# Patient Record
Sex: FEMALE | Employment: UNEMPLOYED | URBAN - METROPOLITAN AREA
[De-identification: names, ages, dates, MRNs, and addresses within clinical notes are randomized per-mention and may not be internally consistent; named-entity substitution may affect disease eponyms.]

---

## 2017-06-12 ENCOUNTER — APPOINTMENT (OUTPATIENT)
Dept: GENERAL RADIOLOGY | Age: 52
End: 2017-06-12
Attending: EMERGENCY MEDICINE
Payer: MEDICARE

## 2017-06-12 ENCOUNTER — HOSPITAL ENCOUNTER (EMERGENCY)
Age: 52
Discharge: HOME | End: 2017-06-13
Attending: EMERGENCY MEDICINE
Payer: MEDICARE

## 2017-06-12 DIAGNOSIS — I10 ESSENTIAL HYPERTENSION: ICD-10-CM

## 2017-06-12 DIAGNOSIS — L03.311 CELLULITIS OF ABDOMINAL WALL: Primary | ICD-10-CM

## 2017-06-12 LAB
ALBUMIN SERPL BCP-MCNC: 2.6 G/DL (ref 3.4–5)
ALBUMIN/GLOB SERPL: 0.4 {RATIO} (ref 0.8–1.7)
ALP SERPL-CCNC: 115 U/L (ref 45–117)
ALT SERPL-CCNC: 33 U/L (ref 13–56)
ANION GAP BLD CALC-SCNC: 6 MMOL/L (ref 3–18)
AST SERPL W P-5'-P-CCNC: 73 U/L (ref 15–37)
BASOPHILS # BLD AUTO: 0 K/UL (ref 0–0.06)
BASOPHILS # BLD: 0 % (ref 0–2)
BILIRUB SERPL-MCNC: 0.4 MG/DL (ref 0.2–1)
BUN SERPL-MCNC: 21 MG/DL (ref 7–18)
BUN/CREAT SERPL: 18 (ref 12–20)
CALCIUM SERPL-MCNC: 8.9 MG/DL (ref 8.5–10.1)
CHLORIDE SERPL-SCNC: 103 MMOL/L (ref 100–108)
CO2 SERPL-SCNC: 27 MMOL/L (ref 21–32)
CREAT SERPL-MCNC: 1.14 MG/DL (ref 0.6–1.3)
DIFFERENTIAL METHOD BLD: ABNORMAL
EOSINOPHIL # BLD: 0.6 K/UL (ref 0–0.4)
EOSINOPHIL NFR BLD: 7 % (ref 0–5)
ERYTHROCYTE [DISTWIDTH] IN BLOOD BY AUTOMATED COUNT: 16.2 % (ref 11.6–14.5)
GLOBULIN SER CALC-MCNC: 7.4 G/DL (ref 2–4)
GLUCOSE SERPL-MCNC: 116 MG/DL (ref 74–99)
HCT VFR BLD AUTO: 26.5 % (ref 35–45)
HGB BLD-MCNC: 8.6 G/DL (ref 12–16)
LYMPHOCYTES # BLD AUTO: 21 % (ref 21–52)
LYMPHOCYTES # BLD: 1.6 K/UL (ref 0.9–3.6)
MCH RBC QN AUTO: 30.2 PG (ref 24–34)
MCHC RBC AUTO-ENTMCNC: 32.5 G/DL (ref 31–37)
MCV RBC AUTO: 93 FL (ref 74–97)
MONOCYTES # BLD: 0.6 K/UL (ref 0.05–1.2)
MONOCYTES NFR BLD AUTO: 7 % (ref 3–10)
NEUTS SEG # BLD: 4.9 K/UL (ref 1.8–8)
NEUTS SEG NFR BLD AUTO: 65 % (ref 40–73)
PLATELET # BLD AUTO: 198 K/UL (ref 135–420)
PMV BLD AUTO: 10.7 FL (ref 9.2–11.8)
POTASSIUM SERPL-SCNC: 5.3 MMOL/L (ref 3.5–5.5)
PROT SERPL-MCNC: 10 G/DL (ref 6.4–8.2)
RBC # BLD AUTO: 2.85 M/UL (ref 4.2–5.3)
SODIUM SERPL-SCNC: 136 MMOL/L (ref 136–145)
WBC # BLD AUTO: 7.7 K/UL (ref 4.6–13.2)

## 2017-06-12 PROCEDURE — 80053 COMPREHEN METABOLIC PANEL: CPT | Performed by: EMERGENCY MEDICINE

## 2017-06-12 PROCEDURE — 85025 COMPLETE CBC W/AUTO DIFF WBC: CPT | Performed by: EMERGENCY MEDICINE

## 2017-06-12 PROCEDURE — 81001 URINALYSIS AUTO W/SCOPE: CPT | Performed by: EMERGENCY MEDICINE

## 2017-06-12 PROCEDURE — 99284 EMERGENCY DEPT VISIT MOD MDM: CPT

## 2017-06-12 PROCEDURE — 71010 XR CHEST PORT: CPT

## 2017-06-12 RX ORDER — BISMUTH SUBSALICYLATE 262 MG
1 TABLET,CHEWABLE ORAL DAILY
COMMUNITY

## 2017-06-12 RX ORDER — FUROSEMIDE 20 MG/1
TABLET ORAL DAILY
Status: ON HOLD | COMMUNITY
End: 2017-10-09

## 2017-06-12 RX ORDER — LOSARTAN POTASSIUM 50 MG/1
TABLET ORAL DAILY
COMMUNITY
End: 2017-06-13

## 2017-06-12 RX ORDER — CLONIDINE HYDROCHLORIDE 0.1 MG/1
0.1 TABLET ORAL 2 TIMES DAILY
COMMUNITY
End: 2017-10-23

## 2017-06-13 ENCOUNTER — APPOINTMENT (OUTPATIENT)
Dept: CT IMAGING | Age: 52
End: 2017-06-13
Attending: EMERGENCY MEDICINE
Payer: MEDICARE

## 2017-06-13 VITALS
SYSTOLIC BLOOD PRESSURE: 174 MMHG | TEMPERATURE: 98.7 F | WEIGHT: 157 LBS | OXYGEN SATURATION: 92 % | HEART RATE: 80 BPM | RESPIRATION RATE: 16 BRPM | BODY MASS INDEX: 26.95 KG/M2 | DIASTOLIC BLOOD PRESSURE: 92 MMHG

## 2017-06-13 LAB
APPEARANCE UR: CLEAR
BILIRUB UR QL: NEGATIVE
COLOR UR: YELLOW
EPITH CASTS URNS QL MICRO: NEGATIVE /LPF (ref 0–5)
GLUCOSE BLD STRIP.AUTO-MCNC: 116 MG/DL (ref 70–110)
GLUCOSE UR STRIP.AUTO-MCNC: NEGATIVE MG/DL
HGB UR QL STRIP: ABNORMAL
KETONES UR QL STRIP.AUTO: NEGATIVE MG/DL
LEUKOCYTE ESTERASE UR QL STRIP.AUTO: NEGATIVE
NITRITE UR QL STRIP.AUTO: NEGATIVE
PH UR STRIP: >8.5 [PH] (ref 5–8)
PROT UR STRIP-MCNC: 300 MG/DL
RBC #/AREA URNS HPF: NEGATIVE /HPF (ref 0–5)
SP GR UR REFRACTOMETRY: 1.01 (ref 1–1.03)
UROBILINOGEN UR QL STRIP.AUTO: 0.2 EU/DL (ref 0.2–1)
WBC URNS QL MICRO: NEGATIVE /HPF (ref 0–4)

## 2017-06-13 PROCEDURE — 74011250637 HC RX REV CODE- 250/637: Performed by: EMERGENCY MEDICINE

## 2017-06-13 PROCEDURE — 82962 GLUCOSE BLOOD TEST: CPT

## 2017-06-13 PROCEDURE — 74011250637 HC RX REV CODE- 250/637: Performed by: PHYSICIAN ASSISTANT

## 2017-06-13 PROCEDURE — 74176 CT ABD & PELVIS W/O CONTRAST: CPT

## 2017-06-13 RX ORDER — SULFAMETHOXAZOLE AND TRIMETHOPRIM 400; 80 MG/1; MG/1
1 TABLET ORAL
Status: DISCONTINUED | OUTPATIENT
Start: 2017-06-13 | End: 2017-06-13

## 2017-06-13 RX ORDER — HYDROCODONE BITARTRATE AND ACETAMINOPHEN 7.5; 325 MG/1; MG/1
1 TABLET ORAL
Status: COMPLETED | OUTPATIENT
Start: 2017-06-13 | End: 2017-06-13

## 2017-06-13 RX ORDER — DIPHENHYDRAMINE HYDROCHLORIDE 50 MG/ML
25 INJECTION, SOLUTION INTRAMUSCULAR; INTRAVENOUS
Status: DISCONTINUED | OUTPATIENT
Start: 2017-06-13 | End: 2017-06-13 | Stop reason: HOSPADM

## 2017-06-13 RX ORDER — CIPROFLOXACIN 500 MG/1
500 TABLET ORAL 2 TIMES DAILY
Qty: 14 TAB | Refills: 0 | Status: SHIPPED | OUTPATIENT
Start: 2017-06-13 | End: 2017-06-20

## 2017-06-13 RX ORDER — SULFAMETHOXAZOLE AND TRIMETHOPRIM 800; 160 MG/1; MG/1
1 TABLET ORAL
Status: COMPLETED | OUTPATIENT
Start: 2017-06-13 | End: 2017-06-13

## 2017-06-13 RX ORDER — SULFAMETHOXAZOLE AND TRIMETHOPRIM 800; 160 MG/1; MG/1
1 TABLET ORAL 2 TIMES DAILY
Qty: 14 TAB | Refills: 0 | Status: SHIPPED | OUTPATIENT
Start: 2017-06-13 | End: 2017-06-20

## 2017-06-13 RX ORDER — CIPROFLOXACIN 500 MG/1
500 TABLET ORAL
Status: COMPLETED | OUTPATIENT
Start: 2017-06-13 | End: 2017-06-13

## 2017-06-13 RX ORDER — ONDANSETRON 2 MG/ML
4 INJECTION INTRAMUSCULAR; INTRAVENOUS
Status: DISCONTINUED | OUTPATIENT
Start: 2017-06-13 | End: 2017-06-13

## 2017-06-13 RX ADMIN — CIPROFLOXACIN HYDROCHLORIDE 500 MG: 500 TABLET, FILM COATED ORAL at 03:25

## 2017-06-13 RX ADMIN — SULFAMETHOXAZOLE AND TRIMETHOPRIM 1 TABLET: 800; 160 TABLET ORAL at 03:25

## 2017-06-13 RX ADMIN — HYDROCODONE BITARTRATE AND ACETAMINOPHEN 1 TABLET: 7.5; 325 TABLET ORAL at 01:49

## 2017-06-13 NOTE — ED PROVIDER NOTES
HPI Comments: Jennifer Verma is a 54 y.o. female that presents to the ED with a complaint of abdominal pain and infection around feeding tube. Pt states that she went to her PCP for these issues 2 days ago. They prescribed antibiotics but she was unaware of treatment plan. She states that she did not receive any medications because they were sent to the wrong pharmacy. Pt states pain is 7/10. Denies CP, SOB, urinary sx, HA, NVD    Patient is a 54 y.o. female presenting with abdominal pain. Abdominal Pain    Pertinent negatives include no fever, no diarrhea, no nausea, no vomiting, no dysuria, no arthralgias, no myalgias and no chest pain. Past Medical History:   Diagnosis Date    Anemia     Cancer ([facility]     throat    Coronary artery disease     Diabetes ([facility] [facility]     Diabetes mellitus ([facility] [facility]     ETOH abuse     HTN (hypertension)     Hypertension     Hypothyroid     Lupus ([facility] [facility]     Osteonecrosis (HCC)     of jaw    PEG adjustment, replacement, or removal     S/P thoracentesis     Throat cancer [facility])        Past Surgical History:   Procedure Laterality Date    ABDOMEN SURGERY PROC UNLISTED      feeding tube placement    HX  SECTION      HX HEENT      throat cancer biopsy         History reviewed. No pertinent family history. Social History     Social History    Marital status: LEGALLY      Spouse name: N/A    Number of children: N/A    Years of education: N/A     Occupational History    Not on file. Social History Main Topics    Smoking status: Former Smoker    Smokeless tobacco: Never Used    Alcohol use No    Drug use: No    Sexual activity: Not on file     Other Topics Concern    Not on file     Social History Narrative         ALLERGIES: Amlodipine and Morphine    Review of Systems   Constitutional: Negative for fatigue and fever. HENT: Negative for congestion. Respiratory: Negative for cough and shortness of breath.     Cardiovascular: Negative for chest pain. Gastrointestinal: Positive for abdominal pain. Negative for diarrhea, nausea and vomiting. Genitourinary: Negative for dysuria. Musculoskeletal: Negative for arthralgias and myalgias. Skin: Positive for color change and rash. Neurological: Negative for dizziness and numbness. All other systems reviewed and are negative. Vitals:    06/12/17 2117 06/12/17 2259 06/13/17 0003 06/13/17 0008   BP: (!) 164/111 (!) 174/118 (!) 186/97    Pulse: 81 80     Resp: 16 16     Temp: 98.7 °F (37.1 °C)      SpO2: 95% 99%  100%   Weight: 71.2 kg (157 lb)               Physical Exam   Constitutional: She is oriented to person, place, and time. She appears well-developed and well-nourished. No distress. HENT:   Head: Normocephalic and atraumatic. Nose: Nose normal.   Eyes: Conjunctivae are normal. Pupils are equal, round, and reactive to light. Neck: Normal range of motion. Neck supple. Cardiovascular: Normal rate, regular rhythm and normal heart sounds. Pulmonary/Chest: Effort normal and breath sounds normal.   Abdominal: Normal appearance and bowel sounds are normal. There is tenderness in the left upper quadrant. There is no CVA tenderness. Peg tube noted. Musculoskeletal: Normal range of motion. Neurological: She is alert and oriented to person, place, and time. Skin: Skin is warm. No bruising and no laceration noted. There is erythema. Psychiatric: She has a normal mood and affect. Her behavior is normal.   Nursing note and vitals reviewed. MDM  Number of Diagnoses or Management Options  Diagnosis management comments: Labs Reviewed  CBC WITH AUTOMATED DIFF - Abnormal; Notable for the following:      RBC                           2.85 (*)               HGB                           8.6 (*)                HCT                           26.5 (*)               RDW                           16.2 (*)               EOSINOPHILS                   7 (*)                  ABS. EOSINOPHILS              0.6 (*)             All other components within normal limits  METABOLIC PANEL, COMPREHENSIVE - Abnormal; Notable for the following:      Glucose                       116 (*)                BUN                           21 (*)                 GFR est non-AA                51 (*)                 AST (SGOT)                    73 (*)                 Protein, total                10.0 (*)               Albumin                       2.6 (*)                Globulin                      7.4 (*)                A-G Ratio                     0.4 (*)             All other components within normal limits  URINALYSIS W/ RFLX MICROSCOPIC - Abnormal; Notable for the following:      pH (UA)                       >8.5 (*)               Protein                       300 (*)                Blood                         TRACE (*)            All other components within normal limits  URINE MICROSCOPIC ONLY    CT:- No focal fluid collections or subcutaneous emphysema noted around PEG tube  -Cholelithiasis  Atelectasis and ground glass centrilobular nodules in right middle lobe, non specific, can be seen in atypical infections.  -additional ground glass nodular opacities in left lower lobe, non specific, infectious/inflammatory    REcent wound culture at PCP showed MRSA and KLEB.   Will treat with Cipro and BActrim    Impression: cellulitis of skin,     Plan:antibiotic treatment here  discharge home  Take medications as prescribed  Follow up with PCP           Amount and/or Complexity of Data Reviewed  Clinical lab tests: ordered and reviewed  Tests in the radiology section of CPT®: ordered and reviewed  Tests in the medicine section of CPT®: ordered and reviewed    Risk of Complications, Morbidity, and/or Mortality  Presenting problems: moderate  Diagnostic procedures: moderate  Management options: moderate    Patient Progress  Patient progress: stable    ED Course       Procedures           Vitals:  Patient Vitals for the past 12 hrs:   Temp Pulse Resp BP SpO2   06/13/17 0234 - - - - 94 %   06/13/17 0208 - - - (!) 186/101 -   06/13/17 0133 - - - - 100 %   06/13/17 0008 - - - - 100 %   06/13/17 0003 - - - (!) 186/97 -   06/12/17 2259 - 80 16 (!) 174/118 99 %   06/12/17 2117 98.7 °F (37.1 °C) 81 16 (!) 164/111 95 %         Medications ordered:   Medications   diphenhydrAMINE (BENADRYL) injection 25 mg (0 mg IntraVENous Held 6/13/17 0101)   ciprofloxacin HCl (CIPRO) tablet 500 mg (not administered)   trimethoprim-sulfamethoxazole (BACTRIM, SEPTRA)  mg per tablet 1 Tab (not administered)   HYDROcodone-acetaminophen (NORCO) 7.5-325 mg per tablet 1 Tab (1 Tab Oral Given 6/13/17 0149)         Lab findings:  Recent Results (from the past 12 hour(s))   CBC WITH AUTOMATED DIFF    Collection Time: 06/12/17 10:30 PM   Result Value Ref Range    WBC 7.7 4.6 - 13.2 K/uL    RBC 2.85 (L) 4.20 - 5.30 M/uL    HGB 8.6 (L) 12.0 - 16.0 g/dL    HCT 26.5 (L) 35.0 - 45.0 %    MCV 93.0 74.0 - 97.0 FL    MCH 30.2 24.0 - 34.0 PG    MCHC 32.5 31.0 - 37.0 g/dL    RDW 16.2 (H) 11.6 - 14.5 %    PLATELET 463 305 - 967 K/uL    MPV 10.7 9.2 - 11.8 FL    NEUTROPHILS 65 40 - 73 %    LYMPHOCYTES 21 21 - 52 %    MONOCYTES 7 3 - 10 %    EOSINOPHILS 7 (H) 0 - 5 %    BASOPHILS 0 0 - 2 %    ABS. NEUTROPHILS 4.9 1.8 - 8.0 K/UL    ABS. LYMPHOCYTES 1.6 0.9 - 3.6 K/UL    ABS. MONOCYTES 0.6 0.05 - 1.2 K/UL    ABS. EOSINOPHILS 0.6 (H) 0.0 - 0.4 K/UL    ABS.  BASOPHILS 0.0 0.0 - 0.06 K/UL    DF AUTOMATED     METABOLIC PANEL, COMPREHENSIVE    Collection Time: 06/12/17 10:30 PM   Result Value Ref Range    Sodium 136 136 - 145 mmol/L    Potassium 5.3 3.5 - 5.5 mmol/L    Chloride 103 100 - 108 mmol/L    CO2 27 21 - 32 mmol/L    Anion gap 6 3.0 - 18 mmol/L    Glucose 116 (H) 74 - 99 mg/dL    BUN 21 (H) 7.0 - 18 MG/DL    Creatinine 1.14 0.6 - 1.3 MG/DL    BUN/Creatinine ratio 18 12 - 20      GFR est AA >60 >60 ml/min/1.73m2    GFR est non-AA 51 (L) >60 ml/min/1.73m2 Calcium 8.9 8.5 - 10.1 MG/DL    Bilirubin, total 0.4 0.2 - 1.0 MG/DL    ALT (SGPT) 33 13 - 56 U/L    AST (SGOT) 73 (H) 15 - 37 U/L    Alk. phosphatase 115 45 - 117 U/L    Protein, total 10.0 (H) 6.4 - 8.2 g/dL    Albumin 2.6 (L) 3.4 - 5.0 g/dL    Globulin 7.4 (H) 2.0 - 4.0 g/dL    A-G Ratio 0.4 (L) 0.8 - 1.7     URINALYSIS W/ RFLX MICROSCOPIC    Collection Time: 06/12/17 11:35 PM   Result Value Ref Range    Color YELLOW      Appearance CLEAR      Specific gravity 1.012 1.005 - 1.030      pH (UA) >8.5 (H) 5.0 - 8.0    Protein 300 (A) NEG mg/dL    Glucose NEGATIVE  NEG mg/dL    Ketone NEGATIVE  NEG mg/dL    Bilirubin NEGATIVE  NEG      Blood TRACE (A) NEG      Urobilinogen 0.2 0.2 - 1.0 EU/dL    Nitrites NEGATIVE  NEG      Leukocyte Esterase NEGATIVE  NEG     URINE MICROSCOPIC ONLY    Collection Time: 06/12/17 11:35 PM   Result Value Ref Range    WBC NEGATIVE  0 - 4 /hpf    RBC NEGATIVE  0 - 5 /hpf    Epithelial cells NEGATIVE  0 - 5 /lpf   GLUCOSE, POC    Collection Time: 06/13/17  2:11 AM   Result Value Ref Range    Glucose (POC) 116 (H) 70 - 110 mg/dL           X-Ray, CT or other radiology findings or impressions:  XR CHEST PORT    (Results Pending)   CT ABD PELV WO CONT    (Results Pending)       Progress notes, Consult notes or additional Procedure notes:       Disposition:  Diagnosis: No diagnosis found. Disposition: discharge    Follow-up Information     None           Patient's Medications   Start Taking    No medications on file   Continue Taking    AMLODIPINE-BENAZEPRIL (LOTREL) 5-10 MG PER CAPSULE    Take 1 Cap by mouth daily. AMYLASE-LIPASE-PROTEASE (CREON) CAPSULE    Take  by mouth three (3) times daily (with meals). ASPIRIN 81 MG TABLET    Take 81 mg by mouth. CLONIDINE HCL (CATAPRES) 0.1 MG TABLET    Take 0.1 mg by mouth two (2) times a day. DOCUSATE SODIUM (COLACE) 100 MG CAPSULE    Take 100 mg by mouth two (2) times a day.     FOLIC ACID (FOLVITE) 1 MG TABLET    Take 1 mg by mouth daily.      FUROSEMIDE (LASIX) 20 MG TABLET    Take  by mouth daily. GABAPENTIN (NEURONTIN) 300 MG CAPSULE    Take 300 mg by mouth three (3) times daily. HYDROCODONE-ACETAMINOPHEN (NORCO) 5-325 MG PER TABLET    Take 1 Tab by mouth every four (4) hours as needed for Pain. LEVOTHYROXINE (SYNTHROID) 100 MCG TABLET    Take  by mouth Daily (before breakfast). METOPROLOL (LOPRESSOR) 50 MG TABLET    Take 50 mg by mouth two (2) times a day. MULTIVITAMIN (ONE A DAY) TABLET    Take 1 Tab by mouth daily. ONDANSETRON HCL (ZOFRAN) 4 MG TABLET    Take 1 Tab by mouth every eight (8) hours as needed for Nausea. OXYCODONE-ACETAMINOPHEN (PERCOCET) 5-325 MG PER TABLET    Take 1 Tab by mouth every four (4) hours as needed for Pain. PANTOPRAZOLE (PROTONIX) 40 MG TABLET    Take 1 Tab by mouth daily. POLYETHYLENE GLYCOL (MIRALAX) 17 GRAM/DOSE POWDER    Take 17 g by mouth daily. SODIUM BICARBONATE 325 MG TABLET    Take 325 mg by mouth four (4) times daily. These Medications have changed    No medications on file   Stop Taking    LOSARTAN (COZAAR) 50 MG TABLET    Take  by mouth daily.

## 2017-06-13 NOTE — ED NOTES
Went in to medicate pt with norco  Pt states \"just one?  One wont do anything can you ask him for two?\"  Will ask provider

## 2017-06-13 NOTE — DISCHARGE INSTRUCTIONS

## 2017-06-13 NOTE — ED TRIAGE NOTES
Alert female reports redness, pain at peg site in lt abdomen. Pain 7/10. Pt reports recently tx'd for pneumonia, sx improving. Continues to have cough with some blood in sputum.

## 2017-09-24 ENCOUNTER — APPOINTMENT (OUTPATIENT)
Dept: CT IMAGING | Age: 52
DRG: 871 | End: 2017-09-24
Attending: INTERNAL MEDICINE
Payer: MEDICARE

## 2017-09-24 ENCOUNTER — APPOINTMENT (OUTPATIENT)
Dept: CT IMAGING | Age: 52
DRG: 871 | End: 2017-09-24
Attending: EMERGENCY MEDICINE
Payer: MEDICARE

## 2017-09-24 ENCOUNTER — HOSPITAL ENCOUNTER (INPATIENT)
Age: 52
LOS: 15 days | Discharge: HOME HEALTH | DRG: 871 | End: 2017-10-09
Attending: EMERGENCY MEDICINE | Admitting: INTERNAL MEDICINE
Payer: MEDICARE

## 2017-09-24 ENCOUNTER — APPOINTMENT (OUTPATIENT)
Dept: GENERAL RADIOLOGY | Age: 52
DRG: 871 | End: 2017-09-24
Attending: EMERGENCY MEDICINE
Payer: MEDICARE

## 2017-09-24 DIAGNOSIS — R13.19 OTHER DYSPHAGIA: ICD-10-CM

## 2017-09-24 DIAGNOSIS — J96.02 ACUTE RESPIRATORY FAILURE WITH HYPERCAPNIA (HCC): ICD-10-CM

## 2017-09-24 DIAGNOSIS — R06.02 SOB (SHORTNESS OF BREATH): ICD-10-CM

## 2017-09-24 DIAGNOSIS — J18.9 HCAP (HEALTHCARE-ASSOCIATED PNEUMONIA): Primary | ICD-10-CM

## 2017-09-24 DIAGNOSIS — R65.21 SEPTIC SHOCK (HCC): ICD-10-CM

## 2017-09-24 DIAGNOSIS — A41.9 SEPTIC SHOCK (HCC): ICD-10-CM

## 2017-09-24 DIAGNOSIS — Z85.819 HISTORY OF THROAT CANCER: ICD-10-CM

## 2017-09-24 PROBLEM — I10 HYPERTENSION: Status: ACTIVE | Noted: 2017-09-24

## 2017-09-24 PROBLEM — E11.9 DIABETES MELLITUS (HCC): Status: ACTIVE | Noted: 2017-09-24

## 2017-09-24 LAB
ALBUMIN SERPL-MCNC: 2.5 G/DL (ref 3.4–5)
ALBUMIN/GLOB SERPL: 0.4 {RATIO} (ref 0.8–1.7)
ALP SERPL-CCNC: 115 U/L (ref 45–117)
ALT SERPL-CCNC: 25 U/L (ref 13–56)
ANION GAP SERPL CALC-SCNC: 8 MMOL/L (ref 3–18)
ANION GAP SERPL CALC-SCNC: 9 MMOL/L (ref 3–18)
APPEARANCE UR: CLEAR
ARTERIAL PATENCY WRIST A: YES
ARTERIAL PATENCY WRIST A: YES
AST SERPL-CCNC: 33 U/L (ref 15–37)
ATRIAL RATE: 122 BPM
BACTERIA URNS QL MICRO: ABNORMAL /HPF
BASE DEFICIT BLD-SCNC: 5 MMOL/L
BASE DEFICIT BLD-SCNC: 6 MMOL/L
BASOPHILS # BLD: 0 K/UL (ref 0–0.06)
BASOPHILS NFR BLD: 0 % (ref 0–2)
BDY SITE: ABNORMAL
BDY SITE: ABNORMAL
BILIRUB SERPL-MCNC: 0.4 MG/DL (ref 0.2–1)
BILIRUB UR QL: NEGATIVE
BODY TEMPERATURE: 100.9
BODY TEMPERATURE: 98.4
BUN SERPL-MCNC: 20 MG/DL (ref 7–18)
BUN SERPL-MCNC: 22 MG/DL (ref 7–18)
BUN/CREAT SERPL: 15 (ref 12–20)
BUN/CREAT SERPL: 19 (ref 12–20)
CALCIUM SERPL-MCNC: 6.7 MG/DL (ref 8.5–10.1)
CALCIUM SERPL-MCNC: 8.2 MG/DL (ref 8.5–10.1)
CALCULATED P AXIS, ECG09: 59 DEGREES
CALCULATED R AXIS, ECG10: -5 DEGREES
CALCULATED T AXIS, ECG11: -115 DEGREES
CHLORIDE SERPL-SCNC: 100 MMOL/L (ref 100–108)
CHLORIDE SERPL-SCNC: 107 MMOL/L (ref 100–108)
CO2 SERPL-SCNC: 25 MMOL/L (ref 21–32)
CO2 SERPL-SCNC: 27 MMOL/L (ref 21–32)
COLOR UR: YELLOW
CREAT SERPL-MCNC: 1.03 MG/DL (ref 0.6–1.3)
CREAT SERPL-MCNC: 1.47 MG/DL (ref 0.6–1.3)
D DIMER PPP FEU-MCNC: 2.09 UG/ML(FEU)
DIAGNOSIS, 93000: NORMAL
DIFFERENTIAL METHOD BLD: ABNORMAL
EOSINOPHIL # BLD: 1 K/UL (ref 0–0.4)
EOSINOPHIL NFR BLD: 6 % (ref 0–5)
EPITH CASTS URNS QL MICRO: ABNORMAL /LPF (ref 0–5)
ERYTHROCYTE [DISTWIDTH] IN BLOOD BY AUTOMATED COUNT: 16.1 % (ref 11.6–14.5)
FLUAV AG NPH QL IA: NEGATIVE
FLUBV AG NOSE QL IA: NEGATIVE
GAS FLOW.O2 O2 DELIVERY SYS: ABNORMAL L/MIN
GAS FLOW.O2 O2 DELIVERY SYS: ABNORMAL L/MIN
GAS FLOW.O2 SETTING OXYMISER: 12 BPM
GAS FLOW.O2 SETTING OXYMISER: 16 BPM
GLOBULIN SER CALC-MCNC: 6.1 G/DL (ref 2–4)
GLUCOSE BLD STRIP.AUTO-MCNC: 154 MG/DL (ref 70–110)
GLUCOSE SERPL-MCNC: 131 MG/DL (ref 74–99)
GLUCOSE SERPL-MCNC: 145 MG/DL (ref 74–99)
GLUCOSE UR STRIP.AUTO-MCNC: NEGATIVE MG/DL
HCO3 BLD-SCNC: 20.3 MMOL/L (ref 22–26)
HCO3 BLD-SCNC: 22.9 MMOL/L (ref 22–26)
HCT VFR BLD AUTO: 26.1 % (ref 35–45)
HGB BLD-MCNC: 8.6 G/DL (ref 12–16)
HGB UR QL STRIP: NEGATIVE
INSPIRATION.DURATION SETTING TIME VENT: 0.9 SEC
INSPIRATION.DURATION SETTING TIME VENT: 1 SEC
KETONES UR QL STRIP.AUTO: NEGATIVE MG/DL
LACTATE BLD-SCNC: 1.5 MMOL/L (ref 0.4–2)
LEUKOCYTE ESTERASE UR QL STRIP.AUTO: NEGATIVE
LYMPHOCYTES # BLD: 2.3 K/UL (ref 0.9–3.6)
LYMPHOCYTES NFR BLD: 12 % (ref 21–52)
MCH RBC QN AUTO: 30.1 PG (ref 24–34)
MCHC RBC AUTO-ENTMCNC: 33 G/DL (ref 31–37)
MCV RBC AUTO: 91.3 FL (ref 74–97)
MONOCYTES # BLD: 0.7 K/UL (ref 0.05–1.2)
MONOCYTES NFR BLD: 4 % (ref 3–10)
MUCOUS THREADS URNS QL MICRO: ABNORMAL /LPF
NEUTS SEG # BLD: 14.3 K/UL (ref 1.8–8)
NEUTS SEG NFR BLD: 78 % (ref 40–73)
NITRITE UR QL STRIP.AUTO: NEGATIVE
O2/TOTAL GAS SETTING VFR VENT: 100 %
O2/TOTAL GAS SETTING VFR VENT: 50 %
P-R INTERVAL, ECG05: 138 MS
PCO2 BLD: 44.3 MMHG (ref 35–45)
PCO2 BLD: 58.3 MMHG (ref 35–45)
PEEP RESPIRATORY: 5 CMH2O
PEEP RESPIRATORY: 5 CMH2O
PH BLD: 7.2 [PH] (ref 7.35–7.45)
PH BLD: 7.28 [PH] (ref 7.35–7.45)
PH UR STRIP: 6.5 [PH] (ref 5–8)
PIP ISTAT,IPIP: 28
PLATELET # BLD AUTO: 262 K/UL (ref 135–420)
PMV BLD AUTO: 11.7 FL (ref 9.2–11.8)
PO2 BLD: 215 MMHG (ref 80–100)
PO2 BLD: 92 MMHG (ref 80–100)
POTASSIUM SERPL-SCNC: 3.3 MMOL/L (ref 3.5–5.5)
POTASSIUM SERPL-SCNC: 3.6 MMOL/L (ref 3.5–5.5)
PROT SERPL-MCNC: 8.6 G/DL (ref 6.4–8.2)
PROT UR STRIP-MCNC: >1000 MG/DL
Q-T INTERVAL, ECG07: 420 MS
QRS DURATION, ECG06: 96 MS
QTC CALCULATION (BEZET), ECG08: 598 MS
RBC # BLD AUTO: 2.86 M/UL (ref 4.2–5.3)
RBC #/AREA URNS HPF: NEGATIVE /HPF (ref 0–5)
SAO2 % BLD: 100 % (ref 92–97)
SAO2 % BLD: 95 % (ref 92–97)
SERVICE CMNT-IMP: ABNORMAL
SERVICE CMNT-IMP: ABNORMAL
SODIUM SERPL-SCNC: 136 MMOL/L (ref 136–145)
SODIUM SERPL-SCNC: 140 MMOL/L (ref 136–145)
SP GR UR REFRACTOMETRY: 1.02 (ref 1–1.03)
SPECIMEN TYPE: ABNORMAL
SPECIMEN TYPE: ABNORMAL
TOTAL RESP. RATE, ITRR: 12
TOTAL RESP. RATE, ITRR: 19
UROBILINOGEN UR QL STRIP.AUTO: 0.2 EU/DL (ref 0.2–1)
VENTILATION MODE VENT: ABNORMAL
VENTILATION MODE VENT: ABNORMAL
VENTRICULAR RATE, ECG03: 122 BPM
VOLUME CONTROL PLUS IVLCP: YES
VOLUME CONTROL PLUS IVLCP: YES
VT SETTING VENT: 420 ML
VT SETTING VENT: 450 ML
WBC # BLD AUTO: 18.3 K/UL (ref 4.6–13.2)
WBC URNS QL MICRO: NEGATIVE /HPF (ref 0–4)

## 2017-09-24 PROCEDURE — 36415 COLL VENOUS BLD VENIPUNCTURE: CPT | Performed by: INTERNAL MEDICINE

## 2017-09-24 PROCEDURE — 65610000006 HC RM INTENSIVE CARE

## 2017-09-24 PROCEDURE — 74011000258 HC RX REV CODE- 258: Performed by: INTERNAL MEDICINE

## 2017-09-24 PROCEDURE — 5A1945Z RESPIRATORY VENTILATION, 24-96 CONSECUTIVE HOURS: ICD-10-PCS | Performed by: EMERGENCY MEDICINE

## 2017-09-24 PROCEDURE — 96367 TX/PROPH/DG ADDL SEQ IV INF: CPT

## 2017-09-24 PROCEDURE — 77030005514 HC CATH URETH FOL14 BARD -A

## 2017-09-24 PROCEDURE — 85025 COMPLETE CBC W/AUTO DIFF WBC: CPT | Performed by: EMERGENCY MEDICINE

## 2017-09-24 PROCEDURE — 74011250636 HC RX REV CODE- 250/636: Performed by: HOSPITALIST

## 2017-09-24 PROCEDURE — 82803 BLOOD GASES ANY COMBINATION: CPT

## 2017-09-24 PROCEDURE — 96368 THER/DIAG CONCURRENT INF: CPT

## 2017-09-24 PROCEDURE — 74011000250 HC RX REV CODE- 250: Performed by: INTERNAL MEDICINE

## 2017-09-24 PROCEDURE — 82962 GLUCOSE BLOOD TEST: CPT

## 2017-09-24 PROCEDURE — 80053 COMPREHEN METABOLIC PANEL: CPT | Performed by: EMERGENCY MEDICINE

## 2017-09-24 PROCEDURE — 51702 INSERT TEMP BLADDER CATH: CPT

## 2017-09-24 PROCEDURE — 74011000258 HC RX REV CODE- 258: Performed by: EMERGENCY MEDICINE

## 2017-09-24 PROCEDURE — 74000 XR ABD PORT  1 V: CPT

## 2017-09-24 PROCEDURE — 87186 SC STD MICRODIL/AGAR DIL: CPT | Performed by: EMERGENCY MEDICINE

## 2017-09-24 PROCEDURE — 87040 BLOOD CULTURE FOR BACTERIA: CPT | Performed by: EMERGENCY MEDICINE

## 2017-09-24 PROCEDURE — 74011000250 HC RX REV CODE- 250: Performed by: EMERGENCY MEDICINE

## 2017-09-24 PROCEDURE — 74011636320 HC RX REV CODE- 636/320: Performed by: EMERGENCY MEDICINE

## 2017-09-24 PROCEDURE — 96375 TX/PRO/DX INJ NEW DRUG ADDON: CPT

## 2017-09-24 PROCEDURE — 71275 CT ANGIOGRAPHY CHEST: CPT

## 2017-09-24 PROCEDURE — 74011250636 HC RX REV CODE- 250/636: Performed by: INTERNAL MEDICINE

## 2017-09-24 PROCEDURE — 89220 SPUTUM SPECIMEN COLLECTION: CPT

## 2017-09-24 PROCEDURE — 36591 DRAW BLOOD OFF VENOUS DEVICE: CPT

## 2017-09-24 PROCEDURE — 71010 XR CHEST PORT: CPT

## 2017-09-24 PROCEDURE — 70450 CT HEAD/BRAIN W/O DYE: CPT

## 2017-09-24 PROCEDURE — 87077 CULTURE AEROBIC IDENTIFY: CPT | Performed by: EMERGENCY MEDICINE

## 2017-09-24 PROCEDURE — 74011250637 HC RX REV CODE- 250/637: Performed by: INTERNAL MEDICINE

## 2017-09-24 PROCEDURE — 87086 URINE CULTURE/COLONY COUNT: CPT | Performed by: INTERNAL MEDICINE

## 2017-09-24 PROCEDURE — 77030032764 HC GLDSCP STAT GVL VERT -B

## 2017-09-24 PROCEDURE — 74011250637 HC RX REV CODE- 250/637: Performed by: EMERGENCY MEDICINE

## 2017-09-24 PROCEDURE — 77030020263 HC SOL INJ SOD CL0.9% LFCR 1000ML

## 2017-09-24 PROCEDURE — 94640 AIRWAY INHALATION TREATMENT: CPT

## 2017-09-24 PROCEDURE — 31500 INSERT EMERGENCY AIRWAY: CPT

## 2017-09-24 PROCEDURE — 87804 INFLUENZA ASSAY W/OPTIC: CPT | Performed by: EMERGENCY MEDICINE

## 2017-09-24 PROCEDURE — 80048 BASIC METABOLIC PNL TOTAL CA: CPT | Performed by: INTERNAL MEDICINE

## 2017-09-24 PROCEDURE — 85379 FIBRIN DEGRADATION QUANT: CPT | Performed by: INTERNAL MEDICINE

## 2017-09-24 PROCEDURE — 74011000250 HC RX REV CODE- 250

## 2017-09-24 PROCEDURE — 81001 URINALYSIS AUTO W/SCOPE: CPT | Performed by: EMERGENCY MEDICINE

## 2017-09-24 PROCEDURE — 87070 CULTURE OTHR SPECIMN AEROBIC: CPT | Performed by: EMERGENCY MEDICINE

## 2017-09-24 PROCEDURE — 74011250636 HC RX REV CODE- 250/636

## 2017-09-24 PROCEDURE — 77030018846 HC SOL IRR STRL H20 ICUM -A

## 2017-09-24 PROCEDURE — 74011250636 HC RX REV CODE- 250/636: Performed by: EMERGENCY MEDICINE

## 2017-09-24 PROCEDURE — 0BH17EZ INSERTION OF ENDOTRACHEAL AIRWAY INTO TRACHEA, VIA NATURAL OR ARTIFICIAL OPENING: ICD-10-PCS | Performed by: EMERGENCY MEDICINE

## 2017-09-24 PROCEDURE — 94002 VENT MGMT INPAT INIT DAY: CPT

## 2017-09-24 PROCEDURE — 36600 WITHDRAWAL OF ARTERIAL BLOOD: CPT

## 2017-09-24 PROCEDURE — 77030020454 HC TU ET CUF HI/LO COVD -B

## 2017-09-24 PROCEDURE — 96365 THER/PROPH/DIAG IV INF INIT: CPT

## 2017-09-24 PROCEDURE — 99285 EMERGENCY DEPT VISIT HI MDM: CPT

## 2017-09-24 PROCEDURE — 83605 ASSAY OF LACTIC ACID: CPT

## 2017-09-24 PROCEDURE — 93005 ELECTROCARDIOGRAM TRACING: CPT

## 2017-09-24 RX ORDER — SODIUM CHLORIDE 0.9 % (FLUSH) 0.9 %
5-10 SYRINGE (ML) INJECTION AS NEEDED
Status: DISCONTINUED | OUTPATIENT
Start: 2017-09-24 | End: 2017-10-09 | Stop reason: HOSPADM

## 2017-09-24 RX ORDER — IPRATROPIUM BROMIDE AND ALBUTEROL SULFATE 2.5; .5 MG/3ML; MG/3ML
3 SOLUTION RESPIRATORY (INHALATION)
Status: COMPLETED | OUTPATIENT
Start: 2017-09-24 | End: 2017-09-24

## 2017-09-24 RX ORDER — PROPOFOL 10 MG/ML
0-50 VIAL (ML) INTRAVENOUS
Status: DISCONTINUED | OUTPATIENT
Start: 2017-09-24 | End: 2017-09-26

## 2017-09-24 RX ORDER — ONDANSETRON 2 MG/ML
4 INJECTION INTRAMUSCULAR; INTRAVENOUS
Status: DISCONTINUED | OUTPATIENT
Start: 2017-09-24 | End: 2017-10-09 | Stop reason: HOSPADM

## 2017-09-24 RX ORDER — ACETAMINOPHEN 325 MG/1
650 TABLET ORAL
Status: DISCONTINUED | OUTPATIENT
Start: 2017-09-24 | End: 2017-09-24

## 2017-09-24 RX ORDER — ETOMIDATE 2 MG/ML
INJECTION INTRAVENOUS
Status: COMPLETED
Start: 2017-09-24 | End: 2017-09-24

## 2017-09-24 RX ORDER — IPRATROPIUM BROMIDE AND ALBUTEROL SULFATE 2.5; .5 MG/3ML; MG/3ML
3 SOLUTION RESPIRATORY (INHALATION)
Status: DISCONTINUED | OUTPATIENT
Start: 2017-09-25 | End: 2017-09-28

## 2017-09-24 RX ORDER — POTASSIUM CHLORIDE 7.45 MG/ML
10 INJECTION INTRAVENOUS
Status: COMPLETED | OUTPATIENT
Start: 2017-09-24 | End: 2017-09-26

## 2017-09-24 RX ORDER — SODIUM CHLORIDE 9 MG/ML
100 INJECTION, SOLUTION INTRAVENOUS CONTINUOUS
Status: DISCONTINUED | OUTPATIENT
Start: 2017-09-24 | End: 2017-09-25

## 2017-09-24 RX ORDER — LORAZEPAM 2 MG/ML
1 INJECTION INTRAMUSCULAR
Status: COMPLETED | OUTPATIENT
Start: 2017-09-24 | End: 2017-09-24

## 2017-09-24 RX ORDER — FAMOTIDINE 10 MG/ML
20 INJECTION INTRAVENOUS DAILY
Status: DISCONTINUED | OUTPATIENT
Start: 2017-09-24 | End: 2017-09-27

## 2017-09-24 RX ORDER — NOREPINEPHRINE BITARTRATE/D5W 8 MG/250ML
0-30 PLASTIC BAG, INJECTION (ML) INTRAVENOUS
Status: DISCONTINUED | OUTPATIENT
Start: 2017-09-24 | End: 2017-09-29

## 2017-09-24 RX ORDER — SODIUM CHLORIDE 0.9 % (FLUSH) 0.9 %
5-10 SYRINGE (ML) INJECTION EVERY 8 HOURS
Status: DISCONTINUED | OUTPATIENT
Start: 2017-09-24 | End: 2017-10-09 | Stop reason: HOSPADM

## 2017-09-24 RX ORDER — SODIUM CHLORIDE 9 MG/ML
97 INJECTION, SOLUTION INTRAVENOUS ONCE
Status: COMPLETED | OUTPATIENT
Start: 2017-09-24 | End: 2017-09-24

## 2017-09-24 RX ORDER — ASPIRIN 81 MG/1
81 TABLET ORAL DAILY
Status: DISCONTINUED | OUTPATIENT
Start: 2017-09-25 | End: 2017-09-27

## 2017-09-24 RX ORDER — ROCURONIUM BROMIDE 10 MG/ML
INJECTION, SOLUTION INTRAVENOUS
Status: COMPLETED
Start: 2017-09-24 | End: 2017-09-24

## 2017-09-24 RX ORDER — LEVOFLOXACIN 5 MG/ML
750 INJECTION, SOLUTION INTRAVENOUS EVERY 24 HOURS
Status: DISCONTINUED | OUTPATIENT
Start: 2017-09-24 | End: 2017-09-25

## 2017-09-24 RX ORDER — HEPARIN SODIUM 5000 [USP'U]/ML
5000 INJECTION, SOLUTION INTRAVENOUS; SUBCUTANEOUS EVERY 8 HOURS
Status: DISCONTINUED | OUTPATIENT
Start: 2017-09-24 | End: 2017-09-27

## 2017-09-24 RX ORDER — HYDROCODONE BITARTRATE AND ACETAMINOPHEN 5; 325 MG/1; MG/1
1 TABLET ORAL ONCE
Status: COMPLETED | OUTPATIENT
Start: 2017-09-24 | End: 2017-09-24

## 2017-09-24 RX ORDER — HYDRALAZINE HYDROCHLORIDE 20 MG/ML
10 INJECTION INTRAMUSCULAR; INTRAVENOUS
Status: DISCONTINUED | OUTPATIENT
Start: 2017-09-24 | End: 2017-10-02

## 2017-09-24 RX ORDER — NOREPINEPHRINE BITARTRATE/D5W 8 MG/250ML
PLASTIC BAG, INJECTION (ML) INTRAVENOUS
Status: COMPLETED
Start: 2017-09-24 | End: 2017-09-24

## 2017-09-24 RX ORDER — PROPOFOL 10 MG/ML
INJECTION, EMULSION INTRAVENOUS
Status: COMPLETED
Start: 2017-09-24 | End: 2017-09-24

## 2017-09-24 RX ADMIN — SODIUM CHLORIDE 1878 ML: 900 INJECTION, SOLUTION INTRAVENOUS at 07:36

## 2017-09-24 RX ADMIN — ACETAMINOPHEN 650 MG: 650 SOLUTION ORAL at 16:54

## 2017-09-24 RX ADMIN — LEVOFLOXACIN 750 MG: 750 INJECTION, SOLUTION INTRAVENOUS at 08:31

## 2017-09-24 RX ADMIN — PIPERACILLIN SODIUM,TAZOBACTAM SODIUM 4.5 G: 4; .5 INJECTION, POWDER, FOR SOLUTION INTRAVENOUS at 15:21

## 2017-09-24 RX ADMIN — PIPERACILLIN SODIUM,TAZOBACTAM SODIUM 4.5 G: 4; .5 INJECTION, POWDER, FOR SOLUTION INTRAVENOUS at 07:50

## 2017-09-24 RX ADMIN — SODIUM CHLORIDE 0.4 MCG/KG/HR: 900 INJECTION, SOLUTION INTRAVENOUS at 11:39

## 2017-09-24 RX ADMIN — POTASSIUM CHLORIDE 10 MEQ: 7.46 INJECTION, SOLUTION INTRAVENOUS at 23:23

## 2017-09-24 RX ADMIN — Medication 5 MCG/MIN: at 10:59

## 2017-09-24 RX ADMIN — SODIUM CHLORIDE 0.2 MCG/KG/HR: 900 INJECTION, SOLUTION INTRAVENOUS at 11:06

## 2017-09-24 RX ADMIN — FAMOTIDINE 20 MG: 10 INJECTION, SOLUTION INTRAVENOUS at 15:15

## 2017-09-24 RX ADMIN — SODIUM CHLORIDE 500 MG: 900 INJECTION, SOLUTION INTRAVENOUS at 09:42

## 2017-09-24 RX ADMIN — SODIUM CHLORIDE 100 ML/HR: 900 INJECTION, SOLUTION INTRAVENOUS at 15:15

## 2017-09-24 RX ADMIN — PIPERACILLIN SODIUM,TAZOBACTAM SODIUM 4.5 G: 4; .5 INJECTION, POWDER, FOR SOLUTION INTRAVENOUS at 22:25

## 2017-09-24 RX ADMIN — ETOMIDATE 20 MG: 2 INJECTION, SOLUTION INTRAVENOUS at 10:38

## 2017-09-24 RX ADMIN — PROPOFOL 15 MCG/KG/MIN: 10 INJECTION, EMULSION INTRAVENOUS at 11:42

## 2017-09-24 RX ADMIN — SODIUM CHLORIDE 1000 MG: 900 INJECTION, SOLUTION INTRAVENOUS at 08:07

## 2017-09-24 RX ADMIN — ACETAMINOPHEN 650 MG: 650 SOLUTION ORAL at 23:23

## 2017-09-24 RX ADMIN — HEPARIN SODIUM 5000 UNITS: 5000 INJECTION, SOLUTION INTRAVENOUS; SUBCUTANEOUS at 22:25

## 2017-09-24 RX ADMIN — ROCURONIUM BROMIDE 70 MG: 10 INJECTION INTRAVENOUS at 10:38

## 2017-09-24 RX ADMIN — HEPARIN SODIUM 5000 UNITS: 5000 INJECTION, SOLUTION INTRAVENOUS; SUBCUTANEOUS at 16:54

## 2017-09-24 RX ADMIN — ACETAMINOPHEN 650 MG: 160 SUSPENSION ORAL at 08:10

## 2017-09-24 RX ADMIN — Medication 10 ML: at 22:29

## 2017-09-24 RX ADMIN — HYDROCODONE BITARTRATE AND ACETAMINOPHEN 1 TABLET: 5; 325 TABLET ORAL at 08:18

## 2017-09-24 RX ADMIN — PROPOFOL 12.5 MCG/KG/MIN: 10 INJECTION, EMULSION INTRAVENOUS at 13:11

## 2017-09-24 RX ADMIN — LORAZEPAM 1 MG: 2 INJECTION INTRAMUSCULAR; INTRAVENOUS at 09:41

## 2017-09-24 RX ADMIN — IPRATROPIUM BROMIDE AND ALBUTEROL SULFATE 3 ML: .5; 3 SOLUTION RESPIRATORY (INHALATION) at 09:26

## 2017-09-24 RX ADMIN — SODIUM CHLORIDE 750 MG: 900 INJECTION, SOLUTION INTRAVENOUS at 19:59

## 2017-09-24 RX ADMIN — THIAMINE HYDROCHLORIDE 100 MG: 100 INJECTION, SOLUTION INTRAMUSCULAR; INTRAVENOUS at 19:59

## 2017-09-24 RX ADMIN — Medication 10 ML: at 15:21

## 2017-09-24 RX ADMIN — POTASSIUM CHLORIDE 10 MEQ: 7.46 INJECTION, SOLUTION INTRAVENOUS at 22:25

## 2017-09-24 RX ADMIN — SODIUM CHLORIDE 97 ML: 900 INJECTION, SOLUTION INTRAVENOUS at 10:21

## 2017-09-24 RX ADMIN — IOPAMIDOL 73 ML: 755 INJECTION, SOLUTION INTRAVENOUS at 10:20

## 2017-09-24 RX ADMIN — Medication 5 MCG/MIN: at 12:28

## 2017-09-24 NOTE — PROGRESS NOTES
Physical Exam   Skin:         red skin rash covering body; raised in some places on abdomen, skin dry and flaky

## 2017-09-24 NOTE — IP AVS SNAPSHOT
Nicholas Gao 
 
 
 [address] [place] [id number] 
[phone] Patient: Patel Chavis MRN: [id number] :1965 You are allergic to the following Allergen Reactions Amlodipine Swelling Leg swelling per patient Morphine Rash Recent Documentation Height Weight BMI OB Status Smoking Status 1.626 m 64.4 kg 24.37 kg/m2 Postmenopausal Former Smoker Emergency Contacts Name Discharge Info Relation Home Work Mobile Kari Molina CAREGIVER [3] Parent [1] [phone] About your hospitalization You were admitted on:  2017 You last received care in the:  [facility] [address] You were discharged on:  2017 Unit phone number:  [phone] Why you were hospitalized Your primary diagnosis was:  Acute Respiratory Failure With Hypercapnia (Hcc) Your diagnoses also included:  Sepsis (Hcc), Hcap (Healthcare-Associated Pneumonia), Hypertension, Diabetes Mellitus (Hcc) Providers Seen During Your Hospitalizations Provider Role Specialty Primary office phone Roopa Salas DO Attending Provider Emergency Medicine [phone] Allyson Foster MD Attending Provider Internal Medicine [phone] Miguel Myers MD Attending Provider Internal Medicine [phone] Sudarshan Youssef MD Attending Provider Internal Medicine [phone] Your Primary Care Physician (PCP) Primary Care Physician Office Phone Office Fax Bill Patel [phone] Follow-up Information Follow up With Details Comments Contact Info Roberta Verma MD On 10/6/2017 10am  34 [facility] [address] [id number] 
[phone] Current Discharge Medication List  
  
START taking these medications Dose & Instructions Dispensing Information Comments Morning Noon Evening Bedtime ciprofloxacin HCl 750 mg tablet Commonly known as:  CIPRO Your last dose was: Your next dose is:    
   
   
 Dose:  750 mg Take 1 Tab by mouth every twelve (12) hours. Quantity:  50 Tab Refills:  0  
     
   
   
   
  
 doxycycline 100 mg tablet Commonly known as:  ADOXA Your last dose was: Your next dose is:    
   
   
 Dose:  100 mg Take 1 Tab by mouth two (2) times a day. Quantity:  28 Tab Refills:  0 CONTINUE these medications which have CHANGED Dose & Instructions Dispensing Information Comments Morning Noon Evening Bedtime  
 furosemide 20 mg tablet Commonly known as:  LASIX What changed:  how much to take Your last dose was: Your next dose is:    
   
   
 Dose:  40 mg Take 2 Tabs by mouth daily. Quantity:  60 Tab Refills:  0  
     
   
   
   
  
 metoprolol tartrate 100 mg IR tablet Commonly known as:  LOPRESSOR What changed:   
- medication strength 
- how much to take - when to take this Your last dose was: Your next dose is:    
   
   
 Dose:  100 mg Take 1 Tab by mouth every twelve (12) hours. Quantity:  60 Tab Refills:  0 CONTINUE these medications which have NOT CHANGED Dose & Instructions Dispensing Information Comments Morning Noon Evening Bedtime  
 aspirin 81 mg tablet Your last dose was: Your next dose is:    
   
   
 Dose:  81 mg Take 81 mg by mouth. Refills:  0  
     
   
   
   
  
 cloNIDine HCl 0.1 mg tablet Commonly known as:  CATAPRES Your last dose was: Your next dose is:    
   
   
 Dose:  0.1 mg Take 0.1 mg by mouth two (2) times a day. Refills:  0  
     
   
   
   
  
 COLACE 100 mg capsule Generic drug:  docusate sodium Your last dose was: Your next dose is:    
   
   
 Dose:  100 mg Take 100 mg by mouth two (2) times a day. Refills:  0 CREON 12,000-38,000 -60,000 unit capsule Generic drug:  lipase-protease-amylase Your last dose was: Your next dose is: Take  by mouth three (3) times daily (with meals). Refills:  0  
     
   
   
   
  
 folic acid 1 mg tablet Commonly known as:  [employer] Your last dose was: Your next dose is:    
   
   
 Dose:  1 mg Take 1 mg by mouth daily. Refills:  0  
     
   
   
   
  
 gabapentin 300 mg capsule Commonly known as:  NEURONTIN Your last dose was: Your next dose is:    
   
   
 Dose:  300 mg Take 300 mg by mouth three (3) times daily. Refills:  0 HYDROcodone-acetaminophen 5-325 mg per tablet Commonly known as:  [place] Cuff Your last dose was: Your next dose is:    
   
   
 Dose:  1 Tab Take 1 Tab by mouth every four (4) hours as needed for Pain. Max Daily Amount: 6 Tabs. Quantity:  10 Tab Refills:  0 LOTREL 5-10 mg per capsule Generic drug:  amLODIPine-benazepril Your last dose was: Your next dose is:    
   
   
 Dose:  1 Cap Take 1 Cap by mouth daily. Refills:  0 MIRALAX 17 gram/dose powder Generic drug:  polyethylene glycol Your last dose was: Your next dose is:    
   
   
 Dose:  17 g Take 17 g by mouth daily. Refills:  0  
     
   
   
   
  
 multivitamin tablet Commonly known as:  ONE A DAY Your last dose was: Your next dose is:    
   
   
 Dose:  1 Tab Take 1 Tab by mouth daily. Refills:  0  
     
   
   
   
  
 ondansetron hcl 4 mg tablet Commonly known as:  ZOFRAN (AS HYDROCHLORIDE) Your last dose was: Your next dose is:    
   
   
 Dose:  4 mg Take 1 Tab by mouth every eight (8) hours as needed for Nausea. Quantity:  20 Tab Refills:  0  
     
   
   
   
  
 pantoprazole 40 mg tablet Commonly known as:  PROTONIX Your last dose was: Your next dose is:    
   
   
 Dose:  40 mg Take 1 Tab by mouth daily. Quantity:  30 Tab Refills:  0  
     
   
   
   
  
 SYNTHROID 100 mcg tablet Generic drug:  levothyroxine Your last dose was: Your next dose is: Take  by mouth Daily (before breakfast). Refills:  0 STOP taking these medications PERCOCET 5-325 mg per tablet Generic drug:  oxyCODONE-acetaminophen  
   
  
 sodium bicarbonate 325 mg tablet Where to Get Your Medications Information on where to get these meds will be given to you by the nurse or doctor. ! Ask your nurse or doctor about these medications  
  ciprofloxacin HCl 750 mg tablet  
 doxycycline 100 mg tablet  
 furosemide 20 mg tablet HYDROcodone-acetaminophen 5-325 mg per tablet  
 metoprolol tartrate 100 mg IR tablet Discharge Instructions Acute Respiratory Failure improved    Continue current therapy, home oxygen at 2L DISCHARGE SUMMARY from Nurse The following personal items are in your possession at time of discharge: 
 
Dental Appliances: None Visual Aid: None Home Medications: None Jewelry: None Clothing: At bedside Other Valuables: None PATIENT INSTRUCTIONS: 
 
 
F-face looks uneven A-arms unable to move or move unevenly S-speech slurred or non-existent T-time-call 911 as soon as signs and symptoms begin-DO NOT go Back to bed or wait to see if you get better-TIME IS BRAIN. Warning Signs of HEART ATTACK Call 911 if you have these symptoms: 
? Chest discomfort. Most heart attacks involve discomfort in the center of the chest that lasts more than a few minutes, or that goes away and comes back. It can feel like uncomfortable pressure, squeezing, fullness, or pain. ? Discomfort in other areas of the upper body. Symptoms can include pain or discomfort in one or both arms, the back, neck, jaw, or stomach. ? Shortness of breath with or without chest discomfort. ? Other signs may include breaking out in a cold sweat, nausea, or lightheadedness. Don't wait more than five minutes to call [address]! Fast action can save your life. Calling 911 is almost always the fastest way to get lifesaving treatment. Emergency Medical Services staff can begin treatment when they arrive  up to an hour sooner than if someone gets to the hospital by car. The discharge information has been reviewed with the patient. The patient verbalized understanding. Discharge medications reviewed with the patient and appropriate educational materials and side effects teaching were provided. Discharge Orders None Introducing [address]! [facility] introduces [employer] patient portal. Now you can access parts of your medical record, email your doctor's office, and request medication refills online. 1. In your internet browser, go to [email or web address] 2. Click on the First Time User? Click Here link in the Sign In box. You will see the New Member Sign Up page. 3. Enter your [employer] Access Code exactly as it appears below. You will not need to use this code after youve completed the sign-up process. If you do not sign up before the expiration date, you must request a new code. · [employer] Access Code: [email or web address] Expires: 1/7/2018  4:12 PM 
 
4. Enter the last four digits of your Social Security Number (xxxx) and Date of Birth (mm/dd/yyyy) as indicated and click Submit. You will be taken to the next sign-up page. 5. Create a Clinician [employer]rapeuticst ID. This will be your [employer] login ID and cannot be changed, so think of one that is secure and easy to remember. 6. Create a Clinician [employer]st password. You can change your password at any time. 7. Enter your Password Reset Question and Answer. This can be used at a later time if you forget your password. 8. Enter your e-mail address. You will receive e-mail notification when new information is available in [address] 9. Click Sign Up. You can now view and download portions of your medical record. 10. Click the Download Summary menu link to download a portable copy of your medical information. If you have questions, please visit the Frequently Asked Questions section of the [employer] website. Remember, [employer] is NOT to be used for urgent needs. For medical emergencies, dial 911. Now available from your iPhone and Android! General Information Please provide this summary of care documentation to your next provider. Patient Signature:  ____________________________________________________________ Date:  ____________________________________________________________  
  
Olivia Buitrago Provider Signature:  ____________________________________________________________ Date:  ____________________________________________________________

## 2017-09-24 NOTE — IP AVS SNAPSHOT
[address] [facility] 
 
 
 [address] [facility] [place] [address] 
[phone] Patient: Dayanara Maxwell MRN: [id number] :1965 Current Discharge Medication List  
  
START taking these medications Dose & Instructions Dispensing Information Comments Morning Noon Evening Bedtime  
 ciprofloxacin HCl 750 mg tablet Commonly known as:  CIPRO Your last dose was: Your next dose is:    
   
   
 Dose:  750 mg Take 1 Tab by mouth every twelve (12) hours. Quantity:  50 Tab Refills:  0  
     
   
   
   
  
 doxycycline 100 mg tablet Commonly known as:  ADOXA Your last dose was: Your next dose is:    
   
   
 Dose:  100 mg Take 1 Tab by mouth two (2) times a day. Quantity:  28 Tab Refills:  0 CONTINUE these medications which have CHANGED Dose & Instructions Dispensing Information Comments Morning Noon Evening Bedtime  
 furosemide 20 mg tablet Commonly known as:  LASIX What changed:  how much to take Your last dose was: Your next dose is:    
   
   
 Dose:  40 mg Take 2 Tabs by mouth daily. Quantity:  60 Tab Refills:  0  
     
   
   
   
  
 metoprolol tartrate 100 mg IR tablet Commonly known as:  LOPRESSOR What changed:   
- medication strength 
- how much to take - when to take this Your last dose was: Your next dose is:    
   
   
 Dose:  100 mg Take 1 Tab by mouth every twelve (12) hours. Quantity:  60 Tab Refills:  0 CONTINUE these medications which have NOT CHANGED Dose & Instructions Dispensing Information Comments Morning Noon Evening Bedtime  
 aspirin 81 mg tablet Your last dose was: Your next dose is:    
   
   
 Dose:  81 mg Take 81 mg by mouth. Refills:  0  
     
   
   
   
  
 cloNIDine HCl 0.1 mg tablet Commonly known as:  CATAPRES Your last dose was: Your next dose is:    
   
   
 Dose:  0.1 mg Take 0.1 mg by mouth two (2) times a day. Refills:  0  
     
   
   
   
  
 COLACE 100 mg capsule Generic drug:  docusate sodium Your last dose was: Your next dose is:    
   
   
 Dose:  100 mg Take 100 mg by mouth two (2) times a day. Refills:  0  
     
   
   
   
  
 CREON 12,000-38,000 -60,000 unit capsule Generic drug:  lipase-protease-amylase Your last dose was: Your next dose is: Take  by mouth three (3) times daily (with meals). Refills:  0  
     
   
   
   
  
 folic acid 1 mg tablet Commonly known as:  [employer] Your last dose was: Your next dose is:    
   
   
 Dose:  1 mg Take 1 mg by mouth daily. Refills:  0  
     
   
   
   
  
 gabapentin 300 mg capsule Commonly known as:  NEURONTIN Your last dose was: Your next dose is:    
   
   
 Dose:  300 mg Take 300 mg by mouth three (3) times daily. Refills:  0 HYDROcodone-acetaminophen 5-325 mg per tablet Commonly known as:  Wayna Glaze Your last dose was: Your next dose is:    
   
   
 Dose:  1 Tab Take 1 Tab by mouth every four (4) hours as needed for Pain. Max Daily Amount: 6 Tabs. Quantity:  10 Tab Refills:  0 LOTREL 5-10 mg per capsule Generic drug:  amLODIPine-benazepril Your last dose was: Your next dose is:    
   
   
 Dose:  1 Cap Take 1 Cap by mouth daily. Refills:  0 MIRALAX 17 gram/dose powder Generic drug:  polyethylene glycol Your last dose was: Your next dose is:    
   
   
 Dose:  17 g Take 17 g by mouth daily. Refills:  0  
     
   
   
   
  
 multivitamin tablet Commonly known as:  ONE A DAY Your last dose was: Your next dose is:    
   
   
 Dose:  1 Tab Take 1 Tab by mouth daily. Refills:  0 ondansetron hcl 4 mg tablet Commonly known as:  ZOFRAN (AS HYDROCHLORIDE) Your last dose was: Your next dose is:    
   
   
 Dose:  4 mg Take 1 Tab by mouth every eight (8) hours as needed for Nausea. Quantity:  20 Tab Refills:  0  
     
   
   
   
  
 pantoprazole 40 mg tablet Commonly known as:  PROTONIX Your last dose was: Your next dose is:    
   
   
 Dose:  40 mg Take 1 Tab by mouth daily. Quantity:  30 Tab Refills:  0  
     
   
   
   
  
 SYNTHROID 100 mcg tablet Generic drug:  levothyroxine Your last dose was: Your next dose is: Take  by mouth Daily (before breakfast). Refills:  0 STOP taking these medications PERCOCET 5-325 mg per tablet Generic drug:  oxyCODONE-acetaminophen  
   
  
 sodium bicarbonate 325 mg tablet Where to Get Your Medications Information on where to get these meds will be given to you by the nurse or doctor. ! Ask your nurse or doctor about these medications  
  ciprofloxacin HCl 750 mg tablet  
 doxycycline 100 mg tablet  
 furosemide 20 mg tablet HYDROcodone-acetaminophen 5-325 mg per tablet  
 metoprolol tartrate 100 mg IR tablet

## 2017-09-24 NOTE — ED NOTES
Patient returned from CT scan with decreased mentation.  MD whitlock immediately at bedside and will prepare for intubation

## 2017-09-24 NOTE — PROGRESS NOTES
Reason for Renal Dosing:  Per Renal Dosing Policy    Patient clinical status and labs ordered/reviewed. Pt Weight Weight: 62.6 kg (138 lb)   Serum Creatinine Lab Results   Component Value Date/Time    Creatinine 1.03 09/24/2017 07:15 AM       Creatinine Clearance Estimated Creatinine Clearance: 57.1 mL/min (based on Cr of 1.03). BUN Lab Results   Component Value Date/Time    BUN 20 09/24/2017 07:15 AM       WBC Lab Results   Component Value Date/Time    WBC 18.3 09/24/2017 07:15 AM      Temperature Temp: (!) 100.5 °F (38.1 °C)     HR Pulse (Heart Rate): (!) 127       BP BP: (!) 119/105             Drug type: Antibiotic indicated for Pneumonia (HAP)     Drug/dose: Piperacillin-Tazobactam 4.5 grams every 6 hours was adjusted to Piperacillin-Tazobactam 4.5 grams every 8 hours HECTOR     Continue to monitor.     Signed Parveen Briceno, PHARMD  Date 9/24/2017  Time 2:40 PM

## 2017-09-24 NOTE — PROGRESS NOTES
Pharmacy Dosing Services: Vancomycin    Indication: HAP    Day of therapy: 0    Other Antimicrobials (Include dose, start day & day of therapy):  Zosyn 4.5g  Levaquin 750 mg    Loading dose (date given): 1.5g  Current Maintenance dose: new start    Goal Vancomycin Level: 15-20  (Trough 15-20 for most infections, 20 for meningitis/osteomyelitis, pre-HD level ~25)     Significant Cultures:  blood culture pending    Renal function stable? (unstable defined as SCr increase of 0.5 mg/dL or > 50% increase from baseline, whichever is greater) (Y/N): Y     CAPD, Hemodialysis or Renal Replacement Therapy (Y/N): N     Recent Labs      17   0715   CREA  1.03   BUN  20*   WBC  18.3*     Temp (24hrs), Av.5 °F (38.1 °C), Min:100.5 °F (38.1 °C), Max:100.5 °F (38.1 °C)    Creatinine Clearance (Creatinine Clearance (ml/min)): 57 ml/min     Regimen assessment: Load total 1.5g  Maintenance dose: 750mg IV q12h  Next scheduled level:  @ 0700       Pharmacy will follow daily and adjust medications as appropriate for renal function and/or serum levels.     Thank you,  Melissa Keith

## 2017-09-24 NOTE — ED PROVIDER NOTES
HPI Comments: 7:11 AM Selma García is a 54 y.o. female with history of throat cancer, HTN, DM, ETOH abuse, DM and CAD who presents to the ED c/o SOB which began . Pt states she is not currently being treated for throat cancer. Pt states she was just discharged a few days ago from [facility] rehab and was on Meropenem for aspiration PNA. Pt has no other complaints at this time. Pt RA sats 80%, given neb from EMS. PCP: Les Gonzalez MD      The history is provided by the patient. Past Medical History:   Diagnosis Date    Anemia     Cancer [facility] [address]     throat    Coronary artery disease     Diabetes [facility] [address]     Diabetes mellitus [facility] [address]     ETOH abuse     HTN (hypertension)     Hypertension     Hypothyroid     Lupus ([facility] [address]     Osteonecrosis (HCC)     of jaw    PEG adjustment, replacement, or removal     S/P thoracentesis     Throat cancer [facility])        Past Surgical History:   Procedure Laterality Date    ABDOMEN SURGERY PROC UNLISTED      feeding tube placement    HX  SECTION      HX HEENT      throat cancer biopsy         History reviewed. No pertinent family history. Social History     Social History    Marital status: LEGALLY      Spouse name: N/A    Number of children: N/A    Years of education: N/A     Occupational History    Not on file. Social History Main Topics    Smoking status: Former Smoker    Smokeless tobacco: Never Used    Alcohol use No    Drug use: No    Sexual activity: Not on file     Other Topics Concern    Not on file     Social History Narrative         ALLERGIES: Amlodipine and Morphine    Review of Systems   Constitutional: Negative for fever. HENT: Negative for congestion. Respiratory: Positive for shortness of breath. Negative for cough. Cardiovascular: Negative for chest pain and leg swelling. Gastrointestinal: Negative for abdominal pain, nausea and vomiting. Genitourinary: Negative for dysuria. Musculoskeletal: Negative. Neurological: Negative for speech difficulty and headaches. All other systems reviewed and are negative. Vitals:    09/24/17 1030 09/24/17 1045 09/24/17 1100 09/24/17 1115   BP: 123/72 (!) 79/58 127/74 161/84   Pulse: (!) 131 (!) 108 (!) 115 (!) 131   Resp: 16      Temp:       SpO2:    100%   Weight:       Height:                Physical Exam   Constitutional: She is oriented to person, place, and time. She appears well-developed and well-nourished. HENT:   Head: Normocephalic and atraumatic. Neck: Neck supple. No JVD present. Cardiovascular: Regular rhythm. Tachycardia present. Pulmonary/Chest: Tachypnea noted. Coarse wheeze and rhonci in all fields  R upper chest wall tunneled PICC in place, no surrounding erythema or drainage   Abdominal: Soft. She exhibits no distension. There is no tenderness. There is no rebound. Peg tube in place, no surrounding erythema or drainage   Musculoskeletal: She exhibits no edema. Neurological: She is alert and oriented to person, place, and time. Skin: Skin is warm and dry. No erythema.         MDM  Number of Diagnoses or Management Options  HCAP (healthcare-associated pneumonia):   Septic shock [facility]):   Diagnosis management comments: 53 y/o female presents with sob and wheezing    Has hx of aspiration, although pt denies taking anything po    Septic bundle initiated in ED  abx for HCAP       Amount and/or Complexity of Data Reviewed  Clinical lab tests: ordered and reviewed  Tests in the radiology section of CPT®: ordered and reviewed  Tests in the medicine section of CPT®: reviewed and ordered    Critical Care  Total time providing critical care: 30-74 minutes    ED Course       Intubation  Date/Time: 9/24/2017 11:48 AM  Performed by: Darrian Smalls  Authorized by: Darrian Smalls     Consent:     Consent obtained:  Emergent situation  Pre-procedure details:     Patient status:  Unresponsive    Mallampati score:  II Pretreatment medications:  None    Paralytics:  Rocuronium  Procedure details:     Preoxygenation:  Nonrebreather mask    CPR in progress: no      Intubation method:  Oral    Oral intubation technique:  Video-assisted    Laryngoscope blade: Mac 3    Tube size (mm):  6.5    Tube type:  Cuffed    Number of attempts:  2    Ventilation between attempts: yes      Cricoid pressure: no      Tube visualized through cords: yes    Placement assessment:     ETT to lip:  22    Tube secured with:  ETT hopper    Breath sounds:  Equal    Placement verification: chest rise, CXR verification, direct visualization, equal breath sounds and ETCO2 detector      CXR findings:  ETT in proper place  Post-procedure details:     Patient tolerance of procedure:   Tolerated well, no immediate complications        Vitals:  Patient Vitals for the past 12 hrs:   Temp Pulse Resp BP SpO2   09/24/17 1115 - (!) 131 - 161/84 100 %   09/24/17 1100 - (!) 115 - 127/74 -   09/24/17 1045 - (!) 108 - (!) 79/58 -   09/24/17 1030 - (!) 131 16 123/72 -   09/24/17 0945 - (!) 134 19 (!) 165/109 95 %   09/24/17 0930 - (!) 134 30 (!) 184/96 97 %   09/24/17 0915 - (!) 132 29 (!) 195/107 95 %   09/24/17 0900 - (!) 122 27 (!) 173/99 99 %   09/24/17 0845 - (!) 121 23 (!) 184/97 97 %   09/24/17 0830 - (!) 121 28 (!) 186/99 96 %   09/24/17 0815 - (!) 118 25 (!) 181/94 96 %   09/24/17 0800 - (!) 123 24 162/84 -   09/24/17 0745 - (!) 115 22 (!) 177/151 100 %   09/24/17 0730 - (!) 115 19 147/84 100 %   09/24/17 0715 - (!) 127 24 143/82 99 %   09/24/17 0709 (!) 100.5 °F (38.1 °C) (!) 135 28 (!) 161/103 100 %        Medications ordered:   Medications   sodium chloride (NS) flush 5-10 mL (not administered)   piperacillin-tazobactam (ZOSYN) 4.5 g in 0.9% sodium chloride (MBP/ADV) 100 mL MBP (0 g IntraVENous IV Completed 9/24/17 0820)   levoFLOXacin (LEVAQUIN) 750 mg in D5W IVPB (0 mg IntraVENous IV Completed 9/24/17 1001)   vancomycin (VANCOCIN) 750 mg in 0.9% sodium chloride (MBP/ADV) 250 mL ADV (not administered)   VANCOMYCIN INFORMATION NOTE (not administered)   VANCOMYCIN INFORMATION NOTE 1 Each (not administered)   dexmedeTOMidine (PRECEDEX) 400 mcg in 0.9% sodium chloride 100 mL infusion (0 mcg/kg/hr × 62.6 kg IntraVENous Stopped 9/24/17 1148)   NOREPINephrine (LEVOPHED) 8,000 mcg in dextrose 5% 250 mL infusion (0 mcg/min IntraVENous Stopped 9/24/17 1130)   propofol (DIPRIVAN) infusion (5 mcg/kg/min × 62.6 kg IntraVENous Continued On Admission 9/24/17 1148)   sodium chloride 0.9 % bolus infusion 1,878 mL (0 mL/kg × 62.6 kg IntraVENous IV Completed 9/24/17 1000)   vancomycin (VANCOCIN) 1,000 mg in 0.9% sodium chloride (MBP/ADV) 250 mL adv (0 mg IntraVENous IV Completed 9/24/17 1007)   acetaminophen (TYLENOL) solution 650 mg (650 mg Oral Given 9/24/17 0810)   vancomycin (VANCOCIN) 500 mg in 0.9% sodium chloride (MBP/ADV) 100 mL ADV (500 mg IntraVENous Given 9/24/17 0942)   HYDROcodone-acetaminophen (NORCO) 5-325 mg per tablet 1 Tab (1 Tab Oral Given 9/24/17 0818)   albuterol-ipratropium (DUO-NEB) 2.5 MG-0.5 MG/3 ML (3 mL Nebulization Given 9/24/17 0926)   LORazepam (ATIVAN) injection 1 mg (1 mg IntraVENous Given 9/24/17 0941)   iopamidol (ISOVUE-370) 76 % injection 73 mL (73 mL IntraVENous Given 9/24/17 1020)   0.9% sodium chloride infusion 97 mL (0 mL IntraVENous IV Completed 9/24/17 1022)   etomidate (AMIDATE) 2 mg/mL injection (20 mg  Given 9/24/17 1038)   rocuronium (ZEMURON) 10 mg/mL injection (70 mg IntraVENous Given 9/24/17 1038)         Lab findings:  Recent Results (from the past 12 hour(s))   CULTURE, BLOOD    Collection Time: 09/24/17  7:15 AM   Result Value Ref Range    Special Requests: PERIPHERAL      Culture result: NO GROWTH AFTER 3 HOURS     METABOLIC PANEL, COMPREHENSIVE    Collection Time: 09/24/17  7:15 AM   Result Value Ref Range    Sodium 136 136 - 145 mmol/L    Potassium 3.3 (L) 3.5 - 5.5 mmol/L    Chloride 100 100 - 108 mmol/L    CO2 27 21 - 32 mmol/L    Anion gap 9 3.0 - 18 mmol/L    Glucose 145 (H) 74 - 99 mg/dL    BUN 20 (H) 7.0 - 18 MG/DL    Creatinine 1.03 0.6 - 1.3 MG/DL    BUN/Creatinine ratio 19 12 - 20      GFR est AA >60 >60 ml/min/1.73m2    GFR est non-AA 57 (L) >60 ml/min/1.73m2    Calcium 8.2 (L) 8.5 - 10.1 MG/DL    Bilirubin, total 0.4 0.2 - 1.0 MG/DL    ALT (SGPT) 25 13 - 56 U/L    AST (SGOT) 33 15 - 37 U/L    Alk. phosphatase 115 45 - 117 U/L    Protein, total 8.6 (H) 6.4 - 8.2 g/dL    Albumin 2.5 (L) 3.4 - 5.0 g/dL    Globulin 6.1 (H) 2.0 - 4.0 g/dL    A-G Ratio 0.4 (L) 0.8 - 1.7     CBC WITH AUTOMATED DIFF    Collection Time: 09/24/17  7:15 AM   Result Value Ref Range    WBC 18.3 (H) 4.6 - 13.2 K/uL    RBC 2.86 (L) 4.20 - 5.30 M/uL    HGB 8.6 (L) 12.0 - 16.0 g/dL    HCT 26.1 (L) 35.0 - 45.0 %    MCV 91.3 74.0 - 97.0 FL    MCH 30.1 24.0 - 34.0 PG    MCHC 33.0 31.0 - 37.0 g/dL    RDW 16.1 (H) 11.6 - 14.5 %    PLATELET 765 143 - 281 K/uL    MPV 11.7 9.2 - 11.8 FL    NEUTROPHILS 78 (H) 40 - 73 %    LYMPHOCYTES 12 (L) 21 - 52 %    MONOCYTES 4 3 - 10 %    EOSINOPHILS 6 (H) 0 - 5 %    BASOPHILS 0 0 - 2 %    ABS. NEUTROPHILS 14.3 (H) 1.8 - 8.0 K/UL    ABS. LYMPHOCYTES 2.3 0.9 - 3.6 K/UL    ABS. MONOCYTES 0.7 0.05 - 1.2 K/UL    ABS. EOSINOPHILS 1.0 (H) 0.0 - 0.4 K/UL    ABS.  BASOPHILS 0.0 0.0 - 0.06 K/UL    DF AUTOMATED     D DIMER    Collection Time: 09/24/17  7:15 AM   Result Value Ref Range    D DIMER 2.09 (H) <0.46 ug/ml(FEU)   EKG, 12 LEAD, INITIAL    Collection Time: 09/24/17  7:28 AM   Result Value Ref Range    Ventricular Rate 122 BPM    Atrial Rate 122 BPM    P-R Interval 138 ms    QRS Duration 96 ms    Q-T Interval 420 ms    QTC Calculation (Bezet) 598 ms    Calculated P Axis 59 degrees    Calculated R Axis -5 degrees    Calculated T Axis -115 degrees    Diagnosis       Sinus tachycardia  Incomplete right bundle branch block  Marked ST abnormality, possible inferior subendocardial injury  Abnormal ECG  When compared with ECG of 04-DEC-2008 16:19,  Incomplete right bundle branch block is now present  Confirmed by Carrol Franklin MD, --- ([id number]) on 9/24/2017 10:01:55 AM     CULTURE, BLOOD    Collection Time: 09/24/17  7:30 AM   Result Value Ref Range    Special Requests: PERIPHERAL      Culture result: NO GROWTH AFTER 3 HOURS     POC LACTIC ACID    Collection Time: 09/24/17  7:33 AM   Result Value Ref Range    Lactic Acid (POC) 1.5 0.4 - 2.0 mmol/L   URINALYSIS W/ RFLX MICROSCOPIC    Collection Time: 09/24/17  8:03 AM   Result Value Ref Range    Color YELLOW      Appearance CLEAR      Specific gravity 1.020 1.005 - 1.030      pH (UA) 6.5 5.0 - 8.0      Protein >1000 (A) NEG mg/dL    Glucose NEGATIVE  NEG mg/dL    Ketone NEGATIVE  NEG mg/dL    Bilirubin NEGATIVE  NEG      Blood NEGATIVE  NEG      Urobilinogen 0.2 0.2 - 1.0 EU/dL    Nitrites NEGATIVE  NEG      Leukocyte Esterase NEGATIVE  NEG     URINE MICROSCOPIC ONLY    Collection Time: 09/24/17  8:03 AM   Result Value Ref Range    WBC NEGATIVE  0 - 4 /hpf    RBC NEGATIVE  0 - 5 /hpf    Epithelial cells 1+ 0 - 5 /lpf    Bacteria 1+ (A) NEG /hpf    Mucus 1+ (A) NEG /lpf   POC G3    Collection Time: 09/24/17 11:39 AM   Result Value Ref Range    Device: VENT      FIO2 (POC) 100 %    pH (POC) 7.201 (LL) 7.35 - 7.45      pCO2 (POC) 58.3 (H) 35.0 - 45.0 MMHG    pO2 (POC) 215 (H) 80 - 100 MMHG    HCO3 (POC) 22.9 22 - 26 MMOL/L    sO2 (POC) 100 (H) 92 - 97 %    Base deficit (POC) 5 mmol/L    Mode ASSIST CONTROL      Tidal volume 450 ml    Set Rate 12 bpm    PEEP/CPAP (POC) 5 cmH2O    Allens test (POC) YES      Inspiratory Time 1 sec    Total resp. rate 12      Site LEFT RADIAL      Patient temp. 98.4      Specimen type (POC) ARTERIAL      Performed by Vanessa Simple     Volume control plus YES         EKG interpretation by ED Physician:   728, rate 122, normal axis, sinus tach with RBBB, t wave inversion I, II, III, avf, V1-v6, st depression V3-6. No prior for comparison.      X-Ray, CT or other radiology findings or impressions:  XR CHEST PORT     PICC line in proper position. Diffuse patchy infiltrate L greater than R. Per Dr. Arleen Espino     XR CHEST PORT   Final Result   Impression:     1. Right IJ approach central venous catheter in position as above. 2. Multifocal alveolar opacities, upper lobe predominant, suspicious for  multifocal pneumonia with or without associated pulmonary edema. Per radiologist   CTA CHEST W OR W WO CONT   Final Result   IMPRESSION:     1. No evidence of pulmonary embolism.     2. Diffuse alveolar, centrilobular, and peribronchial opacities, the appearance  of which is most in keeping with a multifocal pneumonia. No evidence of pleural  effusion.     3. Percutaneous gastrostomy catheter in expected position, with mild fluid  distention of the stomach noted. Per radiologist   XR CHEST PORT after intubation    ET tube in appropriate position. Worsening and infiltrates and edema. Concerning of ARDS. Per Dr. Arleen Espino   XR ABD PORT  1 V     NG Tube in appropriate position. Per Dr. Arleen Espino         Reevaluation, Progress notes, Consult notes, or additional Procedure notes:   8:18 AM Discussed results with the patient. Plan for admission. 8:53 AM Consult: I discussed care with Dr. Ben Hallman (hospitalist). It was a standard discussion including patient history, chief complaint, available diagnostic results, and predicted treatment course. Agrees with the admission. He requests CTA to eval for PE.     10:30 AM Pt returned from CT unresponsive. Plan to intubate due to respiratory distress with accessory muscle usage. Discovered that pt had been given ativan prior to transport to CT, (per Dr. Ben Hallman). Due to pt possibly on chronic benzos, will not reverse. Coarse rhonci noted, pt emergently intubated. 10:36 AM 20 of Etomidate and 70 of Rocuronium given. 10:40 AM Pt intubated with breath sounds on the right and left confirmed by Judi Greene RN. 22 at the lip.      Pt with post intubation hypotension so started on presedex for sedation. Also started on levophed. She then had persistent hypertension and tachycardia, so changed to propofol for sedation. Sepsis 3-6 hour reevaluation and exam:       Reevaluation:    Vital Signs:   Patient Vitals for the past 12 hrs:   Temp Pulse Resp BP SpO2   09/24/17 1115 - (!) 131 - 161/84 100 %   09/24/17 1100 - (!) 115 - 127/74 -   09/24/17 1045 - (!) 108 - (!) 79/58 -   09/24/17 1030 - (!) 131 16 123/72 -   09/24/17 0945 - (!) 134 19 (!) 165/109 95 %   09/24/17 0930 - (!) 134 30 (!) 184/96 97 %   09/24/17 0915 - (!) 132 29 (!) 195/107 95 %   09/24/17 0900 - (!) 122 27 (!) 173/99 99 %   09/24/17 0845 - (!) 121 23 (!) 184/97 97 %   09/24/17 0830 - (!) 121 28 (!) 186/99 96 %   09/24/17 0815 - (!) 118 25 (!) 181/94 96 %   09/24/17 0800 - (!) 123 24 162/84 -   09/24/17 0745 - (!) 115 22 (!) 177/151 100 %   09/24/17 0730 - (!) 115 19 147/84 100 %   09/24/17 0715 - (!) 127 24 143/82 99 %   09/24/17 0709 (!) 100.5 °F (38.1 °C) (!) 135 28 (!) 161/103 100 %       Cardiopulmonary assessment:  RESP: Diffuse rhonchi in all fields. CV: S1 and S2 tachycardic; No murmurs, gallops or rubs. Capillary refill:  1 second  Peripheral pulse:  2+    Skin exam:  Skin color: pink  Skin Turgor: normal    Sepsis 3-6 hour reevaluation and exam performed at 11:40 AM.      11:41 AM Spoke to Dr. Lissy Cruz (intensivist) who accepts the admit. I spent 33 minutes of critical care on this patient. Disposition:  Diagnosis:   1. HCAP (healthcare-associated pneumonia)    2.  Septic shock ([address]        Disposition: admitted      [address] acting as a scribe for and in the presence of Leopoldo Drone, DO              September 24, 2017 at 7:18 AM                            Provider Attestation:           I personally performed the services described in the documentation, reviewed the documentation, as recorded by the scribe in my presence, and it accurately and completely records my words and actions.  September 24, 2017 at 7:18 AM - Alejandro Quezada DO

## 2017-09-24 NOTE — PROGRESS NOTES
1040-Called to ED bedside for intubation due to resp distress - decreased mental status following CT   Pt intubated with 6.5 ETT, 22 lip, Pt has bilateral BS, color change on ETCO2, bilateral chest rise   Xray completed  Placed on vent: AC VC+, Fn=305, Rate=12, Kej5=136%, peep=5    ABG completed post intubation:  PH=7.201, CO2=58.3, RR4=643, HCO3=22.9, Sat=100% - critical values reported to ED physician Dr. Robi Zambrano changes following ABG:  Wean Fio2=50%, Qh=545, Rate=16, Ti=.9     Sputum sample collected & sent to lab    1400-Transport pt to unit - no complications

## 2017-09-24 NOTE — H&P
Hospitalist Admission Note    NAME:  Jane Her   :   1965   MRN:   [id number]     Date/Time:  2017 5:11 PM    Subjective:     CHIEF COMPLAINT:    Chief Complaint   Patient presents with    Shortness of Breath       HISTORY OF PRESENT ILLNESS:     Terri Jalloh is a 54 y.o.  female with h/o throat cancer,cad,hypertension,hypothyroidism,etoh abuse,anxiety,depression,diabetes,brought to ER because of short of breath. She was just discharged from [facility] for pneumonia. In the ER patient was tachypnic. CXR c/w increased conspicuity to multifocal alveolar opacities. As patient was agitated,he was given ativan 1 mg. Then was taken for CTA. At return from CT,patient was noted unresponsive and was intubated. She was admitted for respiratory failure and pneumonia. 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 Authorizing Provider   furosemide (LASIX) 20 mg tablet Take  by mouth daily. Nikita Le MD   cloNIDine HCl (CATAPRES) 0.1 mg tablet Take 0.1 mg by mouth two (2) times a day. Nikita Le MD   multivitamin (ONE A DAY) tablet Take 1 Tab by mouth daily. Nikita Le MD   HYDROcodone-acetaminophen (NORCO) 5-325 mg per tablet Take 1 Tab by mouth every four (4) hours as needed for Pain.     Phys Other, MD   polyethylene glycol (MIRALAX) 17 gram/dose powder Take 17 g by mouth daily. Nikita Le MD   docusate sodium (COLACE) 100 mg capsule Take 100 mg by mouth two (2) times a day. Nikita Le MD   oxyCODONE-acetaminophen (PERCOCET) 5-325 mg per tablet Take 1 Tab by mouth every four (4) hours as needed for Pain. Nikita Le MD   amLODIPine-benazepril (LOTREL) 5-10 mg per capsule Take 1 Cap by mouth daily. Nikita Le MD   metoprolol (LOPRESSOR) 50 mg tablet Take 50 mg by mouth two (2) times a day. Nikita Le MD   levothyroxine (SYNTHROID) 100 mcg tablet Take  by mouth Daily (before breakfast). Nikita Le MD   gabapentin (NEURONTIN) 300 mg capsule Take 300 mg by mouth three (3) times daily. Nikita Le MD   amylase-lipase-protease (CREON) capsule Take  by mouth three (3) times daily (with meals). Nikita Le MD   sodium bicarbonate 325 mg tablet Take 325 mg by mouth four (4) times daily. Nikita Le MD   pantoprazole (PROTONIX) 40 mg tablet Take 1 Tab by mouth daily. 1/5/14   Lala Ratliff DO   ondansetron hcl (ZOFRAN) 4 mg tablet Take 1 Tab by mouth every eight (8) hours as needed for Nausea. 1/5/14   Lala Ratliff DO   folic acid (FOLVITE) 1 mg tablet Take 1 mg by mouth daily. Nikita Le MD   aspirin 81 mg tablet Take 81 mg by mouth. Nikita Le MD       REVIEW OF SYSTEMS:    Patient intubated,not able to provide informations. Objective:   VITALS:       Visit Vitals    /72    Pulse (!) 129    Temp (!) 102.4 °F (39.1 °C)    Resp 25    Ht 5' 4\" (1.626 m)    Wt 62.6 kg (138 lb)    SpO2 100%    BMI 23.69 kg/m2     O2 Flow Rate (L/min): 10 l/min O2 Device: Ventilator    PHYSICAL EXAM:   General:    Lying in bed,sedated and intubated  HEENT:  Pupils equal.  Sclera anicteric. Conjunctiva pink. Mucous membranes                           moist  Neck:  Supple. Trachea midline. No accessory muscle use. No thyromegaly.                            No jugular venous distention  CV:                  Regular rate and rhythm. Lungs:   Crackles in bases. Abdomen:   Soft, non-tender. Not distended. Bowel sounds normal. No organomegaly  Extremities: No cyanosis. No edema. No clubbing  Neurologic: Sedated and intubated. Skin:                Warm and dry. No rashes. LAB DATA REVIEWED:    Recent Results (from the past 24 hour(s))   CULTURE, BLOOD    Collection Time: 09/24/17  7:15 AM   Result Value Ref Range    Special Requests: PERIPHERAL      Culture result: NO GROWTH AFTER 3 HOURS     METABOLIC PANEL, COMPREHENSIVE    Collection Time: 09/24/17  7:15 AM   Result Value Ref Range    Sodium 136 136 - 145 mmol/L    Potassium 3.3 (L) 3.5 - 5.5 mmol/L    Chloride 100 100 - 108 mmol/L    CO2 27 21 - 32 mmol/L    Anion gap 9 3.0 - 18 mmol/L    Glucose 145 (H) 74 - 99 mg/dL    BUN 20 (H) 7.0 - 18 MG/DL    Creatinine 1.03 0.6 - 1.3 MG/DL    BUN/Creatinine ratio 19 12 - 20      GFR est AA >60 >60 ml/min/1.73m2    GFR est non-AA 57 (L) >60 ml/min/1.73m2    Calcium 8.2 (L) 8.5 - 10.1 MG/DL    Bilirubin, total 0.4 0.2 - 1.0 MG/DL    ALT (SGPT) 25 13 - 56 U/L    AST (SGOT) 33 15 - 37 U/L    Alk. phosphatase 115 45 - 117 U/L    Protein, total 8.6 (H) 6.4 - 8.2 g/dL    Albumin 2.5 (L) 3.4 - 5.0 g/dL    Globulin 6.1 (H) 2.0 - 4.0 g/dL    A-G Ratio 0.4 (L) 0.8 - 1.7     CBC WITH AUTOMATED DIFF    Collection Time: 09/24/17  7:15 AM   Result Value Ref Range    WBC 18.3 (H) 4.6 - 13.2 K/uL    RBC 2.86 (L) 4.20 - 5.30 M/uL    HGB 8.6 (L) 12.0 - 16.0 g/dL    HCT 26.1 (L) 35.0 - 45.0 %    MCV 91.3 74.0 - 97.0 FL    MCH 30.1 24.0 - 34.0 PG    MCHC 33.0 31.0 - 37.0 g/dL    RDW 16.1 (H) 11.6 - 14.5 %    PLATELET 679 918 - 584 K/uL    MPV 11.7 9.2 - 11.8 FL    NEUTROPHILS 78 (H) 40 - 73 %    LYMPHOCYTES 12 (L) 21 - 52 %    MONOCYTES 4 3 - 10 %    EOSINOPHILS 6 (H) 0 - 5 %    BASOPHILS 0 0 - 2 %    ABS. NEUTROPHILS 14.3 (H) 1.8 - 8.0 K/UL    ABS. LYMPHOCYTES 2.3 0.9 - 3.6 K/UL    ABS.  MONOCYTES 0.7 0.05 - 1.2 K/UL    ABS. EOSINOPHILS 1.0 (H) 0.0 - 0.4 K/UL    ABS.  BASOPHILS 0.0 0.0 - 0.06 K/UL    DF AUTOMATED     D DIMER    Collection Time: 09/24/17  7:15 AM   Result Value Ref Range    D DIMER 2.09 (H) <0.46 ug/ml(FEU)   EKG, 12 LEAD, INITIAL    Collection Time: 09/24/17  7:28 AM   Result Value Ref Range    Ventricular Rate 122 BPM    Atrial Rate 122 BPM    P-R Interval 138 ms    QRS Duration 96 ms    Q-T Interval 420 ms    QTC Calculation (Bezet) 598 ms    Calculated P Axis 59 degrees    Calculated R Axis -5 degrees    Calculated T Axis -115 degrees    Diagnosis       Sinus tachycardia  Incomplete right bundle branch block  Marked ST abnormality, possible inferior subendocardial injury  Abnormal ECG  When compared with ECG of 04-DEC-2008 16:19,  Incomplete right bundle branch block is now present  Confirmed by Juany Owusu MD, --- ([id number]) on 9/24/2017 10:01:55 AM     CULTURE, BLOOD    Collection Time: 09/24/17  7:30 AM   Result Value Ref Range    Special Requests: PERIPHERAL      Culture result: NO GROWTH AFTER 3 HOURS     POC LACTIC ACID    Collection Time: 09/24/17  7:33 AM   Result Value Ref Range    Lactic Acid (POC) 1.5 0.4 - 2.0 mmol/L   URINALYSIS W/ RFLX MICROSCOPIC    Collection Time: 09/24/17  8:03 AM   Result Value Ref Range    Color YELLOW      Appearance CLEAR      Specific gravity 1.020 1.005 - 1.030      pH (UA) 6.5 5.0 - 8.0      Protein >1000 (A) NEG mg/dL    Glucose NEGATIVE  NEG mg/dL    Ketone NEGATIVE  NEG mg/dL    Bilirubin NEGATIVE  NEG      Blood NEGATIVE  NEG      Urobilinogen 0.2 0.2 - 1.0 EU/dL    Nitrites NEGATIVE  NEG      Leukocyte Esterase NEGATIVE  NEG     URINE MICROSCOPIC ONLY    Collection Time: 09/24/17  8:03 AM   Result Value Ref Range    WBC NEGATIVE  0 - 4 /hpf    RBC NEGATIVE  0 - 5 /hpf    Epithelial cells 1+ 0 - 5 /lpf    Bacteria 1+ (A) NEG /hpf    Mucus 1+ (A) NEG /lpf   POC G3    Collection Time: 09/24/17 11:39 AM   Result Value Ref Range    Device: VENT      FIO2 (POC) 100 %    pH (POC) 7.201 (LL) 7.35 - 7.45      pCO2 (POC) 58.3 (H) 35.0 - 45.0 MMHG    pO2 (POC) 215 (H) 80 - 100 MMHG    HCO3 (POC) 22.9 22 - 26 MMOL/L    sO2 (POC) 100 (H) 92 - 97 %    Base deficit (POC) 5 mmol/L    Mode ASSIST CONTROL      Tidal volume 450 ml    Set Rate 12 bpm    PEEP/CPAP (POC) 5 cmH2O    Allens test (POC) YES      Inspiratory Time 1 sec    Total resp. rate 12      Site LEFT RADIAL      Patient temp. 98.4      Specimen type (POC) ARTERIAL      Performed by [facility]     Volume control plus YES     INFLUENZA A & B AG (RAPID TEST)    Collection Time: 09/24/17 11:53 AM   Result Value Ref Range    Influenza A Antigen NEGATIVE  NEG      Influenza B Antigen NEGATIVE  NEG         Assessment/Plan:      Principal Problem:    Acute respiratory failure with hypercapnia (HCC) (9/24/2017)    Active Problems:    Sepsis ([facility] [email or web address] (9/24/2017)      HCAP (healthcare-associated pneumonia) (9/24/2017)      Hypertension (9/24/2017)      Diabetes mellitus ([facility] [email or web address] (9/24/2017)      GERD  ___________________________________________________  PLAN:    1. Acute respiratory failure with hypercapnia. -Intubated. Follow ABGs    -Intensivist consulted for management. 2.Sepsis due to HCAP    -On vanc,zosyn and levaquin    -Blood cx  3. HTN    -Will monitor.    -Hydralazine prn if needed  4. Diabetes.    -On ssi  5. GERD    -On ppi.   DVT:SCDS  Full code  Disposition:icu for now.    ___________________________________________________  Admitting Physician: Mortimer Diesel, MD

## 2017-09-24 NOTE — PROGRESS NOTES
-1405-Received pt. From ER and placed on ventilator. Current settings ACVC+ rate-16, VT-420, PEEP-5, FIO2-50%. O2 Sats-100% HR-127, RR-28. ETT noted to be patent secure. Respiratory will continue to monitor. -[facility]    -1620-Pt. Transported to CT via portable ventilator and returned without complications. Pt. Returned to above settngs. O2 Sats -100% HR-130, RR-23. ETT noted to be patent and secure. -[address]

## 2017-09-24 NOTE — PROGRESS NOTES
1400 Patient arrived to unit. Patient on propofol and levophed- verified in MAR. Patient sedated but will rouse to name and follow simple commands like thumbs up and wiggle toes. Lungs coarse to auscultation- RT in room to set up ventillator. Patient ST in the 120's. Dr. Madeleine Reinoso to come and see patient. Skin breakdown on sacral/buttock area and red rash present - see skin assessment. Holly cath in place. [id number] Patient taken to CT for CT of head. Carla Clarke called to inform that patient has 102.4 temp. Order received for 650 mg tylenol Q4H via PEG. [address] still 102. Ice packs applied to bilateral groin and axillary regions. Cool cloth to head. 1830 Temp recheck 100.9 oral. Lab called to add on culture to UA. 1930 Oral temp 99.9. Bedside and Verbal shift change report given to [address] (oncoming nurse) by Arpita Iraheta RN   (offgoing nurse). Report included the following information SBAR, Kardex, Intake/Output, MAR and Recent Results. Dr. Tunde Blancas paged for K of 3.3

## 2017-09-24 NOTE — ED NOTES
The Sepsis Screening has been completed on arrival in the Emergency Department.     Vital signs:  Patient Vitals for the past 4 hrs:   Temp Pulse Resp BP SpO2   09/24/17 0709 (!) 100.5 °F (38.1 °C) (!) 135 28 (!) 161/103 100 %            =monitored (data validate)  MAP (Calculated): 122    =calculated (manual entry)    Is patient is 29y/o or older, meets 2 or more ABNORMAL VITAL SIGNS below, associated with SUSPECTED INFECTION?  YES     Temperature < 96.8°F (36°C) or > 100.9°F (38.3°C)   Heart Rate > 90 beats per minute   Respiratory Rate > 20 beats per minute   BP < 90 systolic    MAP < 65    IF ANSWER IS YES, CALL A CODE SEPSIS  POC LACTIC ACID ASAP AND BEGIN NURSE DRIVEN SEPSIS ORDERS WHILE AWAITING PROVIDER

## 2017-09-24 NOTE — CONSULTS
[address] [place] [address] [facility]  Pulmonary, Critical Care, and Sleep Medicine    Name: Dayanara Maxwell MRN: [id number]   : 1965 Hospital: [facility]   Date: 2017        Critical Care Initial Patient Consult        Subjective/History:     17    Patient is a 54 y.o. female   Who has h/o of throat ca had peg tube because apparently of aspiration presented with increasing sob and cough   evaluated in ER was found to have bilateral PNA most likely again aspiration   Sent of  CT received ativan then became apparent un responsive got intubated in the ICU she is responding to vocal commands    she has history HTN, DM, ETOH abuse, DM and CAD   . Pt stated to ER  she was just discharged a few days ago from [facility] rehab and was on Meropenem for aspiration PNA. Pt RA sats 80%, given neb from EMS.    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 Authorizing Provider   furosemide (LASIX) 20 mg tablet Take  by mouth daily. Nikita Le MD   cloNIDine HCl (CATAPRES) 0.1 mg tablet Take 0.1 mg by mouth two (2) times a day. Nikita Le MD   multivitamin (ONE A DAY) tablet Take 1 Tab by mouth daily. Nikita Le MD   HYDROcodone-acetaminophen (NORCO) 5-325 mg per tablet Take 1 Tab by mouth every four (4) hours as needed for Pain.     Nikita Le MD   polyethylene glycol (MIRALAX) 17 gram/dose powder Take 17 g by mouth daily. Nikita Le MD   docusate sodium (COLACE) 100 mg capsule Take 100 mg by mouth two (2) times a day. Nikita Le MD   oxyCODONE-acetaminophen (PERCOCET) 5-325 mg per tablet Take 1 Tab by mouth every four (4) hours as needed for Pain. Nikita Le MD   amLODIPine-benazepril (LOTREL) 5-10 mg per capsule Take 1 Cap by mouth daily. Nikita Le MD   metoprolol (LOPRESSOR) 50 mg tablet Take 50 mg by mouth two (2) times a day. Nikita Le MD   levothyroxine (SYNTHROID) 100 mcg tablet Take  by mouth Daily (before breakfast). Nikita Le MD   gabapentin (NEURONTIN) 300 mg capsule Take 300 mg by mouth three (3) times daily. Nikita Le MD   amylase-lipase-protease (CREON) capsule Take  by mouth three (3) times daily (with meals). Nikita Le MD   sodium bicarbonate 325 mg tablet Take 325 mg by mouth four (4) times daily. Nikita Le MD   pantoprazole (PROTONIX) 40 mg tablet Take 1 Tab by mouth daily. 1/5/14   Magdalena Tamayo DO   ondansetron hcl (ZOFRAN) 4 mg tablet Take 1 Tab by mouth every eight (8) hours as needed for Nausea. 1/5/14   Magdalena Tamayo DO   folic acid (FOLVITE) 1 mg tablet Take 1 mg by mouth daily. Nikita Le MD   aspirin 81 mg tablet Take 81 mg by mouth.       Nikita Le MD     Current Facility-Administered Medications   Medication Dose Route Frequency    levoFLOXacin (LEVAQUIN) 750 mg in D5W IVPB  750 mg IntraVENous Q24H    vancomycin (VANCOCIN) 750 mg in 0.9% sodium chloride (MBP/ADV) 250 mL ADV  750 mg IntraVENous Q12H    [START ON 9/26/2017] VANCOMYCIN INFORMATION NOTE   Other ONCE    VANCOMYCIN INFORMATION NOTE 1 Each  1 Each Other Rx Dosing/Monitoring    dexmedeTOMidine (PRECEDEX) 400 mcg in 0.9% sodium chloride 100 mL infusion  0.2-0.7 mcg/kg/hr IntraVENous TITRATE    NOREPINephrine (LEVOPHED) 8,000 mcg in dextrose 5% 250 mL infusion  2-16 mcg/min IntraVENous TITRATE    propofol (DIPRIVAN) infusion 0-50 mcg/kg/min IntraVENous TITRATE    [START ON 2017] aspirin delayed-release tablet 81 mg  81 mg Oral DAILY    sodium chloride (NS) flush 5-10 mL  5-10 mL IntraVENous Q8H    heparin (porcine) injection 5,000 Units  5,000 Units SubCUTAneous Q8H    0.9% sodium chloride infusion  100 mL/hr IntraVENous CONTINUOUS    famotidine (PF) (PEPCID) injection 20 mg  20 mg IntraVENous DAILY    piperacillin-tazobactam (ZOSYN) 4.5 g in 0.9% sodium chloride (MBP/ADV) 100 mL MBP  4.5 g IntraVENous Q8H     Allergies   Allergen Reactions    Amlodipine Swelling    Morphine Rash      Social History   Substance Use Topics    Smoking status: Former Smoker    Smokeless tobacco: Never Used    Alcohol use No      Family History   Problem Relation Age of Onset    Lupus Sister           . Objective:   Vital Signs:    Visit Vitals    BP (!) 119/105    Pulse (!) 127    Temp (!) 100.5 °F (38.1 °C)    Resp 28    Ht 5' 4\" (1.626 m)    Wt 62.6 kg (138 lb)    SpO2 100%    BMI 23.69 kg/m2       O2 Device: Non-rebreather mask   O2 Flow Rate (L/min): 10 l/min   Temp (24hrs), Av.5 °F (38.1 °C), Min:100.5 °F (38.1 °C), Max:100.5 °F (38.1 °C)       Intake/Output:   Last shift:       0701 -  1900  In: -   Out: 200 [Urine:200]  Last 3 shifts:      Intake/Output Summary (Last 24 hours) at 17 1453  Last data filed at 17 1409   Gross per 24 hour   Intake                0 ml   Output              200 ml   Net             -200 ml         Ventilator Settings:  Mode Rate Tidal Volume Pressure FiO2 PEEP   Assist control, VC+   420 ml    50 % 5 cm H20     Peak airway pressure: 27 cm H2O    Minute ventilation: 14.1 l/min      ARDS network Guidelines:   Lung protective strategy and Plateau  Pressure goal < 30 cm H2O goals  Oxygenation Goals PaO2 55-80 mm Hg or SaO2 88-95%  PH goal 7.30-7.45    VAP bundle;  Reviewed. Marissa tube to suction at 20-30 cm Hg.   Maintain Marissa tube with 5-10ml air every 4 hours  Routine oral care every 4 hours  Elevation of head > 45 degree  Daily sedation holiday and SBT evaluation starting at 6.00am.    Physical Exam: ETT   OGT   Lines    Devices     Drains  Holly     General:  Sedated ventilated . Head:  Normocephalic, without obvious abnormality, atraumatic. Eyes:  Conjunctivae/corneas clear. PERRL,   Nose: Nares normal. Septum midline. Mucosa normal. No drainage or sinus tenderness. Throat: Lips, mucosa, and tongue normal. Teeth and gums normal.   Neck: Supple, symmetrical, trachea midline, no adenopathy, thyroid: no enlargment/tenderness/nodules, no carotid bruit and no JVD. Back:   Symmetric, no curvature. ROM normal.   Lungs:   Clear to auscultation bilaterally. Chest wall:  No tenderness or deformity. Heart:  Regular rate and rhythm, S1, S2 normal, no murmur, click, rub or gallop. Abdomen:   Soft, non-tender. Bowel sounds normal. No masses,  No organomegaly. peg tube in place    Extremities: Extremities normal, atraumatic, no cyanosis or edema. Pulses: 2+ and symmetric all extremities.    Skin: Skin color, texture, turgor normal. No rashes or lesions   Lymph nodes:      Cervical, supraclavicular, and axillary nodes normal.   Neurologic: Grossly nonfocal           Data:     Recent Results (from the past 24 hour(s))   CULTURE, BLOOD    Collection Time: 09/24/17  7:15 AM   Result Value Ref Range    Special Requests: PERIPHERAL      Culture result: NO GROWTH AFTER 3 HOURS     METABOLIC PANEL, COMPREHENSIVE    Collection Time: 09/24/17  7:15 AM   Result Value Ref Range    Sodium 136 136 - 145 mmol/L    Potassium 3.3 (L) 3.5 - 5.5 mmol/L    Chloride 100 100 - 108 mmol/L    CO2 27 21 - 32 mmol/L    Anion gap 9 3.0 - 18 mmol/L    Glucose 145 (H) 74 - 99 mg/dL    BUN 20 (H) 7.0 - 18 MG/DL    Creatinine 1.03 0.6 - 1.3 MG/DL    BUN/Creatinine ratio 19 12 - 20      GFR est AA >60 >60 ml/min/1.73m2    GFR est non-AA 57 (L) >60 ml/min/1.73m2    Calcium 8.2 (L) 8.5 - 10.1 MG/DL Bilirubin, total 0.4 0.2 - 1.0 MG/DL    ALT (SGPT) 25 13 - 56 U/L    AST (SGOT) 33 15 - 37 U/L    Alk. phosphatase 115 45 - 117 U/L    Protein, total 8.6 (H) 6.4 - 8.2 g/dL    Albumin 2.5 (L) 3.4 - 5.0 g/dL    Globulin 6.1 (H) 2.0 - 4.0 g/dL    A-G Ratio 0.4 (L) 0.8 - 1.7     CBC WITH AUTOMATED DIFF    Collection Time: 09/24/17  7:15 AM   Result Value Ref Range    WBC 18.3 (H) 4.6 - 13.2 K/uL    RBC 2.86 (L) 4.20 - 5.30 M/uL    HGB 8.6 (L) 12.0 - 16.0 g/dL    HCT 26.1 (L) 35.0 - 45.0 %    MCV 91.3 74.0 - 97.0 FL    MCH 30.1 24.0 - 34.0 PG    MCHC 33.0 31.0 - 37.0 g/dL    RDW 16.1 (H) 11.6 - 14.5 %    PLATELET 574 352 - 501 K/uL    MPV 11.7 9.2 - 11.8 FL    NEUTROPHILS 78 (H) 40 - 73 %    LYMPHOCYTES 12 (L) 21 - 52 %    MONOCYTES 4 3 - 10 %    EOSINOPHILS 6 (H) 0 - 5 %    BASOPHILS 0 0 - 2 %    ABS. NEUTROPHILS 14.3 (H) 1.8 - 8.0 K/UL    ABS. LYMPHOCYTES 2.3 0.9 - 3.6 K/UL    ABS. MONOCYTES 0.7 0.05 - 1.2 K/UL    ABS. EOSINOPHILS 1.0 (H) 0.0 - 0.4 K/UL    ABS.  BASOPHILS 0.0 0.0 - 0.06 K/UL    DF AUTOMATED     D DIMER    Collection Time: 09/24/17  7:15 AM   Result Value Ref Range    D DIMER 2.09 (H) <0.46 ug/ml(FEU)   EKG, 12 LEAD, INITIAL    Collection Time: 09/24/17  7:28 AM   Result Value Ref Range    Ventricular Rate 122 BPM    Atrial Rate 122 BPM    P-R Interval 138 ms    QRS Duration 96 ms    Q-T Interval 420 ms    QTC Calculation (Bezet) 598 ms    Calculated P Axis 59 degrees    Calculated R Axis -5 degrees    Calculated T Axis -115 degrees    Diagnosis       Sinus tachycardia  Incomplete right bundle branch block  Marked ST abnormality, possible inferior subendocardial injury  Abnormal ECG  When compared with ECG of 04-DEC-2008 16:19,  Incomplete right bundle branch block is now present  Confirmed by Kaila Medrano MD, --- ([id number]) on 9/24/2017 10:01:55 AM     CULTURE, BLOOD    Collection Time: 09/24/17  7:30 AM   Result Value Ref Range    Special Requests: PERIPHERAL      Culture result: NO GROWTH AFTER 3 HOURS POC LACTIC ACID    Collection Time: 09/24/17  7:33 AM   Result Value Ref Range    Lactic Acid (POC) 1.5 0.4 - 2.0 mmol/L   URINALYSIS W/ RFLX MICROSCOPIC    Collection Time: 09/24/17  8:03 AM   Result Value Ref Range    Color YELLOW      Appearance CLEAR      Specific gravity 1.020 1.005 - 1.030      pH (UA) 6.5 5.0 - 8.0      Protein >1000 (A) NEG mg/dL    Glucose NEGATIVE  NEG mg/dL    Ketone NEGATIVE  NEG mg/dL    Bilirubin NEGATIVE  NEG      Blood NEGATIVE  NEG      Urobilinogen 0.2 0.2 - 1.0 EU/dL    Nitrites NEGATIVE  NEG      Leukocyte Esterase NEGATIVE  NEG     URINE MICROSCOPIC ONLY    Collection Time: 09/24/17  8:03 AM   Result Value Ref Range    WBC NEGATIVE  0 - 4 /hpf    RBC NEGATIVE  0 - 5 /hpf    Epithelial cells 1+ 0 - 5 /lpf    Bacteria 1+ (A) NEG /hpf    Mucus 1+ (A) NEG /lpf   POC G3    Collection Time: 09/24/17 11:39 AM   Result Value Ref Range    Device: VENT      FIO2 (POC) 100 %    pH (POC) 7.201 (LL) 7.35 - 7.45      pCO2 (POC) 58.3 (H) 35.0 - 45.0 MMHG    pO2 (POC) 215 (H) 80 - 100 MMHG    HCO3 (POC) 22.9 22 - 26 MMOL/L    sO2 (POC) 100 (H) 92 - 97 %    Base deficit (POC) 5 mmol/L    Mode ASSIST CONTROL      Tidal volume 450 ml    Set Rate 12 bpm    PEEP/CPAP (POC) 5 cmH2O    Allens test (POC) YES      Inspiratory Time 1 sec    Total resp.  rate 12      Site LEFT RADIAL      Patient temp. 98.4      Specimen type (POC) ARTERIAL      Performed by Last Valentine     Volume control plus YES     INFLUENZA A & B AG (RAPID TEST)    Collection Time: 09/24/17 11:53 AM   Result Value Ref Range    Influenza A Antigen NEGATIVE  NEG      Influenza B Antigen NEGATIVE  NEG             Recent Labs      09/24/17   1139   FIO2I  100   HCO3I  22.9   PCO2I  58.3*   PHI  7.201*   PO2I  215*         Imaging:  I have personally reviewed the patients radiographs and have reviewed the reports:  CXR Results  (Last 48 hours)               09/24/17 1121  XR CHEST PORT Final result    Impression:  Impression: 1. Endotracheal tube and visualized esophagogastric tubes in position as above. 2. Right IJ approach central venous catheter in stable position. 3. Increased conspicuity to multifocal alveolar opacities. Narrative:  Chest, single view       Indication: Endotracheal tube placement       Comparison: Several prior examinations, most recently CT scan 9/24/2017       Findings:  Portable upright AP view of the chest was obtained. There is an   endotracheal tube which is present 2.9 cm above the matilde. A right IJ approach   central venous catheter projects over the superior cavoatrial junction. An   esophagogastric tube is present below the diaphragm, the tip collimated from   view. Progressive conspicuity to multifocal airspace disease. No pneumothorax or   large pleural effusion. Cardiac size and mediastinal contours are stable. No   acute osseous abnormality. 09/24/17 0800  XR CHEST PORT Final result    Impression:  Impression:       1. Right IJ approach central venous catheter in position as above. 2. Multifocal alveolar opacities, upper lobe predominant, suspicious for   multifocal pneumonia with or without associated pulmonary edema. Narrative:  Chest, single view       Indication: Shortness of breath, recent admission for aspiration pneumonia. Comparison: Several prior exams, most recently June 12, 2017. Findings:  Portable upright AP view of the chest was obtained. There is a right   IJ approach vascular catheter which projects over the superior cavoatrial   junction. There are diffuse bilateral alveolar opacities, with an upper lobe   predominance. No pneumothorax or pleural effusion. Cardiac size and mediastinal   contours are stable, with cardiac size upper limits of normal. No acute osseous   abnormality.                 Best practice :    Glycemic control  IHI ICU bundles:    MVentilated patients- VAP bundle , aim to keep peak plateau pressure 07-15JV H2O  Sress ulcer prophylaxis  DVT prophylaxis  Need for Lines, mckeon assessed  Palliative care consult     IMPRESSION AND PLAN:   Respiratory failure sec to bilateral PNA most likely aspiration r/o  HCAP, flu PNA etc   patient was put on BS antibiotics and cultured   cxr bilateral infiltrate   DM  HTN  H/o throat ca  H/o of aspiration sec to irradiation etc   H/o of ETOH will put derek thiamin      Patient Active Problem List   Diagnosis Code    Acute respiratory failure with hypercapnia (HCC) J96.02    Sepsis ([address] A41.9    HCAP (healthcare-associated pneumonia) J18.9    Hypertension I10    Diabetes mellitus ([facility] E11.9           ·             Total of 60  min critical care time spent at bedside during the course of care providing evaluation,management and care decisions and ordering appropriate treatment related to critical care problems exclusive of procedures. The reason for providing this level of medical care for this critically ill patient was due a critical illness that impaired one or more vital organ systems such that there was a high probability of imminent or life threatening deterioration in the patients condition. This care involved high complexity decision making to assess, manipulate, and support vital system functions, to treat this degree vital organ system failure and to prevent further life threatening deterioration of the patients condition.     Kathleen Elizabeth MD

## 2017-09-25 ENCOUNTER — APPOINTMENT (OUTPATIENT)
Dept: GENERAL RADIOLOGY | Age: 52
DRG: 871 | End: 2017-09-25
Attending: INTERNAL MEDICINE
Payer: MEDICARE

## 2017-09-25 LAB
ALBUMIN SERPL-MCNC: 1.5 G/DL (ref 3.4–5)
ALBUMIN/GLOB SERPL: 0.3 {RATIO} (ref 0.8–1.7)
ALP SERPL-CCNC: 88 U/L (ref 45–117)
ALT SERPL-CCNC: 26 U/L (ref 13–56)
ANION GAP SERPL CALC-SCNC: 10 MMOL/L (ref 3–18)
ARTERIAL PATENCY WRIST A: YES
AST SERPL-CCNC: 46 U/L (ref 15–37)
BASE DEFICIT BLD-SCNC: 8 MMOL/L
BASOPHILS # BLD: 0 K/UL (ref 0–0.06)
BASOPHILS NFR BLD: 0 % (ref 0–2)
BDY SITE: ABNORMAL
BILIRUB SERPL-MCNC: 0.4 MG/DL (ref 0.2–1)
BNP SERPL-MCNC: ABNORMAL PG/ML (ref 0–450)
BODY TEMPERATURE: 100.4
BUN SERPL-MCNC: 24 MG/DL (ref 7–18)
BUN/CREAT SERPL: 15 (ref 12–20)
CALCIUM SERPL-MCNC: 6.5 MG/DL (ref 8.5–10.1)
CHLORIDE SERPL-SCNC: 107 MMOL/L (ref 100–108)
CO2 SERPL-SCNC: 22 MMOL/L (ref 21–32)
CREAT SERPL-MCNC: 1.63 MG/DL (ref 0.6–1.3)
DATE LAST DOSE: ABNORMAL
DIFFERENTIAL METHOD BLD: ABNORMAL
EOSINOPHIL # BLD: 2.3 K/UL (ref 0–0.4)
EOSINOPHIL NFR BLD: 7 % (ref 0–5)
ERYTHROCYTE [DISTWIDTH] IN BLOOD BY AUTOMATED COUNT: 16.2 % (ref 11.6–14.5)
EST. AVERAGE GLUCOSE BLD GHB EST-MCNC: 105 MG/DL
GAS FLOW.O2 O2 DELIVERY SYS: ABNORMAL L/MIN
GAS FLOW.O2 SETTING OXYMISER: 16 BPM
GLOBULIN SER CALC-MCNC: 4.3 G/DL (ref 2–4)
GLUCOSE BLD STRIP.AUTO-MCNC: 134 MG/DL (ref 70–110)
GLUCOSE BLD STRIP.AUTO-MCNC: 162 MG/DL (ref 70–110)
GLUCOSE SERPL-MCNC: 154 MG/DL (ref 74–99)
HBA1C MFR BLD: 5.3 % (ref 4.2–5.6)
HBA1C MFR BLD: 5.8 % (ref 4.2–5.6)
HCO3 BLD-SCNC: 18.7 MMOL/L (ref 22–26)
HCT VFR BLD AUTO: 24.8 % (ref 35–45)
HGB BLD-MCNC: 8 G/DL (ref 12–16)
INSPIRATION.DURATION SETTING TIME VENT: 1.1 SEC
LYMPHOCYTES # BLD: 0.7 K/UL (ref 0.9–3.6)
LYMPHOCYTES NFR BLD: 2 % (ref 21–52)
MCH RBC QN AUTO: 29.7 PG (ref 24–34)
MCHC RBC AUTO-ENTMCNC: 32.3 G/DL (ref 31–37)
MCV RBC AUTO: 92.2 FL (ref 74–97)
MONOCYTES # BLD: 0.7 K/UL (ref 0.05–1.2)
MONOCYTES NFR BLD: 2 % (ref 3–10)
NEUTS BAND NFR BLD MANUAL: 30 %
NEUTS SEG # BLD: 19.4 K/UL (ref 1.8–8)
NEUTS SEG NFR BLD: 59 % (ref 40–73)
O2/TOTAL GAS SETTING VFR VENT: 35 %
PCO2 BLD: 40 MMHG (ref 35–45)
PEEP RESPIRATORY: 5 CMH2O
PH BLD: 7.28 [PH] (ref 7.35–7.45)
PIP ISTAT,IPIP: 27
PLATELET # BLD AUTO: 233 K/UL (ref 135–420)
PMV BLD AUTO: 10.9 FL (ref 9.2–11.8)
PO2 BLD: 73 MMHG (ref 80–100)
POTASSIUM SERPL-SCNC: 4.5 MMOL/L (ref 3.5–5.5)
PROT SERPL-MCNC: 5.8 G/DL (ref 6.4–8.2)
RBC # BLD AUTO: 2.69 M/UL (ref 4.2–5.3)
RBC MORPH BLD: ABNORMAL
RBC MORPH BLD: ABNORMAL
REPORTED DOSE,DOSE: ABNORMAL UNITS
REPORTED DOSE/TIME,TMG: 2000
SAO2 % BLD: 91 % (ref 92–97)
SERVICE CMNT-IMP: ABNORMAL
SODIUM SERPL-SCNC: 139 MMOL/L (ref 136–145)
SPECIMEN TYPE: ABNORMAL
TOTAL RESP. RATE, ITRR: 18
VANCOMYCIN TROUGH SERPL-MCNC: 24.7 UG/ML (ref 10–20)
VENTILATION MODE VENT: ABNORMAL
VOLUME CONTROL PLUS IVLCP: YES
VT SETTING VENT: 420 ML
WBC # BLD AUTO: 32.8 K/UL (ref 4.6–13.2)

## 2017-09-25 PROCEDURE — 74011250636 HC RX REV CODE- 250/636

## 2017-09-25 PROCEDURE — 74011250636 HC RX REV CODE- 250/636: Performed by: HOSPITALIST

## 2017-09-25 PROCEDURE — 74011000250 HC RX REV CODE- 250: Performed by: INTERNAL MEDICINE

## 2017-09-25 PROCEDURE — 36591 DRAW BLOOD OFF VENOUS DEVICE: CPT

## 2017-09-25 PROCEDURE — 94660 CPAP INITIATION&MGMT: CPT

## 2017-09-25 PROCEDURE — 83036 HEMOGLOBIN GLYCOSYLATED A1C: CPT | Performed by: HOSPITALIST

## 2017-09-25 PROCEDURE — 82803 BLOOD GASES ANY COMBINATION: CPT

## 2017-09-25 PROCEDURE — 74011250636 HC RX REV CODE- 250/636: Performed by: INTERNAL MEDICINE

## 2017-09-25 PROCEDURE — 77010033678 HC OXYGEN DAILY

## 2017-09-25 PROCEDURE — 85025 COMPLETE CBC W/AUTO DIFF WBC: CPT | Performed by: INTERNAL MEDICINE

## 2017-09-25 PROCEDURE — 83880 ASSAY OF NATRIURETIC PEPTIDE: CPT | Performed by: INTERNAL MEDICINE

## 2017-09-25 PROCEDURE — 84145 PROCALCITONIN (PCT): CPT | Performed by: INTERNAL MEDICINE

## 2017-09-25 PROCEDURE — 36600 WITHDRAWAL OF ARTERIAL BLOOD: CPT

## 2017-09-25 PROCEDURE — 65610000006 HC RM INTENSIVE CARE

## 2017-09-25 PROCEDURE — 74011000258 HC RX REV CODE- 258: Performed by: INTERNAL MEDICINE

## 2017-09-25 PROCEDURE — 74011000250 HC RX REV CODE- 250: Performed by: EMERGENCY MEDICINE

## 2017-09-25 PROCEDURE — 74011000250 HC RX REV CODE- 250

## 2017-09-25 PROCEDURE — 74011000258 HC RX REV CODE- 258: Performed by: HOSPITALIST

## 2017-09-25 PROCEDURE — 80307 DRUG TEST PRSMV CHEM ANLYZR: CPT | Performed by: FAMILY MEDICINE

## 2017-09-25 PROCEDURE — 94640 AIRWAY INHALATION TREATMENT: CPT

## 2017-09-25 PROCEDURE — 77030035694 HC MSK BIPAP FLL FAC PERFMAX PHIL -B

## 2017-09-25 PROCEDURE — 80053 COMPREHEN METABOLIC PANEL: CPT | Performed by: INTERNAL MEDICINE

## 2017-09-25 PROCEDURE — 74011250636 HC RX REV CODE- 250/636: Performed by: EMERGENCY MEDICINE

## 2017-09-25 PROCEDURE — 74011636637 HC RX REV CODE- 636/637: Performed by: HOSPITALIST

## 2017-09-25 PROCEDURE — 80202 ASSAY OF VANCOMYCIN: CPT | Performed by: HOSPITALIST

## 2017-09-25 PROCEDURE — 74011000258 HC RX REV CODE- 258: Performed by: EMERGENCY MEDICINE

## 2017-09-25 PROCEDURE — 82962 GLUCOSE BLOOD TEST: CPT

## 2017-09-25 PROCEDURE — 71010 XR CHEST PORT: CPT

## 2017-09-25 PROCEDURE — 93005 ELECTROCARDIOGRAM TRACING: CPT

## 2017-09-25 PROCEDURE — 94003 VENT MGMT INPAT SUBQ DAY: CPT

## 2017-09-25 PROCEDURE — 74011250637 HC RX REV CODE- 250/637: Performed by: INTERNAL MEDICINE

## 2017-09-25 PROCEDURE — 83036 HEMOGLOBIN GLYCOSYLATED A1C: CPT | Performed by: INTERNAL MEDICINE

## 2017-09-25 RX ORDER — LEVOFLOXACIN 5 MG/ML
750 INJECTION, SOLUTION INTRAVENOUS
Status: DISCONTINUED | OUTPATIENT
Start: 2017-09-27 | End: 2017-09-28

## 2017-09-25 RX ORDER — FUROSEMIDE 10 MG/ML
20 INJECTION INTRAMUSCULAR; INTRAVENOUS ONCE
Status: COMPLETED | OUTPATIENT
Start: 2017-09-25 | End: 2017-09-25

## 2017-09-25 RX ORDER — LORAZEPAM 2 MG/ML
2 INJECTION INTRAMUSCULAR ONCE
Status: COMPLETED | OUTPATIENT
Start: 2017-09-25 | End: 2017-09-25

## 2017-09-25 RX ORDER — METOPROLOL TARTRATE 5 MG/5ML
5 INJECTION INTRAVENOUS ONCE
Status: COMPLETED | OUTPATIENT
Start: 2017-09-25 | End: 2017-09-25

## 2017-09-25 RX ORDER — DEXTROSE 50 % IN WATER (D50W) INTRAVENOUS SYRINGE
25-50 AS NEEDED
Status: DISCONTINUED | OUTPATIENT
Start: 2017-09-25 | End: 2017-10-09 | Stop reason: HOSPADM

## 2017-09-25 RX ORDER — FUROSEMIDE 10 MG/ML
INJECTION INTRAMUSCULAR; INTRAVENOUS
Status: COMPLETED
Start: 2017-09-25 | End: 2017-09-25

## 2017-09-25 RX ORDER — LORAZEPAM 2 MG/ML
INJECTION INTRAMUSCULAR
Status: COMPLETED
Start: 2017-09-25 | End: 2017-09-25

## 2017-09-25 RX ORDER — MAGNESIUM SULFATE 100 %
4 CRYSTALS MISCELLANEOUS AS NEEDED
Status: DISCONTINUED | OUTPATIENT
Start: 2017-09-25 | End: 2017-10-09 | Stop reason: HOSPADM

## 2017-09-25 RX ORDER — METOPROLOL TARTRATE 5 MG/5ML
INJECTION INTRAVENOUS
Status: COMPLETED
Start: 2017-09-25 | End: 2017-09-25

## 2017-09-25 RX ORDER — LORAZEPAM 2 MG/ML
2 INJECTION INTRAMUSCULAR
Status: DISCONTINUED | OUTPATIENT
Start: 2017-09-25 | End: 2017-09-26

## 2017-09-25 RX ORDER — INSULIN LISPRO 100 [IU]/ML
INJECTION, SOLUTION INTRAVENOUS; SUBCUTANEOUS EVERY 6 HOURS
Status: DISCONTINUED | OUTPATIENT
Start: 2017-09-25 | End: 2017-10-09 | Stop reason: HOSPADM

## 2017-09-25 RX ADMIN — PIPERACILLIN SODIUM,TAZOBACTAM SODIUM 4.5 G: 4; .5 INJECTION, POWDER, FOR SOLUTION INTRAVENOUS at 05:33

## 2017-09-25 RX ADMIN — LORAZEPAM 2 MG: 2 INJECTION INTRAMUSCULAR at 20:47

## 2017-09-25 RX ADMIN — LORAZEPAM 2 MG: 2 INJECTION INTRAMUSCULAR; INTRAVENOUS at 20:47

## 2017-09-25 RX ADMIN — FAMOTIDINE 20 MG: 10 INJECTION, SOLUTION INTRAVENOUS at 09:07

## 2017-09-25 RX ADMIN — METOPROLOL TARTRATE 5 MG: 5 INJECTION INTRAVENOUS at 21:14

## 2017-09-25 RX ADMIN — LEVOFLOXACIN 750 MG: 750 INJECTION, SOLUTION INTRAVENOUS at 08:56

## 2017-09-25 RX ADMIN — IPRATROPIUM BROMIDE AND ALBUTEROL SULFATE 3 ML: .5; 3 SOLUTION RESPIRATORY (INHALATION) at 11:36

## 2017-09-25 RX ADMIN — Medication 10 ML: at 16:16

## 2017-09-25 RX ADMIN — HEPARIN SODIUM 5000 UNITS: 5000 INJECTION, SOLUTION INTRAVENOUS; SUBCUTANEOUS at 08:58

## 2017-09-25 RX ADMIN — Medication 15 MCG/MIN: at 01:52

## 2017-09-25 RX ADMIN — THIAMINE HYDROCHLORIDE 100 MG: 100 INJECTION, SOLUTION INTRAMUSCULAR; INTRAVENOUS at 08:58

## 2017-09-25 RX ADMIN — Medication 10 ML: at 05:41

## 2017-09-25 RX ADMIN — PIPERACILLIN SODIUM AND TAZOBACTAM SODIUM 4.5 G: 4; .5 INJECTION, POWDER, LYOPHILIZED, FOR SOLUTION INTRAVENOUS at 21:24

## 2017-09-25 RX ADMIN — IPRATROPIUM BROMIDE AND ALBUTEROL SULFATE 3 ML: .5; 3 SOLUTION RESPIRATORY (INHALATION) at 04:38

## 2017-09-25 RX ADMIN — IPRATROPIUM BROMIDE AND ALBUTEROL SULFATE 3 ML: .5; 3 SOLUTION RESPIRATORY (INHALATION) at 08:10

## 2017-09-25 RX ADMIN — ACETAMINOPHEN 650 MG: 650 SOLUTION ORAL at 05:33

## 2017-09-25 RX ADMIN — IPRATROPIUM BROMIDE AND ALBUTEROL SULFATE 3 ML: .5; 3 SOLUTION RESPIRATORY (INHALATION) at 19:47

## 2017-09-25 RX ADMIN — PIPERACILLIN SODIUM AND TAZOBACTAM SODIUM 4.5 G: 4; .5 INJECTION, POWDER, LYOPHILIZED, FOR SOLUTION INTRAVENOUS at 09:07

## 2017-09-25 RX ADMIN — IPRATROPIUM BROMIDE AND ALBUTEROL SULFATE 3 ML: .5; 3 SOLUTION RESPIRATORY (INHALATION) at 15:27

## 2017-09-25 RX ADMIN — HEPARIN SODIUM 5000 UNITS: 5000 INJECTION, SOLUTION INTRAVENOUS; SUBCUTANEOUS at 16:16

## 2017-09-25 RX ADMIN — Medication 10 ML: at 22:00

## 2017-09-25 RX ADMIN — THIAMINE HYDROCHLORIDE 100 MG: 100 INJECTION, SOLUTION INTRAMUSCULAR; INTRAVENOUS at 17:16

## 2017-09-25 RX ADMIN — ACETAMINOPHEN 650 MG: 650 SOLUTION ORAL at 16:15

## 2017-09-25 RX ADMIN — PROPOFOL 20 MCG/KG/MIN: 10 INJECTION, EMULSION INTRAVENOUS at 05:38

## 2017-09-25 RX ADMIN — SODIUM CHLORIDE 0.7 MCG/KG/HR: 900 INJECTION, SOLUTION INTRAVENOUS at 05:37

## 2017-09-25 RX ADMIN — PROPOFOL 25 MCG/KG/MIN: 10 INJECTION, EMULSION INTRAVENOUS at 00:15

## 2017-09-25 RX ADMIN — IPRATROPIUM BROMIDE AND ALBUTEROL SULFATE 3 ML: .5; 3 SOLUTION RESPIRATORY (INHALATION) at 00:00

## 2017-09-25 RX ADMIN — ASPIRIN 81 MG: 81 TABLET, COATED ORAL at 08:57

## 2017-09-25 RX ADMIN — FUROSEMIDE 20 MG: 10 INJECTION, SOLUTION INTRAMUSCULAR; INTRAVENOUS at 13:50

## 2017-09-25 RX ADMIN — INSULIN LISPRO 2 UNITS: 100 INJECTION, SOLUTION INTRAVENOUS; SUBCUTANEOUS at 12:32

## 2017-09-25 RX ADMIN — ACETAMINOPHEN 650 MG: 650 SOLUTION ORAL at 21:22

## 2017-09-25 RX ADMIN — PIPERACILLIN SODIUM AND TAZOBACTAM SODIUM 4.5 G: 4; .5 INJECTION, POWDER, LYOPHILIZED, FOR SOLUTION INTRAVENOUS at 13:50

## 2017-09-25 RX ADMIN — SODIUM CHLORIDE 100 ML/HR: 900 INJECTION, SOLUTION INTRAVENOUS at 04:19

## 2017-09-25 RX ADMIN — Medication 2 MCG/MIN: at 19:09

## 2017-09-25 RX ADMIN — FUROSEMIDE 20 MG: 10 INJECTION INTRAMUSCULAR; INTRAVENOUS at 13:50

## 2017-09-25 NOTE — PROGRESS NOTES
Assumed respiratory care of pt on mechanical ventilator s/p intubation on 09/24/17 for mental status changes. Pt did SBT today, and case was discussed at rounds. Dr. Blanka Escoto hesitant to extubate pt without further information about potential airway compromise 2/2 throat CA. Pt was able to maintain SBT form 0830 to 1500 successfully, and is being evaluated for extubation now. WIll continue to follw pt closely.   KFL

## 2017-09-25 NOTE — ACP (ADVANCE CARE PLANNING)
Patient has designated ____unable____________________ to participate in his/her discharge plan and to receive any needed information.      Name:   Address:  Phone number:

## 2017-09-25 NOTE — CONSULTS
PCCM:    At  PM ICU RRT called me this patient's follow up ABGs:  Both pH and PaCO2 are improved from previous and PaO2 is > 90 mm Hg  On FiO2 50% and PEEP 5 cwp. RRT describes occasional higher than expected Peak Airway Pressures due to:  A) 6,5 mm ID ETT: I cannot change this as this was the only one diameter that could be   inserted thru her narrowed Upper Airway due to her CA and B) Due to Bronchospasm for   which BDs (BHUPINDER+MACIEL) q 4 h were ordered. I requested ABG  Follow up and PCXR but   RRT explained to me that they were ALREADY scheduled. Dr Vadim Huerta will continue her PCCM care. Will report to her in AM (7AM).

## 2017-09-25 NOTE — PROGRESS NOTES
Patient Summary  Assumed care-  Pt on SBT wean this AM- Propofol off & precidex at 0.7 mcq - 1200- slowly weaning levophed for map>60  Pt will open eyes & squeeze hands on command -1400- Lasix 20mg given-  NS IV stopped-TF started via PEG at 25cc/hr   Lungs remain course thruout-1500- Precidex off for extubation   1600- HR up to 130 range since Precidex off & temp up to 101.7 Dr. Quiñones Shanti aware - Pt extubated & placed on 02 6l to maintain 02SATS 94%-- Weaning levophed & off at 1900-Used Bedpan for diarrhea brown stool-

## 2017-09-25 NOTE — PROGRESS NOTES
[facility]. Pulmonary, Critical Care, and Sleep Medicine    Name: Jelly Arellano MRN: [id number]   : 1965 Hospital: [address] [place] [address]   Date: 2017  Admission Date: 2017     Chart and notes reviewed. Data reviewed. I have evaluated all findings. [x]I have reviewed the flowsheet and previous days notes. [x]The patient is unable to give any meaningful history or review of systems because the patient is:  [x]Intubated []Sedated   []Unresponsive      [x]The patient is critically ill on      [x]Mechanical ventilation []Pressors   []BiPAP []                 : Tolerates PS but too sedated to consider extubation, requiring levophed, BNP very elevated    ROS:Review of systems not obtained due to patient factors. Events and notes from last 24 hours reviewed. Care plan discussed on multidisciplinary rounds.   Patient Active Problem List   Diagnosis Code    Acute respiratory failure with hypercapnia (HCC) J96.02    Sepsis ([facility] A41.9    HCAP (healthcare-associated pneumonia) J18.9    Hypertension I10    Diabetes mellitus ([facility] E11.9       Vital Signs:  Visit Vitals    /78    Pulse 98    Temp 98.1 °F (36.7 °C)    Resp 23    Ht 5' 4\" (1.626 m)    Wt 66 kg (145 lb 8.1 oz)    SpO2 100%    BMI 24.98 kg/m2       O2 Device: Ventilator   O2 Flow Rate (L/min): 10 l/min   Temp (24hrs), Av.5 °F (38.1 °C), Min:98.1 °F (36.7 °C), Max:102.4 °F (39.1 °C)       Intake/Output:   Last shift:       07 - 1900  In: 968.6 [I.V.:938.6]  Out: 75 [Urine:75]  Last 3 shifts: 1901 -  0700  In: 1470.7 [I.V.:1380.7]  Out: 781 [Urine:781]    Intake/Output Summary (Last 24 hours) at 17 1330  Last data filed at 17 0800   Gross per 24 hour   Intake          2439.35 ml   Output              856 ml   Net          1583.35 ml      Ventilator Settings:  Ventilator  Mode: Pressure support, Spontaneous  Respiratory Rate  Resp Rate Observed: 23  Back-Up Rate: 16  Insp Time (sec): 1.1 sec  I:E Ratio: 1:1.5  Ventilator Volumes  Vt Set (ml): 420 ml  Vt Exhaled (Machine Breath) (ml): 412 ml  Vt Spont (ml): 378 ml  Ve Observed (l/min): 10.6 l/min  Ventilator Pressures  PIP Observed (cm H2O): 13 cm H2O  Plateau Pressure (cm H2O): 22 cm H2O  MAP (cm H2O): 8.2  PEEP/VENT (cm H2O): 5 cm H20  Auto PEEP Observed (cm H2O):  (unable to obtain)    Current Facility-Administered Medications   Medication Dose Route Frequency    VANCOMYCIN, Dosing per levels    Other Rx Dosing/Monitoring    piperacillin-tazobactam (ZOSYN) 4.5 g in 0.9% sodium chloride (MBP/ADV) 100 mL MBP ### EXTENDED 4 HOUR INFUSION ##   4.5 g IntraVENous Q8H    [START ON 9/27/2017] levoFLOXacin (LEVAQUIN) 750 mg in D5W IVPB  750 mg IntraVENous Q48H    insulin lispro (HUMALOG) injection   SubCUTAneous Q6H    dexmedeTOMidine (PRECEDEX) 400 mcg in 0.9% sodium chloride 100 mL infusion  0.2-0.7 mcg/kg/hr IntraVENous TITRATE    NOREPINephrine (LEVOPHED) 8,000 mcg in dextrose 5% 250 mL infusion  0-30 mcg/min IntraVENous TITRATE    propofol (DIPRIVAN) infusion  0-50 mcg/kg/min IntraVENous TITRATE    aspirin delayed-release tablet 81 mg  81 mg Oral DAILY    sodium chloride (NS) flush 5-10 mL  5-10 mL IntraVENous Q8H    heparin (porcine) injection 5,000 Units  5,000 Units SubCUTAneous Q8H    0.9% sodium chloride infusion  100 mL/hr IntraVENous CONTINUOUS    famotidine (PF) (PEPCID) injection 20 mg  20 mg IntraVENous DAILY    thiamine (B-1) 100 mg in 0.9% sodium chloride 50 mL IVPB  100 mg IntraVENous BID    albuterol-ipratropium (DUO-NEB) 2.5 MG-0.5 MG/3 ML  3 mL Nebulization Q4H RT       Telemetry:     Physical Exam:   General: Sedated but awakes and follows come commands   HEENT: ETT in place   Neck: Supple   Chest: Normal   Lungs: Coarse bilaterally, no wheezes   Heart: Mildly tachycardic but regular   Abdomen: non distended, bowel sounds normoactive   Extremity: Moves all spontaneously   Neuro: Sedated   Skin: Intact   DATA:  MAR reviewed and pertinent medications noted or modified as needed    Labs:  Recent Labs      09/25/17   0300  09/24/17   0715   WBC  32.8*  18.3*   HGB  8.0*  8.6*   HCT  24.8*  26.1*   PLT  233  262     Recent Labs      09/25/17   0300  09/24/17   2038  09/24/17   0715   NA  139  140  136   K  4.5  3.6  3.3*   CL  107  107  100   CO2  22  25  27   GLU  154*  131*  145*   BUN  24*  22*  20*   CREA  1.63*  1.47*  1.03   CA  6.5*  6.7*  8.2*   ALB  1.5*   --   2.5*   SGOT  46*   --   33   ALT  26   --   25     No results for input(s): PH, PCO2, PO2, HCO3, FIO2 in the last 72 hours. Recent Labs      09/25/17   0610  09/24/17   1922  09/24/17   1139   FIO2I  35  50  100   HCO3I  18.7*  20.3*  22.9   PCO2I  40.0  44.3  58.3*   PHI  7.282*  7.276*  7.201*   PO2I  73*  92  215*     Imaging:  [x]                           I have personally reviewed the patients radiographs and reports    High complexity decision making was performed during this consultation and evaluation. [x]       Pt is at high risk for further organ failure and dysfunction. Critical care time spent  minutes with patient exclusive of procedures.     IMPRESSION:    Anemia      Cancer (HCC)       throat    Coronary artery disease      Diabetes (HCC)      Diabetes mellitus ([facility]      ETOH abuse      HTN (hypertension)      Hypertension      Hypothyroid      Lupus ([facility]      Osteonecrosis (HCC)       of jaw    PEG adjustment, replacement, or removal      S/P thoracentesis      Throat cancer (HCC)      ·       PLAN:   Neuro  Sedated on precedex  Frequent sedation vacations daily    Cardiovascular  Requiring levophed, wean as able  BNP >35,000  Will give lasix once today and monitor  Lactic acid normal  Will order 2D echo    Pulmonary  Continue mechanical ventilation, tolerates periods of PS but too sedated to consider extubating currently  ETT 6.5 due to upper airway obstruction from cancer  Continue scheduled duonebs  Will order procal    GI  Start tube feeds  Famotidine ppx    Renal  KEATON, slightly worsened    ID  Significant leukocytosis, 18 --> 32  Tmax 102.4  Continue vanc, zosyn, levaquin  Bcx 9/24 negative to date  Sputum cx 9/24 negative to date   Ucx 9/24 negative to date  Flu A/B negative    Heme Onc  HO throat cancer  Heparin ppx    Endo  HO DM but A1c 5.3  Continue humalog       Critical care time 35 minutes    Kaylin Valdovinos MD

## 2017-09-25 NOTE — PROGRESS NOTES
[facility] Pharmacy Dosing Services     Pharmacy adjusting dose for Piperacillin-Tazobactam (Zosyn) and Levofloxacin (Levaquin)  per renal protocol. Was on a regimen of: Zosyn 4.5 gm IV q6h and Levaquin 750mg IV 24h for HAP    Labs:   Serum Creatinine/CrCl: 1.63 mg/dl/39 ml/min    Plan:  Changed Zosyn to 4.5g IV 8h EXTENDED 4 HOUR INFUSION and Levaquin 750mg IV q48h. Pharmacy to follow and will redose based on renal function changes.     Thank you

## 2017-09-25 NOTE — DIABETES MGMT
NUTRITIONAL ASSESSMENT GLYCEMIC CONTROL/ PLAN OF CARE     Luana Simmons           54 y.o.           9/24/2017                 1. HCAP (healthcare-associated pneumonia)    2. Septic shock (HCC)         INTERVENTIONS/PLAN:   1. Goal rate of  Osmolite 1 narda tube feeding is 70 ml/hr (1781 calories, 74 grams protein, 1416 ml free water/d (from tube feeding). 2.  Monitor tube feedings, labs and weights. ASSESSMENT:   Pt is 121% ideal wt with adequate fat and somatic protein stores; BMI (calculated): 25.0 kg/m2 (low range of overweight classification). Pt appears well nourished from available data but is at risk of malnutrition due to dysphagia and PEG/TF. Upon reaching goal rate, current TFwill cover estimated nutrient needs. Nutrition Diagnoses:   Inadequate oral food and beverage intake due to dysphagia as evidenced by PEG tube/enteral nutrition. Increased nutrient needs due to wounds as evidenced by Stage 2 ulcers sacral area. Unintentional weight loss due to inadequate energy intake AEB 8# weight loss (5%) since June 2017. SUBJECTIVE/OBJECTIVE:   Information obtained from: chart review, ICU rounds  Pt admitted with acute respiratory failure, now intubated and PMHx including throat CA, dysphagia and PEG tube,  stage 2 ulcers sacral area per nursing notes, HTN. Diet: NPO/vent with orders for Osmolite 1 narda tube feeding via PEG , starting at 25 ml/hr, increasing as tolerated to goal rate of 70 ml/hr (1781 calories, 74 grams protein, 1416 ml free water/d (from tube feeding). Chart review indicates pt was at ED at [facility] where it was noted pt was using Jevity 1.5 narda 240 ml QID via PEG. G-tube last replaced 9-7-17 per chart review. No data found.     Medications: [x]                Reviewed   Pertinent:  Lasix, corrective lispro q 6 hours, normal insulin sensitivity,   IVF:  NS at 100 ml/hr    Most Recent POC Glucose:   Recent Labs      09/25/17   0300  09/24/17   2038  09/24/17   0715   GLU 154*  131*  145*         Labs:   Lab Results   Component Value Date/Time    Hemoglobin A1c 5.3 09/25/2017 09:46 AM     Lab Results   Component Value Date/Time    Sodium 139 09/25/2017 03:00 AM    Potassium 4.5 09/25/2017 03:00 AM    Chloride 107 09/25/2017 03:00 AM    CO2 22 09/25/2017 03:00 AM    Anion gap 10 09/25/2017 03:00 AM    Glucose 154 09/25/2017 03:00 AM    BUN 24 09/25/2017 03:00 AM    Creatinine 1.63 09/25/2017 03:00 AM    Calcium 6.5 09/25/2017 03:00 AM    Albumin 1.5 09/25/2017 03:00 AM       Anthropometrics:  ,  , BMI (calculated): 25   Ht - 64\"  weight - 66 kg  Ideal wt -  54.5 kg   Wt Readings from Last 1 Encounters:   09/25/17 66 kg (145 lb 8.1 oz)     08/09/17 Weight from Chart Everywhere - 67.8 kg    Ht Readings from Last 1 Encounters:   09/25/17 5' 4\" (1.626 m)     Wt Readings from Last 3 Encounters:   09/25/17 66 kg (145 lb 8.1 oz)   06/12/17 71.2 kg (157 lb)   01/05/14 77.6 kg (171 lb)       Estimated Nutrition Needs:  8586 Kcals/day, Protein (g): 86 g Fluid (ml): 2300 ml  Based on:   [x]          Actual BW    []          ABW   []            Adjusted BW        Nutrition Diagnoses:   See above          Nutrition Interventions:  Tube feeding  Goal:   Blood glucose will be within target range of  mg/dL by 9/28/17. Pt will maintain weight (+/- 1-2 kg) by 10/5/17.         Nutrition Monitoring and Evaluation      []     Monitor po intake on meal rounds  [x]     Continue inpatient monitoring and intervention  []     Other:      Nutrition Risk:  [x]   High     []  Moderate    []  Minimal/Uncompromised    Jeri Dyer RD, CDE   Office:  [address] [place] [address] Pager:  [phone]

## 2017-09-25 NOTE — PROGRESS NOTES
1605:  Pt extubated to 6L O2 via NC. Pt slightly febrile at this time. RN to give tylenol. Pt tolerating well.   KFL

## 2017-09-25 NOTE — PROGRESS NOTES
Pharmacy Dosing Services: Vancomycin    Indication: HAP    Day of therapy: 1    Other Antimicrobials:  Zosyn  and Levaquin ()    Loading dose (date given): 1500 mg   Current Maintenance dose: dose per levels     Goal Vancomycin Level: 15-20  (Trough 15-20 for most infections, 20 for meningitis/osteomyelitis, pre-HD level ~25)     Vancomycin level:   -random 24.7mcg/dl    Significant Cultures:   : sputumL GPC  : urine- pending   : blood cx: NGTD  x 3 days    Renal function stable? (unstable defined as SCr increase of 0.5 mg/dL or > 50% increase from baseline, whichever is greater) (Y/N): N    CAPD, Hemodialysis or Renal Replacement Therapy (Y/N): N       Recent Labs      17   0300  17   2038  17   0715   CREA  1.63*  1.47*  1.03   BUN  24*  22*  20*   WBC  32.8*   --   18.3*     Temp (24hrs), Av.9 °F (38.3 °C), Min:99.9 °F (37.7 °C), Max:102.4 °F (39.1 °C)    Creatinine Clearance (Creatinine Clearance (ml/min)): 39 ml/min     Regimen assessment: renal fxn decline, dosing per levels. Random level @ 24.7mcg/dl; will redose when random level <20mcg/dl  Maintenance dose: dose per levels  Next scheduled level: random  @ 0400     Pharmacy will follow daily and adjust medications as appropriate for renal function and/or serum levels.     Thank you,  Evita Payne

## 2017-09-25 NOTE — PROGRESS NOTES
Alivia   Discharge Planning/ Assessment    Reasons for Intervention: Chart reviewed and pt discussed on rounds. Pt now on vent and unable to speak. I called her [facility] AUTHORITY Vandana Moreno [phone] and left message for her to call. Pt has hx of throat cancer, not on tx now per report. She recently was discharged from [facility] rehab. Unsure of plan, will review in a.m.     High Risk Criteria  [x] Yes  []No   Physician Referral  [] Yes  [x]No        Date    Nursing Referral  [] Yes  [x]No        Date    Patient/Family Request  [] Yes  [x]No        Date       Resources:    Medicare  [x] Yes  []No   Medicaid  [] Yes  [x]No   No Resources  [] Yes  [x]No   Private Insurance  [] Yes  [x]No    Name/Phone Number    Other  [] Yes  [x]No        (i.e. Workman's Comp)         Prior Services:    Prior Services  [x] Yes  []No   Home Health  [] Yes  [x]No   [address] [place]  [] Yes  [x]No        Number of [address]  [] Yes  [x]No       Meals on Wheels  [] Yes  [x]No   Office on Aging  [] Yes  [x]No   Transportation Services  [] Yes  [x]No   Nursing Home  [] Yes  [x]No        Nursing Home Name    [address]  [] Yes  [x]No        [address] Name    Other       Information Source:      Information obtained from  [] Patient  [] Parent   [] [address]  [] Child  [] Spouse   [] Significant Other/Partner   [] Friend      [] EMS    [] Nursing Home Chart          [] Other:   Chart Review  [x] Yes  []No     Family/Support System:    Patient lives with  [] Alone    [] Spouse   [] Significant Other  [] Children  [] Caretaker   [] Parent  [] Sibling     [x] Other       Other Support System:    Is the patient responsible for care of others  [] Yes  [x]No   Information of person caring for patient on  discharge    Managers financial affairs independently  [x] Yes  []No   If no, explain:      Status Prior to Admission:    Mental Status  [x] Awake  [] Alert  [] Oriented  [] Quiet/Calm [] Lethargic/Sedated   [] Disoriented  [] Restless/Anxious  [] Combative   Personal Care  [] Dependent  [x] Independent Personal Care  [] Requires Assistance   Meal Preparation Ability  [x] Independent   [] Standby Assistance   [] Minimal Assistance   [] Moderate Assistance  [] Maximum Assistance     [] Total Assistance   Chores  [x] Independent with Chores   [] N/A Nursing Home Resident   [] Requires Assistance   Bowel/Bladder  [x] Continent  [] Catheter  [] Incontinent  [] Ostomy Self-Care    [] Urine Diversion Self-Care  [] Maximum Assistance     [] Total Assistance   Number of Persons needed for assistance    DME at home  [] Louann Qureshi  [] Robert Qureshi   [] Commode    [] Bathroom/Grab Bars  [] Hospital Bed  [] Nebulizer  [] Oxygen           [] Raised Toilet Seat  [] Shower Chair  [] Side Rails for Bed   [] Tub Transfer Bench   [] Raxi Bulls  [] Fran Coates, Standard      [] Other:   Vendor      Treatment Presently Receiving:    Current Treatments  [] Chemotherapy  [] Dialysis  [] Insulin  [] IVAB [x] IVF   [x] O2  [] PCA   [] PT   [] RT   [] Tube Feedings   [] Wound Care     Psychosocial Evaluation:    Verbalized Knowledge of Disease Process  [] Patient  []Family   Coping with Disease Process  [] Patient  []Family   Requires Further Counseling Coping with Disease Process  [] Patient  []Family     Identified Projected Needs:    Home Health Aid  [] Yes  [x]No   Transportation  [] Yes  [x]No   Education  [] Yes  [x]No        Specific Education     Financial Counseling  [] Yes  [x]No   Inability to Care for Self/Will Require 24 hour care  [] Yes  [x]No   Pain Management  [] Yes  [x]No   Home Infusion Therapy  [] Yes  [x]No   Oxygen Therapy  [] Yes  [x]No   DME  [] Yes  [x]No   Long Term Care Placement  [] Yes  [x]No   Rehab  [] Yes  [x]No   Physical Therapy  [] Yes  [x]No   Needs Anticipated At This Time  [] Yes  [x]No     Intra-Hospital Referral:    [address]  [] Yes  [x]No     [] Yes  [x]No   Patient Representative  [] Yes  [x]No   Staff for Teaching Needs  [] Yes  [x]No   Specialty Teaching Needs     Diabetic Educator  [] Yes  [x]No   Referral for Diabetic Educator Needed  [] Yes  [x]No  If Yes, place order for Nutritionist or Diabetic Consult     Tentative Discharge Plan:    Home with No Services  [] Yes  [x]No   Home with Home Health Follow-up  [x] Yes  []No        If Yes, specify type    Home Care Program  [] Yes  [x]No        If Yes, specify type    Meals on Wheels  [] Yes  [x]No   Office of Aging  [] Yes  [x]No   NHP  [] Yes  [x]No   Return to the Nursing Home  [] Yes  [x]No   Rehab Therapy  [] Yes  [x]No   Acute Rehab  [] Yes  [x]No   Subacute Rehab  [] Yes  [x]No   Private Care  [] Yes  [x]No   Substance Abuse Referral  [] Yes  [x]No   Transportation  [] Yes  [x]No   Chore Service  [] Yes  [x]No   Inpatient Hospice  [] Yes  [x]No   OP RT  [] Yes  [x] No   OP Hemo  [] Yes  [x] No   OP PT  [] Yes  [x]No   Support Group  [] Yes  [x]No   Reach to Recovery  [] Yes  [x]No   OP Oncology Clinic  [] Yes  [x]No   Clinic Appointment  [] Yes  [x]No   DME  [] Yes  [x]No   Comments    Name of D/C Planner or  Given to Patient or Family Wilman Parker RN   Phone Number         Extension [phone]   Date 09/25/17   Time    If you are discharged home, whom do you designate to participate in your discharge plan and receive any information needed?      Enter name of designee unable        Phone # of designee         Address of designee         Updated         Patient refused to designate any           individual

## 2017-09-25 NOTE — PROGRESS NOTES
Medicine Progress Note    Patient: Mackenzie Kilgore   Age:  54 y.o.  DOA: 9/24/2017   Admit Dx / CC: Acute respiratory failure with hypercapnia (HCC)  LOS:  LOS: 1 day     Assessment/Plan   Principal Problem:    Acute respiratory failure with hypercapnia ([address] (9/24/2017)    Active Problems:    Sepsis ([address] (9/24/2017)      HCAP (healthcare-associated pneumonia) (9/24/2017)      Hypertension (9/24/2017)      Diabetes mellitus ([address] (9/24/2017)        Additional Plan notes       1. Acute respiratory failure with hypercapnia. -now extubated, improving    -Intensivist consulted for management. 2.Septic shock    -On vanc,zosyn and levaquin    -Blood cx    - on levophed    3. Diabetes.    -On ssi    4. GERD    -On ppi. DISPO     Anticipated Date of Discharge: ???  Anticipated Disposition (home, SNF) : SNF    Subjective:   Patient seen and examined. Lethargic but arousable and communicating well post extubation    Objective:     Visit Vitals    /78    Pulse 98    Temp 98.1 °F (36.7 °C)    Resp 23    Ht 5' 4\" (1.626 m)    Wt 66 kg (145 lb 8.1 oz)    SpO2 98%    BMI 24.98 kg/m2       Physical Exam:  General appearance: alert, cooperative, no distress, appears stated age  Head: Normocephalic, without obvious abnormality, atraumatic  Neck: supple, trachea midline  Lungs: clear to auscultation bilaterally  Heart: regular rate and rhythm, S1, S2 normal, no murmur, click, rub or gallop  Abdomen: soft, non-tender.  Bowel sounds normal. No masses,  no organomegaly  Extremities: extremities normal, atraumatic, no cyanosis or edema  Skin: Skin color, texture, turgor normal. No rashes or lesions  Neurologic: Grossly normal    Intake and Output:  Current Shift:  09/25 0701 - 09/25 1900  In: 968.6 [I.V.:938.6]  Out: 75 [Urine:75]  Last three shifts:  09/23 1901 - 09/25 0700  In: 1470.7 [I.V.:1380.7]  Out: 781 [Urine:781]    Lab/Data Reviewed:  CMP:   Lab Results   Component Value Date/Time     09/25/2017 03:00 AM    K 4.5 09/25/2017 03:00 AM     09/25/2017 03:00 AM    CO2 22 09/25/2017 03:00 AM    AGAP 10 09/25/2017 03:00 AM     (H) 09/25/2017 03:00 AM    BUN 24 (H) 09/25/2017 03:00 AM    CREA 1.63 (H) 09/25/2017 03:00 AM    GFRAA 41 (L) 09/25/2017 03:00 AM    GFRNA 34 (L) 09/25/2017 03:00 AM    CA 6.5 (L) 09/25/2017 03:00 AM    ALB 1.5 (L) 09/25/2017 03:00 AM    TP 5.8 (L) 09/25/2017 03:00 AM    GLOB 4.3 (H) 09/25/2017 03:00 AM    AGRAT 0.3 (L) 09/25/2017 03:00 AM    SGOT 46 (H) 09/25/2017 03:00 AM    ALT 26 09/25/2017 03:00 AM     CBC:   Lab Results   Component Value Date/Time    WBC 32.8 (H) 09/25/2017 03:00 AM    HGB 8.0 (L) 09/25/2017 03:00 AM    HCT 24.8 (L) 09/25/2017 03:00 AM     09/25/2017 03:00 AM     All Cardiac Markers in the last 24 hours: No results found for: CPK, CK, CKMMB, CKMB, RCK3, CKMBT, CKNDX, CKND1, LAVELLE, TROPT, TROIQ, RAMIREZ, TROPT, TNIPOC, BNP, BNPP    Medications Reviewed:  Current Facility-Administered Medications   Medication Dose Route Frequency    VANCOMYCIN, Dosing per levels    Other Rx Dosing/Monitoring    piperacillin-tazobactam (ZOSYN) 4.5 g in 0.9% sodium chloride (MBP/ADV) 100 mL MBP ### EXTENDED 4 HOUR INFUSION ##   4.5 g IntraVENous Q8H    [START ON 9/27/2017] levoFLOXacin (LEVAQUIN) 750 mg in D5W IVPB  750 mg IntraVENous Q48H    insulin lispro (HUMALOG) injection   SubCUTAneous Q6H    glucose chewable tablet 16 g  4 Tab Oral PRN    glucagon (GLUCAGEN) injection 1 mg  1 mg IntraMUSCular PRN    dextrose (D50W) injection syrg 12.5-25 g  25-50 mL IntraVENous PRN    sodium chloride (NS) flush 5-10 mL  5-10 mL IntraVENous PRN    dexmedeTOMidine (PRECEDEX) 400 mcg in 0.9% sodium chloride 100 mL infusion  0.2-0.7 mcg/kg/hr IntraVENous TITRATE    NOREPINephrine (LEVOPHED) 8,000 mcg in dextrose 5% 250 mL infusion  0-30 mcg/min IntraVENous TITRATE    propofol (DIPRIVAN) infusion  0-50 mcg/kg/min IntraVENous TITRATE    aspirin delayed-release tablet 81 mg  81 mg Oral DAILY    sodium chloride (NS) flush 5-10 mL  5-10 mL IntraVENous Q8H    sodium chloride (NS) flush 5-10 mL  5-10 mL IntraVENous PRN    heparin (porcine) injection 5,000 Units  5,000 Units SubCUTAneous Q8H    ondansetron (ZOFRAN) injection 4 mg  4 mg IntraVENous Q6H PRN    0.9% sodium chloride infusion  100 mL/hr IntraVENous CONTINUOUS    hydrALAZINE (APRESOLINE) 20 mg/mL injection 10 mg  10 mg IntraVENous Q6H PRN    famotidine (PF) (PEPCID) injection 20 mg  20 mg IntraVENous DAILY    thiamine (B-1) 100 mg in 0.9% sodium chloride 50 mL IVPB  100 mg IntraVENous BID    acetaminophen (TYLENOL) solution 650 mg  650 mg Per G Tube Q4H PRN    ELECTROLYTE REPLACEMENT PROTOCOL  1 Each Other PRN    ELECTROLYTE REPLACEMENT PROTOCOL  1 Each Other PRN    ELECTROLYTE REPLACEMENT PROTOCOL  1 Each Other PRN    ELECTROLYTE REPLACEMENT PROTOCOL  1 Each Other PRN    albuterol-ipratropium (DUO-NEB) 2.5 MG-0.5 MG/3 ML  3 mL Nebulization Q4H RT       Guille Dasilva MD    September 25, 2017

## 2017-09-25 NOTE — PROGRESS NOTES
Patient is unable to communicate at this time as she is currently on life support measures and not awake. Shruthi Cunningham  offered prayer at bedside. No family seen at this time. John Rodriguez will continue to follow and will provide pastoral care on an as needed/requested basis.     Oleg Abraham   Spiritual Care   [phone]

## 2017-09-25 NOTE — PROGRESS NOTES
2000 assumed care of pt.  2015 assessment complete. 2030 suctioned secretions as needed. 2100 pt.turned & repositioned  2136  paged with orders made & carried out. Bure 190 paged for breathing treatment & vent changes. 2320 pt.restless & fighting the vent. 0030 titrating pressors & sedation   0100 suctioned secretions as needed. 0200 turned & repositioned. 0430 complete bath done. 0530 complete AM care done. Bedside and Verbal shift change report given to GUILLERMO SCHUSTER (oncoming nurse) by Terrie Esposito RN (offgoing nurse). Report included the following information SBAR, Kardex, Intake/Output, MAR and Recent Results.

## 2017-09-26 ENCOUNTER — APPOINTMENT (OUTPATIENT)
Dept: ULTRASOUND IMAGING | Age: 52
DRG: 871 | End: 2017-09-26
Attending: INTERNAL MEDICINE
Payer: MEDICARE

## 2017-09-26 LAB
ANION GAP SERPL CALC-SCNC: 13 MMOL/L (ref 3–18)
ATRIAL RATE: 162 BPM
BACTERIA SPEC CULT: NORMAL
BASOPHILS # BLD: 0 K/UL (ref 0–0.1)
BASOPHILS NFR BLD: 0 % (ref 0–3)
BNP SERPL-MCNC: ABNORMAL PG/ML (ref 0–450)
BUN SERPL-MCNC: 32 MG/DL (ref 7–18)
BUN/CREAT SERPL: 18 (ref 12–20)
CALCIUM SERPL-MCNC: 6.9 MG/DL (ref 8.5–10.1)
CALCULATED P AXIS, ECG09: 52 DEGREES
CALCULATED R AXIS, ECG10: -22 DEGREES
CALCULATED T AXIS, ECG11: -156 DEGREES
CHLORIDE SERPL-SCNC: 110 MMOL/L (ref 100–108)
CO2 SERPL-SCNC: 20 MMOL/L (ref 21–32)
CREAT SERPL-MCNC: 1.73 MG/DL (ref 0.6–1.3)
DIAGNOSIS, 93000: NORMAL
DIFFERENTIAL METHOD BLD: ABNORMAL
EOSINOPHIL # BLD: 0 K/UL (ref 0–0.4)
EOSINOPHIL NFR BLD: 0 % (ref 0–5)
ERYTHROCYTE [DISTWIDTH] IN BLOOD BY AUTOMATED COUNT: 16.2 % (ref 11.6–14.5)
ERYTHROCYTE [DISTWIDTH] IN BLOOD BY AUTOMATED COUNT: 16.2 % (ref 11.6–14.5)
ETHANOL SERPL-MCNC: <3 MG/DL (ref 0–3)
GLUCOSE BLD STRIP.AUTO-MCNC: 132 MG/DL (ref 70–110)
GLUCOSE BLD STRIP.AUTO-MCNC: 148 MG/DL (ref 70–110)
GLUCOSE BLD STRIP.AUTO-MCNC: 160 MG/DL (ref 70–110)
GLUCOSE SERPL-MCNC: 148 MG/DL (ref 74–99)
HCT VFR BLD AUTO: 19.6 % (ref 35–45)
HCT VFR BLD AUTO: 21 % (ref 35–45)
HCT VFR BLD AUTO: 21.8 % (ref 35–45)
HGB BLD-MCNC: 6.5 G/DL (ref 12–16)
HGB BLD-MCNC: 6.9 G/DL (ref 12–16)
HGB BLD-MCNC: 7.3 G/DL (ref 12–16)
INR PPP: 1.9 (ref 0.8–1.2)
LYMPHOCYTES # BLD: 1.1 K/UL (ref 0.8–3.5)
LYMPHOCYTES NFR BLD: 4 % (ref 20–51)
MCH RBC QN AUTO: 29.7 PG (ref 24–34)
MCH RBC QN AUTO: 30 PG (ref 24–34)
MCHC RBC AUTO-ENTMCNC: 32.9 G/DL (ref 31–37)
MCHC RBC AUTO-ENTMCNC: 33.2 G/DL (ref 31–37)
MCV RBC AUTO: 90.3 FL (ref 74–97)
MCV RBC AUTO: 90.5 FL (ref 74–97)
MONOCYTES # BLD: 0.3 K/UL (ref 0–1)
MONOCYTES NFR BLD: 1 % (ref 2–9)
NEUTS SEG # BLD: 26.3 K/UL (ref 1.8–8)
NEUTS SEG NFR BLD: 95 % (ref 42–75)
P-R INTERVAL, ECG05: 160 MS
PLATELET # BLD AUTO: 122 K/UL (ref 135–420)
PLATELET # BLD AUTO: 150 K/UL (ref 135–420)
PMV BLD AUTO: 11.2 FL (ref 9.2–11.8)
PMV BLD AUTO: 11.9 FL (ref 9.2–11.8)
POTASSIUM SERPL-SCNC: 4 MMOL/L (ref 3.5–5.5)
PROCALCITONIN SERPL-MCNC: 30.1 NG/ML (ref 0–0.08)
PROTHROMBIN TIME: 20.7 SEC (ref 11.5–15.2)
Q-T INTERVAL, ECG07: 226 MS
QRS DURATION, ECG06: 94 MS
QTC CALCULATION (BEZET), ECG08: 371 MS
RBC # BLD AUTO: 2.17 M/UL (ref 4.2–5.3)
RBC # BLD AUTO: 2.32 M/UL (ref 4.2–5.3)
RBC MORPH BLD: ABNORMAL
RBC MORPH BLD: ABNORMAL
SERVICE CMNT-IMP: NORMAL
SODIUM SERPL-SCNC: 143 MMOL/L (ref 136–145)
T4 FREE SERPL-MCNC: 0.6 NG/DL (ref 0.7–1.5)
TSH SERPL DL<=0.05 MIU/L-ACNC: 1.54 UIU/ML (ref 0.36–3.74)
VANCOMYCIN SERPL-MCNC: 19.1 UG/ML (ref 5–40)
VENTRICULAR RATE, ECG03: 162 BPM
WBC # BLD AUTO: 22.9 K/UL (ref 4.6–13.2)
WBC # BLD AUTO: 27.7 K/UL (ref 4.6–13.2)
WBC MORPH BLD: ABNORMAL

## 2017-09-26 PROCEDURE — 74011000250 HC RX REV CODE- 250: Performed by: INTERNAL MEDICINE

## 2017-09-26 PROCEDURE — P9016 RBC LEUKOCYTES REDUCED: HCPCS | Performed by: PHYSICIAN ASSISTANT

## 2017-09-26 PROCEDURE — 74011250636 HC RX REV CODE- 250/636: Performed by: INTERNAL MEDICINE

## 2017-09-26 PROCEDURE — 85027 COMPLETE CBC AUTOMATED: CPT | Performed by: INTERNAL MEDICINE

## 2017-09-26 PROCEDURE — 84439 ASSAY OF FREE THYROXINE: CPT | Performed by: INTERNAL MEDICINE

## 2017-09-26 PROCEDURE — 36415 COLL VENOUS BLD VENIPUNCTURE: CPT | Performed by: PHYSICIAN ASSISTANT

## 2017-09-26 PROCEDURE — 80202 ASSAY OF VANCOMYCIN: CPT | Performed by: HOSPITALIST

## 2017-09-26 PROCEDURE — 74011250636 HC RX REV CODE- 250/636: Performed by: PHYSICIAN ASSISTANT

## 2017-09-26 PROCEDURE — 74011000250 HC RX REV CODE- 250: Performed by: EMERGENCY MEDICINE

## 2017-09-26 PROCEDURE — 86920 COMPATIBILITY TEST SPIN: CPT | Performed by: PHYSICIAN ASSISTANT

## 2017-09-26 PROCEDURE — 74011250637 HC RX REV CODE- 250/637: Performed by: INTERNAL MEDICINE

## 2017-09-26 PROCEDURE — 86922 COMPATIBILITY TEST ANTIGLOB: CPT | Performed by: PHYSICIAN ASSISTANT

## 2017-09-26 PROCEDURE — 86921 COMPATIBILITY TEST INCUBATE: CPT | Performed by: PHYSICIAN ASSISTANT

## 2017-09-26 PROCEDURE — 74011000258 HC RX REV CODE- 258: Performed by: HOSPITALIST

## 2017-09-26 PROCEDURE — 85018 HEMOGLOBIN: CPT | Performed by: PHYSICIAN ASSISTANT

## 2017-09-26 PROCEDURE — 94660 CPAP INITIATION&MGMT: CPT

## 2017-09-26 PROCEDURE — 86902 BLOOD TYPE ANTIGEN DONOR EA: CPT | Performed by: PHYSICIAN ASSISTANT

## 2017-09-26 PROCEDURE — 76775 US EXAM ABDO BACK WALL LIM: CPT

## 2017-09-26 PROCEDURE — 87493 C DIFF AMPLIFIED PROBE: CPT | Performed by: HOSPITALIST

## 2017-09-26 PROCEDURE — 36430 TRANSFUSION BLD/BLD COMPNT: CPT

## 2017-09-26 PROCEDURE — 80048 BASIC METABOLIC PNL TOTAL CA: CPT | Performed by: PHYSICIAN ASSISTANT

## 2017-09-26 PROCEDURE — 86850 RBC ANTIBODY SCREEN: CPT | Performed by: PHYSICIAN ASSISTANT

## 2017-09-26 PROCEDURE — 74011000258 HC RX REV CODE- 258: Performed by: INTERNAL MEDICINE

## 2017-09-26 PROCEDURE — 65610000006 HC RM INTENSIVE CARE

## 2017-09-26 PROCEDURE — 82962 GLUCOSE BLOOD TEST: CPT

## 2017-09-26 PROCEDURE — 74011250636 HC RX REV CODE- 250/636: Performed by: HOSPITALIST

## 2017-09-26 PROCEDURE — 77030033263 HC DRSG MEPILEX 16-48IN BORD MOLN -B

## 2017-09-26 PROCEDURE — 74011000258 HC RX REV CODE- 258: Performed by: EMERGENCY MEDICINE

## 2017-09-26 PROCEDURE — 94640 AIRWAY INHALATION TREATMENT: CPT

## 2017-09-26 PROCEDURE — 74011250636 HC RX REV CODE- 250/636: Performed by: FAMILY MEDICINE

## 2017-09-26 PROCEDURE — 85025 COMPLETE CBC W/AUTO DIFF WBC: CPT | Performed by: PHYSICIAN ASSISTANT

## 2017-09-26 PROCEDURE — 83880 ASSAY OF NATRIURETIC PEPTIDE: CPT | Performed by: INTERNAL MEDICINE

## 2017-09-26 PROCEDURE — 84443 ASSAY THYROID STIM HORMONE: CPT | Performed by: INTERNAL MEDICINE

## 2017-09-26 PROCEDURE — 85610 PROTHROMBIN TIME: CPT | Performed by: PHYSICIAN ASSISTANT

## 2017-09-26 RX ORDER — LEVOTHYROXINE SODIUM 100 UG/1
100 TABLET ORAL
Status: DISCONTINUED | OUTPATIENT
Start: 2017-09-27 | End: 2017-10-09 | Stop reason: HOSPADM

## 2017-09-26 RX ORDER — METOPROLOL TARTRATE 5 MG/5ML
5 INJECTION INTRAVENOUS
Status: DISCONTINUED | OUTPATIENT
Start: 2017-09-26 | End: 2017-10-02

## 2017-09-26 RX ORDER — HYDROMORPHONE HYDROCHLORIDE 2 MG/ML
0.5 INJECTION, SOLUTION INTRAMUSCULAR; INTRAVENOUS; SUBCUTANEOUS
Status: DISCONTINUED | OUTPATIENT
Start: 2017-09-26 | End: 2017-09-29

## 2017-09-26 RX ORDER — LORAZEPAM 2 MG/ML
2 INJECTION INTRAMUSCULAR
Status: DISCONTINUED | OUTPATIENT
Start: 2017-09-26 | End: 2017-10-02

## 2017-09-26 RX ORDER — SODIUM CHLORIDE 9 MG/ML
250 INJECTION, SOLUTION INTRAVENOUS AS NEEDED
Status: DISCONTINUED | OUTPATIENT
Start: 2017-09-26 | End: 2017-09-30 | Stop reason: SDUPTHER

## 2017-09-26 RX ADMIN — HYDROMORPHONE HYDROCHLORIDE 0.5 MG: 2 INJECTION INTRAMUSCULAR; INTRAVENOUS; SUBCUTANEOUS at 18:35

## 2017-09-26 RX ADMIN — SODIUM CHLORIDE 1000 MG: 900 INJECTION, SOLUTION INTRAVENOUS at 09:51

## 2017-09-26 RX ADMIN — HEPARIN SODIUM 5000 UNITS: 5000 INJECTION, SOLUTION INTRAVENOUS; SUBCUTANEOUS at 22:54

## 2017-09-26 RX ADMIN — ACETAMINOPHEN 650 MG: 650 SOLUTION ORAL at 02:55

## 2017-09-26 RX ADMIN — PIPERACILLIN SODIUM AND TAZOBACTAM SODIUM 4.5 G: 4; .5 INJECTION, POWDER, LYOPHILIZED, FOR SOLUTION INTRAVENOUS at 06:42

## 2017-09-26 RX ADMIN — IPRATROPIUM BROMIDE AND ALBUTEROL SULFATE 3 ML: .5; 3 SOLUTION RESPIRATORY (INHALATION) at 11:57

## 2017-09-26 RX ADMIN — LORAZEPAM 2 MG: 2 INJECTION INTRAMUSCULAR; INTRAVENOUS at 16:24

## 2017-09-26 RX ADMIN — Medication 10 ML: at 06:08

## 2017-09-26 RX ADMIN — THIAMINE HYDROCHLORIDE 100 MG: 100 INJECTION, SOLUTION INTRAMUSCULAR; INTRAVENOUS at 22:54

## 2017-09-26 RX ADMIN — SODIUM CHLORIDE 0.5 MCG/KG/HR: 900 INJECTION, SOLUTION INTRAVENOUS at 01:29

## 2017-09-26 RX ADMIN — SODIUM CHLORIDE 250 ML: 900 INJECTION, SOLUTION INTRAVENOUS at 13:33

## 2017-09-26 RX ADMIN — FAMOTIDINE 20 MG: 10 INJECTION, SOLUTION INTRAVENOUS at 10:00

## 2017-09-26 RX ADMIN — THIAMINE HYDROCHLORIDE 100 MG: 100 INJECTION, SOLUTION INTRAMUSCULAR; INTRAVENOUS at 11:39

## 2017-09-26 RX ADMIN — PIPERACILLIN SODIUM AND TAZOBACTAM SODIUM 4.5 G: 4; .5 INJECTION, POWDER, LYOPHILIZED, FOR SOLUTION INTRAVENOUS at 13:40

## 2017-09-26 RX ADMIN — Medication 10 ML: at 13:41

## 2017-09-26 RX ADMIN — LORAZEPAM 2 MG: 2 INJECTION INTRAMUSCULAR; INTRAVENOUS at 00:17

## 2017-09-26 RX ADMIN — IPRATROPIUM BROMIDE AND ALBUTEROL SULFATE 3 ML: .5; 3 SOLUTION RESPIRATORY (INHALATION) at 19:59

## 2017-09-26 RX ADMIN — LORAZEPAM 2 MG: 2 INJECTION INTRAMUSCULAR; INTRAVENOUS at 02:56

## 2017-09-26 RX ADMIN — HEPARIN SODIUM 5000 UNITS: 5000 INJECTION, SOLUTION INTRAVENOUS; SUBCUTANEOUS at 17:45

## 2017-09-26 RX ADMIN — IPRATROPIUM BROMIDE AND ALBUTEROL SULFATE 3 ML: .5; 3 SOLUTION RESPIRATORY (INHALATION) at 00:27

## 2017-09-26 RX ADMIN — HEPARIN SODIUM 5000 UNITS: 5000 INJECTION, SOLUTION INTRAVENOUS; SUBCUTANEOUS at 10:00

## 2017-09-26 RX ADMIN — IPRATROPIUM BROMIDE AND ALBUTEROL SULFATE 3 ML: .5; 3 SOLUTION RESPIRATORY (INHALATION) at 08:45

## 2017-09-26 RX ADMIN — ASPIRIN 81 MG: 81 TABLET, COATED ORAL at 10:08

## 2017-09-26 RX ADMIN — SODIUM CHLORIDE 0.3 MCG/KG/HR: 900 INJECTION, SOLUTION INTRAVENOUS at 20:18

## 2017-09-26 RX ADMIN — PIPERACILLIN SODIUM AND TAZOBACTAM SODIUM 4.5 G: 4; .5 INJECTION, POWDER, LYOPHILIZED, FOR SOLUTION INTRAVENOUS at 22:01

## 2017-09-26 RX ADMIN — IPRATROPIUM BROMIDE AND ALBUTEROL SULFATE 3 ML: .5; 3 SOLUTION RESPIRATORY (INHALATION) at 04:46

## 2017-09-26 RX ADMIN — HEPARIN SODIUM 5000 UNITS: 5000 INJECTION, SOLUTION INTRAVENOUS; SUBCUTANEOUS at 00:16

## 2017-09-26 RX ADMIN — METOPROLOL TARTRATE 5 MG: 5 INJECTION INTRAVENOUS at 17:41

## 2017-09-26 NOTE — PROGRESS NOTES
Chart reviewed and I called Pt's mom Lydia Flores. She says the pt was recently at [facility] for pneumonia. Then she went a couple of weeks to rehab. She expects the pt to go home when discharged, with Mrs Migdalia Acuña staying at her house to care for her initially. I don't know if she has more snf days. Plan is for pt to go home, possibly with DME such as o2, nebs and or Bipap. Case Management to follow.

## 2017-09-26 NOTE — PROGRESS NOTES
Medicine Progress Note    Patient: Jelly Arellano   Age:  54 y.o.  DOA: 9/24/2017   Admit Dx / CC: Acute respiratory failure with hypercapnia (HCC)  LOS:  LOS: 2 days     Assessment/Plan   Principal Problem:    Acute respiratory failure with hypercapnia ([address] (9/24/2017)    Active Problems:    Sepsis ([address] (9/24/2017)      HCAP (healthcare-associated pneumonia) (9/24/2017)      Hypertension (9/24/2017)      Diabetes mellitus ([address] (9/24/2017)        Additional Plan notes       1. Acute respiratory failure with hypercapnia. 2/2 PNA    -now extubated, improving    -Intensivist consulted for management. 2.Septic shock 2/2 PNA    -On vanc,zosyn and levaquin    -Blood cx NGTD    3. Diabetes.    -On ssi    4. GERD    -On ppi.    5, Hx of Throat cancer    DISPO     Anticipated Date of Discharge: ???  Anticipated Disposition (home, SNF) : SNF    Subjective:   Patient seen and examined. Lethargic today 2/2 ativan    Objective:     Visit Vitals    /78    Pulse (!) 107    Temp 98.1 °F (36.7 °C)    Resp 30    Ht 5' 4\" (1.626 m)    Wt 66 kg (145 lb 8.1 oz)    SpO2 100%    BMI 24.98 kg/m2       Physical Exam:  General appearance: alert, cooperative, no distress, appears stated age  Head: Normocephalic, without obvious abnormality, atraumatic  Neck: supple, trachea midline  Lungs: clear to auscultation bilaterally  Heart: regular rate and rhythm, S1, S2 normal, no murmur, click, rub or gallop  Abdomen: soft, non-tender.  Bowel sounds normal. No masses,  no organomegaly  Extremities: extremities normal, atraumatic, no cyanosis or edema  Skin: Skin color, texture, turgor normal. No rashes or lesions  Neurologic: Grossly normal    Intake and Output:  Current Shift:  09/26 0701 - 09/26 1900  In: 501.2 [I.V.:281.2]  Out: 100 [Urine:100]  Last three shifts:  09/24 1901 - 09/26 0700  In: 3564.2 [I.V.:2829.2]  Out: 6607 [Urine:1640; Drains:200]    Lab/Data Reviewed:  CMP:   Lab Results   Component Value Date/Time    NA 143 09/26/2017 04:00 AM    K 4.0 09/26/2017 04:00 AM     (H) 09/26/2017 04:00 AM    CO2 20 (L) 09/26/2017 04:00 AM    AGAP 13 09/26/2017 04:00 AM     (H) 09/26/2017 04:00 AM    BUN 32 (H) 09/26/2017 04:00 AM    CREA 1.73 (H) 09/26/2017 04:00 AM    GFRAA 38 (L) 09/26/2017 04:00 AM    GFRNA 31 (L) 09/26/2017 04:00 AM    CA 6.9 (L) 09/26/2017 04:00 AM     CBC:   Lab Results   Component Value Date/Time    WBC 22.9 (H) 09/26/2017 09:56 AM    HGB 6.5 (L) 09/26/2017 09:56 AM    HCT 19.6 (L) 09/26/2017 09:56 AM     (L) 09/26/2017 09:56 AM     All Cardiac Markers in the last 24 hours: No results found for: CPK, CK, CKMMB, CKMB, RCK3, CKMBT, CKNDX, CKND1, LAVELLE, TROPT, TROIQ, RAMIREZ, TROPT, TNIPOC, BNP, BNPP    Medications Reviewed:  Current Facility-Administered Medications   Medication Dose Route Frequency    LORazepam (ATIVAN) injection 2 mg  2 mg IntraVENous Q2H PRN    0.9% sodium chloride infusion 250 mL  250 mL IntraVENous PRN    VANCOMYCIN, Dosing per levels    Other Rx Dosing/Monitoring    piperacillin-tazobactam (ZOSYN) 4.5 g in 0.9% sodium chloride (MBP/ADV) 100 mL MBP ### EXTENDED 4 HOUR INFUSION ##   4.5 g IntraVENous Q8H    [START ON 9/27/2017] levoFLOXacin (LEVAQUIN) 750 mg in D5W IVPB  750 mg IntraVENous Q48H    insulin lispro (HUMALOG) injection   SubCUTAneous Q6H    glucose chewable tablet 16 g  4 Tab Oral PRN    glucagon (GLUCAGEN) injection 1 mg  1 mg IntraMUSCular PRN    dextrose (D50W) injection syrg 12.5-25 g  25-50 mL IntraVENous PRN    sodium chloride (NS) flush 5-10 mL  5-10 mL IntraVENous PRN    dexmedeTOMidine (PRECEDEX) 400 mcg in 0.9% sodium chloride 100 mL infusion  0.2-0.7 mcg/kg/hr IntraVENous TITRATE    NOREPINephrine (LEVOPHED) 8,000 mcg in dextrose 5% 250 mL infusion  0-30 mcg/min IntraVENous TITRATE    aspirin delayed-release tablet 81 mg  81 mg Oral DAILY    sodium chloride (NS) flush 5-10 mL  5-10 mL IntraVENous Q8H    sodium chloride (NS) flush 5-10 mL 5-10 mL IntraVENous PRN    heparin (porcine) injection 5,000 Units  5,000 Units SubCUTAneous Q8H    ondansetron (ZOFRAN) injection 4 mg  4 mg IntraVENous Q6H PRN    hydrALAZINE (APRESOLINE) 20 mg/mL injection 10 mg  10 mg IntraVENous Q6H PRN    famotidine (PF) (PEPCID) injection 20 mg  20 mg IntraVENous DAILY    thiamine (B-1) 100 mg in 0.9% sodium chloride 50 mL IVPB  100 mg IntraVENous BID    acetaminophen (TYLENOL) solution 650 mg  650 mg Per G Tube Q4H PRN    ELECTROLYTE REPLACEMENT PROTOCOL  1 Each Other PRN    ELECTROLYTE REPLACEMENT PROTOCOL  1 Each Other PRN    ELECTROLYTE REPLACEMENT PROTOCOL  1 Each Other PRN    ELECTROLYTE REPLACEMENT PROTOCOL  1 Each Other PRN    albuterol-ipratropium (DUO-NEB) 2.5 MG-0.5 MG/3 ML  3 mL Nebulization Q4H RT       Justen More MD    September 26, 2017

## 2017-09-26 NOTE — PROGRESS NOTES
Patient remains on BiPAP - FIo2 decreased to 40%, sats 100% - changed mask to a small - patient appears to be resting comfortably at this time    0454 - patient appears to be resting comfortably on BiPAP

## 2017-09-26 NOTE — PROGRESS NOTES
[facility]  09/25/17           Last 3 Recorded Weights in this Encounter    09/24/17 0709 09/25/17 0728   Weight: 62.6 kg (138 lb) 66 kg (145 lb 8.1 oz)       Overnight Shift Events:  1930:   · Bedside and verbal shift change report received from Zana Werner, [address]. Provider's instructions/meds/plan for current shift reviewed with pt this writer; ongoing education rendered at this time. Rate verify on IV fluids/gtts. Pt lying in bed, call bell within reach, bed in lowest position, side rails engaged x3 for pt safety; will continue to monitor pt.    2045:   · Hospitalist paged and made aware of pts increased HR, anxiety, and respiratory status. New orders for ativan, EKG, CIWA protocol obtained via telephone with read back - will carry out orders as received. 2049:   · EKG completed. MD made aware of EKG reading. Ativan administered as ordered. Pt placed on NRB by RT. Sats now 100%. See flowsheets. 2122:   · Tylenol administered for axillary temp of 102.3F. Will continue to monitor. 2145:   · Spoke with Dr. Lalita Sims to update on the above. Orders received to place pt on BiPAP & see how she tolerates it. BiPAP initiated by RT at this time. 2230:   · FMS inserted by this RN 2/2 to multiple liquid stools. Supervisor paged for contact cart to place pt on contact precautions to r/o c-diff.       0127:   · Pts HR remains elevated & she remains anxious. Pt requesting \"give me propofol like they gave me earlier. \"  Pt educated by this RN on why she cannot receive propofol while she is no longer intubated. Pt with order for precedex & precedex initiated as ordered. 0200:   · Pt tolerating BiPAP better on precedex; HR decreased, pt resting comfortably. Will continue to monitor pt.      0400:   · Pt continues to tolerate BiPAP. Will continue to monitor pt. 8686:  Bedside and Verbal shift change report given to Allyson Buitrago RN (oncoming nurse) by [place] Rosebud Mylar, RN(offgoing nurse). Report included the following information SBAR, Kardex, Procedure Summary, Intake/Output, MAR and Recent Results. Sinus Tachycardia.                     Date 09/25/17 0700 - 09/26/17 0659 09/26/17 0700 - 09/27/17 0659   Shift 1918-4007 0882-7840 24 Hour Total 8952-9654 1512-5312 24 Hour Total   I  N  T  A  K  E   I.V.  (mL/kg/hr) 1779.9  (2.2) 235.2  (0.3) 2015.1  (1.3) 7.8  7.8      Precedex Volume 71.6 35.2 106.8 7.8  7.8      Levophed Volume 116.6  116.6         Volume (0.9% sodium chloride infusion) 1191.7  1191.7         Volume (piperacillin-tazobactam (ZOSYN) 4.5 g in 0.9% sodium chloride (MBP/ADV) 100 mL MBP ### EXTENDED 4 HOUR INFUSION ## ) 300 200 500         Volume (levoFLOXacin (LEVAQUIN) 750 mg in D5W IVPB) 100  100       Other  0 0         Irrigation Volume Input (mL) (Urinary Catheter 09/24/17)  0 0       NG/ 515 695 40  40      Water Flush Volume (mL) (PEG/Gastrostomy Tube 08/14/17) 30 90 120         Intake (ml) (PEG/Gastrostomy Tube 08/14/17) 150 425 575 40  40    Shift Total  (mL/kg) 1959.9  (29.7) 750.2  (11.4) 2710.1  (41.1) 47.8  (0.7)  47.8  (0.7)   O  U  T  P  U  T   Urine  (mL/kg/hr) 750  (0.9) 440  (0.6) 1190  (0.8)         Urine Output (mL) (Urinary Catheter 09/24/17)        Drains  200 200         Output (ml) (Fecal Management)  200 200       Stool            Stool Occurrence(s)  1 x 1 x       Shift Total  (mL/kg) 750  (11.4) 640  (9.7) 1390  (21.1)      NET 1209.9 110.2 1320.1 47.8  47.8   Weight (kg) 66 66 66 66 66 66

## 2017-09-26 NOTE — PROGRESS NOTES
Called to bedside to place patient on BiPap. She has increased work of breathing and increased oxygen requirement. She is tachycardic and tachypneic. She was placed on BiPap S/T 12/6, 40%, back-up rate of 8. Measured tidal volumes 350-380ml. Breath sounds coarse and equal throughout. She tolerated BiPap well. Her SPO2 is 100% and work of breathing has decreased. Emergency equipment and suction is available at the bedside. Will continue to monitor and make adjustments as needed.

## 2017-09-26 NOTE — PROGRESS NOTES
Pharmacy Dosing Services: Vancomycin    Indication: HAP    Day of therapy: 2    Other Antimicrobials (Include dose, start day & day of therapy):  Zosyn  and Levaquin  day 2    Loading dose (date given): 1.5g   Current Maintenance dose: dose per levels     Goal Vancomycin Level: 15-20  (Trough 15-20 for most infections, 20 for meningitis/osteomyelitis, pre-HD level ~25)   Vancomycin levels:   -24.7 mcg/dl (~12hrs post dose)  -19.1 mcg/dl ( ~32 hrs post dose)    Significant Cultures:   : sputum- GPC  : urine- pending   : blood cx: NGTD  x 2 days    Renal function stable? (unstable defined as SCr increase of 0.5 mg/dL or > 50% increase from baseline, whichever is greater) (Y/N): N    CAPD, Hemodialysis or Renal Replacement Therapy (Y/N): N     Recent Labs      17   0400  17   0300  17   2038  17   0715   CREA  1.73*  1.63*  1.47*  1.03   BUN  32*  24*  22*  20*   WBC  27.7*  32.8*   --   18.3*     Temp (24hrs), Av.1 °F (37.8 °C), Min:98.1 °F (36.7 °C), Max:102.3 °F (39.1 °C)    Creatinine Clearance (Creatinine Clearance (ml/min)): 36.8 ml/min     Regimen assessment:  random level <20mcg/dl @ 19.1mcg/dl, will load patient with 1000 mg x 1 today  Maintenance dose: dose per levels  Next scheduled level: random  @ 0900     Pharmacy will follow daily and adjust medications as appropriate for renal function and/or serum levels.     Thank you,  Jyoti Farr

## 2017-09-26 NOTE — PROGRESS NOTES
-0815-Received patient on Bipap 12/6, FIO2-40%. O2 Sats-100% HR-101, RR-25. Pt. Is resting comfortably. No respiratory distress noted. Respiratory will continue to monitor. -[facility]    -0930-Pt. Removed from Bipap and placed on O2 via nasal cannula at 4lpm. O2 Sats-100% HR-106, RR-26. Respiratory will continue to monitor. -[facility]    -1407-Pt. Returned to Bipap due to increase WOB O2 Sgmd80-01% HR-139 RR-45. -[facility]    -1615-Pt. Remains on Bipap O2 Sats-92% of 50% FIO2. HR-156, RR-45, Nebulizer held due to tachycardia. Pt. Due to receive blood. RN is patient's room to administer Ativan. -[address]    -1830-FIO2 increased to 55% due to desaturation per RN to 84%. -[address]

## 2017-09-26 NOTE — PROGRESS NOTES
0730: assumed care of patient resting in bed at this time. Bedside report received from [facility], [address] [facility]. Patient alert and oriented x 4. Patient denies pain at this time and remains on BiPap. Call bell in reach. Will continue to monitor. When giving morning meds, patient and family inquired about being transferred to another hospital [facility]) for medical management. Dr. Sandra Matamoros made aware along with charge nurse. Dr. Val Contreras paged at this time. 1140: tube feeding increased to 50ml/hr per orders. precedex also turned off at this time as discussed in rounds this morning. Patient tolerating well at this time. Will continue to monitor. Patient noted to be desating with an increased HR while on nasal cannula, RT called. Patient placed back on BiPap. 1420: called blood bank to check status of unit of PRBCs, per staff units will not be available until shift change d/t history of antibodies. Patient also noted to be tachycardic and desating with elevated BP and hypothermic 96.6 orally. Dr. Sandra Matamoros called and made aware of patients current status. Will continue to monitor. 1624: PRN ativan given at this time by other nursing staff for increased agitation and vitals. Will continue to monitor. 1641: Patient noted to be increasingly tachycardic, hypertensive and tachypnic, paged Dr. Sandra Matamoros. Dr. Sandra Matamoros and JOSE DAVID Erci at bedside, made aware of patients current. Given orders for PRN Lopressor, also orders given to draw tsh levels. Will continue to monitor. 1751: PRN labetalol given for increased HR. Will continue to monitor. Dr. Sandra Matamoros paged again at this time for sustained elevated HR, RR and BP. Awaiting new orders. RT also called at this time for desat while on BiPap. Will continue to monitor. 1830: received okay for patient to be placed back on precedex, resumed at 0.3, PRN dilaudid also given for complaints of pain. Will continue to monitor.      1933: Bedside and Verbal shift change report given to Rangel Bradford RN (oncoming nurse) by Georgina Hernandez RN   (offgoing nurse). Report included the following information SBAR, ED Summary, Intake/Output, MAR, Recent Results, Med Rec Status, Cardiac Rhythm sinus tach and Alarm Parameters .

## 2017-09-26 NOTE — PROGRESS NOTES
[facility]. Pulmonary, Critical Care, and Sleep Medicine    Name: Luan Dill MRN: [id number]   : 1965 Hospital: [facility]   Date: 2017  Admission Date: 2017     Chart and notes reviewed. Data reviewed. I have evaluated all findings. [x]I have reviewed the flowsheet and previous days notes. [x]The patient is critically ill on      []Mechanical ventilation []Pressors   [x]BiPAP []                 : Tolerates PS but too sedated to consider extubation, requiring levophed, BNP very elevated  : Extubated yesterday, required bipap post extubation, fevers with tmax 102.3, Hb decrease, slow worsening of renal function, agitation overnight requiring precedex    ROS:Review of systems not obtained due to patient factors. Events and notes from last 24 hours reviewed. Care plan discussed on multidisciplinary rounds.   Patient Active Problem List   Diagnosis Code    Acute respiratory failure with hypercapnia (HCC) J96.02    Sepsis ([facility] [address] A41.9    HCAP (healthcare-associated pneumonia) J18.9    Hypertension I10    Diabetes mellitus ([facility] [address] E11.9       Vital Signs:  Visit Vitals    /68    Pulse (!) 104    Temp 98.1 °F (36.7 °C)    Resp 15    Ht 5' 4\" (1.626 m)    Wt 66 kg (145 lb 8.1 oz)    SpO2 100%    BMI 24.98 kg/m2       O2 Device: BIPAP   O2 Flow Rate (L/min): 6 l/min   Temp (24hrs), Av.1 °F (37.8 °C), Min:98.1 °F (36.7 °C), Max:102.3 °F (39.1 °C)       Intake/Output:   Last shift:         Last 3 shifts:  1901 -  0700  In: 3564.2 [I.V.:2829.2]  Out: 3095 [PLZAD:1882; Drains:200]    Intake/Output Summary (Last 24 hours) at 17 0905  Last data filed at 17 0700   Gross per 24 hour   Intake          1789.27 ml   Output             1355 ml   Net           434.27 ml      Ventilator Settings:  Ventilator  Mode: Pressure support, Spontaneous  Respiratory Rate  Resp Rate Observed: 23  Back-Up Rate: 16  Insp Time (sec): 1.1 sec  I:E Ratio: 1:1.5  Ventilator Volumes  Vt Set (ml): 420 ml  Vt Exhaled (Machine Breath) (ml): 412 ml  Vt Spont (ml): 403 ml  Ve Observed (l/min): 9.8 l/min  Ventilator Pressures  PIP Observed (cm H2O): 13 cm H2O  Plateau Pressure (cm H2O): 22 cm H2O  MAP (cm H2O): 8.2  PEEP/VENT (cm H2O): 5 cm H20  Auto PEEP Observed (cm H2O):  (unable to obtain)    Current Facility-Administered Medications   Medication Dose Route Frequency    vancomycin (VANCOCIN) 1,000 mg in 0.9% sodium chloride (MBP/ADV) 250 mL adv  1,000 mg IntraVENous ONCE    VANCOMYCIN, Dosing per levels    Other Rx Dosing/Monitoring    piperacillin-tazobactam (ZOSYN) 4.5 g in 0.9% sodium chloride (MBP/ADV) 100 mL MBP ### EXTENDED 4 HOUR INFUSION ##   4.5 g IntraVENous Q8H    [START ON 9/27/2017] levoFLOXacin (LEVAQUIN) 750 mg in D5W IVPB  750 mg IntraVENous Q48H    insulin lispro (HUMALOG) injection   SubCUTAneous Q6H    dexmedeTOMidine (PRECEDEX) 400 mcg in 0.9% sodium chloride 100 mL infusion  0.2-0.7 mcg/kg/hr IntraVENous TITRATE    NOREPINephrine (LEVOPHED) 8,000 mcg in dextrose 5% 250 mL infusion  0-30 mcg/min IntraVENous TITRATE    propofol (DIPRIVAN) infusion  0-50 mcg/kg/min IntraVENous TITRATE    aspirin delayed-release tablet 81 mg  81 mg Oral DAILY    sodium chloride (NS) flush 5-10 mL  5-10 mL IntraVENous Q8H    heparin (porcine) injection 5,000 Units  5,000 Units SubCUTAneous Q8H    famotidine (PF) (PEPCID) injection 20 mg  20 mg IntraVENous DAILY    thiamine (B-1) 100 mg in 0.9% sodium chloride 50 mL IVPB  100 mg IntraVENous BID    albuterol-ipratropium (DUO-NEB) 2.5 MG-0.5 MG/3 ML  3 mL Nebulization Q4H RT       Telemetry:     Physical Exam:   General: Drowsy but awakes and follows come commands   HEENT: bipap in place   Neck: Supple   Chest: Normal   Lungs: Coarse bilaterally, no wheezes   Heart: Mildly tachycardic but regular   Abdomen: non distended, bowel sounds normoactive   Extremity: Moves all spontaneously   Neuro: Drowsy but awakes and follows commands on all extremities   Skin: Intact   DATA:  MAR reviewed and pertinent medications noted or modified as needed    Labs:  Recent Labs      09/26/17   0400  09/25/17   0300  09/24/17   0715   WBC  27.7*  32.8*  18.3*   HGB  6.9*  8.0*  8.6*   HCT  21.0*  24.8*  26.1*   PLT  150  233  262     Recent Labs      09/26/17   0400  09/25/17   0300  09/24/17 2038  09/24/17   0715   NA  143  139  140  136   K  4.0  4.5  3.6  3.3*   CL  110*  107  107  100   CO2  20*  22  25  27   GLU  148*  154*  131*  145*   BUN  32*  24*  22*  20*   CREA  1.73*  1.63*  1.47*  1.03   CA  6.9*  6.5*  6.7*  8.2*   ALB   --   1.5*   --   2.5*   SGOT   --   46*   --   33   ALT   --   26   --   25   INR  1.9*   --    --    --      No results for input(s): PH, PCO2, PO2, HCO3, FIO2 in the last 72 hours. Recent Labs      09/25/17   0610  09/24/17   1922  09/24/17   1139   FIO2I  35  50  100   HCO3I  18.7*  20.3*  22.9   PCO2I  40.0  44.3  58.3*   PHI  7.282*  7.276*  7.201*   PO2I  73*  92  215*     Imaging:  [x]                           I have personally reviewed the patients radiographs and reports    High complexity decision making was performed during this consultation and evaluation. [x]       Pt is at high risk for further organ failure and dysfunction. Critical care time spent  minutes with patient exclusive of procedures.     IMPRESSION:    Anemia      Cancer (HCC)       throat    Coronary artery disease      Diabetes (HCC)      Diabetes mellitus ([address]      ETOH abuse      HTN (hypertension)      Hypertension      Hypothyroid      Lupus ([address]      Osteonecrosis (HCC)       of jaw    PEG adjustment, replacement, or removal      S/P thoracentesis      Throat cancer ([address]      ·       PLAN:   Neuro  Required restart of precedex for agitation and distress  Minimize sedation    Cardiovascular  Off levophed  BNP >35,000, repeat today  Not much response to lasix, hesitate to heavily diurese given renal function   Lactic acid normal  2D echo pending    Pulmonary  Extubated 9/25, has required bipap periodically  During previous intubation required ETT 6.5 due to upper airway obstruction from cancer, difficult airway and if requires reintubation will likely need anesthesia assistance  Continue scheduled duonebs  procal pending    GI  Continue tube feeds, always requires tube feeds to PEG for upper airway obstruction   Famotidine ppx    Renal  KEATON, continues to slightly worsen daily  Will order renal ultrasound    ID  Significant leukocytosis, 18 --> 32 --> 27  Tmax 102.3  Continue vanc, zosyn, levaquin  Bcx 9/24 negative to date  Sputum cx 9/24 negative to date   Ucx 9/24 negative to date  Flu A/B negative  Cdiff pending    Heme Onc  HO throat cancer with upper airway obstruction  Heparin ppx  Hb 8 --> 6.9, no obvious bleeding will repeat and transfuse if repeat below 7    Endo  HO DM but A1c 5.3  Continue humalog       Critical care time 35 minutes    Niles Morrison MD

## 2017-09-27 ENCOUNTER — APPOINTMENT (OUTPATIENT)
Dept: GENERAL RADIOLOGY | Age: 52
DRG: 871 | End: 2017-09-27
Attending: PHYSICIAN ASSISTANT
Payer: MEDICARE

## 2017-09-27 LAB
ANION GAP SERPL CALC-SCNC: 11 MMOL/L (ref 3–18)
BACTERIA SPEC CULT: NORMAL
BASOPHILS # BLD: 0 K/UL (ref 0–0.06)
BASOPHILS NFR BLD: 0 % (ref 0–2)
BUN SERPL-MCNC: 28 MG/DL (ref 7–18)
BUN/CREAT SERPL: 19 (ref 12–20)
CA-I SERPL-SCNC: 1.02 MMOL/L (ref 1.12–1.32)
CALCIUM SERPL-MCNC: 7 MG/DL (ref 8.5–10.1)
CHLORIDE SERPL-SCNC: 114 MMOL/L (ref 100–108)
CO2 SERPL-SCNC: 24 MMOL/L (ref 21–32)
CREAT SERPL-MCNC: 1.44 MG/DL (ref 0.6–1.3)
DIFFERENTIAL METHOD BLD: ABNORMAL
EOSINOPHIL # BLD: 0.3 K/UL (ref 0–0.4)
EOSINOPHIL NFR BLD: 1 % (ref 0–5)
ERYTHROCYTE [DISTWIDTH] IN BLOOD BY AUTOMATED COUNT: 16.2 % (ref 11.6–14.5)
GLUCOSE BLD STRIP.AUTO-MCNC: 74 MG/DL (ref 70–110)
GLUCOSE BLD STRIP.AUTO-MCNC: 79 MG/DL (ref 70–110)
GLUCOSE BLD STRIP.AUTO-MCNC: 83 MG/DL (ref 70–110)
GLUCOSE BLD STRIP.AUTO-MCNC: 96 MG/DL (ref 70–110)
GLUCOSE SERPL-MCNC: 69 MG/DL (ref 74–99)
HCT VFR BLD AUTO: 21.1 % (ref 35–45)
HCT VFR BLD AUTO: 25.1 % (ref 35–45)
HGB BLD-MCNC: 7 G/DL (ref 12–16)
HGB BLD-MCNC: 8.4 G/DL (ref 12–16)
INR PPP: 1.5 (ref 0.8–1.2)
LDH SERPL L TO P-CCNC: 359 U/L (ref 81–234)
LYMPHOCYTES # BLD: 1.6 K/UL (ref 0.9–3.6)
LYMPHOCYTES NFR BLD: 8 % (ref 21–52)
MAGNESIUM SERPL-MCNC: 1.6 MG/DL (ref 1.6–2.6)
MAGNESIUM SERPL-MCNC: 2 MG/DL (ref 1.6–2.6)
MCH RBC QN AUTO: 29.8 PG (ref 24–34)
MCHC RBC AUTO-ENTMCNC: 33.2 G/DL (ref 31–37)
MCV RBC AUTO: 89.8 FL (ref 74–97)
MONOCYTES # BLD: 0.4 K/UL (ref 0.05–1.2)
MONOCYTES NFR BLD: 2 % (ref 3–10)
NEUTS SEG # BLD: 18.8 K/UL (ref 1.8–8)
NEUTS SEG NFR BLD: 89 % (ref 40–73)
PHOSPHATE SERPL-MCNC: 3.3 MG/DL (ref 2.5–4.9)
PLATELET # BLD AUTO: 109 K/UL (ref 135–420)
POTASSIUM SERPL-SCNC: 3.9 MMOL/L (ref 3.5–5.5)
POTASSIUM SERPL-SCNC: 4.1 MMOL/L (ref 3.5–5.5)
PROTHROMBIN TIME: 17.9 SEC (ref 11.5–15.2)
RBC # BLD AUTO: 2.35 M/UL (ref 4.2–5.3)
RETICS/RBC NFR AUTO: 2.2 % (ref 0.5–2.3)
SERVICE CMNT-IMP: NORMAL
SODIUM SERPL-SCNC: 149 MMOL/L (ref 136–145)
VANCOMYCIN SERPL-MCNC: 19.6 UG/ML (ref 5–40)
WBC # BLD AUTO: 21.1 K/UL (ref 4.6–13.2)

## 2017-09-27 PROCEDURE — 74011250636 HC RX REV CODE- 250/636: Performed by: PHYSICIAN ASSISTANT

## 2017-09-27 PROCEDURE — 77030035694 HC MSK BIPAP FLL FAC PERFMAX PHIL -B

## 2017-09-27 PROCEDURE — 85045 AUTOMATED RETICULOCYTE COUNT: CPT | Performed by: PHYSICIAN ASSISTANT

## 2017-09-27 PROCEDURE — 86022 PLATELET ANTIBODIES: CPT | Performed by: PHYSICIAN ASSISTANT

## 2017-09-27 PROCEDURE — 74011000250 HC RX REV CODE- 250: Performed by: INTERNAL MEDICINE

## 2017-09-27 PROCEDURE — 85025 COMPLETE CBC W/AUTO DIFF WBC: CPT | Performed by: PHYSICIAN ASSISTANT

## 2017-09-27 PROCEDURE — 94640 AIRWAY INHALATION TREATMENT: CPT

## 2017-09-27 PROCEDURE — 74011250636 HC RX REV CODE- 250/636: Performed by: INTERNAL MEDICINE

## 2017-09-27 PROCEDURE — 83010 ASSAY OF HAPTOGLOBIN QUANT: CPT | Performed by: PHYSICIAN ASSISTANT

## 2017-09-27 PROCEDURE — 85610 PROTHROMBIN TIME: CPT | Performed by: PHYSICIAN ASSISTANT

## 2017-09-27 PROCEDURE — 65610000006 HC RM INTENSIVE CARE

## 2017-09-27 PROCEDURE — 94660 CPAP INITIATION&MGMT: CPT

## 2017-09-27 PROCEDURE — 82962 GLUCOSE BLOOD TEST: CPT

## 2017-09-27 PROCEDURE — 74011000258 HC RX REV CODE- 258: Performed by: HOSPITALIST

## 2017-09-27 PROCEDURE — 74011000258 HC RX REV CODE- 258: Performed by: INTERNAL MEDICINE

## 2017-09-27 PROCEDURE — 82330 ASSAY OF CALCIUM: CPT | Performed by: HOSPITALIST

## 2017-09-27 PROCEDURE — 71010 XR CHEST PORT: CPT

## 2017-09-27 PROCEDURE — 85018 HEMOGLOBIN: CPT | Performed by: PHYSICIAN ASSISTANT

## 2017-09-27 PROCEDURE — 80202 ASSAY OF VANCOMYCIN: CPT | Performed by: HOSPITALIST

## 2017-09-27 PROCEDURE — 84145 PROCALCITONIN (PCT): CPT | Performed by: PHYSICIAN ASSISTANT

## 2017-09-27 PROCEDURE — 84132 ASSAY OF SERUM POTASSIUM: CPT | Performed by: PHYSICIAN ASSISTANT

## 2017-09-27 PROCEDURE — P9016 RBC LEUKOCYTES REDUCED: HCPCS | Performed by: PHYSICIAN ASSISTANT

## 2017-09-27 PROCEDURE — 30233N1 TRANSFUSION OF NONAUTOLOGOUS RED BLOOD CELLS INTO PERIPHERAL VEIN, PERCUTANEOUS APPROACH: ICD-10-PCS | Performed by: HOSPITALIST

## 2017-09-27 PROCEDURE — 84100 ASSAY OF PHOSPHORUS: CPT | Performed by: HOSPITALIST

## 2017-09-27 PROCEDURE — 36430 TRANSFUSION BLD/BLD COMPNT: CPT

## 2017-09-27 PROCEDURE — 36415 COLL VENOUS BLD VENIPUNCTURE: CPT | Performed by: HOSPITALIST

## 2017-09-27 PROCEDURE — 83615 LACTATE (LD) (LDH) ENZYME: CPT | Performed by: PHYSICIAN ASSISTANT

## 2017-09-27 PROCEDURE — 74011000250 HC RX REV CODE- 250: Performed by: EMERGENCY MEDICINE

## 2017-09-27 PROCEDURE — 83735 ASSAY OF MAGNESIUM: CPT | Performed by: HOSPITALIST

## 2017-09-27 PROCEDURE — 93306 TTE W/DOPPLER COMPLETE: CPT

## 2017-09-27 PROCEDURE — 74011000258 HC RX REV CODE- 258: Performed by: EMERGENCY MEDICINE

## 2017-09-27 PROCEDURE — 77030018846 HC SOL IRR STRL H20 ICUM -A

## 2017-09-27 PROCEDURE — 77010033711 HC HIGH FLOW OXYGEN

## 2017-09-27 PROCEDURE — 74011250636 HC RX REV CODE- 250/636: Performed by: HOSPITALIST

## 2017-09-27 RX ORDER — GUAIFENESIN 100 MG/5ML
81 LIQUID (ML) ORAL DAILY
Status: DISCONTINUED | OUTPATIENT
Start: 2017-09-28 | End: 2017-09-28

## 2017-09-27 RX ORDER — SODIUM CHLORIDE 9 MG/ML
250 INJECTION, SOLUTION INTRAVENOUS AS NEEDED
Status: DISCONTINUED | OUTPATIENT
Start: 2017-09-27 | End: 2017-09-30 | Stop reason: SDUPTHER

## 2017-09-27 RX ORDER — POTASSIUM CHLORIDE 7.45 MG/ML
10 INJECTION INTRAVENOUS ONCE
Status: COMPLETED | OUTPATIENT
Start: 2017-09-27 | End: 2017-09-28

## 2017-09-27 RX ORDER — SODIUM CHLORIDE 9 MG/ML
250 INJECTION, SOLUTION INTRAVENOUS AS NEEDED
Status: DISCONTINUED | OUTPATIENT
Start: 2017-09-27 | End: 2017-10-09 | Stop reason: HOSPADM

## 2017-09-27 RX ORDER — FAMOTIDINE 20 MG/1
20 TABLET, FILM COATED ORAL DAILY
Status: DISCONTINUED | OUTPATIENT
Start: 2017-09-28 | End: 2017-10-02

## 2017-09-27 RX ORDER — FONDAPARINUX SODIUM 2.5 MG/.5ML
2.5 INJECTION SUBCUTANEOUS
Status: DISCONTINUED | OUTPATIENT
Start: 2017-09-27 | End: 2017-09-30

## 2017-09-27 RX ORDER — MAGNESIUM SULFATE HEPTAHYDRATE 40 MG/ML
2 INJECTION, SOLUTION INTRAVENOUS ONCE
Status: COMPLETED | OUTPATIENT
Start: 2017-09-27 | End: 2017-09-28

## 2017-09-27 RX ORDER — POTASSIUM CHLORIDE 750 MG/1
10 TABLET, FILM COATED, EXTENDED RELEASE ORAL ONCE
Status: DISCONTINUED | OUTPATIENT
Start: 2017-09-27 | End: 2017-09-27

## 2017-09-27 RX ADMIN — LEVOFLOXACIN 750 MG: 750 INJECTION, SOLUTION INTRAVENOUS at 08:15

## 2017-09-27 RX ADMIN — IPRATROPIUM BROMIDE AND ALBUTEROL SULFATE 3 ML: .5; 3 SOLUTION RESPIRATORY (INHALATION) at 20:17

## 2017-09-27 RX ADMIN — IPRATROPIUM BROMIDE AND ALBUTEROL SULFATE 3 ML: .5; 3 SOLUTION RESPIRATORY (INHALATION) at 00:21

## 2017-09-27 RX ADMIN — PIPERACILLIN SODIUM AND TAZOBACTAM SODIUM 4.5 G: 4; .5 INJECTION, POWDER, LYOPHILIZED, FOR SOLUTION INTRAVENOUS at 14:37

## 2017-09-27 RX ADMIN — Medication 10 ML: at 01:05

## 2017-09-27 RX ADMIN — IPRATROPIUM BROMIDE AND ALBUTEROL SULFATE 3 ML: .5; 3 SOLUTION RESPIRATORY (INHALATION) at 03:47

## 2017-09-27 RX ADMIN — PIPERACILLIN SODIUM AND TAZOBACTAM SODIUM 4.5 G: 4; .5 INJECTION, POWDER, LYOPHILIZED, FOR SOLUTION INTRAVENOUS at 22:39

## 2017-09-27 RX ADMIN — PIPERACILLIN SODIUM AND TAZOBACTAM SODIUM 4.5 G: 4; .5 INJECTION, POWDER, LYOPHILIZED, FOR SOLUTION INTRAVENOUS at 05:18

## 2017-09-27 RX ADMIN — HEPARIN SODIUM 5000 UNITS: 5000 INJECTION, SOLUTION INTRAVENOUS; SUBCUTANEOUS at 08:11

## 2017-09-27 RX ADMIN — IPRATROPIUM BROMIDE AND ALBUTEROL SULFATE 3 ML: .5; 3 SOLUTION RESPIRATORY (INHALATION) at 08:31

## 2017-09-27 RX ADMIN — THIAMINE HYDROCHLORIDE 100 MG: 100 INJECTION, SOLUTION INTRAMUSCULAR; INTRAVENOUS at 22:16

## 2017-09-27 RX ADMIN — SODIUM CHLORIDE 1000 MG: 900 INJECTION, SOLUTION INTRAVENOUS at 12:42

## 2017-09-27 RX ADMIN — Medication 10 ML: at 05:20

## 2017-09-27 RX ADMIN — FONDAPARINUX SODIUM 2.5 MG: 2.5 INJECTION, SOLUTION SUBCUTANEOUS at 17:20

## 2017-09-27 RX ADMIN — HYDROMORPHONE HYDROCHLORIDE 0.5 MG: 2 INJECTION INTRAMUSCULAR; INTRAVENOUS; SUBCUTANEOUS at 20:42

## 2017-09-27 RX ADMIN — MAGNESIUM SULFATE HEPTAHYDRATE 2 G: 40 INJECTION, SOLUTION INTRAVENOUS at 13:54

## 2017-09-27 RX ADMIN — IPRATROPIUM BROMIDE AND ALBUTEROL SULFATE 3 ML: .5; 3 SOLUTION RESPIRATORY (INHALATION) at 15:39

## 2017-09-27 RX ADMIN — THIAMINE HYDROCHLORIDE 100 MG: 100 INJECTION, SOLUTION INTRAMUSCULAR; INTRAVENOUS at 08:36

## 2017-09-27 RX ADMIN — FAMOTIDINE 20 MG: 10 INJECTION, SOLUTION INTRAVENOUS at 08:11

## 2017-09-27 RX ADMIN — Medication 10 ML: at 23:25

## 2017-09-27 RX ADMIN — SODIUM CHLORIDE 0.3 MCG/KG/HR: 900 INJECTION, SOLUTION INTRAVENOUS at 17:20

## 2017-09-27 RX ADMIN — Medication 10 ML: at 14:38

## 2017-09-27 RX ADMIN — POTASSIUM CHLORIDE 10 MEQ: 7.46 INJECTION, SOLUTION INTRAVENOUS at 08:12

## 2017-09-27 RX ADMIN — IPRATROPIUM BROMIDE AND ALBUTEROL SULFATE 3 ML: .5; 3 SOLUTION RESPIRATORY (INHALATION) at 11:54

## 2017-09-27 RX ADMIN — HYDROMORPHONE HYDROCHLORIDE 0.5 MG: 2 INJECTION INTRAMUSCULAR; INTRAVENOUS; SUBCUTANEOUS at 09:25

## 2017-09-27 NOTE — PROGRESS NOTES
Problem: Falls - Risk of  Goal: *Absence of Falls  Document Kapil Fall Risk and appropriate interventions in the flowsheet.    Outcome: Progressing Towards Goal  Fall Risk Interventions:        Mentation Interventions: Door open when patient unattended     Medication Interventions: Patient to call before getting OOB     Elimination Interventions: Call light in reach, Patient to call for help with toileting needs

## 2017-09-27 NOTE — PROGRESS NOTES
[facility]. Pulmonary, Critical Care, and Sleep Medicine    Name: Suman Hernandez MRN: [id number]   : 1965 Hospital: [address] [place] [address]   Date: 2017  Admission Date: 2017     Chart and notes reviewed. Data reviewed. I have evaluated all findings. [x]I have reviewed the flowsheet and previous days notes. [x]The patient is critically ill on      []Mechanical ventilation []Pressors   [x]BiPAP []                 : Tolerates PS but too sedated to consider extubation, requiring levophed, BNP very elevated  : Extubated yesterday, required bipap post extubation, fevers with tmax 102.3, Hb decrease, slow worsening of renal function, agitation overnight requiring precedex  : Intermittently switching between bipap and high flow, tachycardic/tachypnic during periods of pain, improves with precedex and dilaudid    ROS:Review of systems not obtained due to patient factors. Events and notes from last 24 hours reviewed. Care plan discussed on multidisciplinary rounds.   Patient Active Problem List   Diagnosis Code    Acute respiratory failure with hypercapnia (HCC) J96.02    Sepsis ([facility] [address] A41.9    HCAP (healthcare-associated pneumonia) J18.9    Hypertension I10    Diabetes mellitus ([facility] [address] E11.9       Vital Signs:  Visit Vitals    /87    Pulse (!) 108    Temp 98.1 °F (36.7 °C)    Resp (!) 31    Ht 5' 4\" (1.626 m)    Wt 67.2 kg (148 lb 2.4 oz)    SpO2 98%    BMI 25.43 kg/m2       O2 Device: Hi flow nasal cannula   O2 Flow Rate (L/min): 45 l/min   Temp (24hrs), Av.6 °F (37 °C), Min:96.6 °F (35.9 °C), Max:99.7 °F (37.6 °C)       Intake/Output:   Last shift:      701 - 1900  In: 195 [I.V.:195]  Out: 182 [Urine:182]  Last 3 shifts: 1901 -  0700  In: 2267.5 [I.V.:1152.5]  Out: 1781 [Urine:1745; Drains:650]    Intake/Output Summary (Last 24 hours) at 17 1020  Last data filed at 17 0900   Gross per 24 hour   Intake 1196.04 ml   Output             1747 ml   Net          -550.96 ml      Ventilator Settings:  Ventilator  Mode: Pressure support, Spontaneous  Respiratory Rate  Resp Rate Observed: 23  Back-Up Rate: 16  Insp Time (sec): 1.1 sec  I:E Ratio: 1:1.5  Ventilator Volumes  Vt Set (ml): 420 ml  Vt Exhaled (Machine Breath) (ml): 412 ml  Vt Spont (ml): 404 ml  Ve Observed (l/min): 12.1 l/min  Ventilator Pressures  PIP Observed (cm H2O): 13 cm H2O  Plateau Pressure (cm H2O): 22 cm H2O  MAP (cm H2O): 8.2  PEEP/VENT (cm H2O): 5 cm H20  Auto PEEP Observed (cm H2O):  (unable to obtain)    Current Facility-Administered Medications   Medication Dose Route Frequency    [START ON 9/28/2017] famotidine (PEPCID) tablet 20 mg  20 mg Oral DAILY    [START ON 9/28/2017] aspirin chewable tablet 81 mg  81 mg Oral DAILY    fondaparinux (ARIXTRA) injection 2.5 mg  2.5 mg SubCUTAneous DAILY    levothyroxine (SYNTHROID) tablet 100 mcg  100 mcg Oral Daily    VANCOMYCIN, Dosing per levels    Other Rx Dosing/Monitoring    piperacillin-tazobactam (ZOSYN) 4.5 g in 0.9% sodium chloride (MBP/ADV) 100 mL MBP ### EXTENDED 4 HOUR INFUSION ##   4.5 g IntraVENous Q8H    levoFLOXacin (LEVAQUIN) 750 mg in D5W IVPB  750 mg IntraVENous Q48H    insulin lispro (HUMALOG) injection   SubCUTAneous Q6H    dexmedeTOMidine (PRECEDEX) 400 mcg in 0.9% sodium chloride 100 mL infusion  0.2-0.7 mcg/kg/hr IntraVENous TITRATE    NOREPINephrine (LEVOPHED) 8,000 mcg in dextrose 5% 250 mL infusion  0-30 mcg/min IntraVENous TITRATE    sodium chloride (NS) flush 5-10 mL  5-10 mL IntraVENous Q8H    thiamine (B-1) 100 mg in 0.9% sodium chloride 50 mL IVPB  100 mg IntraVENous BID    albuterol-ipratropium (DUO-NEB) 2.5 MG-0.5 MG/3 ML  3 mL Nebulization Q4H RT       Telemetry:     Physical Exam:   General: Drowsy but awakes and follows come commands   HEENT: bipap in place   Neck: Supple   Chest: Normal   Lungs: Coarse bilaterally, no wheezes   Heart: Mildly tachycardic but regular   Abdomen: non distended, bowel sounds normoactive   Extremity: Moves all spontaneously   Neuro: Drowsy but awakes and follows commands on all extremities   Skin: Intact   DATA:  MAR reviewed and pertinent medications noted or modified as needed    Labs:  Recent Labs      09/27/17   0347  09/26/17   1630  09/26/17   0956  09/26/17   0400   WBC  21.1*   --   22.9*  27.7*   HGB  7.0*  7.3*  6.5*  6.9*   HCT  21.1*  21.8*  19.6*  21.0*   PLT  109*   --   122*  150     Recent Labs      09/27/17   0347  09/26/17   0400  09/25/17   0300   NA  149*  143  139   K  3.9  4.0  4.5   CL  114*  110*  107   CO2  24  20*  22   GLU  69*  148*  154*   BUN  28*  32*  24*   CREA  1.44*  1.73*  1.63*   CA  7.0*  6.9*  6.5*   ALB   --    --   1.5*   SGOT   --    --   46*   ALT   --    --   26   INR  1.5*  1.9*   --      No results for input(s): PH, PCO2, PO2, HCO3, FIO2 in the last 72 hours. Recent Labs      09/25/17   0610  09/24/17   1922  09/24/17   1139   FIO2I  35  50  100   HCO3I  18.7*  20.3*  22.9   PCO2I  40.0  44.3  58.3*   PHI  7.282*  7.276*  7.201*   PO2I  73*  92  215*     Imaging:  [x]                           I have personally reviewed the patients radiographs and reports    High complexity decision making was performed during this consultation and evaluation. [x]       Pt is at high risk for further organ failure and dysfunction. Critical care time spent  minutes with patient exclusive of procedures.     IMPRESSION:    Anemia      Cancer (HCC)       throat    Coronary artery disease      Diabetes (HCC)      Diabetes mellitus ([address]      ETOH abuse      HTN (hypertension)      Hypertension      Hypothyroid      Lupus ([facility] [address]      Osteonecrosis (HCC)       of jaw    PEG adjustment, replacement, or removal      S/P thoracentesis      Throat cancer ([facility] [address]      ·       PLAN:   Neuro/Pain  On and off precedex for agitation and distress  Minimize sedation if able   Dilaudid for pain    Cardiovascular  Tachycardic and distressed when in pain, resolves with dilauidid  Off levophed  BNP >35,000, not much response to lasix, hesitate to heavily diurese given renal function   Lactic acid normal  2D echo pending, could not complete due to elevated heart rate, will reattempt    Pulmonary  MRSA and Pseudomonas PNA  Extubated 9/25, has required bipap and high flow intermittently  During previous intubation required ETT 6.5 due to upper airway obstruction from cancer, difficult airway and if requires reintubation will likely need anesthesia assistance  Continue scheduled duonebs  procal 30 on 9/25, will repeat for trend    GI  Continue tube feeds, always requires tube feeds to PEG for upper airway obstruction   Famotidine ppx    Renal  KEATON, improved today  renal ultrasound with medical renal disease  Free water for mild hypernatremia    ID  Significant leukocytosis, 18 --> 32 --> 27--> 21  Fevers improving  Continue vanc, zosyn, levaquin  Bcx 9/24 negative to date  Sputum cx 9/24 MRSA and pseudomonas   Ucx 9/24 negative to date  Flu A/B negative  Cdiff negative    Heme Onc  HO throat cancer with upper airway obstruction  Hb decrease, s/p 1u pRBCs 9/26, transfuse PRN for 7 or below  Pt has antibodies in blood, takes many hours to get blood products  Platelets with trend down, will start arixtra and send HIT panel    Endo  HO DM but A1c 5.3  Continue humalog       Critical care time 35 minutes    Cal Masters MD

## 2017-09-27 NOTE — PROGRESS NOTES
Medicine Progress Note    Patient: Luan Dill   Age:  54 y.o.  DOA: 9/24/2017   Admit Dx / CC: Acute respiratory failure with hypercapnia (HCC)  LOS:  LOS: 3 days     Assessment/Plan   Principal Problem:    Acute respiratory failure with hypercapnia ([address] (9/24/2017)    Active Problems:    Sepsis ([address] (9/24/2017)      HCAP (healthcare-associated pneumonia) (9/24/2017)      Hypertension (9/24/2017)      Diabetes mellitus ([address] (9/24/2017)        Additional Plan notes       1. Acute respiratory failure with hypercapnia. 2/2 PNA    -now extubated, improving on hi sunil currently    -Intensivist consulted for management. 2.Septic shock 2/2 PNA    -On vanc,zosyn and levaquin    -Blood cx NGTD, off pressors    3. Diabetes.    -On ssi    4. GERD    -On ppi.    5, Hx of Throat cancer    DISPO     Anticipated Date of Discharge: ???  Anticipated Disposition (home, SNF) : SNF    Subjective:   Patient seen and examined. Lethargic today 2/2 precedex and pain meds    Objective:     Visit Vitals    /80    Pulse 96    Temp 96.6 °F (35.9 °C)    Resp 23    Ht 5' 4\" (1.626 m)    Wt 67.2 kg (148 lb 2.4 oz)    SpO2 99%    BMI 25.43 kg/m2       Physical Exam:  General appearance: alert, cooperative, no distress, appears stated age  Head: Normocephalic, without obvious abnormality, atraumatic  Neck: supple, trachea midline  Lungs: clear to auscultation bilaterally  Heart: regular rate and rhythm, S1, S2 normal, no murmur, click, rub or gallop  Abdomen: soft, non-tender.  Bowel sounds normal. No masses,  no organomegaly  Extremities: extremities normal, atraumatic, no cyanosis or edema  Skin: Skin color, texture, turgor normal. No rashes or lesions  Neurologic: Grossly normal    Intake and Output:  Current Shift:  09/27 0701 - 09/27 1900  In: 195 [I.V.:195]  Out: 227 [Urine:227]  Last three shifts:  09/25 1901 - 09/27 0700  In: 2267.5 [I.V.:1152.5]  Out: 2395 [Urine:1745; Drains:650]    Lab/Data Reviewed:  CMP:   Lab Results   Component Value Date/Time     (H) 09/27/2017 03:47 AM    K 3.9 09/27/2017 03:47 AM     (H) 09/27/2017 03:47 AM    CO2 24 09/27/2017 03:47 AM    AGAP 11 09/27/2017 03:47 AM    GLU 69 (L) 09/27/2017 03:47 AM    BUN 28 (H) 09/27/2017 03:47 AM    CREA 1.44 (H) 09/27/2017 03:47 AM    GFRAA 47 (L) 09/27/2017 03:47 AM    GFRNA 39 (L) 09/27/2017 03:47 AM    CA 7.0 (L) 09/27/2017 03:47 AM    MG 1.6 09/27/2017 03:47 AM    PHOS 3.3 09/27/2017 03:47 AM     CBC:   Lab Results   Component Value Date/Time    WBC 21.1 (H) 09/27/2017 03:47 AM    HGB 7.0 (L) 09/27/2017 03:47 AM    HCT 21.1 (L) 09/27/2017 03:47 AM     (L) 09/27/2017 03:47 AM     All Cardiac Markers in the last 24 hours: No results found for: CPK, CK, CKMMB, CKMB, RCK3, CKMBT, CKNDX, CKND1, LAVELLE, TROPT, TROIQ, RAMIREZ, TROPT, TNIPOC, BNP, BNPP    Medications Reviewed:  Current Facility-Administered Medications   Medication Dose Route Frequency    0.9% sodium chloride infusion 250 mL  250 mL IntraVENous PRN    [START ON 9/28/2017] famotidine (PEPCID) tablet 20 mg  20 mg Oral DAILY    [START ON 9/28/2017] aspirin chewable tablet 81 mg  81 mg Oral DAILY    fondaparinux (ARIXTRA) injection 2.5 mg  2.5 mg SubCUTAneous Q48H    0.9% sodium chloride infusion 250 mL  250 mL IntraVENous PRN    0.9% sodium chloride infusion 250 mL  250 mL IntraVENous PRN    vancomycin (VANCOCIN) 1,000 mg in 0.9% sodium chloride (MBP/ADV) 250 mL adv  1,000 mg IntraVENous ONCE    [START ON 9/28/2017] VANCOMYCIN INFORMATION NOTE   Other ONCE    magnesium sulfate 2 g/50 ml IVPB (premix or compounded)  2 g IntraVENous ONCE    LORazepam (ATIVAN) injection 2 mg  2 mg IntraVENous Q2H PRN    0.9% sodium chloride infusion 250 mL  250 mL IntraVENous PRN    metoprolol (LOPRESSOR) injection 5 mg  5 mg IntraVENous Q4H PRN    HYDROmorphone (DILAUDID) injection 0.5 mg  0.5 mg IntraVENous Q6H PRN    levothyroxine (SYNTHROID) tablet 100 mcg  100 mcg Oral Daily    VANCOMYCIN, Dosing per levels    Other Rx Dosing/Monitoring    piperacillin-tazobactam (ZOSYN) 4.5 g in 0.9% sodium chloride (MBP/ADV) 100 mL MBP ### EXTENDED 4 HOUR INFUSION ##   4.5 g IntraVENous Q8H    levoFLOXacin (LEVAQUIN) 750 mg in D5W IVPB  750 mg IntraVENous Q48H    insulin lispro (HUMALOG) injection   SubCUTAneous Q6H    glucose chewable tablet 16 g  4 Tab Oral PRN    glucagon (GLUCAGEN) injection 1 mg  1 mg IntraMUSCular PRN    dextrose (D50W) injection syrg 12.5-25 g  25-50 mL IntraVENous PRN    sodium chloride (NS) flush 5-10 mL  5-10 mL IntraVENous PRN    dexmedeTOMidine (PRECEDEX) 400 mcg in 0.9% sodium chloride 100 mL infusion  0.2-0.7 mcg/kg/hr IntraVENous TITRATE    NOREPINephrine (LEVOPHED) 8,000 mcg in dextrose 5% 250 mL infusion  0-30 mcg/min IntraVENous TITRATE    sodium chloride (NS) flush 5-10 mL  5-10 mL IntraVENous Q8H    sodium chloride (NS) flush 5-10 mL  5-10 mL IntraVENous PRN    ondansetron (ZOFRAN) injection 4 mg  4 mg IntraVENous Q6H PRN    hydrALAZINE (APRESOLINE) 20 mg/mL injection 10 mg  10 mg IntraVENous Q6H PRN    thiamine (B-1) 100 mg in 0.9% sodium chloride 50 mL IVPB  100 mg IntraVENous BID    acetaminophen (TYLENOL) solution 650 mg  650 mg Per G Tube Q4H PRN    ELECTROLYTE REPLACEMENT PROTOCOL  1 Each Other PRN    ELECTROLYTE REPLACEMENT PROTOCOL  1 Each Other PRN    ELECTROLYTE REPLACEMENT PROTOCOL  1 Each Other PRN    ELECTROLYTE REPLACEMENT PROTOCOL  1 Each Other PRN    albuterol-ipratropium (DUO-NEB) 2.5 MG-0.5 MG/3 ML  3 mL Nebulization Q4H RT       Sky Huynh MD    September 27, 2017

## 2017-09-27 NOTE — PROGRESS NOTES
[facility]: This patient meets P & T approved criteria for conversion from IV to oral therapy for the following medication:     Famotidine 20 mg IV q24h was discontinued and Famotidine 20 mg PO q24 was started. If the patient no longer meets all criteria for oral therapy, the pharmacist will switch back to IV therapy. Thanks.

## 2017-09-27 NOTE — ROUTINE PROCESS
0700- Bedside shift change report given to Michelle Ruvalcaba RN (oncoming nurse) by Hussain Ortega (offgoing nurse). Report included the following information SBAR, MAR and Cardiac Rhythm NSR/SInus Tach. 1900- Bedside shift change report given to Susan Meyer RN (oncoming nurse) by aPlomo Noonan (offgoing nurse). Report included the following information SBAR, MAR and Cardiac Rhythm NSR.

## 2017-09-27 NOTE — PROGRESS NOTES
Pharmacy Dosing Services: Vancomycin    Indication: HAP    Day of therapy: 3    Other Antimicrobials (Include dose, start day & day of therapy):  Zosyn and Levaquin-  day 3    Loading dose (date given): 1500 mg   Current Maintenance dose: dose per levels     Goal Vancomycin Level: 15-20  (Trough 15-20 for most infections, 20 for meningitis/osteomyelitis, pre-HD level ~25)   Vancomycin levels:   -24.7 mcg/dl (~12hrs post dose)  -19.1 mcg/dl ( ~32 hrs post dose)  -19.6 mcg/dl (~23 hrs post dose)    Significant Cultures:   : sputum- MRSA, pseudomonas sensitivity to follow    : urine- NGTD    : blood cx: NGTD    Renal function stable? (unstable defined as SCr increase of 0.5 mg/dL or > 50% increase from baseline, whichever is greater) (Y/N): N, improving    CAPD, Hemodialysis or Renal Replacement Therapy (Y/N): N     Recent Labs      17   0347  17   0956  17   0400  17   0300   CREA  1.44*   --   1.73*  1.63*   BUN  28*   --   32*  24*   WBC  21.1*  22.9*  27.7*  32.8*     Temp (24hrs), Av.6 °F (37 °C), Min:96.6 °F (35.9 °C), Max:99.7 °F (37.6 °C)    Creatinine Clearance (Creatinine Clearance (ml/min)): 44.5 ml/min     Regimen assessment:  random level <20mcg/dl @ 19.6mcg/dl, will load patient with 1000 mg x 1 today  Maintenance dose: dose per levels  Next scheduled level: random  @ 1200     Pharmacy will follow daily and adjust medications as appropriate for renal function and/or serum levels.     Thank you,  Pilar Benavides

## 2017-09-27 NOTE — PROGRESS NOTES
Assumed care of patient . Patient opens eyes to voice and follows all commands. Moderate sedation from Precedex. bipap in place. Patient tolerating fine. Holly in place. FMS in place. Right subclavian central line in place. VSS  1957 blood transfusion started  2247 transfusion complete  0430 labs drawn, bath done  0534 H&H 7.1/21.1 Dr Eloina Alexander notified. No orders at this time   Bedside shift change report given to Marni Appiah (oncoming nurse) by Loraine Schwartz RN   (offgoing nurse). Report included the following information SBAR, MAR and Recent Results.

## 2017-09-28 LAB
ANION GAP SERPL CALC-SCNC: 8 MMOL/L (ref 3–18)
BACTERIA SPEC CULT: ABNORMAL
BASOPHILS # BLD: 0 K/UL (ref 0–0.06)
BASOPHILS NFR BLD: 0 % (ref 0–2)
BUN SERPL-MCNC: 22 MG/DL (ref 7–18)
BUN/CREAT SERPL: 19 (ref 12–20)
CA-I SERPL-SCNC: 1.29 MMOL/L (ref 1.12–1.32)
CALCIUM SERPL-MCNC: 7.9 MG/DL (ref 8.5–10.1)
CHLORIDE SERPL-SCNC: 118 MMOL/L (ref 100–108)
CO2 SERPL-SCNC: 22 MMOL/L (ref 21–32)
CREAT SERPL-MCNC: 1.13 MG/DL (ref 0.6–1.3)
DIFFERENTIAL METHOD BLD: ABNORMAL
EOSINOPHIL # BLD: 1.1 K/UL (ref 0–0.4)
EOSINOPHIL NFR BLD: 7 % (ref 0–5)
ERYTHROCYTE [DISTWIDTH] IN BLOOD BY AUTOMATED COUNT: 16.5 % (ref 11.6–14.5)
GLUCOSE BLD STRIP.AUTO-MCNC: 108 MG/DL (ref 70–110)
GLUCOSE BLD STRIP.AUTO-MCNC: 112 MG/DL (ref 70–110)
GLUCOSE BLD STRIP.AUTO-MCNC: 118 MG/DL (ref 70–110)
GLUCOSE BLD STRIP.AUTO-MCNC: 87 MG/DL (ref 70–110)
GLUCOSE SERPL-MCNC: 83 MG/DL (ref 74–99)
GRAM STN SPEC: ABNORMAL
HAPTOGLOB SERPL-MCNC: 129 MG/DL (ref 34–200)
HCT VFR BLD AUTO: 24.6 % (ref 35–45)
HGB BLD-MCNC: 8.2 G/DL (ref 12–16)
INR PPP: 1.4 (ref 0.8–1.2)
LYMPHOCYTES # BLD: 1.4 K/UL (ref 0.9–3.6)
LYMPHOCYTES NFR BLD: 9 % (ref 21–52)
MAGNESIUM SERPL-MCNC: 1.8 MG/DL (ref 1.6–2.6)
MAGNESIUM SERPL-MCNC: 2.1 MG/DL (ref 1.6–2.6)
MCH RBC QN AUTO: 30.3 PG (ref 24–34)
MCHC RBC AUTO-ENTMCNC: 33.3 G/DL (ref 31–37)
MCV RBC AUTO: 90.8 FL (ref 74–97)
MONOCYTES # BLD: 0.5 K/UL (ref 0.05–1.2)
MONOCYTES NFR BLD: 3 % (ref 3–10)
NEUTS SEG # BLD: 12.5 K/UL (ref 1.8–8)
NEUTS SEG NFR BLD: 81 % (ref 40–73)
PLATELET # BLD AUTO: 65 K/UL (ref 135–420)
POTASSIUM SERPL-SCNC: 4 MMOL/L (ref 3.5–5.5)
PROCALCITONIN SERPL-MCNC: 7.51 NG/ML (ref 0–0.08)
PROTHROMBIN TIME: 16.5 SEC (ref 11.5–15.2)
RBC # BLD AUTO: 2.71 M/UL (ref 4.2–5.3)
SERVICE CMNT-IMP: ABNORMAL
SODIUM SERPL-SCNC: 148 MMOL/L (ref 136–145)
VANCOMYCIN SERPL-MCNC: 20.1 UG/ML (ref 5–40)
WBC # BLD AUTO: 15.5 K/UL (ref 4.6–13.2)

## 2017-09-28 PROCEDURE — 74011000250 HC RX REV CODE- 250: Performed by: INTERNAL MEDICINE

## 2017-09-28 PROCEDURE — 77010033711 HC HIGH FLOW OXYGEN

## 2017-09-28 PROCEDURE — 74011250636 HC RX REV CODE- 250/636

## 2017-09-28 PROCEDURE — 94669 MECHANICAL CHEST WALL OSCILL: CPT

## 2017-09-28 PROCEDURE — 74011000258 HC RX REV CODE- 258: Performed by: HOSPITALIST

## 2017-09-28 PROCEDURE — 74011250636 HC RX REV CODE- 250/636: Performed by: HOSPITALIST

## 2017-09-28 PROCEDURE — 74011250636 HC RX REV CODE- 250/636: Performed by: INTERNAL MEDICINE

## 2017-09-28 PROCEDURE — 85610 PROTHROMBIN TIME: CPT | Performed by: PHYSICIAN ASSISTANT

## 2017-09-28 PROCEDURE — 77030033263 HC DRSG MEPILEX 16-48IN BORD MOLN -B

## 2017-09-28 PROCEDURE — 74011250636 HC RX REV CODE- 250/636: Performed by: PHYSICIAN ASSISTANT

## 2017-09-28 PROCEDURE — 80048 BASIC METABOLIC PNL TOTAL CA: CPT | Performed by: PHYSICIAN ASSISTANT

## 2017-09-28 PROCEDURE — 74011000250 HC RX REV CODE- 250: Performed by: EMERGENCY MEDICINE

## 2017-09-28 PROCEDURE — 77030011256 HC DRSG MEPILEX <16IN NO BORD MOLN -A

## 2017-09-28 PROCEDURE — 77030018846 HC SOL IRR STRL H20 ICUM -A

## 2017-09-28 PROCEDURE — 82330 ASSAY OF CALCIUM: CPT | Performed by: HOSPITALIST

## 2017-09-28 PROCEDURE — 65610000006 HC RM INTENSIVE CARE

## 2017-09-28 PROCEDURE — 83735 ASSAY OF MAGNESIUM: CPT | Performed by: HOSPITALIST

## 2017-09-28 PROCEDURE — 82962 GLUCOSE BLOOD TEST: CPT

## 2017-09-28 PROCEDURE — 85025 COMPLETE CBC W/AUTO DIFF WBC: CPT | Performed by: PHYSICIAN ASSISTANT

## 2017-09-28 PROCEDURE — 74011250637 HC RX REV CODE- 250/637: Performed by: INTERNAL MEDICINE

## 2017-09-28 PROCEDURE — 74011250637 HC RX REV CODE- 250/637: Performed by: PHYSICIAN ASSISTANT

## 2017-09-28 PROCEDURE — 94640 AIRWAY INHALATION TREATMENT: CPT

## 2017-09-28 PROCEDURE — 74011000258 HC RX REV CODE- 258: Performed by: INTERNAL MEDICINE

## 2017-09-28 PROCEDURE — 36415 COLL VENOUS BLD VENIPUNCTURE: CPT | Performed by: HOSPITALIST

## 2017-09-28 PROCEDURE — 80202 ASSAY OF VANCOMYCIN: CPT | Performed by: HOSPITALIST

## 2017-09-28 PROCEDURE — 77030020249 HC SOL INJ D 5% 250ML BG LF

## 2017-09-28 PROCEDURE — 74011000258 HC RX REV CODE- 258: Performed by: EMERGENCY MEDICINE

## 2017-09-28 PROCEDURE — 74011250636 HC RX REV CODE- 250/636: Performed by: FAMILY MEDICINE

## 2017-09-28 RX ORDER — OXYCODONE AND ACETAMINOPHEN 5; 325 MG/1; MG/1
1 TABLET ORAL
Status: DISCONTINUED | OUTPATIENT
Start: 2017-09-28 | End: 2017-09-30

## 2017-09-28 RX ORDER — IPRATROPIUM BROMIDE AND ALBUTEROL SULFATE 2.5; .5 MG/3ML; MG/3ML
3 SOLUTION RESPIRATORY (INHALATION)
Status: DISCONTINUED | OUTPATIENT
Start: 2017-09-28 | End: 2017-10-09 | Stop reason: HOSPADM

## 2017-09-28 RX ORDER — FUROSEMIDE 10 MG/ML
INJECTION INTRAMUSCULAR; INTRAVENOUS
Status: COMPLETED
Start: 2017-09-28 | End: 2017-09-28

## 2017-09-28 RX ORDER — FUROSEMIDE 10 MG/ML
20 INJECTION INTRAMUSCULAR; INTRAVENOUS ONCE
Status: COMPLETED | OUTPATIENT
Start: 2017-09-28 | End: 2017-09-28

## 2017-09-28 RX ORDER — LEVOFLOXACIN 5 MG/ML
750 INJECTION, SOLUTION INTRAVENOUS EVERY 24 HOURS
Status: DISCONTINUED | OUTPATIENT
Start: 2017-09-28 | End: 2017-09-29

## 2017-09-28 RX ORDER — MAGNESIUM SULFATE 1 G/100ML
1 INJECTION INTRAVENOUS ONCE
Status: COMPLETED | OUTPATIENT
Start: 2017-09-28 | End: 2017-09-28

## 2017-09-28 RX ADMIN — METOPROLOL TARTRATE 5 MG: 5 INJECTION INTRAVENOUS at 19:33

## 2017-09-28 RX ADMIN — HYDROMORPHONE HYDROCHLORIDE 0.5 MG: 2 INJECTION INTRAMUSCULAR; INTRAVENOUS; SUBCUTANEOUS at 15:24

## 2017-09-28 RX ADMIN — IPRATROPIUM BROMIDE AND ALBUTEROL SULFATE 3 ML: .5; 3 SOLUTION RESPIRATORY (INHALATION) at 12:00

## 2017-09-28 RX ADMIN — HYDRALAZINE HYDROCHLORIDE 10 MG: 20 INJECTION INTRAMUSCULAR; INTRAVENOUS at 21:07

## 2017-09-28 RX ADMIN — THIAMINE HYDROCHLORIDE 100 MG: 100 INJECTION, SOLUTION INTRAMUSCULAR; INTRAVENOUS at 22:36

## 2017-09-28 RX ADMIN — PIPERACILLIN SODIUM,TAZOBACTAM SODIUM 4.5 G: 4; .5 INJECTION, POWDER, FOR SOLUTION INTRAVENOUS at 18:49

## 2017-09-28 RX ADMIN — LEVOTHYROXINE SODIUM 100 MCG: 100 TABLET ORAL at 05:59

## 2017-09-28 RX ADMIN — LORAZEPAM 2 MG: 2 INJECTION INTRAMUSCULAR; INTRAVENOUS at 23:43

## 2017-09-28 RX ADMIN — PIPERACILLIN SODIUM AND TAZOBACTAM SODIUM 4.5 G: 4; .5 INJECTION, POWDER, LYOPHILIZED, FOR SOLUTION INTRAVENOUS at 05:59

## 2017-09-28 RX ADMIN — IPRATROPIUM BROMIDE AND ALBUTEROL SULFATE 3 ML: .5; 3 SOLUTION RESPIRATORY (INHALATION) at 00:16

## 2017-09-28 RX ADMIN — IPRATROPIUM BROMIDE AND ALBUTEROL SULFATE 3 ML: .5; 3 SOLUTION RESPIRATORY (INHALATION) at 19:19

## 2017-09-28 RX ADMIN — MAGNESIUM SULFATE HEPTAHYDRATE 1 G: 1 INJECTION, SOLUTION INTRAVENOUS at 09:35

## 2017-09-28 RX ADMIN — Medication 10 ML: at 08:52

## 2017-09-28 RX ADMIN — LEVOFLOXACIN 750 MG: 750 INJECTION, SOLUTION INTRAVENOUS at 17:21

## 2017-09-28 RX ADMIN — IPRATROPIUM BROMIDE AND ALBUTEROL SULFATE 3 ML: .5; 3 SOLUTION RESPIRATORY (INHALATION) at 08:18

## 2017-09-28 RX ADMIN — OXYCODONE HYDROCHLORIDE AND ACETAMINOPHEN 1 TABLET: 5; 325 TABLET ORAL at 19:38

## 2017-09-28 RX ADMIN — METOPROLOL TARTRATE 5 MG: 5 INJECTION INTRAVENOUS at 23:43

## 2017-09-28 RX ADMIN — Medication 10 ML: at 22:00

## 2017-09-28 RX ADMIN — LORAZEPAM 2 MG: 2 INJECTION INTRAMUSCULAR; INTRAVENOUS at 20:18

## 2017-09-28 RX ADMIN — HYDROMORPHONE HYDROCHLORIDE 0.5 MG: 2 INJECTION INTRAMUSCULAR; INTRAVENOUS; SUBCUTANEOUS at 21:41

## 2017-09-28 RX ADMIN — FUROSEMIDE 20 MG: 10 INJECTION INTRAMUSCULAR; INTRAVENOUS at 18:21

## 2017-09-28 RX ADMIN — FUROSEMIDE 20 MG: 10 INJECTION, SOLUTION INTRAMUSCULAR; INTRAVENOUS at 18:21

## 2017-09-28 RX ADMIN — THIAMINE HYDROCHLORIDE 100 MG: 100 INJECTION, SOLUTION INTRAMUSCULAR; INTRAVENOUS at 08:53

## 2017-09-28 RX ADMIN — ONDANSETRON 4 MG: 2 INJECTION INTRAMUSCULAR; INTRAVENOUS at 19:56

## 2017-09-28 RX ADMIN — Medication 10 ML: at 17:18

## 2017-09-28 RX ADMIN — ASPIRIN 81 MG 81 MG: 81 TABLET ORAL at 09:34

## 2017-09-28 RX ADMIN — CALCIUM GLUCONATE 2 G: 94 INJECTION, SOLUTION INTRAVENOUS at 02:27

## 2017-09-28 RX ADMIN — IPRATROPIUM BROMIDE AND ALBUTEROL SULFATE 3 ML: .5; 3 SOLUTION RESPIRATORY (INHALATION) at 04:55

## 2017-09-28 RX ADMIN — SODIUM CHLORIDE 1000 MG: 900 INJECTION, SOLUTION INTRAVENOUS at 15:00

## 2017-09-28 RX ADMIN — HYDROMORPHONE HYDROCHLORIDE 0.5 MG: 2 INJECTION INTRAMUSCULAR; INTRAVENOUS; SUBCUTANEOUS at 04:46

## 2017-09-28 RX ADMIN — SODIUM CHLORIDE 0.2 MCG/KG/HR: 900 INJECTION, SOLUTION INTRAVENOUS at 09:43

## 2017-09-28 RX ADMIN — FAMOTIDINE 20 MG: 20 TABLET ORAL at 09:35

## 2017-09-28 NOTE — PROGRESS NOTES
1940  spoke with Dr. Mariam Sanchez concerning synthroid being held. new orders pending pharmacy consult. 2000  hypoglycemia reported by offgoing RN. tube feed restarted via PEG at 25 mL per hour as patient has tolerated hi flow for most of the day. will continue to monitor    2100  follow up call to pharmacy. ok to dissolve synthroid tablet in 10 mL water and administer immediately via PEG on empty stomach    0000  blood sugar 87    0400  patient bathed after some leakage from FMS, sacral mepilex changed, barrier cream applied to buttock and inner thighs skin appears to be improving in that area    0500  tube feed stopped to prepare for synthroid administration. Lab Results   Component Value Date/Time    WBC 15.5 09/28/2017 03:45 AM    HGB 8.2 09/28/2017 03:45 AM    HCT 24.6 09/28/2017 03:45 AM    PLATELET 65 33/71/8713 03:45 AM    MCV 90.8 09/28/2017 03:45 AM     potassium not replaced per renal dosing protocol. sodium this morning is 148 250 mL water flushes given at 0000 and 0600    Bedside and Verbal shift change report given to Kamar Dorman RN (oncoming nurse) by Violeta Manzanares RN   (offgoing nurse). Report included the following information SBAR, Kardex, Intake/Output, MAR and Recent Results.

## 2017-09-28 NOTE — PROGRESS NOTES
Pharmacy adjusting dose for Levofloxacin (Levaquin) per renal protocol. Was on a regimen of: 750 mg IV q48h. Labs:   Serum Creatinine - 1.13 mg/dl  CrCl - 59.1 ml/min  Renal function improved     Plan:  Changed Levofloxacin to 750 mg IV q24h. Pharmacy to follow and will redose based on renal function changes. Thanks.

## 2017-09-28 NOTE — PROGRESS NOTES
Physical Exam   Skin:        Nurses Notes:   0800 Assessment completed. Nuero:Pt is very drowsy. Open eyes with verbal stimuli. Moves all extremities but very weak. Mouth is very dry with crusted oral secretions. On Precedex gtt @ 0.3 mcg/kg/hr infusing via rt Subclavian Catheter. CV:SR on the monitor. Resp. Lungs sound with coarse rhonchi. On Hi Flow O2. Suctioned orally. Mouth care given. GI:Abdomen soft, non tender. With Gasto-Jejunal tube in place. Tube feeding of Osmolite 1 narda ongoing. On accucheck every 6 hours. : Holly intact with marginal output. Skin: see above. 1501 [facility] is off. SR-ST on the monitor. 1400 Awake. Mouth words. Coug up thick secretions. 1524 Complaining of pain. Tachycardic, tachypneic. Dilaudid 0.5 mg IVP given. 1600 Reassessment done. Pt is sleepy but arousable. Still tachycardic. SBP>160. MD is aware    1821 Pt is desatting but able to recover slowly. Not tolerating activity(turning and pm care). Discussed pt's condition with Dr. Alvino Duarte and JOSE DAVID Gonzalez. Lasix 20 mg IVP given. 1900 Pt is still tachycardic. O2 sat 96%. 1930 Bedside and Verbal shift change report given to Laura SCHUSTER (oncoming nurse) by Sarika Rivera RN   (offgoing nurse).  Report included the following information SBAR, Kardex, Intake/Output, MAR and Cardiac Rhythm ST.

## 2017-09-28 NOTE — PROGRESS NOTES
Medicine Progress Note    Patient: Adithya Postal   Age:  54 y.o.  DOA: 9/24/2017   Admit Dx / CC: Acute respiratory failure with hypercapnia (HCC)  LOS:  LOS: 4 days     Assessment/Plan   Principal Problem:    Acute respiratory failure with hypercapnia ([address] (9/24/2017)    Active Problems:    Sepsis ([address] (9/24/2017)      HCAP (healthcare-associated pneumonia) (9/24/2017)      Hypertension (9/24/2017)      Diabetes mellitus ([facility] (9/24/2017)        Additional Plan notes       1. Acute respiratory failure with hypercapnia. 2/2 PNA    -now extubated, improving on hi sunil currently    -Intensivist consulted for management. - chest pt    2. Septic shock 2/2 PNA    -On vanc,zosyn and levaquin    -Blood cx NGTD, off pressors    3. Diabetes.    -On ssi    4. GERD    -On ppi.    5, Hx of Throat cancer    DISPO     Anticipated Date of Discharge: ???  Anticipated Disposition (home, SNF) : SNF    Subjective:   Patient seen and examined. More awake, productive sputum    Objective:     Visit Vitals    BP (!) 160/99    Pulse 100    Temp 96.5 °F (35.8 °C)    Resp 22    Ht 5' 4\" (1.626 m)    Wt 73.5 kg (162 lb 0.6 oz)    SpO2 100%    BMI 27.81 kg/m2       Physical Exam:  General appearance: alert, cooperative, no distress, appears stated age  Head: Normocephalic, without obvious abnormality, atraumatic  Neck: supple, trachea midline  Lungs:b/l rhonchi  Heart: regular rate and rhythm, S1, S2 normal, no murmur, click, rub or gallop  Abdomen: soft, non-tender.  Bowel sounds normal. No masses,  no organomegaly  Extremities: extremities normal, atraumatic, no cyanosis or edema  Skin: Skin color, texture, turgor normal. No rashes or lesions  Neurologic: Grossly normal    Intake and Output:  Current Shift:  09/28 0701 - 09/28 1900  In: 353.7 [I.V.:73.7]  Out: 220 [Urine:120; Drains:100]  Last three shifts:  09/26 1901 - 09/28 0700  In: 2199.5 [I.V.:1474.5]  Out: 2067 [MOVHL:8163; Drains:600]    Lab/Data Reviewed:  CMP:   Lab Results   Component Value Date/Time     (H) 09/28/2017 03:45 AM    K 4.0 09/28/2017 03:45 AM     (H) 09/28/2017 03:45 AM    CO2 22 09/28/2017 03:45 AM    AGAP 8 09/28/2017 03:45 AM    GLU 83 09/28/2017 03:45 AM    BUN 22 (H) 09/28/2017 03:45 AM    CREA 1.13 09/28/2017 03:45 AM    GFRAA >60 09/28/2017 03:45 AM    GFRNA 51 (L) 09/28/2017 03:45 AM    CA 7.9 (L) 09/28/2017 03:45 AM    MG 1.8 09/28/2017 03:45 AM     CBC:   Lab Results   Component Value Date/Time    WBC 15.5 (H) 09/28/2017 03:45 AM    HGB 8.2 (L) 09/28/2017 03:45 AM    HCT 24.6 (L) 09/28/2017 03:45 AM    PLT 65 (L) 09/28/2017 03:45 AM     All Cardiac Markers in the last 24 hours: No results found for: CPK, CK, CKMMB, CKMB, RCK3, CKMBT, CKNDX, CKND1, LAVELLE, TROPT, TROIQ, RAMIREZ, TROPT, TNIPOC, BNP, BNPP    Medications Reviewed:  Current Facility-Administered Medications   Medication Dose Route Frequency    albuterol-ipratropium (DUO-NEB) 2.5 MG-0.5 MG/3 ML  3 mL Nebulization Q6H RT    vancomycin (VANCOCIN) 1,000 mg in 0.9% sodium chloride (MBP/ADV) 250 mL adv  1,000 mg IntraVENous ONCE    [START ON 9/29/2017] VANCOMYCIN INFORMATION NOTE   Other ONCE    0.9% sodium chloride infusion 250 mL  250 mL IntraVENous PRN    famotidine (PEPCID) tablet 20 mg  20 mg Oral DAILY    fondaparinux (ARIXTRA) injection 2.5 mg  2.5 mg SubCUTAneous Q48H    0.9% sodium chloride infusion 250 mL  250 mL IntraVENous PRN    0.9% sodium chloride infusion 250 mL  250 mL IntraVENous PRN    LORazepam (ATIVAN) injection 2 mg  2 mg IntraVENous Q2H PRN    0.9% sodium chloride infusion 250 mL  250 mL IntraVENous PRN    metoprolol (LOPRESSOR) injection 5 mg  5 mg IntraVENous Q4H PRN    HYDROmorphone (DILAUDID) injection 0.5 mg  0.5 mg IntraVENous Q6H PRN    levothyroxine (SYNTHROID) tablet 100 mcg  100 mcg Oral Daily    VANCOMYCIN, Dosing per levels    Other Rx Dosing/Monitoring    piperacillin-tazobactam (ZOSYN) 4.5 g in 0.9% sodium chloride (MBP/ADV) 100 mL MBP ### EXTENDED 4 HOUR INFUSION ##   4.5 g IntraVENous Q8H    levoFLOXacin (LEVAQUIN) 750 mg in D5W IVPB  750 mg IntraVENous Q48H    insulin lispro (HUMALOG) injection   SubCUTAneous Q6H    glucose chewable tablet 16 g  4 Tab Oral PRN    glucagon (GLUCAGEN) injection 1 mg  1 mg IntraMUSCular PRN    dextrose (D50W) injection syrg 12.5-25 g  25-50 mL IntraVENous PRN    sodium chloride (NS) flush 5-10 mL  5-10 mL IntraVENous PRN    dexmedeTOMidine (PRECEDEX) 400 mcg in 0.9% sodium chloride 100 mL infusion  0.2-0.7 mcg/kg/hr IntraVENous TITRATE    NOREPINephrine (LEVOPHED) 8,000 mcg in dextrose 5% 250 mL infusion  0-30 mcg/min IntraVENous TITRATE    sodium chloride (NS) flush 5-10 mL  5-10 mL IntraVENous Q8H    sodium chloride (NS) flush 5-10 mL  5-10 mL IntraVENous PRN    ondansetron (ZOFRAN) injection 4 mg  4 mg IntraVENous Q6H PRN    hydrALAZINE (APRESOLINE) 20 mg/mL injection 10 mg  10 mg IntraVENous Q6H PRN    thiamine (B-1) 100 mg in 0.9% sodium chloride 50 mL IVPB  100 mg IntraVENous BID    acetaminophen (TYLENOL) solution 650 mg  650 mg Per G Tube Q4H PRN    ELECTROLYTE REPLACEMENT PROTOCOL  1 Each Other PRN    ELECTROLYTE REPLACEMENT PROTOCOL  1 Each Other PRN    ELECTROLYTE REPLACEMENT PROTOCOL  1 Each Other PRN    ELECTROLYTE REPLACEMENT PROTOCOL  1 Each Other PRN       Pablo Sparks MD    September 28, 2017

## 2017-09-28 NOTE — PROGRESS NOTES
Patient is unable to communicate at this time as she is resting peacefully at present.  offered prayer and left Spiritual Care brochure. Chaplains will continue to follow and will provide pastoral care on an as needed/requested basis.     Ralph Boyd   Spiritual Care   [phone]

## 2017-09-28 NOTE — PROGRESS NOTES
Pharmacy Dosing Services: Vancomycin    Indication: HAP    Day of therapy: 4    Other Antimicrobials (Include dose, start day & day of therapy):  Zosyn and Levaquin-  day 3    Loading dose (date given): 1.5g   Current Maintenance dose: dose per levels     Goal Vancomycin Level: 15-20  (Trough 15-20 for most infections, 20 for meningitis/osteomyelitis, pre-HD level ~25)   Vancomycin levels:   -24.7 mcg/dl (~12hrs post dose)  -19.1 mcg/dl ( ~32 hrs post dose)  -19.6 mcg/dl (~23 hrs post dose)  -20.1mcg/dl    Significant Cultures:   : sputum- MRSA, pseudomonas sensitivity to follow    : urine- NGTD    : blood cx: NGTD    Renal function stable? (unstable defined as SCr increase of 0.5 mg/dL or > 50% increase from baseline, whichever is greater) (Y/N): N, improving    CAPD, Hemodialysis or Renal Replacement Therapy (Y/N): N     Recent Labs      17   0347  17   0956  17   0400  17   0300   CREA  1.44*   --   1.73*  1.63*   BUN  28*   --   32*  24*   WBC  21.1*  22.9*  27.7*  32.8*     Temp (24hrs), Av.6 °F (37 °C), Min:96.6 °F (35.9 °C), Max:99.7 °F (37.6 °C)    Creatinine Clearance (Creatinine Clearance (ml/min)): 59.1 ml/min     Regimen assessment:  random level @ 20.1 mcg/dl, will load patient with 1000 mg x 1 today; scheduled for later start   Maintenance dose: dose per levels  Next scheduled level: random  @ 1500     Pharmacy will follow daily and adjust medications as appropriate for renal function and/or serum levels.     Thank you,  Jennifer Campos

## 2017-09-28 NOTE — PROGRESS NOTES
Physical Exam   Neck:       Pulmonary/Chest:           Abdominal:       Genitourinary:         Skin:        Primary Nurse Sobia Bowie RN and Gi Sotelo RN performed a dual skin assessment on this patient Impairment noted- see wound doc flow sheet  Jamie score is 13

## 2017-09-28 NOTE — PROGRESS NOTES
Problem: Falls - Risk of  Goal: *Absence of Falls  Document Kapil Fall Risk and appropriate interventions in the flowsheet.    Outcome: Progressing Towards Goal  Fall Risk Interventions:        Mentation Interventions: Bed/chair exit alarm, More frequent rounding, Room close to nurse's station, Reorient patient     Medication Interventions: Bed/chair exit alarm, Patient to call before getting OOB     Elimination Interventions: Call light in reach, Bed/chair exit alarm, Toileting schedule/hourly rounds

## 2017-09-28 NOTE — PROGRESS NOTES
[facility]. Pulmonary, Critical Care, and Sleep Medicine    Name: Inez Benavides MRN: [id number]   : 1965 Hospital: [address] [place] [address]   Date: 2017  Admission Date: 2017     Chart and notes reviewed. Data reviewed. I have evaluated all findings. [x]I have reviewed the flowsheet and previous days notes. [x]The patient is critically ill on      []Mechanical ventilation []Pressors   []BiPAP High flow                 : Tolerates PS but too sedated to consider extubation, requiring levophed, BNP very elevated  : Extubated yesterday, required bipap post extubation, fevers with tmax 102.3, Hb decrease, slow worsening of renal function, agitation overnight requiring precedex  : Intermittently switching between bipap and high flow, tachycardic/tachypnic during periods of pain, improves with precedex and dilaudid  : Tolerating high flow, still requiring precedex, attempting to wean, tachycardia/tachypnea currently improved    ROS:Review of systems not obtained due to patient factors. Events and notes from last 24 hours reviewed. Care plan discussed on multidisciplinary rounds.   Patient Active Problem List   Diagnosis Code    Acute respiratory failure with hypercapnia (HCC) J96.02    Sepsis ([facility] A41.9    HCAP (healthcare-associated pneumonia) J18.9    Hypertension I10    Diabetes mellitus ([facility] E11.9       Vital Signs:  Visit Vitals    /71    Pulse 88    Temp 98.5 °F (36.9 °C)    Resp (!) 0    Ht 5' 4\" (1.626 m)    Wt 73.5 kg (162 lb 0.6 oz)    SpO2 98%    BMI 27.81 kg/m2       O2 Device: Hi flow nasal cannula   O2 Flow Rate (L/min): 45 l/min   Temp (24hrs), Av.1 °F (36.2 °C), Min:96.6 °F (35.9 °C), Max:98.5 °F (36.9 °C)       Intake/Output:   Last shift:         Last 3 shifts:  1901 -  0700  In: 2199.5 [I.V.:1474.5]  Out:  [UFYZC:1515; Drains:600]    Intake/Output Summary (Last 24 hours) at 17 0802  Last data filed at 09/28/17 0700   Gross per 24 hour   Intake          1790.71 ml   Output             1340 ml   Net           450.71 ml      Ventilator Settings:  Ventilator  Mode: Pressure support, Spontaneous  Respiratory Rate  Resp Rate Observed: 23  Back-Up Rate: 16  Insp Time (sec): 1.1 sec  I:E Ratio: 1:1.5  Ventilator Volumes  Vt Set (ml): 420 ml  Vt Exhaled (Machine Breath) (ml): 412 ml  Vt Spont (ml): 404 ml  Ve Observed (l/min): 12.1 l/min  Ventilator Pressures  PIP Observed (cm H2O): 13 cm H2O  Plateau Pressure (cm H2O): 22 cm H2O  MAP (cm H2O): 8.2  PEEP/VENT (cm H2O): 5 cm H20  Auto PEEP Observed (cm H2O):  (unable to obtain)    Current Facility-Administered Medications   Medication Dose Route Frequency    magnesium sulfate 1 g/100 ml IVPB (premix or compounded)  1 g IntraVENous ONCE    famotidine (PEPCID) tablet 20 mg  20 mg Oral DAILY    aspirin chewable tablet 81 mg  81 mg Oral DAILY    fondaparinux (ARIXTRA) injection 2.5 mg  2.5 mg SubCUTAneous Q48H    VANCOMYCIN INFORMATION NOTE   Other ONCE    levothyroxine (SYNTHROID) tablet 100 mcg  100 mcg Oral Daily    VANCOMYCIN, Dosing per levels    Other Rx Dosing/Monitoring    piperacillin-tazobactam (ZOSYN) 4.5 g in 0.9% sodium chloride (MBP/ADV) 100 mL MBP ### EXTENDED 4 HOUR INFUSION ##   4.5 g IntraVENous Q8H    levoFLOXacin (LEVAQUIN) 750 mg in D5W IVPB  750 mg IntraVENous Q48H    insulin lispro (HUMALOG) injection   SubCUTAneous Q6H    dexmedeTOMidine (PRECEDEX) 400 mcg in 0.9% sodium chloride 100 mL infusion  0.2-0.7 mcg/kg/hr IntraVENous TITRATE    NOREPINephrine (LEVOPHED) 8,000 mcg in dextrose 5% 250 mL infusion  0-30 mcg/min IntraVENous TITRATE    sodium chloride (NS) flush 5-10 mL  5-10 mL IntraVENous Q8H    thiamine (B-1) 100 mg in 0.9% sodium chloride 50 mL IVPB  100 mg IntraVENous BID    albuterol-ipratropium (DUO-NEB) 2.5 MG-0.5 MG/3 ML  3 mL Nebulization Q4H RT       Telemetry:     Physical Exam:   General: Drowsy but awakes and follows commands on all extremities   HEENT: high flow in place   Neck: Supple   Chest: Normal   Lungs: Coarse bilaterally, no wheezes, scattered rhonci   Heart: Regular rate and rhythm   Abdomen: non distended, bowel sounds normoactive   Extremity: Moves all spontaneously and to command   Neuro: Drowsy but awakes and follows commands on all extremities   Skin: Intact   DATA:  MAR reviewed and pertinent medications noted or modified as needed    Labs:  Recent Labs      09/28/17   0345  09/27/17   1515  09/27/17   0347   09/26/17   0956   WBC  15.5*   --   21.1*   --   22.9*   HGB  8.2*  8.4*  7.0*   < >  6.5*   HCT  24.6*  25.1*  21.1*   < >  19.6*   PLT  65*   --   109*   --   122*    < > = values in this interval not displayed. Recent Labs      09/28/17   0345  09/27/17   1900  09/27/17   0347 09/26/17   0400   NA  148*   --   149*  143   K  4.0  4.1  3.9  4.0   CL  118*   --   114*  110*   CO2  22   --   24  20*   GLU  83   --   69*  148*   BUN  22*   --   28*  32*   CREA  1.13   --   1.44*  1.73*   CA  7.9*   --   7.0*  6.9*   MG  1.8  2.0  1.6   --    PHOS   --    --   3.3   --    INR  1.4*   --   1.5*  1.9*     No results for input(s): PH, PCO2, PO2, HCO3, FIO2 in the last 72 hours. No results for input(s): FIO2I, IFO2, HCO3I, IHCO3, HCOPOC, PCO2I, PCOPOC, IPHI, PHI, PHPOC, PO2I, PO2POC in the last 72 hours. No lab exists for component: IPOC2  Imaging:  [x]                           I have personally reviewed the patients radiographs and reports    High complexity decision making was performed during this consultation and evaluation. [x]       Pt is at high risk for further organ failure and dysfunction. Critical care time spent  minutes with patient exclusive of procedures.     IMPRESSION:    Anemia      Cancer (HCC)       throat    Coronary artery disease      Diabetes ([facility]      Diabetes mellitus ([facility]      ETOH abuse      HTN (hypertension)      Hypertension      Hypothyroid      Lupus ([facility]      Osteonecrosis (HCC)       of jaw    PEG adjustment, replacement, or removal      S/P thoracentesis      Throat cancer ([facility]      ·       PLAN:   Neuro/Pain  On and off precedex for agitation and distress  Minimize sedation if able   Dilaudid for pain    Cardiovascular  Tachycardic and distressed when in pain, resolves with dilauidid  Off levophed  BNP >35,000, not much response to lasix, hesitate to heavily diurese given renal function   Lactic acid normal  2D echo EF 55 - 60%, moderate tricuspid regurge    Pulmonary  MRSA and Pseudomonas PNA  Extubated 9/25, has required bipap and high flow intermittently  During previous intubation required ETT 6.5 due to upper airway obstruction from cancer, difficult airway and if requires reintubation will likely need anesthesia assistance  Continue scheduled duonebs  procal 30 on 9/25, repeat pending for trend    GI  Continue tube feeds, always requires tube feeds to PEG for upper airway obstruction   Famotidine ppx    Renal  KEATON, resolved  renal ultrasound with medical renal disease  Free water for mild hypernatremia    ID  Significant leukocytosis, 32 --> 27--> 21 --> 15  Fevers resolved  Continue vanc, zosyn, levaquin  Bcx 9/24 negative to date  Sputum cx 9/24 MRSA and pseudomonas   Ucx 9/24 negative to date  Flu A/B negative  Cdiff negative    Heme Onc  HO throat cancer with upper airway obstruction  Hb decrease, s/p 1u pRBCs 9/26, 9/27 transfuse PRN for 7 or below  Pt has antibodies in blood, takes many hours to get blood products  Platelets with trend down, will continue arixtra, HIT panel pending (sent 9/27)    Endo  HO DM but A1c 5.3  Continue humalog  TSH normal, free T4 slightly low  Continue synthroid       Critical care time 35 minutes    Isis Thayer MD

## 2017-09-28 NOTE — DIABETES MGMT
NUTRITIONAL RE-ASSESSMENT GLYCEMIC CONTROL/ PLAN OF CARE     Aniyah Simmons           54 y.o.           9/24/2017                 1. HCAP (healthcare-associated pneumonia)    2. Septic shock (HCC)         INTERVENTIONS/PLAN:   1. Osmolite 1 narda tube feeding via PEG at goal rate of 70 ml/hr (1781 calories, 74 grams protein, 1416 ml free water/d (from tube feeding). 2.  250 ml water flushes via PEG q 6 hours  3. Monitor tube feedings, labs and weights. ASSESSMENT:   Pt is 121% ideal wt with adequate fat and somatic protein stores; BMI (calculated): 25.0 kg/m2 (low range of overweight classification). Pt appears well nourished from available data but is at risk of malnutrition due to dysphagia and PEG/TF. Question accuracy of documented weights as they indicate pt has gained 6.3 kg since yesterday. Tube feeding and water flushes will cover estimated nutrient and fluid needs (once tube feeding reaches goal rate). Noted mild hypernatremia with water flushes ordered yesterday. Nutrition Diagnoses:   Inadequate oral food and beverage intake due to dysphagia as evidenced by PEG tube/enteral nutrition. Increased nutrient needs due to wounds as evidenced by Stage 2 ulcers sacral area. Unintentional weight loss due to inadequate energy intake AEB 8# weight loss (5%) since June 2017. SUBJECTIVE/OBJECTIVE:   Information obtained from: chart review, ICU rounds  Pt admitted with acute respiratory failure, now extubated, was on BIPAP intermittently with tube feeding being held (while on BIPAP), now on HFNC and tube feeding resumed. PMHx includes throat CA, dysphagia and PEG tube,  stage 2 ulcers sacral area per nursing notes, HTN. Diet: NPO with Osmolite 1 narda tube feeding via PEG , now infusing at 45 ml/hr, increasing as tolerated to goal rate of 70 ml/hr (1781 calories, 74 grams protein, 1416 ml free water/d (from tube feeding).   Pt also receiving 250 ml water flushes q 6 hours  Last BM - 9/28 per I/O's - watery, large    Chart review indicates pt was at ED at [facility] where it was noted pt was using Jevity 1.5 narda 240 ml QID via PEG. G-tube last replaced 9-7-17 per chart review. No data found.     Medications: [x]                Reviewed   Pertinent:  Lasix, corrective lispro q 6 hours, normal insulin sensitivity,   IVF:  NS at 100 ml/hr    Most Recent POC Glucose:   Recent Labs      09/28/17   0345  09/27/17   0347  09/26/17   0400   GLU  83  69*  148*         Labs:   Lab Results   Component Value Date/Time    Hemoglobin A1c 5.3 09/25/2017 09:46 AM     Lab Results   Component Value Date/Time    Sodium 148 09/28/2017 03:45 AM    Potassium 4.0 09/28/2017 03:45 AM    Chloride 118 09/28/2017 03:45 AM    CO2 22 09/28/2017 03:45 AM    Anion gap 8 09/28/2017 03:45 AM    Glucose 83 09/28/2017 03:45 AM    BUN 22 09/28/2017 03:45 AM    Creatinine 1.13 09/28/2017 03:45 AM    Calcium 7.9 09/28/2017 03:45 AM    Magnesium 1.8 09/28/2017 03:45 AM    Phosphorus 3.3 09/27/2017 03:47 AM    Albumin 1.5 09/25/2017 03:00 AM       Anthropometrics:  ,  , BMI (calculated): 27.8   Ht - 64\"  weight - 66 kg  Ideal wt -  54.5 kg   Wt Readings from Last 1 Encounters:   09/27/17 73.5 kg (162 lb 0.6 oz)     Last 3 Recorded Weights in this Encounter    09/25/17 0728 09/26/17 1300 09/27/17 1900   Weight: 66 kg (145 lb 8.1 oz) 67.2 kg (148 lb 2.4 oz) 73.5 kg (162 lb 0.6 oz)     08/09/17 Weight from Chart Everywhere - 67.8 kg    Ht Readings from Last 1 Encounters:   09/25/17 5' 4\" (1.626 m)     Wt Readings from Last 3 Encounters:   09/27/17 73.5 kg (162 lb 0.6 oz)   06/12/17 71.2 kg (157 lb)   01/05/14 77.6 kg (171 lb)       Estimated Nutrition Needs:  8193 Kcals/day, Protein (g): 86 g Fluid (ml): 2300 ml  Based on:   [x]          Actual BW    []          ABW   []            Adjusted BW        Nutrition Diagnoses:   See above          Nutrition Interventions:  Tube feeding  Goal:   Blood glucose will be within target range of  mg/dL by 9/28/17. Pt will maintain weight (+/- 1-2 kg) by 10/5/17.         Nutrition Monitoring and Evaluation      []     Monitor po intake on meal rounds  [x]     Continue inpatient monitoring and intervention  []     Other:      Nutrition Risk:  [x]   High     []  Moderate    []  Minimal/Uncompromised    Joaquin Ortega RD, CDE   Office:  [address] [place] [address] Pager:  [phone]

## 2017-09-29 ENCOUNTER — APPOINTMENT (OUTPATIENT)
Dept: GENERAL RADIOLOGY | Age: 52
DRG: 871 | End: 2017-09-29
Attending: INTERNAL MEDICINE
Payer: MEDICARE

## 2017-09-29 LAB
ANION GAP SERPL CALC-SCNC: 7 MMOL/L (ref 3–18)
BASOPHILS # BLD: 0.1 K/UL (ref 0–0.06)
BASOPHILS NFR BLD: 0 % (ref 0–2)
BUN SERPL-MCNC: 21 MG/DL (ref 7–18)
BUN/CREAT SERPL: 18 (ref 12–20)
CA-I SERPL-SCNC: 1.3 MMOL/L (ref 1.12–1.32)
CALCIUM SERPL-MCNC: 8.1 MG/DL (ref 8.5–10.1)
CHLORIDE SERPL-SCNC: 116 MMOL/L (ref 100–108)
CO2 SERPL-SCNC: 23 MMOL/L (ref 21–32)
CREAT SERPL-MCNC: 1.2 MG/DL (ref 0.6–1.3)
DIFFERENTIAL METHOD BLD: ABNORMAL
EOSINOPHIL # BLD: 0.7 K/UL (ref 0–0.4)
EOSINOPHIL NFR BLD: 6 % (ref 0–5)
ERYTHROCYTE [DISTWIDTH] IN BLOOD BY AUTOMATED COUNT: 16.3 % (ref 11.6–14.5)
GLUCOSE BLD STRIP.AUTO-MCNC: 120 MG/DL (ref 70–110)
GLUCOSE BLD STRIP.AUTO-MCNC: 129 MG/DL (ref 70–110)
GLUCOSE BLD STRIP.AUTO-MCNC: 137 MG/DL (ref 70–110)
GLUCOSE SERPL-MCNC: 135 MG/DL (ref 74–99)
HCT VFR BLD AUTO: 26.5 % (ref 35–45)
HEPARIN AGGREGATION,XHEAGT: NEGATIVE
HGB BLD-MCNC: 8.9 G/DL (ref 12–16)
INR PPP: 1.3 (ref 0.8–1.2)
LYMPHOCYTES # BLD: 1.7 K/UL (ref 0.9–3.6)
LYMPHOCYTES NFR BLD: 13 % (ref 21–52)
MAGNESIUM SERPL-MCNC: 1.8 MG/DL (ref 1.6–2.6)
MAGNESIUM SERPL-MCNC: 1.9 MG/DL (ref 1.6–2.6)
MCH RBC QN AUTO: 30.3 PG (ref 24–34)
MCHC RBC AUTO-ENTMCNC: 33.6 G/DL (ref 31–37)
MCV RBC AUTO: 90.1 FL (ref 74–97)
MONOCYTES # BLD: 0.9 K/UL (ref 0.05–1.2)
MONOCYTES NFR BLD: 7 % (ref 3–10)
NEUTS SEG # BLD: 9.6 K/UL (ref 1.8–8)
NEUTS SEG NFR BLD: 74 % (ref 40–73)
PHOSPHATE SERPL-MCNC: 3.7 MG/DL (ref 2.5–4.9)
PLATELET # BLD AUTO: 63 K/UL (ref 135–420)
PLATELET FACTOR 4,XPF4T: NEGATIVE
POTASSIUM SERPL-SCNC: 3.6 MMOL/L (ref 3.5–5.5)
POTASSIUM SERPL-SCNC: 4.3 MMOL/L (ref 3.5–5.5)
PROTHROMBIN TIME: 15.3 SEC (ref 11.5–15.2)
RBC # BLD AUTO: 2.94 M/UL (ref 4.2–5.3)
SODIUM SERPL-SCNC: 146 MMOL/L (ref 136–145)
VANCOMYCIN SERPL-MCNC: 20.2 UG/ML (ref 5–40)
WBC # BLD AUTO: 12.9 K/UL (ref 4.6–13.2)

## 2017-09-29 PROCEDURE — 74011250636 HC RX REV CODE- 250/636: Performed by: PHYSICIAN ASSISTANT

## 2017-09-29 PROCEDURE — 85610 PROTHROMBIN TIME: CPT | Performed by: PHYSICIAN ASSISTANT

## 2017-09-29 PROCEDURE — 74011250636 HC RX REV CODE- 250/636: Performed by: FAMILY MEDICINE

## 2017-09-29 PROCEDURE — 74011250636 HC RX REV CODE- 250/636: Performed by: INTERNAL MEDICINE

## 2017-09-29 PROCEDURE — 74011000250 HC RX REV CODE- 250: Performed by: INTERNAL MEDICINE

## 2017-09-29 PROCEDURE — 74011000258 HC RX REV CODE- 258: Performed by: HOSPITALIST

## 2017-09-29 PROCEDURE — 94669 MECHANICAL CHEST WALL OSCILL: CPT

## 2017-09-29 PROCEDURE — 74011000258 HC RX REV CODE- 258: Performed by: INTERNAL MEDICINE

## 2017-09-29 PROCEDURE — 82962 GLUCOSE BLOOD TEST: CPT

## 2017-09-29 PROCEDURE — 80200 ASSAY OF TOBRAMYCIN: CPT | Performed by: INTERNAL MEDICINE

## 2017-09-29 PROCEDURE — 83735 ASSAY OF MAGNESIUM: CPT | Performed by: INTERNAL MEDICINE

## 2017-09-29 PROCEDURE — 84100 ASSAY OF PHOSPHORUS: CPT | Performed by: INTERNAL MEDICINE

## 2017-09-29 PROCEDURE — 65610000006 HC RM INTENSIVE CARE

## 2017-09-29 PROCEDURE — 74011250637 HC RX REV CODE- 250/637: Performed by: INTERNAL MEDICINE

## 2017-09-29 PROCEDURE — 85025 COMPLETE CBC W/AUTO DIFF WBC: CPT | Performed by: PHYSICIAN ASSISTANT

## 2017-09-29 PROCEDURE — 80202 ASSAY OF VANCOMYCIN: CPT | Performed by: HOSPITALIST

## 2017-09-29 PROCEDURE — 71010 XR CHEST PORT: CPT

## 2017-09-29 PROCEDURE — 36591 DRAW BLOOD OFF VENOUS DEVICE: CPT

## 2017-09-29 PROCEDURE — 94640 AIRWAY INHALATION TREATMENT: CPT

## 2017-09-29 PROCEDURE — 84132 ASSAY OF SERUM POTASSIUM: CPT | Performed by: PHYSICIAN ASSISTANT

## 2017-09-29 PROCEDURE — 82330 ASSAY OF CALCIUM: CPT | Performed by: INTERNAL MEDICINE

## 2017-09-29 PROCEDURE — 97162 PT EVAL MOD COMPLEX 30 MIN: CPT

## 2017-09-29 PROCEDURE — 74011250636 HC RX REV CODE- 250/636: Performed by: HOSPITALIST

## 2017-09-29 PROCEDURE — 77010033711 HC HIGH FLOW OXYGEN

## 2017-09-29 PROCEDURE — 97530 THERAPEUTIC ACTIVITIES: CPT

## 2017-09-29 RX ORDER — HALOPERIDOL 5 MG/ML
4 INJECTION INTRAMUSCULAR
Status: DISCONTINUED | OUTPATIENT
Start: 2017-09-29 | End: 2017-10-09 | Stop reason: HOSPADM

## 2017-09-29 RX ORDER — MAGNESIUM SULFATE 1 G/100ML
1 INJECTION INTRAVENOUS ONCE
Status: COMPLETED | OUTPATIENT
Start: 2017-09-29 | End: 2017-09-29

## 2017-09-29 RX ORDER — METOPROLOL TARTRATE 25 MG/1
25 TABLET, FILM COATED ORAL EVERY 12 HOURS
Status: DISCONTINUED | OUTPATIENT
Start: 2017-09-29 | End: 2017-10-01

## 2017-09-29 RX ORDER — POTASSIUM CHLORIDE 7.45 MG/ML
10 INJECTION INTRAVENOUS
Status: COMPLETED | OUTPATIENT
Start: 2017-09-29 | End: 2017-09-29

## 2017-09-29 RX ORDER — METOPROLOL TARTRATE 25 MG/1
12.5 TABLET, FILM COATED ORAL EVERY 12 HOURS
Status: DISCONTINUED | OUTPATIENT
Start: 2017-09-29 | End: 2017-09-29

## 2017-09-29 RX ORDER — HYDROMORPHONE HCL IN 0.9% NACL 50 MG/50ML
0.5 PLASTIC BAG, INJECTION (ML) INJECTION ONCE
Status: COMPLETED | OUTPATIENT
Start: 2017-09-29 | End: 2017-09-29

## 2017-09-29 RX ADMIN — METOPROLOL TARTRATE 5 MG: 5 INJECTION INTRAVENOUS at 06:54

## 2017-09-29 RX ADMIN — HALOPERIDOL LACTATE 4 MG: 5 INJECTION, SOLUTION INTRAMUSCULAR at 21:14

## 2017-09-29 RX ADMIN — IPRATROPIUM BROMIDE AND ALBUTEROL SULFATE 3 ML: .5; 3 SOLUTION RESPIRATORY (INHALATION) at 14:12

## 2017-09-29 RX ADMIN — FAMOTIDINE 20 MG: 20 TABLET ORAL at 10:26

## 2017-09-29 RX ADMIN — FONDAPARINUX SODIUM 2.5 MG: 2.5 INJECTION, SOLUTION SUBCUTANEOUS at 15:25

## 2017-09-29 RX ADMIN — THIAMINE HYDROCHLORIDE 100 MG: 100 INJECTION, SOLUTION INTRAMUSCULAR; INTRAVENOUS at 07:54

## 2017-09-29 RX ADMIN — OXYCODONE HYDROCHLORIDE AND ACETAMINOPHEN 1 TABLET: 5; 325 TABLET ORAL at 16:18

## 2017-09-29 RX ADMIN — TOBRAMYCIN SULFATE 300 MG: 40 INJECTION, SOLUTION INTRAMUSCULAR; INTRAVENOUS at 11:01

## 2017-09-29 RX ADMIN — POTASSIUM CHLORIDE 10 MEQ: 7.46 INJECTION, SOLUTION INTRAVENOUS at 09:10

## 2017-09-29 RX ADMIN — POTASSIUM CHLORIDE 10 MEQ: 7.46 INJECTION, SOLUTION INTRAVENOUS at 07:54

## 2017-09-29 RX ADMIN — Medication 0.5 MG: at 22:31

## 2017-09-29 RX ADMIN — METOPROLOL TARTRATE 5 MG: 5 INJECTION INTRAVENOUS at 03:39

## 2017-09-29 RX ADMIN — IPRATROPIUM BROMIDE AND ALBUTEROL SULFATE 3 ML: .5; 3 SOLUTION RESPIRATORY (INHALATION) at 20:25

## 2017-09-29 RX ADMIN — IPRATROPIUM BROMIDE AND ALBUTEROL SULFATE 3 ML: .5; 3 SOLUTION RESPIRATORY (INHALATION) at 09:45

## 2017-09-29 RX ADMIN — Medication 10 ML: at 06:38

## 2017-09-29 RX ADMIN — Medication 10 ML: at 22:00

## 2017-09-29 RX ADMIN — HALOPERIDOL LACTATE 4 MG: 5 INJECTION, SOLUTION INTRAMUSCULAR at 13:50

## 2017-09-29 RX ADMIN — HYDRALAZINE HYDROCHLORIDE 10 MG: 20 INJECTION INTRAMUSCULAR; INTRAVENOUS at 09:10

## 2017-09-29 RX ADMIN — HYDRALAZINE HYDROCHLORIDE 10 MG: 20 INJECTION INTRAMUSCULAR; INTRAVENOUS at 02:19

## 2017-09-29 RX ADMIN — MAGNESIUM SULFATE HEPTAHYDRATE 1 G: 1 INJECTION, SOLUTION INTRAVENOUS at 06:54

## 2017-09-29 RX ADMIN — OXYCODONE HYDROCHLORIDE AND ACETAMINOPHEN 1 TABLET: 5; 325 TABLET ORAL at 02:55

## 2017-09-29 RX ADMIN — METOPROLOL TARTRATE 12.5 MG: 25 TABLET ORAL at 10:26

## 2017-09-29 RX ADMIN — HYDROMORPHONE HYDROCHLORIDE 0.5 MG: 2 INJECTION INTRAMUSCULAR; INTRAVENOUS; SUBCUTANEOUS at 03:39

## 2017-09-29 RX ADMIN — METOPROLOL TARTRATE 5 MG: 5 INJECTION INTRAVENOUS at 19:05

## 2017-09-29 RX ADMIN — OXYCODONE HYDROCHLORIDE AND ACETAMINOPHEN 1 TABLET: 5; 325 TABLET ORAL at 11:16

## 2017-09-29 RX ADMIN — HYDRALAZINE HYDROCHLORIDE 10 MG: 20 INJECTION INTRAMUSCULAR; INTRAVENOUS at 16:18

## 2017-09-29 RX ADMIN — PIPERACILLIN SODIUM,TAZOBACTAM SODIUM 4.5 G: 4; .5 INJECTION, POWDER, FOR SOLUTION INTRAVENOUS at 02:19

## 2017-09-29 RX ADMIN — LORAZEPAM 2 MG: 2 INJECTION INTRAMUSCULAR; INTRAVENOUS at 13:11

## 2017-09-29 RX ADMIN — LORAZEPAM 2 MG: 2 INJECTION INTRAMUSCULAR; INTRAVENOUS at 19:43

## 2017-09-29 RX ADMIN — METOPROLOL TARTRATE 25 MG: 25 TABLET ORAL at 21:14

## 2017-09-29 RX ADMIN — SODIUM CHLORIDE 1000 MG: 900 INJECTION, SOLUTION INTRAVENOUS at 21:16

## 2017-09-29 RX ADMIN — Medication 10 ML: at 15:24

## 2017-09-29 RX ADMIN — IPRATROPIUM BROMIDE AND ALBUTEROL SULFATE 3 ML: .5; 3 SOLUTION RESPIRATORY (INHALATION) at 03:18

## 2017-09-29 RX ADMIN — METOPROLOL TARTRATE 5 MG: 5 INJECTION INTRAVENOUS at 16:01

## 2017-09-29 NOTE — PROGRESS NOTES
OT order received and chart reviewed. Pt no longer has bedrest orders. 1st attempt at 1402: RT present. 2nd attempt, PT working with pt. Will allow pt to rest prior to mobilizing. Will follow up. Thank you.     Tristan Valadez MS OTR/L  Office Ext: [phone]  Pager: [phone]

## 2017-09-29 NOTE — PROGRESS NOTES
Problem: Mobility Impaired (Adult and Pediatric)  Goal: *Acute Goals and Plan of Care (Insert Text)  Physical Therapy Goals  Initiated 9/29/2017 and to be accomplished within 7 day(s)  1. Patient will move from supine to sit and sit to supine , scoot up and down and roll side to side in bed with supervision/set-up. 2. Patient will transfer from bed to chair and chair to bed with minimal assistance/contact guard assist using the least restrictive device. 3. Patient will perform sit to stand with moderate assistance . 4. Patient will ambulate with moderate assistance for 50 feet with the least restrictive device. 5. Patient will ascend/descend 3 stairs with handrail(s) with moderate assistance . Outcome: Progressing Towards Goal  PHYSICAL THERAPY EVALUATION     Patient: Adithya Wiseman (72 y.o. female)  Date: 9/29/2017  Primary Diagnosis: Acute respiratory failure with hypercapnia (HCC)        Precautions:   Fall, Aspiration      PROBLEM LIST:  Patient presents with the following problems:   Bed Mobility, Transfers, Gait, Strength, Range of Motion, Balance, Precautions and breathing rate  ASSESSMENT :   Patient requires between maximal assistance and total assistance for bed mobility, transfers . Only able to sit at edge of bed with total assist minimal responses to questions and opening eyes. Limited active movement noted. patietn on HI flow via n/c SAT's stayed 94%. No pain reported education on exercises and plan of care no evidence of learning; patient had been given haldol prior to evaluation. Patient will benefit from skilled intervention to address the above impairments.   Patients rehabilitation potential is considered to be Fair  Factors which may influence rehabilitation potential include:   [ ]         None noted  [ ]         Mental ability/status  [X]         Medical condition  [ ]         Home/family situation and support systems  [ ]         Safety awareness  [ ]         Pain tolerance/management  [ ]         Other:        PLAN :  Recommendations and Planned Interventions:  [X]           Bed Mobility Training             [X]    Neuromuscular Re-Education  [X]           Transfer Training                   [ ]    Orthotic/Prosthetic Training  [X]           Gait Training                          [ ]    Modalities  [X]           Therapeutic Exercises          [ ]    Edema Management/Control  [X]           Therapeutic Activities            [X]    Patient and Family Training/Education  [ ]           Other (comment):     Frequency/Duration: Patient will be followed by physical therapy 3-5 times a week to address goals. Discharge Recommendations: Rehab, [facility] and To Be Determined  Further Equipment Recommendations for Discharge: tbd       SUBJECTIVE:   Patient stated .      OBJECTIVE DATA SUMMARY:       Past Medical History:   Diagnosis Date    Anemia      Cancer ([facility]       throat    Coronary artery disease      Diabetes ([facility]      Diabetes mellitus ([facility]      ETOH abuse      HTN (hypertension)      Hypertension      Hypothyroid      Lupus ([facility]      Osteonecrosis ([facility]       of jaw    PEG adjustment, replacement, or removal      S/P thoracentesis      Throat cancer ([facility]       Past Surgical History:   Procedure Laterality Date    ABDOMEN SURGERY PROC UNLISTED         feeding tube placement    HX  SECTION        HX HEENT         throat cancer biopsy     Barriers to Learning/Limitations: yes;  physical  Compensate with: visual, verbal, tactile, kinesthetic cues/model     G CODES:Mobility  Current  CN= 100%   Goal  CK= 40-59%. The severity rating is based on the Other [employer] Sitting Balance Scale0/5   [employer] Sitting Balance Scale0/5  0: Pt performs 25% or less of sitting activity (Max assist) CN, 100% impaired. 1: Pt supports self with upper extremities but requires therapist assistance.  Pt performs 25-50% of effort (Mod assist) CM, 80% to <100% impaired. 1+: Pt supports self with upper extremities but requires therapist assistance. Pt performs >50% effort. (Min assist). CL, 60% to <80% impaired. 2: Pt supports self independently with both upper extremities. CL, 60% to <80% impaired. 2+: Pt support self independently with 1 upper extremity. CK, 40% to <60% impaired. 3: Pt sits without upper extremity support for up to 30 seconds. CK, 40% to <60% impaired. 3+: Pt sits without upper extremity support for 30 seconds or greater. CJ, 20% to <40% impaired. 4: Pt moves and returns trunkal midpoint 1-2 inches in one plane. CJ, 20% to <40% impaired. 4+: Pt moves and returns trunkal midpoint 1-2 inches in multiple planes. CI, 1% to <20% impaired. 5: Pt moves and returns trunkal midpoint in all planes greater than 2 inches. CH, 0% impaired. Eval Complexity: History: HIGH Complexity :3+ comorbidities / personal factors will impact the outcome/ POC Exam:MEDIUM Complexity : 3 Standardized tests and measures addressing body structure, function, activity limitation and / or participation in recreation  Presentation: MEDIUM Complexity : Evolving with changing characteristics  Clinical Decision Making:Medium Complexity [facility] Sitting Balance Scale0/5 Overall Complexity:MEDIUM     Prior Level of Function/Home Situation: unknown  Home Situation  Home Environment: Other (comment) (unknown)  One/Two Story Residence: Other (Comment) (unknown)  Living Alone: No  Support Systems: Friends \ neighbors  Patient Expects to be Discharged to[de-identified] Unknown  Current DME Used/Available at Home: None  Critical Behavior:  Neurologic State: Drowsy; Lethargic;Eyes open to pain  Orientation Level: Oriented to person;Disoriented to place; Disoriented to situation;Disoriented to time  Cognition: Decreased attention/concentration;Decreased command following  Safety/Judgement: Fall prevention  Psychosocial  Patient Behaviors: Lethargic; Not interactive  Needs Expressed: Cultural;Nutritional;Non-supportive  Purposeful Interaction: Yes  Pt Identified Daily Priority: Clinical issues (comment)  Caritas Process: Nurture loving kindness;Nurture spiritual self;Create healing environment  Caring Interventions: Reassure; Therapeutic modalities  Reassure: Therapeutic listening  Therapeutic Modalities: Intentional therapeutic touch  Skin Condition/Temp: Flaky  Skin Integrity: Excoriation  Skin Integumentary  Skin Color: Appropriate for ethnicity  Skin Condition/Temp: Flaky  Skin Integrity: Excoriation  Turgor: Non-tenting  Hair Growth: Sparce  Varicosities: Absent  Strength:    Strength: Grossly decreased, non-functional (2-/5 both both legs and trunk)  Tone & Sensation:   Tone: Normal  Range Of Motion:  AROM: Grossly decreased, non-functional (minimal movement of  extremities noted )  PROM: Within functional limits  Functional Mobility:  Bed Mobility:  Rolling: Maximum assistance; Total assistance;Assist x2; Additional time  Supine to Sit: Maximum assistance; Total assistance;Assist x2; Additional time  Sit to Supine: Maximum assistance; Total assistance; Additional time;Assist x2  Scooting: Maximum assistance; Total assistance;Assist x2; Additional time  Transfers:  Sit to Stand:  (unable to test)  Balance:   Sitting: Impaired  Sitting - Static: Poor (constant support)  Sitting - Dynamic: None  Standing:  (unable to test )  Ambulation/Gait Training:  Gait Description (WDL):  (unable to test )  Therapeutic Exercises:   PROM/AAROM  Pain:  Pre treatment pain level:0  Post treatment pain level:0  Pain Scale 1: Adult Nonverbal Pain Scale  Pain Intensity 1: 2  Pain Location 1: Back  Pain Description 1: Aching  Pain Intervention(s) 1: Medication (see MAR); Repositioned  Activity Tolerance:   Fair minus   Please refer to the flowsheet for vital signs taken during this treatment.   After treatment:   [ ]         Patient left in no apparent distress sitting up in chair  [X]         Patient left in no apparent distress in bed  [X]         Call bell left within reach  [X]         Nursing notified  [ ]         Caregiver present  [ ]         Bed alarm activated      COMMUNICATION/EDUCATION:   [X]         Fall prevention education was provided and the patient/caregiver indicated understanding. [ ]         Patient/family have participated as able in goal setting and plan of care. [ ]         Patient/family agree to work toward stated goals and plan of care. [ ]         Patient understands intent and goals of therapy, but is neutral about his/her participation. [X]         Patient is unable to participate in goal setting and plan of care. Patient educated on the role of physical therapy during the acute stay  and the importance of mobility. No evidence of learning no family present to educate.  .       Thank you for this referral.  Janet Price, PT   Time Calculation: 20 mins

## 2017-09-29 NOTE — PROGRESS NOTES
Problem: Falls - Risk of  Goal: *Absence of Falls  Document Kapil Fall Risk and appropriate interventions in the flowsheet.    Outcome: Progressing Towards Goal  Fall Risk Interventions:        Mentation Interventions: Bed/chair exit alarm     Medication Interventions: Evaluate medications/consider consulting pharmacy     Elimination Interventions: Call light in reach

## 2017-09-29 NOTE — PROGRESS NOTES
Pharmacy Dosing Services: Tobramycin    Indication: pneumonia    Day of therapy: 1    Other Antimicrobials (Include dose, start day & day of therapy):  Vancomycin day 5  S/p levaquin, zosyn       Significant Cultures: MDR pseudomonas    Renal function stable? (unstable defined as SCr increase of 0.5 mg/dL or > 50% increase from baseline, whichever is greater) (Y/N): y (improved, + urine output)     CAPD, Hemodialysis or Renal Replacement Therapy (Y/N): n     Recent Labs      17   0355  17   0345  17   0347   CREA  1.20  1.13  1.44*   BUN  21*  22*  28*   WBC  12.9  15.5*  21.1*     Temp (24hrs), Av.3 °F (36.8 °C), Min:97.5 °F (36.4 °C), Max:99 °F (37.2 °C)    Creatinine Clearance (Creatinine Clearance (ml/min)): 55     Regimen assessment: 300mg once, then dose per nomogram    Next scheduled level: 10 hour level to determine interval dosing       Pharmacy will follow daily and adjust medications as appropriate for renal function and/or serum levels. Thank you for this consult,  Yeni Aaron D. BCPS  [phone].

## 2017-09-29 NOTE — PROGRESS NOTES
Physical Exam   Skin:        - Dry, Flaking skin all over body. 2954- assumed care of patient. SBP over 170, given PRN. Tachycardic-110s with RR 20-30. Pt drowsy, oriented to self. See doc flow sheets and MAR for completed assessment and medication administration   0910- hydralazine given PRN for SBP >160  1315- Dr Lorena Martin in to see patient, updated on patient status and agitation/restlessness and anxiety. Haldol ordered for agitation. 1350-Haldol given for pt agitation  1618-hydralazine given for sbp >160. Percocet given for pain .   1900- report to be given to [facility]

## 2017-09-29 NOTE — PROGRESS NOTES
[facility]. Pulmonary, Critical Care, and Sleep Medicine    Name: Donald Hudson MRN: [id number]   : 1965 Hospital: [facility]   Date: 2017  Admission Date: 2017     Chart and notes reviewed. Data reviewed. I have evaluated all findings. [x]I have reviewed the flowsheet and previous days notes. [x]The patient is critically ill on      []Mechanical ventilation []Pressors   []BiPAP High flow                 : Tolerates PS but too sedated to consider extubation, requiring levophed, BNP very elevated  : Extubated yesterday, required bipap post extubation, fevers with tmax 102.3, Hb decrease, slow worsening of renal function, agitation overnight requiring precedex  : Intermittently switching between bipap and high flow, tachycardic/tachypnic during periods of pain, improves with precedex and dilaudid  : Tolerating high flow, still requiring precedex, attempting to wean, tachycardia/tachypnea currently improved  : Tolerating high flow, off precedex, dilaudid leads to sedation, pain meds adjusted, awaiting HIT panel    ROS:Review of systems not obtained due to patient factors. Events and notes from last 24 hours reviewed. Care plan discussed on multidisciplinary rounds.   Patient Active Problem List   Diagnosis Code    Acute respiratory failure with hypercapnia (HCC) J96.02    Sepsis ([facility] A41.9    HCAP (healthcare-associated pneumonia) J18.9    Hypertension I10    Diabetes mellitus ([facility] E11.9       Vital Signs:  Visit Vitals    BP (!) 163/98    Pulse (!) 119    Temp 99 °F (37.2 °C)    Resp 22    Ht 5' 4\" (1.626 m)    Wt 73.5 kg (162 lb 0.6 oz)    SpO2 99%    BMI 27.81 kg/m2       O2 Device: Hi flow nasal cannula   O2 Flow Rate (L/min): 45 l/min   Temp (24hrs), Av.1 °F (36.7 °C), Min:96.5 °F (35.8 °C), Max:99 °F (37.2 °C)       Intake/Output:   Last shift:         Last 3 shifts:   0700  In: 3731.3 [I.V.:1125.3]  Out: 5507 [Urine:1890; Drains:550]    Intake/Output Summary (Last 24 hours) at 09/29/17 0747  Last data filed at 09/29/17 1504   Gross per 24 hour   Intake          2594.92 ml   Output             2090 ml   Net           504.92 ml      Ventilator Settings:  Ventilator  Mode: Pressure support, Spontaneous  Respiratory Rate  Resp Rate Observed: 23  Back-Up Rate: 16  Insp Time (sec): 1.1 sec  I:E Ratio: 1:1.5  Ventilator Volumes  Vt Set (ml): 420 ml  Vt Exhaled (Machine Breath) (ml): 412 ml  Vt Spont (ml): 404 ml  Ve Observed (l/min): 12.1 l/min  Ventilator Pressures  PIP Observed (cm H2O): 13 cm H2O  Plateau Pressure (cm H2O): 22 cm H2O  MAP (cm H2O): 8.2  PEEP/VENT (cm H2O): 5 cm H20  Auto PEEP Observed (cm H2O):  (unable to obtain)    Current Facility-Administered Medications   Medication Dose Route Frequency    potassium chloride 10 mEq in 100 ml IVPB  10 mEq IntraVENous Q1H    magnesium sulfate 1 g/100 ml IVPB (premix or compounded)  1 g IntraVENous ONCE    albuterol-ipratropium (DUO-NEB) 2.5 MG-0.5 MG/3 ML  3 mL Nebulization Q6H RT    VANCOMYCIN INFORMATION NOTE   Other ONCE    levoFLOXacin (LEVAQUIN) 750 mg in D5W IVPB  750 mg IntraVENous Q24H    piperacillin-tazobactam (ZOSYN) 4.5 g in 0.9% sodium chloride (MBP/ADV) 100 mL MBP Extended 4 Hour Infusion###  4.5 g IntraVENous Q8H    famotidine (PEPCID) tablet 20 mg  20 mg Oral DAILY    fondaparinux (ARIXTRA) injection 2.5 mg  2.5 mg SubCUTAneous Q48H    levothyroxine (SYNTHROID) tablet 100 mcg  100 mcg Oral Daily    VANCOMYCIN, Dosing per levels    Other Rx Dosing/Monitoring    insulin lispro (HUMALOG) injection   SubCUTAneous Q6H    dexmedeTOMidine (PRECEDEX) 400 mcg in 0.9% sodium chloride 100 mL infusion  0.2-0.7 mcg/kg/hr IntraVENous TITRATE    NOREPINephrine (LEVOPHED) 8,000 mcg in dextrose 5% 250 mL infusion  0-30 mcg/min IntraVENous TITRATE    sodium chloride (NS) flush 5-10 mL  5-10 mL IntraVENous Q8H    thiamine (B-1) 100 mg in 0.9% sodium chloride 50 mL IVPB  100 mg IntraVENous BID       Telemetry:     Physical Exam:   General: Drowsy but awakes and follows commands on all extremities   HEENT: high flow in place   Neck: Supple   Chest: Normal   Lungs: Coarse bilaterally, no wheezes, scattered rhonci   Heart: Regular rate and rhythm   Abdomen: non distended, bowel sounds normoactive   Extremity: Moves all spontaneously and to command   Neuro: Drowsy but awakes and follows commands on all extremities   Skin: Intact   DATA:  MAR reviewed and pertinent medications noted or modified as needed    Labs:  Recent Labs      09/29/17   0355  09/28/17 0345 09/27/17   1515  09/27/17 0347   WBC  12.9  15.5*   --   21.1*   HGB  8.9*  8.2*  8.4*  7.0*   HCT  26.5*  24.6*  25.1*  21.1*   PLT  63*  65*   --   109*     Recent Labs      09/29/17   0355  09/28/17   1454  09/28/17   0345  09/27/17   1900  09/27/17 0347   NA  146*   --   148*   --   149*   K  3.6   --   4.0  4.1  3.9   CL  116*   --   118*   --   114*   CO2  23   --   22   --   24   GLU  135*   --   83   --   69*   BUN  21*   --   22*   --   28*   CREA  1.20   --   1.13   --   1.44*   CA  8.1*   --   7.9*   --   7.0*   MG  1.8  2.1  1.8  2.0  1.6   PHOS  3.7   --    --    --   3.3   INR  1.3*   --   1.4*   --   1.5*     No results for input(s): PH, PCO2, PO2, HCO3, FIO2 in the last 72 hours. No results for input(s): FIO2I, IFO2, HCO3I, IHCO3, HCOPOC, PCO2I, PCOPOC, IPHI, PHI, PHPOC, PO2I, PO2POC in the last 72 hours. No lab exists for component: IPOC2  Imaging:  [x]                           I have personally reviewed the patients radiographs and reports    High complexity decision making was performed during this consultation and evaluation. [x]       Pt is at high risk for further organ failure and dysfunction. Critical care time spent  minutes with patient exclusive of procedures.     IMPRESSION:    Anemia      Cancer (HCC)       throat    Coronary artery disease      Diabetes ([facility]      Diabetes mellitus ([facility]      ETOH abuse      HTN (hypertension)      Hypertension      Hypothyroid      L[facility] ([facility]      Osteonecrosis (HCC)       of jaw    PEG adjustment, replacement, or removal      S/P thoracentesis      Throat cancer ([facility] [facility]      ·       PLAN:   Neuro/Pain  Now off precedex, will avoid restart if able  Dilaudid causes sedation, preferentially use percocet     Cardiovascular  Tachycardic and distressed when in pain, resolves with pain control  Off levophed  BNP >35,000, not much response to lasix, hesitate to heavily diurese as renal functions worsens with each dose of lasix   Lactic acid normal  2D echo EF 55 - 60%, moderate tricuspid regurge  Will start metoprolol for BP control    Pulmonary  MRSA and Pseudomonas PNA  Extubated 9/25, has required high flow continuously, occasionally bipap  During previous intubation required ETT 6.5 due to upper airway obstruction from cancer, difficult airway and if requires reintubation will likely need anesthesia assistance  Continue scheduled duonebs  procal 30 --> 7.5  CXR today    GI  Continue tube feeds, always requires tube feeds to PEG for upper airway obstruction   Famotidine ppx    Renal  KEATON, overall improved but slight worsening with diuresis  renal ultrasound with medical renal disease  Free water for mild hypernatremia    ID  Significant leukocytosis, 32 --> 27--> 21 --> 15 --> 12.9  Fevers resolved  Continue vanc, zosyn, levaquin  Bcx 9/24 negative to date  Sputum cx 9/24 MRSA and pseudomonas  Pseudomonas MDR and resistant to zosyn and partially to levaquin, adjust abx to tobramycin   Ucx 9/24 negative to date  Flu A/B negative  Cdiff negative    Heme Onc  HO throat cancer with upper airway obstruction  Hb decrease, s/p 1u pRBCs 9/26, 9/27 transfuse PRN for 7 or below  Pt has antibodies in blood, takes many hours to get blood products  Platelets with trend down, will continue arixtra, HIT panel pending (sent 9/27)    Endo  HO DM but A1c 5.3  Continue humalog  TSH normal, free T4 slightly low  Continue synthroid       Critical care time 35 minutes    Manjula Rodriguez MD

## 2017-09-29 NOTE — PROGRESS NOTES
Pt moaning, asking for her momma. She is also asking for her \"\". She is not  now but Mom says pt is friends with ex  and he sometimes checks up on her. Mom reaffirmed that she will take care of pt when discharged, But her Nurse is concerned that pt may be total care pt and may require more care than her mom can give her. Case Management to follow. 1446: I had called [facility] [phone] and per call Reference # [id number], they states pt had 100% in network Home health benefits then they said she has used 7 days of her 100 days skilled nursing facility days. I said those 100 days  must mean hospital days allowed, but she said no, skilled nursing facility days. I think she is mistaken.

## 2017-09-29 NOTE — PROGRESS NOTES
Chart reviewed. Pt was recently at [address] [place] [address] for pneumonia. Then she went a couple of weeks to rehab. Mrs Mosher Grounds staying at her daughter's  house to care for her initially. I don't know if pt has more snf days. Plan is for pt to go home, possibly with DME such as o2, nebs and or Bipap. Case Management to follow.

## 2017-09-29 NOTE — PROGRESS NOTES
[address] - Received report from \[facility]\"". Pt is alert and receiving a breathing treatment. Pt is tachycardic in 130s and tachypneic. Complains of pain in her lower back and bottom, and complaining of full feeling in stomach and nauseated. . Adjusted pt and given percocet. Pt's osmolite 1 narda infusing at 45 along with free water infusing at 45ml/hr continuous. Stopped feeding and free water. Residual over 300 from PEG. Zofran given. Pt also hypertensive and given metoprolol at this time. Pt states she is allergic to hydralazine. When asked what her reaction is her response is unclear. 2030 - restarted osmolite at 55 ml / hr. Pt states she feels better. Complains of pain still. Ativan given. 2100 - Pressure still elevated at 187/109. No history of allergy to hydralazine in chart. Given and stayed with pt to ensure no reaction. Pt tolerated well. 2130 - Pt hollering out for pain medication. Dilaudid 0.5 given. 2340 - metoprolol given for elevated BP and ativan. Pt resting quietly and stable. Will continue to monitor. 9/29/17  0100 - Pt's /110. Cannot give medication at this time. Will continue to monitor. Trying to keep pt comfortable. 0340 - Pt's residual > 400. Stopped tube feeding. Pt cleaned and bathed. Linens changed. Pt complaining of pain even after percocet. See MAR. Turning pt and laying her flat increased her heartrate from 130s to 150. FMS intact. Will continue to monitor.

## 2017-09-30 LAB
ABO + RH BLD: NORMAL
ANION GAP SERPL CALC-SCNC: 9 MMOL/L (ref 3–18)
ANTIGENS PRESENT RBC DONR: NORMAL
BACTERIA SPEC CULT: NORMAL
BASOPHILS # BLD: 0 K/UL (ref 0–0.1)
BASOPHILS NFR BLD: 0 % (ref 0–3)
BLD PROD TYP BPU: NORMAL
BLOOD GROUP ANTIBODIES SERPL: NORMAL
BNP SERPL-MCNC: ABNORMAL PG/ML (ref 0–450)
BPU ID: NORMAL
BUN SERPL-MCNC: 19 MG/DL (ref 7–18)
BUN/CREAT SERPL: 18 (ref 12–20)
CALCIUM SERPL-MCNC: 8.6 MG/DL (ref 8.5–10.1)
CALLED TO:,BCALL1: NORMAL
CALLED TO:,BCALL2: NORMAL
CALLED TO:,BCALL3: NORMAL
CHLORIDE SERPL-SCNC: 114 MMOL/L (ref 100–108)
CO2 SERPL-SCNC: 22 MMOL/L (ref 21–32)
CREAT SERPL-MCNC: 1.03 MG/DL (ref 0.6–1.3)
CROSSMATCH RESULT,%XM: NORMAL
DIFFERENTIAL METHOD BLD: ABNORMAL
EOSINOPHIL # BLD: 0 K/UL (ref 0–0.4)
EOSINOPHIL NFR BLD: 0 % (ref 0–5)
ERYTHROCYTE [DISTWIDTH] IN BLOOD BY AUTOMATED COUNT: 16.4 % (ref 11.6–14.5)
GLUCOSE BLD STRIP.AUTO-MCNC: 109 MG/DL (ref 70–110)
GLUCOSE BLD STRIP.AUTO-MCNC: 126 MG/DL (ref 70–110)
GLUCOSE BLD STRIP.AUTO-MCNC: 133 MG/DL (ref 70–110)
GLUCOSE BLD STRIP.AUTO-MCNC: 94 MG/DL (ref 70–110)
GLUCOSE SERPL-MCNC: 107 MG/DL (ref 74–99)
HCT VFR BLD AUTO: 24.4 % (ref 35–45)
HCT VFR BLD AUTO: 24.9 % (ref 35–45)
HGB BLD-MCNC: 8.1 G/DL (ref 12–16)
HGB BLD-MCNC: 8.4 G/DL (ref 12–16)
INR PPP: 1.3 (ref 0.8–1.2)
LYMPHOCYTES # BLD: 1.9 K/UL (ref 0.8–3.5)
LYMPHOCYTES NFR BLD: 23 % (ref 20–51)
MAGNESIUM SERPL-MCNC: 1.6 MG/DL (ref 1.6–2.6)
MCH RBC QN AUTO: 30 PG (ref 24–34)
MCHC RBC AUTO-ENTMCNC: 33.2 G/DL (ref 31–37)
MCV RBC AUTO: 90.4 FL (ref 74–97)
METAMYELOCYTES NFR BLD MANUAL: 1 %
MONOCYTES # BLD: 0.4 K/UL (ref 0–1)
MONOCYTES NFR BLD: 5 % (ref 2–9)
MYELOCYTES NFR BLD MANUAL: 1 %
NEUTS BAND NFR BLD MANUAL: 2 % (ref 0–5)
NEUTS SEG # BLD: 5.5 K/UL (ref 1.8–8)
NEUTS SEG NFR BLD: 68 % (ref 42–75)
PLATELET # BLD AUTO: 63 K/UL (ref 135–420)
PLATELET COMMENTS,PCOM: ABNORMAL
POTASSIUM SERPL-SCNC: 3.8 MMOL/L (ref 3.5–5.5)
PROTHROMBIN TIME: 15.6 SEC (ref 11.5–15.2)
RBC # BLD AUTO: 2.7 M/UL (ref 4.2–5.3)
RBC MORPH BLD: ABNORMAL
SERVICE CMNT-IMP: NORMAL
SODIUM SERPL-SCNC: 145 MMOL/L (ref 136–145)
SPECIMEN EXP DATE BLD: NORMAL
STATUS OF UNIT,%ST: NORMAL
TOBRAMYCIN SERPL-MCNC: 5.1 UG/ML
UNIT DIVISION, %UDIV: 0
WBC # BLD AUTO: 8.1 K/UL (ref 4.6–13.2)

## 2017-09-30 PROCEDURE — 65610000006 HC RM INTENSIVE CARE

## 2017-09-30 PROCEDURE — 74011250637 HC RX REV CODE- 250/637: Performed by: PHYSICIAN ASSISTANT

## 2017-09-30 PROCEDURE — 74011250636 HC RX REV CODE- 250/636: Performed by: HOSPITALIST

## 2017-09-30 PROCEDURE — 36592 COLLECT BLOOD FROM PICC: CPT

## 2017-09-30 PROCEDURE — 80048 BASIC METABOLIC PNL TOTAL CA: CPT | Performed by: PHYSICIAN ASSISTANT

## 2017-09-30 PROCEDURE — 74011000258 HC RX REV CODE- 258: Performed by: INTERNAL MEDICINE

## 2017-09-30 PROCEDURE — 94669 MECHANICAL CHEST WALL OSCILL: CPT

## 2017-09-30 PROCEDURE — 74011250636 HC RX REV CODE- 250/636: Performed by: INTERNAL MEDICINE

## 2017-09-30 PROCEDURE — 94640 AIRWAY INHALATION TREATMENT: CPT

## 2017-09-30 PROCEDURE — 36591 DRAW BLOOD OFF VENOUS DEVICE: CPT

## 2017-09-30 PROCEDURE — 83735 ASSAY OF MAGNESIUM: CPT | Performed by: PHYSICIAN ASSISTANT

## 2017-09-30 PROCEDURE — 85025 COMPLETE CBC W/AUTO DIFF WBC: CPT | Performed by: PHYSICIAN ASSISTANT

## 2017-09-30 PROCEDURE — 74011000258 HC RX REV CODE- 258: Performed by: HOSPITALIST

## 2017-09-30 PROCEDURE — 74011000250 HC RX REV CODE- 250: Performed by: INTERNAL MEDICINE

## 2017-09-30 PROCEDURE — 82962 GLUCOSE BLOOD TEST: CPT

## 2017-09-30 PROCEDURE — 74011250637 HC RX REV CODE- 250/637: Performed by: INTERNAL MEDICINE

## 2017-09-30 PROCEDURE — 83880 ASSAY OF NATRIURETIC PEPTIDE: CPT | Performed by: PHYSICIAN ASSISTANT

## 2017-09-30 PROCEDURE — 85610 PROTHROMBIN TIME: CPT | Performed by: PHYSICIAN ASSISTANT

## 2017-09-30 PROCEDURE — 77010033711 HC HIGH FLOW OXYGEN

## 2017-09-30 PROCEDURE — 85018 HEMOGLOBIN: CPT | Performed by: PHYSICIAN ASSISTANT

## 2017-09-30 PROCEDURE — 94660 CPAP INITIATION&MGMT: CPT

## 2017-09-30 RX ORDER — LISINOPRIL 10 MG/1
10 TABLET ORAL DAILY
Status: DISCONTINUED | OUTPATIENT
Start: 2017-09-30 | End: 2017-10-01

## 2017-09-30 RX ORDER — HEPARIN SODIUM 5000 [USP'U]/ML
5000 INJECTION, SOLUTION INTRAVENOUS; SUBCUTANEOUS EVERY 12 HOURS
Status: DISCONTINUED | OUTPATIENT
Start: 2017-09-30 | End: 2017-09-30

## 2017-09-30 RX ORDER — OXYCODONE HYDROCHLORIDE 5 MG/1
5 TABLET ORAL
Status: DISCONTINUED | OUTPATIENT
Start: 2017-09-30 | End: 2017-10-02

## 2017-09-30 RX ORDER — POTASSIUM CHLORIDE 7.45 MG/ML
10 INJECTION INTRAVENOUS ONCE
Status: DISCONTINUED | OUTPATIENT
Start: 2017-10-01 | End: 2017-09-30

## 2017-09-30 RX ORDER — MAGNESIUM SULFATE HEPTAHYDRATE 40 MG/ML
2 INJECTION, SOLUTION INTRAVENOUS ONCE
Status: DISCONTINUED | OUTPATIENT
Start: 2017-10-01 | End: 2017-09-30

## 2017-09-30 RX ORDER — OXYMETAZOLINE HCL 0.05 %
2 SPRAY, NON-AEROSOL (ML) NASAL 2 TIMES DAILY
Status: DISPENSED | OUTPATIENT
Start: 2017-09-30 | End: 2017-10-01

## 2017-09-30 RX ADMIN — METOPROLOL TARTRATE 5 MG: 5 INJECTION INTRAVENOUS at 23:00

## 2017-09-30 RX ADMIN — LISINOPRIL 10 MG: 10 TABLET ORAL at 09:34

## 2017-09-30 RX ADMIN — HYDRALAZINE HYDROCHLORIDE 10 MG: 20 INJECTION INTRAMUSCULAR; INTRAVENOUS at 18:41

## 2017-09-30 RX ADMIN — IPRATROPIUM BROMIDE AND ALBUTEROL SULFATE 3 ML: .5; 3 SOLUTION RESPIRATORY (INHALATION) at 20:36

## 2017-09-30 RX ADMIN — THIAMINE HYDROCHLORIDE 100 MG: 100 INJECTION, SOLUTION INTRAMUSCULAR; INTRAVENOUS at 00:26

## 2017-09-30 RX ADMIN — METOPROLOL TARTRATE 25 MG: 25 TABLET ORAL at 21:36

## 2017-09-30 RX ADMIN — HALOPERIDOL LACTATE 4 MG: 5 INJECTION, SOLUTION INTRAMUSCULAR at 21:36

## 2017-09-30 RX ADMIN — TOBRAMYCIN SULFATE 300 MG: 40 INJECTION, SOLUTION INTRAMUSCULAR; INTRAVENOUS at 22:34

## 2017-09-30 RX ADMIN — THIAMINE HYDROCHLORIDE 100 MG: 100 INJECTION, SOLUTION INTRAMUSCULAR; INTRAVENOUS at 10:56

## 2017-09-30 RX ADMIN — OXYCODONE HYDROCHLORIDE 5 MG: 5 TABLET ORAL at 19:45

## 2017-09-30 RX ADMIN — SODIUM CHLORIDE 0.5 MCG/KG/HR: 900 INJECTION, SOLUTION INTRAVENOUS at 22:31

## 2017-09-30 RX ADMIN — OXYMETAZOLINE HYDROCHLORIDE 2 SPRAY: 5 SPRAY NASAL at 08:00

## 2017-09-30 RX ADMIN — THIAMINE HYDROCHLORIDE 100 MG: 100 INJECTION, SOLUTION INTRAMUSCULAR; INTRAVENOUS at 21:49

## 2017-09-30 RX ADMIN — SODIUM CHLORIDE 0.6 MCG/KG/HR: 900 INJECTION, SOLUTION INTRAVENOUS at 23:07

## 2017-09-30 RX ADMIN — SODIUM CHLORIDE 0.2 MCG/KG/HR: 900 INJECTION, SOLUTION INTRAVENOUS at 02:50

## 2017-09-30 RX ADMIN — FAMOTIDINE 20 MG: 20 TABLET ORAL at 09:34

## 2017-09-30 RX ADMIN — METOPROLOL TARTRATE 25 MG: 25 TABLET ORAL at 09:34

## 2017-09-30 RX ADMIN — HALOPERIDOL LACTATE 4 MG: 5 INJECTION, SOLUTION INTRAMUSCULAR at 12:43

## 2017-09-30 RX ADMIN — HYDRALAZINE HYDROCHLORIDE 10 MG: 20 INJECTION INTRAMUSCULAR; INTRAVENOUS at 03:03

## 2017-09-30 RX ADMIN — IPRATROPIUM BROMIDE AND ALBUTEROL SULFATE 3 ML: .5; 3 SOLUTION RESPIRATORY (INHALATION) at 08:29

## 2017-09-30 RX ADMIN — HYDRALAZINE HYDROCHLORIDE 10 MG: 20 INJECTION INTRAMUSCULAR; INTRAVENOUS at 11:03

## 2017-09-30 RX ADMIN — Medication 10 ML: at 14:00

## 2017-09-30 RX ADMIN — IPRATROPIUM BROMIDE AND ALBUTEROL SULFATE 3 ML: .5; 3 SOLUTION RESPIRATORY (INHALATION) at 02:28

## 2017-09-30 RX ADMIN — OXYCODONE HYDROCHLORIDE 5 MG: 5 TABLET ORAL at 09:34

## 2017-09-30 RX ADMIN — Medication 10 ML: at 21:37

## 2017-09-30 RX ADMIN — IPRATROPIUM BROMIDE AND ALBUTEROL SULFATE 3 ML: .5; 3 SOLUTION RESPIRATORY (INHALATION) at 15:52

## 2017-09-30 RX ADMIN — SODIUM CHLORIDE 1000 MG: 900 INJECTION, SOLUTION INTRAVENOUS at 21:36

## 2017-09-30 RX ADMIN — Medication 10 ML: at 06:46

## 2017-09-30 NOTE — PROGRESS NOTES
Pharmacy Dosing Services: Aminoglycosides    Aminoglycoside agent: Tobramycin    Indication: HAP    Day of therapy: 1    Other Antimicrobials (Include dose, start day & day of therapy):  Vancomycin 1 gram IV every 24 hours - start 24 Sept - Day 5      Significant Cultures: 24 Sept Sputum:  Pseudomonas aeruginosa sensitive to tobramycin <= 1mcg/ml  MRSA sensitive to vancomycin = 1 mcg/ml        Extended or traditional dosing: Extended  For extended dosing, follow nomogram - Dosing per nomogram  For traditional dosing:   goal peak n/a   Goal trough n/a  Dosing weight (IBW or adjusted weight) 66.5 kg    Level (if applicable): Tobramycin Random Level 5.1 mcg/ml about 10.7 hours after previous dose       Renal function stable? (unstable defined as SCr increase of 0.5 mg/dL or > 50% increase from baseline, whichever is greater) (Y/N): N    CAPD, Hemodialysis or Renal Replacement Therapy (Y/N): N     Estimated Creatinine Clearance: 62 mL/min (based on Cr of 1.03). Regimen assessment: Tobramycin to Treat HAP (Pseudomonas aeruginosa)  Maintenance dose: 300 mg IV every 36 hours (dosing per nomogram). Next scheduled level: 2 Oct 2100 hours       Pharmacy will follow daily and adjust medications as appropriate for renal function and/or serum levels. Thank you,  M.D.  Avila Jean, Pharmacist

## 2017-09-30 NOTE — PROGRESS NOTES
Patient took HFNC off during sleep dropped SpO2 to 69% placed back on HFNC spo2 back up to 93% in 5 minuets patient asleep again and comfortable .  will continue to monitor     [id number] patient got very un compliant   Increased oxygen  md in room patient given sedative and had to increase HFNC  to 100% fio2 and 50 lpm flow

## 2017-09-30 NOTE — PROGRESS NOTES
Medicine Progress Note    Patient: Inez Benavides   Age:  54 y.o.  DOA: 9/24/2017   Admit Dx / CC: Acute respiratory failure with hypercapnia (HCC)  LOS:  LOS: 6 days     Assessment/Plan   Principal Problem:    Acute respiratory failure with hypercapnia ([address] (9/24/2017)    Active Problems:    Sepsis ([address] (9/24/2017)      HCAP (healthcare-associated pneumonia) (9/24/2017)      Hypertension (9/24/2017)      Diabetes mellitus ([facility] (9/24/2017)        Additional Plan notes       1. Acute respiratory failure with hypercapnia. 2/2 PNA    -now extubated, still on hi sunil, unable to decrease to NC    -Intensivist consulted for management. - chest pt    2. Septic shock 2/2 PNA    -On vanc, tobramycin    -Blood cx NGTD, off pressors    3. Diabetes.    -On ssi    4. GERD    -On ppi.    5, Hx of Throat cancer    DISPO     Anticipated Date of Discharge: ???  Anticipated Disposition (home, SNF) : SNF    Subjective:   Patient seen and examined. sleeping    Objective:     Visit Vitals    /82    Pulse (!) 107    Temp 98.6 °F (37 °C)    Resp 27    Ht 5' 4\" (1.626 m)    Wt 66.5 kg (146 lb 9.7 oz)    SpO2 90%    BMI 25.16 kg/m2       Physical Exam:  General appearance: alert, cooperative, no distress, appears stated age  Head: Normocephalic, without obvious abnormality, atraumatic  Neck: supple, trachea midline  Lungs:b/l rhonchi  Heart: regular rate and rhythm, S1, S2 normal, no murmur, click, rub or gallop  Abdomen: soft, non-tender.  Bowel sounds normal. No masses,  no organomegaly  Extremities: extremities normal, atraumatic, no cyanosis or edema  Skin: Skin color, texture, turgor normal. No rashes or lesions  Neurologic: Grossly normal    Intake and Output:  Current Shift:  09/30 0701 - 09/30 1900  In: 1184.9 [I.V.:834.9]  Out: 395 [Urine:295; Drains:100]  Last three shifts:  09/28 1901 - 09/30 0700  In: 1920 [I.V.:665]  Out: 9620 [Urine:2231; Drains:210]    Lab/Data Reviewed:  CMP:   Lab Results   Component Value Date/Time     09/30/2017 04:00 AM    K 3.8 09/30/2017 04:00 AM     (H) 09/30/2017 04:00 AM    CO2 22 09/30/2017 04:00 AM    AGAP 9 09/30/2017 04:00 AM     (H) 09/30/2017 04:00 AM    BUN 19 (H) 09/30/2017 04:00 AM    CREA 1.03 09/30/2017 04:00 AM    GFRAA >60 09/30/2017 04:00 AM    GFRNA 57 (L) 09/30/2017 04:00 AM    CA 8.6 09/30/2017 04:00 AM    MG 1.6 09/30/2017 09:20 AM     CBC:   Lab Results   Component Value Date/Time    WBC 8.1 09/30/2017 04:00 AM    HGB 8.4 (L) 09/30/2017 09:20 AM    HCT 24.9 (L) 09/30/2017 09:20 AM    PLT 63 (L) 09/30/2017 04:00 AM     All Cardiac Markers in the last 24 hours: No results found for: CPK, CK, CKMMB, CKMB, RCK3, CKMBT, CKNDX, CKND1, LAVELLE, TROPT, TROIQ, RAMIREZ, TROPT, TNIPOC, BNP, BNPP    Medications Reviewed:  Current Facility-Administered Medications   Medication Dose Route Frequency    oxymetazoline (AFRIN) 0.05 % nasal spray 2 Spray  2 Spray Both Nostrils BID    oxyCODONE IR (ROXICODONE) tablet 5 mg  5 mg Oral Q4H PRN    lisinopril (PRINIVIL, ZESTRIL) tablet 10 mg  10 mg Oral DAILY    tobramycin (NEBCIN) 300 mg in 0.9% sodium chloride 100 mL IVPB  300 mg IntraVENous Q36H    [START ON 10/2/2017] TOBRAMYCIN INFORMATION NOTE   Other ONCE    TOBRAMYCIN INFORMATION NOTE   Other Rx Dosing/Monitoring    haloperidol lactate (HALDOL) injection 4 mg  4 mg IntraVENous Q8H PRN    metoprolol tartrate (LOPRESSOR) tablet 25 mg  25 mg Oral Q12H    vancomycin (VANCOCIN) 1,000 mg in 0.9% sodium chloride (MBP/ADV) 250 mL adv  1,000 mg IntraVENous Q24H    [START ON 10/2/2017] VANCOMYCIN INFORMATION NOTE   Other ONCE    VANCOMYCIN INFORMATION NOTE   Other Rx Dosing/Monitoring    dexmedeTOMidine (PRECEDEX) 400 mcg in 0.9% sodium chloride 100 mL infusion  0.2-0.7 mcg/kg/hr IntraVENous TITRATE    albuterol-ipratropium (DUO-NEB) 2.5 MG-0.5 MG/3 ML  3 mL Nebulization Q6H RT    0.9% sodium chloride infusion 250 mL  250 mL IntraVENous PRN    famotidine (PEPCID) tablet 20 mg  20 mg Oral DAILY    0.9% sodium chloride infusion 250 mL  250 mL IntraVENous PRN    0.9% sodium chloride infusion 250 mL  250 mL IntraVENous PRN    LORazepam (ATIVAN) injection 2 mg  2 mg IntraVENous Q2H PRN    0.9% sodium chloride infusion 250 mL  250 mL IntraVENous PRN    metoprolol (LOPRESSOR) injection 5 mg  5 mg IntraVENous Q4H PRN    levothyroxine (SYNTHROID) tablet 100 mcg  100 mcg Oral Daily    insulin lispro (HUMALOG) injection   SubCUTAneous Q6H    glucose chewable tablet 16 g  4 Tab Oral PRN    glucagon (GLUCAGEN) injection 1 mg  1 mg IntraMUSCular PRN    dextrose (D50W) injection syrg 12.5-25 g  25-50 mL IntraVENous PRN    sodium chloride (NS) flush 5-10 mL  5-10 mL IntraVENous PRN    sodium chloride (NS) flush 5-10 mL  5-10 mL IntraVENous Q8H    sodium chloride (NS) flush 5-10 mL  5-10 mL IntraVENous PRN    ondansetron (ZOFRAN) injection 4 mg  4 mg IntraVENous Q6H PRN    hydrALAZINE (APRESOLINE) 20 mg/mL injection 10 mg  10 mg IntraVENous Q6H PRN    thiamine (B-1) 100 mg in 0.9% sodium chloride 50 mL IVPB  100 mg IntraVENous BID    acetaminophen (TYLENOL) solution 650 mg  650 mg Per G Tube Q4H PRN    ELECTROLYTE REPLACEMENT PROTOCOL  1 Each Other PRN    ELECTROLYTE REPLACEMENT PROTOCOL  1 Each Other PRN    ELECTROLYTE REPLACEMENT PROTOCOL  1 Each Other PRN    ELECTROLYTE REPLACEMENT PROTOCOL  1 Each Other PRN       Enid Pringle MD    September 30, 2017

## 2017-09-30 NOTE — PROGRESS NOTES
[facility]. Pulmonary, Critical Care, and Sleep Medicine    Name: Jennifer Serrano MRN: [id number]   : 1965 Hospital: [address] [place] [address]   Date: 2017  Admission Date: 2017     Chart and notes reviewed. Data reviewed. I have evaluated all findings. [x]I have reviewed the flowsheet and previous days notes. [x]The patient is critically ill on      []Mechanical ventilation []Pressors   []BiPAP High flow                 : Tolerates PS but too sedated to consider extubation, requiring levophed, BNP very elevated  : Extubated yesterday, required bipap post extubation, fevers with tmax 102.3, Hb decrease, slow worsening of renal function, agitation overnight requiring precedex  : Intermittently switching between bipap and high flow, tachycardic/tachypnic during periods of pain, improves with precedex and dilaudid  : Tolerating high flow, still requiring precedex, attempting to wean, tachycardia/tachypnea currently improved  : Tolerating high flow, off precedex, dilaudid leads to sedation, pain meds adjusted, awaiting HIT panel  : Tolerating high flow, precedex overnight now held, HIT negative, wbc improving, episode of epistaxis this AM now resolved    ROS:Review of systems not obtained due to patient factors. Events and notes from last 24 hours reviewed. Care plan discussed on multidisciplinary rounds.   Patient Active Problem List   Diagnosis Code    Acute respiratory failure with hypercapnia (HCC) J96.02    Sepsis ([facility] A41.9    HCAP (healthcare-associated pneumonia) J18.9    Hypertension I10    Diabetes mellitus ([facility] [address] E11.9       Vital Signs:  Visit Vitals    BP (!) 155/94    Pulse 96    Temp 97.8 °F (36.6 °C)    Resp 23    Ht 5' 4\" (1.626 m)    Wt 66.5 kg (146 lb 9.7 oz)    SpO2 99%    BMI 25.16 kg/m2       O2 Device: Hi flow nasal cannula   O2 Flow Rate (L/min): 50 l/min   Temp (24hrs), Av.9 °F (36.6 °C), Min:97.8 °F (36.6 °C), Max:98.1 °F (36.7 °C)       Intake/Output:   Last shift:         Last 3 shifts: 09/28 1901 - 09/30 0700  In: 1920 [I.V.:665]  Out: 6839 [Urine:2231; Drains:210]    Intake/Output Summary (Last 24 hours) at 09/30/17 0820  Last data filed at 09/30/17 0657   Gross per 24 hour   Intake              640 ml   Output             1221 ml   Net             -581 ml      Ventilator Settings:  Ventilator  Mode: Pressure support, Spontaneous  Respiratory Rate  Resp Rate Observed: 23  Back-Up Rate: 16  Insp Time (sec): 1.1 sec  I:E Ratio: 1:1.5  Ventilator Volumes  Vt Set (ml): 420 ml  Vt Exhaled (Machine Breath) (ml): 412 ml  Vt Spont (ml): 404 ml  Ve Observed (l/min): 12.1 l/min  Ventilator Pressures  PIP Observed (cm H2O): 13 cm H2O  Plateau Pressure (cm H2O): 22 cm H2O  MAP (cm H2O): 8.2  PEEP/VENT (cm H2O): 5 cm H20  Auto PEEP Observed (cm H2O):  (unable to obtain)    Current Facility-Administered Medications   Medication Dose Route Frequency    oxymetazoline (AFRIN) 0.05 % nasal spray 2 Spray  2 Spray Both Nostrils BID    TOBRAMYCIN INFORMATION NOTE   Other Rx Dosing/Monitoring    metoprolol tartrate (LOPRESSOR) tablet 25 mg  25 mg Oral Q12H    vancomycin (VANCOCIN) 1,000 mg in 0.9% sodium chloride (MBP/ADV) 250 mL adv  1,000 mg IntraVENous Q24H    [START ON 10/2/2017] VANCOMYCIN INFORMATION NOTE   Other ONCE    VANCOMYCIN INFORMATION NOTE   Other Rx Dosing/Monitoring    dexmedeTOMidine (PRECEDEX) 400 mcg in 0.9% sodium chloride 100 mL infusion  0.2-0.7 mcg/kg/hr IntraVENous TITRATE    albuterol-ipratropium (DUO-NEB) 2.5 MG-0.5 MG/3 ML  3 mL Nebulization Q6H RT    famotidine (PEPCID) tablet 20 mg  20 mg Oral DAILY    fondaparinux (ARIXTRA) injection 2.5 mg  2.5 mg SubCUTAneous Q48H    levothyroxine (SYNTHROID) tablet 100 mcg  100 mcg Oral Daily    insulin lispro (HUMALOG) injection   SubCUTAneous Q6H    sodium chloride (NS) flush 5-10 mL  5-10 mL IntraVENous Q8H    thiamine (B-1) 100 mg in 0.9% sodium chloride 50 mL IVPB  100 mg IntraVENous BID       Telemetry:     Physical Exam:   General: Drowsy but awakes and follows commands on all extremities   HEENT: high flow in place   Neck: Supple   Chest: Normal   Lungs: Coarse bilaterally, no wheezes, scattered rhonci   Heart: Regular rate and rhythm   Abdomen: non distended, bowel sounds normoactive   Extremity: Moves all spontaneously and to command   Neuro: Drowsy but awakes and follows commands on all extremities   Skin: Intact   DATA:  MAR reviewed and pertinent medications noted or modified as needed    Labs:  Recent Labs      09/30/17   0400 09/29/17   0355  09/28/17   0345   WBC  8.1  12.9  15.5*   HGB  8.1*  8.9*  8.2*   HCT  24.4*  26.5*  24.6*   PLT  63*  63*  65*     Recent Labs      09/30/17   0400  09/29/17   1530  09/29/17   0355  09/28/17   1454  09/28/17   0345   NA  145   --   146*   --   148*   K  3.8  4.3  3.6   --   4.0   CL  114*   --   116*   --   118*   CO2  22   --   23   --   22   GLU  107*   --   135*   --   83   BUN  19*   --   21*   --   22*   CREA  1.03   --   1.20   --   1.13   CA  8.6   --   8.1*   --   7.9*   MG   --   1.9  1.8  2.1  1.8   PHOS   --    --   3.7   --    --    INR  1.3*   --   1.3*   --   1.4*     No results for input(s): PH, PCO2, PO2, HCO3, FIO2 in the last 72 hours. No results for input(s): FIO2I, IFO2, HCO3I, IHCO3, HCOPOC, PCO2I, PCOPOC, IPHI, PHI, PHPOC, PO2I, PO2POC in the last 72 hours. No lab exists for component: IPOC2  Imaging:  [x]                           I have personally reviewed the patients radiographs and reports    High complexity decision making was performed during this consultation and evaluation. [x]       Pt is at high risk for further organ failure and dysfunction. Critical care time spent  minutes with patient exclusive of procedures.     IMPRESSION:    Anemia      Cancer (HCC)       throat    Coronary artery disease      Diabetes ([address]      Diabetes mellitus (HCC)      ETOH abuse      HTN (hypertension)      Hypertension      Hypothyroid      Lupus ([facility] [address]      Osteonecrosis (HCC)       of jaw    PEG adjustment, replacement, or removal      S/P thoracentesis      Throat cancer ([facility] [address]      ·       PLAN:   Neuro/Pain  Required precedex overnight, Now off precedex again  Dilaudid causes sedation, preferentially use oxycodone    Cardiovascular  Tachycardia improved  BNP >35,000, order repeat today, not much response to lasix, hesitate to heavily diurese as renal functions worsens with each dose of lasix   Lactic acid normal  2D echo EF 55 - 60%, moderate tricuspid regurge  Continue to increase metoprolol for BP control  Will add lisinopril    Pulmonary  MRSA and Pseudomonas PNA  Extubated 9/25, has required high flow continuously, occasionally bipap  During previous intubation required ETT 6.5 due to upper airway obstruction from cancer, difficult airway and if requires reintubation will likely need anesthesia assistance  Continue scheduled duonebs  procal 30 --> 7.5      GI  Continue tube feeds, always requires tube feeds to PEG for upper airway obstruction   Famotidine ppx    Renal  KEATON, overall improved but worsens with every attempt at diuresis   renal ultrasound with medical renal disease  Free water for mild hypernatremia    ID  Significant leukocytosis, 32 --> 8.1  Fevers resolved  Continue vanc, tobramycin  Bcx 9/24 negative to date  Sputum cx 9/24 MRSA and MDR pseudomonas  Pseudomonas MDR and resistant to zosyn and partially to levaquin   Ucx 9/24 negative to date  Flu A/B negative  Cdiff negative    Heme Onc  HO throat cancer with upper airway obstruction  Hb decrease, s/p 1u pRBCs 9/26, 9/27 transfuse PRN for 7 or below  Pt has antibodies in blood, takes many hours to get blood products  Platelets stabilized with trend down  arixtra to heparin, if further epistaxis may need hold of anticoagulation  HIT panel negative    Endo  HO DM but A1c 5.3  Continue humalog  TSH normal, free T4 slightly low  Continue synthroid       Critical care time 35 minutes    Isis Thayer MD

## 2017-09-30 NOTE — PROGRESS NOTES
Physical Exam   Skin: Skin is warm and dry. Skin is dry, flaky, and peeling all over the patients body. Dr's are aware. Possible drug reaction.

## 2017-09-30 NOTE — PROGRESS NOTES
Problem: Falls - Risk of  Goal: *Absence of Falls  Document Kapil Fall Risk and appropriate interventions in the flowsheet.    Outcome: Progressing Towards Goal  Fall Risk Interventions:        Mentation Interventions: Door open when patient unattended, More frequent rounding, Room close to nurse's station     Medication Interventions: Bed/chair exit alarm, Patient to call before getting OOB, Evaluate medications/consider consulting pharmacy     Elimination Interventions: Call light in reach

## 2017-09-30 NOTE — PROGRESS NOTES
Pharmacy Dosing Services: Vancomycin    Indication: Pneumonia (HAP)    Day of therapy: 5    Other Antimicrobials (Include dose, start day & day of therapy): Tobramycin started 2017 day 0      Loading dose (date given): 1,500 mg  Current Maintenance dose: 1,000 mg every 24 hours    Goal Vancomycin Level: 15-20 mcg/mL  (Trough 15-20 for most infections, 20 for meningitis/osteomyelitis, pre-HD level ~25)    Vancomycin Level (if drawn): 20.2 mcg/mL     Significant Cultures: FEW PSEUDOMONAS AERUGINOSA in respiratory culture. Renal function stable? (unstable defined as SCr increase of 0.5 mg/dL or > 50% increase from baseline, whichever is greater) (Y/N): Y     CAPD, Hemodialysis or Renal Replacement Therapy (Y/N): N     Recent Labs      17   0355  17   0345  17   0347   CREA  1.20  1.13  1.44*   BUN  21*  22*  28*   WBC  12.9  15.5*  21.1*     Temp (24hrs), Av.2 °F (36.8 °C), Min:97.5 °F (36.4 °C), Max:99 °F (37.2 °C)    Creatinine Clearance (Creatinine Clearance (ml/min)): 53.2 mL/min     Regimen assessment: Treating for HAP, continuing coverage with Vancomycin  Maintenance dose: 1,000 mg  Next scheduled level: 10- at 2100       Pharmacy will follow daily and adjust medications as appropriate for renal function and/or serum levels. Thank you,  JESSIE DENG

## 2017-09-30 NOTE — ROUTINE PROCESS
Assumed care of patient. Report received from [address]. GroverGILBERT reported patient had blood all over her bed. Went to assess the patient and there were large blood clots coming out of her mouth and nose. Gown and sheets saturated. ICU team notified. Cleaned up patient and suctioned mouth. Bleeding appears to be coming out of patients nose. Afrin ordered and a repeat Hgb/Hct drawn and sent. 1200: Patient agitated. Pulling off oxygen. Fighting nursing staff and yelling \"no\" when attempting to place O2 back on patient. Required 2 RN's to put O2 back on patient. Patient's O2 sat dropped to the low 60's and took a few minutes recover back to patients baseline. RT at the bedside. Bedside shift change report given to Sathish Landry RN (oncoming nurse) by [address], RN (offgoing nurse). Report included the following information SBAR, Kardex, ED Summary, Procedure Summary, Intake/Output, MAR, Accordion, Recent Results, Med Rec Status and Cardiac Rhythm Sinus Tach.

## 2017-10-01 ENCOUNTER — APPOINTMENT (OUTPATIENT)
Dept: CT IMAGING | Age: 52
DRG: 871 | End: 2017-10-01
Attending: INTERNAL MEDICINE
Payer: MEDICARE

## 2017-10-01 LAB
ANION GAP SERPL CALC-SCNC: 9 MMOL/L (ref 3–18)
BASOPHILS # BLD: 0 K/UL (ref 0–0.1)
BASOPHILS NFR BLD: 0 % (ref 0–3)
BUN SERPL-MCNC: 15 MG/DL (ref 7–18)
BUN/CREAT SERPL: 15 (ref 12–20)
CA-I SERPL-SCNC: 1.31 MMOL/L (ref 1.12–1.32)
CA-I SERPL-SCNC: 1.32 MMOL/L (ref 1.12–1.32)
CALCIUM SERPL-MCNC: 8.3 MG/DL (ref 8.5–10.1)
CHLORIDE SERPL-SCNC: 115 MMOL/L (ref 100–108)
CO2 SERPL-SCNC: 22 MMOL/L (ref 21–32)
CREAT SERPL-MCNC: 0.99 MG/DL (ref 0.6–1.3)
DIFFERENTIAL METHOD BLD: ABNORMAL
EOSINOPHIL # BLD: 0.4 K/UL (ref 0–0.4)
EOSINOPHIL NFR BLD: 6 % (ref 0–5)
ERYTHROCYTE [DISTWIDTH] IN BLOOD BY AUTOMATED COUNT: 16.2 % (ref 11.6–14.5)
GLUCOSE BLD STRIP.AUTO-MCNC: 100 MG/DL (ref 70–110)
GLUCOSE BLD STRIP.AUTO-MCNC: 104 MG/DL (ref 70–110)
GLUCOSE BLD STRIP.AUTO-MCNC: 111 MG/DL (ref 70–110)
GLUCOSE BLD STRIP.AUTO-MCNC: 111 MG/DL (ref 70–110)
GLUCOSE SERPL-MCNC: 112 MG/DL (ref 74–99)
HCT VFR BLD AUTO: 22.2 % (ref 35–45)
HGB BLD-MCNC: 7.3 G/DL (ref 12–16)
INR PPP: 1.3 (ref 0.8–1.2)
LYMPHOCYTES # BLD: 1.8 K/UL (ref 0.8–3.5)
LYMPHOCYTES NFR BLD: 26 % (ref 20–51)
MAGNESIUM SERPL-MCNC: 1.5 MG/DL (ref 1.6–2.6)
MCH RBC QN AUTO: 29.8 PG (ref 24–34)
MCHC RBC AUTO-ENTMCNC: 32.9 G/DL (ref 31–37)
MCV RBC AUTO: 90.6 FL (ref 74–97)
METAMYELOCYTES NFR BLD MANUAL: 2 %
MONOCYTES # BLD: 0.4 K/UL (ref 0–1)
MONOCYTES NFR BLD: 6 % (ref 2–9)
MYELOCYTES NFR BLD MANUAL: 6 %
NEUTS BAND NFR BLD MANUAL: 1 % (ref 0–5)
NEUTS SEG # BLD: 3.6 K/UL (ref 1.8–8)
NEUTS SEG NFR BLD: 53 % (ref 42–75)
PHOSPHATE SERPL-MCNC: 3.6 MG/DL (ref 2.5–4.9)
PHOSPHATE SERPL-MCNC: 3.7 MG/DL (ref 2.5–4.9)
PLATELET # BLD AUTO: 84 K/UL (ref 135–420)
POTASSIUM SERPL-SCNC: 3.5 MMOL/L (ref 3.5–5.5)
POTASSIUM SERPL-SCNC: 3.7 MMOL/L (ref 3.5–5.5)
POTASSIUM SERPL-SCNC: 3.9 MMOL/L (ref 3.5–5.5)
PROTHROMBIN TIME: 15.6 SEC (ref 11.5–15.2)
RBC # BLD AUTO: 2.45 M/UL (ref 4.2–5.3)
RBC MORPH BLD: ABNORMAL
RBC MORPH BLD: ABNORMAL
SODIUM SERPL-SCNC: 146 MMOL/L (ref 136–145)
WBC # BLD AUTO: 6.8 K/UL (ref 4.6–13.2)

## 2017-10-01 PROCEDURE — 94660 CPAP INITIATION&MGMT: CPT

## 2017-10-01 PROCEDURE — 74011250636 HC RX REV CODE- 250/636: Performed by: HOSPITALIST

## 2017-10-01 PROCEDURE — 71250 CT THORAX DX C-: CPT

## 2017-10-01 PROCEDURE — 74011000250 HC RX REV CODE- 250: Performed by: INTERNAL MEDICINE

## 2017-10-01 PROCEDURE — 74011250636 HC RX REV CODE- 250/636: Performed by: FAMILY MEDICINE

## 2017-10-01 PROCEDURE — 74011250637 HC RX REV CODE- 250/637: Performed by: PHYSICIAN ASSISTANT

## 2017-10-01 PROCEDURE — 74011250637 HC RX REV CODE- 250/637: Performed by: INTERNAL MEDICINE

## 2017-10-01 PROCEDURE — 94640 AIRWAY INHALATION TREATMENT: CPT

## 2017-10-01 PROCEDURE — 84132 ASSAY OF SERUM POTASSIUM: CPT | Performed by: PHYSICIAN ASSISTANT

## 2017-10-01 PROCEDURE — 82330 ASSAY OF CALCIUM: CPT | Performed by: HOSPITALIST

## 2017-10-01 PROCEDURE — 74011250636 HC RX REV CODE- 250/636: Performed by: INTERNAL MEDICINE

## 2017-10-01 PROCEDURE — 85610 PROTHROMBIN TIME: CPT | Performed by: PHYSICIAN ASSISTANT

## 2017-10-01 PROCEDURE — 83735 ASSAY OF MAGNESIUM: CPT | Performed by: HOSPITALIST

## 2017-10-01 PROCEDURE — 74011250636 HC RX REV CODE- 250/636

## 2017-10-01 PROCEDURE — 36415 COLL VENOUS BLD VENIPUNCTURE: CPT | Performed by: HOSPITALIST

## 2017-10-01 PROCEDURE — 82962 GLUCOSE BLOOD TEST: CPT

## 2017-10-01 PROCEDURE — 74011000258 HC RX REV CODE- 258: Performed by: INTERNAL MEDICINE

## 2017-10-01 PROCEDURE — 84132 ASSAY OF SERUM POTASSIUM: CPT | Performed by: HOSPITALIST

## 2017-10-01 PROCEDURE — 84100 ASSAY OF PHOSPHORUS: CPT | Performed by: HOSPITALIST

## 2017-10-01 PROCEDURE — 36592 COLLECT BLOOD FROM PICC: CPT

## 2017-10-01 PROCEDURE — 77010033711 HC HIGH FLOW OXYGEN

## 2017-10-01 PROCEDURE — 85025 COMPLETE CBC W/AUTO DIFF WBC: CPT | Performed by: PHYSICIAN ASSISTANT

## 2017-10-01 PROCEDURE — 65610000006 HC RM INTENSIVE CARE

## 2017-10-01 RX ORDER — BUDESONIDE 0.5 MG/2ML
500 INHALANT ORAL
Status: DISCONTINUED | OUTPATIENT
Start: 2017-10-01 | End: 2017-10-02

## 2017-10-01 RX ORDER — POTASSIUM CHLORIDE 7.45 MG/ML
10 INJECTION INTRAVENOUS ONCE
Status: COMPLETED | OUTPATIENT
Start: 2017-10-01 | End: 2017-10-01

## 2017-10-01 RX ORDER — MAGNESIUM SULFATE HEPTAHYDRATE 40 MG/ML
INJECTION, SOLUTION INTRAVENOUS
Status: COMPLETED
Start: 2017-10-01 | End: 2017-10-01

## 2017-10-01 RX ORDER — POTASSIUM CHLORIDE 14.9 MG/ML
10 INJECTION INTRAVENOUS
Status: COMPLETED | OUTPATIENT
Start: 2017-10-01 | End: 2017-10-01

## 2017-10-01 RX ORDER — FUROSEMIDE 10 MG/ML
20 INJECTION INTRAMUSCULAR; INTRAVENOUS ONCE
Status: COMPLETED | OUTPATIENT
Start: 2017-10-01 | End: 2017-10-01

## 2017-10-01 RX ORDER — LISINOPRIL 20 MG/1
20 TABLET ORAL DAILY
Status: DISCONTINUED | OUTPATIENT
Start: 2017-10-01 | End: 2017-10-09 | Stop reason: HOSPADM

## 2017-10-01 RX ORDER — ENOXAPARIN SODIUM 100 MG/ML
40 INJECTION SUBCUTANEOUS EVERY 24 HOURS
Status: DISCONTINUED | OUTPATIENT
Start: 2017-10-01 | End: 2017-10-09 | Stop reason: HOSPADM

## 2017-10-01 RX ORDER — POTASSIUM CHLORIDE 7.45 MG/ML
INJECTION INTRAVENOUS
Status: COMPLETED
Start: 2017-10-01 | End: 2017-10-01

## 2017-10-01 RX ORDER — MAGNESIUM SULFATE HEPTAHYDRATE 40 MG/ML
2 INJECTION, SOLUTION INTRAVENOUS ONCE
Status: COMPLETED | OUTPATIENT
Start: 2017-10-01 | End: 2017-10-01

## 2017-10-01 RX ORDER — ARFORMOTEROL TARTRATE 15 UG/2ML
15 SOLUTION RESPIRATORY (INHALATION)
Status: DISCONTINUED | OUTPATIENT
Start: 2017-10-01 | End: 2017-10-02

## 2017-10-01 RX ORDER — METOPROLOL TARTRATE 50 MG/1
50 TABLET ORAL EVERY 12 HOURS
Status: DISCONTINUED | OUTPATIENT
Start: 2017-10-01 | End: 2017-10-02

## 2017-10-01 RX ORDER — POTASSIUM CHLORIDE 29.8 MG/ML
20 INJECTION INTRAVENOUS
Status: DISCONTINUED | OUTPATIENT
Start: 2017-10-01 | End: 2017-10-01 | Stop reason: RX

## 2017-10-01 RX ORDER — HEPARIN SODIUM 5000 [USP'U]/ML
5000 INJECTION, SOLUTION INTRAVENOUS; SUBCUTANEOUS EVERY 12 HOURS
Status: DISCONTINUED | OUTPATIENT
Start: 2017-10-01 | End: 2017-10-01

## 2017-10-01 RX ADMIN — METOPROLOL TARTRATE 50 MG: 50 TABLET ORAL at 20:47

## 2017-10-01 RX ADMIN — POTASSIUM CHLORIDE 10 MEQ: 14.9 INJECTION, SOLUTION INTRAVENOUS at 17:10

## 2017-10-01 RX ADMIN — OXYCODONE HYDROCHLORIDE 5 MG: 5 TABLET ORAL at 03:07

## 2017-10-01 RX ADMIN — Medication 10 ML: at 21:41

## 2017-10-01 RX ADMIN — FAMOTIDINE 20 MG: 20 TABLET ORAL at 09:05

## 2017-10-01 RX ADMIN — METOPROLOL TARTRATE 50 MG: 50 TABLET ORAL at 09:05

## 2017-10-01 RX ADMIN — POTASSIUM CHLORIDE 10 MEQ: 14.9 INJECTION, SOLUTION INTRAVENOUS at 19:48

## 2017-10-01 RX ADMIN — IPRATROPIUM BROMIDE AND ALBUTEROL SULFATE 3 ML: .5; 3 SOLUTION RESPIRATORY (INHALATION) at 02:39

## 2017-10-01 RX ADMIN — IPRATROPIUM BROMIDE AND ALBUTEROL SULFATE 3 ML: .5; 3 SOLUTION RESPIRATORY (INHALATION) at 20:10

## 2017-10-01 RX ADMIN — OXYCODONE HYDROCHLORIDE 5 MG: 5 TABLET ORAL at 07:11

## 2017-10-01 RX ADMIN — ARFORMOTEROL TARTRATE 15 MCG: 15 SOLUTION RESPIRATORY (INHALATION) at 20:10

## 2017-10-01 RX ADMIN — BUDESONIDE 500 MCG: 0.5 INHALANT RESPIRATORY (INHALATION) at 20:10

## 2017-10-01 RX ADMIN — HYDRALAZINE HYDROCHLORIDE 10 MG: 20 INJECTION INTRAMUSCULAR; INTRAVENOUS at 06:05

## 2017-10-01 RX ADMIN — OXYCODONE HYDROCHLORIDE 5 MG: 5 TABLET ORAL at 17:27

## 2017-10-01 RX ADMIN — SODIUM CHLORIDE 1000 MG: 900 INJECTION, SOLUTION INTRAVENOUS at 21:34

## 2017-10-01 RX ADMIN — THIAMINE HYDROCHLORIDE 100 MG: 100 INJECTION, SOLUTION INTRAMUSCULAR; INTRAVENOUS at 09:08

## 2017-10-01 RX ADMIN — HEPARIN SODIUM 5000 UNITS: 5000 INJECTION, SOLUTION INTRAVENOUS; SUBCUTANEOUS at 09:05

## 2017-10-01 RX ADMIN — SODIUM CHLORIDE 0.6 MCG/KG/HR: 900 INJECTION, SOLUTION INTRAVENOUS at 12:54

## 2017-10-01 RX ADMIN — IPRATROPIUM BROMIDE AND ALBUTEROL SULFATE 3 ML: .5; 3 SOLUTION RESPIRATORY (INHALATION) at 13:10

## 2017-10-01 RX ADMIN — METOPROLOL TARTRATE 5 MG: 5 INJECTION INTRAVENOUS at 03:07

## 2017-10-01 RX ADMIN — ENOXAPARIN SODIUM 40 MG: 40 INJECTION SUBCUTANEOUS at 21:33

## 2017-10-01 RX ADMIN — ARFORMOTEROL TARTRATE 15 MCG: 15 SOLUTION RESPIRATORY (INHALATION) at 08:40

## 2017-10-01 RX ADMIN — THIAMINE HYDROCHLORIDE 100 MG: 100 INJECTION, SOLUTION INTRAMUSCULAR; INTRAVENOUS at 21:34

## 2017-10-01 RX ADMIN — BUDESONIDE 500 MCG: 0.5 INHALANT RESPIRATORY (INHALATION) at 08:40

## 2017-10-01 RX ADMIN — OXYCODONE HYDROCHLORIDE 5 MG: 5 TABLET ORAL at 20:47

## 2017-10-01 RX ADMIN — POTASSIUM CHLORIDE 10 MEQ: 7.46 INJECTION, SOLUTION INTRAVENOUS at 06:07

## 2017-10-01 RX ADMIN — POTASSIUM CHLORIDE 10 MEQ: 14.9 INJECTION, SOLUTION INTRAVENOUS at 16:18

## 2017-10-01 RX ADMIN — HYDRALAZINE HYDROCHLORIDE 10 MG: 20 INJECTION INTRAMUSCULAR; INTRAVENOUS at 22:05

## 2017-10-01 RX ADMIN — POTASSIUM CHLORIDE 10 MEQ: 7.45 INJECTION INTRAVENOUS at 06:07

## 2017-10-01 RX ADMIN — HYDRALAZINE HYDROCHLORIDE 10 MG: 20 INJECTION INTRAMUSCULAR; INTRAVENOUS at 17:11

## 2017-10-01 RX ADMIN — MAGNESIUM SULFATE HEPTAHYDRATE 2 G: 40 INJECTION, SOLUTION INTRAVENOUS at 03:13

## 2017-10-01 RX ADMIN — IPRATROPIUM BROMIDE AND ALBUTEROL SULFATE 3 ML: .5; 3 SOLUTION RESPIRATORY (INHALATION) at 08:28

## 2017-10-01 RX ADMIN — SODIUM CHLORIDE 0.7 MCG/KG/HR: 900 INJECTION, SOLUTION INTRAVENOUS at 03:08

## 2017-10-01 RX ADMIN — HYDRALAZINE HYDROCHLORIDE 10 MG: 20 INJECTION INTRAMUSCULAR; INTRAVENOUS at 11:05

## 2017-10-01 RX ADMIN — Medication 10 ML: at 07:06

## 2017-10-01 RX ADMIN — Medication 10 ML: at 16:17

## 2017-10-01 RX ADMIN — LISINOPRIL 20 MG: 20 TABLET ORAL at 09:05

## 2017-10-01 RX ADMIN — FUROSEMIDE 20 MG: 10 INJECTION, SOLUTION INTRAMUSCULAR; INTRAVENOUS at 17:11

## 2017-10-01 RX ADMIN — POTASSIUM CHLORIDE 10 MEQ: 14.9 INJECTION, SOLUTION INTRAVENOUS at 18:13

## 2017-10-01 RX ADMIN — LEVOTHYROXINE SODIUM 100 MCG: 100 TABLET ORAL at 06:00

## 2017-10-01 NOTE — PROGRESS NOTES
PHYSICAL THERAPY NOTE    10:07:  Per Aime Schroeder to work with patient on bed level exercises. However, patient sound asleep at this time. 12:48:  Attempted to see patient for the second time. Patient declined PT. Nurse Delmer in room with patient. Will re-attempt PT as schedule permits. Ashley Rios.  Aris Martin

## 2017-10-01 NOTE — PROGRESS NOTES
Medicine Progress Note    Patient: Jane Her   Age:  54 y.o.  DOA: 9/24/2017   Admit Dx / CC: Acute respiratory failure with hypercapnia (HCC)  LOS:  LOS: 7 days     Assessment/Plan   Principal Problem:    Acute respiratory failure with hypercapnia ([address] (9/24/2017)    Active Problems:    Sepsis ([address] (9/24/2017)      HCAP (healthcare-associated pneumonia) (9/24/2017)      Hypertension (9/24/2017)      Diabetes mellitus ([facility] (9/24/2017)        Additional Plan notes       1. Acute respiratory failure with hypercapnia. 2/2 PNA    -extubated, still on hi sunil, unable to decrease to NC    -Intensivist consulted for management. - chest pt,  Repeat chest CT today    2. Septic shock 2/2 PNA    -On vanc, tobramycin    -Blood cx NGTD, off pressors    3. Diabetes.    -On ssi    4. GERD    -On ppi.    5, Hx of Throat cancer    DISPO     Anticipated Date of Discharge: ???  Anticipated Disposition (home, SNF) : SNF    Subjective:   Patient seen and examined. Lethargic but arousable , no complaints    Objective:     Visit Vitals    BP (!) 148/99    Pulse 72    Temp 96.4 °F (35.8 °C)    Resp 17    Ht 5' 4\" (1.626 m)    Wt 66.3 kg (146 lb 2.6 oz)    SpO2 98%    BMI 25.09 kg/m2       Physical Exam:  General appearance: alert, cooperative, no distress, appears stated age  Head: Normocephalic, without obvious abnormality, atraumatic  Neck: supple, trachea midline  Lungs:b/l rhonchi  Heart: regular rate and rhythm, S1, S2 normal, no murmur, click, rub or gallop  Abdomen: soft, non-tender.  Bowel sounds normal. No masses,  no organomegaly  Extremities: extremities normal, atraumatic, no cyanosis or edema  Skin: Skin color, texture, turgor normal. No rashes or lesions  Neurologic: Grossly normal    Intake and Output:  Current Shift:  10/01 0701 - 10/01 1900  In: 356.5 [I.V.:126.5]  Out: 610 [Urine:610]  Last three shifts:  09/29 1901 - 10/01 0700  In: 2537.8 [I.V.:1672.8]  Out: 1880 [Urine:1730; Drains:150]    Lab/Data Reviewed:  CMP:   Lab Results   Component Value Date/Time     (H) 10/01/2017 04:00 AM    K 3.5 10/01/2017 11:00 AM     (H) 10/01/2017 04:00 AM    CO2 22 10/01/2017 04:00 AM    AGAP 9 10/01/2017 04:00 AM     (H) 10/01/2017 04:00 AM    BUN 15 10/01/2017 04:00 AM    CREA 0.99 10/01/2017 04:00 AM    GFRAA >60 10/01/2017 04:00 AM    GFRNA 60 (L) 10/01/2017 04:00 AM    CA 8.3 (L) 10/01/2017 04:00 AM    MG 1.5 (L) 10/01/2017 12:00 AM    PHOS 3.6 10/01/2017 04:00 AM     CBC:   Lab Results   Component Value Date/Time    WBC 6.8 10/01/2017 04:00 AM    HGB 7.3 (L) 10/01/2017 04:00 AM    HCT 22.2 (L) 10/01/2017 04:00 AM    PLT 84 (L) 10/01/2017 04:00 AM     All Cardiac Markers in the last 24 hours: No results found for: CPK, CK, CKMMB, CKMB, RCK3, CKMBT, CKNDX, CKND1, LAVELLE, TROPT, TROIQ, RAMIREZ, TROPT, TNIPOC, BNP, BNPP    Medications Reviewed:  Current Facility-Administered Medications   Medication Dose Route Frequency    budesonide (PULMICORT) 500 mcg/2 ml nebulizer suspension  500 mcg Nebulization BID RT    arformoterol (BROVANA) neb solution 15 mcg  15 mcg Nebulization BID RT    lisinopril (PRINIVIL, ZESTRIL) tablet 20 mg  20 mg Oral DAILY    metoprolol tartrate (LOPRESSOR) tablet 50 mg  50 mg Oral Q12H    heparin (porcine) injection 5,000 Units  5,000 Units SubCUTAneous Q12H    oxymetazoline (AFRIN) 0.05 % nasal spray 2 Spray  2 Spray Both Nostrils BID    oxyCODONE IR (ROXICODONE) tablet 5 mg  5 mg Oral Q4H PRN    tobramycin (NEBCIN) 300 mg in 0.9% sodium chloride 100 mL IVPB  300 mg IntraVENous Q36H    [START ON 10/2/2017] TOBRAMYCIN INFORMATION NOTE   Other ONCE    TOBRAMYCIN INFORMATION NOTE   Other Rx Dosing/Monitoring    haloperidol lactate (HALDOL) injection 4 mg  4 mg IntraVENous Q8H PRN    vancomycin (VANCOCIN) 1,000 mg in 0.9% sodium chloride (MBP/ADV) 250 mL adv  1,000 mg IntraVENous Q24H    [START ON 10/2/2017] VANCOMYCIN INFORMATION NOTE   Other ONCE    VANCOMYCIN INFORMATION NOTE Other Rx Dosing/Monitoring    dexmedeTOMidine (PRECEDEX) 400 mcg in 0.9% sodium chloride 100 mL infusion  0.2-0.7 mcg/kg/hr IntraVENous TITRATE    albuterol-ipratropium (DUO-NEB) 2.5 MG-0.5 MG/3 ML  3 mL Nebulization Q6H RT    famotidine (PEPCID) tablet 20 mg  20 mg Oral DAILY    0.9% sodium chloride infusion 250 mL  250 mL IntraVENous PRN    LORazepam (ATIVAN) injection 2 mg  2 mg IntraVENous Q2H PRN    metoprolol (LOPRESSOR) injection 5 mg  5 mg IntraVENous Q4H PRN    levothyroxine (SYNTHROID) tablet 100 mcg  100 mcg Oral Daily    insulin lispro (HUMALOG) injection   SubCUTAneous Q6H    glucose chewable tablet 16 g  4 Tab Oral PRN    glucagon (GLUCAGEN) injection 1 mg  1 mg IntraMUSCular PRN    dextrose (D50W) injection syrg 12.5-25 g  25-50 mL IntraVENous PRN    sodium chloride (NS) flush 5-10 mL  5-10 mL IntraVENous PRN    sodium chloride (NS) flush 5-10 mL  5-10 mL IntraVENous Q8H    sodium chloride (NS) flush 5-10 mL  5-10 mL IntraVENous PRN    ondansetron (ZOFRAN) injection 4 mg  4 mg IntraVENous Q6H PRN    hydrALAZINE (APRESOLINE) 20 mg/mL injection 10 mg  10 mg IntraVENous Q6H PRN    thiamine (B-1) 100 mg in 0.9% sodium chloride 50 mL IVPB  100 mg IntraVENous BID    acetaminophen (TYLENOL) solution 650 mg  650 mg Per G Tube Q4H PRN    ELECTROLYTE REPLACEMENT PROTOCOL  1 Each Other PRN    ELECTROLYTE REPLACEMENT PROTOCOL  1 Each Other PRN    ELECTROLYTE REPLACEMENT PROTOCOL  1 Each Other PRN    ELECTROLYTE REPLACEMENT PROTOCOL  1 Each Other PRN       Rajiv Barnes MD    October 1, 2017

## 2017-10-01 NOTE — PROGRESS NOTES
OT order received and chart reviewed. Per Daniel Louise, RN, pt agitated over night. 2672: On bipap at this time; pt nodding/shaking head appropriately to questions, however, very drowsy. 1130: pt still on bipap. Will follow up.     Amanda Whitmore MS OTR/L  Office Ext: [phone]  Pager: [phone]

## 2017-10-01 NOTE — PROGRESS NOTES
Problem: Falls - Risk of  Goal: *Absence of Falls  Document Kapil Fall Risk and appropriate interventions in the flowsheet.    Outcome: Progressing Towards Goal  Fall Risk Interventions:  Mobility Interventions: Bed/chair exit alarm     Mentation Interventions: Bed/chair exit alarm, Reorient patient, Toileting rounds, Update white board, Family/sitter at bedside     Medication Interventions: Bed/chair exit alarm, Teach patient to arise slowly     Elimination Interventions: Call light in reach, Toileting schedule/hourly rounds

## 2017-10-01 NOTE — PROGRESS NOTES
[facility]. Pulmonary, Critical Care, and Sleep Medicine    Name: Axel Lara MRN: [id number]   : 1965 Hospital: [facility]   Date: 10/1/2017  Admission Date: 2017     Chart and notes reviewed. Data reviewed. I have evaluated all findings. [x]I have reviewed the flowsheet and previous days notes. [x]The patient is critically ill on      []Mechanical ventilation []Pressors   [x]BiPAP High flow                 : Tolerates PS but too sedated to consider extubation, requiring levophed, BNP very elevated  : Extubated yesterday, required bipap post extubation, fevers with tmax 102.3, Hb decrease, slow worsening of renal function, agitation overnight requiring precedex  : Intermittently switching between bipap and high flow, tachycardic/tachypnic during periods of pain, improves with precedex and dilaudid  : Tolerating high flow, still requiring precedex, attempting to wean, tachycardia/tachypnea currently improved  : Tolerating high flow, off precedex, dilaudid leads to sedation, pain meds adjusted, awaiting HIT panel  : Tolerating high flow, precedex overnight now held, HIT negative, wbc improving, episode of epistaxis this AM now resolved  10/1: Still requiring precedex, when weaned pt becomes agitated and refuses to wear oxygen which leads to quick desaturation, on and off bipap, tolerates high flow for periods of time, still high oxygen requirements, platelets improving, leukocytosis improving    ROS:Review of systems not obtained due to patient factors. Events and notes from last 24 hours reviewed. Care plan discussed on multidisciplinary rounds.   Patient Active Problem List   Diagnosis Code    Acute respiratory failure with hypercapnia (HCC) J96.02    Sepsis ([facility] [address] A41.9    HCAP (healthcare-associated pneumonia) J18.9    Hypertension I10    Diabetes mellitus ([facility] [address] E11.9       Vital Signs:  Visit Vitals    BP (!) 156/92    Pulse 73    Temp 98.1 °F (36.7 °C)    Resp 20    Ht 5' 4\" (1.626 m)    Wt 66.3 kg (146 lb 2.6 oz)    SpO2 98%    BMI 25.09 kg/m2       O2 Device: BIPAP   O2 Flow Rate (L/min): 100 l/min   Temp (24hrs), Av.1 °F (36.7 °C), Min:97.5 °F (36.4 °C), Max:98.6 °F (37 °C)       Intake/Output:   Last shift:         Last 3 shifts: 1901 - 10/01 07  In: 2537.8 [I.V.:1672.8]  Out: 1880 [Urine:1730; Drains:150]    Intake/Output Summary (Last 24 hours) at 10/01/17 08  Last data filed at 10/01/17 0700   Gross per 24 hour   Intake          1484.96 ml   Output             1019 ml   Net           465.96 ml      Ventilator Settings:  Ventilator  Mode: Pressure support, Spontaneous  Respiratory Rate  Resp Rate Observed: 23  Back-Up Rate: 16  Insp Time (sec): 1.1 sec  I:E Ratio: 1:1.5  Ventilator Volumes  Vt Set (ml): 420 ml  Vt Exhaled (Machine Breath) (ml): 412 ml  Vt Spont (ml): 421 ml  Ve Observed (l/min): 8.3 l/min  Ventilator Pressures  PIP Observed (cm H2O): 13 cm H2O  Plateau Pressure (cm H2O): 22 cm H2O  MAP (cm H2O): 8.2  PEEP/VENT (cm H2O): 5 cm H20  Auto PEEP Observed (cm H2O):  (unable to obtain)    Current Facility-Administered Medications   Medication Dose Route Frequency    oxymetazoline (AFRIN) 0.05 % nasal spray 2 Spray  2 Spray Both Nostrils BID    lisinopril (PRINIVIL, ZESTRIL) tablet 10 mg  10 mg Oral DAILY    tobramycin (NEBCIN) 300 mg in 0.9% sodium chloride 100 mL IVPB  300 mg IntraVENous Q36H    [START ON 10/2/2017] TOBRAMYCIN INFORMATION NOTE   Other ONCE    TOBRAMYCIN INFORMATION NOTE   Other Rx Dosing/Monitoring    metoprolol tartrate (LOPRESSOR) tablet 25 mg  25 mg Oral Q12H    vancomycin (VANCOCIN) 1,000 mg in 0.9% sodium chloride (MBP/ADV) 250 mL adv  1,000 mg IntraVENous Q24H    [START ON 10/2/2017] VANCOMYCIN INFORMATION NOTE   Other ONCE    VANCOMYCIN INFORMATION NOTE   Other Rx Dosing/Monitoring    dexmedeTOMidine (PRECEDEX) 400 mcg in 0.9% sodium chloride 100 mL infusion 0.2-0.7 mcg/kg/hr IntraVENous TITRATE    albuterol-ipratropium (DUO-NEB) 2.5 MG-0.5 MG/3 ML  3 mL Nebulization Q6H RT    famotidine (PEPCID) tablet 20 mg  20 mg Oral DAILY    levothyroxine (SYNTHROID) tablet 100 mcg  100 mcg Oral Daily    insulin lispro (HUMALOG) injection   SubCUTAneous Q6H    sodium chloride (NS) flush 5-10 mL  5-10 mL IntraVENous Q8H    thiamine (B-1) 100 mg in 0.9% sodium chloride 50 mL IVPB  100 mg IntraVENous BID       Telemetry:     Physical Exam:   General: Drowsy but awakes and follows commands on all extremities   HEENT: bipap in place   Neck: Supple   Chest: Normal   Lungs: Coarse bilaterally, no wheezes, scattered rhonci   Heart: Regular rate and rhythm   Abdomen: non distended, bowel sounds normoactive   Extremity: Moves all spontaneously and to command   Neuro: Drowsy but awakes and follows commands on all extremities   Skin: Intact   DATA:  MAR reviewed and pertinent medications noted or modified as needed    Labs:  Recent Labs      10/01/17   0400  09/30/17   0920 09/30/17   0400  09/29/17   0355   WBC  6.8   --   8.1  12.9   HGB  7.3*  8.4*  8.1*  8.9*   HCT  22.2*  24.9*  24.4*  26.5*   PLT  84*   --   63*  63*     Recent Labs      10/01/17   0400  10/01/17   0000  09/30/17   0920  09/30/17   0400  09/29/17   1530  09/29/17   0355   NA  146*   --    --   145   --   146*   K  3.7  3.9   --   3.8  4.3  3.6   CL  115*   --    --   114*   --   116*   CO2  22   --    --   22   --   23   GLU  112*   --    --   107*   --   135*   BUN  15   --    --   19*   --   21*   CREA  0.99   --    --   1.03   --   1.20   CA  8.3*   --    --   8.6   --   8.1*   MG   --   1.5*  1.6   --   1.9  1.8   PHOS  3.6  3.7   --    --    --   3.7   INR  1.3*   --    --   1.3*   --   1.3*     No results for input(s): PH, PCO2, PO2, HCO3, FIO2 in the last 72 hours. No results for input(s): FIO2I, IFO2, HCO3I, IHCO3, HCOPOC, PCO2I, PCOPOC, IPHI, PHI, PHPOC, PO2I, PO2POC in the last 72 hours.     No lab exists for component: IPOC2  Imaging:  [x]                           I have personally reviewed the patients radiographs and reports    High complexity decision making was performed during this consultation and evaluation. [x]       Pt is at high risk for further organ failure and dysfunction. Critical care time spent  minutes with patient exclusive of procedures.     IMPRESSION:    Anemia      Cancer (HCC)       throat    Coronary artery disease      Diabetes (HCC)      Diabetes mellitus ([facility] [address]      ETOH abuse      HTN (hypertension)      Hypertension      Hypothyroid      Lupus ([facility] [address]      Osteonecrosis (HCC)       of jaw    PEG adjustment, replacement, or removal      S/P thoracentesis      Throat cancer (HCC)      ·       PLAN:   Neuro/Pain  No success in decreasing sedation, requires precedex for severe and frequent agitation  When off precedex pt confused and agitated, refuses to keep oxygen on leading to desaturation  Difficult to manage chronic pain issues, dilaudid causes sedation, preferentially use oxycodone    Cardiovascular  Hemodynamically stable  BNP >35,000, not much response to lasix, hesitate to heavily diurese as renal functions worsens with each dose of lasix   Lactic acid normal  2D echo EF 55 - 60%, moderate tricuspid regurge  Increase metoprolol  Increase lisinopril    Pulmonary  MRSA and Pseudomonas PNA  Extubated 9/25, has required high flow continuously, occasionally bipap  During previous intubation required ETT 6.5 due to upper airway obstruction from cancer, difficult airway and if requires reintubation will likely need anesthesia assistance  Continue scheduled duonebs  Add pulmicort and brovana  procal 30 --> 7.5  CTA 9/24 negative for PE  Will order non contrast CT chest today (10/1) given prolonged oxygen requirements      GI  Continue tube feeds, always requires tube feeds through PEG due to upper airway obstruction   Famotidine ppx    Renal  KEATON, overall improved but worsens with every attempt at diuresis   renal ultrasound with medical renal disease  Increase free water for mild hypernatremia    ID  Significant leukocytosis, 32 --> 6.8  Fevers resolved  Continue vanc, tobramycin  Bcx 9/24 negative to date  Sputum cx 9/24 MRSA and MDR pseudomonas  Pseudomonas MDR and resistant to zosyn and partially to levaquin   Ucx 9/24 negative to date  Flu A/B negative  Cdiff negative    Heme Onc  HO throat cancer with upper airway obstruction  Hb decrease, s/p 1u pRBCs 9/26, 9/27 transfuse PRN for 7 or below  Pt has antibodies in blood, takes many hours to get blood products  Platelets stabilized, now trending up  Continue heparin ppx  Epistaxis once 9/30, no further episodes  HIT panel negative    Endo  HO DM but A1c 5.3  Continue humalog  TSH normal, free T4 slightly low  Continue synthroid    Social/Dispo  Pt with minimal improvement in the past week, pt very deconditioned with high oxygen requirements and agitation requiring prolonged sedation, anticipate prolonged hospitalization and overall poor prognosis.   Will consult palliative care team in AM for assistance with goals of care and end of life discussions       Critical care time 35 minutes    Barrera Avelar MD

## 2017-10-01 NOTE — PROGRESS NOTES
Patient-s sats dropping into the 80s on HFNC, RR>29, patient very agitated - placed patient on BiPAP - sats increased to 95%, RR between 24-26 - patient appears to be resting comfortably at this time - will continue to monitor - CPT via vest deferred at this time due to patient agitation    0031 - patient remains on BiPAP - appears to be resting comfortably at this time    0429 - patient remains on BiPAP

## 2017-10-01 NOTE — PROGRESS NOTES
0700- Assumed care of pt. Report received by [facility]. 0800- Assessment complete. Pt drowsy awakens to voice, precedex gtt infusing, +3 PERRL, QUICK, FC. Lungs clear on bipap. SR on the monitor. Pulses palpable. Ab soft non tender, PEG clamped. Pt has no complaints of pain or discomfort. Repositioned. 1105- SBP > 160, PRN hydralazine given    1900- Bedside and Verbal shift change report given to [facility] (oncoming nurse) by Charlene Diop. Alma Valentino, RN  (offgoing nurse).  Report included the following information SBAR, Kardex, Intake/Output, Recent Results and Cardiac Rhythm SR.

## 2017-10-01 NOTE — PROGRESS NOTES
1945  Bedside SBAR report was taken from [place] [id number]. Patient is awake alert and oriented. Her Precedex is running and can be titrated to get BP less than 160. Patient complains of lower back and foot pain. Denies numbing and tingling. Can move all extremities, however is weak. 2020   had been in prior to being put on BiPap. Patient requested that he call her , which he did and the  told her that Mr. Kalyani Hewitt would be in      2030  Patient was not maintaining her saturations on high flow, thus respiratory therapy was called and this patient was placed on Bi-pap. Oxygen saturations were 88-89% on high flow, now on bi pap they are %    2140  , patients mother and father were in to see her. She got upset and ripped off her bi pap mask. Was able to replace it and get her calmed down. Family has left and the patient is resting now with sats at 99% and resp. At 25 bpm      0000  Bathed patient and checked her skin well. lotioned her up well. Tolerated turns well and was very cooperative. Cleaned her oral cavity as much as I could. Dried blood covers the inside. Denies pain, numbing tingling or itching. 0200  Resting well.      0400  Turned to the left. Voices no complaints. Refusing to let me perform mouth care. States it hurts when mouth care is done. Explained how important it is do have it done. Holly care performed    0600  Refuses to let me perform mouth care. Turned her onto her right side    0700  Gave patient pain pill for back pain. Once again refusing mouth care. Magnesium level drawn    0730  SBAR report given to Sara Wilcox, incoming RN. Patient was just given pain medicine for back pain. Dr. Maik Barakat has been in to see patient.

## 2017-10-02 LAB
ANION GAP SERPL CALC-SCNC: 9 MMOL/L (ref 3–18)
BASOPHILS # BLD: 0.1 K/UL (ref 0–0.1)
BASOPHILS NFR BLD: 1 % (ref 0–3)
BNP SERPL-MCNC: ABNORMAL PG/ML (ref 0–450)
BUN SERPL-MCNC: 12 MG/DL (ref 7–18)
BUN/CREAT SERPL: 13 (ref 12–20)
CA-I SERPL-SCNC: 1.24 MMOL/L (ref 1.12–1.32)
CALCIUM SERPL-MCNC: 8.4 MG/DL (ref 8.5–10.1)
CHLORIDE SERPL-SCNC: 112 MMOL/L (ref 100–108)
CO2 SERPL-SCNC: 21 MMOL/L (ref 21–32)
CREAT SERPL-MCNC: 0.93 MG/DL (ref 0.6–1.3)
DATE LAST DOSE: ABNORMAL
DIFFERENTIAL METHOD BLD: ABNORMAL
EOSINOPHIL # BLD: 0.5 K/UL (ref 0–0.4)
EOSINOPHIL NFR BLD: 6 % (ref 0–5)
ERYTHROCYTE [DISTWIDTH] IN BLOOD BY AUTOMATED COUNT: 16 % (ref 11.6–14.5)
GLUCOSE BLD STRIP.AUTO-MCNC: 108 MG/DL (ref 70–110)
GLUCOSE BLD STRIP.AUTO-MCNC: 72 MG/DL (ref 70–110)
GLUCOSE BLD STRIP.AUTO-MCNC: 81 MG/DL (ref 70–110)
GLUCOSE SERPL-MCNC: 70 MG/DL (ref 74–99)
HCT VFR BLD AUTO: 24.7 % (ref 35–45)
HGB BLD-MCNC: 8.2 G/DL (ref 12–16)
INR PPP: 1.2 (ref 0.8–1.2)
LYMPHOCYTES # BLD: 2.3 K/UL (ref 0.8–3.5)
LYMPHOCYTES NFR BLD: 28 % (ref 20–51)
MAGNESIUM SERPL-MCNC: 1.6 MG/DL (ref 1.6–2.6)
MAGNESIUM SERPL-MCNC: 1.7 MG/DL (ref 1.6–2.6)
MAGNESIUM SERPL-MCNC: 2.2 MG/DL (ref 1.6–2.6)
MCH RBC QN AUTO: 29.9 PG (ref 24–34)
MCHC RBC AUTO-ENTMCNC: 33.2 G/DL (ref 31–37)
MCV RBC AUTO: 90.1 FL (ref 74–97)
METAMYELOCYTES NFR BLD MANUAL: 1 %
MONOCYTES # BLD: 0.7 K/UL (ref 0–1)
MONOCYTES NFR BLD: 8 % (ref 2–9)
NEUTS BAND NFR BLD MANUAL: 1 % (ref 0–5)
NEUTS SEG # BLD: 4.5 K/UL (ref 1.8–8)
NEUTS SEG NFR BLD: 55 % (ref 42–75)
PHOSPHATE SERPL-MCNC: 3.8 MG/DL (ref 2.5–4.9)
PLATELET # BLD AUTO: 125 K/UL (ref 135–420)
POTASSIUM SERPL-SCNC: 4 MMOL/L (ref 3.5–5.5)
POTASSIUM SERPL-SCNC: 4.1 MMOL/L (ref 3.5–5.5)
POTASSIUM SERPL-SCNC: 4.2 MMOL/L (ref 3.5–5.5)
PROTHROMBIN TIME: 14.7 SEC (ref 11.5–15.2)
RBC # BLD AUTO: 2.74 M/UL (ref 4.2–5.3)
RBC MORPH BLD: ABNORMAL
REPORTED DOSE,DOSE: ABNORMAL UNITS
REPORTED DOSE/TIME,TMG: 2200
SODIUM SERPL-SCNC: 142 MMOL/L (ref 136–145)
VANCOMYCIN TROUGH SERPL-MCNC: 21.3 UG/ML (ref 10–20)
WBC # BLD AUTO: 8.2 K/UL (ref 4.6–13.2)

## 2017-10-02 PROCEDURE — 36591 DRAW BLOOD OFF VENOUS DEVICE: CPT

## 2017-10-02 PROCEDURE — 74011000258 HC RX REV CODE- 258: Performed by: INTERNAL MEDICINE

## 2017-10-02 PROCEDURE — 65610000006 HC RM INTENSIVE CARE

## 2017-10-02 PROCEDURE — 85610 PROTHROMBIN TIME: CPT | Performed by: PHYSICIAN ASSISTANT

## 2017-10-02 PROCEDURE — 74011250637 HC RX REV CODE- 250/637: Performed by: PHYSICIAN ASSISTANT

## 2017-10-02 PROCEDURE — 83735 ASSAY OF MAGNESIUM: CPT | Performed by: HOSPITALIST

## 2017-10-02 PROCEDURE — 74011250636 HC RX REV CODE- 250/636: Performed by: INTERNAL MEDICINE

## 2017-10-02 PROCEDURE — 97110 THERAPEUTIC EXERCISES: CPT

## 2017-10-02 PROCEDURE — 74011000258 HC RX REV CODE- 258: Performed by: HOSPITALIST

## 2017-10-02 PROCEDURE — 84132 ASSAY OF SERUM POTASSIUM: CPT | Performed by: HOSPITALIST

## 2017-10-02 PROCEDURE — 74011250636 HC RX REV CODE- 250/636: Performed by: HOSPITALIST

## 2017-10-02 PROCEDURE — 74011250636 HC RX REV CODE- 250/636: Performed by: FAMILY MEDICINE

## 2017-10-02 PROCEDURE — 74011250636 HC RX REV CODE- 250/636

## 2017-10-02 PROCEDURE — 76450000000

## 2017-10-02 PROCEDURE — 94640 AIRWAY INHALATION TREATMENT: CPT

## 2017-10-02 PROCEDURE — 84132 ASSAY OF SERUM POTASSIUM: CPT | Performed by: PHYSICIAN ASSISTANT

## 2017-10-02 PROCEDURE — 94660 CPAP INITIATION&MGMT: CPT

## 2017-10-02 PROCEDURE — 74011250637 HC RX REV CODE- 250/637: Performed by: INTERNAL MEDICINE

## 2017-10-02 PROCEDURE — 36592 COLLECT BLOOD FROM PICC: CPT

## 2017-10-02 PROCEDURE — 77030020254 HC SOL INJ D5LR LACTATED RINGER

## 2017-10-02 PROCEDURE — 77010033711 HC HIGH FLOW OXYGEN

## 2017-10-02 PROCEDURE — 82962 GLUCOSE BLOOD TEST: CPT

## 2017-10-02 PROCEDURE — 82330 ASSAY OF CALCIUM: CPT | Performed by: HOSPITALIST

## 2017-10-02 PROCEDURE — 74011258636 HC RX REV CODE- 258/636: Performed by: INTERNAL MEDICINE

## 2017-10-02 PROCEDURE — 84100 ASSAY OF PHOSPHORUS: CPT | Performed by: HOSPITALIST

## 2017-10-02 PROCEDURE — 74011000250 HC RX REV CODE- 250: Performed by: INTERNAL MEDICINE

## 2017-10-02 PROCEDURE — 97166 OT EVAL MOD COMPLEX 45 MIN: CPT

## 2017-10-02 PROCEDURE — 83880 ASSAY OF NATRIURETIC PEPTIDE: CPT | Performed by: HOSPITALIST

## 2017-10-02 PROCEDURE — 77030018846 HC SOL IRR STRL H20 ICUM -A

## 2017-10-02 PROCEDURE — 85025 COMPLETE CBC W/AUTO DIFF WBC: CPT | Performed by: PHYSICIAN ASSISTANT

## 2017-10-02 PROCEDURE — 80202 ASSAY OF VANCOMYCIN: CPT | Performed by: HOSPITALIST

## 2017-10-02 PROCEDURE — 77030011256 HC DRSG MEPILEX <16IN NO BORD MOLN -A

## 2017-10-02 RX ORDER — HYDROCODONE BITARTRATE AND ACETAMINOPHEN 5; 325 MG/1; MG/1
2 TABLET ORAL
Status: DISCONTINUED | OUTPATIENT
Start: 2017-10-02 | End: 2017-10-09 | Stop reason: HOSPADM

## 2017-10-02 RX ORDER — HYDRALAZINE HYDROCHLORIDE 20 MG/ML
INJECTION INTRAMUSCULAR; INTRAVENOUS
Status: COMPLETED
Start: 2017-10-02 | End: 2017-10-02

## 2017-10-02 RX ORDER — HYDRALAZINE HYDROCHLORIDE 20 MG/ML
20 INJECTION INTRAMUSCULAR; INTRAVENOUS ONCE
Status: COMPLETED | OUTPATIENT
Start: 2017-10-02 | End: 2017-10-02

## 2017-10-02 RX ORDER — MAGNESIUM SULFATE HEPTAHYDRATE 40 MG/ML
INJECTION, SOLUTION INTRAVENOUS
Status: COMPLETED
Start: 2017-10-02 | End: 2017-10-02

## 2017-10-02 RX ORDER — MAGNESIUM SULFATE 1 G/100ML
1 INJECTION INTRAVENOUS ONCE
Status: COMPLETED | OUTPATIENT
Start: 2017-10-02 | End: 2017-10-02

## 2017-10-02 RX ORDER — MAGNESIUM SULFATE HEPTAHYDRATE 40 MG/ML
2 INJECTION, SOLUTION INTRAVENOUS ONCE
Status: COMPLETED | OUTPATIENT
Start: 2017-10-02 | End: 2017-10-02

## 2017-10-02 RX ORDER — LABETALOL HYDROCHLORIDE 5 MG/ML
20 INJECTION, SOLUTION INTRAVENOUS
Status: DISCONTINUED | OUTPATIENT
Start: 2017-10-02 | End: 2017-10-09 | Stop reason: HOSPADM

## 2017-10-02 RX ORDER — POTASSIUM CHLORIDE 7.45 MG/ML
10 INJECTION INTRAVENOUS ONCE
Status: COMPLETED | OUTPATIENT
Start: 2017-10-02 | End: 2017-10-02

## 2017-10-02 RX ORDER — DEXTROSE, SODIUM CHLORIDE, SODIUM LACTATE, POTASSIUM CHLORIDE, AND CALCIUM CHLORIDE 5; .6; .31; .03; .02 G/100ML; G/100ML; G/100ML; G/100ML; G/100ML
50 INJECTION, SOLUTION INTRAVENOUS CONTINUOUS
Status: DISCONTINUED | OUTPATIENT
Start: 2017-10-02 | End: 2017-10-04

## 2017-10-02 RX ORDER — AMLODIPINE BESYLATE 10 MG/1
10 TABLET ORAL DAILY
Status: DISCONTINUED | OUTPATIENT
Start: 2017-10-02 | End: 2017-10-09 | Stop reason: HOSPADM

## 2017-10-02 RX ORDER — METOPROLOL TARTRATE 50 MG/1
100 TABLET ORAL EVERY 12 HOURS
Status: DISCONTINUED | OUTPATIENT
Start: 2017-10-02 | End: 2017-10-09 | Stop reason: HOSPADM

## 2017-10-02 RX ORDER — GABAPENTIN 300 MG/1
300 CAPSULE ORAL 3 TIMES DAILY
Status: DISCONTINUED | OUTPATIENT
Start: 2017-10-02 | End: 2017-10-09 | Stop reason: HOSPADM

## 2017-10-02 RX ORDER — FAMOTIDINE 20 MG/1
20 TABLET, FILM COATED ORAL EVERY 12 HOURS
Status: DISCONTINUED | OUTPATIENT
Start: 2017-10-02 | End: 2017-10-09 | Stop reason: HOSPADM

## 2017-10-02 RX ORDER — HYDRALAZINE HYDROCHLORIDE 20 MG/ML
10 INJECTION INTRAMUSCULAR; INTRAVENOUS ONCE
Status: COMPLETED | OUTPATIENT
Start: 2017-10-02 | End: 2017-10-02

## 2017-10-02 RX ORDER — CIPROFLOXACIN 2 MG/ML
400 INJECTION, SOLUTION INTRAVENOUS EVERY 12 HOURS
Status: DISCONTINUED | OUTPATIENT
Start: 2017-10-02 | End: 2017-10-05

## 2017-10-02 RX ADMIN — HYDRALAZINE HYDROCHLORIDE 20 MG: 20 INJECTION INTRAMUSCULAR; INTRAVENOUS at 15:47

## 2017-10-02 RX ADMIN — FAMOTIDINE 20 MG: 20 TABLET ORAL at 08:14

## 2017-10-02 RX ADMIN — OXYCODONE HYDROCHLORIDE 5 MG: 5 TABLET ORAL at 00:15

## 2017-10-02 RX ADMIN — LABETALOL HYDROCHLORIDE 20 MG: 5 INJECTION, SOLUTION INTRAVENOUS at 16:46

## 2017-10-02 RX ADMIN — SODIUM CHLORIDE 0.2 MCG/KG/HR: 900 INJECTION, SOLUTION INTRAVENOUS at 06:20

## 2017-10-02 RX ADMIN — Medication 10 ML: at 06:01

## 2017-10-02 RX ADMIN — BUDESONIDE 500 MCG: 0.5 INHALANT RESPIRATORY (INHALATION) at 07:31

## 2017-10-02 RX ADMIN — OXYCODONE HYDROCHLORIDE 5 MG: 5 TABLET ORAL at 05:07

## 2017-10-02 RX ADMIN — LEVOTHYROXINE SODIUM 100 MCG: 100 TABLET ORAL at 06:00

## 2017-10-02 RX ADMIN — CIPROFLOXACIN 400 MG: 2 INJECTION, SOLUTION INTRAVENOUS at 19:41

## 2017-10-02 RX ADMIN — ONDANSETRON 4 MG: 2 INJECTION INTRAMUSCULAR; INTRAVENOUS at 19:35

## 2017-10-02 RX ADMIN — HYDROCODONE BITARTRATE AND ACETAMINOPHEN 2 TABLET: 5; 325 TABLET ORAL at 19:40

## 2017-10-02 RX ADMIN — MAGNESIUM SULFATE HEPTAHYDRATE 1 G: 1 INJECTION, SOLUTION INTRAVENOUS at 15:55

## 2017-10-02 RX ADMIN — SODIUM CHLORIDE 0.3 MCG/KG/HR: 900 INJECTION, SOLUTION INTRAVENOUS at 03:58

## 2017-10-02 RX ADMIN — IPRATROPIUM BROMIDE AND ALBUTEROL SULFATE 3 ML: .5; 3 SOLUTION RESPIRATORY (INHALATION) at 01:50

## 2017-10-02 RX ADMIN — AMLODIPINE BESYLATE 10 MG: 10 TABLET ORAL at 12:46

## 2017-10-02 RX ADMIN — METOPROLOL TARTRATE 50 MG: 50 TABLET ORAL at 08:15

## 2017-10-02 RX ADMIN — Medication 10 ML: at 13:34

## 2017-10-02 RX ADMIN — IPRATROPIUM BROMIDE AND ALBUTEROL SULFATE 3 ML: .5; 3 SOLUTION RESPIRATORY (INHALATION) at 13:27

## 2017-10-02 RX ADMIN — TOBRAMYCIN SULFATE 300 MG: 40 INJECTION, SOLUTION INTRAMUSCULAR; INTRAVENOUS at 12:15

## 2017-10-02 RX ADMIN — FAMOTIDINE 20 MG: 20 TABLET ORAL at 20:38

## 2017-10-02 RX ADMIN — LISINOPRIL 20 MG: 20 TABLET ORAL at 08:14

## 2017-10-02 RX ADMIN — GABAPENTIN 300 MG: 300 CAPSULE ORAL at 15:55

## 2017-10-02 RX ADMIN — MAGNESIUM SULFATE HEPTAHYDRATE 2 G: 40 INJECTION, SOLUTION INTRAVENOUS at 06:04

## 2017-10-02 RX ADMIN — METOPROLOL TARTRATE 100 MG: 50 TABLET ORAL at 20:38

## 2017-10-02 RX ADMIN — POTASSIUM CHLORIDE 10 MEQ: 7.46 INJECTION, SOLUTION INTRAVENOUS at 09:46

## 2017-10-02 RX ADMIN — THIAMINE HYDROCHLORIDE 100 MG: 100 INJECTION, SOLUTION INTRAMUSCULAR; INTRAVENOUS at 20:39

## 2017-10-02 RX ADMIN — IPRATROPIUM BROMIDE AND ALBUTEROL SULFATE 3 ML: .5; 3 SOLUTION RESPIRATORY (INHALATION) at 07:31

## 2017-10-02 RX ADMIN — GABAPENTIN 300 MG: 300 CAPSULE ORAL at 21:57

## 2017-10-02 RX ADMIN — SODIUM CHLORIDE, SODIUM LACTATE, POTASSIUM CHLORIDE, CALCIUM CHLORIDE, AND DEXTROSE MONOHYDRATE 50 ML/HR: 600; 310; 30; 20; 5 INJECTION, SOLUTION INTRAVENOUS at 12:14

## 2017-10-02 RX ADMIN — THIAMINE HYDROCHLORIDE 100 MG: 100 INJECTION, SOLUTION INTRAMUSCULAR; INTRAVENOUS at 08:30

## 2017-10-02 RX ADMIN — HYDRALAZINE HYDROCHLORIDE 10 MG: 20 INJECTION INTRAMUSCULAR; INTRAVENOUS at 05:07

## 2017-10-02 RX ADMIN — HYDRALAZINE HYDROCHLORIDE 10 MG: 20 INJECTION INTRAMUSCULAR; INTRAVENOUS at 09:44

## 2017-10-02 RX ADMIN — HYDROCODONE BITARTRATE AND ACETAMINOPHEN 2 TABLET: 5; 325 TABLET ORAL at 12:48

## 2017-10-02 RX ADMIN — Medication 10 ML: at 21:30

## 2017-10-02 RX ADMIN — LABETALOL HYDROCHLORIDE 20 MG: 5 INJECTION, SOLUTION INTRAVENOUS at 12:22

## 2017-10-02 RX ADMIN — ENOXAPARIN SODIUM 40 MG: 40 INJECTION SUBCUTANEOUS at 21:57

## 2017-10-02 RX ADMIN — OXYCODONE HYDROCHLORIDE 5 MG: 5 TABLET ORAL at 09:27

## 2017-10-02 RX ADMIN — ARFORMOTEROL TARTRATE 15 MCG: 15 SOLUTION RESPIRATORY (INHALATION) at 07:31

## 2017-10-02 RX ADMIN — IPRATROPIUM BROMIDE AND ALBUTEROL SULFATE 3 ML: .5; 3 SOLUTION RESPIRATORY (INHALATION) at 20:02

## 2017-10-02 NOTE — PROGRESS NOTES
0700 Received patient from [facility] 39 Patient B/P 181/105 Valeen Mcardle PA made aware. PRN antihypertensive not due at this time. One time additional dose IV hydralazin ordered see MAR     1222 Patient B/P still elevated systolic in 302'J PRN IV Labetolol given see Vital signs and MAR     12 Kaelyn HOLLINGSWORTH was made aware of patient high B/Ps. Patient placed back on home BP medication with the exception of Clonidine per pharmacy clonidine and precedex may interact. P.O.  Antihypertensives from home ordered and given see MAR    0267 Patient systolic B/P still  in 202'G     1340 Patient placed on High Flow NC 50L 35% by RT     1600 Mirian MAIN made aware of patients B/P despite many antihypertensives    10/02/17 1600   Vitals   BP (!) 173/96

## 2017-10-02 NOTE — PROGRESS NOTES
0 - Received report from [facility]. Pt is alert and stable. Currently using hi-flow NC. Complains of nausea. See MAR. Will continue to monitor. 2224 - Received call from lab about critical vancomycin trough of 21.1. Pharmacy notified. Informed to hold 2200 dose. 10/3/17  0000 - Pt resting quietly and stable. Using bipap. No complaints at this time. Will continue to monitor. 0400 - Pt bathed. Tolerated well. Complained of pain. See MAR. Will continue to monitor.

## 2017-10-02 NOTE — PROGRESS NOTES
0700 Beside verbal report received from  [facility]. Pt denied any pain and continued on BI-Pap. No distress noted at the time. 0930 bp 181/105 Jacey MAIN was informed at bedside. New order for hydralazine 10 mg iv received. 1050 Dr Candelario Ratliff by bedside to see the pt.  6682 Pt placed on high flow  Oxygen at 35% at 50 L/ min. 1523 /99. Navi MAIN was informed. New order to give hydralazine 20 mg IV stat. Pt tolerating high flow oxygen well. Denies any SOB. 1900 Bedside and Verbal shift change report given to BRENDA RN (oncoming nurse) by Jordon Villafuerte RN (offgoing nurse). Report included the following information SBAR, Kardex, ED Summary, Intake/Output, MAR, Recent Results and Cardiac Rhythm NSR.

## 2017-10-02 NOTE — PROGRESS NOTES
Called by nurse for patient on heparin with platelet 84. Unlikely to be HIT given an upward trend in platelet level. I will however switch her to Lovenox 40 mg sc every day.

## 2017-10-02 NOTE — PROGRESS NOTES
-0730-Received patient on Bipap 12/6, FIO2-35%. O2 Sats-99% HR-91, RR-21. Pt. Resting comfortably not respiratory distress at this time. CPT due to /99 MAP-115. Respiratory will continue to monitor. -[facility]    -1340-Pt. Placed on high flow nasal cannula FIO2-35% 50lpm. O2 Sats-96% HR-91, RR-92. CPT held due to /100. Dried blood noted in nose and mouth. RN aware. Respiratory will continue to monitor. -[facility]    -1540-Pt. Remains on high flow nasal cannula. FIO2-35% 50lpm. O2 Sats-96% HR-97, RR-20. Pt.  Is awake and alert watching TV.-[facility]

## 2017-10-02 NOTE — PROGRESS NOTES
Problem: Falls - Risk of  Goal: *Absence of Falls  Document Kapil Fall Risk and appropriate interventions in the flowsheet. Outcome: Not Met  Fall Risk Interventions:  Mobility Interventions: Bed/chair exit alarm     Mentation Interventions: Bed/chair exit alarm     Medication Interventions: Bed/chair exit alarm     Elimination Interventions: Call light in reach      patient is bed bound at this time. She is on bipap and onsure when she will be able to be strong enough to get up with PT           Problem: Gas Exchange - Impaired  Goal: *Absence of hypoxia  Outcome: Not Progressing Towards Goal  Patient has been on Bi pap since yesterday.  She was on high flow with sats no higher than 89%

## 2017-10-02 NOTE — PROGRESS NOTES
Patient remains on BiPAP    0446 - patient remains on BiPAP - no s/s respiratory distress noted at this time

## 2017-10-02 NOTE — PROGRESS NOTES
Physical Exam   Skin:             Primary Nurse Vivienne Stout RN and Latasha aCll RN performed a dual skin assessment on this patient Impairment noted- see wound doc flow sheet  Jamie score is 15

## 2017-10-02 NOTE — PROGRESS NOTES
Problem: Falls - Risk of  Goal: *Absence of Falls  Document Kapil Fall Risk and appropriate interventions in the flowsheet.    Fall Risk Interventions:  Mobility Interventions: Patient to call before getting OOB     Mentation Interventions: Bed/chair exit alarm     Medication Interventions: Patient to call before getting OOB, Bed/chair exit alarm     Elimination Interventions: Call light in reach

## 2017-10-02 NOTE — PROGRESS NOTES
[facility]  ICU Progress Note    Name: Kamaljit White   : 1965   MRN: [id number]   Date: 10/2/2017 2:14 PM     [x]I have reviewed the flowsheet and previous days notes. Events overnight reviewed and discussed with nursing staff. Vital signs and records reviewed. Subjective:  - Elevated BP overnight and this AM. HR controlled. - Remained on bipap overnight, will trial on HiFlow today. - Off Precedex with no agitation with bipap. - Afebrile. RC (+) MRSA and pseudomonas   - Adequate UOP from mckeon, clear yellow urine   - Pt reports back pain, but denies any other complaints. []The patient is critically ill on      []Mechanical ventilation []Pressors   [x]BiPAP []               ROS:Review of systems not obtained due to patient factors.     Medication Review:  · Pressors - none  · Sedation - PRN precedex   · Antibiotics - Tobramycin, Vanc   · Pain - PRN Norco  · GI/ DVT -  Pepcid/Lovenox   · Others (other gtts)    Safety Bundles: CAUTI/ Electrolyte Replacement Protocol    Vital Signs:    Visit Vitals    BP (!) 187/108    Pulse 96    Temp 98.5 °F (36.9 °C)    Resp 28    Ht 5' 4\" (1.626 m)    Wt 69.1 kg (152 lb 5.4 oz)    SpO2 99%    BMI 26.15 kg/m2       O2 Device: BIPAP   O2 Flow Rate (L/min): 100 l/min   Temp (24hrs), Av.6 °F (36.4 °C), Min:96.4 °F (35.8 °C), Max:98.5 °F (36.9 °C)       Intake/Output:   Last shift:      10/02 0701 - 10/02 1900  In: 73.2 [I.V.:13.2]  Out: 615 [Urine:615]  Last 3 shifts: 1901 - 10/02 0700  In: 2925.9 [I.V.:1460.9]  Out: 1888 [Urine:3284; Drains:50]    Intake/Output Summary (Last 24 hours) at 10/02/17 1414  Last data filed at 10/02/17 1300   Gross per 24 hour   Intake          1547.92 ml   Output             2665 ml   Net         -1117.08 ml       Ventilator Settings:  Ventilator  Mode: Pressure support, Spontaneous  Respiratory Rate  Resp Rate Observed: 23  Back-Up Rate: 16  Insp Time (sec): 1.1 sec  I:E Ratio: 1:1.5  Ventilator Volumes  Vt Set (ml): 420 ml  Vt Exhaled (Machine Breath) (ml): 412 ml  Vt Spont (ml): 489 ml  Ve Observed (l/min): 10.3 l/min  Ventilator Pressures  PIP Observed (cm H2O): 13 cm H2O  Plateau Pressure (cm H2O): 22 cm H2O  MAP (cm H2O): 8.2  PEEP/VENT (cm H2O): 5 cm H20  Auto PEEP Observed (cm H2O):  (unable to obtain)    Physical Exam:              General: A&O x 4 in NAD. HEENT: bipap in place                        Neck: Supple, no JVD, but (+) hepato-juglar reflex. Chest: Normal                        Lungs: Symmetric rise and fall of chest with no increased WOB while on bipap. Inspiratory rales in LLL, otherwise CTA. No wheezing, rhonchi. Heart: Regular rate and rhythm. No m,r,g. Abdomen: non distended, bowel sounds normoactive. PEG tube in place, small amount of drainage around site. Extremity: No edema or cyanosis in extremities bilaterally. Neuro: No focal weakness or paresthesias. CN II-XII intact.                          Skin: Intact                                        Devices:  PEG, PICC in subclavian, Holly     DATA:     Current Facility-Administered Medications   Medication Dose Route Frequency    famotidine (PEPCID) tablet 20 mg  20 mg Oral Q12H    HYDROcodone-acetaminophen (NORCO) 5-325 mg per tablet 2 Tab  2 Tab Oral Q4H PRN    gabapentin (NEURONTIN) capsule 300 mg  300 mg Oral TID    amLODIPine (NORVASC) tablet 10 mg  10 mg Oral DAILY    labetalol (NORMODYNE;TRANDATE) injection 20 mg  20 mg IntraVENous Q4H PRN    dextrose 5% lactated ringers infusion  50 mL/hr IntraVENous CONTINUOUS    lisinopril (PRINIVIL, ZESTRIL) tablet 20 mg  20 mg Oral DAILY    metoprolol tartrate (LOPRESSOR) tablet 50 mg  50 mg Oral Q12H    enoxaparin (LOVENOX) injection 40 mg  40 mg SubCUTAneous Q24H    tobramycin (NEBCIN) 300 mg in 0.9% sodium chloride 100 mL IVPB  300 mg IntraVENous Q36H    TOBRAMYCIN INFORMATION NOTE   Other ONCE    TOBRAMYCIN INFORMATION NOTE   Other Rx Dosing/Monitoring    haloperidol lactate (HALDOL) injection 4 mg  4 mg IntraVENous Q8H PRN    vancomycin (VANCOCIN) 1,000 mg in 0.9% sodium chloride (MBP/ADV) 250 mL adv  1,000 mg IntraVENous Q24H    VANCOMYCIN INFORMATION NOTE   Other ONCE    VANCOMYCIN INFORMATION NOTE   Other Rx Dosing/Monitoring    dexmedeTOMidine (PRECEDEX) 400 mcg in 0.9% sodium chloride 100 mL infusion  0.2-0.7 mcg/kg/hr IntraVENous TITRATE    albuterol-ipratropium (DUO-NEB) 2.5 MG-0.5 MG/3 ML  3 mL Nebulization Q6H RT    0.9% sodium chloride infusion 250 mL  250 mL IntraVENous PRN    levothyroxine (SYNTHROID) tablet 100 mcg  100 mcg Oral Daily    insulin lispro (HUMALOG) injection   SubCUTAneous Q6H    glucose chewable tablet 16 g  4 Tab Oral PRN    glucagon (GLUCAGEN) injection 1 mg  1 mg IntraMUSCular PRN    dextrose (D50W) injection syrg 12.5-25 g  25-50 mL IntraVENous PRN    sodium chloride (NS) flush 5-10 mL  5-10 mL IntraVENous PRN    sodium chloride (NS) flush 5-10 mL  5-10 mL IntraVENous Q8H    sodium chloride (NS) flush 5-10 mL  5-10 mL IntraVENous PRN    ondansetron (ZOFRAN) injection 4 mg  4 mg IntraVENous Q6H PRN    thiamine (B-1) 100 mg in 0.9% sodium chloride 50 mL IVPB  100 mg IntraVENous BID    acetaminophen (TYLENOL) solution 650 mg  650 mg Per G Tube Q4H PRN    ELECTROLYTE REPLACEMENT PROTOCOL  1 Each Other PRN    ELECTROLYTE REPLACEMENT PROTOCOL  1 Each Other PRN    ELECTROLYTE REPLACEMENT PROTOCOL  1 Each Other PRN    ELECTROLYTE REPLACEMENT PROTOCOL  1 Each Other PRN     Labs: Results:       Chemistry Recent Labs      10/02/17   0410  10/02/17   0025  10/01/17   1100  10/01/17   0400   09/30/17   0400   GLU  70*   --    --   112*   --   107*   NA  142   --    --   146*   --   145   K  4.0  4.2  3.5  3.7   < >  3.8   CL  112*   --    --   115*   --   114*   CO2  21   --    -- 22   --   22   BUN  12   --    --   15   --   19*   CREA  0.93   --    --   0.99   --   1.03   CA  8.4*   --    --   8.3*   --   8.6   AGAP  9   --    --   9   --   9   BUCR  13   --    --   15   --   18    < > = values in this interval not displayed. CBC w/Diff Recent Labs      10/02/17   0410  10/01/17   0400  09/30/17   0920  09/30/17   0400   WBC  8.2  6.8   --   8.1   RBC  2.74*  2.45*   --   2.70*   HGB  8.2*  7.3*  8.4*  8.1*   HCT  24.7*  22.2*  24.9*  24.4*   PLT  125*  84*   --   63*   GRANS  55  53   --   68   LYMPH  28  26   --   23   EOS  6*  6*   --   0      Coagulation Recent Labs      10/02/17   0410  10/01/17   0400   PTP  14.7  15.6*   INR  1.2  1.3*       Liver Enzymes No results for input(s): TP, ALB, TBIL, AP, SGOT, GPT in the last 72 hours. No lab exists for component: DBIL   ABG No results found for: PH, PHI, PCO2, PCO2I, PO2, PO2I, HCO3, HCO3I, FIO2, FIO2I   Microbiology No results for input(s): CULT in the last 72 hours. Telemetry: [x]Sinus []A-flutter []Paced    []A-fib []Multiple PVCs                  Imaging:  CT Chest 10/01/17: 1. Slight interval improvement in the patchy bilateral airspace opacities with  overall similar distribution as above suggesting multifocal pneumonia and likely  superimposed interstitial edema. 2.  Small new bilateral pleural effusions and a trace pericardial effusion. 3.  Probable reactive mediastinal adenopathy. IMPRESSION:   · Acute Hypercapnic and Hypoxic Respiratory Failure- extubated 09/25 and has since required bipap  · Health Care Associated Pneumonia- bilateral ground glass opacities of CT Chest, respiratory culture (+) MRSA and Pseudomonas MDR   · Possible acute exacerbation of diastolic CHF-compensated, does not appear fluid overloaded  · Septic Shock requiring pressors- now resolved.    · Acute Kidney Injury- resolved  · Hx of throat cancer s/p PEG placement   · DM Type II  · Remote hx of alcohol and tobacco abuse      PLAN: · Resp -  Continuous bipap 12/6 with trial of HiFlow NC as pt tolerates. Continue Pulmicort and brovana. Repeat CT Chest- similar to prior, no new findings. Duo-nebz. Difficult airway due to upper airway obstruction from cancer, call anesthesia for assistance (size 6.5 ETT used initally). · ID - afebrile and leukocytosis- resolved. Continue Vanc and Tobramycin for (+) respiratory cultures. UC and BC- NGTD. · CVS - Elevated BP. Continue Norvasc, lisinopril, and metoprolol. PRN labetalol for BP > 160/100. Elevated BNP > 35,000, recheck. · Heme/onc -  Monitor H/H and transfuse for hemoglobin < 7. Platelets okay, HIT negative. · Metabolic - Monitor and replace lytes per protocol. Add D5LR @ 50 ml/hr to prevent hypoglycemia while pt NPO. · Renal - Monitor Cr and UOP. Strict I&O. Free water flush to prevent hypernatremia. · Endocrine - BS q 6 hrs while NPO. Continue Synthroid. · Neuro/ Pain/ Sedation - PRN Norco, chronic back pain. Restart Gabapentin. Hold precedex if pt is able to tolerate. Avoid sedating medications. · GI - NPO except meds while on bipap. · Prophylaxis - DVT(lovenox), GI(Pepcid)  · Discussed in interdisciplinary rounds  · Palliative Care following.    · Full Code         The patient is: [x] acutely ill Risk of deterioration: [x] moderate    [] critically ill  [] high     [x]See my orders for details    My assessment/plan was discussed with:  [x]nursing []PT/OT    []respiratory therapy [x]Dr. Yariel Luna [x] Patient      Scotty Guo PA-C   10/02/17 2:14 PM

## 2017-10-02 NOTE — PROGRESS NOTES
Patient is unable to communicate at this time.  offered prayer and left Spiritual Care brochure. Chaplains will continue to follow and will provide pastoral care on an as needed/requested basis.     Britton Mcclure   Spiritual Care   [phone]

## 2017-10-02 NOTE — MED STUDENT NOTES
*ATTENTION:  This note has been created by a medical student for educational purposes only. Please do not refer to the content of this note for clinical decision-making, billing, or other purposes. Please see attending physicians note to obtain clinical information on this patient. *        History and Physical    Patient: Jayy Araujo MRN: [id number]  SSN: [id number]    YOB: 1965  Age: 54 y.o. Sex: female      Subjective:      Jayy Araujo is a 54 y.o. female who presented to Ed  for SOB. PMH significant for throat cancer, HTN, DM, CAD. Patient was discharged several days prior to presentation for [facility] for aspiration pneumonia. Patient was intubated in ED d/t respiratory distress. Found to have bilateral pneumonia in ED. Culture grew pseudomonas and MRSA. Patient was admitted to ICU. Extubated  with bipap following extubation. Currently tolerating high flow. Today patient was awake and responsive on bipap. Did not communicate any new complaints. Past Medical History:   Diagnosis Date    Anemia     Cancer ([address]     throat    Coronary artery disease     [facility]     Diabetes mellitus [facility]     ETOH abuse     HTN (hypertension)     Hypertension     Hypothyroid     Lupus ([facility]     Osteonecrosis (HCC)     of jaw    PEG adjustment, replacement, or removal     S/P thoracentesis     Throat cancer [facility])      Past Surgical History:   Procedure Laterality Date    ABDOMEN SURGERY PROC UNLISTED      feeding tube placement    HX  SECTION      HX HEENT      throat cancer biopsy      Family History   Problem Relation Age of Onset    Lupus Sister      Social History   Substance Use Topics    Smoking status: Former Smoker    Smokeless tobacco: Never Used    Alcohol use No      Prior to Admission medications    Medication Sig Start Date End Date Taking? Authorizing Provider   furosemide (LASIX) 20 mg tablet Take  by mouth daily.     Phys MD Rey cloNIDine HCl (CATAPRES) 0.1 mg tablet Take 0.1 mg by mouth two (2) times a day. Nikita Le MD   multivitamin (ONE A DAY) tablet Take 1 Tab by mouth daily. Nikita Le MD   HYDROcodone-acetaminophen (NORCO) 5-325 mg per tablet Take 1 Tab by mouth every four (4) hours as needed for Pain. Nikita Le MD   polyethylene glycol (MIRALAX) 17 gram/dose powder Take 17 g by mouth daily. Nikita Le MD   docusate sodium (COLACE) 100 mg capsule Take 100 mg by mouth two (2) times a day. Nikita Le MD   oxyCODONE-acetaminophen (PERCOCET) 5-325 mg per tablet Take 1 Tab by mouth every four (4) hours as needed for Pain. Nikita Le MD   amLODIPine-benazepril (LOTREL) 5-10 mg per capsule Take 1 Cap by mouth daily. Nikita Le MD   metoprolol (LOPRESSOR) 50 mg tablet Take 50 mg by mouth two (2) times a day. Nikita Le MD   levothyroxine (SYNTHROID) 100 mcg tablet Take  by mouth Daily (before breakfast). Nikita Le MD   gabapentin (NEURONTIN) 300 mg capsule Take 300 mg by mouth three (3) times daily. Nikita Le MD   amylase-lipase-protease (CREON) capsule Take  by mouth three (3) times daily (with meals). Nikita Le MD   sodium bicarbonate 325 mg tablet Take 325 mg by mouth four (4) times daily. Nikita Le MD   pantoprazole (PROTONIX) 40 mg tablet Take 1 Tab by mouth daily. 1/5/14   Ja Salcedo,    ondansetron hcl (ZOFRAN) 4 mg tablet Take 1 Tab by mouth every eight (8) hours as needed for Nausea. 1/5/14   Ja Salcedo,    folic acid (FOLVITE) 1 mg tablet Take 1 mg by mouth daily. Nikita Le MD   aspirin 81 mg tablet Take 81 mg by mouth.       Nikita Le MD        Allergies   Allergen Reactions    Amlodipine Swelling     Leg swelling per patient    Morphine Rash       Objective:     Vitals:    10/02/17 1130 10/02/17 1146 10/02/17 1148 10/02/17 1328   BP:   (!) 187/108    Pulse: 88  96    Resp: 19  28    Temp:       SpO2: 99% 93% 93% 99%   Weight:       Height: Physical Exam:  GENERAL: alert, cooperative, no distress  LUNG: clear to auscultation bilaterally  HEART: regular rate and rhythm, S1, S2 normal, no murmur, click, rub or gallop    Assessment:     Hospital Problems  Date Reviewed: 9/24/2017          Codes Class Noted POA    * (Principal)Acute respiratory failure with hypercapnia ([email or web address] ICD-10-CM: J96.02  ICD-9-CM: 518.81  9/24/2017 Yes        Sepsis ([facility] ICD-10-CM: A41.9  ICD-9-CM: 038.9, 995.91  9/24/2017 Yes        HCAP (healthcare-associated pneumonia) ICD-10-CM: J18.9  ICD-9-CM: 062  9/24/2017 Yes        Hypertension ICD-10-CM: I10  ICD-9-CM: 401.9  9/24/2017 Yes        Diabetes mellitus ([facility] ICD-10-CM: E11.9  ICD-9-CM: 250.00  9/24/2017 Yes              Plan:   Acute respiratory failure, HCAP  -continue bipap with high flow  -repeat chest CT negative    Sepsis  -consult ID  -continue IV Abs  -low platelets suggested possible HIT, switched to lovenox    HTN  -continue IV labetalol, norvasc PO    Signed By: Antoinette Sol     October 2, 2017

## 2017-10-02 NOTE — CONSULTS
Infectious Disease Consultation Note    Requested by: Dr. Leigh Dire    Reason: pseudomonas and MRSA in sputum culture    Current abx Prior abx   Vancomycin 9/24 - 8  Ciprofloxacin 10/2 - 0 Levofloxacin 9/24 - 4  Zosyn 9/24 - 6 Tobramycin 9/30 - 2      ASSESSMENT - > REC:     Recurrent Pneumonia  - MRSA, Pseudomonas in spcx 9/24  - same organisms in spcx last confinement [facility]) 8/11-18  - treated with Vancomycin/zosyn x 14 days (ending ~8/25)  - bilateral multifocal opacities - sl improved on CT 10/1 -> continue Vancomycin  -> change Tobramycin to Ciprofloxacin 400 mg IV q 12h (eventual change to po 750 bid)  -> could eventually change Vancomycin to Doxycycline aiming for total 3-4 weeks rx (relapsed after 2 weeks IV rx)   Respiratory Failure   - intubated 9/24, extubated 9/25 -> HFNC per PCCM   H/o throat CA     ETOH abuse    DM    HTN    CAD    GERD    PEG    DELFINA      MICROBIOLOGY:   9/24 blcx NG x 2    urcx NG   spcx Ps aeruginosa sens aminoglycosides, cipro res pip-tazo, other beta lactams    LINES AND CATHETERS:   Tunneled PICC R ? IJ      HPI:    54year-old female with h/o throat CA, ETOH abuse, DM, HTN, CAD, GERD, PEG, DELFINA admitted to [facility] 9/24/2017 due to shortness of breath. She had recently been confined to [facility] from 8/11-8/18 for MRSA and Pseudomonas pneumonia for which she was given Vancomycin and zosyn for 14 days. She followed up with Kayla Long ID on 8/24 and no further antibiotic therapy was recommended. She was sent to the [facility] ED on 9/16 due to worsening anemia and was discharged that same day. She presented to [facility] ED on 9/24 with severe shortness of breath requiring intubation and admission to the NICU. She was febrile to 100.5 degrees and tachycardic to 135/minute. Vancomycin, Zosyn and levofloxacin were given. WBC count was 18.3 but faheem to 32.8 with 30% bands on 9/25.   CTA chest 9/24 showed no pulmonary embolism but diffuse alveolar, centrilobular and peribronchial opacities (multifocal pneumonia). Sputum culture again grew MRSA and Pseudomonas aeruginosa res to zosyn, Meropenem and all beta-lactams. It was sensitive to Ciprofloxacin and aminoglycosides. Blood cultures  are NG x 3.  Repeat CT Chest 10/1 showed slight interval improvement in the bilateral opacities. Zosyn was discontinued  and tobramycin started IV. Fever resolved by  and WBC normalized by . Past Medical History:   Diagnosis Date    Anemia     Cancer ([facility]     throat    Coronary artery disease     Diabetes ([facility]     [facility]     ETOH abuse     HTN (hypertension)     Hypertension     Hypothyroid     Lupus [facility]     Osteonecrosis (HCC)     of jaw    PEG adjustment, replacement, or removal     S/P thoracentesis     Throat cancer [facility])        Past Surgical History:   Procedure Laterality Date    ABDOMEN SURGERY PROC UNLISTED      feeding tube placement    HX  SECTION      HX HEENT      throat cancer biopsy       Allergies: Amlodipine and Morphine .     Current Facility-Administered Medications   Medication Dose Route Frequency    famotidine (PEPCID) tablet 20 mg  20 mg Oral Q12H    HYDROcodone-acetaminophen (NORCO) 5-325 mg per tablet 2 Tab  2 Tab Oral Q4H PRN    gabapentin (NEURONTIN) capsule 300 mg  300 mg Oral TID    amLODIPine (NORVASC) tablet 10 mg  10 mg Oral DAILY    labetalol (NORMODYNE;TRANDATE) injection 20 mg  20 mg IntraVENous Q4H PRN    dextrose 5% lactated ringers infusion  50 mL/hr IntraVENous CONTINUOUS    magnesium sulfate 1 g/100 ml IVPB (premix or compounded)  1 g IntraVENous ONCE    lisinopril (PRINIVIL, ZESTRIL) tablet 20 mg  20 mg Oral DAILY    metoprolol tartrate (LOPRESSOR) tablet 50 mg  50 mg Oral Q12H    enoxaparin (LOVENOX) injection 40 mg  40 mg SubCUTAneous Q24H    tobramycin (NEBCIN) 300 mg in 0.9% sodium chloride 100 mL IVPB  300 mg IntraVENous Q36H    TOBRAMYCIN INFORMATION NOTE   Other ONCE    TOBRAMYCIN INFORMATION NOTE   Other Rx Dosing/Monitoring    haloperidol lactate (HALDOL) injection 4 mg  4 mg IntraVENous Q8H PRN    vancomycin (VANCOCIN) 1,000 mg in 0.9% sodium chloride (MBP/ADV) 250 mL adv  1,000 mg IntraVENous Q24H    VANCOMYCIN INFORMATION NOTE   Other ONCE    VANCOMYCIN INFORMATION NOTE   Other Rx Dosing/Monitoring    dexmedeTOMidine (PRECEDEX) 400 mcg in 0.9% sodium chloride 100 mL infusion  0.2-0.7 mcg/kg/hr IntraVENous TITRATE    albuterol-ipratropium (DUO-NEB) 2.5 MG-0.5 MG/3 ML  3 mL Nebulization Q6H RT    0.9% sodium chloride infusion 250 mL  250 mL IntraVENous PRN    levothyroxine (SYNTHROID) tablet 100 mcg  100 mcg Oral Daily    insulin lispro (HUMALOG) injection   SubCUTAneous Q6H    glucose chewable tablet 16 g  4 Tab Oral PRN    glucagon (GLUCAGEN) injection 1 mg  1 mg IntraMUSCular PRN    dextrose (D50W) injection syrg 12.5-25 g  25-50 mL IntraVENous PRN    sodium chloride (NS) flush 5-10 mL  5-10 mL IntraVENous PRN    sodium chloride (NS) flush 5-10 mL  5-10 mL IntraVENous Q8H    sodium chloride (NS) flush 5-10 mL  5-10 mL IntraVENous PRN    ondansetron (ZOFRAN) injection 4 mg  4 mg IntraVENous Q6H PRN    thiamine (B-1) 100 mg in 0.9% sodium chloride 50 mL IVPB  100 mg IntraVENous BID    acetaminophen (TYLENOL) solution 650 mg  650 mg Per G Tube Q4H PRN    ELECTROLYTE REPLACEMENT PROTOCOL  1 Each Other PRN    ELECTROLYTE REPLACEMENT PROTOCOL  1 Each Other PRN    ELECTROLYTE REPLACEMENT PROTOCOL  1 Each Other PRN    ELECTROLYTE REPLACEMENT PROTOCOL  1 Each Other PRN       Family History   Problem Relation Age of Onset    Lupus Sister      Social History     Social History    Marital status: LEGALLY      Spouse name: N/A    Number of children: N/A    Years of education: N/A     Occupational History    Not on file.      Social History Main Topics    Smoking status: Former Smoker    Smokeless tobacco: Never Used    Alcohol use No    Drug use: No    Sexual activity: Not on file     Other Topics Concern    Not on file     Social History Narrative     History   Smoking Status    Former Smoker   Smokeless Tobacco    Never Used        Temp (24hrs), Av.6 °F (36.4 °C), Min:96.4 °F (35.8 °C), Max:98.5 °F (36.9 °C)    Visit Vitals    BP (!) 176/102    Pulse 94    Temp 98.5 °F (36.9 °C)    Resp 18    Ht 5' 4\" (1.626 m)    Wt 69.1 kg (152 lb 5.4 oz)    SpO2 96%    BMI 26.15 kg/m2       ROS:A comprehensive review of systems was negative except for that written in the History of Present Illness. Physical Exam:    General: Well developed, well nourished 54 y.o.  female in no acute distress. ENT: ENT exam normal, no neck nodes or sinus tenderness  Head: normocephalic, without obvious abnormality  Mouth:  mucous membranes moist, pharynx normal without lesions  Neck: supple, symmetrical, trachea midline and no adenopathy   Cardio:  regular rate and rhythm, S1, S2 normal, no murmur, click, rub or gallop  Chest: inspection normal - no chest wall deformities or tenderness  Lungs: scattered crackles, no wheezing  Abdomen: soft, non-tender. Bowel sounds normal. No masses, no organomegaly. PEG in place  Extremities:  no redness or tenderness in the calves or thighs, no edema  Neuro: Grossly normal      Labs: Results:   Chemistry Recent Labs      10/02/17   1456  10/02/17   0410  10/02/17   0025   10/01/17   0400   17   0400   GLU   --   70*   --    --   112*   --   107*   NA   --   142   --    --   146*   --   145   K  4.1  4.0  4.2   < >  3.7   < >  3.8   CL   --   112*   --    --   115*   --   114*   CO2   --   21   --    --   22   --   22   BUN   --   12   --    --   15   --   19*   CREA   --   0.93   --    --   0.99   --   1.03   CA   --   8.4*   --    --   8.3*   --   8.6   AGAP   --   9   --    --   9   --   9   BUCR   --   13   --    --   15   --   18    < > = values in this interval not displayed.       CBC w/Diff Recent Labs      10/02/17 0410  10/01/17   0400  09/30/17   0920  09/30/17   0400   WBC  8.2  6.8   --   8.1   RBC  2.74*  2.45*   --   2.70*   HGB  8.2*  7.3*  8.4*  8.1*   HCT  24.7*  22.2*  24.9*  24.4*   PLT  125*  84*   --   63*   GRANS  55  53   --   68   LYMPH  28  26   --   23   EOS  6*  6*   --   0      Microbiology No results for input(s): CULT in the last 72 hours.      Brii Gilliland M.D.  [facility]   [phone]

## 2017-10-02 NOTE — PALLIATIVE CARE
I met with pt at bedside. Pt was AOX4. Her voice is weak. I told her that I had spoken to her mother who gave me the children's names, but no phone numbers. Pt said she didn't know their phone numbers either. We discussed the AMD, pt said she did not have one but did want to complete one. She named her mother Lucina Buckley as 1st MPOA, and her sister Nicole Miller at 2nd MPOA. She does not want heroics at end of life and does not want to be an organ donor. I did not discuss code status as there was no family present. Pt currently off bipap.

## 2017-10-02 NOTE — PROGRESS NOTES
0700 Verbal bed side report received from [facility]. Pt in bed on BI-pap and tolerating well. Pt narda and denied any SOB, or pain.  Precedex drip  Running at 0.2 Mcg.

## 2017-10-02 NOTE — PROGRESS NOTES
1930  [place] SBAR report taken from [employer]. SItter at bedside. Patient is alert and oriented and very pleasant and cooperative. She refuses mouth care, despite her mouth being dry and having dried blood coating the inside. 2112  Spoke with Dr. Britney Hunter in regards to whether or not to give Heparin on this patient that has platelets of 84. He said not to give it, he is ordering Lovenox instead. 0115  Offered to give patient a bath now and she is refusing, wants to wait. Still refusing mouth care    0500  Patient agreed to get her bath. Bathed her with mild soap and applied generous amounts of lotion to her dry flaky skin. CVL dressing was changed, ostomy site was cleaned and a new dressing placed. New Mepilex applied. Patient still refused mouth care. 0730  SBAR Report given to [place] [employer] incoming RN's.

## 2017-10-02 NOTE — PALLIATIVE CARE
I spoke to pt at bedside. She was alert and awake, on bipap so conversation was limited. She can shake head yes or no. She reports that she is , has three children, and gave permission to call her mother to get the children's info. She does not know whether or not she will get more tx for her cancer, could not remember oncologist's name. I looking in care everywhere, she has seen Toy Mohamud at [facility], last contact in 2012. Mother is Denver Mikes does not know phone numbers    [facility]    [employer]. Mother reports that King Lizabeth is the child who has most contact with pt. When pt is stronger and can better communicate, we will discuss completing an AMD so pt can choose her MPOAs.

## 2017-10-02 NOTE — PROGRESS NOTES
Pt stated she would be amenable to SNF or ARU if recommended. Pt states her only preference is for the facility to be in [place]. Referrals sent to several [facility] via [employer] [facility] [place] and [facility]. Discharge plan: ARU vs SNF.

## 2017-10-02 NOTE — PROGRESS NOTES
Problem: Mobility Impaired (Adult and Pediatric)  Goal: *Acute Goals and Plan of Care (Insert Text)  Physical Therapy Goals  Initiated 9/29/2017 and to be accomplished within 7 day(s)  1. Patient will move from supine to sit and sit to supine , scoot up and down and roll side to side in bed with supervision/set-up. 2. Patient will transfer from bed to chair and chair to bed with minimal assistance/contact guard assist using the least restrictive device. 3. Patient will perform sit to stand with moderate assistance . 4. Patient will ambulate with moderate assistance for 50 feet with the least restrictive device. 5. Patient will ascend/descend 3 stairs with handrail(s) with moderate assistance . Outcome: Progressing Towards Goal  PHYSICAL THERAPY TREATMENT     Patient: Ginger Zhao (30 y.o. female)  Date: 10/2/2017  Diagnosis: Acute respiratory failure with hypercapnia ([facility] Acute respiratory failure with hypercapnia (HCC)       Precautions: Fall (respiration )  Chart, physical therapy assessment, plan of care and goals were reviewed. ASSESSMENT:  Patient on bipap during session worked on exercise for all extremities,  And patient able to follow directions and nod yes /no to questions and talk thru mask. Pain reported all over at 10/10 pre and post, nursing was trying to get medication for her. Education on exercise needs reinforcement. Progression toward goals:  [ ]      Improving appropriately and progressing toward goals  [X]      Improving slowly and progressing toward goals  [ ]      Not making progress toward goals and plan of care will be adjusted       PLAN:  Patient continues to benefit from skilled intervention to address the above impairments. Continue treatment per established plan of care.   Discharge Recommendations:  Rehab, [address] [place] [address] depending on progress   Further Equipment Recommendations for Discharge:  tbd  Depending on progress        SUBJECTIVE: Patient stated .      OBJECTIVE DATA SUMMARY:   Critical Behavior:  Neurologic State: Alert  Orientation Level: Oriented X4  Cognition: Follows commands  Safety/Judgement: Awareness of environment, Fall prevention  Transfers:  Sit to Stand:  (on bipap)     Therapeutic Exercises: Ankle pumps glu8t sets quad sets resisted shoulder hortizonal abd/add, flex, heel slides hip abd/add  10 reps each on both sides. Pain:10  Pain Scale 1: Visual  Activity Tolerance:   Fair   Please refer to the flowsheet for vital signs taken during this treatment.   After treatment:   [ ] Patient left in no apparent distress sitting up in chair  [X] Patient left in no apparent distress in bed  [X] Call bell left within reach  [X] Nursing notified  [ ] Caregiver present  [ ] Bed alarm activated      Debi Andrews PT   Time Calculation: 10 mins

## 2017-10-02 NOTE — PROGRESS NOTES
Problem: Falls - Risk of  Goal: *Absence of Falls  Document Kapil Fall Risk and appropriate interventions in the flowsheet.    Outcome: Progressing Towards Goal  Fall Risk Interventions:  Mobility Interventions: Patient to call before getting OOB     Mentation Interventions: Bed/chair exit alarm     Medication Interventions: Patient to call before getting OOB, Bed/chair exit alarm     Elimination Interventions: Call light in reach

## 2017-10-02 NOTE — PROGRESS NOTES
Progress Note      Patient: Luan Dill               Sex: female          DOA: 9/24/2017       YOB: 1965      Age:  54 y.o.        LOS:  LOS: 8 days               Subjective:   Pt was on bipap when seen  . she has a pneumonia and is growing out pseudomonas amd MRSA from her  sputum . she is on tobramycin and vancomycin . will ask id to see the pt. Pt also seems to have chf as the nt probnp is markedly elevated at >35,000. She is on precedex and an iv at 50 cc/hr . Her bp is elevated at 187/108 and is on iv labetolol for this. Her mg is being supplemented . Objective:      Visit Vitals    BP (!) 187/108    Pulse 96    Temp 98.5 °F (36.9 °C)    Resp 28    Ht 5' 4\" (1.626 m)    Wt 69.1 kg (152 lb 5.4 oz)    SpO2 99%    BMI 26.15 kg/m2       Physical Exam:  The pt was awake and responsive abnd was o her bipap   Heart reg rate and rhythm  Lungs decreased breath sounds heard bilaterally   Abdomen soft and no masses felt . peg in place  Neuro moves all extremities     Lab/Data Reviewed:  BMP:   Lab Results   Component Value Date/Time     10/02/2017 04:10 AM    K 4.0 10/02/2017 04:10 AM     (H) 10/02/2017 04:10 AM    CO2 21 10/02/2017 04:10 AM    AGAP 9 10/02/2017 04:10 AM    GLU 70 (L) 10/02/2017 04:10 AM    BUN 12 10/02/2017 04:10 AM    CREA 0.93 10/02/2017 04:10 AM    GFRAA >60 10/02/2017 04:10 AM    GFRNA >60 10/02/2017 04:10 AM     CMP:   Lab Results   Component Value Date/Time     10/02/2017 04:10 AM    K 4.0 10/02/2017 04:10 AM     (H) 10/02/2017 04:10 AM    CO2 21 10/02/2017 04:10 AM    AGAP 9 10/02/2017 04:10 AM    GLU 70 (L) 10/02/2017 04:10 AM    BUN 12 10/02/2017 04:10 AM    CREA 0.93 10/02/2017 04:10 AM    GFRAA >60 10/02/2017 04:10 AM    GFRNA >60 10/02/2017 04:10 AM    CA 8.4 (L) 10/02/2017 04:10 AM    MG 1.7 10/02/2017 01:00 PM    PHOS 3.8 10/02/2017 04:10 AM           Assessment/Plan     Principal Problem:    Acute respiratory failure with hypercapnia ([facility] (9/24/2017)    Active Problems:    Sepsis ([facility] (9/24/2017)      HCAP (healthcare-associated pneumonia) (9/24/2017)      Hypertension (9/24/2017)      Diabetes mellitus ([facility] (9/24/2017)  H/o throat cancer . Location is unclear        Plan:continue the antibiotics as above . Will need to be slowly diuresed once the infection has come under control .

## 2017-10-02 NOTE — PROGRESS NOTES
Problem: Self Care Deficits Care Plan (Adult)  Goal: *Acute Goals and Plan of Care (Insert Text)  Occupational Therapy Goals  Initiated 10/2/2017 within 3 day(s). Pt is on bipap, will implement 3 day trial to determine ability to participate in skilled therapy. 1. Patient will perform grooming tasks with supervision. 2. Patient will perform bed mobility with supervision and verbal cues for pacing. 3. Patient will perform functional task at EOB for 8 minutes with supervision for balance to increase activity tolerance for ADLs. 4. Patient will perform all aspects of toileting with minimal assistance. 5. Patient will participate in upper extremity therapeutic exercise/activities with supervision/set-up for 8 minutes to increase BUE strength for ADLs. 6. Patient will utilize energy conservation techniques during functional activities with minimal verbal cues. 7. Patient will perform toilet transfer with moderate assistance. Outcome: Progressing Towards Goal  OCCUPATIONAL THERAPY EVALUATION     Patient: Laqueta Olszewski (54 y.o. female)  Date: 10/2/2017  Primary Diagnosis: Acute respiratory failure with hypercapnia [facility])        Precautions:  Fall      ASSESSMENT :  Based on the objective data described below, the patient presents with impairments with regard to bed mobility in prep for ADLs, activity tolerance, BUE strength and independence in ADLs. Pt on bipap t/o session. /94 on arrival, within 5 mins of beginning OT evaluation, BP elevated to 180/109. Bed mobility & EOB activity deferred. Pt A&O x4, following all commands, appropriate decision making. Full AROM, decreased strength of BUEs. Scooted towards [facility] with mod A x2. Pt reporting 8/10 pain in lower back; [facility], RN notified. Will implement 3 day trial to determine pt's ability to participate in skilled therapy due to bipap. Pt appears to be motivated to work with therapy. O2 100% on bipap, HR low 100s, RR 20-25 t/o session.      Patient will benefit from skilled intervention to address the above impairments. Patients rehabilitation potential is considered to be Fair  Factors which may influence rehabilitation potential include:   [ ]             None noted  [ ]             Mental ability/status  [X]             Medical condition  [ ]             Home/family situation and support systems  [ ]             Safety awareness  [ ]             Pain tolerance/management  [ ]             Other:           PLAN :  Recommendations and Planned Interventions:  [X]               Self Care Training                  [X]        Therapeutic Activities  [X]               Functional Mobility Training    [ ]        Cognitive Retraining  [X]               Therapeutic Exercises           [X]        Endurance Activities  [X]               Balance Training                   [ ]        Neuromuscular Re-Education  [ ]               Visual/Perceptual Training     [X]   Home Safety Training  [X]               Patient Education                 [X]        Family Training/Education  [ ]               Other (comment):     Frequency/Duration: Patient will be followed by occupational therapy 3 times a week for a 3 day trial to address goals. Discharge Recommendations: TBD with OOB activity  Further Equipment Recommendations for Discharge: TBD       SUBJECTIVE:   Patient stated My back. 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 The severity rating is based on the Other Functional Assessment, MMT, ROM     Eval Complexity: History: MEDIUM Complexity : Expanded review of history including physical, cognitive and psychosocial  history ; Examination: MEDIUM Complexity : 3-5 performance deficits relating to physical, cognitive , or psychosocial skils that result in activity limitations and / or participation restrictions; Decision Making:MEDIUM Complexity : Patient may present with comorbidities that affect occupational performnce. Miniml to moderate modification of tasks or assistance (eg, physical or verbal ) with assesment(s) is necessary to enable patient to complete evaluation      Prior Level of Function/Home Situation: Unknown  Home Situation  Home Environment:  (unable to obtain; pt on bipap)  One/Two Story Residence: Other (Comment) (unknown)  Living Alone: No  Support Systems: Friends \ neighbors  Patient Expects to be Discharged to[de-identified] Unknown  Current DME Used/Available at Home: None  [X]  Right hand dominant          [ ]  Left hand dominant     Cognitive/Behavioral Status:  Neurologic State: Alert  Orientation Level: Oriented X4 (by providing choices & pt nodding)  Cognition: Follows commands  Safety/Judgement: Awareness of environment; Fall prevention      Skin: Intact (BUEs)  Edema: slight edema in L hand     Vision/Perceptual:    Acuity: Within Defined Limits       Coordination:  Coordination: Within functional limits (BUEs)  Fine Motor Skills-Upper: Right Intact; Left Intact    Gross Motor Skills-Upper: Right Intact; Left Intact      Balance:  Sitting:  (pt seen at bed level; on bipap)  Standing:  (not assessed)      Strength:  Strength: Generally decreased, functional (BUEs: 4/5)     Tone & Sensation:  Tone: Normal (bues)     Range of Motion:  AROM: Within functional limits (BUEs: full shoulder/elbow flex)     Functional Mobility and Transfers for ADLs:  Bed Mobility:  Rolling: Stand-by asssistance  Supine to Sit:  (not assessed)  Scooting: Moderate assistance;Assist x2 (towards HOB)      Transfers:  Sit to Stand:  (not assessed)     ADL Assessment:  Feeding: Minimum assistance  Oral Facial Hygiene/Grooming: Minimum assistance  Bathing: Moderate assistance  Upper Body Dressing: Minimum assistance  Lower Body Dressing: Maximum assistance  Toileting: Total assistance     Cognitive Retraining  Safety/Judgement: Awareness of environment; Fall prevention     Pain:  Pre treatment pain level: 8/10  Post treatment pain level: 8/10  Pain Scale 1: Numeric (0 - 10)   Pain Location: Lower back     Activity Tolerance:  Poor+  Please refer to the flowsheet for vital signs taken during this treatment. After treatment:   [ ] Patient left in no apparent distress sitting up in chair  [X] Patient left in no apparent distress in bed  [X] Call bell left within reach  [X] Nursing notified  [ ] Caregiver present  [ ] Bed alarm activated      COMMUNICATION/EDUCATION: Pt educated on role of OT and POC; pt nodded in understanding. [ ] Home safety education was provided and the patient/caregiver indicated understanding. [X] Patient/family have participated as able in goal setting and plan of care. [X] Patient/family agree to work toward stated goals and plan of care. [ ] Patient understands intent and goals of therapy, but is neutral about his/her participation. [ ] Patient is unable to participate in goal setting and plan of care.      Thank you for this referral.     Bryan Carlos MS OTR/L  Time Calculation: 12 mins

## 2017-10-02 NOTE — PROGRESS NOTES
Pharmacy adjusting dose for Famotidine (Pepcid) per renal protocol. Was on a regimen of: 20 mg po q24h. Labs:     CrCl - 69.9 ml/min    Plan:  Changed Famotidine to 20 mg po q12h. Pharmacy to follow and will redose based on renal function changes. Thanks.

## 2017-10-03 ENCOUNTER — APPOINTMENT (OUTPATIENT)
Dept: GENERAL RADIOLOGY | Age: 52
DRG: 871 | End: 2017-10-03
Attending: PHYSICIAN ASSISTANT
Payer: MEDICARE

## 2017-10-03 LAB
ALBUMIN SERPL-MCNC: 2 G/DL (ref 3.4–5)
ALBUMIN/GLOB SERPL: 0.4 {RATIO} (ref 0.8–1.7)
ALP SERPL-CCNC: 121 U/L (ref 45–117)
ALT SERPL-CCNC: 14 U/L (ref 13–56)
ANION GAP SERPL CALC-SCNC: 10 MMOL/L (ref 3–18)
AST SERPL-CCNC: 18 U/L (ref 15–37)
BASOPHILS # BLD: 0.1 K/UL (ref 0–0.06)
BASOPHILS NFR BLD: 1 % (ref 0–2)
BILIRUB SERPL-MCNC: 0.3 MG/DL (ref 0.2–1)
BUN SERPL-MCNC: 9 MG/DL (ref 7–18)
BUN/CREAT SERPL: 10 (ref 12–20)
CALCIUM SERPL-MCNC: 8 MG/DL (ref 8.5–10.1)
CHLORIDE SERPL-SCNC: 106 MMOL/L (ref 100–108)
CO2 SERPL-SCNC: 25 MMOL/L (ref 21–32)
CREAT SERPL-MCNC: 0.9 MG/DL (ref 0.6–1.3)
DIFFERENTIAL METHOD BLD: ABNORMAL
EOSINOPHIL # BLD: 1 K/UL (ref 0–0.4)
EOSINOPHIL NFR BLD: 11 % (ref 0–5)
ERYTHROCYTE [DISTWIDTH] IN BLOOD BY AUTOMATED COUNT: 15.7 % (ref 11.6–14.5)
GLOBULIN SER CALC-MCNC: 4.9 G/DL (ref 2–4)
GLUCOSE BLD STRIP.AUTO-MCNC: 102 MG/DL (ref 70–110)
GLUCOSE BLD STRIP.AUTO-MCNC: 104 MG/DL (ref 70–110)
GLUCOSE BLD STRIP.AUTO-MCNC: 104 MG/DL (ref 70–110)
GLUCOSE BLD STRIP.AUTO-MCNC: 114 MG/DL (ref 70–110)
GLUCOSE BLD STRIP.AUTO-MCNC: 85 MG/DL (ref 70–110)
GLUCOSE BLD STRIP.AUTO-MCNC: 95 MG/DL (ref 70–110)
GLUCOSE SERPL-MCNC: 96 MG/DL (ref 74–99)
HCT VFR BLD AUTO: 25.7 % (ref 35–45)
HGB BLD-MCNC: 8.3 G/DL (ref 12–16)
INR PPP: 1.3 (ref 0.8–1.2)
LYMPHOCYTES # BLD: 1.7 K/UL (ref 0.9–3.6)
LYMPHOCYTES NFR BLD: 18 % (ref 21–52)
MAGNESIUM SERPL-MCNC: 1.9 MG/DL (ref 1.6–2.6)
MCH RBC QN AUTO: 29.1 PG (ref 24–34)
MCHC RBC AUTO-ENTMCNC: 32.3 G/DL (ref 31–37)
MCV RBC AUTO: 90.2 FL (ref 74–97)
MONOCYTES # BLD: 1 K/UL (ref 0.05–1.2)
MONOCYTES NFR BLD: 10 % (ref 3–10)
NEUTS SEG # BLD: 5.9 K/UL (ref 1.8–8)
NEUTS SEG NFR BLD: 60 % (ref 40–73)
PLATELET # BLD AUTO: 134 K/UL (ref 135–420)
POTASSIUM SERPL-SCNC: 3.9 MMOL/L (ref 3.5–5.5)
POTASSIUM SERPL-SCNC: 3.9 MMOL/L (ref 3.5–5.5)
POTASSIUM SERPL-SCNC: 4 MMOL/L (ref 3.5–5.5)
PROT SERPL-MCNC: 6.9 G/DL (ref 6.4–8.2)
PROTHROMBIN TIME: 15.2 SEC (ref 11.5–15.2)
RBC # BLD AUTO: 2.85 M/UL (ref 4.2–5.3)
SODIUM SERPL-SCNC: 141 MMOL/L (ref 136–145)
WBC # BLD AUTO: 9.6 K/UL (ref 4.6–13.2)

## 2017-10-03 PROCEDURE — 97530 THERAPEUTIC ACTIVITIES: CPT

## 2017-10-03 PROCEDURE — 36591 DRAW BLOOD OFF VENOUS DEVICE: CPT

## 2017-10-03 PROCEDURE — 94668 MNPJ CHEST WALL SBSQ: CPT

## 2017-10-03 PROCEDURE — 77010033711 HC HIGH FLOW OXYGEN

## 2017-10-03 PROCEDURE — 74011250637 HC RX REV CODE- 250/637: Performed by: PHYSICIAN ASSISTANT

## 2017-10-03 PROCEDURE — 74011250636 HC RX REV CODE- 250/636

## 2017-10-03 PROCEDURE — 71010 XR CHEST PORT: CPT

## 2017-10-03 PROCEDURE — 94640 AIRWAY INHALATION TREATMENT: CPT

## 2017-10-03 PROCEDURE — 85025 COMPLETE CBC W/AUTO DIFF WBC: CPT | Performed by: HOSPITALIST

## 2017-10-03 PROCEDURE — 77030011943

## 2017-10-03 PROCEDURE — 74011250636 HC RX REV CODE- 250/636: Performed by: HOSPITALIST

## 2017-10-03 PROCEDURE — 65610000006 HC RM INTENSIVE CARE

## 2017-10-03 PROCEDURE — 74011258636 HC RX REV CODE- 258/636: Performed by: INTERNAL MEDICINE

## 2017-10-03 PROCEDURE — 84132 ASSAY OF SERUM POTASSIUM: CPT | Performed by: HOSPITALIST

## 2017-10-03 PROCEDURE — 74011250637 HC RX REV CODE- 250/637: Performed by: INTERNAL MEDICINE

## 2017-10-03 PROCEDURE — 97535 SELF CARE MNGMENT TRAINING: CPT

## 2017-10-03 PROCEDURE — 85610 PROTHROMBIN TIME: CPT | Performed by: HOSPITALIST

## 2017-10-03 PROCEDURE — 83735 ASSAY OF MAGNESIUM: CPT | Performed by: HOSPITALIST

## 2017-10-03 PROCEDURE — 36592 COLLECT BLOOD FROM PICC: CPT

## 2017-10-03 PROCEDURE — 82962 GLUCOSE BLOOD TEST: CPT

## 2017-10-03 PROCEDURE — 74011250636 HC RX REV CODE- 250/636: Performed by: INTERNAL MEDICINE

## 2017-10-03 PROCEDURE — 51798 US URINE CAPACITY MEASURE: CPT

## 2017-10-03 PROCEDURE — 94660 CPAP INITIATION&MGMT: CPT

## 2017-10-03 PROCEDURE — 77030018836 HC SOL IRR NACL ICUM -A

## 2017-10-03 PROCEDURE — 74011000250 HC RX REV CODE- 250: Performed by: INTERNAL MEDICINE

## 2017-10-03 PROCEDURE — 74011000258 HC RX REV CODE- 258: Performed by: INTERNAL MEDICINE

## 2017-10-03 PROCEDURE — 74011250636 HC RX REV CODE- 250/636: Performed by: FAMILY MEDICINE

## 2017-10-03 RX ORDER — POTASSIUM CHLORIDE 7.45 MG/ML
10 INJECTION INTRAVENOUS ONCE
Status: COMPLETED | OUTPATIENT
Start: 2017-10-03 | End: 2017-10-03

## 2017-10-03 RX ORDER — ASPIRIN 325 MG/1
100 TABLET, FILM COATED ORAL 2 TIMES DAILY
Status: DISCONTINUED | OUTPATIENT
Start: 2017-10-03 | End: 2017-10-09 | Stop reason: HOSPADM

## 2017-10-03 RX ORDER — POTASSIUM CHLORIDE 7.45 MG/ML
INJECTION INTRAVENOUS
Status: COMPLETED
Start: 2017-10-03 | End: 2017-10-03

## 2017-10-03 RX ADMIN — LEVOTHYROXINE SODIUM 100 MCG: 100 TABLET ORAL at 05:13

## 2017-10-03 RX ADMIN — HYDROCODONE BITARTRATE AND ACETAMINOPHEN 2 TABLET: 5; 325 TABLET ORAL at 15:22

## 2017-10-03 RX ADMIN — POTASSIUM CHLORIDE 10 MEQ: 10 INJECTION, SOLUTION INTRAVENOUS at 06:09

## 2017-10-03 RX ADMIN — Medication 10 ML: at 14:41

## 2017-10-03 RX ADMIN — GABAPENTIN 300 MG: 300 CAPSULE ORAL at 21:49

## 2017-10-03 RX ADMIN — THIAMINE HYDROCHLORIDE 100 MG: 100 INJECTION, SOLUTION INTRAMUSCULAR; INTRAVENOUS at 08:11

## 2017-10-03 RX ADMIN — SODIUM CHLORIDE, SODIUM LACTATE, POTASSIUM CHLORIDE, CALCIUM CHLORIDE, AND DEXTROSE MONOHYDRATE 50 ML/HR: 600; 310; 30; 20; 5 INJECTION, SOLUTION INTRAVENOUS at 07:11

## 2017-10-03 RX ADMIN — Medication 10 ML: at 05:13

## 2017-10-03 RX ADMIN — POTASSIUM CHLORIDE 10 MEQ: 10 INJECTION, SOLUTION INTRAVENOUS at 22:45

## 2017-10-03 RX ADMIN — IPRATROPIUM BROMIDE AND ALBUTEROL SULFATE 3 ML: .5; 3 SOLUTION RESPIRATORY (INHALATION) at 13:31

## 2017-10-03 RX ADMIN — HYDROCODONE BITARTRATE AND ACETAMINOPHEN 2 TABLET: 5; 325 TABLET ORAL at 20:01

## 2017-10-03 RX ADMIN — HYDROCODONE BITARTRATE AND ACETAMINOPHEN 2 TABLET: 5; 325 TABLET ORAL at 05:02

## 2017-10-03 RX ADMIN — METOPROLOL TARTRATE 100 MG: 50 TABLET ORAL at 20:58

## 2017-10-03 RX ADMIN — HYDROCODONE BITARTRATE AND ACETAMINOPHEN 2 TABLET: 5; 325 TABLET ORAL at 09:29

## 2017-10-03 RX ADMIN — METOPROLOL TARTRATE 100 MG: 50 TABLET ORAL at 08:12

## 2017-10-03 RX ADMIN — POTASSIUM CHLORIDE 10 MEQ: 7.46 INJECTION, SOLUTION INTRAVENOUS at 14:41

## 2017-10-03 RX ADMIN — GABAPENTIN 300 MG: 300 CAPSULE ORAL at 15:22

## 2017-10-03 RX ADMIN — IPRATROPIUM BROMIDE AND ALBUTEROL SULFATE 3 ML: .5; 3 SOLUTION RESPIRATORY (INHALATION) at 08:00

## 2017-10-03 RX ADMIN — Medication 10 ML: at 12:47

## 2017-10-03 RX ADMIN — SODIUM CHLORIDE 750 MG: 900 INJECTION, SOLUTION INTRAVENOUS at 23:46

## 2017-10-03 RX ADMIN — SODIUM CHLORIDE 750 MG: 900 INJECTION, SOLUTION INTRAVENOUS at 00:22

## 2017-10-03 RX ADMIN — CIPROFLOXACIN 400 MG: 2 INJECTION, SOLUTION INTRAVENOUS at 08:04

## 2017-10-03 RX ADMIN — AMLODIPINE BESYLATE 10 MG: 10 TABLET ORAL at 08:12

## 2017-10-03 RX ADMIN — FAMOTIDINE 20 MG: 20 TABLET ORAL at 20:58

## 2017-10-03 RX ADMIN — LISINOPRIL 20 MG: 20 TABLET ORAL at 08:12

## 2017-10-03 RX ADMIN — IPRATROPIUM BROMIDE AND ALBUTEROL SULFATE 3 ML: .5; 3 SOLUTION RESPIRATORY (INHALATION) at 21:09

## 2017-10-03 RX ADMIN — IPRATROPIUM BROMIDE AND ALBUTEROL SULFATE 3 ML: .5; 3 SOLUTION RESPIRATORY (INHALATION) at 01:43

## 2017-10-03 RX ADMIN — ENOXAPARIN SODIUM 40 MG: 40 INJECTION SUBCUTANEOUS at 21:49

## 2017-10-03 RX ADMIN — GABAPENTIN 300 MG: 300 CAPSULE ORAL at 08:12

## 2017-10-03 RX ADMIN — FAMOTIDINE 20 MG: 20 TABLET ORAL at 08:12

## 2017-10-03 RX ADMIN — Medication 100 MG: at 17:47

## 2017-10-03 RX ADMIN — CIPROFLOXACIN 400 MG: 2 INJECTION, SOLUTION INTRAVENOUS at 20:55

## 2017-10-03 RX ADMIN — Medication 10 ML: at 21:49

## 2017-10-03 RX ADMIN — POTASSIUM CHLORIDE 10 MEQ: 7.45 INJECTION INTRAVENOUS at 06:09

## 2017-10-03 NOTE — PROGRESS NOTES
Problem: Self Care Deficits Care Plan (Adult)  Goal: *Acute Goals and Plan of Care (Insert Text)  Occupational Therapy Goals  Initiated 10/2/2017 within 3 day(s). Pt is on bipap, will implement 3 day trial to determine ability to participate in skilled therapy. Continue per POC if appropriate. 1. Patient will perform grooming tasks with supervision. 2. Patient will perform bed mobility with supervision and verbal cues for pacing. 3. Patient will perform functional task at EOB for 8 minutes with supervision for balance to increase activity tolerance for ADLs. 4. Patient will perform all aspects of toileting with minimal assistance. 5. Patient will participate in upper extremity therapeutic exercise/activities with supervision/set-up for 8 minutes to increase BUE strength for ADLs. 6. Patient will utilize energy conservation techniques during functional activities with minimal verbal cues. 7. Patient will perform toilet transfer with moderate assistance. Outcome: Progressing Towards Goal  OCCUPATIONAL THERAPY TREATMENT     Patient: Pilar Gresham (55 y.o. female)  Date: 10/3/2017  Diagnosis: Acute respiratory failure with hypercapnia ([facility] [address] Acute respiratory failure with hypercapnia (HCC)       Precautions: Fall (respiration )  Chart, occupational therapy assessment, plan of care, and goals were reviewed. ASSESSMENT:  Day 1 of 3 day trial. Pt is motivated for EOB activity, requires mod vc's for pacing and Min Assist 2/2 generalized weakness and  LYN. Pt desats to 87% requiring frequent rest breaks w/all tasks and vc's for PLB. Pt tolerates ~ 15 minutes sitting EOB (unsupported) performing ADL grooming tasks w/o LOB. Pt fatigues easily and requires assist w/BLE returning to supine.    EDUCATION Pt educated on energy conservation techniques w/ADLs and PLB  Progression toward goals:  [ ]          Improving appropriately and progressing toward goals  [X]          Improving slowly and progressing toward goals  [ ]          Not making progress toward goals and plan of care will be adjusted       PLAN:  Patient continues to benefit from skilled intervention to address the above impairments. Continue treatment per established plan of care. Discharge Recommendations:  Inpatient Rehab vs Skilled Nursing Facility  Further Equipment Recommendations for Discharge:  shower chair and rolling walker       SUBJECTIVE:   Patient stated It feels good to sit up.       OBJECTIVE DATA SUMMARY:         Cognitive/Behavioral Status:  Neurologic State: Alert  Orientation Level: Oriented X4  Cognition: Follows commands, Appropriate for age attention/concentration  Safety/Judgement: Awareness of environment, Fall prevention  Functional Mobility and Transfers for ADLs:              Bed Mobility:  Rolling: Contact guard assistance  Supine to Sit: Minimum assistance  Sit to Supine: Moderate assistance (requires assist w/BLEs)  Scooting: Minimum assistance (along EOB)              Balance:  Sitting: Impaired  Sitting - Static: Fair (occasional)  Sitting - Dynamic: Fair (occasional)  ADL Intervention:  Grooming  Brushing Teeth: Stand-by assistance  Applying Makeup: Stand-by assistance (applying lotion to BUEs and face)     Pain:  Pre Treatment:0  Post Treatment:0     Activity Tolerance:    Fair, pt fatigues easily     Please refer to the flowsheet for vital signs taken during this treatment.   After treatment:   [ ]  Patient left in no apparent distress sitting up in chair  [X]  Patient left in no apparent distress in bed  [X]  Call bell left within reach  [X]  Nursing notified  [ ]  Caregiver present  [ ]  Bed alarm activated     KOREY Medrano  Time Calculation: 29 mins

## 2017-10-03 NOTE — PROGRESS NOTES
Ms. Eduardo Robles is resting comfortably on 50lpm HFNC @ 40%. She received her duoneb treatment and tolerated it without adverse reaction to the medication. CPT held at this time due to her complaint of nausea. Will continue to monitor. 2200: Placed back on BiPap for the night. Suction is set-up and available at the bedside.

## 2017-10-03 NOTE — PROGRESS NOTES
[facility]: Thiamine 100mg IV BID changed to PO BID (via tube)    If the patient no longer meets all criteria for oral therapy, the pharmacist will switch back to IV therapy. Thanks.

## 2017-10-03 NOTE — MED STUDENT NOTES
*ATTENTION:  This note has been created by a medical student for educational purposes only. Please do not refer to the content of this note for clinical decision-making, billing, or other purposes. Please see attending physicians note to obtain clinical information on this patient. *        Progress Note    Patient: Dayanara Maxwell MRN: [id number]  SSN: [id number]    YOB: 1965  Age: 54 y.o. Sex: female      Admit Date: 9/24/2017    LOS: 9 days     Subjective:   Patient was awake and on high flow nasal cannula today. Patient denied any pain or new complaints today. She grew Pseudomonas and MRSA in sputum cultures. Tobramycin was changed to Ciprofloxacin. Objective:     Vitals:    10/03/17 0947 10/03/17 1000 10/03/17 1100 10/03/17 1200   BP:  158/89 145/79 155/82   Pulse:  75 73 70   Resp:  14 11 12   Temp:    97.7 °F (36.5 °C)   SpO2: 96% 93%     Weight:       Height:            Intake and Output:  Current Shift: 10/03 0701 - 10/03 1900  In: 460 [I.V.:200]  Out: 125 [Urine:125]  Last three shifts: 10/01 1901 - 10/03 0700  In: 3680.2 [I.V.:2060.2]  Out: 4353 [LRGDU:0554; Drains:70]    Physical Exam:   GENERAL: alert, cooperative, no distress, appears stated age, PEG in place  HEART: regular rate and rhythm, S1, S2 normal, no murmur, click, rub or gallop    Lab/Data Review: All lab results for the last 24 hours reviewed.      Assessment:     Principal Problem:    Acute respiratory failure with hypercapnia ([address] (9/24/2017)    Active Problems:    Sepsis ([address] (9/24/2017)      HCAP (healthcare-associated pneumonia) (9/24/2017)      Hypertension (9/24/2017)      Diabetes mellitus ([address] (9/24/2017)        Plan:   Acute respiratory failure   -continue high flow during the day with bipap at night    HCAP  -continue vanc and cipro per ID  -tobramycin was changed to ciprofloxacin per ID  -sputum cultures grew pseudomonas, MRSA    HTN  -continue norvasc, lisinopril, and metoprolol      Signed By: Gabriela Mcdermott KATIE Marcus     October 3, 2017

## 2017-10-03 NOTE — PROGRESS NOTES
Problem: Falls - Risk of  Goal: *Absence of Falls  Document Kapil Fall Risk and appropriate interventions in the flowsheet.    Outcome: Progressing Towards Goal  Fall Risk Interventions:  Mobility Interventions: Assess mobility with egress test     Mentation Interventions: Adequate sleep, hydration, pain control, Bed/chair exit alarm     Medication Interventions: Assess postural VS orthostatic hypotension, Evaluate medications/consider consulting pharmacy, Bed/chair exit alarm     Elimination Interventions: Bed/chair exit alarm, Call light in reach

## 2017-10-03 NOTE — DIABETES MGMT
NUTRITIONAL RE-ASSESSMENT GLYCEMIC CONTROL/ PLAN OF CARE     Matthew Simmons           54 y.o.           9/24/2017                 1. HCAP (healthcare-associated pneumonia)    2. Septic shock (HCC)         INTERVENTIONS/PLAN:   1. When able to resume tube feeding, suggest Osmolite 1.2 narda tube feeding via PEG at initial rate of 20 ml/hr increasing gradually as tolerated to goal rate of 60 ml/hr (1728 calories, 80 grams protein, ~1.2 L L free water/d (from tube feeding). 2.  Monitor tube feedings, labs and weights. ASSESSMENT:   Pt is 121% ideal wt with adequate fat and somatic protein stores; BMI (calculated): 25.0 kg/m2 (low range of overweight classification). Pt appears well nourished from available data but is at risk of malnutrition due to dysphagia and PEG/TF. Weight has been stable. Pt is not receiving tube feeding x 3 days due to intermittent Bipap and also had complaints of nausea 10/2. Nutrition Diagnoses:   Inadequate oral food and beverage intake due to dysphagia as evidenced by PEG tube/enteral nutrition. Inadequate intake from enteral feedings due to intermittent Bipap/nausea as evidenced by tube feeding stopped 9/30. Increased nutrient needs due to wounds as evidenced by Stage 2 ulcers sacral area. Unintentional weight loss due to inadequate energy intake AEB 8# weight loss (5%) since June 2017. SUBJECTIVE/OBJECTIVE:   Information obtained from: chart review, ICU rounds, RN  Pt admitted with acute respiratory failure, extubated and now on HFNC with intermittent Bipap. PMHx includes throat CA, dysphagia and PEG tube,  stage 2 ulcers sacral area per nursing notes, HTN. Chart review indicates pt was at ED at [facility] where it was noted pt was using Jevity 1.5 narda 240 ml QID via PEG. G-tube last replaced 9-7-17 per chart review. Diet: NPO; noted tube feeding was discontinued 9/30 when pt was having nausea and on intermittent Bipap.       Patient Vitals for the past 100 hrs:   % Diet Eaten   10/02/17 1600 0 %   10/02/17 1200 0 %     Medications: [x]                Reviewed   Pertinent:  Lasix, corrective lispro q 6 hours, normal insulin sensitivity,   IVF:  D5 LR at 50 ml/hr    Most Recent POC Glucose:   Recent Labs      10/03/17   0330  10/02/17   0410  10/01/17   0400   GLU  96  70*  112*         Labs:   Lab Results   Component Value Date/Time    Hemoglobin A1c 5.3 09/25/2017 09:46 AM     Lab Results   Component Value Date/Time    Sodium 141 10/03/2017 03:30 AM    Potassium 3.9 10/03/2017 03:30 AM    Chloride 106 10/03/2017 03:30 AM    CO2 25 10/03/2017 03:30 AM    Anion gap 10 10/03/2017 03:30 AM    Glucose 96 10/03/2017 03:30 AM    BUN 9 10/03/2017 03:30 AM    Creatinine 0.90 10/03/2017 03:30 AM    Calcium 8.0 10/03/2017 03:30 AM    Magnesium 1.9 10/03/2017 03:30 AM    Phosphorus 3.8 10/02/2017 04:10 AM    Albumin 2.0 10/03/2017 03:30 AM       Anthropometrics:  ,  , BMI (calculated): 25.7   Ht - 64\"  weight - 66 kg  Ideal wt -  54.5 kg   Wt Readings from Last 1 Encounters:   10/03/17 68 kg (149 lb 14.6 oz)     Last 3 Recorded Weights in this Encounter    10/01/17 0524 10/02/17 0600 10/03/17 0609   Weight: 66.3 kg (146 lb 2.6 oz) 69.1 kg (152 lb 5.4 oz) 68 kg (149 lb 14.6 oz)     08/09/17 Weight from Chart Everywhere - 67.8 kg    Ht Readings from Last 1 Encounters:   10/03/17 5' 4\" (1.626 m)     Wt Readings from Last 3 Encounters:   10/03/17 68 kg (149 lb 14.6 oz)   06/12/17 71.2 kg (157 lb)   01/05/14 77.6 kg (171 lb)       Estimated Nutrition Needs:  1978 Kcals/day, Protein (g): 86 g Fluid (ml): 2300 ml  Based on:   [x]          Actual BW    []          ABW   []            Adjusted BW        Nutrition Diagnoses:   See above          Nutrition Interventions:  Tube feeding recommendations  Goal:   Met and on-going:  Blood glucose will be within target range of  mg/dL by 9/28/17. Met and on-going:  Pt will maintain weight (+/- 1-2 kg) by 10/5/17.   Provision of adequate nutrition by 10/6/17.         Nutrition Monitoring and Evaluation      []     Monitor po intake on meal rounds  [x]     Continue inpatient monitoring and intervention  []     Other:      Nutrition Risk:  [x]   High     []  Moderate    []  Minimal/Uncompromised    Micky Carmona RD, CDE   Office:  [address] [place] [address] Pager:  [phone]

## 2017-10-03 NOTE — PROGRESS NOTES
Problem: Falls - Risk of  Goal: *Absence of Falls  Document Kapil Fall Risk and appropriate interventions in the flowsheet.    Outcome: Progressing Towards Goal  Fall Risk Interventions:  Mobility Interventions: Bed/chair exit alarm, Patient to call before getting OOB     Mentation Interventions: Bed/chair exit alarm     Medication Interventions: Bed/chair exit alarm, Patient to call before getting OOB     Elimination Interventions: Bed/chair exit alarm, Call light in reach, Patient to call for help with toileting needs

## 2017-10-03 NOTE — PROGRESS NOTES
Pharmacy Dosing Services: Vancomycin     Indication: Pneumonia (HAP)     Day of therapy: 9     Other Antimicrobials (Include dose, start day & day of therapy):  Ciprofloxacin 400 mg every 12 hours BID day 0        Loading dose (date given): 1,500 mg  Current Maintenance dose: 750 mg every 24 hours     Goal Vancomycin Level: 15-20 mcg/mL  (Trough 15-20 for most infections, 20 for meningitis/osteomyelitis, pre-HD level ~25)     Vancomycin Level (if drawn): 21.3 mcg/mL     Significant Cultures: FEW PSEUDOMONAS AERUGINOSA in respiratory culture. Renal function stable? (unstable defined as SCr increase of 0.5 mg/dL or > 50% increase from baseline, whichever is greater) (Y/N): Y      CAPD, Hemodialysis or Renal Replacement Therapy (Y/N): N            Recent Labs       17   0355  17   0345  17   034   CREA  1.20  1.13  1.44*   BUN  21*  22*  28*   WBC  12.9  15.5*  21.1*      Temp (24hrs), Av.2 °F (36.8 °C), Min:97.5 °F (36.4 °C), Max:99 °F (37.2 °C)     Creatinine Clearance (Creatinine Clearance (ml/min)): 69.9 mL/min      Regimen assessment: Treating for HAP, continuing coverage with Vancomycin  Maintenance dose: 750 mg every 24 hours  Next scheduled level: 10- at 2300         Pharmacy will follow daily and adjust medications as appropriate for renal function and/or serum levels. Thank you,  [employer]

## 2017-10-03 NOTE — PROGRESS NOTES
[facility]  ICU Progress Note    Name: Artis Roe   : 1965   MRN: [id number]   Date: 10/3/2017 2:14 PM     [x]I have reviewed the flowsheet and previous days notes. Events overnight reviewed and discussed with nursing staff. Vital signs and records reviewed. Subjective:  - BP now controlled, HR controlled. - Now tolerating HiFlow 50 L/min FIO2 40% during the day, was placed back on bipap do to respiratory fatigue overnight.   - Off Precedex with no agitation with bipap. - Afebrile. RC (+) MRSA and pseudomonas   - Pt reports back pain, but denies any other complaints- notes some improvement  - Holly Removed     [x]The patient is critically ill on      []Mechanical ventilation []Pressors   [x]BiPAP []               ROS:Review of systems not obtained due to patient factors.     Medication Review:  · Pressors - none  · Sedation - PRN precedex   · Antibiotics - Cipro, Vanc   · Pain - PRN Norco  · GI/ DVT -  Pepcid/Lovenox   · Others (other gtts)    Safety Bundles: Electrolyte Replacement Protocol    Vital Signs:    Visit Vitals    /89    Pulse 75    Temp 97.8 °F (36.6 °C)    Resp 14    Ht 5' 4\" (1.626 m)    Wt 68 kg (149 lb 14.6 oz)    SpO2 93%    BMI 25.73 kg/m2       O2 Device: Hi flow nasal cannula   O2 Flow Rate (L/min): 50 l/min   Temp (24hrs), Av.8 °F (36.6 °C), Min:97.5 °F (36.4 °C), Max:97.9 °F (36.6 °C)       Intake/Output:   Last shift:      10/03 0701 - 10/03 1900  In: 460 [I.V.:200]  Out: 125 [Urine:125]  Last 3 shifts: 10/01 1901 - 10/03 0700  In: 3680.2 [I.V.:2060.2]  Out: 6636 [EGTKX:6154; Drains:70]    Intake/Output Summary (Last 24 hours) at 10/03/17 1206  Last data filed at 10/03/17 1000   Gross per 24 hour   Intake          2922.68 ml   Output             2080 ml   Net           842.68 ml     Physical Exam:              General: A&O x 4 in NAD on HiFlow NC.                         HEENT: bipap in place, mouth looks clean with normal mucosa no visible mass                         Neck: Supple, no JVD, but (+) hepato-juglar reflex. Chest: Normal                         Lungs: Symmetric rise and fall of chest with no increased WOB while on HiFlow. Inspiratory rales in LLL, otherwise CTA. Scattered rhonchi that clear with cough. No wheezing. Heart: Regular rate and rhythm. No m,r,g. Abdomen: non distended, bowel sounds normoactive. PEG tube in place. Extremity: No edema or cyanosis in extremities bilaterally. Neuro: No focal weakness.                          Skin: Intact                                       Devices:  PEG, PICC in subclavian  DATA:     Current Facility-Administered Medications   Medication Dose Route Frequency    Thiamine Mononitrate (B-1) tablet 100 mg  100 mg Oral BID    famotidine (PEPCID) tablet 20 mg  20 mg Oral Q12H    HYDROcodone-acetaminophen (NORCO) 5-325 mg per tablet 2 Tab  2 Tab Oral Q4H PRN    gabapentin (NEURONTIN) capsule 300 mg  300 mg Oral TID    amLODIPine (NORVASC) tablet 10 mg  10 mg Oral DAILY    labetalol (NORMODYNE;TRANDATE) injection 20 mg  20 mg IntraVENous Q4H PRN    dextrose 5% lactated ringers infusion  50 mL/hr IntraVENous CONTINUOUS    ciprofloxacin (CIPRO) 400 mg IVPB (premix)  400 mg IntraVENous Q12H    metoprolol tartrate (LOPRESSOR) tablet 100 mg  100 mg Oral Q12H    vancomycin (VANCOCIN) 750 mg in 0.9% sodium chloride (MBP/ADV) 250 mL ADV  750 mg IntraVENous Q24H    [START ON 10/5/2017] VANCOMYCIN INFORMATION NOTE   Other ONCE    lisinopril (PRINIVIL, ZESTRIL) tablet 20 mg  20 mg Oral DAILY    enoxaparin (LOVENOX) injection 40 mg  40 mg SubCUTAneous Q24H    haloperidol lactate (HALDOL) injection 4 mg  4 mg IntraVENous Q8H PRN    VANCOMYCIN INFORMATION NOTE   Other Rx Dosing/Monitoring    albuterol-ipratropium (DUO-NEB) 2.5 MG-0.5 MG/3 ML  3 mL Nebulization Q6H RT    0.9% sodium chloride infusion 250 mL  250 mL IntraVENous PRN    levothyroxine (SYNTHROID) tablet 100 mcg  100 mcg Oral Daily    insulin lispro (HUMALOG) injection   SubCUTAneous Q6H    glucose chewable tablet 16 g  4 Tab Oral PRN    glucagon (GLUCAGEN) injection 1 mg  1 mg IntraMUSCular PRN    dextrose (D50W) injection syrg 12.5-25 g  25-50 mL IntraVENous PRN    sodium chloride (NS) flush 5-10 mL  5-10 mL IntraVENous PRN    sodium chloride (NS) flush 5-10 mL  5-10 mL IntraVENous Q8H    sodium chloride (NS) flush 5-10 mL  5-10 mL IntraVENous PRN    ondansetron (ZOFRAN) injection 4 mg  4 mg IntraVENous Q6H PRN    acetaminophen (TYLENOL) solution 650 mg  650 mg Per G Tube Q4H PRN    ELECTROLYTE REPLACEMENT PROTOCOL  1 Each Other PRN    ELECTROLYTE REPLACEMENT PROTOCOL  1 Each Other PRN    ELECTROLYTE REPLACEMENT PROTOCOL  1 Each Other PRN    ELECTROLYTE REPLACEMENT PROTOCOL  1 Each Other PRN     Labs: Results:       Chemistry Recent Labs      10/03/17   0330  10/02/17   1456  10/02/17   0410   10/01/17   0400   GLU  96   --   70*   --   112*   NA  141   --   142   --   146*   K  3.9  4.1  4.0   < >  3.7   CL  106   --   112*   --   115*   CO2  25   --   21   --   22   BUN  9   --   12   --   15   CREA  0.90   --   0.93   --   0.99   CA  8.0*   --   8.4*   --   8.3*   AGAP  10   --   9   --   9   BUCR  10*   --   13   --   15   AP  121*   --    --    --    --    TP  6.9   --    --    --    --    ALB  2.0*   --    --    --    --    GLOB  4.9*   --    --    --    --    AGRAT  0.4*   --    --    --    --     < > = values in this interval not displayed.       CBC w/Diff Recent Labs      10/03/17   0330  10/02/17   0410  10/01/17   0400   WBC  9.6  8.2  6.8   RBC  2.85*  2.74*  2.45*   HGB  8.3*  8.2*  7.3*   HCT  25.7*  24.7*  22.2*   PLT  134*  125*  84*   GRANS  60  55  53   LYMPH  18*  28  26   EOS  11*  6*  6*      Coagulation Recent Labs      10/03/17   0330  10/02/17   0410   PTP  15.2  14.7   INR  1.3* 1.2       Liver Enzymes Recent Labs      10/03/17   0330   TP  6.9   ALB  2.0*   AP  121*   SGOT  18      ABG No results found for: PH, PHI, PCO2, PCO2I, PO2, PO2I, HCO3, HCO3I, FIO2, FIO2I   Microbiology No results for input(s): CULT in the last 72 hours. Telemetry: [x]Sinus []A-flutter []Paced    []A-fib []Multiple PVCs                  Imaging:  CT Chest 10/01/17: 1. Slight interval improvement in the patchy bilateral airspace opacities with  overall similar distribution as above suggesting multifocal pneumonia and likely  superimposed interstitial edema. 2.  Small new bilateral pleural effusions and a trace pericardial effusion. 3.  Probable reactive mediastinal adenopathy. CXR 10/03/17: 1. The bilateral predominantly upper lobe airspace infiltrates which could  represent inflammatory pneumonia  and superimposed interstitial edema appear  improved from 9/29/2017 but about the same as the CT. 2. Right central line satisfactory position without pneumothorax. 3. Small right pleural effusion. IMPRESSION:   · Acute Hypercapnic and Hypoxic Respiratory Failure- extubated 09/25 and has since required bipap, now tolerating Hiflow during the day   · HCAP with septic shock- bilateral ground glass opacities of CT Chest, respiratory culture (+) MRSA and Pseudomonas MDR   · Possible acute exacerbation of diastolic CHF-compensated, does not appear fluid overloaded  · Acute Kidney Injury- resolved  · Hx of throat cancer s/p PEG placement- difficult airway due to upper airway obstruction from cancer, call anesthesia for assistance (size 6.5 ETT used initally). · DM Type II  · Remote hx of alcohol and tobacco abuse      PLAN:   · Resp -  Wean HiFlow NC as tolerated, PRN bipap 12/6 for respiratory fatigue or distress. Continue Pulmicort and brovana. Duo-nebz.  Will consider trial lasix if pt's oxygen requirement does not improve, small right pleural effusion on CXR with improving upper lobe infiltrates (pt takes Lasix 20 mg PO at home). · ID - Continue Vanc and Cipro per ID. Resp cultures (+) MRSA. UC, BC- NGTD. · CVS - Elevated BP. Continue Norvasc, lisinopril, and metoprolol (increased dose today). PRN labetalol for BP > 160/100. BNP remains elevated, will re-check in a few days. · Heme/onc - Monitor H/H and Platelets. · Metabolic - Monitor and replace lytes per protocol. Continue D5LR @ 50 ml/hr to prevent hypoglycemia while pt NPO (on bipap). · Renal - Monitor Cr and UOP. Strict I&O. Free water flush to prevent hypernatremia. Remove Holly and monitor for retention. · Endocrine - BS q 6 hrs while NPO. Continue Synthroid. · Neuro/ Pain/ Sedation - PRN Norco, chronic back pain. Continue Gabapentin. Avoid sedating medications. Precedex D/C'd. · GI - NPO except meds while on bipap. Please provide aggressive oral care. · Prophylaxis - DVT(lovenox), GI(Pepcid)  · Discussed in interdisciplinary rounds  · Palliative Care following.    · Full Code         The patient is: [x] acutely ill Risk of deterioration: [x] moderate    [] critically ill  [] high     [x]See my orders for details    My assessment/plan was discussed with:  [x]nursing []PT/OT    []respiratory therapy [x]Dr. Noemí Oseguera [x] Patient      Iris LAVERN Louise   10/03/17 2:14 PM

## 2017-10-03 NOTE — PROGRESS NOTES
-0750-Received patient on Bipap 12/6., FIO2-35% O2 sats-99% HR-81, RR-13. Pt. Placed on high flow nasal cannula FIO2-35% 50lpm O2 Sats-96% HR-82, RR-15. Pt. Is awake and alert. Pt. Given scheduled nebulizer to and CPT via [employer]. Pt. Tolerated well. Respiratory will continue to monitor. -[address]    -1515-Pt. Remains on high flow nasal cannula, FIO2-40% which was increased earlier in shift due to desaturation. 50lpm. O2 Sats-98% HR-77, RR-13. Pt. Is awake and alert. No respiratory distress noted.  -[address]

## 2017-10-03 NOTE — PROGRESS NOTES
Infectious Disease Follow-up     Admit Date: 9/24/2017    Current abx Prior abx   Vancomycin 9/24-9  Ciprofloxacin 10/2-1 Levofloxacin 9/24 - 4  Zosyn 9/24 - 6 Tobramycin 9/30 - 2      ASSESSMENT - > REC:     Recurrent Pneumonia  - MRSA, Pseudomonas in spcx 9/24  - same organisms in spcx last confinement [facility]) 8/11-18  - treated with Vancomycin/zosyn x 14 days (ending ~8/25)  - bilateral multifocal opacities - sl improved on CT 10/1 -> continue Vancomycin and cipro  -> could eventually change Vancomycin to Doxycycline, cipro to po 750 bid aiming for total 3-4 weeks rx (relapsed after 2 weeks IV rx)   Respiratory Failure   - intubated 9/24, extubated 9/25 -> HFNC per PCCM   H/o throat CA     ETOH abuse    DM    HTN    CAD    GERD    PEG    DELFINA      MICROBIOLOGY:   9/24 blcx NG x 2    urcx NG   spcx Ps aeruginosa sens aminoglycosides, cipro res pip-tazo, other beta lactams, MRSA R levaquin vanco rios 1 few CF  9/26  C diff neg     LINES AND CATHETERS:   Tunneled PICC R ? [address] Problems    Diagnosis Date Noted    Acute respiratory failure with hypercapnia ([facility] [address] 09/24/2017    Sepsis ([facility] [address] 09/24/2017    HCAP (healthcare-associated pneumonia) 09/24/2017    Hypertension 09/24/2017    Diabetes mellitus ([facility] [address] 09/24/2017         Subjective: Interval notes reviewed. Pt remains in ICU on HFNC, pt denied adr rash n/v/d, fever or chill, says breathing ok d/w nurse no new issues reported.       Current Facility-Administered Medications   Medication Dose Route Frequency    Thiamine Mononitrate (B-1) tablet 100 mg  100 mg Oral BID    famotidine (PEPCID) tablet 20 mg  20 mg Oral Q12H    HYDROcodone-acetaminophen (NORCO) 5-325 mg per tablet 2 Tab  2 Tab Oral Q4H PRN    gabapentin (NEURONTIN) capsule 300 mg  300 mg Oral TID    amLODIPine (NORVASC) tablet 10 mg  10 mg Oral DAILY    labetalol (NORMODYNE;TRANDATE) injection 20 mg  20 mg IntraVENous Q4H PRN    dextrose 5% lactated ringers infusion  50 mL/hr IntraVENous CONTINUOUS    ciprofloxacin (CIPRO) 400 mg IVPB (premix)  400 mg IntraVENous Q12H    metoprolol tartrate (LOPRESSOR) tablet 100 mg  100 mg Oral Q12H    vancomycin (VANCOCIN) 750 mg in 0.9% sodium chloride (MBP/ADV) 250 mL ADV  750 mg IntraVENous Q24H    [START ON 10/5/2017] VANCOMYCIN INFORMATION NOTE   Other ONCE    lisinopril (PRINIVIL, ZESTRIL) tablet 20 mg  20 mg Oral DAILY    enoxaparin (LOVENOX) injection 40 mg  40 mg SubCUTAneous Q24H    haloperidol lactate (HALDOL) injection 4 mg  4 mg IntraVENous Q8H PRN    VANCOMYCIN INFORMATION NOTE   Other Rx Dosing/Monitoring    albuterol-ipratropium (DUO-NEB) 2.5 MG-0.5 MG/3 ML  3 mL Nebulization Q6H RT    0.9% sodium chloride infusion 250 mL  250 mL IntraVENous PRN    levothyroxine (SYNTHROID) tablet 100 mcg  100 mcg Oral Daily    insulin lispro (HUMALOG) injection   SubCUTAneous Q6H    glucose chewable tablet 16 g  4 Tab Oral PRN    glucagon (GLUCAGEN) injection 1 mg  1 mg IntraMUSCular PRN    dextrose (D50W) injection syrg 12.5-25 g  25-50 mL IntraVENous PRN    sodium chloride (NS) flush 5-10 mL  5-10 mL IntraVENous PRN    sodium chloride (NS) flush 5-10 mL  5-10 mL IntraVENous Q8H    sodium chloride (NS) flush 5-10 mL  5-10 mL IntraVENous PRN    ondansetron (ZOFRAN) injection 4 mg  4 mg IntraVENous Q6H PRN    acetaminophen (TYLENOL) solution 650 mg  650 mg Per G Tube Q4H PRN    ELECTROLYTE REPLACEMENT PROTOCOL  1 Each Other PRN    ELECTROLYTE REPLACEMENT PROTOCOL  1 Each Other PRN    ELECTROLYTE REPLACEMENT PROTOCOL  1 Each Other PRN    ELECTROLYTE REPLACEMENT PROTOCOL  1 Each Other PRN         Objective:     Visit Vitals    /88    Pulse 78    Temp 97.7 °F (36.5 °C)    Resp 23    Ht 5' 4\" (1.626 m)    Wt 68 kg (149 lb 14.6 oz)    SpO2 98%    BMI 25.73 kg/m2       Temp (24hrs), Av.8 °F (36.6 °C), Min:97.5 °F (36.4 °C), Max:97.9 °F (36.6 °C)    GEN: wdwn BF appears older than stated age in ICU  HEENT: HFNC in place, anicteric  NECK: R picc in place  CHEST: fair air movement B scattered crackles no wheezes  CVS: S1'S2' no murmurs  ABD: soft (+) BS non-tender PEG  EXT: no edema   NEURO: awake alert seems appropriate. Labs: Results:   Chemistry Recent Labs      10/03/17   1130  10/03/17   0330  10/02/17   1456  10/02/17   0410   10/01/17   0400   GLU   --   96   --   70*   --   112*   NA   --   141   --   142   --   146*   K  3.9  3.9  4.1  4.0   < >  3.7   CL   --   106   --   112*   --   115*   CO2   --   25   --   21   --   22   BUN   --   9   --   12   --   15   CREA   --   0.90   --   0.93   --   0.99   CA   --   8.0*   --   8.4*   --   8.3*   AGAP   --   10   --   9   --   9   BUCR   --   10*   --   13   --   15   AP   --   121*   --    --    --    --    TP   --   6.9   --    --    --    --    ALB   --   2.0*   --    --    --    --    GLOB   --   4.9*   --    --    --    --    AGRAT   --   0.4*   --    --    --    --     < > = values in this interval not displayed. CBC w/Diff Recent Labs      10/03/17   0330  10/02/17   0410  10/01/17   0400   WBC  9.6  8.2  6.8   RBC  2.85*  2.74*  2.45*   HGB  8.3*  8.2*  7.3*   HCT  25.7*  24.7*  22.2*   PLT  134*  125*  84*   GRANS  60  55  53   LYMPH  18*  28  26   EOS  11*  6*  6*        cxr 10/3 Impression:        1. The bilateral predominantly upper lobe airspace infiltrates which could  represent inflammatory pneumonia  and superimposed interstitial edema appear  improved from 9/29/2017 but about the same as the CT. 2. Right central line satisfactory position without pneumothorax. 3. Small right pleural effusion. Microbiology Results  No results for input(s): Saniya Ochoa in the last 72 hours.     Cheryle Altman MD  October 3, 2017  [facility] AT THE [facility]  [phone]

## 2017-10-03 NOTE — PROGRESS NOTES
0700 Bedside report received from [facility]. Pt. Continues on Bi-Pap .  0805 pt was started on high flow oxygen. 2290 Pt was desaturating to the seventies. Pt. repositioned . Oxygen level did not improve. Pt FIO2 was increased to 40%. 1447 Pt. Working with OT  Pt. SpO2 at 88 % with activity. Pt denied any SOB. Pt. Continues on Hi-Sajan oxygen. Pt. Sitting at the side of the bed.   1900 Bedside and Verbal shift change report given to BRENDA SCHUSTER (oncoming nurse) by Emily Foley   (offgoing nurse). Report included the following information SBAR, Kardex, ED Summary, Intake/Output, MAR and Recent Results.

## 2017-10-03 NOTE — PROGRESS NOTES
0 - Received report from [facility] and LANDEN Agarwal. Pt is alert and stable. Complains of pain. Will continue to monitor. 2000 - Pt complains of pain. See MAR. Pt is also having to urinate again and is having difficulty urinating on her own. Bladder scan completed with 655 ml detected. Straight cathed with 625 ml clear yellow urine out. 10/4/17  0000 - Pt complains of pain. See MAR. Pt is also having to urinate again and is having difficulty urinating on her own. Bladder scan completed with 525 ml detected. Straight cathed with 500 ml clear yellow urine out. Pt had copious amounts of white discharge in genital area. 0030 - Pt transferred to [facility]. Bedside and Verbal shift change report given to LANDEN Katz (oncoming nurse) by Robert Mcnally RN (offgoing nurse). Report included the following information SBAR, Kardex, Intake/Output, MAR, Recent Results and Med Rec Status.

## 2017-10-03 NOTE — PROGRESS NOTES
Pt with accepting facility - [facility]- pending auth and bed availability at time of discharge readiness. Several other referrals without decisions rendered at this time. Pt not medically stable for discharge at this time. Remains NPO on IV abx and increase in 02 requirements today to 40% Fio2 @ 50L. Plan remains ARU vs SNF pending pt progress and auth.

## 2017-10-03 NOTE — PROGRESS NOTES
0700 Received patient from [facility] Patient SPO2 dropped to 77%. Patient repositioned in bed and placed in chair position    6884 Patient SPO2 still not improving SPO2 hanging at 86% and pleth is accurate.  High Flow NC FIO2  increased from 35% to 40% with 50LPM.     0944 SPO2 now 93%

## 2017-10-03 NOTE — PROGRESS NOTES
Physical Exam   Skin: Skin is warm and dry.         Primary Nurse Christy Rosas and Shelby Carmona RN performed a dual skin assessment on this patient Impairment noted- see wound doc flow sheet  Jamie score is 14

## 2017-10-04 LAB
ALBUMIN SERPL-MCNC: 2 G/DL (ref 3.4–5)
ALBUMIN/GLOB SERPL: 0.4 {RATIO} (ref 0.8–1.7)
ALP SERPL-CCNC: 125 U/L (ref 45–117)
ALT SERPL-CCNC: 16 U/L (ref 13–56)
ANION GAP SERPL CALC-SCNC: 8 MMOL/L (ref 3–18)
AST SERPL-CCNC: 25 U/L (ref 15–37)
BASOPHILS # BLD: 0.1 K/UL (ref 0–0.06)
BASOPHILS NFR BLD: 1 % (ref 0–2)
BILIRUB SERPL-MCNC: 0.3 MG/DL (ref 0.2–1)
BNP SERPL-MCNC: ABNORMAL PG/ML (ref 0–450)
BUN SERPL-MCNC: 7 MG/DL (ref 7–18)
BUN/CREAT SERPL: 8 (ref 12–20)
CALCIUM SERPL-MCNC: 7.8 MG/DL (ref 8.5–10.1)
CHLORIDE SERPL-SCNC: 102 MMOL/L (ref 100–108)
CO2 SERPL-SCNC: 25 MMOL/L (ref 21–32)
CREAT SERPL-MCNC: 0.83 MG/DL (ref 0.6–1.3)
DIFFERENTIAL METHOD BLD: ABNORMAL
EOSINOPHIL # BLD: 1.4 K/UL (ref 0–0.4)
EOSINOPHIL NFR BLD: 13 % (ref 0–5)
ERYTHROCYTE [DISTWIDTH] IN BLOOD BY AUTOMATED COUNT: 15.6 % (ref 11.6–14.5)
GLOBULIN SER CALC-MCNC: 5.2 G/DL (ref 2–4)
GLUCOSE BLD STRIP.AUTO-MCNC: 101 MG/DL (ref 70–110)
GLUCOSE BLD STRIP.AUTO-MCNC: 83 MG/DL (ref 70–110)
GLUCOSE BLD STRIP.AUTO-MCNC: 99 MG/DL (ref 70–110)
GLUCOSE SERPL-MCNC: 91 MG/DL (ref 74–99)
HCT VFR BLD AUTO: 26.3 % (ref 35–45)
HGB BLD-MCNC: 8.6 G/DL (ref 12–16)
INR PPP: 1.3 (ref 0.8–1.2)
LYMPHOCYTES # BLD: 1.7 K/UL (ref 0.9–3.6)
LYMPHOCYTES NFR BLD: 15 % (ref 21–52)
MCH RBC QN AUTO: 29.7 PG (ref 24–34)
MCHC RBC AUTO-ENTMCNC: 32.7 G/DL (ref 31–37)
MCV RBC AUTO: 90.7 FL (ref 74–97)
MONOCYTES # BLD: 0.9 K/UL (ref 0.05–1.2)
MONOCYTES NFR BLD: 8 % (ref 3–10)
NEUTS SEG # BLD: 7 K/UL (ref 1.8–8)
NEUTS SEG NFR BLD: 63 % (ref 40–73)
PLATELET # BLD AUTO: 143 K/UL (ref 135–420)
PMV BLD AUTO: 10.2 FL (ref 9.2–11.8)
POTASSIUM SERPL-SCNC: 4.1 MMOL/L (ref 3.5–5.5)
PROT SERPL-MCNC: 7.2 G/DL (ref 6.4–8.2)
PROTHROMBIN TIME: 15.3 SEC (ref 11.5–15.2)
RBC # BLD AUTO: 2.9 M/UL (ref 4.2–5.3)
SODIUM SERPL-SCNC: 135 MMOL/L (ref 136–145)
WBC # BLD AUTO: 11 K/UL (ref 4.6–13.2)

## 2017-10-04 PROCEDURE — 74011250636 HC RX REV CODE- 250/636: Performed by: HOSPITALIST

## 2017-10-04 PROCEDURE — 77010033711 HC HIGH FLOW OXYGEN

## 2017-10-04 PROCEDURE — 80053 COMPREHEN METABOLIC PANEL: CPT | Performed by: HOSPITALIST

## 2017-10-04 PROCEDURE — 97530 THERAPEUTIC ACTIVITIES: CPT

## 2017-10-04 PROCEDURE — 74011258636 HC RX REV CODE- 258/636: Performed by: INTERNAL MEDICINE

## 2017-10-04 PROCEDURE — 74011250636 HC RX REV CODE- 250/636: Performed by: PHYSICIAN ASSISTANT

## 2017-10-04 PROCEDURE — 74011000250 HC RX REV CODE- 250: Performed by: INTERNAL MEDICINE

## 2017-10-04 PROCEDURE — 74011250637 HC RX REV CODE- 250/637: Performed by: INTERNAL MEDICINE

## 2017-10-04 PROCEDURE — 94660 CPAP INITIATION&MGMT: CPT

## 2017-10-04 PROCEDURE — 74011250637 HC RX REV CODE- 250/637: Performed by: PHYSICIAN ASSISTANT

## 2017-10-04 PROCEDURE — 97535 SELF CARE MNGMENT TRAINING: CPT

## 2017-10-04 PROCEDURE — 94640 AIRWAY INHALATION TREATMENT: CPT

## 2017-10-04 PROCEDURE — 65610000006 HC RM INTENSIVE CARE

## 2017-10-04 PROCEDURE — 94668 MNPJ CHEST WALL SBSQ: CPT

## 2017-10-04 PROCEDURE — 85610 PROTHROMBIN TIME: CPT | Performed by: HOSPITALIST

## 2017-10-04 PROCEDURE — 83880 ASSAY OF NATRIURETIC PEPTIDE: CPT | Performed by: INTERNAL MEDICINE

## 2017-10-04 PROCEDURE — 74011250636 HC RX REV CODE- 250/636: Performed by: INTERNAL MEDICINE

## 2017-10-04 PROCEDURE — 82962 GLUCOSE BLOOD TEST: CPT

## 2017-10-04 PROCEDURE — 74011250636 HC RX REV CODE- 250/636: Performed by: FAMILY MEDICINE

## 2017-10-04 PROCEDURE — 77030011943

## 2017-10-04 PROCEDURE — 85025 COMPLETE CBC W/AUTO DIFF WBC: CPT | Performed by: HOSPITALIST

## 2017-10-04 RX ORDER — MIDAZOLAM HYDROCHLORIDE 1 MG/ML
5 INJECTION, SOLUTION INTRAMUSCULAR; INTRAVENOUS ONCE
Status: DISCONTINUED | OUTPATIENT
Start: 2017-10-04 | End: 2017-10-04

## 2017-10-04 RX ORDER — MIDAZOLAM HYDROCHLORIDE 1 MG/ML
2 INJECTION, SOLUTION INTRAMUSCULAR; INTRAVENOUS ONCE
Status: DISCONTINUED | OUTPATIENT
Start: 2017-10-04 | End: 2017-10-04

## 2017-10-04 RX ORDER — CLONIDINE HYDROCHLORIDE 0.1 MG/1
0.1 TABLET ORAL 2 TIMES DAILY
Status: DISCONTINUED | OUTPATIENT
Start: 2017-10-04 | End: 2017-10-04

## 2017-10-04 RX ORDER — CLONIDINE HYDROCHLORIDE 0.1 MG/1
0.1 TABLET ORAL 2 TIMES DAILY
Status: DISCONTINUED | OUTPATIENT
Start: 2017-10-04 | End: 2017-10-09 | Stop reason: HOSPADM

## 2017-10-04 RX ORDER — FUROSEMIDE 10 MG/ML
40 INJECTION INTRAMUSCULAR; INTRAVENOUS ONCE
Status: COMPLETED | OUTPATIENT
Start: 2017-10-04 | End: 2017-10-04

## 2017-10-04 RX ADMIN — METOPROLOL TARTRATE 100 MG: 50 TABLET ORAL at 09:15

## 2017-10-04 RX ADMIN — AMLODIPINE BESYLATE 10 MG: 10 TABLET ORAL at 09:30

## 2017-10-04 RX ADMIN — HYDROCODONE BITARTRATE AND ACETAMINOPHEN 2 TABLET: 5; 325 TABLET ORAL at 19:46

## 2017-10-04 RX ADMIN — Medication 100 MG: at 21:57

## 2017-10-04 RX ADMIN — SODIUM CHLORIDE 750 MG: 900 INJECTION, SOLUTION INTRAVENOUS at 11:23

## 2017-10-04 RX ADMIN — Medication 10 ML: at 21:58

## 2017-10-04 RX ADMIN — CIPROFLOXACIN 400 MG: 2 INJECTION, SOLUTION INTRAVENOUS at 19:43

## 2017-10-04 RX ADMIN — CLONIDINE HYDROCHLORIDE 0.1 MG: 0.1 TABLET ORAL at 13:28

## 2017-10-04 RX ADMIN — IPRATROPIUM BROMIDE AND ALBUTEROL SULFATE 3 ML: .5; 3 SOLUTION RESPIRATORY (INHALATION) at 20:41

## 2017-10-04 RX ADMIN — CIPROFLOXACIN 400 MG: 2 INJECTION, SOLUTION INTRAVENOUS at 10:07

## 2017-10-04 RX ADMIN — SODIUM CHLORIDE, SODIUM LACTATE, POTASSIUM CHLORIDE, CALCIUM CHLORIDE, AND DEXTROSE MONOHYDRATE 50 ML/HR: 600; 310; 30; 20; 5 INJECTION, SOLUTION INTRAVENOUS at 06:04

## 2017-10-04 RX ADMIN — FAMOTIDINE 20 MG: 20 TABLET ORAL at 09:16

## 2017-10-04 RX ADMIN — FUROSEMIDE 40 MG: 10 INJECTION, SOLUTION INTRAMUSCULAR; INTRAVENOUS at 11:36

## 2017-10-04 RX ADMIN — IPRATROPIUM BROMIDE AND ALBUTEROL SULFATE 3 ML: .5; 3 SOLUTION RESPIRATORY (INHALATION) at 08:19

## 2017-10-04 RX ADMIN — ENOXAPARIN SODIUM 40 MG: 40 INJECTION SUBCUTANEOUS at 21:57

## 2017-10-04 RX ADMIN — FAMOTIDINE 20 MG: 20 TABLET ORAL at 21:57

## 2017-10-04 RX ADMIN — HYDROCODONE BITARTRATE AND ACETAMINOPHEN 2 TABLET: 5; 325 TABLET ORAL at 13:26

## 2017-10-04 RX ADMIN — IPRATROPIUM BROMIDE AND ALBUTEROL SULFATE 3 ML: .5; 3 SOLUTION RESPIRATORY (INHALATION) at 02:00

## 2017-10-04 RX ADMIN — GABAPENTIN 300 MG: 300 CAPSULE ORAL at 09:16

## 2017-10-04 RX ADMIN — SODIUM CHLORIDE 750 MG: 900 INJECTION, SOLUTION INTRAVENOUS at 23:07

## 2017-10-04 RX ADMIN — LABETALOL HYDROCHLORIDE 20 MG: 5 INJECTION, SOLUTION INTRAVENOUS at 04:07

## 2017-10-04 RX ADMIN — IPRATROPIUM BROMIDE AND ALBUTEROL SULFATE 3 ML: .5; 3 SOLUTION RESPIRATORY (INHALATION) at 13:51

## 2017-10-04 RX ADMIN — METOPROLOL TARTRATE 100 MG: 50 TABLET ORAL at 21:57

## 2017-10-04 RX ADMIN — CLONIDINE HYDROCHLORIDE 0.1 MG: 0.1 TABLET ORAL at 17:54

## 2017-10-04 RX ADMIN — LEVOTHYROXINE SODIUM 100 MCG: 100 TABLET ORAL at 06:00

## 2017-10-04 RX ADMIN — GABAPENTIN 300 MG: 300 CAPSULE ORAL at 21:57

## 2017-10-04 RX ADMIN — GABAPENTIN 300 MG: 300 CAPSULE ORAL at 17:54

## 2017-10-04 RX ADMIN — HYDROCODONE BITARTRATE AND ACETAMINOPHEN 2 TABLET: 5; 325 TABLET ORAL at 00:36

## 2017-10-04 RX ADMIN — Medication 100 MG: at 09:15

## 2017-10-04 RX ADMIN — HYDROCODONE BITARTRATE AND ACETAMINOPHEN 2 TABLET: 5; 325 TABLET ORAL at 06:00

## 2017-10-04 RX ADMIN — LISINOPRIL 20 MG: 20 TABLET ORAL at 09:15

## 2017-10-04 RX ADMIN — Medication 10 ML: at 05:49

## 2017-10-04 NOTE — PROGRESS NOTES
Progress Note      Patient: Sina Moreland               Sex: female          DOA: 9/24/2017       YOB: 1965      Age:  54 y.o.        LOS:  LOS: 10 days               Subjective:   Pt was on HiFlow when seen today . She became fatigued and was put back on bipap . She is off pressors and is on precedex as needed . she is s/p peg she has been extubated but still requires a high degree of respiratory support       Objective:      Visit Vitals    BP (!) 147/94    Pulse 76    Temp 97.2 °F (36.2 °C)    Resp 12    Ht 5' 4\" (1.626 m)    Wt 68.8 kg (151 lb 10.8 oz)    SpO2 100%    BMI 26.04 kg/m2       Physical Exam:  Pt is awake and is responsive   heart reg rate and rhythm   Lungs fair movement of air   Abdomen  Soft  Peg in place   Neuro nonfocal        Lab/Data Reviewed:  CMP:   Lab Results   Component Value Date/Time     (L) 10/04/2017 04:00 AM    K 4.1 10/04/2017 04:00 AM     10/04/2017 04:00 AM    CO2 25 10/04/2017 04:00 AM    AGAP 8 10/04/2017 04:00 AM    GLU 91 10/04/2017 04:00 AM    BUN 7 10/04/2017 04:00 AM    CREA 0.83 10/04/2017 04:00 AM    GFRAA >60 10/04/2017 04:00 AM    GFRNA >60 10/04/2017 04:00 AM    CA 7.8 (L) 10/04/2017 04:00 AM    ALB 2.0 (L) 10/04/2017 04:00 AM    TP 7.2 10/04/2017 04:00 AM    GLOB 5.2 (H) 10/04/2017 04:00 AM    AGRAT 0.4 (L) 10/04/2017 04:00 AM    SGOT 25 10/04/2017 04:00 AM    ALT 16 10/04/2017 04:00 AM     CBC:   Lab Results   Component Value Date/Time    WBC 11.0 10/04/2017 04:00 AM    HGB 8.6 (L) 10/04/2017 04:00 AM    HCT 26.3 (L) 10/04/2017 04:00 AM     10/04/2017 04:00 AM           Assessment/Plan     Principal Problem:    Acute respiratory failure with hypercapnia (HCC) (9/24/2017)    Active Problems:    Sepsis ([facility] (9/24/2017)      HCAP (healthcare-associated pneumonia) (9/24/2017)      Hypertension (9/24/2017)      Diabetes mellitus ([facility] (9/24/2017)        Plan: continue respiratory support as per pulmonary .  Will follow

## 2017-10-04 NOTE — PROGRESS NOTES
Problem: Self Care Deficits Care Plan (Adult)  Goal: *Acute Goals and Plan of Care (Insert Text)  Occupational Therapy Goals  Initiated 10/2/2017 within 3 day(s). Pt is on bipap, will implement 3 day trial to determine ability to participate in skilled therapy. Continue per POC if appropriate. 1. Patient will perform grooming tasks with supervision. 2. Patient will perform bed mobility with supervision and verbal cues for pacing. 3. Patient will perform functional task at EOB for 8 minutes with supervision for balance to increase activity tolerance for ADLs. 4. Patient will perform all aspects of toileting with minimal assistance. 5. Patient will participate in upper extremity therapeutic exercise/activities with supervision/set-up for 8 minutes to increase BUE strength for ADLs. 6. Patient will utilize energy conservation techniques during functional activities with minimal verbal cues. 7. Patient will perform toilet transfer with moderate assistance. Outcome: Progressing Towards Goal  OCCUPATIONAL THERAPY TREATMENT     Patient: Kamaljit White (48 y.o. female)  Date: 10/4/2017  Diagnosis: Acute respiratory failure with hypercapnia ([facility] Acute respiratory failure with hypercapnia (HCC)       Precautions: Fall (respiration )  Chart, occupational therapy assessment, plan of care, and goals were reviewed. ASSESSMENT:  Pt seen for day 2 of 3 day trial continuing to progress toward STGs. Pt remains on Hi-sunil, 50L, maintaining O2 sats 100% throughout session. Pt tolerates sitting EOB ~ 20 minutes performing thorough oral care with increase time and no LOB. Pt generalized weakness and BLE weakness requires Min Assist w/functional transfer to [facility] and pt tolerates standing w/RW for marky-care. Pt decrease standing balance requires Max Assist w/toileting ADL.     EDUCATION Pt educated on stand pivot technique w/functional transfer to [facility]  Progression toward goals:  [ ]          Improving appropriately and progressing toward goals  [X]          Improving slowly and progressing toward goals  [ ]          Not making progress toward goals and plan of care will be adjusted       PLAN:  Patient continues to benefit from skilled intervention to address the above impairments. Continue treatment per established plan of care. Discharge Recommendations:  Isiah Medel  Further Equipment Recommendations for Discharge:  shower chair and rolling walker       SUBJECTIVE:   Patient stated My back hurts when I'm standing.       OBJECTIVE DATA SUMMARY:         Cognitive/Behavioral Status:  Neurologic State: Alert  Orientation Level: Oriented X4  Cognition: Appropriate for age attention/concentration, Follows commands  Safety/Judgement: Awareness of environment, Fall prevention  Functional Mobility and Transfers for ADLs:              Bed Mobility:  Supine to Sit: Additional time;Minimum assistance  Sit to Supine: Minimum assistance  Scooting: Stand-by asssistance              Transfers:  Sit to Stand: Minimum assistance  Bed to Chair: Additional time              Toilet Transfer : Minimum assistance              Stand Pivot Transfers: Minimal assistance  Balance:  Sitting: Intact  Sitting - Static: Good (unsupported)  Sitting - Dynamic: Fair (occasional)  Standing: Impaired; With support  Standing - Static: Fair  Standing - Dynamic : Fair  ADL Intervention:  Grooming  Washing Hands: Supervision/set-up  Brushing Teeth: Supervision/set-up     Toileting  Toileting Assistance: Maximum assistance  Bowel Hygiene: Maximum assistance  Clothing Management: Maximum assistance     Pain:  Pre Treatment:7  Post Treatment:7  Pain Scale 1: Numeric (0 - 10)  Pain Intensity 1: 7  Pain Location 1: Back  Pain Orientation 1: Lower  Pain Description 1: Aching  Pain Intervention(s) 1: Medication (see MAR)   Pt c/o back pain 8/10 standing     Activity Tolerance:    Fair, pt fatigues easily     Please refer to the flowsheet for vital signs taken during this treatment.   After treatment:   [ ]  Patient left in no apparent distress sitting up in chair  [X]  Patient left in no apparent distress in bed  [X]  Call bell left within reach  [X]  Nursing notified  [ ]  Caregiver present  [ ]  Bed alarm activated     KOREY Medrano  Time Calculation: 32 mins

## 2017-10-04 NOTE — PROGRESS NOTES
-0805-Received patient on Bipap 12/6, FIO2-35%. O2 sats-10% HR-74, RR-14. Respiratory will continue to monitor. -[address]

## 2017-10-04 NOTE — PROGRESS NOTES
Pharmacy Dosing Services: Vancomycin    Indication: HAP    Day of therapy: 11    Other Antimicrobials (Include dose, start day & day of therapy):  Cipro 400 mg IV q 12h-day 1, start 10/2    Loading dose (date given): 1.5g-   Current Maintenance dose: 750 mg IV q24h    Goal Vancomycin Level: 15-20  (Trough 15-20 for most infections, 20 for meningitis/osteomyelitis, pre-HD level ~25)     Significant Cultures:   : sputum: GPC, MRSA, Pseudomonas   : urine- NGTD   : blood cx: NGTD      Renal function stable? (unstable defined as SCr increase of 0.5 mg/dL or > 50% increase from baseline, whichever is greater) (Y/N): Y    CAPD, Hemodialysis or Renal Replacement Therapy (Y/N): N     Recent Labs      10/04/17   0400  10/03/17   0330  10/02/17   0410   CREA  0.83  0.90  0.93   BUN  7  9  12   WBC  11.0  9.6  8.2     Temp (24hrs), Av.2 °F (36.8 °C), Min:97.7 °F (36.5 °C), Max:98.7 °F (37.1 °C)    Creatinine Clearance (Creatinine Clearance (ml/min)): 78 ml/min     Regimen assessment: s.cr improved; will increase frequency to q12h  Maintenance dose: 750 mg IV q12h (predicted trough ~15)  Next scheduled level: 10/5  prior to 2300 dose     Pharmacy will follow daily and adjust medications as appropriate for renal function and/or serum levels.     Thank you,  Jennifer Campos

## 2017-10-04 NOTE — PROGRESS NOTES
Accepted to [facility] pending auth when pt closer to discharge. Discharge plan: [facility].

## 2017-10-04 NOTE — PROGRESS NOTES
[facility]  ICU Progress Note    Name: Joy Delong   : 1965   MRN: [id number]   Date: 10/4/2017 2:14 PM     [x]I have reviewed the flowsheet and previous days notes. Events overnight reviewed and discussed with nursing staff. Vital signs and records reviewed. Subjective:  - BP remains elevated, clonidine added bipap. - Now tolerating HiFlow 50 L/min FIO2 40% during the day, was placed back on bipap do to respiratory fatigue overnight. - Afebrile. RC (+) MRSA and pseudomonas   - Pt reports back pain, but denies any other complaints- notes some improvement  - Holly Removed     [x]The patient is critically ill on      []Mechanical ventilation []Pressors   [x]BiPAP []               ROS:Review of systems not obtained due to patient factors.     Medication Review:  · Pressors - none  · Sedation - PRN precedex   · Antibiotics - Cipro, Vanc   · Pain - PRN Norco  · GI/ DVT -  Pepcid/Lovenox   · Others (other gtts)    Safety Bundles: Electrolyte Replacement Protocol    Vital Signs:    Visit Vitals    BP (!) 146/93    Pulse 71    Temp 97.4 °F (36.3 °C)    Resp 18    Ht 5' 4\" (1.626 m)    Wt 68.8 kg (151 lb 10.8 oz)    SpO2 92%    BMI 26.04 kg/m2       O2 Device: Hi flow nasal cannula   O2 Flow Rate (L/min): 50 l/min   Temp (24hrs), Av.2 °F (36.8 °C), Min:97.4 °F (36.3 °C), Max:98.7 °F (37.1 °C)       Intake/Output:   Last shift:      10/04 0701 - 10/04 1900  In: 925 [I.V.:850]  Out: 950 [Urine:950]  Last 3 shifts: 10/02 1901 - 10/04 07  In: 5110 [I.V.:2960]  Out: 3365 [JDLIO:3739; Drains:170]    Intake/Output Summary (Last 24 hours) at 10/04/17 1354  Last data filed at 10/04/17 1123   Gross per 24 hour   Intake             3305 ml   Output             2535 ml   Net              770 ml     Physical Exam:              General: A&O x 4 in NAD on HiFlow NC.                         HEENT: bipap in place, mouth looks clean with normal mucosa no visible mass Neck: Supple, no JVD, but (+) hepato-juglar reflex. Chest: Normal                         Lungs: Symmetric rise and fall of chest with no increased WOB while on HiFlow. Inspiratory rales in LLL, otherwise CTA. Scattered rhonchi that clear with cough. No wheezing. Heart: Regular rate and rhythm. No m,r,g. Abdomen: non distended, bowel sounds normoactive. PEG tube in place. Extremity: No edema or cyanosis in extremities bilaterally. Neuro: No focal weakness.                          Skin: Intact                                       Devices:  PEG, PICC in subclavian  DATA:     Current Facility-Administered Medications   Medication Dose Route Frequency    vancomycin (VANCOCIN) 750 mg in 0.9% sodium chloride (MBP/ADV) 250 mL ADV  750 mg IntraVENous Q12H    [START ON 10/5/2017] VANCOMYCIN INFORMATION NOTE   Other ONCE    cloNIDine HCl (CATAPRES) tablet 0.1 mg  0.1 mg Per G Tube BID    Thiamine Mononitrate (B-1) tablet 100 mg  100 mg Oral BID    famotidine (PEPCID) tablet 20 mg  20 mg Oral Q12H    HYDROcodone-acetaminophen (NORCO) 5-325 mg per tablet 2 Tab  2 Tab Oral Q4H PRN    gabapentin (NEURONTIN) capsule 300 mg  300 mg Oral TID    amLODIPine (NORVASC) tablet 10 mg  10 mg Oral DAILY    labetalol (NORMODYNE;TRANDATE) injection 20 mg  20 mg IntraVENous Q4H PRN    ciprofloxacin (CIPRO) 400 mg IVPB (premix)  400 mg IntraVENous Q12H    metoprolol tartrate (LOPRESSOR) tablet 100 mg  100 mg Oral Q12H    lisinopril (PRINIVIL, ZESTRIL) tablet 20 mg  20 mg Oral DAILY    enoxaparin (LOVENOX) injection 40 mg  40 mg SubCUTAneous Q24H    haloperidol lactate (HALDOL) injection 4 mg  4 mg IntraVENous Q8H PRN    VANCOMYCIN INFORMATION NOTE   Other Rx Dosing/Monitoring    albuterol-ipratropium (DUO-NEB) 2.5 MG-0.5 MG/3 ML  3 mL Nebulization Q6H RT    0.9% sodium chloride infusion 250 mL  250 mL IntraVENous PRN    levothyroxine (SYNTHROID) tablet 100 mcg  100 mcg Oral Daily    insulin lispro (HUMALOG) injection   SubCUTAneous Q6H    glucose chewable tablet 16 g  4 Tab Oral PRN    glucagon (GLUCAGEN) injection 1 mg  1 mg IntraMUSCular PRN    dextrose (D50W) injection syrg 12.5-25 g  25-50 mL IntraVENous PRN    sodium chloride (NS) flush 5-10 mL  5-10 mL IntraVENous PRN    sodium chloride (NS) flush 5-10 mL  5-10 mL IntraVENous Q8H    sodium chloride (NS) flush 5-10 mL  5-10 mL IntraVENous PRN    ondansetron (ZOFRAN) injection 4 mg  4 mg IntraVENous Q6H PRN    acetaminophen (TYLENOL) solution 650 mg  650 mg Per G Tube Q4H PRN    ELECTROLYTE REPLACEMENT PROTOCOL  1 Each Other PRN    ELECTROLYTE REPLACEMENT PROTOCOL  1 Each Other PRN    ELECTROLYTE REPLACEMENT PROTOCOL  1 Each Other PRN    ELECTROLYTE REPLACEMENT PROTOCOL  1 Each Other PRN     Labs: Results:       Chemistry Recent Labs      10/04/17   0400  10/03/17   2000  10/03/17   1130  10/03/17   0330   10/02/17   0410   GLU  91   --    --   96   --   70*   NA  135*   --    --   141   --   142   K  4.1  4.0  3.9  3.9   < >  4.0   CL  102   --    --   106   --   112*   CO2  25   --    --   25   --   21   BUN  7   --    --   9   --   12   CREA  0.83   --    --   0.90   --   0.93   CA  7.8*   --    --   8.0*   --   8.4*   AGAP  8   --    --   10   --   9   BUCR  8*   --    --   10*   --   13   AP  125*   --    --   121*   --    --    TP  7.2   --    --   6.9   --    --    ALB  2.0*   --    --   2.0*   --    --    GLOB  5.2*   --    --   4.9*   --    --    AGRAT  0.4*   --    --   0.4*   --    --     < > = values in this interval not displayed.       CBC w/Diff Recent Labs      10/04/17   0400  10/03/17   0330  10/02/17   0410   WBC  11.0  9.6  8.2   RBC  2.90*  2.85*  2.74*   HGB  8.6*  8.3*  8.2*   HCT  26.3*  25.7*  24.7*   PLT  143  134*  125*   GRANS  63  60  55   LYMPH  15*  18*  28   EOS  13*  11*  6*      Coagulation Recent Labs 10/04/17   0400  10/03/17   0330   PTP  15.3*  15.2   INR  1.3*  1.3*       Liver Enzymes Recent Labs      10/04/17   0400   TP  7.2   ALB  2.0*   AP  125*   SGOT  25      ABG No results found for: PH, PHI, PCO2, PCO2I, PO2, PO2I, HCO3, HCO3I, FIO2, FIO2I   Microbiology No results for input(s): CULT in the last 72 hours. Telemetry: [x]Sinus []A-flutter []Paced    []A-fib []Multiple PVCs                  Imaging:  CT Chest 10/01/17: 1. Slight interval improvement in the patchy bilateral airspace opacities with  overall similar distribution as above suggesting multifocal pneumonia and likely  superimposed interstitial edema. 2.  Small new bilateral pleural effusions and a trace pericardial effusion. 3.  Probable reactive mediastinal adenopathy. CXR 10/03/17: 1. The bilateral predominantly upper lobe airspace infiltrates which could  represent inflammatory pneumonia  and superimposed interstitial edema appear  improved from 9/29/2017 but about the same as the CT. 2. Right central line satisfactory position without pneumothorax. 3. Small right pleural effusion. IMPRESSION:   · Acute Hypercapnic and Hypoxic Respiratory Failure- extubated 09/25 and has since required bipap, now tolerating Hiflow during the day   · HCAP with septic shock- bilateral ground glass opacities of CT Chest, respiratory culture (+) MRSA and Pseudomonas MDR   · Possible acute exacerbation of diastolic CHF-compensated, does not appear fluid overloaded, BNP 35,000   · Acute Kidney Injury- resolved  · Hx of throat cancer s/p PEG placement- difficult airway due to upper airway obstruction from cancer, call anesthesia for assistance (size 6.5 ETT used initally). · DM Type II  · Remote hx of alcohol and tobacco abuse      PLAN:   · Resp -  Wean HiFlow NC as tolerated, PRN bipap 12/6 for respiratory fatigue or distress. Continue Pulmicort and brovana. Duo-nebz.  Will consider trial lasix if pt's oxygen requirement does not improve, small right pleural effusion on CXR. · ID - Continue Vanc and Cipro per ID. Resp cultures (+) MRSA. UC, BC- NGTD. · CVS - Give IV lasix 40 mg and monitor output. Daily I&Os. Elevated BP. Continue Norvasc, lisinopril, and metoprolol. Add clonidine . PRN labetalol for BP > 160/100. BNP remains elevated-trending down, will re-check in a few days. · Heme/onc - Monitor H/H and Platelets. · Metabolic - Monitor and replace lytes per protocol. Continue D5LR @ 50 ml/hr to prevent hypoglycemia while pt NPO (on bipap). · Renal - Urinary retention. Continue to straight cath with serial bladder scans. Monitor Cr and UOP. Strict I&O. May need to put mckeon back in if pt is unable to urinate. · Endocrine - BS q 6 hrs while NPO. Continue Synthroid. · Neuro/ Pain/ Sedation - PRN Norco, chronic back pain. Continue Gabapentin. Avoid sedating medications. · GI - Tube Feeds 8 am-8 pm while off bipap. · Prophylaxis - DVT(lovenox), GI(Pepcid)  · Discussed in interdisciplinary rounds  · Palliative Care following.    · Full Code         The patient is: [x] acutely ill Risk of deterioration: [x] moderate    [] critically ill  [] high     [x]See my orders for details    My assessment/plan was discussed with:  [x]nursing []PT/OT    []respiratory therapy [x]Dr. Phil Pace [x] Patient      Diego Chavira PA-C   10/04/17 2:14 PM

## 2017-10-04 NOTE — PROGRESS NOTES
Progress Note      Patient: Patel Chavis               Sex: female          DOA: 9/24/2017       YOB: 1965      Age:  54 y.o.        LOS:  LOS: 9 days               Subjective:   Help of id is greatly appreciated . pt has a recurrent pneumonia and  Is now on vancomycin and ciprofloxacin . She has a h/o  Throat cancer in the past and this has been treated . she has multiple other problems including CAD and alcohol dependence . she is now off precedex  and was on high flow when seen this am        Pt Objective:      Visit Vitals    BP (!) 147/95    Pulse 73    Temp 97.7 °F (36.5 °C)    Resp 14    Ht 5' 4\" (1.626 m)    Wt 68 kg (149 lb 14.6 oz)    SpO2 99%    BMI 25.73 kg/m2       Physical Exam:  Pt was on hihg flow nasal o2   Heart reg rate and rhythm   Lungs good air flow noted   abdomen soft and no m,asses felt   Neuro nonfocal        Lab/Data Reviewed:  CMP:   Lab Results   Component Value Date/Time     10/03/2017 03:30 AM    K 3.9 10/03/2017 11:30 AM     10/03/2017 03:30 AM    CO2 25 10/03/2017 03:30 AM    AGAP 10 10/03/2017 03:30 AM    GLU 96 10/03/2017 03:30 AM    BUN 9 10/03/2017 03:30 AM    CREA 0.90 10/03/2017 03:30 AM    GFRAA >60 10/03/2017 03:30 AM    GFRNA >60 10/03/2017 03:30 AM    CA 8.0 (L) 10/03/2017 03:30 AM    MG 1.9 10/03/2017 03:30 AM    ALB 2.0 (L) 10/03/2017 03:30 AM    TP 6.9 10/03/2017 03:30 AM    GLOB 4.9 (H) 10/03/2017 03:30 AM    AGRAT 0.4 (L) 10/03/2017 03:30 AM    SGOT 18 10/03/2017 03:30 AM    ALT 14 10/03/2017 03:30 AM     CBC:   Lab Results   Component Value Date/Time    WBC 9.6 10/03/2017 03:30 AM    HGB 8.3 (L) 10/03/2017 03:30 AM    HCT 25.7 (L) 10/03/2017 03:30 AM     (L) 10/03/2017 03:30 AM           Assessment/Plan     Principal Problem:    Acute respiratory failure with hypercapnia (HCC) (9/24/2017)    Active Problems:    Sepsis ([address] (9/24/2017)      HCAP (healthcare-associated pneumonia) (9/24/2017)      Hypertension (9/24/2017)      Diabetes mellitus ([facility] (9/24/2017)        Plan:continue the vancomycin and cipro as outlined by id

## 2017-10-04 NOTE — ROUTINE PROCESS
Bedside and Verbal shift change report given to [address]   (oncoming nurse) by Cedrick James RN   (offgoing nurse). Report included the following information SBAR, Kardex, Intake/Output, MAR, Accordion and Recent Results.

## 2017-10-04 NOTE — PROGRESS NOTES
Called to bedside for desaturation. Despite repositioning and replacing pulse oximeter probe, SPO2 maintained <85%. She had increased work of breathing and was tachypneic. She was placed back on BiPap at previous settings of 12/6, 40%. Tolerated well and vital signs stabilized.

## 2017-10-04 NOTE — PROGRESS NOTES
Pt was straight cathed twice this shift when she felt like she had to void. Was able to void on BSC near end of shift 500 cc. Also FMS was removed. Remained on Hi flow all day. SATs flashed mostly 100%. sometimes reading incorrectly due to placement.

## 2017-10-04 NOTE — MED STUDENT NOTES
*ATTENTION:  This note has been created by a medical student for educational purposes only. Please do not refer to the content of this note for clinical decision-making, billing, or other purposes. Please see attending physicians note to obtain clinical information on this patient. *        Progress Note    Patient: Jelly Arellano MRN: [id number]  SSN: [id number]    YOB: 1965  Age: 54 y.o. Sex: female      Admit Date: 9/24/2017    LOS: 10 days     Subjective:   Patient was on high flow nasal cannula today. She was awake and alert but had slowed mentation. Denied any pain or further complaints. Patient was recently changed to Vancomycin and Ciprofloxacin for Pseudomonas and MRSA growth in sputum cultures. Objective:     Vitals:    10/04/17 1030 10/04/17 1100 10/04/17 1130 10/04/17 1200   BP:       Pulse: 75 74 73    Resp: 18 19 19    Temp:    97.4 °F (36.3 °C)   SpO2: (!) 86% (!) 86% 92%    Weight:       Height:            Intake and Output:  Current Shift: 10/04 0701 - 10/04 1900  In: 925 [I.V.:850]  Out: 950 [Urine:950]  Last three shifts: 10/02 1901 - 10/04 0700  In: 5110 [I.V.:2960]  Out: 3365 [HGVCE:7920; Drains:170]    Physical Exam:   GENERAL: alert, cooperative, no distress  LUNG: chest excursion normal  HEART: regular rate and rhythm, S1, S2 normal, no murmur, click, rub or gallop    Lab/Data Review: All lab results for the last 24 hours reviewed.        Assessment:     Principal Problem:    Acute respiratory failure with hypercapnia ([address] (9/24/2017)    Active Problems:    Sepsis ([address] (9/24/2017)      HCAP (healthcare-associated pneumonia) (9/24/2017)      Hypertension (9/24/2017)      Diabetes mellitus ([address] (9/24/2017)        Plan:   Acute respiratory failure with hypercapnia  -continue high flow during the day with bipap at night    HCAP  - ID was consulted, sputum culture grew MRSA and Pseudomonas, continue taking vancomycin and ciprofloxacin per ID    Elevated BNP likely indicative of volume overload  -consider lasix therapy     Signed By: Julianna Madera     October 4, 2017

## 2017-10-04 NOTE — PROGRESS NOTES
Infectious Disease Follow-up     Admit Date: 9/24/2017    Current abx Prior abx   Vancomycin 9/24-10  Ciprofloxacin 10/2-2 Levofloxacin 9/24 - 4  Zosyn 9/24 - 6 Tobramycin 9/30 - 2      ASSESSMENT - > REC:     Recurrent Pneumonia  - MRSA, Pseudomonas in spcx 9/24  - same organisms in spcx last confinement [facility]) 8/11-18  - treated with Vancomycin/zosyn x 14 days (ending ~8/25)  - bilateral multifocal opacities - sl improved on CT 10/1 -> continue Vancomycin and cipro  -> may eventually change Vancomycin to Doxycycline, cipro to po 750 bid aiming for total 3-4 weeks rx (relapsed after 2 weeks IV rx)   Respiratory Failure   - intubated 9/24, extubated 9/25  -NEW BNP 26k 10/4- got lasix -> HFNC etc per PCCM   H/o throat CA     ETOH abuse    DM    HTN    CAD    GERD    PEG    DELFINA      MICROBIOLOGY:   9/24 blcx NG x 2    urcx NG   spcx Ps aeruginosa sens aminoglycosides, cipro res pip-tazo, other beta lactams, MRSA R levaquin vanco rios 1 few CF  9/26  C diff neg     LINES AND CATHETERS:   Tunneled PICC R ? [address] Problems    Diagnosis Date Noted    Acute respiratory failure with hypercapnia ([address] 09/24/2017    Sepsis ([facility] 09/24/2017    HCAP (healthcare-associated pneumonia) 09/24/2017    Hypertension 09/24/2017    Diabetes mellitus ([facility] 09/24/2017     Subjective: Interval notes reviewed.  Pt remains in ICU on HFNC, d/w nurse mckeon out has required straight cath, got lasix, pt denied adr rash n/v/d, fever or chill      Current Facility-Administered Medications   Medication Dose Route Frequency    vancomycin (VANCOCIN) 750 mg in 0.9% sodium chloride (MBP/ADV) 250 mL ADV  750 mg IntraVENous Q12H    [START ON 10/5/2017] VANCOMYCIN INFORMATION NOTE   Other ONCE    cloNIDine HCl (CATAPRES) tablet 0.1 mg  0.1 mg Per G Tube BID    Thiamine Mononitrate (B-1) tablet 100 mg  100 mg Oral BID    famotidine (PEPCID) tablet 20 mg  20 mg Oral Q12H    HYDROcodone-acetaminophen (NORCO) 5-325 mg per tablet 2 Tab  2 Tab Oral Q4H PRN    gabapentin (NEURONTIN) capsule 300 mg  300 mg Oral TID    amLODIPine (NORVASC) tablet 10 mg  10 mg Oral DAILY    labetalol (NORMODYNE;TRANDATE) injection 20 mg  20 mg IntraVENous Q4H PRN    ciprofloxacin (CIPRO) 400 mg IVPB (premix)  400 mg IntraVENous Q12H    metoprolol tartrate (LOPRESSOR) tablet 100 mg  100 mg Oral Q12H    lisinopril (PRINIVIL, ZESTRIL) tablet 20 mg  20 mg Oral DAILY    enoxaparin (LOVENOX) injection 40 mg  40 mg SubCUTAneous Q24H    haloperidol lactate (HALDOL) injection 4 mg  4 mg IntraVENous Q8H PRN    VANCOMYCIN INFORMATION NOTE   Other Rx Dosing/Monitoring    albuterol-ipratropium (DUO-NEB) 2.5 MG-0.5 MG/3 ML  3 mL Nebulization Q6H RT    0.9% sodium chloride infusion 250 mL  250 mL IntraVENous PRN    levothyroxine (SYNTHROID) tablet 100 mcg  100 mcg Oral Daily    insulin lispro (HUMALOG) injection   SubCUTAneous Q6H    glucose chewable tablet 16 g  4 Tab Oral PRN    glucagon (GLUCAGEN) injection 1 mg  1 mg IntraMUSCular PRN    dextrose (D50W) injection syrg 12.5-25 g  25-50 mL IntraVENous PRN    sodium chloride (NS) flush 5-10 mL  5-10 mL IntraVENous PRN    sodium chloride (NS) flush 5-10 mL  5-10 mL IntraVENous Q8H    sodium chloride (NS) flush 5-10 mL  5-10 mL IntraVENous PRN    ondansetron (ZOFRAN) injection 4 mg  4 mg IntraVENous Q6H PRN    acetaminophen (TYLENOL) solution 650 mg  650 mg Per G Tube Q4H PRN    ELECTROLYTE REPLACEMENT PROTOCOL  1 Each Other PRN    ELECTROLYTE REPLACEMENT PROTOCOL  1 Each Other PRN    ELECTROLYTE REPLACEMENT PROTOCOL  1 Each Other PRN    ELECTROLYTE REPLACEMENT PROTOCOL  1 Each Other PRN         Objective:     Visit Vitals    BP (!) 158/94    Pulse 73    Temp 98.5 °F (36.9 °C)    Resp 19    Ht 5' 4\" (1.626 m)    Wt 68.8 kg (151 lb 10.8 oz)    SpO2 92%    BMI 26.04 kg/m2       Temp (24hrs), Av.3 °F (36.8 °C), Min:97.7 °F (36.5 °C), Max:98.7 °F (37.1 °C)    GEN: wdwn BF appears older than stated age in ICU  HEENT: HFNC in place, anicteric  NECK: R picc in place  CHEST: fair air movement B scattered crackles no wheezes  CVS: S1'S2' no murmurs  ABD: soft (+) BS non-tender PEG  EXT: no edema   NEURO: awake alert appropriate. Labs: Results:   Chemistry Recent Labs      10/04/17   0400  10/03/17   2000  10/03/17   1130  10/03/17   0330   10/02/17   0410   GLU  91   --    --   96   --   70*   NA  135*   --    --   141   --   142   K  4.1  4.0  3.9  3.9   < >  4.0   CL  102   --    --   106   --   112*   CO2  25   --    --   25   --   21   BUN  7   --    --   9   --   12   CREA  0.83   --    --   0.90   --   0.93   CA  7.8*   --    --   8.0*   --   8.4*   AGAP  8   --    --   10   --   9   BUCR  8*   --    --   10*   --   13   AP  125*   --    --   121*   --    --    TP  7.2   --    --   6.9   --    --    ALB  2.0*   --    --   2.0*   --    --    GLOB  5.2*   --    --   4.9*   --    --    AGRAT  0.4*   --    --   0.4*   --    --     < > = values in this interval not displayed. CBC w/Diff Recent Labs      10/04/17   0400  10/03/17   0330  10/02/17   0410   WBC  11.0  9.6  8.2   RBC  2.90*  2.85*  2.74*   HGB  8.6*  8.3*  8.2*   HCT  26.3*  25.7*  24.7*   PLT  143  134*  125*   GRANS  63  60  55   LYMPH  15*  18*  28   EOS  13*  11*  6*        cxr 10/3 Impression:        1. The bilateral predominantly upper lobe airspace infiltrates which could  represent inflammatory pneumonia  and superimposed interstitial edema appear  improved from 9/29/2017 but about the same as the CT. 2. Right central line satisfactory position without pneumothorax. 3. Small right pleural effusion. Microbiology Results  No results for input(s): Lord Chandra in the last 72 hours.     Oneida Beckett MD  October 4, 2017  [facility] AT THE [facility]  [phone]

## 2017-10-04 NOTE — PROGRESS NOTES
0040 received pt.fron Neuro ICU alert & oriented. 0130 pt.resing quietly in bed.  0250 noted pt.desaturating  5668 RT notified. Pt.placed on   Bipap as ordered. 0400 pt.sleeping.  0600 AM care done. needs attended.

## 2017-10-04 NOTE — PROGRESS NOTES
Problem: Falls - Risk of  Goal: *Absence of Falls  Document Kapil Fall Risk and appropriate interventions in the flowsheet.    Outcome: Progressing Towards Goal  Fall Risk Interventions:  Mobility Interventions: Assess mobility with egress test, Bed/chair exit alarm, OT consult for ADLs, Patient to call before getting OOB, Strengthening exercises (ROM-active/passive)     Mentation Interventions: Adequate sleep, hydration, pain control, Bed/chair exit alarm, Door open when patient unattended, More frequent rounding, Toileting rounds, Update white board     Medication Interventions: Bed/chair exit alarm, Patient to call before getting OOB     Elimination Interventions: Bed/chair exit alarm, Call light in reach, Patient to call for help with toileting needs, Toileting schedule/hourly rounds

## 2017-10-04 NOTE — PROGRESS NOTES
Problem: Mobility Impaired (Adult and Pediatric)  Goal: *Acute Goals and Plan of Care (Insert Text)  Physical Therapy Goals  Initiated 9/29/2017 and to be accomplished within 7 day(s)  1. Patient will move from supine to sit and sit to supine , scoot up and down and roll side to side in bed with supervision/set-up. 2. Patient will transfer from bed to chair and chair to bed with minimal assistance/contact guard assist using the least restrictive device. 3. Patient will perform sit to stand with moderate assistance . 4. Patient will ambulate with moderate assistance for 50 feet with the least restrictive device. 5. Patient will ascend/descend 3 stairs with handrail(s) with moderate assistance . Outcome: Progressing Towards Goal  PHYSICAL THERAPY TREATMENT     Patient: Anurag Real (66 y.o. female)  Date: 10/4/2017  Diagnosis: Acute respiratory failure with hypercapnia ([facility] Acute respiratory failure with hypercapnia (HCC)  Precautions: Fall (respiration )   Chart, physical therapy assessment, plan of care and goals were reviewed. ASSESSMENT:  Nurse requesting assistance with getting pt onto [facility] to attempt voiding. Retrieved BS, nurse disconnected lines. Debbi with bed mobility and sit to stand. In standing, instructed pt to perform (B)TKE to stabilize LEs for bed to [facility] transfer. Pt unable to void, FMS came out with pt stating \"I think I had a bowel movement. \"  Nurse assisted with marky-care. Pt returned to supine in bed. SpO2 maintaining 100% on high flow O2 throughout session. No signs of distress. C/o feeling cold. Education: TKE to stabilize knees  Progression toward goals:  [ ]      Improving appropriately and progressing toward goals  [X]      Improving slowly and progressing toward goals  [ ]      Not making progress toward goals and plan of care will be adjusted       PLAN:  Patient continues to benefit from skilled intervention to address the above impairments.   Continue treatment per established plan of care. Discharge Recommendations:  SNF likely, To Be Determined  Further Equipment Recommendations for Discharge:  rolling walker likely       SUBJECTIVE:   Patient stated I am freezing.       OBJECTIVE DATA SUMMARY:   Critical Behavior:  Neurologic State: Alert, Eyes open spontaneously  Orientation Level: Oriented X4  Cognition: Appropriate decision making, Appropriate for age attention/concentration, Appropriate safety awareness, Follows commands  Safety/Judgement: Awareness of environment, Fall prevention  Functional Mobility Training:  Bed Mobility:  Supine to Sit: Minimum assistance; Additional time  Sit to Supine: Minimum assistance  Scooting: Stand-by asssistance  Transfers:  Sit to Stand: Minimum assistance  Stand to Sit: Minimum assistance  Stand Pivot Transfers: Minimal assistance  Bed to Chair: Additional time  Balance:  Sitting: Intact  Sitting - Static: Good (unsupported)  Sitting - Dynamic: Fair (occasional)  Standing: Impaired; With support  Standing - Static: Fair  Standing - Dynamic : Fair  Ambulation/Gait Training:  Assistive Device: Walker, rolling  Ambulation - Level of Assistance: Contact guard assistance  Pain:  Pre:7  Post:7  Pain Scale 1: Numeric (0 - 10)  Pain Intensity 1: 7  Pain Location 1: Back  Pain Orientation 1: Lower  Pain Description 1: Aching  Pain Intervention(s) 1: Medication (see MAR)  Activity Tolerance:   Fair(-)  Please refer to the flowsheet for vital signs taken during this treatment.   After treatment:   [ ] Patient left in no apparent distress sitting up in chair  [X] Patient left in no apparent distress in bed  [X] Call bell left within reach  [ ] Nursing notified  [X] Nurse present  [ ] Bed alarm activated      Kareem Marinelli PTA   Time Calculation: 30 mins

## 2017-10-05 LAB
ALBUMIN SERPL-MCNC: 1.7 G/DL (ref 3.4–5)
ALBUMIN/GLOB SERPL: 0.4 {RATIO} (ref 0.8–1.7)
ALP SERPL-CCNC: 119 U/L (ref 45–117)
ALT SERPL-CCNC: 15 U/L (ref 13–56)
ANION GAP SERPL CALC-SCNC: 8 MMOL/L (ref 3–18)
AST SERPL-CCNC: 27 U/L (ref 15–37)
BASOPHILS # BLD: 0 K/UL (ref 0–0.06)
BASOPHILS NFR BLD: 0 % (ref 0–2)
BILIRUB SERPL-MCNC: 0.3 MG/DL (ref 0.2–1)
BUN SERPL-MCNC: 10 MG/DL (ref 7–18)
BUN/CREAT SERPL: 11 (ref 12–20)
CA-I SERPL-SCNC: 1.13 MMOL/L (ref 1.12–1.32)
CALCIUM SERPL-MCNC: 7.8 MG/DL (ref 8.5–10.1)
CHLORIDE SERPL-SCNC: 103 MMOL/L (ref 100–108)
CO2 SERPL-SCNC: 26 MMOL/L (ref 21–32)
CREAT SERPL-MCNC: 0.9 MG/DL (ref 0.6–1.3)
DATE LAST DOSE: ABNORMAL
DIFFERENTIAL METHOD BLD: ABNORMAL
EOSINOPHIL # BLD: 0.7 K/UL (ref 0–0.4)
EOSINOPHIL NFR BLD: 9 % (ref 0–5)
ERYTHROCYTE [DISTWIDTH] IN BLOOD BY AUTOMATED COUNT: 15.6 % (ref 11.6–14.5)
GLOBULIN SER CALC-MCNC: 4.7 G/DL (ref 2–4)
GLUCOSE BLD STRIP.AUTO-MCNC: 72 MG/DL (ref 70–110)
GLUCOSE BLD STRIP.AUTO-MCNC: 73 MG/DL (ref 70–110)
GLUCOSE BLD STRIP.AUTO-MCNC: 75 MG/DL (ref 70–110)
GLUCOSE BLD STRIP.AUTO-MCNC: 91 MG/DL (ref 70–110)
GLUCOSE SERPL-MCNC: 66 MG/DL (ref 74–99)
HCT VFR BLD AUTO: 20.7 % (ref 35–45)
HCT VFR BLD AUTO: 24.8 % (ref 35–45)
HGB BLD-MCNC: 6.8 G/DL (ref 12–16)
HGB BLD-MCNC: 8.1 G/DL (ref 12–16)
INR PPP: 1.3 (ref 0.8–1.2)
LYMPHOCYTES # BLD: 1.2 K/UL (ref 0.9–3.6)
LYMPHOCYTES NFR BLD: 16 % (ref 21–52)
MCH RBC QN AUTO: 29.6 PG (ref 24–34)
MCHC RBC AUTO-ENTMCNC: 32.9 G/DL (ref 31–37)
MCV RBC AUTO: 90 FL (ref 74–97)
MONOCYTES # BLD: 0.6 K/UL (ref 0.05–1.2)
MONOCYTES NFR BLD: 7 % (ref 3–10)
NEUTS SEG # BLD: 5.3 K/UL (ref 1.8–8)
NEUTS SEG NFR BLD: 68 % (ref 40–73)
PLATELET # BLD AUTO: 121 K/UL (ref 135–420)
POTASSIUM SERPL-SCNC: 4 MMOL/L (ref 3.5–5.5)
PROT SERPL-MCNC: 6.4 G/DL (ref 6.4–8.2)
PROTHROMBIN TIME: 15.9 SEC (ref 11.5–15.2)
RBC # BLD AUTO: 2.3 M/UL (ref 4.2–5.3)
REPORTED DOSE,DOSE: ABNORMAL UNITS
REPORTED DOSE/TIME,TMG: 1100
RETICS/RBC NFR AUTO: 2.8 % (ref 0.5–2.3)
SODIUM SERPL-SCNC: 137 MMOL/L (ref 136–145)
VANCOMYCIN TROUGH SERPL-MCNC: 42.3 UG/ML (ref 10–20)
WBC # BLD AUTO: 7.8 K/UL (ref 4.6–13.2)

## 2017-10-05 PROCEDURE — 80053 COMPREHEN METABOLIC PANEL: CPT | Performed by: HOSPITALIST

## 2017-10-05 PROCEDURE — 74011250636 HC RX REV CODE- 250/636: Performed by: PHYSICIAN ASSISTANT

## 2017-10-05 PROCEDURE — 85045 AUTOMATED RETICULOCYTE COUNT: CPT | Performed by: HOSPITALIST

## 2017-10-05 PROCEDURE — 74011250636 HC RX REV CODE- 250/636: Performed by: INTERNAL MEDICINE

## 2017-10-05 PROCEDURE — 74011250637 HC RX REV CODE- 250/637: Performed by: INTERNAL MEDICINE

## 2017-10-05 PROCEDURE — 74011250636 HC RX REV CODE- 250/636: Performed by: HOSPITALIST

## 2017-10-05 PROCEDURE — 82962 GLUCOSE BLOOD TEST: CPT

## 2017-10-05 PROCEDURE — 97530 THERAPEUTIC ACTIVITIES: CPT

## 2017-10-05 PROCEDURE — 74011250637 HC RX REV CODE- 250/637: Performed by: PHYSICIAN ASSISTANT

## 2017-10-05 PROCEDURE — 85025 COMPLETE CBC W/AUTO DIFF WBC: CPT | Performed by: HOSPITALIST

## 2017-10-05 PROCEDURE — 77010033711 HC HIGH FLOW OXYGEN

## 2017-10-05 PROCEDURE — 74011250636 HC RX REV CODE- 250/636: Performed by: FAMILY MEDICINE

## 2017-10-05 PROCEDURE — 36592 COLLECT BLOOD FROM PICC: CPT

## 2017-10-05 PROCEDURE — 77030018846 HC SOL IRR STRL H20 ICUM -A

## 2017-10-05 PROCEDURE — 85018 HEMOGLOBIN: CPT | Performed by: HOSPITALIST

## 2017-10-05 PROCEDURE — 82330 ASSAY OF CALCIUM: CPT | Performed by: HOSPITALIST

## 2017-10-05 PROCEDURE — 74011000250 HC RX REV CODE- 250: Performed by: INTERNAL MEDICINE

## 2017-10-05 PROCEDURE — 97535 SELF CARE MNGMENT TRAINING: CPT

## 2017-10-05 PROCEDURE — 94640 AIRWAY INHALATION TREATMENT: CPT

## 2017-10-05 PROCEDURE — 94668 MNPJ CHEST WALL SBSQ: CPT

## 2017-10-05 PROCEDURE — 65610000006 HC RM INTENSIVE CARE

## 2017-10-05 PROCEDURE — 80202 ASSAY OF VANCOMYCIN: CPT | Performed by: HOSPITALIST

## 2017-10-05 PROCEDURE — 85610 PROTHROMBIN TIME: CPT | Performed by: HOSPITALIST

## 2017-10-05 PROCEDURE — 92950 HEART/LUNG RESUSCITATION CPR: CPT

## 2017-10-05 PROCEDURE — 36415 COLL VENOUS BLD VENIPUNCTURE: CPT | Performed by: HOSPITALIST

## 2017-10-05 RX ORDER — NYSTATIN 100000 U/G
OINTMENT TOPICAL 2 TIMES DAILY
Status: DISCONTINUED | OUTPATIENT
Start: 2017-10-05 | End: 2017-10-09 | Stop reason: HOSPADM

## 2017-10-05 RX ORDER — FUROSEMIDE 10 MG/ML
40 INJECTION INTRAMUSCULAR; INTRAVENOUS ONCE
Status: COMPLETED | OUTPATIENT
Start: 2017-10-05 | End: 2017-10-05

## 2017-10-05 RX ORDER — FUROSEMIDE 10 MG/ML
40 INJECTION INTRAMUSCULAR; INTRAVENOUS DAILY
Status: DISCONTINUED | OUTPATIENT
Start: 2017-10-05 | End: 2017-10-05

## 2017-10-05 RX ORDER — FUROSEMIDE 10 MG/ML
40 INJECTION INTRAMUSCULAR; INTRAVENOUS 2 TIMES DAILY
Status: DISCONTINUED | OUTPATIENT
Start: 2017-10-05 | End: 2017-10-09 | Stop reason: HOSPADM

## 2017-10-05 RX ORDER — FUROSEMIDE 10 MG/ML
40 INJECTION INTRAMUSCULAR; INTRAVENOUS 2 TIMES DAILY
Status: DISCONTINUED | OUTPATIENT
Start: 2017-10-06 | End: 2017-10-05

## 2017-10-05 RX ADMIN — HYDROCODONE BITARTRATE AND ACETAMINOPHEN 2 TABLET: 5; 325 TABLET ORAL at 00:51

## 2017-10-05 RX ADMIN — IPRATROPIUM BROMIDE AND ALBUTEROL SULFATE 3 ML: .5; 3 SOLUTION RESPIRATORY (INHALATION) at 13:42

## 2017-10-05 RX ADMIN — GABAPENTIN 300 MG: 300 CAPSULE ORAL at 16:59

## 2017-10-05 RX ADMIN — IPRATROPIUM BROMIDE AND ALBUTEROL SULFATE 3 ML: .5; 3 SOLUTION RESPIRATORY (INHALATION) at 08:08

## 2017-10-05 RX ADMIN — Medication 100 MG: at 09:31

## 2017-10-05 RX ADMIN — Medication 10 ML: at 14:00

## 2017-10-05 RX ADMIN — CLONIDINE HYDROCHLORIDE 0.1 MG: 0.1 TABLET ORAL at 18:24

## 2017-10-05 RX ADMIN — HYDROCODONE BITARTRATE AND ACETAMINOPHEN 2 TABLET: 5; 325 TABLET ORAL at 22:15

## 2017-10-05 RX ADMIN — Medication 10 ML: at 22:08

## 2017-10-05 RX ADMIN — NYSTATIN: 100000 OINTMENT TOPICAL at 12:00

## 2017-10-05 RX ADMIN — FUROSEMIDE 40 MG: 10 INJECTION, SOLUTION INTRAMUSCULAR; INTRAVENOUS at 09:31

## 2017-10-05 RX ADMIN — NYSTATIN: 100000 OINTMENT TOPICAL at 18:25

## 2017-10-05 RX ADMIN — Medication 10 ML: at 07:16

## 2017-10-05 RX ADMIN — GABAPENTIN 300 MG: 300 CAPSULE ORAL at 22:08

## 2017-10-05 RX ADMIN — CIPROFLOXACIN 400 MG: 2 INJECTION, SOLUTION INTRAVENOUS at 09:51

## 2017-10-05 RX ADMIN — AMLODIPINE BESYLATE 10 MG: 10 TABLET ORAL at 09:30

## 2017-10-05 RX ADMIN — LEVOTHYROXINE SODIUM 100 MCG: 100 TABLET ORAL at 07:16

## 2017-10-05 RX ADMIN — ENOXAPARIN SODIUM 40 MG: 40 INJECTION SUBCUTANEOUS at 22:15

## 2017-10-05 RX ADMIN — GABAPENTIN 300 MG: 300 CAPSULE ORAL at 09:31

## 2017-10-05 RX ADMIN — FAMOTIDINE 20 MG: 20 TABLET ORAL at 22:08

## 2017-10-05 RX ADMIN — METOPROLOL TARTRATE 100 MG: 50 TABLET ORAL at 22:08

## 2017-10-05 RX ADMIN — FAMOTIDINE 20 MG: 20 TABLET ORAL at 09:30

## 2017-10-05 RX ADMIN — HYDROCODONE BITARTRATE AND ACETAMINOPHEN 2 TABLET: 5; 325 TABLET ORAL at 09:30

## 2017-10-05 RX ADMIN — IPRATROPIUM BROMIDE AND ALBUTEROL SULFATE 3 ML: .5; 3 SOLUTION RESPIRATORY (INHALATION) at 01:58

## 2017-10-05 RX ADMIN — METOPROLOL TARTRATE 100 MG: 50 TABLET ORAL at 09:30

## 2017-10-05 RX ADMIN — IPRATROPIUM BROMIDE AND ALBUTEROL SULFATE 3 ML: .5; 3 SOLUTION RESPIRATORY (INHALATION) at 20:03

## 2017-10-05 RX ADMIN — LISINOPRIL 20 MG: 20 TABLET ORAL at 09:31

## 2017-10-05 RX ADMIN — Medication 100 MG: at 22:08

## 2017-10-05 RX ADMIN — SODIUM CHLORIDE 750 MG: 900 INJECTION, SOLUTION INTRAVENOUS at 10:57

## 2017-10-05 RX ADMIN — FUROSEMIDE 40 MG: 10 INJECTION, SOLUTION INTRAMUSCULAR; INTRAVENOUS at 18:24

## 2017-10-05 RX ADMIN — HYDROCODONE BITARTRATE AND ACETAMINOPHEN 2 TABLET: 5; 325 TABLET ORAL at 16:59

## 2017-10-05 RX ADMIN — CLONIDINE HYDROCHLORIDE 0.1 MG: 0.1 TABLET ORAL at 09:30

## 2017-10-05 NOTE — PROGRESS NOTES
10/04/2017 2000 Assumed care of pt after bedside report with pt lying in bed with HOB elevated. AAO x 4. QUICK x 4. Monitor denotes NSR without ectopy. Lungs clear diminished in bases. Pt on HI-flow O2 at present. O2 sat 10%. Abd soft distended with PEG tube in place. TF turned off in case pt needs BIPAP. Pt void clear yellow urine. Pt very [place]. Skin dry, flaky, and some excoriation at perineum and buttock region. Pt states was on Clotrimazole cream for same under breasts etc prior to admission. Pt c/o pain and requests pain meds. Norco 2 tabs po given as ordered for pain in back. Pt turns self in bed. 2200 Restin at present without complaints. 10/05/2017 0000 Accucheck result 73. No Lispro insulin given as per sliding scale coverage. 0050 Pt c/o pain and requests pain meds. Norco 2 tabs po given as ordered. 0130 Pt asiiste to bedpain and voided 500 ml yellow urine with smear of brownish BM. 0300 Resting at present without complaints. 0400 AM labs drawn and sent to lab. 0630 Accucheck result 75. No Lispro insulin given as per sliding scale protocol. Pt on bed pan as requested. Pt voided and had some incontinence of loose brownish stool. Pt cleansed and bed pads and bed linens changed. Tolerated without difficulty. Electroded and pulse ox changed.

## 2017-10-05 NOTE — PROGRESS NOTES
Physical Exam   Skin:         Pt states has had fungal infection under breasts and at perineal, vaginal area.  States was on Clotrimazole cream for [place].

## 2017-10-05 NOTE — PROGRESS NOTES
Progress Note      Patient: Jane Her               Sex: female          DOA: 9/24/2017       YOB: 1965      Age:  54 y.o.        LOS:  LOS: 11 days               Subjective:   Pt is alert and is on HiFlow  nasal o2 at 30 litres /min . she has been having c/o dryness in her throat . A repeat h/h was 8.1/24.8 no evidence of bleeding . Her retic count is low at 2. 8.pt says she feels much better today . Her ntprobnp  is [id number]. chest x ray could be consistent with chf. Pt is on iv lasix bid      Objective:      Visit Vitals    /83    Pulse 78    Temp 98.1 °F (36.7 °C)    Resp 19    Ht 5' 4\" (1.626 m)    Wt 66.6 kg (146 lb 13.2 oz)    SpO2 98%    BMI 25.2 kg/m2       Physical Exam:  Pt is awake and is on nasal o2   Heart reg rate and rhythm   Lungs crackles heard in the bases   Abdomen soft and nontender   Neuro nonfocal        Lab/Data Reviewed:  CMP:   Lab Results   Component Value Date/Time     10/05/2017 04:00 AM    K 4.0 10/05/2017 04:00 AM     10/05/2017 04:00 AM    CO2 26 10/05/2017 04:00 AM    AGAP 8 10/05/2017 04:00 AM    GLU 66 (L) 10/05/2017 04:00 AM    BUN 10 10/05/2017 04:00 AM    CREA 0.90 10/05/2017 04:00 AM    GFRAA >60 10/05/2017 04:00 AM    GFRNA >60 10/05/2017 04:00 AM    CA 7.8 (L) 10/05/2017 04:00 AM    ALB 1.7 (L) 10/05/2017 04:00 AM    TP 6.4 10/05/2017 04:00 AM    GLOB 4.7 (H) 10/05/2017 04:00 AM    AGRAT 0.4 (L) 10/05/2017 04:00 AM    SGOT 27 10/05/2017 04:00 AM    ALT 15 10/05/2017 04:00 AM     CBC:   Lab Results   Component Value Date/Time    WBC 7.8 10/05/2017 04:00 AM    HGB 8.1 (L) 10/05/2017 09:29 AM    HCT 24.8 (L) 10/05/2017 09:29 AM     (L) 10/05/2017 04:00 AM           Assessment/Plan     Principal Problem:    Acute respiratory failure with hypercapnia (HCC) (9/24/2017)    Active Problems:    Sepsis ([facility] (9/24/2017)      HCAP (healthcare-associated pneumonia) (9/24/2017)      Hypertension (9/24/2017)      Diabetes mellitus ([facility] (9/24/2017)        Plan: continue iv diuretics as per pulmonary . she will be weaned down off her hiflow as tolerated

## 2017-10-05 NOTE — PROGRESS NOTES
Problem: Falls - Risk of  Goal: *Absence of Falls  Document Kapil Fall Risk and appropriate interventions in the flowsheet.    Outcome: Progressing Towards Goal  Fall Risk Interventions:  Mobility Interventions: Assess mobility with egress test, Patient to call before getting OOB     Mentation Interventions: Adequate sleep, hydration, pain control     Medication Interventions: Evaluate medications/consider consulting pharmacy     Elimination Interventions: Bed/chair exit alarm, Call light in reach, Patient to call for help with toileting needs

## 2017-10-05 NOTE — PROGRESS NOTES
Infectious Disease Follow-up     Admit Date: 9/24/2017    Current abx Prior abx   Vancomycin 9/24-11  Ciprofloxacin 10/2-3 Levofloxacin 9/24 - 4  Zosyn 9/24 - 6 Tobramycin 9/30 - 2      ASSESSMENT - > REC:     Recurrent Pneumonia  - MRSA, Pseudomonas in spcx 9/24  - same organisms in spcx last confinement [facility]) 8/11-18  - treated with Vancomycin/zosyn x 14 days (ending ~8/25)  - bilateral multifocal opacities - sl improved on CT 10/1  - CXR 10/3 improving infiltrates c/w 9/29 -> continue Vancomycin to complete 14 days (3 more) then change to po Doxycycline 100 mg bid x 2 weeks  -> Change Cipro to po 750 bid aiming for 4 weeks rx if tolerated (relapsed after 2 weeks IV rx)   Respiratory Failure  - intubated 9/24, extubated 9/25  - BNP 26k 10/4-  Improved after lasix -> HFNC etc per PCCM   H/o throat CA     ETOH abuse    DM    HTN    CAD    GERD    PEG    DELFINA      MICROBIOLOGY:   9/24 blcx NG x 2    urcx NG   spcx Ps aeruginosa sens aminoglycosides, cipro res pip-tazo, other beta lactams, MRSA R levaquin vanco rios 1 few CF  9/26  C diff neg     LINES AND CATHETERS:   Tunneled PICC R ? [address] Problems    Diagnosis Date Noted    Acute respiratory failure with hypercapnia ([facility] 09/24/2017    Sepsis ([facility] [facility] 09/24/2017    HCAP (healthcare-associated pneumonia) 09/24/2017    Hypertension 09/24/2017    Diabetes mellitus ([facility] [address] 09/24/2017     Subjective: Interval notes reviewed. Sitting up in bed in ICU with HFNC O2. Says she feels \"much better\" than yesterday.       Current Facility-Administered Medications   Medication Dose Route Frequency    nystatin (MYCOSTATIN) 100,000 unit/gram ointment   Topical BID    furosemide (LASIX) injection 40 mg  40 mg IntraVENous BID    vancomycin (VANCOCIN) 750 mg in 0.9% sodium chloride (MBP/ADV) 250 mL ADV  750 mg IntraVENous Q12H    VANCOMYCIN INFORMATION NOTE   Other ONCE    cloNIDine HCl (CATAPRES) tablet 0.1 mg  0.1 mg Per G Tube BID    Thiamine Mononitrate (B-1) tablet 100 mg  100 mg Oral BID    famotidine (PEPCID) tablet 20 mg  20 mg Oral Q12H    HYDROcodone-acetaminophen (NORCO) 5-325 mg per tablet 2 Tab  2 Tab Oral Q4H PRN    gabapentin (NEURONTIN) capsule 300 mg  300 mg Oral TID    amLODIPine (NORVASC) tablet 10 mg  10 mg Oral DAILY    labetalol (NORMODYNE;TRANDATE) injection 20 mg  20 mg IntraVENous Q4H PRN    ciprofloxacin (CIPRO) 400 mg IVPB (premix)  400 mg IntraVENous Q12H    metoprolol tartrate (LOPRESSOR) tablet 100 mg  100 mg Oral Q12H    lisinopril (PRINIVIL, ZESTRIL) tablet 20 mg  20 mg Oral DAILY    enoxaparin (LOVENOX) injection 40 mg  40 mg SubCUTAneous Q24H    haloperidol lactate (HALDOL) injection 4 mg  4 mg IntraVENous Q8H PRN    VANCOMYCIN INFORMATION NOTE   Other Rx Dosing/Monitoring    albuterol-ipratropium (DUO-NEB) 2.5 MG-0.5 MG/3 ML  3 mL Nebulization Q6H RT    0.9% sodium chloride infusion 250 mL  250 mL IntraVENous PRN    levothyroxine (SYNTHROID) tablet 100 mcg  100 mcg Oral Daily    insulin lispro (HUMALOG) injection   SubCUTAneous Q6H    glucose chewable tablet 16 g  4 Tab Oral PRN    glucagon (GLUCAGEN) injection 1 mg  1 mg IntraMUSCular PRN    dextrose (D50W) injection syrg 12.5-25 g  25-50 mL IntraVENous PRN    sodium chloride (NS) flush 5-10 mL  5-10 mL IntraVENous PRN    sodium chloride (NS) flush 5-10 mL  5-10 mL IntraVENous Q8H    sodium chloride (NS) flush 5-10 mL  5-10 mL IntraVENous PRN    ondansetron (ZOFRAN) injection 4 mg  4 mg IntraVENous Q6H PRN    acetaminophen (TYLENOL) solution 650 mg  650 mg Per G Tube Q4H PRN    ELECTROLYTE REPLACEMENT PROTOCOL  1 Each Other PRN    ELECTROLYTE REPLACEMENT PROTOCOL  1 Each Other PRN    ELECTROLYTE REPLACEMENT PROTOCOL  1 Each Other PRN    ELECTROLYTE REPLACEMENT PROTOCOL  1 Each Other PRN       Objective:     Visit Vitals    /60    Pulse 75    Temp 97.4 °F (36.3 °C)    Resp 15    Ht 5' 4\" (1.626 m)    Wt 66.6 kg (146 lb 13.2 oz)    SpO2 96%    BMI 25.2 kg/m2       Temp (24hrs), Av.3 °F (36.3 °C), Min:96.8 °F (36 °C), Max:97.6 °F (36.4 °C)    GEN: wdwn BF appears older than stated age in ICU,   HEENT: HFNC in place, anicteric  NECK: R picc in place  CHEST: fair air movement B crackles mostly in bases no wheezes   CVS: S1'S2' no murmurs  ABD: soft (+) BS non-tender PEG  EXT: no edema   NEURO: awake alert appropriate. Labs: Results:   Chemistry Recent Labs      10/05/17   0400  10/04/17   0400  10/03/17   2000   10/03/17   0330   GLU  66*  91   --    --   96   NA  137  135*   --    --   141   K  4.0  4.1  4.0   < >  3.9   CL  103  102   --    --   106   CO2  26  25   --    --   25   BUN  10  7   --    --   9   CREA  0.90  0.83   --    --   0.90   CA  7.8*  7.8*   --    --   8.0*   AGAP  8  8   --    --   10   BUCR  11*  8*   --    --   10*   AP  119*  125*   --    --   121*   TP  6.4  7.2   --    --   6.9   ALB  1.7*  2.0*   --    --   2.0*   GLOB  4.7*  5.2*   --    --   4.9*   AGRAT  0.4*  0.4*   --    --   0.4*    < > = values in this interval not displayed. CBC w/Diff Recent Labs      10/05/17   0400  10/04/17   0400  10/03/17   0330   WBC  7.8  11.0  9.6   RBC  2.30*  2.90*  2.85*   HGB  6.8*  8.6*  8.3*   HCT  20.7*  26.3*  25.7*   PLT  121*  143  134*   GRANS  68  63  60   LYMPH  16*  15*  18*   EOS  9*  13*  11*        cxr 10/3 Impression:        1. The bilateral predominantly upper lobe airspace infiltrates which could  represent inflammatory pneumonia  and superimposed interstitial edema appear  improved from 2017 but about the same as the CT. 2. Right central line satisfactory position without pneumothorax. 3. Small right pleural effusion. Microbiology Results  No results for input(s): Angelina Mancilla in the last 72 hours.     Liliam Owusu MD  2017  [facility] AT THE [facility]  [phone]

## 2017-10-05 NOTE — PROGRESS NOTES
Physical Exam   Skin: Skin is warm and dry.           Primary Nurse Fawn Harris RN and TONI KELLER  CATE LEBLANC RN performed a dual skin assessment on this patient No impairment noted  Jamie score is 13

## 2017-10-05 NOTE — PROGRESS NOTES
Chart reviewed. Pt has been accepted to [facility] but needs an anticipated date of discharge so auth can be used while it is active. Care Management to follow.

## 2017-10-05 NOTE — PROGRESS NOTES
Ms. Gould Miu is resting comfortably at this time on HFNC @ 50lpm and 50%. SPO2 100%, HR 74 and respiratory rate 17. FIO2 weaned to 40%. Will continue to monitor and wean as tolerated. 0215: Patient continues to tolerate wean of HFNC. Patient now decreased to 40lpm, 35%. Will continue to monitor. 0400: HFNC weaned to 30 lpm and 35%, SPO2 maintained @ 100%, HR 73, RR 15, No respiratory distress.

## 2017-10-05 NOTE — PROGRESS NOTES
Received patient on HFNC 30 lpm and FiO2 30%. - Did not use BIPAP last night. PLAN: Continue to use metaneb even if she transfers off the unit.

## 2017-10-05 NOTE — PROGRESS NOTES
Problem: Self Care Deficits Care Plan (Adult)  Goal: *Acute Goals and Plan of Care (Insert Text)  Occupational Therapy Goals  Initiated 10/2/2017 within 3 day(s). Pt is on bipap, will implement 3 day trial to determine ability to participate in skilled therapy. Continue per POC if appropriate. 1. Patient will perform grooming tasks with supervision. 2. Patient will perform bed mobility with supervision and verbal cues for pacing. 3. Patient will perform functional task at EOB for 8 minutes with supervision for balance to increase activity tolerance for ADLs. 4. Patient will perform all aspects of toileting with minimal assistance. 5. Patient will participate in upper extremity therapeutic exercise/activities with supervision/set-up for 8 minutes to increase BUE strength for ADLs. 6. Patient will utilize energy conservation techniques during functional activities with minimal verbal cues. 7. Patient will perform toilet transfer with moderate assistance. Outcome: Progressing Towards Goal  OCCUPATIONAL THERAPY TREATMENT     Patient: Laqueta Olszewski (54 y.o. female)  Date: 10/5/2017  Diagnosis: Acute respiratory failure with hypercapnia ([facility] [address] Acute respiratory failure with hypercapnia (HCC)       Precautions: Fall (respiration )  Chart, occupational therapy assessment, plan of care, and goals were reviewed. ASSESSMENT:  Pt motivated for OOB, requesting to use bathroom upon entry. Pt demonstrates good carryover w/stand pivot transfer to [facility], requires assist 2/2 generalized weakness. Decrease standing balance requires Max Assist w/toileting ADL. Pt requires increase time side stepping around bed to chair w/RW for support. O2 sats % on 35L w/activity, HR 70s throughout session  EDUCATION Pt educated on safety w/RW and functional mobility to chair.   Progression toward goals:  [X]          Improving appropriately and progressing toward goals  [ ]          Improving slowly and progressing toward goals  [ ]          Not making progress toward goals and plan of care will be adjusted       PLAN:  Patient continues to benefit from skilled intervention to address the above impairments. Continue treatment per established plan of care. Discharge Recommendations:  Inpatient Rehab vs Skilled Nursing Facility  Further Equipment Recommendations for Discharge:  shower chair and rolling walker       SUBJECTIVE:   Patient stated This feels good.  reference sitting in chair      OBJECTIVE DATA SUMMARY:         Cognitive/Behavioral Status:  Neurologic State: Alert  Orientation Level: Oriented X4  Cognition: Appropriate for age attention/concentration, Follows commands  Safety/Judgement: Awareness of environment, Fall prevention  Functional Mobility and Transfers for ADLs:              Bed Mobility:  Supine to Sit: Additional time;Supervision              Transfers:  Sit to Stand: Contact guard assistance  Bed to Chair: Contact guard assistance (w/RW)              Toilet Transfer : Minimum assistance              Balance:  Sitting: Intact  Sitting - Static: Good (unsupported)  Sitting - Dynamic: Good (unsupported)  Standing: Impaired; With support  Standing - Static: Fair  Standing - Dynamic : Fair  ADL Intervention:  Grooming  Washing Face: Supervision/set-up  Washing Hands: Supervision/set-up     Toileting  Toileting Assistance: Maximum assistance  Bladder Hygiene: Contact guard assistance  Bowel Hygiene: Maximum assistance  Clothing Management: Maximum assistance     Pain:  Pre Treatment:0  Post Treatment:0     Activity Tolerance:    Good     Please refer to the flowsheet for vital signs taken during this treatment.   After treatment:   [X]  Patient left in no apparent distress sitting up in chair  [ ]  Patient left in no apparent distress in bed  [X]  Call bell left within reach  [X]  Nursing notified  [ ]  Caregiver present  [ ]  Bed alarm activated     KOREY Medrano  Time Calculation: 38 mins

## 2017-10-05 NOTE — ROUTINE PROCESS
(0730) Received report from [facility]. Assumed care of patient. (1030) Patient had a urine output of 700 ml/hr. (1100) Tube feeding rate increased to 60 ml/hr continuous. [id number]) Patient returned to bed after being up in the recliner since physical therapy worked with her. Patient is resting and breathing with ease. (1920) Bedside and Verbal shift change report given to LANDEN Botello (oncoming nurse) by Ziyad Hicks RN (offgoing nurse). Report included the following information SBAR, Intake/Output, MAR, Recent Results, Cardiac Rhythm NSR and Alarm Parameters .

## 2017-10-05 NOTE — PROGRESS NOTES
[facility]  ICU Progress Note    Name: Jelly Arellano   : 1965   MRN: [id number]   Date: 10/5/2017 2:14 PM     [x]I have reviewed the flowsheet and previous days notes. Events overnight reviewed and discussed with nursing staff. Vital signs and records reviewed. Subjective:  - BP now controlled. - HiFlow reduced to 30 L/min FIO2 30%  - Afebrile. - Pt reports back pain, but denies any other complaints- notes significant improvement in her breathing today  - Pt experienced urinary retention and was straight cathed several times yesterday, total UOP 2.3 L 24 hrs with the addition of laisx.  - Pt not urinating with no issues, mckeon did not have to be reinserted     ROS:Review of systems not obtained due to patient factors. Medication Review:  · Pressors - none  · Sedation - none   · Antibiotics - Cipro  · Pain - PRN Norco  · GI/ DVT -  Pepcid/Lovenox   · Others (other gtts)    Safety Bundles: Electrolyte Replacement Protocol    Vital Signs:    Visit Vitals    /63    Pulse 64    Temp 97.4 °F (36.3 °C)    Resp 20    Ht 5' 4\" (1.626 m)    Wt 66.6 kg (146 lb 13.2 oz)    SpO2 100%    BMI 25.2 kg/m2       O2 Device: Hi flow nasal cannula   O2 Flow Rate (L/min): 30 l/min   Temp (24hrs), Av.3 °F (36.3 °C), Min:96.8 °F (36 °C), Max:97.6 °F (36.4 °C)       Intake/Output:   Last shift:      10/05 0701 - 10/05 1900  In: 605 [I.V.:450]  Out: 700 [Urine:700]  Last 3 shifts: 10/03 1901 - 10/05 0700  In: 4365 [I.V.:2310]  Out: 8206 [Urine:3525; Drains:50]    Intake/Output Summary (Last 24 hours) at 10/05/17 1452  Last data filed at 10/05/17 1100   Gross per 24 hour   Intake             1870 ml   Output             1500 ml   Net              370 ml     Physical Exam:              General: A&O x 4 in NAD on HiFlow NC.                         HEENT: mouth looks clean with normal mucosa no visible mass. Small amount of dried blood in nose from nose bleed overnight. Neck: Supple, no JVD. Trachea midline. Chest: Normal                         Lungs: Symmetric rise and fall of chest with no increased WOB while on HiFlow. Inspiratory rales in LLL have improved significantly, otherwise CTA. No wheezing or rhonchi. Heart: Regular rate and rhythm. No m,r,g. Abdomen: non-distended, bowel sounds normoactive. PEG tube in place. Extremity: No edema or cyanosis in extremities bilaterally. Neuro: No focal weakness.                          Skin: Intact                                       Devices:  PEG, PICC in subclavian  DATA:     Current Facility-Administered Medications   Medication Dose Route Frequency    nystatin (MYCOSTATIN) 100,000 unit/gram ointment   Topical BID    furosemide (LASIX) injection 40 mg  40 mg IntraVENous BID    ciprofloxacin HCl (CIPRO) 750 mg  750 mg Oral Q12H    vancomycin (VANCOCIN) 750 mg in 0.9% sodium chloride (MBP/ADV) 250 mL ADV  750 mg IntraVENous Q12H    VANCOMYCIN INFORMATION NOTE   Other ONCE    cloNIDine HCl (CATAPRES) tablet 0.1 mg  0.1 mg Per G Tube BID    Thiamine Mononitrate (B-1) tablet 100 mg  100 mg Oral BID    famotidine (PEPCID) tablet 20 mg  20 mg Oral Q12H    HYDROcodone-acetaminophen (NORCO) 5-325 mg per tablet 2 Tab  2 Tab Oral Q4H PRN    gabapentin (NEURONTIN) capsule 300 mg  300 mg Oral TID    amLODIPine (NORVASC) tablet 10 mg  10 mg Oral DAILY    labetalol (NORMODYNE;TRANDATE) injection 20 mg  20 mg IntraVENous Q4H PRN    metoprolol tartrate (LOPRESSOR) tablet 100 mg  100 mg Oral Q12H    lisinopril (PRINIVIL, ZESTRIL) tablet 20 mg  20 mg Oral DAILY    enoxaparin (LOVENOX) injection 40 mg  40 mg SubCUTAneous Q24H    haloperidol lactate (HALDOL) injection 4 mg  4 mg IntraVENous Q8H PRN    VANCOMYCIN INFORMATION NOTE   Other Rx Dosing/Monitoring    albuterol-ipratropium (DUO-NEB) 2.5 MG-0.5 MG/3 ML  3 mL Nebulization Q6H RT    0.9% sodium chloride infusion 250 mL  250 mL IntraVENous PRN    levothyroxine (SYNTHROID) tablet 100 mcg  100 mcg Oral Daily    insulin lispro (HUMALOG) injection   SubCUTAneous Q6H    glucose chewable tablet 16 g  4 Tab Oral PRN    glucagon (GLUCAGEN) injection 1 mg  1 mg IntraMUSCular PRN    dextrose (D50W) injection syrg 12.5-25 g  25-50 mL IntraVENous PRN    sodium chloride (NS) flush 5-10 mL  5-10 mL IntraVENous PRN    sodium chloride (NS) flush 5-10 mL  5-10 mL IntraVENous Q8H    sodium chloride (NS) flush 5-10 mL  5-10 mL IntraVENous PRN    ondansetron (ZOFRAN) injection 4 mg  4 mg IntraVENous Q6H PRN    acetaminophen (TYLENOL) solution 650 mg  650 mg Per G Tube Q4H PRN    ELECTROLYTE REPLACEMENT PROTOCOL  1 Each Other PRN    ELECTROLYTE REPLACEMENT PROTOCOL  1 Each Other PRN    ELECTROLYTE REPLACEMENT PROTOCOL  1 Each Other PRN    ELECTROLYTE REPLACEMENT PROTOCOL  1 Each Other PRN     Labs: Results:       Chemistry Recent Labs      10/05/17   0400  10/04/17   0400  10/03/17   2000   10/03/17   0330   GLU  66*  91   --    --   96   NA  137  135*   --    --   141   K  4.0  4.1  4.0   < >  3.9   CL  103  102   --    --   106   CO2  26  25   --    --   25   BUN  10  7   --    --   9   CREA  0.90  0.83   --    --   0.90   CA  7.8*  7.8*   --    --   8.0*   AGAP  8  8   --    --   10   BUCR  11*  8*   --    --   10*   AP  119*  125*   --    --   121*   TP  6.4  7.2   --    --   6.9   ALB  1.7*  2.0*   --    --   2.0*   GLOB  4.7*  5.2*   --    --   4.9*   AGRAT  0.4*  0.4*   --    --   0.4*    < > = values in this interval not displayed.       CBC w/Diff Recent Labs      10/05/17   0929  10/05/17   0400  10/04/17   0400  10/03/17   0330   WBC   --   7.8  11.0  9.6   RBC   --   2.30*  2.90*  2.85*   HGB  8.1*  6.8*  8.6*  8.3*   HCT  24.8*  20.7*  26.3*  25.7*   PLT   --   121*  143  134*   GRANS   --   68  63  60   LYMPH   --   16*  15*  18*   EOS   --   9*  13* 11*      Coagulation Recent Labs      10/05/17   0400  10/04/17   0400   PTP  15.9*  15.3*   INR  1.3*  1.3*       Liver Enzymes Recent Labs      10/05/17   0400   TP  6.4   ALB  1.7*   AP  119*   SGOT  27      ABG No results found for: PH, PHI, PCO2, PCO2I, PO2, PO2I, HCO3, HCO3I, FIO2, FIO2I   Microbiology No results for input(s): CULT in the last 72 hours. Telemetry: [x]Sinus []A-flutter []Paced    []A-fib []Multiple PVCs                  Imaging:  CT Chest 10/01/17: 1. Slight interval improvement in the patchy bilateral airspace opacities with  overall similar distribution as above suggesting multifocal pneumonia and likely  superimposed interstitial edema. 2.  Small new bilateral pleural effusions and a trace pericardial effusion. 3.  Probable reactive mediastinal adenopathy. CXR 10/03/17: 1. The bilateral predominantly upper lobe airspace infiltrates which could  represent inflammatory pneumonia  and superimposed interstitial edema appear  improved from 9/29/2017 but about the same as the CT. 2. Right central line satisfactory position without pneumothorax. 3. Small right pleural effusion. IMPRESSION:   · Acute Hypercapnic and Hypoxic Respiratory Failure- extubated 09/25, improving   · HCAP with septic shock- resolved   · Acute exacerbation of diastolic CHF- improving  · Acute Kidney Injury- resolved  · Hx of throat cancer s/p PEG placement- difficult airway due to upper airway obstruction from cancer, call anesthesia for assistance (size 6.5 ETT used initally). · DM Type II  · Remote hx of alcohol and tobacco abuse      PLAN:   · Resp -  Wean HiFlow NC as tolerated, Continue Pulmicort and brovana. Duo-nebz. · ID - Continue Vanc and Cipro per ID. Resp cultures (+) MRSA. UC, BC- NGTD. · CVS - Lasix BID, Daily weights. Continue Norvasc, lisinopril, clonidine, and metoprolol. BNP remains elevated-trending down, will re-check in a few days.    · Heme/onc - Monitor H/H and Platelets. · Metabolic - Monitor and replace lytes per protocol. D/C IVF. · Renal - Monitor Cr and UOP. Strict I&O. · Endocrine - BS checks. Continue Synthroid. · Neuro/ Pain/ Sedation - PRN Norco, chronic back pain. Continue Gabapentin. Avoid sedating medications. · GI - Start continuous Tube Feeds now that pt is off bipap. · Prophylaxis - DVT(lovenox), GI(Pepcid)  · Discussed in interdisciplinary rounds  · Palliative Care following.    · Full Code         The patient is: [x] acutely ill Risk of deterioration: [x] moderate    [] critically ill  [] high     [x]See my orders for details    My assessment/plan was discussed with:  [x]nursing []PT/OT    []respiratory therapy [x]Dr. Molly Call [x] Patient      Umer LAVERN Rosenberg   10/05/17 2:14 PM

## 2017-10-05 NOTE — DIABETES MGMT
Nutrition/Glycemic Control:Pt to resume tube feeding when off bipap during the day. Recommend Osmolite 1.2 Calorie TF at 75 ml/hr from 8am to 8pm. This will provide 1080 calorie, 50 g protein with 0.8 liters free water from TF. If  bipap is discontinued, TF should be given over 24 hrs to more closely meet pt's. nutrient requirements.  Claudia KENNEDY

## 2017-10-05 NOTE — PROGRESS NOTES
Problem: Falls - Risk of  Goal: *Absence of Falls  Document Kapil Fall Risk and appropriate interventions in the flowsheet.    Outcome: Progressing Towards Goal  Fall Risk Interventions:  Mobility Interventions: Bed/chair exit alarm, Patient to call before getting OOB     Mentation Interventions: Adequate sleep, hydration, pain control, Bed/chair exit alarm, Door open when patient unattended     Medication Interventions: Bed/chair exit alarm, Evaluate medications/consider consulting pharmacy, Patient to call before getting OOB     Elimination Interventions: Bed/chair exit alarm, Call light in reach, Patient to call for help with toileting needs

## 2017-10-05 NOTE — INTERDISCIPLINARY ROUNDS
AM ICU MDR Brief  BNP down, crackles less, airflow improved; no need for BIPAP last night  Off IVF  Alb 1.7 -increase EN to goal   Doing well  Can go to [facility] if needed    -maksim

## 2017-10-05 NOTE — ROUTINE PROCESS
0745 Bedside report given to [id number] (oncoming nurse) per Burley Collet RN (offgoing nurse) utilizing SBAR, MAR, Kardex, I&O, results review, and EKG interpretation with rate and rhythm.

## 2017-10-05 NOTE — PROGRESS NOTES
Problem: Mobility Impaired (Adult and Pediatric)  Goal: *Acute Goals and Plan of Care (Insert Text)  Physical Therapy Goals  Initiated 9/29/2017 and to be accomplished within 7 day(s)  1. Patient will move from supine to sit and sit to supine , scoot up and down and roll side to side in bed with supervision/set-up. 2. Patient will transfer from bed to chair and chair to bed with minimal assistance/contact guard assist using the least restrictive device. 3. Patient will perform sit to stand with moderate assistance . 4. Patient will ambulate with moderate assistance for 50 feet with the least restrictive device. 5. Patient will ascend/descend 3 stairs with handrail(s) with moderate assistance . Outcome: Progressing Towards Goal  PHYSICAL THERAPY TREATMENT     Patient: Anurag Real (63 y.o. female)  Date: 10/5/2017  Diagnosis: Acute respiratory failure with hypercapnia (HCC) Acute respiratory failure with hypercapnia (HCC)       Precautions: Fall (respiration )   Chart, physical therapy assessment, plan of care and goals were reviewed. ASSESSMENT:  No assistance needed to sit up at EOB, only mgmt of lines needed. Pt able to perform sit to stand without hesitation this session. Ambulated a total of 10ft x2 with with RW in room, one seated rest break. Unable to obtain O2 saturation but pt does not reports difficulty or SOB. Pt left sitting EOB, notified nurse. Education: PT POC, [facility] vs SNF determination when pt TF to floor  Progression toward goals:  [ ]      Improving appropriately and progressing toward goals  [X]      Improving slowly and progressing toward goals  [ ]      Not making progress toward goals and plan of care will be adjusted       PLAN:  Patient continues to benefit from skilled intervention to address the above impairments. Continue treatment per established plan of care.   Discharge Recommendations:  [address], TBD  Further Equipment Recommendations for Discharge:  rolling walker possible       SUBJECTIVE:   Patient stated I am feeling stronger.       OBJECTIVE DATA SUMMARY:   Critical Behavior:  Neurologic State: Alert  Orientation Level: Oriented X4  Cognition: Appropriate decision making, Appropriate for age attention/concentration, Appropriate safety awareness, Follows commands  Safety/Judgement: Awareness of environment, Fall prevention  Functional Mobility Training:  Bed Mobility:  Supine to Sit: Additional time;Supervision  Scooting: Additional time  Transfers:  Sit to Stand: Contact guard assistance  Stand to Sit: Contact guard assistance  Balance:  Sitting: Intact  Sitting - Static: Good (unsupported)  Sitting - Dynamic: Good (unsupported)  Standing: With support  Standing - Static: Fair  Standing - Dynamic : Fair  Ambulation/Gait Training:  Distance (ft): 10 Feet (ft) (x2)  Assistive Device: Walker, rolling  Ambulation - Level of Assistance: Contact guard assistance  Gait Abnormalities: Decreased step clearance  Speed/Doretha: Slow  Step Length: Right shortened;Left shortened  Pain:  Pre:2  Post:2  Pain Scale 1: Numeric (0 - 10)  Pain location: buttocks  Activity Tolerance:   Fair  Please refer to the flowsheet for vital signs taken during this treatment.   After treatment:   [ ] Patient left in no apparent distress sitting up in chair  [X] Patient left in no apparent distress in bed  [X] Call bell left within reach  [X] Nursing notified  [ ] Caregiver present  [ ] Bed alarm activated      Tanner Marinelli PTA   Time Calculation: 27 mins

## 2017-10-06 LAB
ALBUMIN SERPL-MCNC: 1.9 G/DL (ref 3.4–5)
ALBUMIN/GLOB SERPL: 0.4 {RATIO} (ref 0.8–1.7)
ALP SERPL-CCNC: 138 U/L (ref 45–117)
ALT SERPL-CCNC: 18 U/L (ref 13–56)
ANION GAP SERPL CALC-SCNC: 6 MMOL/L (ref 3–18)
AST SERPL-CCNC: 32 U/L (ref 15–37)
BASOPHILS # BLD: 0 K/UL (ref 0–0.06)
BASOPHILS NFR BLD: 0 % (ref 0–2)
BILIRUB SERPL-MCNC: 0.2 MG/DL (ref 0.2–1)
BUN SERPL-MCNC: 12 MG/DL (ref 7–18)
BUN/CREAT SERPL: 11 (ref 12–20)
CALCIUM SERPL-MCNC: 7.8 MG/DL (ref 8.5–10.1)
CHLORIDE SERPL-SCNC: 103 MMOL/L (ref 100–108)
CO2 SERPL-SCNC: 29 MMOL/L (ref 21–32)
CREAT SERPL-MCNC: 1.13 MG/DL (ref 0.6–1.3)
DIFFERENTIAL METHOD BLD: ABNORMAL
EOSINOPHIL # BLD: 0.8 K/UL (ref 0–0.4)
EOSINOPHIL NFR BLD: 8 % (ref 0–5)
ERYTHROCYTE [DISTWIDTH] IN BLOOD BY AUTOMATED COUNT: 15.8 % (ref 11.6–14.5)
GLOBULIN SER CALC-MCNC: 5.3 G/DL (ref 2–4)
GLUCOSE BLD STRIP.AUTO-MCNC: 116 MG/DL (ref 70–110)
GLUCOSE BLD STRIP.AUTO-MCNC: 136 MG/DL (ref 70–110)
GLUCOSE BLD STRIP.AUTO-MCNC: 94 MG/DL (ref 70–110)
GLUCOSE BLD STRIP.AUTO-MCNC: 96 MG/DL (ref 70–110)
GLUCOSE BLD STRIP.AUTO-MCNC: 97 MG/DL (ref 70–110)
GLUCOSE SERPL-MCNC: 106 MG/DL (ref 74–99)
HCT VFR BLD AUTO: 26.2 % (ref 35–45)
HGB BLD-MCNC: 8.6 G/DL (ref 12–16)
INR PPP: 1.1 (ref 0.8–1.2)
LYMPHOCYTES # BLD: 1.5 K/UL (ref 0.9–3.6)
LYMPHOCYTES NFR BLD: 16 % (ref 21–52)
MAGNESIUM SERPL-MCNC: 1.3 MG/DL (ref 1.6–2.6)
MCH RBC QN AUTO: 29.4 PG (ref 24–34)
MCHC RBC AUTO-ENTMCNC: 32.8 G/DL (ref 31–37)
MCV RBC AUTO: 89.4 FL (ref 74–97)
MONOCYTES # BLD: 0.5 K/UL (ref 0.05–1.2)
MONOCYTES NFR BLD: 5 % (ref 3–10)
NEUTS SEG # BLD: 6.9 K/UL (ref 1.8–8)
NEUTS SEG NFR BLD: 71 % (ref 40–73)
PHOSPHATE SERPL-MCNC: 5.1 MG/DL (ref 2.5–4.9)
PLATELET # BLD AUTO: 163 K/UL (ref 135–420)
POTASSIUM SERPL-SCNC: 3.6 MMOL/L (ref 3.5–5.5)
PROT SERPL-MCNC: 7.2 G/DL (ref 6.4–8.2)
PROTHROMBIN TIME: 13.6 SEC (ref 11.5–15.2)
RBC # BLD AUTO: 2.93 M/UL (ref 4.2–5.3)
SODIUM SERPL-SCNC: 138 MMOL/L (ref 136–145)
VANCOMYCIN SERPL-MCNC: 36.9 UG/ML (ref 5–40)
WBC # BLD AUTO: 9.7 K/UL (ref 4.6–13.2)

## 2017-10-06 PROCEDURE — 97110 THERAPEUTIC EXERCISES: CPT

## 2017-10-06 PROCEDURE — 84100 ASSAY OF PHOSPHORUS: CPT | Performed by: HOSPITALIST

## 2017-10-06 PROCEDURE — 74011250637 HC RX REV CODE- 250/637: Performed by: INTERNAL MEDICINE

## 2017-10-06 PROCEDURE — 80202 ASSAY OF VANCOMYCIN: CPT | Performed by: HOSPITALIST

## 2017-10-06 PROCEDURE — 94668 MNPJ CHEST WALL SBSQ: CPT

## 2017-10-06 PROCEDURE — 80053 COMPREHEN METABOLIC PANEL: CPT | Performed by: HOSPITALIST

## 2017-10-06 PROCEDURE — 74011250636 HC RX REV CODE- 250/636: Performed by: INTERNAL MEDICINE

## 2017-10-06 PROCEDURE — 74011250636 HC RX REV CODE- 250/636: Performed by: HOSPITALIST

## 2017-10-06 PROCEDURE — 74011000250 HC RX REV CODE- 250: Performed by: INTERNAL MEDICINE

## 2017-10-06 PROCEDURE — 74011250637 HC RX REV CODE- 250/637: Performed by: PHYSICIAN ASSISTANT

## 2017-10-06 PROCEDURE — 83735 ASSAY OF MAGNESIUM: CPT | Performed by: HOSPITALIST

## 2017-10-06 PROCEDURE — 65660000000 HC RM CCU STEPDOWN

## 2017-10-06 PROCEDURE — 82962 GLUCOSE BLOOD TEST: CPT

## 2017-10-06 PROCEDURE — 85610 PROTHROMBIN TIME: CPT | Performed by: HOSPITALIST

## 2017-10-06 PROCEDURE — 77030020256 HC SOL INJ NACL 0.9%  500ML

## 2017-10-06 PROCEDURE — 97530 THERAPEUTIC ACTIVITIES: CPT

## 2017-10-06 PROCEDURE — 74011250636 HC RX REV CODE- 250/636: Performed by: FAMILY MEDICINE

## 2017-10-06 PROCEDURE — 74011250636 HC RX REV CODE- 250/636: Performed by: PHYSICIAN ASSISTANT

## 2017-10-06 PROCEDURE — 94640 AIRWAY INHALATION TREATMENT: CPT

## 2017-10-06 PROCEDURE — 85025 COMPLETE CBC W/AUTO DIFF WBC: CPT | Performed by: HOSPITALIST

## 2017-10-06 PROCEDURE — 36415 COLL VENOUS BLD VENIPUNCTURE: CPT | Performed by: HOSPITALIST

## 2017-10-06 RX ORDER — CIPROFLOXACIN 250 MG/1
750 TABLET, FILM COATED ORAL EVERY 12 HOURS
Status: DISCONTINUED | OUTPATIENT
Start: 2017-10-06 | End: 2017-10-09 | Stop reason: HOSPADM

## 2017-10-06 RX ORDER — POTASSIUM CHLORIDE 7.45 MG/ML
10 INJECTION INTRAVENOUS
Status: DISPENSED | OUTPATIENT
Start: 2017-10-06 | End: 2017-10-06

## 2017-10-06 RX ORDER — MAGNESIUM SULFATE 1 G/100ML
1 INJECTION INTRAVENOUS ONCE
Status: COMPLETED | OUTPATIENT
Start: 2017-10-06 | End: 2017-10-06

## 2017-10-06 RX ORDER — MAGNESIUM SULFATE HEPTAHYDRATE 40 MG/ML
2 INJECTION, SOLUTION INTRAVENOUS ONCE
Status: COMPLETED | OUTPATIENT
Start: 2017-10-06 | End: 2017-10-06

## 2017-10-06 RX ADMIN — SODIUM CHLORIDE 250 ML: 900 INJECTION, SOLUTION INTRAVENOUS at 13:17

## 2017-10-06 RX ADMIN — Medication 5 ML: at 14:00

## 2017-10-06 RX ADMIN — HYDROCODONE BITARTRATE AND ACETAMINOPHEN 2 TABLET: 5; 325 TABLET ORAL at 13:16

## 2017-10-06 RX ADMIN — METOPROLOL TARTRATE 100 MG: 50 TABLET ORAL at 08:45

## 2017-10-06 RX ADMIN — IPRATROPIUM BROMIDE AND ALBUTEROL SULFATE 3 ML: .5; 3 SOLUTION RESPIRATORY (INHALATION) at 01:25

## 2017-10-06 RX ADMIN — IPRATROPIUM BROMIDE AND ALBUTEROL SULFATE 3 ML: .5; 3 SOLUTION RESPIRATORY (INHALATION) at 07:42

## 2017-10-06 RX ADMIN — FUROSEMIDE 40 MG: 10 INJECTION, SOLUTION INTRAMUSCULAR; INTRAVENOUS at 18:55

## 2017-10-06 RX ADMIN — CLONIDINE HYDROCHLORIDE 0.1 MG: 0.1 TABLET ORAL at 08:45

## 2017-10-06 RX ADMIN — ENOXAPARIN SODIUM 40 MG: 40 INJECTION SUBCUTANEOUS at 22:49

## 2017-10-06 RX ADMIN — Medication 100 MG: at 08:45

## 2017-10-06 RX ADMIN — Medication 10 ML: at 22:49

## 2017-10-06 RX ADMIN — AMLODIPINE BESYLATE 10 MG: 10 TABLET ORAL at 08:45

## 2017-10-06 RX ADMIN — FAMOTIDINE 20 MG: 20 TABLET ORAL at 22:48

## 2017-10-06 RX ADMIN — MAGNESIUM SULFATE HEPTAHYDRATE 1 G: 1 INJECTION, SOLUTION INTRAVENOUS at 13:11

## 2017-10-06 RX ADMIN — POTASSIUM CHLORIDE 10 MEQ: 10 INJECTION, SOLUTION INTRAVENOUS at 08:46

## 2017-10-06 RX ADMIN — HYDROCODONE BITARTRATE AND ACETAMINOPHEN 2 TABLET: 5; 325 TABLET ORAL at 22:49

## 2017-10-06 RX ADMIN — GABAPENTIN 300 MG: 300 CAPSULE ORAL at 08:45

## 2017-10-06 RX ADMIN — Medication 10 ML: at 06:10

## 2017-10-06 RX ADMIN — FUROSEMIDE 40 MG: 10 INJECTION, SOLUTION INTRAMUSCULAR; INTRAVENOUS at 08:45

## 2017-10-06 RX ADMIN — GABAPENTIN 300 MG: 300 CAPSULE ORAL at 22:49

## 2017-10-06 RX ADMIN — Medication 100 MG: at 22:49

## 2017-10-06 RX ADMIN — GABAPENTIN 300 MG: 300 CAPSULE ORAL at 16:46

## 2017-10-06 RX ADMIN — POTASSIUM CHLORIDE 10 MEQ: 10 INJECTION, SOLUTION INTRAVENOUS at 10:06

## 2017-10-06 RX ADMIN — HYDROCODONE BITARTRATE AND ACETAMINOPHEN 2 TABLET: 5; 325 TABLET ORAL at 08:45

## 2017-10-06 RX ADMIN — FAMOTIDINE 20 MG: 20 TABLET ORAL at 08:45

## 2017-10-06 RX ADMIN — METOPROLOL TARTRATE 100 MG: 50 TABLET ORAL at 22:38

## 2017-10-06 RX ADMIN — CLONIDINE HYDROCHLORIDE 0.1 MG: 0.1 TABLET ORAL at 18:55

## 2017-10-06 RX ADMIN — HYDROCODONE BITARTRATE AND ACETAMINOPHEN 2 TABLET: 5; 325 TABLET ORAL at 04:30

## 2017-10-06 RX ADMIN — NYSTATIN: 100000 OINTMENT TOPICAL at 08:46

## 2017-10-06 RX ADMIN — POTASSIUM CHLORIDE 10 MEQ: 10 INJECTION, SOLUTION INTRAVENOUS at 11:00

## 2017-10-06 RX ADMIN — LISINOPRIL 20 MG: 20 TABLET ORAL at 08:45

## 2017-10-06 RX ADMIN — LEVOTHYROXINE SODIUM 100 MCG: 100 TABLET ORAL at 06:09

## 2017-10-06 RX ADMIN — NYSTATIN: 100000 OINTMENT TOPICAL at 18:54

## 2017-10-06 RX ADMIN — CIPROFLOXACIN HYDROCHLORIDE 750 MG: 250 TABLET, FILM COATED ORAL at 16:46

## 2017-10-06 RX ADMIN — MAGNESIUM SULFATE HEPTAHYDRATE 2 G: 40 INJECTION, SOLUTION INTRAVENOUS at 10:16

## 2017-10-06 NOTE — ROUTINE PROCESS
(7678) Received report from [address]. Assumed care of patient. (1000) Physical therapist Tahir Resendiz at bedside working with patient. Patient is up in chair. (5406) TRANSFER - OUT REPORT:    Verbal report given to Marcos Gibson RN(name) on Dayanara Maxwell  being transferred to (unit) for routine progression of care       Report consisted of patients Situation, Background, Assessment and   Recommendations(SBAR). Information from the following report(s) SBAR, Intake/Output, MAR, Recent Results, Med Rec Status and Cardiac Rhythm NSR was reviewed with the receiving nurse. Lines:   Double Lumen 08/16/17 Right Subclavian (Active)   Central Line Being Utilized Yes 10/6/2017  8:00 AM   Criteria for Appropriate Use Hemodynamically unstable, requiring monitoring lines, vasopressors, or volume resuscitation 10/6/2017  8:00 AM   Site Assessment Clean, dry, & intact 10/6/2017  9:00 AM   Infiltration Assessment 0 10/6/2017  9:00 AM   Affected Extremity/Extremities Color distal to insertion site pink (or appropriate for race); Pulses palpable;Range of motion performed 10/6/2017  8:00 AM   Date of Last Dressing Change 10/02/17 10/6/2017  8:00 AM   Dressing Status Clean, dry, & intact 10/6/2017  8:00 AM   Dressing Type Disk with Chlorhexadine gluconate (CHG) 10/6/2017  8:00 AM   Action Taken Open ports on tubing capped 10/6/2017  8:00 AM   Proximal Hub Color/Line Status Red;Flushed;Patent;Capped 10/6/2017  8:00 AM   Positive Blood Return (Medial Site) No 10/6/2017  8:00 AM   Distal Hub Color/Line Status Purple;Flushed;Patent;Capped 10/6/2017  8:00 AM   Positive Blood Return (Lateral Site) No 10/6/2017  8:00 AM   External Catheter Length (cm) 0 centimeters 10/6/2017  8:00 AM   Alcohol Cap Used Yes 10/6/2017  8:00 AM        Opportunity for questions and clarification was provided.       Patient transported with:   O2 @ 10 liters  Patient-specific medications from [facility]

## 2017-10-06 NOTE — PROGRESS NOTES
Problem: Falls - Risk of  Goal: *Absence of Falls  Document Kapil Fall Risk and appropriate interventions in the flowsheet.    Outcome: Progressing Towards Goal  Fall Risk Interventions:  Mobility Interventions: Bed/chair exit alarm     Mentation Interventions: Adequate sleep, hydration, pain control     Medication Interventions: Evaluate medications/consider consulting pharmacy     Elimination Interventions: Call light in reach, Patient to call for help with toileting needs

## 2017-10-06 NOTE — PROGRESS NOTES
10/05/2017 2000 Assumed care of pt after bedside report with pt lying in bed with HOB elevated. Monitor denotes NSR without ectopy. Pulses present and palpable. Neuro intact. AAO x 4. QUICK x 4. Pt on HI-flow O2. Lungs clear diminished in bases. Abd soft, distended and nontender. PEG tube in place with Osmolite 1.0 narda TF in progress at 60 ml/hr. Pt voiding in bedpan as needed. 2215 Norco 2 tab po given for c/o back pain. 2300 Resting with both eyes closed after earlier pain meds. Lab drawn and sent to lab. 2300 Vancomycin level 42.3. Dose held for now. 10/06/2017 0000 Accucheck result 136. No Lispro insulin given as per sliding scale coverage. Voiding in bedpan as needed. Skin care given to buttocks and marky area. 0400 AM labs drawn and sent to lab. 0430 Norco 2 tabs po given for pain. 0500 Resting with both eyes closed after earlier pain meds. 0600 Pt put on call l ight and nurse in to see pt. Pt expectorated large blood clot with ?tissue inside. States feels much better now.

## 2017-10-06 NOTE — PROGRESS NOTES
Titrated patient's oxygen to 10 lpm. Patient SpO2 98%.     - No respiratory distress noted.    -Respiratory KAMLESH

## 2017-10-06 NOTE — PROGRESS NOTES
NUTRITION follow-up/Plan of care    RECOMMENDATIONS:     1. TF: Osmolite 1.2 at goal rate of 60 ml/hr to provide 1728 Kcal, 80 g protein and 1181 ml free water. 2. Monitor weight, labs and PO intake  3. RD to follow    GOALS:     1. Met/Ongoing: Enteral nutrition meets >75% of protein/calorie needs by 10/9  2. Ongoing: Maintain weight (+/- 1-2 lb by 10/9)    ASSESSMENT:     Weight status is classified as normal per BMI of 24.9. Meeting nutrition needs with Osmolite 1.2 enteral nutrition at 60 ml/hr. Labs noted. Nutrition recommendations listed. RD to follow. Nutrition Risk:  [x]   High []  Moderate [] Low    SUBJECTIVE/OBJECTIVE:     Transferred from ICU. PMHx includes throat CA, dysphagia and PEG tube. Osmolite 1.2 infusing at goal rate of 60 ml/hr without difficulty. 20 ml residuals per I/O's. Will monitor. Information Obtained From:   [x] Chart Review  [x] Patient  [] Family/Caregiver  [] Nurse/Physician   [] Patient Rounds/Interdisciplinary Meeting    Diet: TF: Osmolite 1.2 @ goal of 60 ml/hr  Patient Vitals for the past 100 hrs:   % Diet Eaten   10/02/17 1600 0 %   10/02/17 1200 0 %     Medications: [x] Reviewed   Encounter Diagnoses     ICD-10-CM ICD-9-CM   1. HCAP (healthcare-associated pneumonia) J18.9 486   2.  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    PEG adjustment, replacement, or removal     S/P thoracentesis     Throat cancer ([address]      Labs:  Lab Results   Component Value Date/Time    Sodium 138 10/06/2017 04:30 AM    Potassium 3.6 10/06/2017 04:30 AM    Chloride 103 10/06/2017 04:30 AM    CO2 29 10/06/2017 04:30 AM    Anion gap 6 10/06/2017 04:30 AM    Glucose 106 10/06/2017 04:30 AM BUN 12 10/06/2017 04:30 AM    Creatinine 1.13 10/06/2017 04:30 AM    Calcium 7.8 10/06/2017 04:30 AM    Magnesium 1.3 10/06/2017 04:30 AM    Phosphorus 5.1 10/06/2017 04:30 AM    Albumin 1.9 10/06/2017 04:30 AM     Anthropometrics: BMI (calculated): 24.9 Last 3 Recorded Weights in this Encounter    10/04/17 0706 10/05/17 0715 10/06/17 0900   Weight: 68.8 kg (151 lb 10.8 oz) 66.6 kg (146 lb 13.2 oz) 65.9 kg (145 lb 4.5 oz)    Ht Readings from Last 1 Encounters:   10/03/17 5' 4\" (1.626 m)     [x]  Weight Loss  []  Weight Gain  []  Weight Stable   []  New wt n/a on record     Estimated Nutrition Needs:   3039 Kcals/day  Protein (g): 86 g    Nutrition Problems Identified:   [x] Suboptimal PO intake   [] Food Allergies  [] Difficulty chewing/swallowing/poor dentition  [] Constipation/Diarrhea   [] Nausea/Vomiting   [] None  [x] Other: Enteral nutrition via PEG    Plan:   [] Therapeutic Diet  []  Obtained/adjusted food preferences/tolerances and/or snacks options   []  Supplements added   [] Occupational therapy following for feeding techniques  []  HS snack added   []  Modify diet texture   []  Modify diet for food allergies   []  Assist with menu selection   []  Monitor PO intake on meal rounds   [x]  Continue inpatient monitoring and intervention   []  Participated in discharge planning/Interdisciplinary rounds/Team meetings   [x]  Other: Enteral nutrition recommendations listed    Education Needs:   [x] Not appropriate for teaching at this time    [] Identified and addressed    Nutrition Monitoring and Evaluation:   [x] Continue inpatient monitoring and interventions    [] Other:     Raquel Arrington

## 2017-10-06 NOTE — ROUTINE PROCESS
1215- Assumed care. Pt on 8 L high flow. No acute distress noted. 1315- Norco given for back pain rated 8/10. Tolerated well. Shift assessment complete. No respiratory distress. Alert and oriented x4. Call bell in reach.

## 2017-10-06 NOTE — PROGRESS NOTES
Pharmacy Dosing Services: Vancomycin    Indication: HAP    Day of therapy: 13    Other Antimicrobials (Include dose, start day & day of therapy):  Cipro 400 mg IV q 12h-day 1, start 10/2    Loading dose (date given): 1.5g-   Current Maintenance dose: was on 750 mg IV q24h    Goal Vancomycin Level: 15-20  (Trough 15-20 for most infections, 20 for meningitis/osteomyelitis, pre-HD level ~25)     -24.7 mcg/dl (~12hrs post dose)  -19.1 mcg/dl ( ~32 hrs post dose)  -19.6 mcg/dl (~23 hrs post dose)  -20.1mcg/dl  10/6-36.9 mcg/dl      Significant Cultures:   : sputum: GPC, MRSA, Pseudomonas   : urine- NGTD   : blood cx: NGTD      Renal function stable? (unstable defined as SCr increase of 0.5 mg/dL or > 50% increase from baseline, whichever is greater) (Y/N): Y    CAPD, Hemodialysis or Renal Replacement Therapy (Y/N): N     Recent Labs      10/06/17   0430  10/05/17   0400  10/04/17   0400   CREA  1.13  0.90  0.83   BUN  12  10  7   WBC  9.7  7.8  11.0     Temp (24hrs), Av.7 ° F (36.5 ° C), Min:97.1 ° F (36.2 ° C), Max:98.6 ° F (37 ° C)    Creatinine Clearance (Creatinine Clearance (ml/min)): 56 ml/min     Regimen assessment: supra therapeutic level, will draw random levels and redose when level is <20mcg/dl   Maintenance dose: dose per level  Next scheduled level: 10/7 @ 0400     Pharmacy will follow daily and adjust medications as appropriate for renal function and/or serum levels.     Thank you,  Jair Rhodes

## 2017-10-06 NOTE — PROGRESS NOTES
Progress Note      Patient: Sina Moreland               Sex: female          DOA: 9/24/2017       YOB: 1965      Age:  54 y.o.        LOS:  LOS: 12 days               Subjective:   P[t seen this am in the icu . she is now transferred to the floor . she has been extubated . she is continuing on vancomycin and ciprofloxacin for her pseudomonas . she has chf and is on iv lasix . sputum grew out pseudomonas      Objective:      Visit Vitals    /85    Pulse 63    Temp 97.8 °F (36.6 °C)    Resp 18    Ht 5' 4\" (1.626 m)    Wt 65.9 kg (145 lb 4.5 oz)    SpO2 100%    BMI 24.94 kg/m2       Physical Exam:  Pt is awake and is alert . she says she feels much better   Heart reg rate and rhythm   Lungs coarse rhonchi bilaterally   Abdomen soft and no masses felt   Neuro nonfocal        Lab/Data Reviewed:  CMP:   Lab Results   Component Value Date/Time     10/06/2017 04:30 AM    K 3.6 10/06/2017 04:30 AM     10/06/2017 04:30 AM    CO2 29 10/06/2017 04:30 AM    AGAP 6 10/06/2017 04:30 AM     (H) 10/06/2017 04:30 AM    BUN 12 10/06/2017 04:30 AM    CREA 1.13 10/06/2017 04:30 AM    GFRAA >60 10/06/2017 04:30 AM    GFRNA 51 (L) 10/06/2017 04:30 AM    CA 7.8 (L) 10/06/2017 04:30 AM    MG 1.3 (L) 10/06/2017 04:30 AM    PHOS 5.1 (H) 10/06/2017 04:30 AM    ALB 1.9 (L) 10/06/2017 04:30 AM    TP 7.2 10/06/2017 04:30 AM    GLOB 5.3 (H) 10/06/2017 04:30 AM    AGRAT 0.4 (L) 10/06/2017 04:30 AM    SGOT 32 10/06/2017 04:30 AM    ALT 18 10/06/2017 04:30 AM     CBC:   Lab Results   Component Value Date/Time    WBC 9.7 10/06/2017 04:30 AM    HGB 8.6 (L) 10/06/2017 04:30 AM    HCT 26.2 (L) 10/06/2017 04:30 AM     10/06/2017 04:30 AM           Assessment/Plan     Principal Problem:    Acute respiratory failure with hypercapnia (HCC) (9/24/2017)    Active Problems:    Sepsis ([address] (9/24/2017)      HCAP (healthcare-associated pneumonia) (9/24/2017)      Hypertension (9/24/2017)      Diabetes mellitus ([facility] (9/24/2017)  chf      Plan: continue the antibiotics as oper id . Continue the iv lasix as well .

## 2017-10-06 NOTE — PROGRESS NOTES
Problem: Mobility Impaired (Adult and Pediatric)  Goal: *Acute Goals and Plan of Care (Insert Text)  Physical Therapy Goals  Initiated 9/29/2017 and to be accomplished within 7 day(s)  1. Patient will move from supine to sit and sit to supine , scoot up and down and roll side to side in bed with supervision/set-up. 2. Patient will transfer from bed to chair and chair to bed with minimal assistance/contact guard assist using the least restrictive device. 3. Patient will perform sit to stand with moderate assistance . 4. Patient will ambulate with moderate assistance for 50 feet with the least restrictive device. 5. Patient will ascend/descend 3 stairs with handrail(s) with moderate assistance . Outcome: Progressing Towards Goal  PHYSICAL THERAPY TREATMENT     Patient: Adithya Wiseman (52 y.o. female)  Date: 10/6/2017  Diagnosis: Acute respiratory failure with hypercapnia ([facility] Acute respiratory failure with hypercapnia (HCC)  Precautions: Fall (respiration )   Chart, physical therapy assessment, plan of care and goals were reviewed. ASSESSMENT:  No assistance needed for bed mobility. Ambulated 10ft with RW then 60ft without an assistive device, reciprocal gt pattern, decreased step height and length, decreased shavon, limited my many lines, no LOB or SOB. Pt left sitting in recliner. SpO2 maintained 100% throughout session. Education: take time changing positions  Progression toward goals:  [ ]      Improving appropriately and progressing toward goals  [X]      Improving slowly and progressing toward goals  [ ]      Not making progress toward goals and plan of care will be adjusted       PLAN:  Patient continues to benefit from skilled intervention to address the above impairments. Continue treatment per established plan of care.   Discharge Recommendations:  [address], TBD when TF to step down  Further Equipment Recommendations for Discharge:  N/A SUBJECTIVE:   Patient stated I feel better.       OBJECTIVE DATA SUMMARY:   Critical Behavior:  Neurologic State: Alert  Orientation Level: Oriented X4  Cognition: Appropriate decision making, Appropriate for age attention/concentration, Appropriate safety awareness, Follows commands  Safety/Judgement: Awareness of environment, Fall prevention  Functional Mobility Training:  Bed Mobility:  Supine to Sit: Supervision  Scooting: Supervision  Transfers:  Sit to Stand: Stand-by asssistance  Stand to Sit: Stand-by asssistance;Contact guard assistance  Bed to Chair: Contact guard assistance  Balance:  Sitting: Intact  Sitting - Static: Good (unsupported)  Sitting - Dynamic: Good (unsupported)  Standing: Intact; With support; Without support  Standing - Static: Good  Standing - Dynamic : Good  Ambulation/Gait Training:  Distance (ft): 70 Feet (ft)  Ambulation - Level of Assistance: Contact guard assistance  Gait Abnormalities: Decreased step clearance  Speed/Doretha: Slow  Pain:  Pre:0  Post:0  Pain Scale 1: Adult Nonverbal Pain Scale  Activity Tolerance:   Good  Please refer to the flowsheet for vital signs taken during this treatment.   After treatment:   [ ] Patient left in no apparent distress sitting up in chair  [X] Patient left in no apparent distress in bed  [X] Call bell left within reach  [X] Nursing notified  [ ] Caregiver present  [ ] Bed alarm activated      David Marinelli PTA   Time Calculation: 29 mins

## 2017-10-06 NOTE — DIABETES MGMT
NUTRITIONAL RE-ASSESSMENT GLYCEMIC CONTROL/ PLAN OF CARE     Martin Simmons           54 y.o.           9/24/2017                 1. HCAP (healthcare-associated pneumonia)    2. Septic shock (HCC)         INTERVENTIONS/PLAN:   Pt is receiving Osmolite 1.2 narda tube feeding via PEG at 60 ml/hr. TF is providing 1728 calories, 80 grams protein, ~1.2 L L free water/d  from tube feeding. Monitor tube feeding tolerance. ASSESSMENT:   Pt is 121% ideal wt with adequate fat  stores; BMI  25.0 kg/m2 (low range of overweight classification). Pt at risk for malnutrition due to  hx of  Throat CA and PEG/TF. Nutrition Diagnoses:   Inadequate oral food and beverage intake  R/t npo status due to throat CA and dysphagia as evidenced by reqrt for PEG tube/enteral nutrition. Increased nutrient needs due to wounds as evidenced by Stage 2 ulcers sacral area. Pt w/hypoalbuminemia as evidenced by albumin=1.7mg/dl. Unintentional weight loss due to inadequate energy intake AEB 8# weight loss (5%) since June 2017. SUBJECTIVE/OBJECTIVE:   Information obtained from:  ICU rounds  PMHx includes throat CA, dysphagia and PEG tube,  stage 2 ulcers sacral area    Diet: Osmolite 1.2 narda tube feeding via PEG at 60 ml/hr.    Medications: [x]                Reviewed   corrective lispro q 6 hours, normal insulin sensitivity,     Most Recent POC Glucose:   Recent Labs      10/05/17   0400  10/04/17   0400  10/03/17   0330   GLU  66*  91  96         Labs:   Lab Results   Component Value Date/Time    Hemoglobin A1c 5.3 09/25/2017 09:46 AM     Lab Results   Component Value Date/Time    Sodium 137 10/05/2017 04:00 AM    Potassium 4.0 10/05/2017 04:00 AM    Chloride 103 10/05/2017 04:00 AM    CO2 26 10/05/2017 04:00 AM    Anion gap 8 10/05/2017 04:00 AM    Glucose 66 10/05/2017 04:00 AM    BUN 10 10/05/2017 04:00 AM    Creatinine 0.90 10/05/2017 04:00 AM    Calcium 7.8 10/05/2017 04:00 AM    Magnesium 1.9 10/03/2017 03:30 AM    Phosphorus 3.8 10/02/2017 04:10 AM    Albumin 1.7 10/05/2017 04:00 AM       Anthropometrics:  ,  , BMI (calculated): 25.2   Ht - 64\"  weight - 66 kg  Ideal wt -  54.5 kg   Wt Readings from Last 1 Encounters:   10/05/17 66.6 kg (146 lb 13.2 oz)     Last 3 Recorded Weights in this Encounter    10/03/17 0609 10/04/17 0706 10/05/17 0715   Weight: 68 kg (149 lb 14.6 oz) 68.8 kg (151 lb 10.8 oz) 66.6 kg (146 lb 13.2 oz)         Ht Readings from Last 1 Encounters:   10/03/17 5' 4\" (1.626 m)     Wt Readings from Last 3 Encounters:   10/05/17 66.6 kg (146 lb 13.2 oz)   06/12/17 71.2 kg (157 lb)   01/05/14 77.6 kg (171 lb)       Estimated Nutrition Needs:  8147 Kcals/day, Protein (g): 86 g Fluid (ml): 2300 ml  Based on:   [x]          Actual BW    []          ABW   []            Adjusted BW      Nutrition Diagnoses:   As stated above. Nutrition Interventions:  Tube feeding  Monitoring. Goal:    Blood glucose will be within target range of  mg/dL by  10/10/17. Pt will maintain weight (+/- 1-2 kg) by 10/15/17. Intake will meet >75% of energy and protein requirements. by 10/10.        Nutrition Monitoring and Evaluation      []     Monitor po intake on meal rounds  [x]     Continue inpatient monitoring and intervention  []     Other:      Nutrition Risk:  [x]   High     []  Moderate    []  Minimal/Uncompromised

## 2017-10-06 NOTE — ROUTINE PROCESS
1456 Bedside report given to [address] (oncoming nurse) per Rebel Knowles RN (offgoing nurse) utilizing SBAR, MAR, Kardex, I&O, results review, and EKG interpretation with rate and rhythm.

## 2017-10-06 NOTE — MED STUDENT NOTES
*ATTENTION:  This note has been created by a medical student for educational purposes only. Please do not refer to the content of this note for clinical decision-making, billing, or other purposes. Please see attending physicians note to obtain clinical information on this patient. *        Progress Note    Patient: Jayy Araujo MRN: [id number]  SSN: [id number]    YOB: 1965  Age: 54 y.o. Sex: female      Admit Date: 9/24/2017    LOS: 12 days     Subjective:   Patient is up with PT. She has been able to walk a few steps around the room but limited by machines. Patient was maintaining O2 sat of 100% throughout PT. She is improving and feeling much better. Patient is wanting to be moved from the ICU soon so she can go home. Yesterday patient complain of \"something stuck in her throat\" and today she coughed up what looked like a clot. Probably from irritation from intubation. Patient moved to unit at 1200. Objective:     Vitals:    10/06/17 1000 10/06/17 1100 10/06/17 1200 10/06/17 1241   BP: 124/83 111/69  134/83   Pulse: 71 74  68   Resp: 23 17  18   Temp:   97.6 °F (36.4 °C) 98 °F (36.7 °C)   SpO2:    100%   Weight:       Height:            Intake and Output:  Current Shift: 10/06 0701 - 10/06 1900  In: 270 [I.V.:100]  Out: -   Last three shifts: 10/04 1901 - 10/06 0700  In: 5140 [I.V.:900]  Out: 2900 [Urine:2900]    Physical Exam:   GENERAL: alert, cooperative, no distress  LUNG: clear to auscultation bilaterally  HEART: regular rate and rhythm, S1, S2 normal, no murmur, click, rub or gallop    Lab/Data Review: All lab results for the last 24 hours reviewed.        Assessment:     Principal Problem:    Acute respiratory failure with hypercapnia ([address] (9/24/2017)    Active Problems:    Sepsis ([address] (9/24/2017)      HCAP (healthcare-associated pneumonia) (9/24/2017)      Hypertension (9/24/2017)      Diabetes mellitus ([address] (9/24/2017)        Plan:   HCAP  -sputum cultures grew Pseudomonas and MRSA  -continue ciprofloxacin and vancomycin per ID    Acute respiratory failure  -patient was extubated  -continue to wean HiFlow as tolerated    Signed By: Severo Plough     October 6, 2017

## 2017-10-06 NOTE — PROGRESS NOTES
Infectious Disease Follow-up   Will follow back on Monday, Dr John Monson is on call this weekend, please call us at [phone] for any questions or concerns. Admit Date: 9/24/2017    Current abx Prior abx   Vancomycin 9/24-12  Ciprofloxacin 10/2-4 Levofloxacin 9/24 - 4  Zosyn 9/24 - 6 Tobramycin 9/30 - 2      ASSESSMENT - > REC:     Recurrent Pneumonia  - MRSA, Pseudomonas in spcx 9/24  - same organisms in spcx last confinement . [facility]) 8/11-18  - treated with Vancomycin/zosyn x 14 days (ending ~8/25)  - bilateral multifocal opacities - sl improved on CT 10/1  - CXR 10/3 improving infiltrates c/w 9/29 -> continue Vancomycin to complete 14 days (2 more) then change to po Doxycycline 100 mg bid x 2 weeks  -> Change Cipro to po 750 bid aiming for 4 weeks rx if tolerated (relapsed after 2 weeks IV rx)   Respiratory Failure  - intubated 9/24, extubated 9/25  - BNP 26k 10/4-  Improved after lasix -> HFNC etc per PCCM   H/o throat CA     ETOH abuse    DM    HTN    CAD    GERD    PEG    DELFINA      MICROBIOLOGY:   9/24 blcx NG x 2    urcx NG   spcx Ps aeruginosa sens aminoglycosides, cipro res pip-tazo, other beta lactams, MRSA R levaquin vanco rios 1 few CF  9/26  C diff neg     LINES AND CATHETERS:   Tunneled PICC R ? IJ    Subjective: Interval notes reviewed. Sitting up in bed out of ICU with HFNC O2. Says she feels \"much better\" .       Current Facility-Administered Medications   Medication Dose Route Frequency    VANCOMYCIN- DOSING PER LEVELS   Other Rx Dosing/Monitoring    [START ON 10/7/2017] VANCOMYCIN INFORMATION NOTE   Other ONCE    magnesium sulfate 1 g/100 ml IVPB (premix or compounded)  1 g IntraVENous ONCE    nystatin (MYCOSTATIN) 100,000 unit/gram ointment   Topical BID    furosemide (LASIX) injection 40 mg  40 mg IntraVENous BID    ciprofloxacin HCl (CIPRO) 750 mg  750 mg Oral Q12H    cloNIDine HCl (CATAPRES) tablet 0.1 mg  0.1 mg Per G Tube BID    Thiamine Mononitrate (B-1) tablet 100 mg  100 mg Oral BID    famotidine (PEPCID) tablet 20 mg  20 mg Oral Q12H    HYDROcodone-acetaminophen (NORCO) 5-325 mg per tablet 2 Tab  2 Tab Oral Q4H PRN    gabapentin (NEURONTIN) capsule 300 mg  300 mg Oral TID    amLODIPine (NORVASC) tablet 10 mg  10 mg Oral DAILY    labetalol (NORMODYNE;TRANDATE) injection 20 mg  20 mg IntraVENous Q4H PRN    metoprolol tartrate (LOPRESSOR) tablet 100 mg  100 mg Oral Q12H    lisinopril (PRINIVIL, ZESTRIL) tablet 20 mg  20 mg Oral DAILY    enoxaparin (LOVENOX) injection 40 mg  40 mg SubCUTAneous Q24H    haloperidol lactate (HALDOL) injection 4 mg  4 mg IntraVENous Q8H PRN    albuterol-ipratropium (DUO-NEB) 2.5 MG-0.5 MG/3 ML  3 mL Nebulization Q6H RT    0.9% sodium chloride infusion 250 mL  250 mL IntraVENous PRN    levothyroxine (SYNTHROID) tablet 100 mcg  100 mcg Oral Daily    insulin lispro (HUMALOG) injection   SubCUTAneous Q6H    glucose chewable tablet 16 g  4 Tab Oral PRN    glucagon (GLUCAGEN) injection 1 mg  1 mg IntraMUSCular PRN    dextrose (D50W) injection syrg 12.5-25 g  25-50 mL IntraVENous PRN    sodium chloride (NS) flush 5-10 mL  5-10 mL IntraVENous PRN    sodium chloride (NS) flush 5-10 mL  5-10 mL IntraVENous Q8H    sodium chloride (NS) flush 5-10 mL  5-10 mL IntraVENous PRN    ondansetron (ZOFRAN) injection 4 mg  4 mg IntraVENous Q6H PRN    acetaminophen (TYLENOL) solution 650 mg  650 mg Per G Tube Q4H PRN       Objective:     Visit Vitals    /83    Pulse 68    Temp 98 °F (36.7 °C)    Resp 18    Ht 5' 4\" (1.626 m)    Wt 65.9 kg (145 lb 4.5 oz)    SpO2 100%    BMI 24.94 kg/m2       Temp (24hrs), Av.7 °F (36.5 °C), Min:97.1 °F (36.2 °C), Max:98.6 °F (37 °C)    GEN: wdwn BF appears older than stated age in ICU,   HEENT: HFNC in place, anicteric  NECK: R picc in place  CHEST: fair air movement B crackles mostly in bases no wheezes   CVS: S1'S2' no murmurs  ABD: soft (+) BS non-tender PEG  EXT: no edema   NEURO: awake alert appropriate. Labs: Results:   Chemistry Recent Labs      10/06/17   0430  10/05/17   0400  10/04/17   0400   GLU  106*  66*  91   NA  138  137  135*   K  3.6  4.0  4.1   CL  103  103  102   CO2  29  26  25   BUN  12  10  7   CREA  1.13  0.90  0.83   CA  7.8*  7.8*  7.8*   AGAP  6  8  8   BUCR  11*  11*  8*   AP  138*  119*  125*   TP  7.2  6.4  7.2   ALB  1.9*  1.7*  2.0*   GLOB  5.3*  4.7*  5.2*   AGRAT  0.4*  0.4*  0.4*      CBC w/Diff Recent Labs      10/06/17   0430  10/05/17   0929  10/05/17   0400  10/04/17   0400   WBC  9.7   --   7.8  11.0   RBC  2.93*   --   2.30*  2.90*   HGB  8.6*  8.1*  6.8*  8.6*   HCT  26.2*  24.8*  20.7*  26.3*   PLT  163   --   121*  143   GRANS  71   --   68  63   LYMPH  16*   --   16*  15*   EOS  8*   --   9*  13*        cxr 10/3 Impression:        1. The bilateral predominantly upper lobe airspace infiltrates which could  represent inflammatory pneumonia  and superimposed interstitial edema appear  improved from 9/29/2017 but about the same as the CT. 2. Right central line satisfactory position without pneumothorax. 3. Small right pleural effusion.     Sawyer Conde MD  October 6, 2017  [facility] AT THE [facility]  [phone]

## 2017-10-06 NOTE — PROGRESS NOTES
Problem: Self Care Deficits Care Plan (Adult)  Goal: *Acute Goals and Plan of Care (Insert Text)  Occupational Therapy Goals  Initiated 10/2/2017 within 3 day(s). Pt is on bipap, will implement 3 day trial to determine ability to participate in skilled therapy. Continue per POC if appropriate. 1. Patient will perform grooming tasks with supervision. 2. Patient will perform bed mobility with supervision and verbal cues for pacing. 3. Patient will perform functional task at EOB for 8 minutes with supervision for balance to increase activity tolerance for ADLs. 4. Patient will perform all aspects of toileting with minimal assistance. 5. Patient will participate in upper extremity therapeutic exercise/activities with supervision/set-up for 8 minutes to increase BUE strength for ADLs. 6. Patient will utilize energy conservation techniques during functional activities with minimal verbal cues. 7. Patient will perform toilet transfer with moderate assistance. Outcome: Progressing Towards Goal  OCCUPATIONAL THERAPY TREATMENT     Patient: Hermes Lee (65 y.o. female)  Date: 10/6/2017  Diagnosis: Acute respiratory failure with hypercapnia ([address] Acute respiratory failure with hypercapnia (HCC)       Precautions: Fall (respiration )  Chart, occupational therapy assessment, plan of care, and goals were reviewed. ASSESSMENT:  Pt appears brighter today, OOB seated in chair and cleared for discharge to floor. Pt now on 10L O2 nc, sating 100% w/UE TherEx using orange theraband. EDUCATION Pt educated on importance of UE TherEx and encouraged to perform throughout the day  Progression toward goals:  [X]          Improving appropriately and progressing toward goals  [ ]          Improving slowly and progressing toward goals  [ ]          Not making progress toward goals and plan of care will be adjusted       PLAN:  Patient continues to benefit from skilled intervention to address the above impairments. Continue treatment per established plan of care. Discharge Recommendations:  Skilled Nursing Facility  Further Equipment Recommendations for Discharge:  shower chair and rolling walker       SUBJECTIVE:   Patient stated Loulou Galdamez just had my pain medicine.  when asked if she was in any pain      OBJECTIVE DATA SUMMARY:         Cognitive/Behavioral Status:  Neurologic State: Alert  Orientation Level: Oriented X4  Cognition: Appropriate for age attention/concentration, Follows commands  Safety/Judgement: Awareness of environment, Fall prevention        Therapeutic Exercises:   BUE TherEx w/oranger theraband in multiple planes 10x3     Pain:  Pre Treatment:0  Post Treatment:0     Activity Tolerance:    Good     Please refer to the flowsheet for vital signs taken during this treatment.   After treatment:   [X]  Patient left in no apparent distress sitting up in chair  [ ]  Patient left in no apparent distress in bed  [X]  Call bell left within reach  [X]  Nursing notified  [ ]  Caregiver present  [ ]  Bed alarm activated     KOREY Medrano  Time Calculation: 21 mins

## 2017-10-06 NOTE — ROUTINE PROCESS
1530-Assumed care, no change in assessment. No distress noted. 1640-pm due medications given. 1855-due medications given. 1930-Bedside and Verbal shift change report given to Julee Kelly (oncoming nurse) by Shorty Shafer (offgoing nurse). Report included the following information SBAR, MAR and Recent Results.

## 2017-10-06 NOTE — PROGRESS NOTES
completed follow up visit with patient in room 2706 today and a Spiritual assessment of patient was done. Patient reported that she was hoping to be moved from ICU today into another room as she is feeling better. Prayer offered.   Chaplains will continue to follow and will provide pastoral care on an as needed/requested basis    Chaplain Sachin Johnston   Board Certified [address] [place] [address]   [phone]

## 2017-10-07 LAB
ANION GAP SERPL CALC-SCNC: 5 MMOL/L (ref 3–18)
BASOPHILS # BLD: 0.1 K/UL (ref 0–0.06)
BASOPHILS NFR BLD: 1 % (ref 0–2)
BUN SERPL-MCNC: 17 MG/DL (ref 7–18)
BUN/CREAT SERPL: 15 (ref 12–20)
CALCIUM SERPL-MCNC: 8.2 MG/DL (ref 8.5–10.1)
CHLORIDE SERPL-SCNC: 102 MMOL/L (ref 100–108)
CO2 SERPL-SCNC: 30 MMOL/L (ref 21–32)
CREAT SERPL-MCNC: 1.17 MG/DL (ref 0.6–1.3)
DIFFERENTIAL METHOD BLD: ABNORMAL
EOSINOPHIL # BLD: 0.8 K/UL (ref 0–0.4)
EOSINOPHIL NFR BLD: 8 % (ref 0–5)
ERYTHROCYTE [DISTWIDTH] IN BLOOD BY AUTOMATED COUNT: 15.9 % (ref 11.6–14.5)
GLUCOSE BLD STRIP.AUTO-MCNC: 106 MG/DL (ref 70–110)
GLUCOSE BLD STRIP.AUTO-MCNC: 108 MG/DL (ref 70–110)
GLUCOSE BLD STRIP.AUTO-MCNC: 115 MG/DL (ref 70–110)
GLUCOSE BLD STRIP.AUTO-MCNC: 84 MG/DL (ref 70–110)
GLUCOSE SERPL-MCNC: 119 MG/DL (ref 74–99)
HCT VFR BLD AUTO: 28.8 % (ref 35–45)
HGB BLD-MCNC: 9.5 G/DL (ref 12–16)
INR PPP: 1 (ref 0.8–1.2)
LYMPHOCYTES # BLD: 2 K/UL (ref 0.9–3.6)
LYMPHOCYTES NFR BLD: 21 % (ref 21–52)
MCH RBC QN AUTO: 29.9 PG (ref 24–34)
MCHC RBC AUTO-ENTMCNC: 33 G/DL (ref 31–37)
MCV RBC AUTO: 90.6 FL (ref 74–97)
MONOCYTES # BLD: 0.7 K/UL (ref 0.05–1.2)
MONOCYTES NFR BLD: 7 % (ref 3–10)
NEUTS SEG # BLD: 6 K/UL (ref 1.8–8)
NEUTS SEG NFR BLD: 63 % (ref 40–73)
PLATELET # BLD AUTO: 178 K/UL (ref 135–420)
POTASSIUM SERPL-SCNC: 4.1 MMOL/L (ref 3.5–5.5)
PROTHROMBIN TIME: 13 SEC (ref 11.5–15.2)
RBC # BLD AUTO: 3.18 M/UL (ref 4.2–5.3)
SODIUM SERPL-SCNC: 137 MMOL/L (ref 136–145)
VANCOMYCIN SERPL-MCNC: 23.9 UG/ML (ref 5–40)
WBC # BLD AUTO: 9.4 K/UL (ref 4.6–13.2)

## 2017-10-07 PROCEDURE — 74011250636 HC RX REV CODE- 250/636: Performed by: FAMILY MEDICINE

## 2017-10-07 PROCEDURE — 97530 THERAPEUTIC ACTIVITIES: CPT

## 2017-10-07 PROCEDURE — 85610 PROTHROMBIN TIME: CPT | Performed by: PHYSICIAN ASSISTANT

## 2017-10-07 PROCEDURE — 80048 BASIC METABOLIC PNL TOTAL CA: CPT | Performed by: PHYSICIAN ASSISTANT

## 2017-10-07 PROCEDURE — 77030011256 HC DRSG MEPILEX <16IN NO BORD MOLN -A

## 2017-10-07 PROCEDURE — 65660000000 HC RM CCU STEPDOWN

## 2017-10-07 PROCEDURE — 74011250637 HC RX REV CODE- 250/637: Performed by: PHYSICIAN ASSISTANT

## 2017-10-07 PROCEDURE — 74011250637 HC RX REV CODE- 250/637: Performed by: INTERNAL MEDICINE

## 2017-10-07 PROCEDURE — 77010033678 HC OXYGEN DAILY

## 2017-10-07 PROCEDURE — 74011250636 HC RX REV CODE- 250/636: Performed by: PHYSICIAN ASSISTANT

## 2017-10-07 PROCEDURE — 94760 N-INVAS EAR/PLS OXIMETRY 1: CPT

## 2017-10-07 PROCEDURE — 36415 COLL VENOUS BLD VENIPUNCTURE: CPT | Performed by: PHYSICIAN ASSISTANT

## 2017-10-07 PROCEDURE — 74011000250 HC RX REV CODE- 250: Performed by: INTERNAL MEDICINE

## 2017-10-07 PROCEDURE — 94640 AIRWAY INHALATION TREATMENT: CPT

## 2017-10-07 PROCEDURE — 85025 COMPLETE CBC W/AUTO DIFF WBC: CPT | Performed by: PHYSICIAN ASSISTANT

## 2017-10-07 PROCEDURE — 80202 ASSAY OF VANCOMYCIN: CPT | Performed by: HOSPITALIST

## 2017-10-07 PROCEDURE — 82962 GLUCOSE BLOOD TEST: CPT

## 2017-10-07 RX ADMIN — CLONIDINE HYDROCHLORIDE 0.1 MG: 0.1 TABLET ORAL at 10:30

## 2017-10-07 RX ADMIN — Medication 10 ML: at 23:08

## 2017-10-07 RX ADMIN — Medication 100 MG: at 10:31

## 2017-10-07 RX ADMIN — FAMOTIDINE 20 MG: 20 TABLET ORAL at 23:08

## 2017-10-07 RX ADMIN — LISINOPRIL 20 MG: 20 TABLET ORAL at 10:30

## 2017-10-07 RX ADMIN — Medication 10 ML: at 06:49

## 2017-10-07 RX ADMIN — IPRATROPIUM BROMIDE AND ALBUTEROL SULFATE 3 ML: .5; 3 SOLUTION RESPIRATORY (INHALATION) at 14:52

## 2017-10-07 RX ADMIN — HYDROCODONE BITARTRATE AND ACETAMINOPHEN 2 TABLET: 5; 325 TABLET ORAL at 23:09

## 2017-10-07 RX ADMIN — HYDROCODONE BITARTRATE AND ACETAMINOPHEN 2 TABLET: 5; 325 TABLET ORAL at 15:58

## 2017-10-07 RX ADMIN — NYSTATIN: 100000 OINTMENT TOPICAL at 10:31

## 2017-10-07 RX ADMIN — HYDROCODONE BITARTRATE AND ACETAMINOPHEN 2 TABLET: 5; 325 TABLET ORAL at 04:59

## 2017-10-07 RX ADMIN — FUROSEMIDE 40 MG: 10 INJECTION, SOLUTION INTRAMUSCULAR; INTRAVENOUS at 10:33

## 2017-10-07 RX ADMIN — IPRATROPIUM BROMIDE AND ALBUTEROL SULFATE 3 ML: .5; 3 SOLUTION RESPIRATORY (INHALATION) at 09:10

## 2017-10-07 RX ADMIN — LEVOTHYROXINE SODIUM 100 MCG: 100 TABLET ORAL at 06:49

## 2017-10-07 RX ADMIN — ENOXAPARIN SODIUM 40 MG: 40 INJECTION SUBCUTANEOUS at 23:07

## 2017-10-07 RX ADMIN — FAMOTIDINE 20 MG: 20 TABLET ORAL at 10:31

## 2017-10-07 RX ADMIN — GABAPENTIN 300 MG: 300 CAPSULE ORAL at 10:30

## 2017-10-07 RX ADMIN — METOPROLOL TARTRATE 100 MG: 50 TABLET ORAL at 23:08

## 2017-10-07 RX ADMIN — GABAPENTIN 300 MG: 300 CAPSULE ORAL at 23:08

## 2017-10-07 RX ADMIN — IPRATROPIUM BROMIDE AND ALBUTEROL SULFATE 3 ML: .5; 3 SOLUTION RESPIRATORY (INHALATION) at 01:43

## 2017-10-07 RX ADMIN — CIPROFLOXACIN HYDROCHLORIDE 750 MG: 250 TABLET, FILM COATED ORAL at 15:58

## 2017-10-07 RX ADMIN — NYSTATIN: 100000 OINTMENT TOPICAL at 17:55

## 2017-10-07 RX ADMIN — Medication 100 MG: at 23:09

## 2017-10-07 RX ADMIN — IPRATROPIUM BROMIDE AND ALBUTEROL SULFATE 3 ML: .5; 3 SOLUTION RESPIRATORY (INHALATION) at 19:40

## 2017-10-07 RX ADMIN — AMLODIPINE BESYLATE 10 MG: 10 TABLET ORAL at 10:28

## 2017-10-07 RX ADMIN — HYDROCODONE BITARTRATE AND ACETAMINOPHEN 2 TABLET: 5; 325 TABLET ORAL at 10:28

## 2017-10-07 RX ADMIN — Medication 10 ML: at 13:09

## 2017-10-07 RX ADMIN — METOPROLOL TARTRATE 100 MG: 50 TABLET ORAL at 10:30

## 2017-10-07 RX ADMIN — GABAPENTIN 300 MG: 300 CAPSULE ORAL at 15:58

## 2017-10-07 RX ADMIN — CIPROFLOXACIN HYDROCHLORIDE 750 MG: 250 TABLET, FILM COATED ORAL at 05:00

## 2017-10-07 NOTE — PROGRESS NOTES
Pharmacy Dosing Services: Vancomycin    Indication: HAP    Day of therapy: 14    Other Antimicrobials (Include dose, start day & day of therapy):  Cipro 400 mg IV q 12h-day 1, start 10/2    Loading dose (date given): 1.5g-   Current Maintenance dose: dose per level    Goal Vancomycin Level: 15-20  (Trough 15-20 for most infections, 20 for meningitis/osteomyelitis, pre-HD level ~25)     Level: 23.9 (10/7 @ 1015)    Significant Cultures:   : sputum: GPC, MRSA, Pseudomonas   : urine- NGTD   : blood cx: NGTD      Renal function stable? (unstable defined as SCr increase of 0.5 mg/dL or > 50% increase from baseline, whichever is greater) (Y/N): Y    CAPD, Hemodialysis or Renal Replacement Therapy (Y/N): N     Recent Labs      10/06/17   0430  10/05/17   0400  10/04/17   0400   CREA  1.13  0.90  0.83   BUN  12  10  7   WBC  9.7  7.8  11.0     Temp (24hrs), Av.7 ° F (36.5 ° C), Min:97.1 ° F (36.2 ° C), Max:98.6 ° F (37 ° C)    Creatinine Clearance (Creatinine Clearance (ml/min)): 54.4 ml/min     Regimen assessment: Calculated half life ~48hrs per random level calcualtion  Maintenance dose: 750mg IV q48h starting 10/8 @ 1000   Next scheduled level: 10/10 @ 0900     Pharmacy will follow daily and adjust medications as appropriate for renal function and/or serum levels.     Thank you,  Maria Esther Richards

## 2017-10-07 NOTE — PROGRESS NOTES
0910-Recd pt on 7 lpm Trevor - pt awake & alert - no distress observed  -administer neb  -begin wean of Fio2= 6 lpm    -used Nayana with pt - explained device & had pt use device herself - placed by bedside & encouraged continued use throughout day     1450-Pt sleeping but had removed her nasal cannula  -saturation 88% so placed back on NC but wean to 4 lpm  -no SOB reported when awoke pt  -administer neb

## 2017-10-07 NOTE — PROGRESS NOTES
Problem: Falls - Risk of  Goal: *Absence of Falls  Document Kapil Fall Risk and appropriate interventions in the flowsheet.    Outcome: Progressing Towards Goal  Fall Risk Interventions:  Mobility Interventions: Patient to call before getting OOB     Mentation Interventions: Adequate sleep, hydration, pain control     Medication Interventions: Patient to call before getting OOB     Elimination Interventions: Call light in reach

## 2017-10-07 NOTE — PROGRESS NOTES
Problem: Mobility Impaired (Adult and Pediatric)  Goal: *Acute Goals and Plan of Care (Insert Text)  10/7/2017  PT Goals # 1-4 initiated on 9/29/2017 met and revised below. Goal # 5 initiated on 9/29/2017 not met and continue to be appropriate. Updated Physical Therapy Goals to be accomplished within 7 day(s)  1. Patient will move from supine to sit and sit to supine , scoot up and down and roll side to side in bed with modified independence. 2. Patient will transfer from bed to chair and chair to bed with modified independence using the least restrictive device. 3. Patient will perform sit to stand with modified independence. 4. Patient will ambulate with modified independence for >/= 440 feet with the least restrictive device. 5. Patient will ascend/descend 3 stairs with handrail(s) with moderate assistance. -----Lopez Dodson. Jaime, PT    Physical Therapy Goals  Initiated 9/29/2017 and to be accomplished within 7 day(s)  1. Patient will move from supine to sit and sit to supine , scoot up and down and roll side to side in bed with supervision/set-up. 2. Patient will transfer from bed to chair and chair to bed with minimal assistance/contact guard assist using the least restrictive device. 3. Patient will perform sit to stand with moderate assistance . 4. Patient will ambulate with moderate assistance for 50 feet with the least restrictive device. 5. Patient will ascend/descend 3 stairs with handrail(s) with moderate assistance . PHYSICAL THERAPY RE-EVALUATION AND TREATMENT     Patient: Joy Delong (81 y.o. female)  Date: 10/7/2017  Diagnosis: Acute respiratory failure with hypercapnia ([address] Acute respiratory failure with hypercapnia ([address]       Precautions: Fall (respiration )      ASSESSMENT:  Patient making steady progress, though does require increased time to execute task at hand. Supervision supine to sit. SBA sit to stand. CGA bed <> BSC, stand pivot transfer.   Patient ambulated 120 feet with rolling walker and CGA. SpO2 97% after gait with 6l/min O2 per nasal cannula. Will benefit from continued PT intervention to increase overall functional mobility independence and safety. EDUCATION:  Bed Mobility, Transfers, Gait with rolling walker  Patient's progression toward goals since last assessment: Goal #1-4 initiated on 9/29/2017 met and revised. PLAN:  Goals have been updated based on progression since last assessment. Patient continues to benefit from skilled intervention to address the above impairments. Continue to follow the patient 1 time per day/3-5 days per week to address goals. Planned Interventions:  [X]     Bed Mobility Training          [ ]     Neuromuscular Re-Education  [X]     Transfer Training                [ ]    Orthotic/Prosthetic Training   [X]     Gait Training                       [ ]     Modalities  [ ]     Therapeutic Exercises       [ ]     Edema Management/Control  [ ]     Therapeutic Activities         [X]     Patient and Family Training/Education  [ ]     Other (comment):  Discharge Recommendations: [facility] Medel  Further Equipment Recommendations for Discharge: to be determined       SUBJECTIVE:   Patient stated Can I use the bathroom before we go walking?       OBJECTIVE DATA SUMMARY:      Critical Behavior:  Neurologic State: Alert  Orientation Level: Oriented X4  Cognition: Follows commands  Safety/Judgement: Awareness of environment, Fall prevention  Functional Mobility Training:  Bed Mobility:  Supine to Sit: Supervision  Sit to Supine: Stand-by asssistance  Transfers:  Sit to Stand: Stand-by asssistance  Stand to Sit: Stand-by asssistance     Stand Pivot Transfers: Contact guard assistance (BSC <> Bed)  Balance:  Sitting: Intact  Sitting - Static: Good (unsupported)  Sitting - Dynamic: Good (unsupported)  Standing: Intact; With support  Standing - Static: Good  Standing - Dynamic : Good  Ambulation/Gait Training:  Distance (ft): 120 Feet (ft)  Assistive Device: Walker, rolling  Ambulation - Level of Assistance: Contact guard assistance  Gait Abnormalities: Decreased step clearance  Base of Support: Center of gravity altered  Speed/Doretha: Slow  Step Length: Left shortened;Right shortened     Pain:  Pre treatment pain:  0  Post treatment pain:  0  Pain Scale 1: Numeric (0 - 10)  Pain Intensity 1: 0  Pain Location 1: Back  Pain Orientation 1: Lower  Pain Description 1: Aching  Pain Intervention(s) 1: Medication (see MAR)      Activity Tolerance:   Fair, SpO2 97% after gait with 6l/min O2 per nasal cannula     Please refer to the flowsheet for vital signs taken during this treatment.   After treatment:   [ ]  Patient left in no apparent distress sitting up in chair  [X]  Patient left in no apparent distress in bed  [X]  Call bell left within reach  [X]  Nursing notified  [ ]  Caregiver present  [ ]  Bed alarm activated     Recommendations for nursing:   Written on communication board: May ambulate in hallway with assist of 1  Verbally communicated to: nurse Nicolasa Castellanos, PT   Time Calculation: 40 mins

## 2017-10-07 NOTE — PROGRESS NOTES
Patient is AAA. She is on high sunil and states she wants to be weaned down from 8. She had two incont issues of bowel, but patient was able to ambulate with assistance to bsc to get cleaned up. She complained of pain twice and medication was given with positive results. Patient had minimal residuals from Peg tube feeding and continued to tolerate 60ml/hr. No other issues throughout my shift. Bedside and Verbal shift change report given to Florin (oncoming nurse) by Berto Duncan (offgoing nurse). Report included the following information Kardex, MAR and Med Rec Status.

## 2017-10-07 NOTE — PROGRESS NOTES
Progress Note      Patient: Jonathan Sesay               Sex: female          DOA: 9/24/2017       YOB: 1965      Age:  54 y.o.        LOS:  LOS: 13 days               Subjective:   Pt is now on the floor . she is feeling much better . she is s/p pseudomonas  pneumonia . She is off bipap and is looking much brighter . she is on ciprofloxacin . she is now being mobilized . She has been on hi flow nasal cannula at 6 litres /min        Objective:      Visit Vitals    /71 (BP 1 Location: Right arm, BP Patient Position: Head of bed elevated (Comment degrees))    Pulse 72    Temp 97.7 °F (36.5 °C)    Resp 16    Ht 5' 4\" (1.626 m)    Wt 62.7 kg (138 lb 3.7 oz)    SpO2 (!) 88%    BMI 23.73 kg/m2       Physical Exam:  Pt is awake and is alert . denies sob  Heart reg rte and rhythm   Lungs rhonchi heard bilaterally   Abdomen soft and nontender . peg inplace  Neuro nonfocal        Lab/Data Reviewed:  CMP:   Lab Results   Component Value Date/Time     10/07/2017 10:15 AM    K 4.1 10/07/2017 10:15 AM     10/07/2017 10:15 AM    CO2 30 10/07/2017 10:15 AM    AGAP 5 10/07/2017 10:15 AM     (H) 10/07/2017 10:15 AM    BUN 17 10/07/2017 10:15 AM    CREA 1.17 10/07/2017 10:15 AM    GFRAA 60 (L) 10/07/2017 10:15 AM    GFRNA 49 (L) 10/07/2017 10:15 AM    CA 8.2 (L) 10/07/2017 10:15 AM     CBC:   Lab Results   Component Value Date/Time    WBC 9.4 10/07/2017 10:15 AM    HGB 9.5 (L) 10/07/2017 10:15 AM    HCT 28.8 (L) 10/07/2017 10:15 AM     10/07/2017 10:15 AM           Assessment/Plan     Principal Problem:    Acute respiratory failure with hypercapnia (HCC) (9/24/2017)    Active Problems:    Sepsis ([address] (9/24/2017)      HCAP (healthcare-associated pneumonia) (9/24/2017)      Hypertension (9/24/2017)      Diabetes mellitus ([facility] (9/24/2017)        Plan:continue to slowly wean pt down on the hiflow  nasalo2 and to mobilize the pt  As tolerated .

## 2017-10-07 NOTE — ROUTINE PROCESS
0730- Assumed care. Pt in bed sleeping. No acute distress noted. 1030- Due meds given. Shift assessment completed. No respiratory distress noted. Complaints of back pain 5/10. Norco given. Peg tube residual 225ml. Feeding paused. Pt is alert and oriented x4. Mepilex placed over sacrum. Call bell in reach. 1300- Pt in bed after working with PT. Steady gait. States she \"feels much better after walking\". Residual rechecked. 10 ml. Will continue to monitor. 1500- Reassessment completed. No changes to previous. Call bell in reach. 1600- Norc given for chronic back pain rated 8/10. Tolerated well. 1930-Bedside and Verbal shift change report given to Milana SCHUSTER (oncoming nurse) by Tejas Dale RN   (offgoing nurse). Report included the following information SBAR, Kardex, Procedure Summary, Intake/Output, MAR and Recent Results.

## 2017-10-08 LAB
ALBUMIN SERPL-MCNC: 1.8 G/DL (ref 3.4–5)
ALBUMIN/GLOB SERPL: 0.4 {RATIO} (ref 0.8–1.7)
ALP SERPL-CCNC: 118 U/L (ref 45–117)
ALT SERPL-CCNC: 16 U/L (ref 13–56)
ANION GAP SERPL CALC-SCNC: 7 MMOL/L (ref 3–18)
AST SERPL-CCNC: 27 U/L (ref 15–37)
BASOPHILS # BLD: 0.1 K/UL (ref 0–0.06)
BASOPHILS NFR BLD: 1 % (ref 0–2)
BILIRUB SERPL-MCNC: 0.2 MG/DL (ref 0.2–1)
BUN SERPL-MCNC: 19 MG/DL (ref 7–18)
BUN/CREAT SERPL: 17 (ref 12–20)
CALCIUM SERPL-MCNC: 7.7 MG/DL (ref 8.5–10.1)
CHLORIDE SERPL-SCNC: 104 MMOL/L (ref 100–108)
CO2 SERPL-SCNC: 30 MMOL/L (ref 21–32)
CREAT SERPL-MCNC: 1.15 MG/DL (ref 0.6–1.3)
DIFFERENTIAL METHOD BLD: ABNORMAL
EOSINOPHIL # BLD: 0.6 K/UL (ref 0–0.4)
EOSINOPHIL NFR BLD: 8 % (ref 0–5)
ERYTHROCYTE [DISTWIDTH] IN BLOOD BY AUTOMATED COUNT: 16.1 % (ref 11.6–14.5)
GLOBULIN SER CALC-MCNC: 4.9 G/DL (ref 2–4)
GLUCOSE BLD STRIP.AUTO-MCNC: 111 MG/DL (ref 70–110)
GLUCOSE BLD STRIP.AUTO-MCNC: 114 MG/DL (ref 70–110)
GLUCOSE BLD STRIP.AUTO-MCNC: 119 MG/DL (ref 70–110)
GLUCOSE BLD STRIP.AUTO-MCNC: 119 MG/DL (ref 70–110)
GLUCOSE BLD STRIP.AUTO-MCNC: 137 MG/DL (ref 70–110)
GLUCOSE SERPL-MCNC: 112 MG/DL (ref 74–99)
HCT VFR BLD AUTO: 25.9 % (ref 35–45)
HGB BLD-MCNC: 8.4 G/DL (ref 12–16)
INR PPP: 1 (ref 0.8–1.2)
LYMPHOCYTES # BLD: 1.5 K/UL (ref 0.9–3.6)
LYMPHOCYTES NFR BLD: 22 % (ref 21–52)
MCH RBC QN AUTO: 29.9 PG (ref 24–34)
MCHC RBC AUTO-ENTMCNC: 32.4 G/DL (ref 31–37)
MCV RBC AUTO: 92.2 FL (ref 74–97)
MONOCYTES # BLD: 0.7 K/UL (ref 0.05–1.2)
MONOCYTES NFR BLD: 10 % (ref 3–10)
NEUTS SEG # BLD: 4 K/UL (ref 1.8–8)
NEUTS SEG NFR BLD: 59 % (ref 40–73)
PLATELET # BLD AUTO: 165 K/UL (ref 135–420)
PMV BLD AUTO: 11.7 FL (ref 9.2–11.8)
POTASSIUM SERPL-SCNC: 4.3 MMOL/L (ref 3.5–5.5)
PROT SERPL-MCNC: 6.7 G/DL (ref 6.4–8.2)
PROTHROMBIN TIME: 12.9 SEC (ref 11.5–15.2)
RBC # BLD AUTO: 2.81 M/UL (ref 4.2–5.3)
SODIUM SERPL-SCNC: 141 MMOL/L (ref 136–145)
WBC # BLD AUTO: 6.8 K/UL (ref 4.6–13.2)

## 2017-10-08 PROCEDURE — 85610 PROTHROMBIN TIME: CPT | Performed by: HOSPITALIST

## 2017-10-08 PROCEDURE — 74011250637 HC RX REV CODE- 250/637: Performed by: INTERNAL MEDICINE

## 2017-10-08 PROCEDURE — 77030020262 HC SOL INJ SOD CL 0.9% 100ML

## 2017-10-08 PROCEDURE — 74011250637 HC RX REV CODE- 250/637: Performed by: PHYSICIAN ASSISTANT

## 2017-10-08 PROCEDURE — 94640 AIRWAY INHALATION TREATMENT: CPT

## 2017-10-08 PROCEDURE — 36415 COLL VENOUS BLD VENIPUNCTURE: CPT | Performed by: HOSPITALIST

## 2017-10-08 PROCEDURE — 80053 COMPREHEN METABOLIC PANEL: CPT | Performed by: HOSPITALIST

## 2017-10-08 PROCEDURE — 82962 GLUCOSE BLOOD TEST: CPT

## 2017-10-08 PROCEDURE — 74011250636 HC RX REV CODE- 250/636: Performed by: HOSPITALIST

## 2017-10-08 PROCEDURE — 85025 COMPLETE CBC W/AUTO DIFF WBC: CPT | Performed by: HOSPITALIST

## 2017-10-08 PROCEDURE — 74011250636 HC RX REV CODE- 250/636: Performed by: FAMILY MEDICINE

## 2017-10-08 PROCEDURE — 77010033678 HC OXYGEN DAILY

## 2017-10-08 PROCEDURE — 65660000000 HC RM CCU STEPDOWN

## 2017-10-08 PROCEDURE — 74011250636 HC RX REV CODE- 250/636: Performed by: PHYSICIAN ASSISTANT

## 2017-10-08 PROCEDURE — 77030011256 HC DRSG MEPILEX <16IN NO BORD MOLN -A

## 2017-10-08 PROCEDURE — 94760 N-INVAS EAR/PLS OXIMETRY 1: CPT

## 2017-10-08 PROCEDURE — 77030020847 HC STATLOK BARD -A

## 2017-10-08 PROCEDURE — 74011000250 HC RX REV CODE- 250: Performed by: INTERNAL MEDICINE

## 2017-10-08 RX ADMIN — IPRATROPIUM BROMIDE AND ALBUTEROL SULFATE 3 ML: .5; 3 SOLUTION RESPIRATORY (INHALATION) at 01:48

## 2017-10-08 RX ADMIN — GABAPENTIN 300 MG: 300 CAPSULE ORAL at 21:44

## 2017-10-08 RX ADMIN — CLONIDINE HYDROCHLORIDE 0.1 MG: 0.1 TABLET ORAL at 18:41

## 2017-10-08 RX ADMIN — NYSTATIN: 100000 OINTMENT TOPICAL at 10:06

## 2017-10-08 RX ADMIN — HYDROCODONE BITARTRATE AND ACETAMINOPHEN 2 TABLET: 5; 325 TABLET ORAL at 06:32

## 2017-10-08 RX ADMIN — Medication 100 MG: at 21:44

## 2017-10-08 RX ADMIN — FAMOTIDINE 20 MG: 20 TABLET ORAL at 09:47

## 2017-10-08 RX ADMIN — NYSTATIN: 100000 OINTMENT TOPICAL at 18:44

## 2017-10-08 RX ADMIN — CIPROFLOXACIN HYDROCHLORIDE 750 MG: 250 TABLET, FILM COATED ORAL at 06:32

## 2017-10-08 RX ADMIN — GABAPENTIN 300 MG: 300 CAPSULE ORAL at 18:41

## 2017-10-08 RX ADMIN — HYDROCODONE BITARTRATE AND ACETAMINOPHEN 2 TABLET: 5; 325 TABLET ORAL at 13:29

## 2017-10-08 RX ADMIN — GABAPENTIN 300 MG: 300 CAPSULE ORAL at 09:47

## 2017-10-08 RX ADMIN — LISINOPRIL 20 MG: 20 TABLET ORAL at 09:47

## 2017-10-08 RX ADMIN — Medication 10 ML: at 21:45

## 2017-10-08 RX ADMIN — CIPROFLOXACIN HYDROCHLORIDE 750 MG: 250 TABLET, FILM COATED ORAL at 18:41

## 2017-10-08 RX ADMIN — IPRATROPIUM BROMIDE AND ALBUTEROL SULFATE 3 ML: .5; 3 SOLUTION RESPIRATORY (INHALATION) at 08:21

## 2017-10-08 RX ADMIN — FAMOTIDINE 20 MG: 20 TABLET ORAL at 21:44

## 2017-10-08 RX ADMIN — ENOXAPARIN SODIUM 40 MG: 40 INJECTION SUBCUTANEOUS at 21:45

## 2017-10-08 RX ADMIN — AMLODIPINE BESYLATE 10 MG: 10 TABLET ORAL at 09:47

## 2017-10-08 RX ADMIN — METOPROLOL TARTRATE 100 MG: 50 TABLET ORAL at 21:44

## 2017-10-08 RX ADMIN — FUROSEMIDE 40 MG: 10 INJECTION, SOLUTION INTRAMUSCULAR; INTRAVENOUS at 09:47

## 2017-10-08 RX ADMIN — LEVOTHYROXINE SODIUM 100 MCG: 100 TABLET ORAL at 06:36

## 2017-10-08 RX ADMIN — METOPROLOL TARTRATE 100 MG: 50 TABLET ORAL at 09:47

## 2017-10-08 RX ADMIN — Medication 10 ML: at 06:32

## 2017-10-08 RX ADMIN — HYDROCODONE BITARTRATE AND ACETAMINOPHEN 2 TABLET: 5; 325 TABLET ORAL at 23:28

## 2017-10-08 RX ADMIN — HYDROCODONE BITARTRATE AND ACETAMINOPHEN 2 TABLET: 5; 325 TABLET ORAL at 18:47

## 2017-10-08 RX ADMIN — IPRATROPIUM BROMIDE AND ALBUTEROL SULFATE 3 ML: .5; 3 SOLUTION RESPIRATORY (INHALATION) at 20:20

## 2017-10-08 RX ADMIN — Medication 10 ML: at 13:35

## 2017-10-08 RX ADMIN — FUROSEMIDE 40 MG: 10 INJECTION, SOLUTION INTRAMUSCULAR; INTRAVENOUS at 18:42

## 2017-10-08 RX ADMIN — CLONIDINE HYDROCHLORIDE 0.1 MG: 0.1 TABLET ORAL at 09:47

## 2017-10-08 RX ADMIN — Medication 100 MG: at 09:47

## 2017-10-08 RX ADMIN — SODIUM CHLORIDE 750 MG: 900 INJECTION, SOLUTION INTRAVENOUS at 09:35

## 2017-10-08 NOTE — PROGRESS NOTES
Patient declined CPT via Nayana - stated she wanted to use the mask for her neb treatment - sats 100% on 4lpm, decreased to 2lpm post neb tx

## 2017-10-08 NOTE — ROUTINE PROCESS
1950: Assumed care. Awake. HOB elevated at 45 degrees. Assessment done. On TF via peg. Infusing Osmolite 1.2 narda at 60 ml/hr. Active ROM all extremities. Sensations intact. Denies any pain or discomfort at this time. Call light within reach. 2136: No change from previous assessment. 2307: Medicated for pain per patient request. Will continue to monitor. 0118: No change from previous assessment. 0430: Sleeping.    2667: Slept on & off thru night. Needs attended. Medicated for pain per patient request. Right SC PICC line dressing changed & labeled. Aseptic technique observed. 0800: Bedside and Verbal shift change report given to Van Cody RN (oncoming nurse) by me (offgoing nurse). Report included the following information SBAR, Kardex, Intake/Output, MAR and Recent Results.

## 2017-10-08 NOTE — PROGRESS NOTES
1916  Received bedside verbal shift report. Pt lying in bed resting comfortably. 2lnc in place. Nsr on telemetry box # 18. Right subclavian DL cvl intact. Peg with osmolite 1.2 narda infusing at 60 ml/hr. Call bell within reach, side rails up x 3, bed low and locked. No complaints offered, pt instructed to call for assistance    2300  Notified by telemetry pt with 6 beats vtach, pt resting no complaints offered. 0550  Medicated for pain     0730 Bedside and Verbal shift change report given to 810 Nahomy Keating rn  (oncoming nurse) by Arabella Mariano rn  (offgoing nurse). Report included the following information SBAR, Kardex, Intake/Output, MAR, Recent Results and Cardiac Rhythm nsr.

## 2017-10-08 NOTE — PROGRESS NOTES
0820-Rec'd pt on 2 lpm NC - pt awake & alert  Administer neb    Discussed use of aerobika for CPT - pt indicated has been using device - is by bed within pt reach    1420-Attempt to administer neb - pt currently bathing with CNA - requested I return when completed  1520-Made 2nd attempt on neb - pt sleeping

## 2017-10-08 NOTE — PROGRESS NOTES
Problem: Falls - Risk of  Goal: *Absence of Falls  Document Kapil Fall Risk and appropriate interventions in the flowsheet.    Outcome: Progressing Towards Goal  Fall Risk Interventions:  Mobility Interventions: Bed/chair exit alarm     Mentation Interventions: Door open when patient unattended, Bed/chair exit alarm     Medication Interventions: Bed/chair exit alarm, Patient to call before getting OOB     Elimination Interventions: Call light in reach, Patient to call for help with toileting needs

## 2017-10-08 NOTE — ROUTINE PROCESS
0740 assumed care of pt after bedside verbal report was given by off going nurse, pt resting in bed awake, no acute distress noted, osmolite 1.2 narda infusing via peg tube at 60 ml/hr, will continue to monitor      1223 pt resting in bed quietly, no acute distress noted, will monitor     1656 Bedside and Verbal shift change report given to LANDEN Petersen (oncoming nurse) by LANDEN Chauhan (offgoing nurse). Report included the following information SBAR, Kardex and MAR.

## 2017-10-08 NOTE — PROGRESS NOTES
Problem: Falls - Risk of  Goal: *Absence of Falls  Document Kapil Fall Risk and appropriate interventions in the flowsheet.    Outcome: Progressing Towards Goal  Fall Risk Interventions:  Mobility Interventions: Patient to call before getting OOB     Mentation Interventions: Door open when patient unattended     Medication Interventions: Patient to call before getting OOB, Teach patient to arise slowly     Elimination Interventions: Call light in reach, Patient to call for help with toileting needs

## 2017-10-08 NOTE — ROUTINE PROCESS
1950: Assumed care. Awake. Watching TV. Assessment done. On TF via peg tube infusing Osmolite 1.2 narda  At 60 ml/hr. Denies any pain or discomfort at this time. Call light within reach. 2136: No change from previous assessment. 2309: Due meds given. Medicated for pain per patient request. Will continue to monitor. 0118: No change from previous assessment.

## 2017-10-08 NOTE — PROGRESS NOTES
Progress Note      Patient: Alexey Mehta               Sex: female          DOA: 9/24/2017       YOB: 1965      Age:  54 y.o.        LOS:  LOS: 14 days               Subjective:   Pt is sitting up in bed and is apparently fairly comfortable she is not wheezing . she is on 2 litres nasal o2 and her o2 sats are  % . She is on vancomycin and ciprofloxacin for her pseudomonas infection      Objective:      Visit Vitals    /64    Pulse 75    Temp 97.8 °F (36.6 °C)    Resp 16    Ht 5' 4\" (1.626 m)    Wt 64.5 kg (142 lb 3.2 oz)    SpO2 98%    BMI 24.41 kg/m2       Physical Exam:  Pt is awake and alert   Heart reg rate and rhythm  Lungs good breath sounds heard   Abdomen soft and no masses felt   Neuro nonfocal    Lab/Data Reviewed:  CMP:   Lab Results   Component Value Date/Time     10/08/2017 08:05 AM    K 4.3 10/08/2017 08:05 AM     10/08/2017 08:05 AM    CO2 30 10/08/2017 08:05 AM    AGAP 7 10/08/2017 08:05 AM     (H) 10/08/2017 08:05 AM    BUN 19 (H) 10/08/2017 08:05 AM    CREA 1.15 10/08/2017 08:05 AM    GFRAA >60 10/08/2017 08:05 AM    GFRNA 50 (L) 10/08/2017 08:05 AM    CA 7.7 (L) 10/08/2017 08:05 AM    ALB 1.8 (L) 10/08/2017 08:05 AM    TP 6.7 10/08/2017 08:05 AM    GLOB 4.9 (H) 10/08/2017 08:05 AM    AGRAT 0.4 (L) 10/08/2017 08:05 AM    SGOT 27 10/08/2017 08:05 AM    ALT 16 10/08/2017 08:05 AM     CBC:   Lab Results   Component Value Date/Time    WBC 6.8 10/08/2017 08:05 AM    HGB 8.4 (L) 10/08/2017 08:05 AM    HCT 25.9 (L) 10/08/2017 08:05 AM     10/08/2017 08:05 AM           Assessment/Plan     Principal Problem:    Acute respiratory failure with hypercapnia (HCC) (9/24/2017)    Active Problems:    Sepsis ([facility] (9/24/2017)      HCAP (healthcare-associated pneumonia) (9/24/2017)      Hypertension (9/24/2017)      Diabetes mellitus ([facility] (9/24/2017)        Plan: pt does not use home o2 . She mmay need it on a temporary basis when she is dc'd . Continue the antibiotics as per id the patient is slowly recovering

## 2017-10-09 ENCOUNTER — HOME HEALTH ADMISSION (OUTPATIENT)
Dept: HOME HEALTH SERVICES | Facility: HOME HEALTH | Age: 52
End: 2017-10-09
Payer: MEDICARE

## 2017-10-09 ENCOUNTER — APPOINTMENT (OUTPATIENT)
Dept: GENERAL RADIOLOGY | Age: 52
DRG: 871 | End: 2017-10-09
Attending: HOSPITALIST
Payer: MEDICARE

## 2017-10-09 VITALS
BODY MASS INDEX: 24.24 KG/M2 | HEART RATE: 72 BPM | TEMPERATURE: 97.6 F | SYSTOLIC BLOOD PRESSURE: 107 MMHG | OXYGEN SATURATION: 94 % | RESPIRATION RATE: 20 BRPM | HEIGHT: 64 IN | DIASTOLIC BLOOD PRESSURE: 65 MMHG | WEIGHT: 141.98 LBS

## 2017-10-09 PROBLEM — Z85.819 HISTORY OF THROAT CANCER: Status: ACTIVE | Noted: 2017-10-09

## 2017-10-09 PROBLEM — R06.02 SOB (SHORTNESS OF BREATH): Status: ACTIVE | Noted: 2017-10-09

## 2017-10-09 PROBLEM — R13.10 DYSPHAGIA: Status: ACTIVE | Noted: 2017-10-09

## 2017-10-09 LAB
ALBUMIN SERPL-MCNC: 1.8 G/DL (ref 3.4–5)
ALBUMIN/GLOB SERPL: 0.3 {RATIO} (ref 0.8–1.7)
ALP SERPL-CCNC: 119 U/L (ref 45–117)
ALT SERPL-CCNC: 16 U/L (ref 13–56)
ANION GAP SERPL CALC-SCNC: 7 MMOL/L (ref 3–18)
AST SERPL-CCNC: 29 U/L (ref 15–37)
BASOPHILS # BLD: 0 K/UL (ref 0–0.06)
BASOPHILS NFR BLD: 1 % (ref 0–2)
BILIRUB SERPL-MCNC: 0.2 MG/DL (ref 0.2–1)
BNP SERPL-MCNC: 5558 PG/ML (ref 0–450)
BUN SERPL-MCNC: 22 MG/DL (ref 7–18)
BUN/CREAT SERPL: 18 (ref 12–20)
CALCIUM SERPL-MCNC: 8 MG/DL (ref 8.5–10.1)
CHLORIDE SERPL-SCNC: 102 MMOL/L (ref 100–108)
CO2 SERPL-SCNC: 30 MMOL/L (ref 21–32)
CREAT SERPL-MCNC: 1.2 MG/DL (ref 0.6–1.3)
DIFFERENTIAL METHOD BLD: ABNORMAL
EOSINOPHIL # BLD: 0.6 K/UL (ref 0–0.4)
EOSINOPHIL NFR BLD: 9 % (ref 0–5)
ERYTHROCYTE [DISTWIDTH] IN BLOOD BY AUTOMATED COUNT: 16.2 % (ref 11.6–14.5)
GLOBULIN SER CALC-MCNC: 5.2 G/DL (ref 2–4)
GLUCOSE BLD STRIP.AUTO-MCNC: 113 MG/DL (ref 70–110)
GLUCOSE BLD STRIP.AUTO-MCNC: 131 MG/DL (ref 70–110)
GLUCOSE BLD STRIP.AUTO-MCNC: 96 MG/DL (ref 70–110)
GLUCOSE SERPL-MCNC: 107 MG/DL (ref 74–99)
HCT VFR BLD AUTO: 25 % (ref 35–45)
HGB BLD-MCNC: 8.1 G/DL (ref 12–16)
INR PPP: 1 (ref 0.8–1.2)
LYMPHOCYTES # BLD: 1.4 K/UL (ref 0.9–3.6)
LYMPHOCYTES NFR BLD: 23 % (ref 21–52)
MCH RBC QN AUTO: 29.6 PG (ref 24–34)
MCHC RBC AUTO-ENTMCNC: 32.4 G/DL (ref 31–37)
MCV RBC AUTO: 91.2 FL (ref 74–97)
MONOCYTES # BLD: 0.6 K/UL (ref 0.05–1.2)
MONOCYTES NFR BLD: 10 % (ref 3–10)
NEUTS SEG # BLD: 3.6 K/UL (ref 1.8–8)
NEUTS SEG NFR BLD: 57 % (ref 40–73)
PLATELET # BLD AUTO: 142 K/UL (ref 135–420)
POTASSIUM SERPL-SCNC: 4.6 MMOL/L (ref 3.5–5.5)
PROT SERPL-MCNC: 7 G/DL (ref 6.4–8.2)
PROTHROMBIN TIME: 12.7 SEC (ref 11.5–15.2)
RBC # BLD AUTO: 2.74 M/UL (ref 4.2–5.3)
SODIUM SERPL-SCNC: 139 MMOL/L (ref 136–145)
WBC # BLD AUTO: 6.2 K/UL (ref 4.6–13.2)

## 2017-10-09 PROCEDURE — 82962 GLUCOSE BLOOD TEST: CPT

## 2017-10-09 PROCEDURE — 97110 THERAPEUTIC EXERCISES: CPT

## 2017-10-09 PROCEDURE — 94640 AIRWAY INHALATION TREATMENT: CPT

## 2017-10-09 PROCEDURE — 85025 COMPLETE CBC W/AUTO DIFF WBC: CPT | Performed by: HOSPITALIST

## 2017-10-09 PROCEDURE — 74011250637 HC RX REV CODE- 250/637: Performed by: INTERNAL MEDICINE

## 2017-10-09 PROCEDURE — 71010 XR CHEST PORT: CPT

## 2017-10-09 PROCEDURE — 74011000250 HC RX REV CODE- 250: Performed by: INTERNAL MEDICINE

## 2017-10-09 PROCEDURE — 77010033678 HC OXYGEN DAILY

## 2017-10-09 PROCEDURE — 97535 SELF CARE MNGMENT TRAINING: CPT

## 2017-10-09 PROCEDURE — 36415 COLL VENOUS BLD VENIPUNCTURE: CPT | Performed by: HOSPITALIST

## 2017-10-09 PROCEDURE — 94760 N-INVAS EAR/PLS OXIMETRY 1: CPT

## 2017-10-09 PROCEDURE — 74011250637 HC RX REV CODE- 250/637: Performed by: PHYSICIAN ASSISTANT

## 2017-10-09 PROCEDURE — 74011250636 HC RX REV CODE- 250/636: Performed by: PHYSICIAN ASSISTANT

## 2017-10-09 PROCEDURE — 85610 PROTHROMBIN TIME: CPT | Performed by: HOSPITALIST

## 2017-10-09 PROCEDURE — 83880 ASSAY OF NATRIURETIC PEPTIDE: CPT | Performed by: INTERNAL MEDICINE

## 2017-10-09 PROCEDURE — 80053 COMPREHEN METABOLIC PANEL: CPT | Performed by: HOSPITALIST

## 2017-10-09 RX ORDER — CIPROFLOXACIN 750 MG/1
750 TABLET, FILM COATED ORAL EVERY 12 HOURS
Qty: 50 TAB | Refills: 0 | Status: SHIPPED | OUTPATIENT
Start: 2017-10-09 | End: 2017-10-23

## 2017-10-09 RX ORDER — HYDROCODONE BITARTRATE AND ACETAMINOPHEN 5; 325 MG/1; MG/1
1 TABLET ORAL
Qty: 10 TAB | Refills: 0 | Status: SHIPPED | OUTPATIENT
Start: 2017-10-09 | End: 2017-12-16

## 2017-10-09 RX ORDER — DOXYCYCLINE 100 MG/1
100 TABLET ORAL 2 TIMES DAILY
Qty: 28 TAB | Refills: 0 | Status: SHIPPED | OUTPATIENT
Start: 2017-10-09 | End: 2017-10-23

## 2017-10-09 RX ORDER — FUROSEMIDE 20 MG/1
40 TABLET ORAL DAILY
Qty: 60 TAB | Refills: 0 | Status: SHIPPED | OUTPATIENT
Start: 2017-10-09

## 2017-10-09 RX ORDER — METOPROLOL TARTRATE 100 MG/1
100 TABLET ORAL EVERY 12 HOURS
Qty: 60 TAB | Refills: 0 | Status: SHIPPED | OUTPATIENT
Start: 2017-10-09 | End: 2017-10-23

## 2017-10-09 RX ADMIN — IPRATROPIUM BROMIDE AND ALBUTEROL SULFATE 3 ML: .5; 3 SOLUTION RESPIRATORY (INHALATION) at 01:11

## 2017-10-09 RX ADMIN — HYDROCODONE BITARTRATE AND ACETAMINOPHEN 2 TABLET: 5; 325 TABLET ORAL at 16:42

## 2017-10-09 RX ADMIN — FUROSEMIDE 40 MG: 10 INJECTION, SOLUTION INTRAMUSCULAR; INTRAVENOUS at 08:46

## 2017-10-09 RX ADMIN — LEVOTHYROXINE SODIUM 100 MCG: 100 TABLET ORAL at 05:48

## 2017-10-09 RX ADMIN — AMLODIPINE BESYLATE 10 MG: 10 TABLET ORAL at 08:46

## 2017-10-09 RX ADMIN — Medication 10 ML: at 06:04

## 2017-10-09 RX ADMIN — CIPROFLOXACIN HYDROCHLORIDE 750 MG: 250 TABLET, FILM COATED ORAL at 16:40

## 2017-10-09 RX ADMIN — GABAPENTIN 300 MG: 300 CAPSULE ORAL at 08:46

## 2017-10-09 RX ADMIN — FAMOTIDINE 20 MG: 20 TABLET ORAL at 08:46

## 2017-10-09 RX ADMIN — LISINOPRIL 20 MG: 20 TABLET ORAL at 08:46

## 2017-10-09 RX ADMIN — Medication 100 MG: at 08:46

## 2017-10-09 RX ADMIN — GABAPENTIN 300 MG: 300 CAPSULE ORAL at 16:40

## 2017-10-09 RX ADMIN — CLONIDINE HYDROCHLORIDE 0.1 MG: 0.1 TABLET ORAL at 08:46

## 2017-10-09 RX ADMIN — CIPROFLOXACIN HYDROCHLORIDE 750 MG: 250 TABLET, FILM COATED ORAL at 05:48

## 2017-10-09 RX ADMIN — IPRATROPIUM BROMIDE AND ALBUTEROL SULFATE 3 ML: .5; 3 SOLUTION RESPIRATORY (INHALATION) at 10:05

## 2017-10-09 RX ADMIN — METOPROLOL TARTRATE 100 MG: 50 TABLET ORAL at 08:46

## 2017-10-09 RX ADMIN — HYDROCODONE BITARTRATE AND ACETAMINOPHEN 2 TABLET: 5; 325 TABLET ORAL at 05:49

## 2017-10-09 NOTE — DISCHARGE INSTRUCTIONS
Acute Respiratory Failure improved    Continue current therapy, home oxygen at 2L  DISCHARGE SUMMARY from Nurse    The following personal items are in your possession at time of discharge:    Dental Appliances: None  Visual Aid: None     Home Medications: None  Jewelry: None  Clothing: At bedside  Other Valuables: None             PATIENT INSTRUCTIONS:    After general anesthesia or intravenous sedation, for 24 hours or while taking prescription Narcotics:  · Limit your activities  · Do not drive and operate hazardous machinery  · Do not make important personal or business decisions  · Do  not drink alcoholic beverages  · If you have not urinated within 8 hours after discharge, please contact your surgeon on call. Report the following to your surgeon:  · Excessive pain, swelling, redness or odor of or around the surgical area  · Temperature over 100.5  · Nausea and vomiting lasting longer than 4 hours or if unable to take medications  · Any signs of decreased circulation or nerve impairment to extremity: change in color, persistent  numbness, tingling, coldness or increase pain  · Any questions        What to do at Home:  Recommended activity: Activity as tolerated      *  Please give a list of your current medications to your Primary Care Provider. *  Please update this list whenever your medications are discontinued, doses are      changed, or new medications (including over-the-counter products) are added. *  Please carry medication information at all times in case of emergency situations. These are general instructions for a healthy lifestyle:    No smoking/ No tobacco products/ Avoid exposure to second hand smoke    Surgeon General's Warning:  Quitting smoking now greatly reduces serious risk to your health.     Obesity, smoking, and sedentary lifestyle greatly increases your risk for illness    A healthy diet, regular physical exercise & weight monitoring are important for maintaining a healthy lifestyle    You may be retaining fluid if you have a history of heart failure or if you experience any of the following symptoms:  Weight gain of 3 pounds or more overnight or 5 pounds in a week, increased swelling in our hands or feet or shortness of breath while lying flat in bed. Please call your doctor as soon as you notice any of these symptoms; do not wait until your next office visit. Recognize signs and symptoms of STROKE:    F-face looks uneven    A-arms unable to move or move unevenly    S-speech slurred or non-existent    T-time-call 911 as soon as signs and symptoms begin-DO NOT go       Back to bed or wait to see if you get better-TIME IS BRAIN. Warning Signs of HEART ATTACK     Call 911 if you have these symptoms:   Chest discomfort. Most heart attacks involve discomfort in the center of the chest that lasts more than a few minutes, or that goes away and comes back. It can feel like uncomfortable pressure, squeezing, fullness, or pain.  Discomfort in other areas of the upper body. Symptoms can include pain or discomfort in one or both arms, the back, neck, jaw, or stomach.  Shortness of breath with or without chest discomfort.  Other signs may include breaking out in a cold sweat, nausea, or lightheadedness. Don't wait more than five minutes to call 911 - MINUTES MATTER! Fast action can save your life. Calling 911 is almost always the fastest way to get lifesaving treatment. Emergency Medical Services staff can begin treatment when they arrive -- up to an hour sooner than if someone gets to the hospital by car. The discharge information has been reviewed with the patient. The patient verbalized understanding. Discharge medications reviewed with the patient and appropriate educational materials and side effects teaching were provided.

## 2017-10-09 NOTE — PROGRESS NOTES
Problem: Falls - Risk of  Goal: *Absence of Falls  Document Kapil Fall Risk and appropriate interventions in the flowsheet.    Outcome: Progressing Towards Goal  Fall Risk Interventions:  Mobility Interventions: Patient to call before getting OOB     Mentation Interventions: Adequate sleep, hydration, pain control, Door open when patient unattended, Update white board     Medication Interventions: Patient to call before getting OOB, Teach patient to arise slowly     Elimination Interventions: Call light in reach, Patient to call for help with toileting needs

## 2017-10-09 NOTE — PROGRESS NOTES
Offered the pt FOC for home care, she chose [facility]. Referral sent to intake via [facility] and called to the [address] [place] [address] for f/u. Pt verified the contact information on the face sheet is correct. Delivered the pt a portable POC to the room. Referral and signed delivery tickets  sent to [facility] for processing and home DME delivery.

## 2017-10-09 NOTE — MANAGEMENT PLAN
Discharge Planning    Reviewed chart. Pt went 120 feet with therapy. Spoke with patient regarding d/c plan. She would like to discharge home with home health. Mother will be at home to assist and she has multiple family members that are willing to help. PT completed walk test and pt dipped to 79% on Room Air. Verbal order, read back from Dr Lorena Martin for home o2 and [address].  Pt states family able to transport home        Lizzy Middleton RN BSN  Outcomes Manager  Pager # [phone]

## 2017-10-09 NOTE — PROGRESS NOTES
Rec'd pt on 2 lpm NC  -pt awake & alert - stated plans to go home today  -administer neb - pt has Nayana at bedside & has been instructed on use    1510-Made attempt to administer neb - pt sleeping - pt had advised me earlier in day she would prefer the rest

## 2017-10-09 NOTE — PROGRESS NOTES
[facility]: [phone]  [facility]ICK[facility]: [phone]   [facility]: [phone]    Patient Name: Tanisha Martinez  YOB: 1965    Date of Initial Consult: 10-2-17  Reason for Consult: Care Decisions  Requesting Provider: Maryjo Mendoza MD   Primary Care Physician: Dannie Harris MD      SUMMARY:   Tanisha Martinez is a 54y.o. year old with a past history of throat cancer, HTN, DM, ETOH abuse, DM and CAD, who was admitted on 9/24/2017 from ER/Home with a diagnosis of SOB Current medical issues leading to Palliative Medicine involvement include: patient admitted for aspiration pneumonia with resp failure was intubated initially on pressors, extubated on 9-26->BIPAP, asked to support patient to complete AMD/Goals of care. PALLIATIVE DIAGNOSES:   1. SOB  2. Resp Failure  3. Dysphagia  4. Throat Cancer       PLAN:   1. I met with patient in the am, she was being visited by a friend and told me she would be leaving later today, to come back around 4pm. I stopped by at 3pm, she was sleeping, came back at 4pm, she was up walking around her room gathering her belongings. She was in good spirits, happy to be returning home, she said she does have some pain from a diaper rash but at the moment felt ok. 2. SOB-presently she is on nasal oxygen and is not out of breath. IS still having a cough, but although it sounds wet she is not expectorating. 3. Resp Failure-when admitted she required Intubation, then BIPAP, then high flow ie she had a long slow recovery. Had candid conversation with patient, she does recall her admission and agreed she has had pneumonia due to aspiration more than once and that she was very sick. 4. Dysphagia-she knows that she aspirates and that it is due to her history of throat cancer and the radiation she rec'd to treat it. We talked about not eating/drinking orally to use the PEG instead.  Explained to her that she may still get ASPIRATION PNEUMONIA from aspirating her own saliva and she is aware of how she can lower this risk with good oral hygiene. Unfortunately her mother was not present during our talk, PLUS this same talk is well documented in the chart when she was at [address] and other times. 5. Throat Cancer-per records she is still followed at [facility]. 15 by Dr Giorgi Mccauley. No evidence of recurrent disease. 6. Goals of care and Code status-patient apparently has been seen by Palliative Care at [facility] during her prior admission in 9-2017, she completed a POST there, but she did not have a copy of it and we did an AMD with her on 10-2, she named her mother Lucina Buckley as primary mPOA and her sister Irving Augustin as secondary mPOA. She did spell out that she did not want to be kept alive with machines if she was not going to improve or if she had no ability to interact and was not going to recover. She does voice an understanding that she could have [address] again and she would be willing to have the same aggressive interventions, including intubation, if she were to recover. She said she shared this with her mother, I spoke to her mother later and she agreed she knew what patient wanted and was planning on helping her at home. Patient remains a FULL CODE and by her history will likely return to hospital with recurrent respiratory distress. 7. Initial consult note routed to primary continuity provider  8.  Communicated plan of care with: Palliative IDT, attending, PCP       GOALS OF CARE:     [====Goals of Care====]  GOALS OF CARE:  Patient / health care proxy stated goals: to return home and see my family      TREATMENT PREFERENCES:   Code Status: Full Code    Advance Care Planning:  Advance Care Planning 10/2/2017   Patient's Healthcare Decision Maker is: Named in scanned ACP document   Primary Decision Maker Name Lucina Buckley   Primary Decision Maker Phone Number [phone]   Primary Decision Maker Relationship to Patient Parent   Secondary Decision Maker Name Fall Officer   Secondary Decision Maker Phone Number [phone]   Secondary Decision Maker Relationship to Patient Sibling   Confirm Advance Directive Yes, on file       Other:    The palliative care team has discussed with patient / health care proxy about goals of care / treatment preferences for patient.  [====Goals of Care====]    Advance Care Planning 10/2/2017   Patient's Healthcare Decision Maker is: Named in scanned ACP document   Primary Decision Maker Name Colton Stanton   Primary Decision Maker Phone Number [phone]   Primary Decision Maker Relationship to Patient Parent   Secondary Decision Maker Name Eufemia Officer   Secondary Decision Maker Phone Number [phone]   Secondary Decision Maker Relationship to Patient Sibling   Confirm Advance Directive Yes, on file      mother Colton Stanton as 1st [phone], and her sister Eufemia Officer at [facility]     HISTORY:     History obtained from: Chart  Throat Cancer-Toy Ribeiro at [facility], last contact in 2012  CHIEF COMPLAINT: SOB    HPI/SUBJECTIVE:  98.4, 73, 113/73, 18 on 2L at 96%, WT 141lb, Stool 10-7,   MAR-Nebs, Norvasc, Cipro, Catapress, Lovenox, Lasix, Pepcid, Neurontin 300mg tid, NORCO 5-325 2 tabs prn po C9ys-[id number]'d 8 tabs 10-8, and 10-7, Synthroid, Lopressor, Zestril, Nystatin ointment, Thiamine, Vanco  LABS-WBC 6.2, H&H 8,1 & 25, Plat 142, BUN/Creat 22/1.2, Albumin 1.8, BNP 27933,  TSH 1.54, Sputum grew MRSA and Pseudomonas,   CXR-  The bilateral predominantly upper lobe airspace infiltrates which could  represent inflammatory pneumonia  and superimposed interstitial edema appear  improved from 9/29/2017 but about the same as the CT. 2. Right central line satisfactory position without pneumothorax. 3. Small right pleural effusion.       CT Chest-1.   Slight interval improvement in the patchy bilateral airspace opacities with  overall similar distribution as above suggesting multifocal pneumonia and likely  superimposed interstitial edema. 2.  Small new bilateral pleural effusions and a trace pericardial effusion. 3.  Probable reactive mediastinal adenopathy. ECHO-Left ventricle: Systolic function was normal. Ejection fraction was estimated   in the range of 55 % to 60 %. There were  no regional wall motion abnormalities. Left atrium: The atrium was dilated. Tricuspid valve: There was moderate regurgitation. The patient is:   [x] Verbal and participatory  [] Non-participatory due to:       Clinical Pain Assessment (nonverbal scale for nonverbal patients): Pain: 0  Pain free       FUNCTIONAL ASSESSMENT:     Palliative Performance Scale (PPS):90%          PSYCHOSOCIAL/SPIRITUAL SCREENING:     Advance Care Planning:  Advance Care Planning 10/2/2017   Patient's Healthcare Decision Maker is: Named in scanned ACP document   Primary Decision Maker Name Kiki Tse   Primary Decision Maker Phone Number [phone]   Primary Decision Maker Relationship to Patient Parent   Secondary Decision Maker Name Louise Sosa   Secondary Decision Maker Phone Number [phone]   Secondary Decision Maker Relationship to Patient Sibling   Confirm Advance Directive Yes, on file        Any spiritual / Jew concerns:  [] Yes /  [x] No    Caregiver Burnout:  [] Yes /  [] No /  [x] No Caregiver Present      Anticipatory grief assessment:   [x] Normal  / [] Maladaptive       ESAS Anxiety: Anxiety: 0    ESAS Depression: Depression: 0        REVIEW OF SYSTEMS:     Positive and pertinent negative findings in ROS are noted above in HPI. Is not taking orally, but using her PEG and admits to having frequent stool, which has caused a diaper rash but otherwise she has no pain. The following systems were [x] reviewed / [] unable to be reviewed as noted in HPI  Other findings are noted below.   Systems: constitutional, ears/nose/mouth/throat, respiratory, gastrointestinal, genitourinary, musculoskeletal, integumentary, neurologic, psychiatric, endocrine. Positive findings noted below. Modified ESAS Completed by: provider   Fatigue: 2 Drowsiness: 0   Depression: 0 Pain: 0   Anxiety: 0 Nausea: 0   Anorexia: 0 Dyspnea: 2     Constipation: No     Stool Occurrence(s): 1        PHYSICAL EXAM:     Wt Readings from Last 3 Encounters:   10/09/17 64.4 kg (141 lb 15.6 oz)   06/12/17 71.2 kg (157 lb)   01/05/14 77.6 kg (171 lb)     Blood pressure 107/65, pulse 72, temperature 97.6 °F (36.4 °C), resp. rate 20, height 5' 4\" (1.626 m), weight 64.4 kg (141 lb 15.6 oz), SpO2 94 %.   Pain:  Pain Scale 1: Numeric (0 - 10)  Pain Intensity 1: 8  Pain Onset 1: constant  Pain Location 1: Back, Buttocks  Pain Orientation 1: Lower  Pain Description 1: Aching  Pain Intervention(s) 1: Medication (see MAR)  Last bowel movement: several times daily    Constitutional: alert and oriented, in no distress, walking in room, packing bags  Eyes: pupils equal, anicteric  ENMT: wearing oxygen, speech is nasal   Respiratory: breathing not labored, symmetric  Gastrointestinal: soft non-tender, +bowel sounds  Musculoskeletal: no deformity, no tenderness to palpation  Skin: warm, dry  Neurologic: following commands, moving all extremities  Psychiatric: full affect, no hallucinations  Other:       HISTORY:     Principal Problem:    Acute respiratory failure with hypercapnia (HCC) (9/24/2017)    Active Problems:    Sepsis ([address] (9/24/2017)      HCAP (healthcare-associated pneumonia) (9/24/2017)      Hypertension (9/24/2017)      Diabetes mellitus ([address] (9/24/2017)      Past Medical History:   Diagnosis Date    Anemia     Cancer ([address]     throat    Coronary artery disease     Diabetes ([address]     Diabetes mellitus ([address]     ETOH abuse     HTN (hypertension)     Hypertension     Hypothyroid     Lupus ([address]     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   Social History   Substance Use Topics    Smoking status: Former Smoker    Smokeless tobacco: Never Used    Alcohol use No     Allergies   Allergen Reactions    Amlodipine Swelling     Leg swelling per patient    Morphine Rash      Current Facility-Administered Medications   Medication Dose Route Frequency    vancomycin (VANCOCIN) 750 mg in 0.9% sodium chloride (MBP/ADV) 250 mL ADV  750 mg IntraVENous Q48H    [START ON 10/10/2017] VANCOMYCIN INFORMATION NOTE   Other ONCE    VANCOMYCIN INFORMATION NOTE 1 Each  1 Each Other Rx Dosing/Monitoring    ciprofloxacin HCl (CIPRO) tablet 750 mg  750 mg Oral Q12H    nystatin (MYCOSTATIN) 100,000 unit/gram ointment   Topical BID    furosemide (LASIX) injection 40 mg  40 mg IntraVENous BID    cloNIDine HCl (CATAPRES) tablet 0.1 mg  0.1 mg Per G Tube BID    Thiamine Mononitrate (B-1) tablet 100 mg  100 mg Oral BID    famotidine (PEPCID) tablet 20 mg  20 mg Oral Q12H    HYDROcodone-acetaminophen (NORCO) 5-325 mg per tablet 2 Tab  2 Tab Oral Q4H PRN    gabapentin (NEURONTIN) capsule 300 mg  300 mg Oral TID    amLODIPine (NORVASC) tablet 10 mg  10 mg Oral DAILY    labetalol (NORMODYNE;TRANDATE) injection 20 mg  20 mg IntraVENous Q4H PRN    metoprolol tartrate (LOPRESSOR) tablet 100 mg  100 mg Oral Q12H    lisinopril (PRINIVIL, ZESTRIL) tablet 20 mg  20 mg Oral DAILY    enoxaparin (LOVENOX) injection 40 mg  40 mg SubCUTAneous Q24H    haloperidol lactate (HALDOL) injection 4 mg  4 mg IntraVENous Q8H PRN    albuterol-ipratropium (DUO-NEB) 2.5 MG-0.5 MG/3 ML  3 mL Nebulization Q6H RT    0.9% sodium chloride infusion 250 mL  250 mL IntraVENous PRN    levothyroxine (SYNTHROID) tablet 100 mcg  100 mcg Oral Daily    insulin lispro (HUMALOG) injection   SubCUTAneous Q6H    glucose chewable tablet 16 g  4 Tab Oral PRN    glucagon (GLUCAGEN) injection 1 mg  1 mg IntraMUSCular PRN    dextrose (D50W) injection syrg 12.5-25 g  25-50 mL IntraVENous PRN    sodium chloride (NS) flush 5-10 mL  5-10 mL IntraVENous PRN    sodium chloride (NS) flush 5-10 mL  5-10 mL IntraVENous Q8H    sodium chloride (NS) flush 5-10 mL  5-10 mL IntraVENous PRN    ondansetron (ZOFRAN) injection 4 mg  4 mg IntraVENous Q6H PRN    acetaminophen (TYLENOL) solution 650 mg  650 mg Per G Tube Q4H PRN        LAB AND IMAGING FINDINGS:     Lab Results   Component Value Date/Time    WBC 6.2 10/09/2017 04:50 AM    HGB 8.1 10/09/2017 04:50 AM    PLATELET 660 35/62/6841 04:50 AM     Lab Results   Component Value Date/Time    Sodium 139 10/09/2017 04:50 AM    Potassium 4.6 10/09/2017 04:50 AM    Chloride 102 10/09/2017 04:50 AM    CO2 30 10/09/2017 04:50 AM    BUN 22 10/09/2017 04:50 AM    Creatinine 1.20 10/09/2017 04:50 AM    Calcium 8.0 10/09/2017 04:50 AM    Magnesium 1.3 10/06/2017 04:30 AM    Phosphorus 5.1 10/06/2017 04:30 AM      Lab Results   Component Value Date/Time    AST (SGOT) 29 10/09/2017 04:50 AM    Alk.  phosphatase 119 10/09/2017 04:50 AM    Protein, total 7.0 10/09/2017 04:50 AM    Albumin 1.8 10/09/2017 04:50 AM    Globulin 5.2 10/09/2017 04:50 AM     Lab Results   Component Value Date/Time    INR 1.0 10/09/2017 04:50 AM    Prothrombin time 12.7 10/09/2017 04:50 AM    aPTT 67.1 01/29/2013 07:30 PM      No results found for: IRON, FE, TIBC, IBCT, PSAT, FERR   No results found for: PH, PCO2, PO2  No components found for: GLPOC   No results found for: CPK, CKMB           Total time: 100  Counseling / coordination time, spent as noted above: 60  > 50% counseling / coordination?: yes with patient and mother    Prolonged service was provided for  [x]30 min   []75 min in face to face time in the presence of the patient, spent as noted above. Time Start: 4pmTime End: 5pm  Note: this can only be billed with  (initial) or 21 [phone] (follow up). If multiple start / stop times, list each separately.

## 2017-10-09 NOTE — PROGRESS NOTES
Pt seen for walk test with OT. Pt desats to 82% on RA, seated EOB. Test stopped, reapplied O2 2L nc. Pt tolerates standing sinkside ~ 7 minutes, desats 79% w/2L O2 nc. Pt requires ~ 2 minutes to recover to 91% w/PLB.     KOREY Otero/BENSON

## 2017-10-09 NOTE — PROGRESS NOTES
Problem: Self Care Deficits Care Plan (Adult)  Goal: *Acute Goals and Plan of Care (Insert Text)  Occupational Therapy Goals  Initiated 10/2/2017 within 3 day(s). Pt is on bipap, will implement 3 day trial to determine ability to participate in skilled therapy. Continue per POC if appropriate. 1. Patient will perform grooming tasks with supervision. 2. Patient will perform bed mobility with supervision and verbal cues for pacing. 3. Patient will perform functional task at EOB for 8 minutes with supervision for balance to increase activity tolerance for ADLs. 4. Patient will perform all aspects of toileting with minimal assistance. 5. Patient will participate in upper extremity therapeutic exercise/activities with supervision/set-up for 8 minutes to increase BUE strength for ADLs. 6. Patient will utilize energy conservation techniques during functional activities with minimal verbal cues. 7. Patient will perform toilet transfer with moderate assistance. Outcome: Progressing Towards Goal  OCCUPATIONAL THERAPY TREATMENT     Patient: Suman Hernandez (13 y.o. female)  Date: 10/9/2017  Diagnosis: Acute respiratory failure with hypercapnia ([address] Acute respiratory failure with hypercapnia (HCC)       Precautions: Fall (respiration )  Chart, occupational therapy assessment, plan of care, and goals were reviewed. ASSESSMENT:  Pt now out of ICU, on telemetry unit. Requested to perfromed walk test w/pt. Pt desats to 82% on RA seated EOB ~ 1 minute. Reapplied O2 to complete therapy session. Pt demonstrates good safety and balance w/functional mobility to bathroom and tolerates standing ~ 8 minutes performing ADL grooming tasks. Pt c/o fatigue and returns to EOB. Pt requires ~ 2 minute seated rest break for O2 levels to return to 91%. Pt requires min vc's for PLB technique. Pt demonstrates energy conservation techniques w/LB dressing ADLs, donning/doffing socks and performing footcare.  Pt motivated for discharge home. EDUCATION Pt educated on energy conservation techniques and importance of diabetic foot care. Progression toward goals:  [X]          Improving appropriately and progressing toward goals  [ ]          Improving slowly and progressing toward goals  [ ]          Not making progress toward goals and plan of care will be adjusted       PLAN:  Patient continues to benefit from skilled intervention to address the above impairments. Continue treatment per established plan of care. Discharge Recommendations:  Home Health  Further Equipment Recommendations for Discharge:  shower chair       SUBJECTIVE:   Patient stated Ok, I'm tired now.       OBJECTIVE DATA SUMMARY:         Cognitive/Behavioral Status:  Neurologic State: Alert  Orientation Level: Oriented X4  Cognition: Follows commands, Appropriate for age attention/concentration  Safety/Judgement: Awareness of environment, Fall prevention  Functional Mobility and Transfers for ADLs:              Bed Mobility:  Supine to Sit: Modified independent  Sit to Supine: Modified independent              Transfers:  Sit to Stand: Independent              Bathroom Mobility: Modified independent              Balance:  Sitting: Intact  Sitting - Static: Good (unsupported)  Sitting - Dynamic: Good (unsupported)  Standing: Intact; With support  Standing - Static: Good  Standing - Dynamic : Good  ADL Intervention:  Grooming  Washing Face: Modified independent  Washing Hands: Moderate assistance  Brushing Teeth: Modified independent  Applying Makeup: Independent (tae lotion to BLEs, long sitting )     Lower Body Dressing Assistance  Socks: Modified independent  Leg Crossed Method Used: Yes  Position Performed: Long sitting on bed  Cues: Don;Doff     Pain:  Pre Treatment:0  Post Treatment:0     Activity Tolerance:    Fair, pt fatigues easily     Please refer to the flowsheet for vital signs taken during this treatment.   After treatment:   [ ]  Patient left in no apparent distress sitting up in chair  [X]  Patient left in no apparent distress in bed  [X]  Call bell left within reach  [ ]  Nursing notified  [ ]  Caregiver present  [ ]  Bed alarm activated     KOREY Medrano  Time Calculation: 38 mins

## 2017-10-09 NOTE — PROGRESS NOTES
Patient remained stable throughout the shift with no changes in status. Discharged instructions given by Uziel Brandon RN. Patient is educated regarding the oxygen machine and how to use. All questions are answered prior to discharge. Tunneled central line is also newly dressed and xray performed due to it being pulled slightly and reads that it remains in a correct position.

## 2017-10-09 NOTE — PROGRESS NOTES
Infectious Disease Follow-up    Admit Date: 9/24/2017    Current abx Prior abx   Ciprofloxacin 10/2-7 Doxycycline 10/9 - 0 Levofloxacin 9/24 - 4  Zosyn 9/24 - 6 Tobramycin 9/30 - 2 Vancomycin 9/24-15        ASSESSMENT - > REC:     Recurrent Pneumonia  - MRSA, Pseudomonas in spcx 9/24  - same organisms in spcx last confinement [facility]) 8/11-18  - treated with Vancomycin/zosyn x 14 days (ending ~8/25)  - bilateral multifocal opacities - sl improved on CT 10/1  - CXR 10/3 improving infiltrates c/w 9/29 -> dc Vancomycin (15 days given)   -> start po Doxycycline 100 mg bid x 2 weeks  -> continue Cipro to po 750 bid aiming for 4 weeks rx if tolerated (relapsed after 2 weeks IV rx)  - > no objection to discharge   Respiratory Failure  - intubated 9/24, extubated 9/25  - BNP 26k 10/4-  Improved after lasix -> HFNC etc per PCCM   H/o throat CA     ETOH abuse    DM    HTN    CAD    GERD    PEG    DELFINA      MICROBIOLOGY:   9/24 blcx NG x 2    urcx NG   spcx Ps aeruginosa sens aminoglycosides, cipro res pip-tazo, other beta lactams, MRSA R levaquin vanco rios 1 few CF  9/26  C diff neg     LINES AND CATHETERS:   Tunneled PICC R ? IJ    Subjective: Interval notes reviewed. Sleeping soundly. Easily awakened. Being discharged today .       Current Facility-Administered Medications   Medication Dose Route Frequency    vancomycin (VANCOCIN) 750 mg in 0.9% sodium chloride (MBP/ADV) 250 mL ADV  750 mg IntraVENous Q48H    [START ON 10/10/2017] VANCOMYCIN INFORMATION NOTE   Other ONCE    VANCOMYCIN INFORMATION NOTE 1 Each  1 Each Other Rx Dosing/Monitoring    ciprofloxacin HCl (CIPRO) tablet 750 mg  750 mg Oral Q12H    nystatin (MYCOSTATIN) 100,000 unit/gram ointment   Topical BID    furosemide (LASIX) injection 40 mg  40 mg IntraVENous BID    cloNIDine HCl (CATAPRES) tablet 0.1 mg  0.1 mg Per G Tube BID    Thiamine Mononitrate (B-1) tablet 100 mg  100 mg Oral BID    famotidine (PEPCID) tablet 20 mg  20 mg Oral Q12H  HYDROcodone-acetaminophen (NORCO) 5-325 mg per tablet 2 Tab  2 Tab Oral Q4H PRN    gabapentin (NEURONTIN) capsule 300 mg  300 mg Oral TID    amLODIPine (NORVASC) tablet 10 mg  10 mg Oral DAILY    labetalol (NORMODYNE;TRANDATE) injection 20 mg  20 mg IntraVENous Q4H PRN    metoprolol tartrate (LOPRESSOR) tablet 100 mg  100 mg Oral Q12H    lisinopril (PRINIVIL, ZESTRIL) tablet 20 mg  20 mg Oral DAILY    enoxaparin (LOVENOX) injection 40 mg  40 mg SubCUTAneous Q24H    haloperidol lactate (HALDOL) injection 4 mg  4 mg IntraVENous Q8H PRN    albuterol-ipratropium (DUO-NEB) 2.5 MG-0.5 MG/3 ML  3 mL Nebulization Q6H RT    0.9% sodium chloride infusion 250 mL  250 mL IntraVENous PRN    levothyroxine (SYNTHROID) tablet 100 mcg  100 mcg Oral Daily    insulin lispro (HUMALOG) injection   SubCUTAneous Q6H    glucose chewable tablet 16 g  4 Tab Oral PRN    glucagon (GLUCAGEN) injection 1 mg  1 mg IntraMUSCular PRN    dextrose (D50W) injection syrg 12.5-25 g  25-50 mL IntraVENous PRN    sodium chloride (NS) flush 5-10 mL  5-10 mL IntraVENous PRN    sodium chloride (NS) flush 5-10 mL  5-10 mL IntraVENous Q8H    sodium chloride (NS) flush 5-10 mL  5-10 mL IntraVENous PRN    ondansetron (ZOFRAN) injection 4 mg  4 mg IntraVENous Q6H PRN    acetaminophen (TYLENOL) solution 650 mg  650 mg Per G Tube Q4H PRN       Objective:     Visit Vitals    /65    Pulse 72    Temp 97.6 °F (36.4 °C)    Resp 20    Ht 5' 4\" (1.626 m)    Wt 64.4 kg (141 lb 15.6 oz)    SpO2 94%    BMI 24.37 kg/m2       Temp (24hrs), Av.2 °F (36.8 °C), Min:97.6 °F (36.4 °C), Max:98.6 °F (37 °C)    GEN: wdwn BF appears older than stated age in ICU,   HEENT: HFNC in place, anicteric  NECK: R picc in place  CHEST: fair air movement B crackles diffusely, no wheezes   CVS: S1'S2' no murmurs  ABD: soft (+) BS non-tender PEG  EXT: no edema   NEURO: awake alert appropriate.      Labs: Results:   Chemistry Recent Labs      10/09/17   7236 10/08/17   0805  10/07/17   1015   GLU  107*  112*  119*   NA  139  141  137   K  4.6  4.3  4.1   CL  102  104  102   CO2  30  30  30   BUN  22*  19*  17   CREA  1.20  1.15  1.17   CA  8.0*  7.7*  8.2*   AGAP  7  7  5   BUCR  18  17  15   AP  119*  118*   --    TP  7.0  6.7   --    ALB  1.8*  1.8*   --    GLOB  5.2*  4.9*   --    AGRAT  0.3*  0.4*   --       CBC w/Diff Recent Labs      10/09/17   0450  10/08/17   0805  10/07/17   1015   WBC  6.2  6.8  9.4   RBC  2.74*  2.81*  3.18*   HGB  8.1*  8.4*  9.5*   HCT  25.0*  25.9*  28.8*   PLT  142  165  178   GRANS  57  59  63   LYMPH  23  22  21   EOS  9*  8*  8*        cxr 10/3 Impression:        1. The bilateral predominantly upper lobe airspace infiltrates which could  represent inflammatory pneumonia  and superimposed interstitial edema appear  improved from 9/29/2017 but about the same as the CT. 2. Right central line satisfactory position without pneumothorax. 3. Small right pleural effusion.     Oskar Greenberg MD  October 9, 2017  [facility] AT THE [facility]  [phone]

## 2017-10-09 NOTE — ROUTINE PROCESS
Assisting patient with discharge home with [facility]. Reviewed discharge instructions, follow-up appointments, prescriptions and next doses due with patient. Medicated for pain. Patient able to give her crushed meds via g-tube. Has home oxygen tank in room and able to use at 2L NC. Questions answered and phone number given for any follow- up questions once home. Patient waiting for her brother to pick her up.

## 2017-10-09 NOTE — PROGRESS NOTES
Attempted twice to see patient for treatment first time patient requested to hold due to line being  Removed  And second time patient completing  Consult with Dr. Summer Reaves care doctor .

## 2017-10-09 NOTE — PROGRESS NOTES
Care Management Interventions  PCP Verified by CM: No  Palliative Care Criteria Met (RRAT>21 & CHF Dx)?: No  Reason Palliative Care Not Recommended?: Other (see comment) (not ordered)  Mode of Transport at Discharge: Other (see comment) (unsure)  Transition of Care Consult (CM Consult): [address]: Yes  MyChart Signup: No  Discharge Durable Medical Equipment: Yes (91)  Physical Therapy Consult: No  Occupational Therapy Consult: No  Speech Therapy Consult: No  Current Support Network:  Other (not sure)  Confirm Follow Up Transport: Other (see comment) (not sure)  Plan discussed with Pt/Family/Caregiver: Yes  Freedom of Choice Offered: Yes  Discharge Location  Discharge Placement: Home with home health

## 2017-10-09 NOTE — PROGRESS NOTES
[facility]  ICU FU  10/9/2017      Events/ Subj: Asleep, no distress. I tried gently to awaken her but decided against when she did not immediately respond; on NC O2     10/5: (last time I saw):  - BP now controlled. - HiFlow reduced to 30 L/min FIO2 30%  - Afebrile.      - Pt reports back pain, but denies any other complaints- notes significant improvement in her breathing today  - Pt experienced urinary retention and was straight cathed several times yesterday, total UOP 2.3 L 24 hrs with the addition of laisx.  - Pt now urinating with no issues, mckeon did not have to be reinserted       Vital Signs:    Visit Vitals    /65    Pulse 72    Temp 97.6 °F (36.4 °C)    Resp 20    Ht 5' 4\" (1.626 m)    Wt 64.4 kg (141 lb 15.6 oz)    SpO2 94%    BMI 24.37 kg/m2       O2 Device: Nasal cannula   O2 Flow Rate (L/min): 2 l/min   Temp (24hrs), Av.2 °F (36.8 °C), Min:97.6 °F (36.4 °C), Max:98.6 °F (37 °C)       Intake/Output:   Last shift:      10/09 0701 - 10/09 1900  In: 560   Out: -   Last 3 shifts: 10/07 1901 - 10/09 0700  In: 1940   Out: -     Intake/Output Summary (Last 24 hours) at 10/09/17 1755  Last data filed at 10/09/17 1400   Gross per 24 hour   Intake             1520 ml   Output                0 ml   Net             1520 ml     Physical Exam:   Gen/Neuro: Asleep as above; no obvious use [facility]  HEENT:  Neck:   Chest:   Lungs: +mid insp to EI crackles dep laterally bilat   + mild possible abd paradox  Heart:   Abdomen: non-distended, soft; PEG infusing EN  Extremity: Symm/ No edema or cyanosis    Skin: no diffuse rash                  Devices:  new CVC apparently today enters skin under the right clavicle (tunneled RIJ)     DATA:     Current Facility-Administered Medications   Medication Dose Route Frequency    vancomycin (VANCOCIN) 750 mg in 0.9% sodium chloride (MBP/ADV) 250 mL ADV  750 mg IntraVENous Q48H    [START ON 10/10/2017] VANCOMYCIN INFORMATION NOTE   Other ONCE    VANCOMYCIN INFORMATION NOTE 1 Each  1 Each Other Rx Dosing/Monitoring    ciprofloxacin HCl (CIPRO) tablet 750 mg  750 mg Oral Q12H    nystatin (MYCOSTATIN) 100,000 unit/gram ointment   Topical BID    furosemide (LASIX) injection 40 mg  40 mg IntraVENous BID    cloNIDine HCl (CATAPRES) tablet 0.1 mg  0.1 mg Per G Tube BID    Thiamine Mononitrate (B-1) tablet 100 mg  100 mg Oral BID    famotidine (PEPCID) tablet 20 mg  20 mg Oral Q12H    HYDROcodone-acetaminophen (NORCO) 5-325 mg per tablet 2 Tab  2 Tab Oral Q4H PRN    gabapentin (NEURONTIN) capsule 300 mg  300 mg Oral TID    amLODIPine (NORVASC) tablet 10 mg  10 mg Oral DAILY    labetalol (NORMODYNE;TRANDATE) injection 20 mg  20 mg IntraVENous Q4H PRN    metoprolol tartrate (LOPRESSOR) tablet 100 mg  100 mg Oral Q12H    lisinopril (PRINIVIL, ZESTRIL) tablet 20 mg  20 mg Oral DAILY    enoxaparin (LOVENOX) injection 40 mg  40 mg SubCUTAneous Q24H    haloperidol lactate (HALDOL) injection 4 mg  4 mg IntraVENous Q8H PRN    albuterol-ipratropium (DUO-NEB) 2.5 MG-0.5 MG/3 ML  3 mL Nebulization Q6H RT    0.9% sodium chloride infusion 250 mL  250 mL IntraVENous PRN    levothyroxine (SYNTHROID) tablet 100 mcg  100 mcg Oral Daily    insulin lispro (HUMALOG) injection   SubCUTAneous Q6H    glucose chewable tablet 16 g  4 Tab Oral PRN    glucagon (GLUCAGEN) injection 1 mg  1 mg IntraMUSCular PRN    dextrose (D50W) injection syrg 12.5-25 g  25-50 mL IntraVENous PRN    sodium chloride (NS) flush 5-10 mL  5-10 mL IntraVENous PRN    sodium chloride (NS) flush 5-10 mL  5-10 mL IntraVENous Q8H    sodium chloride (NS) flush 5-10 mL  5-10 mL IntraVENous PRN    ondansetron (ZOFRAN) injection 4 mg  4 mg IntraVENous Q6H PRN    acetaminophen (TYLENOL) solution 650 mg  650 mg Per G Tube Q4H PRN     Labs: Results:       Chemistry Recent Labs      10/09/17   0450  10/08/17   0805  10/07/17   1015   GLU  107*  112*  119*   NA  139  141  137   K  4.6  4.3  4.1   CL  102  104  102   CO2  30 30  30   BUN  22*  19*  17   CREA  1.20  1.15  1.17   CA  8.0*  7.7*  8.2*   AGAP  7  7  5   BUCR  18  17  15   AP  119*  118*   --    TP  7.0  6.7   --    ALB  1.8*  1.8*   --    GLOB  5.2*  4.9*   --    AGRAT  0.3*  0.4*   --       CBC w/Diff Recent Labs      10/09/17   0450  10/08/17   0805  10/07/17   1015   WBC  6.2  6.8  9.4   RBC  2.74*  2.81*  3.18*   HGB  8.1*  8.4*  9.5*   HCT  25.0*  25.9*  28.8*   PLT  142  165  178   GRANS  57  59  63   LYMPH  23  22  21   EOS  9*  8*  8*      Coagulation Recent Labs      10/09/17   0450  10/08/17   0805   PTP  12.7  12.9   INR  1.0  1.0       Liver Enzymes Recent Labs      10/09/17   0450   TP  7.0   ALB  1.8*   AP  119*   SGOT  29      ABG No results found for: PH, PHI, PCO2, PCO2I, PO2, PO2I, HCO3, HCO3I, FIO2, FIO2I   Microbiology No results for input(s): CULT in the last 72 hours. Telemetry: [x]Sinus []A-flutter []Paced    []A-fib []Multiple PVCs                  Imaging:  CT Chest 10/01/17: 1. Slight interval improvement in the patchy bilateral airspace opacities with  overall similar distribution as above suggesting multifocal pneumonia and likely  superimposed interstitial edema. 2.  Small new bilateral pleural effusions and a trace pericardial effusion. 3.  Probable reactive mediastinal adenopathy. CXR today 10/9:  Chest, single view   Indication: Central line placement.   Comparison: Several prior exams, most recently 10/3/2017   Findings:  Portable upright AP view of the chest was obtained. There is a right  IJ approach central venous catheter with tip projecting over the superior  cavoatrial junction. Degree of pulmonary inflation is similar to prior, with  patchy upper lobe predominant airspace opacities unchanged. No pneumothorax or  pleural effusion. Cardiac size and mediastinal contours are stable.  No acute osseous abnormality.   Impression:   1. Right IJ approach central venous catheter with tip projecting over the superior cavoatrial junction. 2. Overall similar appearance to bilateral, upper lobe predominant airspace opacities in keeping with underlying pneumonia             CXR 10/03/17: 1. The bilateral predominantly upper lobe airspace infiltrates which could  represent inflammatory pneumonia  and superimposed interstitial edema appear  improved from 9/29/2017 but about the same as the CT. 2. Right central line satisfactory position without pneumothorax. 3. Small right pleural effusion. IMPRESSION/ PLANS/ REC   # Acute Hypercapneic and Hypoxic Respiratory Failure    * felt to have MRSA & pseudomonas pneumonia w/ septic shock    * extubated 09/25    * pulm edema / acute exacerbation DD-CHF    * now on 2L nc    * COPD? # KEATON - resolved    # ID   * managing abx for pneumonia    # Throat cancer     * PEG prior to admission    * difficult airway due to upper airway obstruction from cancer, call anesthesia for assistance if necessary to re-intubate (size 6.5 ETT used initally). # GI/ Nutrition   * DM2 mgmnt per protocol    * EN in PEG   * pepcid    # Coag/ Heme    * enox 40    # Other   * remote hx of alcohol and tobacco abuse   * chronic back pain on Norco, gabapentin    SUMMARY/ DISPOSITION:  Although she came in with pneumonia/ sepsis I think her ongoing HF is still a significant issue, although everything seems to be slowly imroving.     * Check BNP (was >25,000 despite resolved KEATON)              Tresa Marquez MD  Pager [phone]

## 2017-10-09 NOTE — PROGRESS NOTES
Problem: Self Care Deficits Care Plan (Adult)  Goal: *Acute Goals and Plan of Care (Insert Text)  Occupational Therapy Goals  Initiated 10/2/2017 within 3 day(s). Pt is on bipap, will implement 3 day trial to determine ability to participate in skilled therapy. Continue per POC if appropriate. 1. Patient will perform grooming tasks with supervision. 2. Patient will perform bed mobility with supervision and verbal cues for pacing. 3. Patient will perform functional task at EOB for 8 minutes with supervision for balance to increase activity tolerance for ADLs. 4. Patient will perform all aspects of toileting with minimal assistance. 5. Patient will participate in upper extremity therapeutic exercise/activities with supervision/set-up for 8 minutes to increase BUE strength for ADLs. 6. Patient will utilize energy conservation techniques during functional activities with minimal verbal cues. 7. Patient will perform toilet transfer with moderate assistance. Outcome: Progressing Towards Goal  OCCUPATIONAL THERAPY TREATMENT     Patient: Manoj Farrell (30 y.o. female)  Date: 10/9/2017  Diagnosis: Acute respiratory failure with hypercapnia ([address] Acute respiratory failure with hypercapnia (HCC)       Precautions: Fall (respiration )  Chart, occupational therapy assessment, plan of care, and goals were reviewed. ASSESSMENT:  Pt seen at bed level for UE TherEx w/yellow theraband. Issued yellow theraband and HEP. Pt requires rest breaks between sets. EDUCATION Pt educated on importance of UE TherEx  Progression toward goals:  [X]          Improving appropriately and progressing toward goals  [ ]          Improving slowly and progressing toward goals  [ ]          Not making progress toward goals and plan of care will be adjusted       PLAN:  Patient continues to benefit from skilled intervention to address the above impairments. Continue treatment per established plan of care.   Discharge Recommendations: Home Health  Further Equipment Recommendations for Discharge:  shower chair       SUBJECTIVE:   Patient stated You can't get any sleep in here.       OBJECTIVE DATA SUMMARY:         Cognitive/Behavioral Status:  Neurologic State: Alert  Orientation Level: Oriented X4  Cognition: Follows commands, Appropriate for age attention/concentration  Safety/Judgement: Awareness of environment, Fall prevention     Therapeutic Exercises:   BUE TherEx w/yellow theraband in multiple planes, 4x5, at bed level     Pain:  Pre Treatment:0  Post Treatment:0     Activity Tolerance:    Fair     Please refer to the flowsheet for vital signs taken during this treatment.   After treatment:   [ ]  Patient left in no apparent distress sitting up in chair  [X]  Patient left in no apparent distress in bed  [X]  Call bell left within reach  [ ]  Nursing notified  [ ]  Caregiver present  [ ]  Bed alarm activated     KOREY Medrano  Time Calculation: 12 mins

## 2017-10-09 NOTE — ANCILLARY DISCHARGE INSTRUCTIONS
Patient and/or next of kin has been given the [facility] Important Message From Medicare About Your Rights\" letter and all questions were answered.

## 2017-10-11 ENCOUNTER — HOME CARE VISIT (OUTPATIENT)
Dept: SCHEDULING | Facility: HOME HEALTH | Age: 52
End: 2017-10-11
Payer: MEDICARE

## 2017-10-11 ENCOUNTER — HOME CARE VISIT (OUTPATIENT)
Dept: HOME HEALTH SERVICES | Facility: HOME HEALTH | Age: 52
End: 2017-10-11
Payer: MEDICARE

## 2017-10-11 ENCOUNTER — APPOINTMENT (OUTPATIENT)
Dept: GENERAL RADIOLOGY | Age: 52
DRG: 177 | End: 2017-10-11
Attending: EMERGENCY MEDICINE
Payer: MEDICARE

## 2017-10-11 ENCOUNTER — HOSPITAL ENCOUNTER (INPATIENT)
Age: 52
LOS: 12 days | Discharge: HOME HEALTH | DRG: 177 | End: 2017-10-23
Attending: EMERGENCY MEDICINE | Admitting: FAMILY MEDICINE
Payer: MEDICARE

## 2017-10-11 VITALS — SYSTOLIC BLOOD PRESSURE: 88 MMHG | DIASTOLIC BLOOD PRESSURE: 44 MMHG | OXYGEN SATURATION: 91 % | HEART RATE: 71 BPM

## 2017-10-11 VITALS
BODY MASS INDEX: 24.41 KG/M2 | SYSTOLIC BLOOD PRESSURE: 128 MMHG | HEIGHT: 64 IN | DIASTOLIC BLOOD PRESSURE: 60 MMHG | HEART RATE: 69 BPM | WEIGHT: 143 LBS | TEMPERATURE: 97.6 F | OXYGEN SATURATION: 93 %

## 2017-10-11 DIAGNOSIS — J96.21 ACUTE ON CHRONIC RESPIRATORY FAILURE WITH HYPOXIA (HCC): Primary | ICD-10-CM

## 2017-10-11 DIAGNOSIS — J18.9 HCAP (HEALTHCARE-ASSOCIATED PNEUMONIA): ICD-10-CM

## 2017-10-11 DIAGNOSIS — N17.9 ACUTE KIDNEY INJURY (HCC): ICD-10-CM

## 2017-10-11 PROBLEM — J96.91 RESPIRATORY FAILURE WITH HYPOXIA (HCC): Status: ACTIVE | Noted: 2017-10-11

## 2017-10-11 LAB
ALBUMIN SERPL-MCNC: 2.4 G/DL (ref 3.4–5)
ALBUMIN/GLOB SERPL: 0.4 {RATIO} (ref 0.8–1.7)
ALP SERPL-CCNC: 125 U/L (ref 45–117)
ALT SERPL-CCNC: 20 U/L (ref 13–56)
ANION GAP SERPL CALC-SCNC: 6 MMOL/L (ref 3–18)
APPEARANCE UR: CLEAR
ARTERIAL PATENCY WRIST A: YES
ARTERIAL PATENCY WRIST A: YES
AST SERPL-CCNC: 35 U/L (ref 15–37)
BACTERIA URNS QL MICRO: ABNORMAL /HPF
BASE EXCESS BLD CALC-SCNC: 4 MMOL/L
BASE EXCESS BLD CALC-SCNC: 4 MMOL/L
BASOPHILS # BLD: 0.1 K/UL (ref 0–0.06)
BASOPHILS NFR BLD: 0 % (ref 0–2)
BDY SITE: ABNORMAL
BDY SITE: ABNORMAL
BILIRUB SERPL-MCNC: 0.3 MG/DL (ref 0.2–1)
BILIRUB UR QL: NEGATIVE
BNP SERPL-MCNC: 2830 PG/ML (ref 0–450)
BODY TEMPERATURE: 98.4
BODY TEMPERATURE: 98.6
BUN SERPL-MCNC: 33 MG/DL (ref 7–18)
BUN/CREAT SERPL: 13 (ref 12–20)
CALCIUM SERPL-MCNC: 8.6 MG/DL (ref 8.5–10.1)
CHLORIDE SERPL-SCNC: 101 MMOL/L (ref 100–108)
CK MB CFR SERPL CALC: NORMAL % (ref 0–4)
CK MB SERPL-MCNC: <1 NG/ML (ref 5–25)
CK SERPL-CCNC: 42 U/L (ref 26–192)
CO2 SERPL-SCNC: 29 MMOL/L (ref 21–32)
COLOR UR: YELLOW
CREAT SERPL-MCNC: 2.46 MG/DL (ref 0.6–1.3)
D DIMER PPP FEU-MCNC: 0.57 UG/ML(FEU)
DIFFERENTIAL METHOD BLD: ABNORMAL
EOSINOPHIL # BLD: 0.4 K/UL (ref 0–0.4)
EOSINOPHIL NFR BLD: 2 % (ref 0–5)
EPITH CASTS URNS QL MICRO: NEGATIVE /LPF (ref 0–5)
ERYTHROCYTE [DISTWIDTH] IN BLOOD BY AUTOMATED COUNT: 16.6 % (ref 11.6–14.5)
GAS FLOW.O2 O2 DELIVERY SYS: ABNORMAL L/MIN
GAS FLOW.O2 O2 DELIVERY SYS: ABNORMAL L/MIN
GAS FLOW.O2 SETTING OXYMISER: 4 L/M
GLOBULIN SER CALC-MCNC: 6 G/DL (ref 2–4)
GLUCOSE BLD STRIP.AUTO-MCNC: 137 MG/DL (ref 70–110)
GLUCOSE SERPL-MCNC: 103 MG/DL (ref 74–99)
GLUCOSE UR STRIP.AUTO-MCNC: NEGATIVE MG/DL
HCO3 BLD-SCNC: 28.9 MMOL/L (ref 22–26)
HCO3 BLD-SCNC: 29.4 MMOL/L (ref 22–26)
HCT VFR BLD AUTO: 26.3 % (ref 35–45)
HGB BLD-MCNC: 8.4 G/DL (ref 12–16)
HGB UR QL STRIP: NEGATIVE
HYALINE CASTS URNS QL MICRO: ABNORMAL /LPF (ref 0–2)
KETONES UR QL STRIP.AUTO: NEGATIVE MG/DL
LACTATE BLD-SCNC: 1.6 MMOL/L (ref 0.4–2)
LEUKOCYTE ESTERASE UR QL STRIP.AUTO: NEGATIVE
LYMPHOCYTES # BLD: 1.3 K/UL (ref 0.9–3.6)
LYMPHOCYTES NFR BLD: 7 % (ref 21–52)
MCH RBC QN AUTO: 30.1 PG (ref 24–34)
MCHC RBC AUTO-ENTMCNC: 31.9 G/DL (ref 31–37)
MCV RBC AUTO: 94.3 FL (ref 74–97)
MONOCYTES # BLD: 0.8 K/UL (ref 0.05–1.2)
MONOCYTES NFR BLD: 4 % (ref 3–10)
NEUTS SEG # BLD: 16 K/UL (ref 1.8–8)
NEUTS SEG NFR BLD: 87 % (ref 40–73)
NITRITE UR QL STRIP.AUTO: NEGATIVE
O2/TOTAL GAS SETTING VFR VENT: 0.36 %
O2/TOTAL GAS SETTING VFR VENT: 70 %
PCO2 BLD: 48.4 MMHG (ref 35–45)
PCO2 BLD: 52.5 MMHG (ref 35–45)
PEEP RESPIRATORY: 8 CMH2O
PH BLD: 7.36 [PH] (ref 7.35–7.45)
PH BLD: 7.38 [PH] (ref 7.35–7.45)
PH UR STRIP: 6.5 [PH] (ref 5–8)
PIP ISTAT,IPIP: 18
PLATELET # BLD AUTO: 249 K/UL (ref 135–420)
PMV BLD AUTO: 12.4 FL (ref 9.2–11.8)
PO2 BLD: 226 MMHG (ref 80–100)
PO2 BLD: 43 MMHG (ref 80–100)
POTASSIUM SERPL-SCNC: 4.3 MMOL/L (ref 3.5–5.5)
PRESSURE SUPPORT SETTING VENT: 10 CMH2O
PROT SERPL-MCNC: 8.4 G/DL (ref 6.4–8.2)
PROT UR STRIP-MCNC: 100 MG/DL
RBC # BLD AUTO: 2.79 M/UL (ref 4.2–5.3)
RBC #/AREA URNS HPF: NEGATIVE /HPF (ref 0–5)
SAO2 % BLD: 100 % (ref 92–97)
SAO2 % BLD: 78 % (ref 92–97)
SERVICE CMNT-IMP: ABNORMAL
SERVICE CMNT-IMP: ABNORMAL
SODIUM SERPL-SCNC: 136 MMOL/L (ref 136–145)
SP GR UR REFRACTOMETRY: 1.02 (ref 1–1.03)
SPECIMEN TYPE: ABNORMAL
SPECIMEN TYPE: ABNORMAL
TOTAL RESP. RATE, ITRR: 18
TOTAL RESP. RATE, ITRR: 18
TROPONIN I SERPL-MCNC: <0.02 NG/ML (ref 0–0.04)
UROBILINOGEN UR QL STRIP.AUTO: 0.2 EU/DL (ref 0.2–1)
VANCOMYCIN SERPL-MCNC: 12.3 UG/ML (ref 5–40)
WBC # BLD AUTO: 18.5 K/UL (ref 4.6–13.2)
WBC URNS QL MICRO: ABNORMAL /HPF (ref 0–4)

## 2017-10-11 PROCEDURE — G0152 HHCP-SERV OF OT,EA 15 MIN: HCPCS

## 2017-10-11 PROCEDURE — 85025 COMPLETE CBC W/AUTO DIFF WBC: CPT | Performed by: EMERGENCY MEDICINE

## 2017-10-11 PROCEDURE — 82803 BLOOD GASES ANY COMBINATION: CPT

## 2017-10-11 PROCEDURE — 74011000258 HC RX REV CODE- 258: Performed by: EMERGENCY MEDICINE

## 2017-10-11 PROCEDURE — 3331090001 HH PPS REVENUE CREDIT

## 2017-10-11 PROCEDURE — 96365 THER/PROPH/DIAG IV INF INIT: CPT

## 2017-10-11 PROCEDURE — 83880 ASSAY OF NATRIURETIC PEPTIDE: CPT | Performed by: EMERGENCY MEDICINE

## 2017-10-11 PROCEDURE — 74011250636 HC RX REV CODE- 250/636: Performed by: EMERGENCY MEDICINE

## 2017-10-11 PROCEDURE — G0299 HHS/HOSPICE OF RN EA 15 MIN: HCPCS

## 2017-10-11 PROCEDURE — 80202 ASSAY OF VANCOMYCIN: CPT | Performed by: EMERGENCY MEDICINE

## 2017-10-11 PROCEDURE — 83036 HEMOGLOBIN GLYCOSYLATED A1C: CPT | Performed by: FAMILY MEDICINE

## 2017-10-11 PROCEDURE — 65270000029 HC RM PRIVATE

## 2017-10-11 PROCEDURE — 71010 XR CHEST PORT: CPT

## 2017-10-11 PROCEDURE — 81001 URINALYSIS AUTO W/SCOPE: CPT | Performed by: EMERGENCY MEDICINE

## 2017-10-11 PROCEDURE — 82962 GLUCOSE BLOOD TEST: CPT

## 2017-10-11 PROCEDURE — 83605 ASSAY OF LACTIC ACID: CPT

## 2017-10-11 PROCEDURE — 74011250636 HC RX REV CODE- 250/636: Performed by: FAMILY MEDICINE

## 2017-10-11 PROCEDURE — G0151 HHCP-SERV OF PT,EA 15 MIN: HCPCS

## 2017-10-11 PROCEDURE — 99285 EMERGENCY DEPT VISIT HI MDM: CPT

## 2017-10-11 PROCEDURE — 93005 ELECTROCARDIOGRAM TRACING: CPT

## 2017-10-11 PROCEDURE — 80053 COMPREHEN METABOLIC PANEL: CPT | Performed by: EMERGENCY MEDICINE

## 2017-10-11 PROCEDURE — 96375 TX/PRO/DX INJ NEW DRUG ADDON: CPT

## 2017-10-11 PROCEDURE — 96361 HYDRATE IV INFUSION ADD-ON: CPT

## 2017-10-11 PROCEDURE — 94660 CPAP INITIATION&MGMT: CPT

## 2017-10-11 PROCEDURE — 77030013033 HC MSK BPAP/CPAP MMKA -B

## 2017-10-11 PROCEDURE — 400013 HH SOC

## 2017-10-11 PROCEDURE — 3331090002 HH PPS REVENUE DEBIT

## 2017-10-11 PROCEDURE — 36600 WITHDRAWAL OF ARTERIAL BLOOD: CPT

## 2017-10-11 PROCEDURE — 85379 FIBRIN DEGRADATION QUANT: CPT | Performed by: EMERGENCY MEDICINE

## 2017-10-11 PROCEDURE — 82550 ASSAY OF CK (CPK): CPT | Performed by: EMERGENCY MEDICINE

## 2017-10-11 PROCEDURE — 87040 BLOOD CULTURE FOR BACTERIA: CPT | Performed by: EMERGENCY MEDICINE

## 2017-10-11 RX ORDER — DEXTROSE 50 % IN WATER (D50W) INTRAVENOUS SYRINGE
25-50 AS NEEDED
Status: DISCONTINUED | OUTPATIENT
Start: 2017-10-11 | End: 2017-10-23 | Stop reason: HOSPADM

## 2017-10-11 RX ORDER — NALOXONE HYDROCHLORIDE 0.4 MG/ML
0.4 INJECTION, SOLUTION INTRAMUSCULAR; INTRAVENOUS; SUBCUTANEOUS AS NEEDED
Status: DISCONTINUED | OUTPATIENT
Start: 2017-10-11 | End: 2017-10-23 | Stop reason: HOSPADM

## 2017-10-11 RX ORDER — HYDROMORPHONE HYDROCHLORIDE 2 MG/ML
1 INJECTION, SOLUTION INTRAMUSCULAR; INTRAVENOUS; SUBCUTANEOUS
Status: DISCONTINUED | OUTPATIENT
Start: 2017-10-11 | End: 2017-10-12

## 2017-10-11 RX ORDER — LEVOFLOXACIN 5 MG/ML
750 INJECTION, SOLUTION INTRAVENOUS
Status: DISCONTINUED | OUTPATIENT
Start: 2017-10-11 | End: 2017-10-11

## 2017-10-11 RX ORDER — ENOXAPARIN SODIUM 100 MG/ML
70 INJECTION SUBCUTANEOUS EVERY 24 HOURS
Status: DISCONTINUED | OUTPATIENT
Start: 2017-10-11 | End: 2017-10-11

## 2017-10-11 RX ORDER — SODIUM CHLORIDE 0.9 % (FLUSH) 0.9 %
5-10 SYRINGE (ML) INJECTION AS NEEDED
Status: DISCONTINUED | OUTPATIENT
Start: 2017-10-11 | End: 2017-10-20

## 2017-10-11 RX ORDER — BUDESONIDE 0.5 MG/2ML
500 INHALANT ORAL
Status: CANCELLED | OUTPATIENT
Start: 2017-10-11

## 2017-10-11 RX ORDER — IPRATROPIUM BROMIDE AND ALBUTEROL SULFATE 2.5; .5 MG/3ML; MG/3ML
3 SOLUTION RESPIRATORY (INHALATION)
Status: CANCELLED | OUTPATIENT
Start: 2017-10-11

## 2017-10-11 RX ORDER — INSULIN LISPRO 100 [IU]/ML
INJECTION, SOLUTION INTRAVENOUS; SUBCUTANEOUS EVERY 6 HOURS
Status: DISCONTINUED | OUTPATIENT
Start: 2017-10-11 | End: 2017-10-23 | Stop reason: HOSPADM

## 2017-10-11 RX ORDER — SODIUM CHLORIDE 9 MG/ML
75 INJECTION, SOLUTION INTRAVENOUS CONTINUOUS
Status: DISCONTINUED | OUTPATIENT
Start: 2017-10-11 | End: 2017-10-12

## 2017-10-11 RX ORDER — CIPROFLOXACIN 2 MG/ML
400 INJECTION, SOLUTION INTRAVENOUS EVERY 24 HOURS
Status: DISCONTINUED | OUTPATIENT
Start: 2017-10-11 | End: 2017-10-14

## 2017-10-11 RX ORDER — MAGNESIUM SULFATE 100 %
4 CRYSTALS MISCELLANEOUS AS NEEDED
Status: DISCONTINUED | OUTPATIENT
Start: 2017-10-11 | End: 2017-10-23 | Stop reason: HOSPADM

## 2017-10-11 RX ORDER — CIPROFLOXACIN 2 MG/ML
400 INJECTION, SOLUTION INTRAVENOUS EVERY 12 HOURS
Status: DISCONTINUED | OUTPATIENT
Start: 2017-10-11 | End: 2017-10-11 | Stop reason: DRUGHIGH

## 2017-10-11 RX ORDER — LEVOFLOXACIN 5 MG/ML
750 INJECTION, SOLUTION INTRAVENOUS EVERY 24 HOURS
Status: DISCONTINUED | OUTPATIENT
Start: 2017-10-11 | End: 2017-10-11

## 2017-10-11 RX ORDER — HEPARIN SODIUM 5000 [USP'U]/ML
5000 INJECTION, SOLUTION INTRAVENOUS; SUBCUTANEOUS EVERY 8 HOURS
Status: DISCONTINUED | OUTPATIENT
Start: 2017-10-11 | End: 2017-10-23 | Stop reason: HOSPADM

## 2017-10-11 RX ORDER — ARFORMOTEROL TARTRATE 15 UG/2ML
15 SOLUTION RESPIRATORY (INHALATION)
Status: CANCELLED | OUTPATIENT
Start: 2017-10-11

## 2017-10-11 RX ADMIN — HYDROMORPHONE HYDROCHLORIDE 1 MG: 2 INJECTION INTRAMUSCULAR; INTRAVENOUS; SUBCUTANEOUS at 20:41

## 2017-10-11 RX ADMIN — HEPARIN SODIUM 5000 UNITS: 5000 INJECTION, SOLUTION INTRAVENOUS; SUBCUTANEOUS at 22:29

## 2017-10-11 RX ADMIN — SODIUM CHLORIDE 75 ML/HR: 900 INJECTION, SOLUTION INTRAVENOUS at 22:38

## 2017-10-11 RX ADMIN — CIPROFLOXACIN 400 MG: 2 INJECTION, SOLUTION INTRAVENOUS at 21:49

## 2017-10-11 RX ADMIN — SODIUM CHLORIDE 1000 MG: 900 INJECTION, SOLUTION INTRAVENOUS at 19:27

## 2017-10-11 RX ADMIN — SODIUM CHLORIDE 1000 ML: 900 INJECTION, SOLUTION INTRAVENOUS at 17:35

## 2017-10-11 RX ADMIN — PIPERACILLIN SODIUM AND TAZOBACTAM SODIUM 4.5 G: 4; .5 INJECTION, POWDER, LYOPHILIZED, FOR SOLUTION INTRAVENOUS at 18:33

## 2017-10-11 NOTE — ED PROVIDER NOTES
HPI   54year old female, recent admission for HCAP, respiratory failure, requiring intubation presents with hypoxia and shortness of breath. Said she has gradual shortness of breath since she was discharged. EMS called. Had poor oxygen saturation 40-50% and placed on NRB at 100% FiO2 with improvement to 100%. Patient said she felt much better after oxygen. She denies any chest pain. Denies any fevers. Says her cough is getting worse. Says she has chills. No abdominal pain, nausea, vomiting or diarrhea. Denies any urinary symptoms. Has a tunneled central line placed 2 months ago at [facility] for MRSA pneumonia at that time. Past Medical History:   Diagnosis Date    Anemia     Cancer ([facility] [address]     throat    Coronary artery disease     Diabetes ([facility] [address] [facility]     Diabetes mellitus ([facility] [facility]     ETOH abuse     HTN (hypertension)     Hypertension     Hypothyroid     Lupus     Osteonecrosis (HCC)     of jaw    PEG adjustment, replacement, or removal     S/P thoracentesis     Throat cancer [facility])        Past Surgical History:   Procedure Laterality Date    ABDOMEN SURGERY PROC UNLISTED      feeding tube placement    HX  SECTION      HX HEENT      throat cancer biopsy         Family History:   Problem Relation Age of Onset    Lupus Sister        Social History     Social History    Marital status: LEGALLY      Spouse name: N/A    Number of children: N/A    Years of education: N/A     Occupational History    Not on file. Social History Main Topics    Smoking status: Former Smoker    Smokeless tobacco: Never Used    Alcohol use No    Drug use: No    Sexual activity: Not on file     Other Topics Concern    Not on file     Social History Narrative         ALLERGIES: Amlodipine and Morphine    Review of Systems   Constitutional: Positive for chills and fatigue. Negative for fever. HENT: Negative for rhinorrhea. Respiratory: Positive for cough and shortness of breath.     Cardiovascular: Negative for chest pain. Gastrointestinal: Negative for abdominal pain, nausea and vomiting. Endocrine: Negative for polyuria. Genitourinary: Negative for dysuria. Musculoskeletal: Negative for back pain. Skin: Negative for rash. Neurological: Negative for headaches. Vitals:    10/11/17 1859 10/11/17 1900 10/11/17 1910 10/11/17 1953   BP: 111/66 118/71     Pulse: 78 75 75    Resp:  19     Temp:       SpO2: 99% 96% 96% 98%   Weight:                Physical Exam   Constitutional: She is oriented to person, place, and time. She appears well-developed and well-nourished. She appears distressed. Speaking in full sentences but appears to be in respiratory distress   HENT:   Head: Normocephalic and atraumatic. Eyes: Conjunctivae are normal. Pupils are equal, round, and reactive to light. Neck: Normal range of motion. Neck supple. Cardiovascular: Normal rate and regular rhythm. Pulmonary/Chest: She is in respiratory distress. She has no wheezes. She exhibits no tenderness. Rhonchi at bilateral bases   Abdominal: Soft. Bowel sounds are normal. She exhibits no distension. There is no tenderness. There is no rebound and no guarding. Musculoskeletal: Normal range of motion. Neurological: She is alert and oriented to person, place, and time. Skin: Skin is warm and dry. Psychiatric: She has a normal mood and affect. Thought content normal.        [facility]  ED Course   Patient seen and examined. Patient again with respiraotry failure. Seems similar to last presentation. D-dimer against positive. Unable to perform CTA as patient has new KEATON. D-dimer less than last visit. DIscused with hospitalist, will treat for PE for now until further observation. BID lovenox ordered. Treated for sepsis and HCAP. Most likely respiratory failure from worsening infection. On Bipap currenty. Oxygen improved on repeat ABG, turned down FiO2. Sepsis protocol initiated.  30 cc/kg not ordered due to patient history of CHF. Patient did not desire intubation so will be gentle with fluids. 1L ordered and given. Procedures    Vitals:  Patient Vitals for the past 12 hrs:   Temp Pulse Resp BP SpO2   10/11/17 1953 - - - - 98 %   10/11/17 1910 - 75 - - 96 %   10/11/17 1900 - 75 19 118/71 96 %   10/11/17 1859 - 78 - 111/66 99 %   10/11/17 1845 - 75 20 111/66 95 %   10/11/17 1759 - - - - 93 %   10/11/17 1755 98.4 °F (36.9 °C) 96 (!) 38 146/73 (!) 78 %       Medications Ordered:  Medications   sodium chloride (NS) flush 5-10 mL (not administered)   vancomycin (VANCOCIN) 1,000 mg in 0.9% sodium chloride (MBP/ADV) 250 mL adv (1,000 mg IntraVENous New Bag 10/11/17 1927)   piperacillin-tazobactam (ZOSYN) 3.375 g in 0.9% sodium chloride (MBP/ADV) 100 mL MBP (not administered)   ciprofloxacin (CIPRO) 400 mg IVPB (premix) (not administered)   HYDROmorphone (DILAUDID) injection 1 mg (1 mg IntraVENous Given 10/11/17 2041)   0.9% sodium chloride infusion (not administered)   VANCOMYCIN INFORMATION NOTE (not administered)   sodium chloride 0.9 % bolus infusion 1,000 mL (0 mL IntraVENous IV Completed 10/11/17 1926)       Lab Findings:  Recent Results (from the past 12 hour(s))   METABOLIC PANEL, COMPREHENSIVE    Collection Time: 10/11/17  5:41 PM   Result Value Ref Range    Sodium 136 136 - 145 mmol/L    Potassium 4.3 3.5 - 5.5 mmol/L    Chloride 101 100 - 108 mmol/L    CO2 29 21 - 32 mmol/L    Anion gap 6 3.0 - 18 mmol/L    Glucose 103 (H) 74 - 99 mg/dL    BUN 33 (H) 7.0 - 18 MG/DL    Creatinine 2.46 (H) 0.6 - 1.3 MG/DL    BUN/Creatinine ratio 13 12 - 20      GFR est AA 25 (L) >60 ml/min/1.73m2    GFR est non-AA 21 (L) >60 ml/min/1.73m2    Calcium 8.6 8.5 - 10.1 MG/DL    Bilirubin, total 0.3 0.2 - 1.0 MG/DL    ALT (SGPT) 20 13 - 56 U/L    AST (SGOT) 35 15 - 37 U/L    Alk.  phosphatase 125 (H) 45 - 117 U/L    Protein, total 8.4 (H) 6.4 - 8.2 g/dL    Albumin 2.4 (L) 3.4 - 5.0 g/dL    Globulin 6.0 (H) 2.0 - 4.0 g/dL    A-G Ratio 0.4 (L) 0.8 - 1.7 CBC WITH AUTOMATED DIFF    Collection Time: 10/11/17  5:41 PM   Result Value Ref Range    WBC 18.5 (H) 4.6 - 13.2 K/uL    RBC 2.79 (L) 4.20 - 5.30 M/uL    HGB 8.4 (L) 12.0 - 16.0 g/dL    HCT 26.3 (L) 35.0 - 45.0 %    MCV 94.3 74.0 - 97.0 FL    MCH 30.1 24.0 - 34.0 PG    MCHC 31.9 31.0 - 37.0 g/dL    RDW 16.6 (H) 11.6 - 14.5 %    PLATELET 945 321 - 142 K/uL    MPV 12.4 (H) 9.2 - 11.8 FL    NEUTROPHILS 87 (H) 40 - 73 %    LYMPHOCYTES 7 (L) 21 - 52 %    MONOCYTES 4 3 - 10 %    EOSINOPHILS 2 0 - 5 %    BASOPHILS 0 0 - 2 %    ABS. NEUTROPHILS 16.0 (H) 1.8 - 8.0 K/UL    ABS. LYMPHOCYTES 1.3 0.9 - 3.6 K/UL    ABS. MONOCYTES 0.8 0.05 - 1.2 K/UL    ABS. EOSINOPHILS 0.4 0.0 - 0.4 K/UL    ABS. BASOPHILS 0.1 (H) 0.0 - 0.06 K/UL    DF AUTOMATED     NT-PRO BNP    Collection Time: 10/11/17  5:41 PM   Result Value Ref Range    NT pro-BNP 2830 (H) 0 - 450 PG/ML   CARDIAC PANEL,(CK, CKMB & TROPONIN)    Collection Time: 10/11/17  5:41 PM   Result Value Ref Range    CK 42 26 - 192 U/L    CK - MB <1.0 <3.6 ng/ml    CK-MB Index  0.0 - 4.0 %     CALCULATION NOT PERFORMED WHEN RESULT IS BELOW LINEAR LIMIT    Troponin-I, Qt. <0.02 0.0 - 0.045 NG/ML   D DIMER    Collection Time: 10/11/17  5:41 PM   Result Value Ref Range    D DIMER 0.57 (H) <0.46 ug/ml(FEU)   POC G3    Collection Time: 10/11/17  5:44 PM   Result Value Ref Range    Device: NASAL CANNULA      Flow rate (POC) 4 L/M    FIO2 (POC) 0.36 %    pH (POC) 7.384 7.35 - 7.45      pCO2 (POC) 48.4 (H) 35.0 - 45.0 MMHG    pO2 (POC) 43 (LL) 80 - 100 MMHG    HCO3 (POC) 28.9 (H) 22 - 26 MMOL/L    sO2 (POC) 78 (L) 92 - 97 %    Base excess (POC) 4 mmol/L    Allens test (POC) YES      Total resp.  rate 18      Site RIGHT RADIAL      Patient temp. 98.4      Specimen type (POC) ARTERIAL      Performed by Donta Bill    EKG, 12 LEAD, INITIAL    Collection Time: 10/11/17  5:56 PM   Result Value Ref Range    Ventricular Rate 79 BPM    Atrial Rate 79 BPM    P-R Interval 142 ms    QRS Duration 98 ms Q-T Interval 430 ms    QTC Calculation (Bezet) 493 ms    Calculated P Axis 34 degrees    Calculated T Axis -87 degrees    Diagnosis       Normal sinus rhythm  Incomplete right bundle branch block  T wave abnormality, consider inferior ischemia  T wave abnormality, consider anterolateral ischemia  Prolonged QT  Abnormal ECG  When compared with ECG of 25-SEP-2017 20:49,  Vent. rate has decreased BY  83 BPM  ST no longer depressed in Anterolateral leads  Inverted T waves have replaced nonspecific T wave abnormality in Inferior   leads     POC LACTIC ACID    Collection Time: 10/11/17  6:35 PM   Result Value Ref Range    Lactic Acid (POC) 1.6 0.4 - 2.0 mmol/L   POC G3    Collection Time: 10/11/17  7:39 PM   Result Value Ref Range    Device: BIPAP      FIO2 (POC) 70 %    pH (POC) 7.356 7.35 - 7.45      pCO2 (POC) 52.5 (H) 35.0 - 45.0 MMHG    pO2 (POC) 226 (H) 80 - 100 MMHG    HCO3 (POC) 29.4 (H) 22 - 26 MMOL/L    sO2 (POC) 100 (H) 92 - 97 %    Base excess (POC) 4 mmol/L    PEEP/CPAP (POC) 8 cmH2O    PIP (POC) 18      Pressure support 10 cmH2O    Allens test (POC) YES      Total resp. rate 18      Site LEFT RADIAL      Patient temp.  98.6      Specimen type (POC) ARTERIAL      Performed by [address] [place] [address] Sepsis Core Measures      Name: Debra Leyva   : 1965   MRN: [id number]   Date: 10/11/2017   Time:       7:06 PM      Completed physical exam:yes    Latest lactic acid: less than 2    Vital Signs:    Visit Vitals    /71    Pulse 75    Temp 98.4 °F (36.9 °C)    Resp 19    Wt 68 kg (150 lb)    SpO2 98%    BMI 25.75 kg/m2       O2 Device: BIPAP   O2 Flow Rate (L/min): 6 l/min   Temp (24hrs), Av.4 °F (36.9 °C), Min:98.4 °F (36.9 °C), Max:98.4 °F (36.9 °C)       Patient Vitals for the past 8 hrs:   Temp Pulse Resp BP SpO2   10/11/17 1953 - - - - 98 %   10/11/17 1910 - 75 - - 96 %   10/11/17 1900 - 75 19 118/71 96 %   10/11/17 1859 - 78 -  99 %   10/11/17 1845 - 75 20  95 %   10/11/17 1759 - - - - 93 %   10/11/17 1755 98.4 °F (36.9 °C) 96 (!) 38 146/73 (!) 78 %         Physical Examination:  General:  Alert and oriented to all spheres   Heart[de-identified]  regular rate and rhythm   Lungs:  shallow; breath sounds clear and equal bilaterally, coarse but without crackles or wheezes   Skin: Warm and dry. no hyperpigmentation, vitiligo, or suspicious lesions   Peripheral Pulse: Bilateral   Capillary refill: normal       Completed IVF Resuscitation (30ml/kg) - no  IVF choice: NS  Patient with history of CHF, will not give 30 cc/kg. Patient with normal lactate. Suspected source/s of severe sepsis: Pneumonia    Organ dysfunction: Elevated creatinine    Antibiotics: Kali Levine Levaquin Doss Lively, MD    EKG Interpretation by ED physician:  Performed at 17:56. Normal sinus rhythm. Incomplete RBBB. R wave inversions in anterior and lateral leads. No ST elevation. No STEMI. X-ray, CT or radiology findings or impressions:  No results found. Reevaluation of the patient:   Patient looks comfortable on bipap. Has no complaints currently. Diagnosis:   1. Acute on chronic respiratory failure with hypoxia (HCC)        Disposition: Admit    Follow-up Information     None           Patient's Medications   Start Taking    No medications on file   Continue Taking    AMLODIPINE-BENAZEPRIL (LOTREL) 5-10 MG PER CAPSULE    Take 1 Cap by mouth daily. AMYLASE-LIPASE-PROTEASE (CREON) CAPSULE    Take  by mouth three (3) times daily (with meals). ASPIRIN 81 MG TABLET    Take 81 mg by mouth. CIPROFLOXACIN HCL (CIPRO) 750 MG TABLET    Take 1 Tab by mouth every twelve (12) hours. CLONIDINE HCL (CATAPRES) 0.1 MG TABLET    Take 0.1 mg by mouth two (2) times a day. DOCUSATE SODIUM (COLACE) 100 MG CAPSULE    Take 100 mg by mouth two (2) times a day. DOXYCYCLINE (ADOXA) 100 MG TABLET    Take 1 Tab by mouth two (2) times a day.     FOLIC ACID (FOLVITE) 1 MG TABLET    Take 1 mg by mouth daily. FUROSEMIDE (LASIX) 20 MG TABLET    Take 2 Tabs by mouth daily. GABAPENTIN (NEURONTIN) 300 MG CAPSULE    Take 300 mg by mouth three (3) times daily. HYDROCODONE-ACETAMINOPHEN (NORCO) 5-325 MG PER TABLET    Take 1 Tab by mouth every four (4) hours as needed for Pain. Max Daily Amount: 6 Tabs. LEVOTHYROXINE (SYNTHROID) 100 MCG TABLET    Take  by mouth Daily (before breakfast). METOPROLOL TARTRATE (LOPRESSOR) 100 MG IR TABLET    Take 1 Tab by mouth every twelve (12) hours. MULTIVITAMIN (ONE A DAY) TABLET    Take 1 Tab by mouth daily. ONDANSETRON HCL (ZOFRAN) 4 MG TABLET    Take 1 Tab by mouth every eight (8) hours as needed for Nausea. PANTOPRAZOLE (PROTONIX) 40 MG TABLET    Take 1 Tab by mouth daily. POLYETHYLENE GLYCOL (MIRALAX) 17 GRAM/DOSE POWDER    Take 17 g by mouth daily.    These Medications have changed    No medications on file   Stop Taking    No medications on file

## 2017-10-11 NOTE — PROGRESS NOTES
Reason for Renal Dosing:  Per Renal Dosing Policy    Patient clinical status and labs ordered/reviewed. Pt Weight Weight: 68 kg (150 lb)   Serum Creatinine Lab Results   Component Value Date/Time    Creatinine 2.46 10/11/2017 05:41 PM       Creatinine Clearance Estimated Creatinine Clearance: 26.2 mL/min (based on Cr of 2.46). BUN Lab Results   Component Value Date/Time    BUN 33 10/11/2017 05:41 PM       WBC Lab Results   Component Value Date/Time    WBC 18.5 10/11/2017 05:41 PM      Temperature Temp: 98.4 °F (36.9 °C)   HR Pulse (Heart Rate): 75     BP BP: 118/71           Drug type: Non-Antibiotic    Drug/dose: Enoxaparin 70 mg every 12 hours was adjusted to Enoxaparin 70 mg every 24 hours. Continue to monitor. Signed Davin Pretty, ARMOND  Date 10/11/2017  Time 7:29 PM    Reason for Renal Dosing:  Per Renal Dosing Policy    Patient clinical status and labs ordered/reviewed. Pt Weight Weight: 68 kg (150 lb)   Serum Creatinine Lab Results   Component Value Date/Time    Creatinine 2.46 10/11/2017 05:41 PM       Creatinine Clearance Estimated Creatinine Clearance: 26.2 mL/min (based on Cr of 2.46). BUN Lab Results   Component Value Date/Time    BUN 33 10/11/2017 05:41 PM       WBC Lab Results   Component Value Date/Time    WBC 18.5 10/11/2017 05:41 PM      Temperature Temp: 98.4 °F (36.9 °C)   HR Pulse (Heart Rate): 75     BP BP: 118/71           Drug type: Antibiotic indicated for HCAP    Drug/dose: Levofloxacin 750 mg every 24 hours was adjusted to Levofloxacin 750 mg every 48 hours     Continue to monitor. Signed Davin Pretty, PHARMLEA  Date 10/11/2017  Time 7:32 PM    Reason for Renal Dosing:  Per Renal Dosing Policy    Patient clinical status and labs ordered/reviewed. Pt Weight Weight: 68 kg (150 lb)   Serum Creatinine Lab Results   Component Value Date/Time    Creatinine 2.46 10/11/2017 05:41 PM       Creatinine Clearance Estimated Creatinine Clearance: 26.2 mL/min (based on Cr of 2.46). BUN Lab Results   Component Value Date/Time    BUN 33 10/11/2017 05:41 PM       WBC Lab Results   Component Value Date/Time    WBC 18.5 10/11/2017 05:41 PM      Temperature Temp: 98.4 °F (36.9 °C)   HR Pulse (Heart Rate): 75     BP BP: 118/71           Drug type: Antibiotic indicated for HCAP    Drug/dose: Piperacillin-Tazobactam 4.5 grams eery 6 hours was adjusted to Piperacillin-Tazobactam 3.375 grams every 6 hours     Continue to monitor.     Signed Isabella Casillas PHARMD  Date 10/11/2017  Time 7:37 PM

## 2017-10-11 NOTE — ED NOTES
Arrives with PICC to RIGHT upper chest, inserted PTA  Red port does not flush or draw  Purple port does not draw, flushes with a lot of resistance  Provider notified  Not using PICC at this time

## 2017-10-11 NOTE — Clinical Note
Status[de-identified] Inpatient [101] Type of Bed: Intensive Care [6] Inpatient Hospitalization Certified Necessary for the Following Reasons: 3. Patient receiving treatment that can only be provided in an inpatient setting (further clarification in H&P documentation) Admitting Diagnosis: Respiratory failure with hypoxia ([facility] [address] [0123695] Admitting Diagnosis: Recurrent pneumonia [[id number]] Admitting Physician: Tk Tse Attending Physician: Tk Tse Estimated Length of Stay: 2 Midnights Discharge Plan[de-identified] Home with Office Follow-up

## 2017-10-11 NOTE — ED TRIAGE NOTES
Pt arrives via EMS from home  Pt awoke with SOB  Ems found pt on home O2 at 2L, Tripoding and combative at 44%  Placed on NRBR and pt faheem to 98%  Pt tripoding and using all accessory muscles    Recent admission for aspiration pneumonia for 2 weeks d/c'd 3 days ago  HX throat CA

## 2017-10-11 NOTE — ED NOTES
Pt skin hot to touch, oral temperature 98.4  Pt refusing rectal temp despite writer educating her about possible fever despite oral temperature being normal  Pt still refuses rectal temperature  Provider aware

## 2017-10-11 NOTE — IP AVS SNAPSHOT
[address] [address] 
[phone] Patient: Sina Moreland MRN: [id number] :1965 Current Discharge Medication List  
  
START taking these medications Dose & Instructions Dispensing Information Comments Morning Noon Evening Bedtime  
 amLODIPine 10 mg tablet Commonly known as:  Kirti Cantu Start taking on:  10/24/2017 Your next dose is:  Tomorrow 10/24 Dose:  10 mg Take 1 Tab by mouth daily. Quantity:  30 Tab Refills:  0  
     
  
   
   
   
  
 linezolid 600 mg tablet Commonly known as:  Merrilyn Goodell Your next dose is: Tonight Dose:  600 mg Take 1 Tab by mouth every twelve (12) hours. Quantity:  20 Tab Refills:  0 CONTINUE these medications which have NOT CHANGED Dose & Instructions Dispensing Information Comments Morning Noon Evening Bedtime  
 aspirin 81 mg tablet Your next dose is:  Tomorrow 10/24 Dose:  81 mg Take 81 mg by mouth. Refills:  0  
     
  
   
   
   
  
 cetirizine 10 mg tablet Commonly known as:  ZYRTEC Dose:  10 mg Take 10 mg by mouth daily as needed for Allergies, Rhinitis or Itching. Refills:  0  
     
   
   
   
  
 COLACE 100 mg capsule Generic drug:  docusate sodium Dose:  100 mg Take 100 mg by mouth two (2) times a day. Refills:  0  
     
   
   
   
  
 CREON 12,000-38,000 -60,000 unit capsule Generic drug:  lipase-protease-amylase Your next dose is: Today Take  by mouth three (3) times daily (with meals). Refills:  0  
     
   
  
   
   
  
 folic acid 1 mg tablet Commonly known as:  [employer] Your next dose is:  Tomorrow 10/24 Dose:  0.4 mg Take 0.4 mg by mouth daily. Refills:  0  
     
  
   
   
   
  
 furosemide 20 mg tablet Commonly known as:  LASIX Your next dose is:  Tomorrow 10/24 Dose:  40 mg Take 2 Tabs by mouth daily. Quantity:  60 Tab Refills:  0  
     
  
   
   
   
  
 gabapentin 300 mg capsule Commonly known as:  NEURONTIN Your next dose is: Today Dose:  400 mg Take 400 mg by mouth three (3) times daily. Refills:  0 HYDROcodone-acetaminophen 5-325 mg per tablet Commonly known as:  Nelma Mahad Your next dose is: Tonight Dose:  1 Tab Take 1 Tab by mouth every four (4) hours as needed for Pain. Max Daily Amount: 6 Tabs. Quantity:  10 Tab Refills:  0 MIRALAX 17 gram/dose powder Generic drug:  polyethylene glycol Your next dose is:  Tomorrow 10/24 Dose:  17 g Take 17 g by mouth daily. Refills:  0  
     
   
   
   
  
 multivitamin tablet Commonly known as:  ONE A DAY Your next dose is:  Tomorrow 10/24 Dose:  1 Tab Take 1 Tab by mouth daily. Refills:  0  
     
   
   
   
  
 ondansetron hcl 4 mg tablet Commonly known as:  ZOFRAN (AS HYDROCHLORIDE) Dose:  4 mg Take 1 Tab by mouth every eight (8) hours as needed for Nausea. Quantity:  20 Tab Refills:  0  
     
   
   
   
  
 pantoprazole 40 mg tablet Commonly known as:  PROTONIX Your next dose is:  Tomorrow 10/24 Dose:  40 mg Take 1 Tab by mouth daily. Quantity:  30 Tab Refills:  0  
     
   
   
   
  
 SYNTHROID 100 mcg tablet Generic drug:  levothyroxine Your next dose is:  Tomorrow 10/24 Take  by mouth Daily (before breakfast). Refills:  0 STOP taking these medications   
 ciprofloxacin HCl 750 mg tablet Commonly known as:  CIPRO  
   
  
 cloNIDine HCl 0.1 mg tablet Commonly known as:  CATAPRES  
   
  
 doxycycline 100 mg tablet Commonly known as:  ADOXA  
   
  
 LOTREL 5-10 mg per capsule Generic drug:  amLODIPine-benazepril  
   
  
 metoprolol tartrate 100 mg IR tablet Commonly known as:  LOPRESSOR  [employer]  
   
  
  
  
 Where to Get Your Medications Information on where to get these meds will be given to you by the nurse or doctor. ! Ask your nurse or doctor about these medications  
  amLODIPine 10 mg tablet  
 linezolid 600 mg tablet

## 2017-10-11 NOTE — ROUTINE PROCESS
Called to place patient on BIPAP due to respiratory distress. Attempted to put patient on BIPAP but she refused. Patient wanted to keep NRB mask on instead. Titrated patient to 4 lpm via NC.    1744 ABG done and results given to Dr. Early Monday.   - PO2 43. Educated and intructed the importance of wearing the BIPAP and patient agreed to wear it. Patient started on 18/8 and FiO2 70%. Plan: ABG in a hour.

## 2017-10-11 NOTE — IP AVS SNAPSHOT
[address] [address] 
[phone] Patient: Jelly Arellano MRN: [id number] :1965 You are allergic to the following Allergen Reactions Morphine Rash Amlodipine Swelling Leg swelling per patient, but no problem taking Lotrel (amlodipine-benazepril) Recent Documentation Height Weight BMI OB Status Smoking Status 1.626 m 61.6 kg 23.29 kg/m2 Postmenopausal Former Smoker Emergency Contacts Name Discharge Info Relation Home Work Mobile Kari Molina CAREGIVER [3] Parent [1] [phone] About your hospitalization You were admitted on:  2017 You last received care in the:  [address] You were discharged on:  2017 Unit phone number:  [phone] Why you were hospitalized Your primary diagnosis was:  Recurrent Pneumonia Your diagnoses also included:  Respiratory Failure With Hypoxia (Hcc), Enmanuel (Acute Kidney Injury) (Hcc) Providers Seen During Your Hospitalizations Provider Role Specialty Primary office phone Jeanette Tucker MD Attending Provider Emergency Medicine [phone] Nayan Burgos MD Attending Provider Emergency Medicine [phone] Codie Lugo MD Attending Provider [facility] [phone] Blanka Hoffman MD Attending Provider Internal Medicine [phone] Laura Mcmahon MD Attending Provider Internal Medicine [phone] Ericka Rodriguez MD Attending Provider Internal Medicine [phone] Your Primary Care Physician (PCP) Primary Care Physician Office Phone Office Fax Janelle Mo [phone] Follow-up Information Follow up With Details Comments Contact Info Bryan Good MD  **OFFICE WILL CONTACT PATIENT WITH FOLLOW UP APPOINTMENT** [address] [facility] [id number] [phone] Current Discharge Medication List  
  
START taking these medications Dose & Instructions Dispensing Information Comments Morning Noon Evening Bedtime  
 amLODIPine 10 mg tablet Commonly known as:  Abimael Saenz Start taking on:  10/24/2017 Your next dose is:  Tomorrow 10/24 Dose:  10 mg Take 1 Tab by mouth daily. Quantity:  30 Tab Refills:  0  
     
  
   
   
   
  
 linezolid 600 mg tablet Commonly known as:  Dora Levo Your next dose is: Tonight Dose:  600 mg Take 1 Tab by mouth every twelve (12) hours. Quantity:  20 Tab Refills:  0 CONTINUE these medications which have NOT CHANGED Dose & Instructions Dispensing Information Comments Morning Noon Evening Bedtime  
 aspirin 81 mg tablet Your next dose is:  Tomorrow 10/24 Dose:  81 mg Take 81 mg by mouth. Refills:  0  
     
  
   
   
   
  
 cetirizine 10 mg tablet Commonly known as:  ZYRTEC Dose:  10 mg Take 10 mg by mouth daily as needed for Allergies, Rhinitis or Itching. Refills:  0  
     
   
   
   
  
 COLACE 100 mg capsule Generic drug:  docusate sodium Dose:  100 mg Take 100 mg by mouth two (2) times a day. Refills:  0  
     
   
   
   
  
 CREON 12,000-38,000 -60,000 unit capsule Generic drug:  lipase-protease-amylase Your next dose is: Today Take  by mouth three (3) times daily (with meals). Refills:  0  
     
   
  
   
   
  
 folic acid 1 mg tablet Commonly known as:  Google Your next dose is:  Tomorrow 10/24 Dose:  0.4 mg Take 0.4 mg by mouth daily. Refills:  0  
     
  
   
   
   
  
 furosemide 20 mg tablet Commonly known as:  LASIX Your next dose is:  Tomorrow 10/24 Dose:  40 mg Take 2 Tabs by mouth daily. Quantity:  60 Tab Refills:  0  
     
  
   
   
   
  
 gabapentin 300 mg capsule Commonly known as:  NEURONTIN Your next dose is: Today Dose:  400 mg Take 400 mg by mouth three (3) times daily. Refills:  0 HYDROcodone-acetaminophen 5-325 mg per tablet Commonly known as:  Yadira Dennis Your next dose is: Tonight Dose:  1 Tab Take 1 Tab by mouth every four (4) hours as needed for Pain. Max Daily Amount: 6 Tabs. Quantity:  10 Tab Refills:  0 MIRALAX 17 gram/dose powder Generic drug:  polyethylene glycol Your next dose is:  Tomorrow 10/24 Dose:  17 g Take 17 g by mouth daily. Refills:  0  
     
   
   
   
  
 multivitamin tablet Commonly known as:  ONE A DAY Your next dose is:  Tomorrow 10/24 Dose:  1 Tab Take 1 Tab by mouth daily. Refills:  0  
     
   
   
   
  
 ondansetron hcl 4 mg tablet Commonly known as:  ZOFRAN (AS HYDROCHLORIDE) Dose:  4 mg Take 1 Tab by mouth every eight (8) hours as needed for Nausea. Quantity:  20 Tab Refills:  0  
     
   
   
   
  
 pantoprazole 40 mg tablet Commonly known as:  PROTONIX Your next dose is:  Tomorrow 10/24 Dose:  40 mg Take 1 Tab by mouth daily. Quantity:  30 Tab Refills:  0  
     
   
   
   
  
 SYNTHROID 100 mcg tablet Generic drug:  levothyroxine Your next dose is:  Tomorrow 10/24 Take  by mouth Daily (before breakfast). Refills:  0 STOP taking these medications   
 ciprofloxacin HCl 750 mg tablet Commonly known as:  CIPRO  
   
  
 cloNIDine HCl 0.1 mg tablet Commonly known as:  CATAPRES  
   
  
 doxycycline 100 mg tablet Commonly known as:  ADOXA  
   
  
 LOTREL 5-10 mg per capsule Generic drug:  amLODIPine-benazepril  
   
  
 metoprolol tartrate 100 mg IR tablet Commonly known as:  LOPRESSOR OXYGEN-AIR DELIVERY SYSTEMS Where to Get Your Medications Information on where to get these meds will be given to you by the nurse or doctor. ! Ask your nurse or doctor about these medications  
  amLODIPine 10 mg tablet  
 linezolid 600 mg tablet Discharge Instructions Pneumonia: Care Instructions Your Care Instructions Pneumonia is an infection of the lungs. Most cases are caused by infections from bacteria or viruses. Pneumonia may be mild or very severe. If it is caused by bacteria, you will be treated with antibiotics. It may take a few weeks to a few months to recover fully from pneumonia, depending on how sick you were and whether your overall health is good. Follow-up care is a key part of your treatment and safety. Be sure to make and go to all appointments, and call your doctor if you are having problems. Its also a good idea to know your test results and keep a list of the medicines you take. How can you care for yourself at home? · Take your antibiotics exactly as directed. Do not stop taking the medicine just because you are feeling better. You need to take the full course of antibiotics. · Take your medicines exactly as prescribed. Call your doctor if you think you are having a problem with your medicine. · Get plenty of rest and sleep. You may feel weak and tired for a while, but your energy level will improve with time. · To prevent dehydration, drink plenty of fluids, enough so that your urine is light yellow or clear like water. Choose water and other caffeine-free clear liquids until you feel better. If you have kidney, heart, or liver disease and have to limit fluids, talk with your doctor before you increase the amount of fluids you drink. · Take care of your cough so you can rest. A cough that brings up mucus from your lungs is common with pneumonia. It is one way your body gets rid of the infection. But if coughing keeps you from resting or causes severe fatigue and chest-wall pain, talk to your doctor. He or she may suggest that you take a medicine to reduce the cough. · Use a vaporizer or humidifier to add moisture to your bedroom. Follow the directions for cleaning the machine. · Do not smoke or allow others to smoke around you. Smoke will make your cough last longer. If you need help quitting, talk to your doctor about stop-smoking programs and medicines. These can increase your chances of quitting for good. · Take an over-the-counter pain medicine, such as acetaminophen (Tylenol), ibuprofen (Advil, Motrin), or naproxen (Aleve). Read and follow all instructions on the label. · Do not take two or more pain medicines at the same time unless the doctor told you to. Many pain medicines have acetaminophen, which is Tylenol. Too much acetaminophen (Tylenol) can be harmful. · If you were given a spirometer to measure how well your lungs are working, use it as instructed. This can help your doctor tell how your recovery is going. · To prevent pneumonia in the future, talk to your doctor about getting a flu vaccine (once a year) and a pneumococcal vaccine (one time only for most people). When should you call for help? Call 911 anytime you think you may need emergency care. For example, call if: 
· You have severe trouble breathing. Call your doctor now or seek immediate medical care if: 
· You cough up dark brown or bloody mucus (sputum). · You have new or worse trouble breathing. · You are dizzy or lightheaded, or you feel like you may faint. Watch closely for changes in your health, and be sure to contact your doctor if: 
· You have a new or higher fever. · You are coughing more deeply or more often. · You are not getting better after 2 days (48 hours). · You do not get better as expected. Where can you learn more? Go to http://brandon-courtney.info/. Enter [id number] in the search box to learn more about \"Pneumonia: Care Instructions. \" Current as of: March 25, 2017 Content Version: 11.3 © 0637-7338 "[employer]". Care instructions adapted under license by [employer] (which disclaims liability or warranty for this information). If you have questions about a medical condition or this instruction, always ask your healthcare professional. [address] any warranty or liability for your use of this information. Learning About COPD and How to Prevent Lung Infections How do lung infections affect COPD? Lung infections like pneumonia and acute bronchitis are common causes of COPD flare-ups. And people who have COPD are more likely to get these lung infections, especially if they smoke. When you have COPD, it is important to know the symptoms of pneumonia and acute bronchitis and call your doctor if you have them. Symptoms include: · A cough that brings up more mucus than usual. 
· Fever. · Shortness of breath. What can you do to prevent these infections? Stay healthy · Get a flu shot every year. · Get a pneumococcal vaccine shot. If you have had one before, ask your doctor whether you need another dose. Two different types of pneumococcal vaccines are recommended for people ages 72 and older. · If you must be around people with colds or the flu, wash your hands often. · Do not smoke. This is the most important step you can take to prevent more damage to your lungs. If you need help quitting, talk to your doctor about stop-smoking programs and medicines. These can increase your chances of quitting for good. · Avoid secondhand smoke, air pollution, and high altitudes. Also avoid cold, dry air and hot, humid air. Stay at home with your windows closed when air pollution is bad. Exercise and eat well · If your doctor recommends it, get more exercise. Walking is a good choice. Bit by bit, increase the amount you walk every day. Try for at least 30 minutes on most days of the week. · Eat regular, well-balanced meals. Eating right keeps your energy levels up and helps your body fight infection. · Get plenty of rest and sleep. Follow-up care is a key part of your treatment and safety. Be sure to make and go to all appointments, and call your doctor if you are having problems. It's also a good idea to know your test results and keep a list of the medicines you take. Where can you learn more? Go to http://brandon-courtney.info/. Enter A749 in the search box to learn more about \"Learning About COPD and How to Prevent Lung Infections. \" Current as of: March 25, 2017 Content Version: 11.3 © 3030-4139 [employer]. Care instructions adapted under license by [employer] (which disclaims liability or warranty for this information). If you have questions about a medical condition or this instruction, always ask your healthcare professional. [place] [address] any warranty or liability for your use of this information. Patient armband removed and shredded DISCHARGE SUMMARY from Nurse The following personal items are in your possession at time of discharge: 
 
Dental Appliances: None Visual Aid: None Home Medications: None Jewelry: None Clothing: [place] Other Valuables: Cell Phone PATIENT INSTRUCTIONS: 
 
 
F-face looks uneven A-arms unable to move or move unevenly S-speech slurred or non-existent T-time-call 911 as soon as signs and symptoms begin-DO NOT go Back to bed or wait to see if you get better-TIME IS BRAIN.  
 
Warning Signs of HEART ATTACK  
 
 Call 911 if you have these symptoms: 
? Chest discomfort. Most heart attacks involve discomfort in the center of the chest that lasts more than a few minutes, or that goes away and comes back. It can feel like uncomfortable pressure, squeezing, fullness, or pain. ? Discomfort in other areas of the upper body. Symptoms can include pain or discomfort in one or both arms, the back, neck, jaw, or stomach. ? Shortness of breath with or without chest discomfort. ? Other signs may include breaking out in a cold sweat, nausea, or lightheadedness. Don't wait more than five minutes to call [address]! Fast action can save your life. Calling 911 is almost always the fastest way to get lifesaving treatment. Emergency Medical Services staff can begin treatment when they arrive  up to an hour sooner than if someone gets to the hospital by car. The discharge information has been reviewed with the patient. The patient verbalized understanding. Discharge medications reviewed with the patient and appropriate educational materials and side effects teaching were provided. Discharge Orders None Introducing [facility]! Romayne Duster introduces [employer] patient portal. Now you can access parts of your medical record, email your doctor's office, and request medication refills online. 1. In your internet browser, go to [email or web address] 2. Click on the First Time User? Click Here link in the Sign In box. You will see the New Member Sign Up page. 3. Enter your [employer] Access Code exactly as it appears below. You will not need to use this code after youve completed the sign-up process. If you do not sign up before the expiration date, you must request a new code. · [employer] Access Code: [email or web address] Expires: 1/7/2018  4:12 PM 
 
4.  Enter the last four digits of your Social Security Number (xxxx) and Date of Birth (mm/dd/yyyy) as indicated and click Submit. You will be taken to the next sign-up page. 5. Create a uiu ID. This will be your uiu login ID and cannot be changed, so think of one that is secure and easy to remember. 6. Create a uiu password. You can change your password at any time. 7. Enter your Password Reset Question and Answer. This can be used at a later time if you forget your password. 8. Enter your e-mail address. You will receive e-mail notification when new information is available in [address] 9. Click Sign Up. You can now view and download portions of your medical record. 10. Click the Download Summary menu link to download a portable copy of your medical information. If you have questions, please visit the Frequently Asked Questions section of the uiu website. Remember, uiu is NOT to be used for urgent needs. For medical emergencies, dial 911. Now available from your iPhone and Android! General Information Please provide this summary of care documentation to your next provider. Patient Signature:  ____________________________________________________________ Date:  ____________________________________________________________  
  
Nima Lety Provider Signature:  ____________________________________________________________ Date:  ____________________________________________________________

## 2017-10-11 NOTE — H&P
History and Physical    Patient: Axel Lara               Sex: female          DOA: 10/11/2017       YOB: 1965      Age:  54 y.o.        LOS:  LOS: 0 days        Chief Complaint   Patient presents with    Shortness of Breath         HPI:     Axel Lara is a 54 y.o. female with pmhx of throat cancer, dysphagia , peg, CAD,DM, hypothyroid , recent pneumonia, htn,who presents with sob. Patient arrived by EMS from home. EMS report she was in respiratory distress and sating 44%. She was placed on a NRBR at sat went up to 98%. Patient was recently discharged 3 days ago from [facility] with recurrent pneumonia. She then had sputum culture positive for MRSA and pseudomonas sensitive . Today in ED she was placed on BIPAP. ABG PH 7.384,PO2 43, PCO2 48.4. Patient was started on IV vanco and levaquin by ED. She also had elevated ddimer. vq scan ordered. Past Medical History:   Diagnosis Date    Anemia     Cancer ([address]     throat    Coronary artery disease     Diabetes ([facility] [address]     Diabetes mellitus ([facility] [address]     ETOH abuse     HTN (hypertension)     Hypertension     Hypothyroid     Lupus     Osteonecrosis (HCC)     of jaw    PEG adjustment, replacement, or removal     S/P thoracentesis     Throat cancer ([address]    . Past Surgical History:   Procedure Laterality Date    ABDOMEN SURGERY PROC UNLISTED      feeding tube placement    HX  SECTION      HX HEENT      throat cancer biopsy       No current facility-administered medications on file prior to encounter. Current Outpatient Prescriptions on File Prior to Encounter   Medication Sig Dispense Refill    furosemide (LASIX) 20 mg tablet Take 2 Tabs by mouth daily. 60 Tab 0    HYDROcodone-acetaminophen (NORCO) 5-325 mg per tablet Take 1 Tab by mouth every four (4) hours as needed for Pain. Max Daily Amount: 6 Tabs. 10 Tab 0    metoprolol tartrate (LOPRESSOR) 100 mg IR tablet Take 1 Tab by mouth every twelve (12) hours.  61 Tab 0    ciprofloxacin HCl (CIPRO) 750 mg tablet Take 1 Tab by mouth every twelve (12) hours. 50 Tab 0    doxycycline (ADOXA) 100 mg tablet Take 1 Tab by mouth two (2) times a day. 28 Tab 0    cloNIDine HCl (CATAPRES) 0.1 mg tablet Take 0.1 mg by mouth two (2) times a day.  multivitamin (ONE A DAY) tablet Take 1 Tab by mouth daily.  polyethylene glycol (MIRALAX) 17 gram/dose powder Take 17 g by mouth daily.  docusate sodium (COLACE) 100 mg capsule Take 100 mg by mouth two (2) times a day.  amLODIPine-benazepril (LOTREL) 5-10 mg per capsule Take 1 Cap by mouth daily.  levothyroxine (SYNTHROID) 100 mcg tablet Take  by mouth Daily (before breakfast).  amylase-lipase-protease (CREON) capsule Take  by mouth three (3) times daily (with meals).  pantoprazole (PROTONIX) 40 mg tablet Take 1 Tab by mouth daily. 30 Tab 0    ondansetron hcl (ZOFRAN) 4 mg tablet Take 1 Tab by mouth every eight (8) hours as needed for Nausea. 20 Tab 0    aspirin 81 mg tablet Take 81 mg by mouth. Social History     Social History    Marital status: LEGALLY      Spouse name: N/A    Number of children: N/A    Years of education: N/A     Occupational History    Not on file. Social History Main Topics    Smoking status: Former Smoker    Smokeless tobacco: Never Used    Alcohol use No    Drug use: No    Sexual activity: Not on file     Other Topics Concern    Not on file     Social History Narrative       Prior to Admission Medications   Prescriptions Last Dose Informant Patient Reported? Taking? HYDROcodone-acetaminophen (NORCO) 5-325 mg per tablet   No No   Sig: Take 1 Tab by mouth every four (4) hours as needed for Pain. Max Daily Amount: 6 Tabs. amLODIPine-benazepril (LOTREL) 5-10 mg per capsule   Yes No   Sig: Take 1 Cap by mouth daily. amylase-lipase-protease (CREON) capsule   Yes No   Sig: Take  by mouth three (3) times daily (with meals).    aspirin 81 mg tablet   Yes No   Sig: Take 81 mg by mouth. ciprofloxacin HCl (CIPRO) 750 mg tablet   No No   Sig: Take 1 Tab by mouth every twelve (12) hours. cloNIDine HCl (CATAPRES) 0.1 mg tablet   Yes No   Sig: Take 0.1 mg by mouth two (2) times a day. docusate sodium (COLACE) 100 mg capsule   Yes No   Sig: Take 100 mg by mouth two (2) times a day. doxycycline (ADOXA) 100 mg tablet   No No   Sig: Take 1 Tab by mouth two (2) times a day. folic acid (FOLVITE) 1 mg tablet   Yes No   Sig: Take 1 mg by mouth daily. furosemide (LASIX) 20 mg tablet   No No   Sig: Take 2 Tabs by mouth daily. gabapentin (NEURONTIN) 300 mg capsule   Yes No   Sig: Take 300 mg by mouth three (3) times daily. levothyroxine (SYNTHROID) 100 mcg tablet   Yes No   Sig: Take  by mouth Daily (before breakfast). metoprolol tartrate (LOPRESSOR) 100 mg IR tablet   No No   Sig: Take 1 Tab by mouth every twelve (12) hours. multivitamin (ONE A DAY) tablet   Yes No   Sig: Take 1 Tab by mouth daily. ondansetron hcl (ZOFRAN) 4 mg tablet   No No   Sig: Take 1 Tab by mouth every eight (8) hours as needed for Nausea. pantoprazole (PROTONIX) 40 mg tablet   No No   Sig: Take 1 Tab by mouth daily. polyethylene glycol (MIRALAX) 17 gram/dose powder   Yes No   Sig: Take 17 g by mouth daily. Facility-Administered Medications: None       Family History   Problem Relation Age of Onset    Lupus Sister        Allergies   Allergen Reactions    Amlodipine Swelling     Leg swelling per patient    Morphine Rash       Review of Systems   Constitutional: Positive for malaise/fatigue. Negative for fever. HENT: Negative. Eyes: Negative. Respiratory: Positive for cough, sputum production and shortness of breath. Cardiovascular: Positive for chest pain. Gastrointestinal: Negative. Genitourinary: Negative. Musculoskeletal: Negative. Skin: Negative. Neurological: Negative. Psychiatric/Behavioral: Negative.         Physical Exam:       Visit Vitals    /71    Pulse 75    Temp 98.4 °F (36.9 °C)    Resp 19    Wt 68 kg (150 lb)    SpO2 98%    BMI 25.75 kg/m2       Physical Exam   Constitutional: She is oriented to person, place, and time. She appears well-developed. She has a sickly appearance. She appears distressed. Face mask in place. bipap   HENT:   Head: Normocephalic and atraumatic. Eyes: Conjunctivae are normal. Pupils are equal, round, and reactive to light. No scleral icterus. Neck: Neck supple. Cardiovascular: Normal rate, regular rhythm and normal heart sounds. No murmur heard. Pulmonary/Chest: She has rales. Abdominal: Soft. Bowel sounds are normal.   Peg tube   Musculoskeletal: She exhibits edema. Neurological: She is alert and oriented to person, place, and time. Skin: Skin is warm. No rash noted. No erythema. No pallor. Psychiatric: She has a normal mood and affect. Ancillary Studies: All lab and imaging reviewed for the past 24 hours. Recent Results (from the past 24 hour(s))   METABOLIC PANEL, COMPREHENSIVE    Collection Time: 10/11/17  5:41 PM   Result Value Ref Range    Sodium 136 136 - 145 mmol/L    Potassium 4.3 3.5 - 5.5 mmol/L    Chloride 101 100 - 108 mmol/L    CO2 29 21 - 32 mmol/L    Anion gap 6 3.0 - 18 mmol/L    Glucose 103 (H) 74 - 99 mg/dL    BUN 33 (H) 7.0 - 18 MG/DL    Creatinine 2.46 (H) 0.6 - 1.3 MG/DL    BUN/Creatinine ratio 13 12 - 20      GFR est AA 25 (L) >60 ml/min/1.73m2    GFR est non-AA 21 (L) >60 ml/min/1.73m2    Calcium 8.6 8.5 - 10.1 MG/DL    Bilirubin, total 0.3 0.2 - 1.0 MG/DL    ALT (SGPT) 20 13 - 56 U/L    AST (SGOT) 35 15 - 37 U/L    Alk.  phosphatase 125 (H) 45 - 117 U/L    Protein, total 8.4 (H) 6.4 - 8.2 g/dL    Albumin 2.4 (L) 3.4 - 5.0 g/dL    Globulin 6.0 (H) 2.0 - 4.0 g/dL    A-G Ratio 0.4 (L) 0.8 - 1.7     CBC WITH AUTOMATED DIFF    Collection Time: 10/11/17  5:41 PM   Result Value Ref Range    WBC 18.5 (H) 4.6 - 13.2 K/uL    RBC 2.79 (L) 4.20 - 5.30 M/uL HGB 8.4 (L) 12.0 - 16.0 g/dL    HCT 26.3 (L) 35.0 - 45.0 %    MCV 94.3 74.0 - 97.0 FL    MCH 30.1 24.0 - 34.0 PG    MCHC 31.9 31.0 - 37.0 g/dL    RDW 16.6 (H) 11.6 - 14.5 %    PLATELET 132 731 - 786 K/uL    MPV 12.4 (H) 9.2 - 11.8 FL    NEUTROPHILS 87 (H) 40 - 73 %    LYMPHOCYTES 7 (L) 21 - 52 %    MONOCYTES 4 3 - 10 %    EOSINOPHILS 2 0 - 5 %    BASOPHILS 0 0 - 2 %    ABS. NEUTROPHILS 16.0 (H) 1.8 - 8.0 K/UL    ABS. LYMPHOCYTES 1.3 0.9 - 3.6 K/UL    ABS. MONOCYTES 0.8 0.05 - 1.2 K/UL    ABS. EOSINOPHILS 0.4 0.0 - 0.4 K/UL    ABS. BASOPHILS 0.1 (H) 0.0 - 0.06 K/UL    DF AUTOMATED     NT-PRO BNP    Collection Time: 10/11/17  5:41 PM   Result Value Ref Range    NT pro-BNP 2830 (H) 0 - 450 PG/ML   CARDIAC PANEL,(CK, CKMB & TROPONIN)    Collection Time: 10/11/17  5:41 PM   Result Value Ref Range    CK 42 26 - 192 U/L    CK - MB <1.0 <3.6 ng/ml    CK-MB Index  0.0 - 4.0 %     CALCULATION NOT PERFORMED WHEN RESULT IS BELOW LINEAR LIMIT    Troponin-I, Qt. <0.02 0.0 - 0.045 NG/ML   D DIMER    Collection Time: 10/11/17  5:41 PM   Result Value Ref Range    D DIMER 0.57 (H) <0.46 ug/ml(FEU)   VANCOMYCIN, RANDOM    Collection Time: 10/11/17  5:41 PM   Result Value Ref Range    Vancomycin, random 12.3 5.0 - 40.0 UG/ML   POC G3    Collection Time: 10/11/17  5:44 PM   Result Value Ref Range    Device: NASAL CANNULA      Flow rate (POC) 4 L/M    FIO2 (POC) 0.36 %    pH (POC) 7.384 7.35 - 7.45      pCO2 (POC) 48.4 (H) 35.0 - 45.0 MMHG    pO2 (POC) 43 (LL) 80 - 100 MMHG    HCO3 (POC) 28.9 (H) 22 - 26 MMOL/L    sO2 (POC) 78 (L) 92 - 97 %    Base excess (POC) 4 mmol/L    Allens test (POC) YES      Total resp.  rate 18      Site RIGHT RADIAL      Patient temp. 98.4      Specimen type (POC) ARTERIAL      Performed by Melia Saba    EKG, 12 LEAD, INITIAL    Collection Time: 10/11/17  5:56 PM   Result Value Ref Range    Ventricular Rate 79 BPM    Atrial Rate 79 BPM    P-R Interval 142 ms    QRS Duration 98 ms    Q-T Interval 430 ms QTC Calculation (Bezet) 493 ms    Calculated P Axis 34 degrees    Calculated T Axis -87 degrees    Diagnosis       Normal sinus rhythm  Incomplete right bundle branch block  T wave abnormality, consider inferior ischemia  T wave abnormality, consider anterolateral ischemia  Prolonged QT  Abnormal ECG  When compared with ECG of 25-SEP-2017 20:49,  Vent. rate has decreased BY  83 BPM  ST no longer depressed in Anterolateral leads  Inverted T waves have replaced nonspecific T wave abnormality in Inferior   leads     POC LACTIC ACID    Collection Time: 10/11/17  6:35 PM   Result Value Ref Range    Lactic Acid (POC) 1.6 0.4 - 2.0 mmol/L   POC G3    Collection Time: 10/11/17  7:39 PM   Result Value Ref Range    Device: BIPAP      FIO2 (POC) 70 %    pH (POC) 7.356 7.35 - 7.45      pCO2 (POC) 52.5 (H) 35.0 - 45.0 MMHG    pO2 (POC) 226 (H) 80 - 100 MMHG    HCO3 (POC) 29.4 (H) 22 - 26 MMOL/L    sO2 (POC) 100 (H) 92 - 97 %    Base excess (POC) 4 mmol/L    PEEP/CPAP (POC) 8 cmH2O    PIP (POC) 18      Pressure support 10 cmH2O    Allens test (POC) YES      Total resp. rate 18      Site LEFT RADIAL      Patient temp. 98.6      Specimen type (POC) ARTERIAL      Performed by Aubry Cheadle        Assessment/Plan     Principal Problem:    Recurrent pneumonia (10/11/2017)    Active Problems:    Respiratory failure with hypoxia ([facility] [address] (10/11/2017)      KEATON (acute kidney injury) ([facility] [address] (10/11/2017)      Throat cancer  PEG Tube   DM  HTN  CAD      PLAN:    Pneumonia - HAP. Patient sputum was positive for MRSA and pseudomonas last admission. Discharged home 3 days ago. Continue Vancomycin and Ciprofloxacin. Recommend ID consult in am.    Respiratory Failure with hypoxia - 2/2 above vs ? PE. VQ SCAN ordered and pending. On BIPAP. Intensivist consulted.  Follow PCCM recommendations    KEATON - Gentle IVF     Throat cancer - s/p PEG tube     DM - SSI    DVT Prophylaxis     Elevated ProBNP -  Echo ( 9/27/17) EF 55%    DVT Prophylaxis - Heparin    Full code     Adrienne Larson MD  10/11/2017  7:33 PM

## 2017-10-11 NOTE — ED NOTES
Despite multiple attempts only 1 pediatric BC obtained  VO Dr Abhinav Masters start antibiotics with only one BC despite order for 2 and protocol requiring 2  States pt's status and condition require start of antibiotics sooner rather than later

## 2017-10-12 ENCOUNTER — HOME CARE VISIT (OUTPATIENT)
Dept: HOME HEALTH SERVICES | Facility: HOME HEALTH | Age: 52
End: 2017-10-12
Payer: MEDICARE

## 2017-10-12 LAB
ANION GAP SERPL CALC-SCNC: 6 MMOL/L (ref 3–18)
ATRIAL RATE: 79 BPM
BASOPHILS # BLD: 0 K/UL (ref 0–0.1)
BASOPHILS NFR BLD: 0 % (ref 0–3)
BUN SERPL-MCNC: 28 MG/DL (ref 7–18)
BUN/CREAT SERPL: 14 (ref 12–20)
CALCIUM SERPL-MCNC: 7.9 MG/DL (ref 8.5–10.1)
CALCULATED P AXIS, ECG09: 34 DEGREES
CALCULATED T AXIS, ECG11: -87 DEGREES
CHLORIDE SERPL-SCNC: 107 MMOL/L (ref 100–108)
CO2 SERPL-SCNC: 28 MMOL/L (ref 21–32)
CREAT SERPL-MCNC: 2 MG/DL (ref 0.6–1.3)
DIAGNOSIS, 93000: NORMAL
DIFFERENTIAL METHOD BLD: ABNORMAL
EOSINOPHIL # BLD: 0.2 K/UL (ref 0–0.4)
EOSINOPHIL NFR BLD: 1 % (ref 0–5)
ERYTHROCYTE [DISTWIDTH] IN BLOOD BY AUTOMATED COUNT: 16.4 % (ref 11.6–14.5)
EST. AVERAGE GLUCOSE BLD GHB EST-MCNC: 97 MG/DL
GLUCOSE BLD STRIP.AUTO-MCNC: 82 MG/DL (ref 70–110)
GLUCOSE BLD STRIP.AUTO-MCNC: 86 MG/DL (ref 70–110)
GLUCOSE BLD STRIP.AUTO-MCNC: 91 MG/DL (ref 70–110)
GLUCOSE BLD STRIP.AUTO-MCNC: 98 MG/DL (ref 70–110)
GLUCOSE SERPL-MCNC: 79 MG/DL (ref 74–99)
HBA1C MFR BLD: 5 % (ref 4.2–5.6)
HCT VFR BLD AUTO: 25.2 % (ref 35–45)
HGB BLD-MCNC: 8.2 G/DL (ref 12–16)
LYMPHOCYTES # BLD: 1.2 K/UL (ref 0.8–3.5)
LYMPHOCYTES NFR BLD: 5 % (ref 20–51)
MCH RBC QN AUTO: 30.5 PG (ref 24–34)
MCHC RBC AUTO-ENTMCNC: 32.5 G/DL (ref 31–37)
MCV RBC AUTO: 93.7 FL (ref 74–97)
MONOCYTES # BLD: 0 K/UL (ref 0–1)
MONOCYTES NFR BLD: 0 % (ref 2–9)
NEUTS BAND NFR BLD MANUAL: 2 % (ref 0–5)
NEUTS SEG # BLD: 21.3 K/UL (ref 1.8–8)
NEUTS SEG NFR BLD: 92 % (ref 42–75)
P-R INTERVAL, ECG05: 142 MS
PLATELET # BLD AUTO: 232 K/UL (ref 135–420)
PLATELET COMMENTS,PCOM: ADEQUATE
PMV BLD AUTO: 11.5 FL (ref 9.2–11.8)
POTASSIUM SERPL-SCNC: 4.5 MMOL/L (ref 3.5–5.5)
Q-T INTERVAL, ECG07: 430 MS
QRS DURATION, ECG06: 98 MS
QTC CALCULATION (BEZET), ECG08: 493 MS
RBC # BLD AUTO: 2.69 M/UL (ref 4.2–5.3)
RBC MORPH BLD: ABNORMAL
SODIUM SERPL-SCNC: 141 MMOL/L (ref 136–145)
VENTRICULAR RATE, ECG03: 79 BPM
WBC # BLD AUTO: 23.2 K/UL (ref 4.6–13.2)

## 2017-10-12 PROCEDURE — 74011250636 HC RX REV CODE- 250/636: Performed by: INTERNAL MEDICINE

## 2017-10-12 PROCEDURE — 3331090001 HH PPS REVENUE CREDIT

## 2017-10-12 PROCEDURE — 80048 BASIC METABOLIC PNL TOTAL CA: CPT | Performed by: HOSPITALIST

## 2017-10-12 PROCEDURE — 77030020254 HC SOL INJ D5LR LACTATED RINGER

## 2017-10-12 PROCEDURE — 74011000258 HC RX REV CODE- 258: Performed by: FAMILY MEDICINE

## 2017-10-12 PROCEDURE — 65610000006 HC RM INTENSIVE CARE

## 2017-10-12 PROCEDURE — 85025 COMPLETE CBC W/AUTO DIFF WBC: CPT | Performed by: HOSPITALIST

## 2017-10-12 PROCEDURE — 74011000250 HC RX REV CODE- 250: Performed by: PHYSICIAN ASSISTANT

## 2017-10-12 PROCEDURE — 84145 PROCALCITONIN (PCT): CPT | Performed by: PHYSICIAN ASSISTANT

## 2017-10-12 PROCEDURE — 74011000258 HC RX REV CODE- 258: Performed by: EMERGENCY MEDICINE

## 2017-10-12 PROCEDURE — 74011250636 HC RX REV CODE- 250/636: Performed by: EMERGENCY MEDICINE

## 2017-10-12 PROCEDURE — 77010033711 HC HIGH FLOW OXYGEN

## 2017-10-12 PROCEDURE — 94660 CPAP INITIATION&MGMT: CPT

## 2017-10-12 PROCEDURE — 87070 CULTURE OTHR SPECIMN AEROBIC: CPT | Performed by: FAMILY MEDICINE

## 2017-10-12 PROCEDURE — 87186 SC STD MICRODIL/AGAR DIL: CPT | Performed by: FAMILY MEDICINE

## 2017-10-12 PROCEDURE — 74011250636 HC RX REV CODE- 250/636: Performed by: HOSPITALIST

## 2017-10-12 PROCEDURE — 3331090002 HH PPS REVENUE DEBIT

## 2017-10-12 PROCEDURE — 77030018846 HC SOL IRR STRL H20 ICUM -A

## 2017-10-12 PROCEDURE — 74011258636 HC RX REV CODE- 258/636: Performed by: PHYSICIAN ASSISTANT

## 2017-10-12 PROCEDURE — 74011250637 HC RX REV CODE- 250/637: Performed by: PHYSICIAN ASSISTANT

## 2017-10-12 PROCEDURE — 87086 URINE CULTURE/COLONY COUNT: CPT | Performed by: PHYSICIAN ASSISTANT

## 2017-10-12 PROCEDURE — 74011000250 HC RX REV CODE- 250: Performed by: HOSPITALIST

## 2017-10-12 PROCEDURE — 82962 GLUCOSE BLOOD TEST: CPT

## 2017-10-12 PROCEDURE — 94640 AIRWAY INHALATION TREATMENT: CPT

## 2017-10-12 PROCEDURE — 74011250636 HC RX REV CODE- 250/636: Performed by: FAMILY MEDICINE

## 2017-10-12 PROCEDURE — 87077 CULTURE AEROBIC IDENTIFY: CPT | Performed by: FAMILY MEDICINE

## 2017-10-12 RX ORDER — SODIUM CHLORIDE 0.9 % (FLUSH) 0.9 %
10 SYRINGE (ML) INJECTION AS NEEDED
Status: DISCONTINUED | OUTPATIENT
Start: 2017-10-12 | End: 2017-10-20

## 2017-10-12 RX ORDER — DEXTROSE, SODIUM CHLORIDE, SODIUM LACTATE, POTASSIUM CHLORIDE, AND CALCIUM CHLORIDE 5; .6; .31; .03; .02 G/100ML; G/100ML; G/100ML; G/100ML; G/100ML
75 INJECTION, SOLUTION INTRAVENOUS CONTINUOUS
Status: DISCONTINUED | OUTPATIENT
Start: 2017-10-12 | End: 2017-10-13

## 2017-10-12 RX ORDER — ARFORMOTEROL TARTRATE 15 UG/2ML
15 SOLUTION RESPIRATORY (INHALATION)
Status: DISCONTINUED | OUTPATIENT
Start: 2017-10-12 | End: 2017-10-13

## 2017-10-12 RX ORDER — ALBUTEROL SULFATE 0.83 MG/ML
2.5 SOLUTION RESPIRATORY (INHALATION)
Status: DISCONTINUED | OUTPATIENT
Start: 2017-10-12 | End: 2017-10-23 | Stop reason: HOSPADM

## 2017-10-12 RX ORDER — HYDROCODONE BITARTRATE AND ACETAMINOPHEN 5; 325 MG/1; MG/1
1 TABLET ORAL
Status: DISCONTINUED | OUTPATIENT
Start: 2017-10-12 | End: 2017-10-13

## 2017-10-12 RX ORDER — FAMOTIDINE 20 MG/1
20 TABLET, FILM COATED ORAL 2 TIMES DAILY
Status: DISCONTINUED | OUTPATIENT
Start: 2017-10-12 | End: 2017-10-21

## 2017-10-12 RX ORDER — SODIUM CHLORIDE 0.9 % (FLUSH) 0.9 %
20 SYRINGE (ML) INJECTION EVERY 24 HOURS
Status: DISCONTINUED | OUTPATIENT
Start: 2017-10-12 | End: 2017-10-20

## 2017-10-12 RX ORDER — BUDESONIDE 0.5 MG/2ML
500 INHALANT ORAL
Status: DISCONTINUED | OUTPATIENT
Start: 2017-10-12 | End: 2017-10-13

## 2017-10-12 RX ORDER — SODIUM CHLORIDE 0.9 % (FLUSH) 0.9 %
10 SYRINGE (ML) INJECTION EVERY 24 HOURS
Status: DISCONTINUED | OUTPATIENT
Start: 2017-10-12 | End: 2017-10-20

## 2017-10-12 RX ORDER — SODIUM CHLORIDE 0.9 % (FLUSH) 0.9 %
10-40 SYRINGE (ML) INJECTION EVERY 8 HOURS
Status: DISCONTINUED | OUTPATIENT
Start: 2017-10-12 | End: 2017-10-20

## 2017-10-12 RX ORDER — SODIUM CHLORIDE 0.9 % (FLUSH) 0.9 %
10-30 SYRINGE (ML) INJECTION AS NEEDED
Status: DISCONTINUED | OUTPATIENT
Start: 2017-10-12 | End: 2017-10-20

## 2017-10-12 RX ORDER — HYDROMORPHONE HYDROCHLORIDE 1 MG/ML
0.2 INJECTION, SOLUTION INTRAMUSCULAR; INTRAVENOUS; SUBCUTANEOUS
Status: DISCONTINUED | OUTPATIENT
Start: 2017-10-12 | End: 2017-10-12

## 2017-10-12 RX ORDER — HEPARIN 100 UNIT/ML
300-500 SYRINGE INTRAVENOUS AS NEEDED
Status: DISCONTINUED | OUTPATIENT
Start: 2017-10-12 | End: 2017-10-23 | Stop reason: HOSPADM

## 2017-10-12 RX ADMIN — HYDROMORPHONE HYDROCHLORIDE 1 MG: 2 INJECTION INTRAMUSCULAR; INTRAVENOUS; SUBCUTANEOUS at 02:16

## 2017-10-12 RX ADMIN — Medication 10 ML: at 18:21

## 2017-10-12 RX ADMIN — PIPERACILLIN SODIUM,TAZOBACTAM SODIUM 3.38 G: 3; .375 INJECTION, POWDER, FOR SOLUTION INTRAVENOUS at 01:24

## 2017-10-12 RX ADMIN — PIPERACILLIN SODIUM,TAZOBACTAM SODIUM 4.5 G: 4; .5 INJECTION, POWDER, FOR SOLUTION INTRAVENOUS at 20:10

## 2017-10-12 RX ADMIN — Medication 20 ML: at 18:18

## 2017-10-12 RX ADMIN — Medication 10 ML: at 18:18

## 2017-10-12 RX ADMIN — HYDROMORPHONE HYDROCHLORIDE 1 MG: 2 INJECTION INTRAMUSCULAR; INTRAVENOUS; SUBCUTANEOUS at 08:32

## 2017-10-12 RX ADMIN — HYDROCODONE BITARTRATE AND ACETAMINOPHEN 1 TABLET: 5; 325 TABLET ORAL at 18:24

## 2017-10-12 RX ADMIN — HEPARIN SODIUM 5000 UNITS: 5000 INJECTION, SOLUTION INTRAVENOUS; SUBCUTANEOUS at 21:09

## 2017-10-12 RX ADMIN — ARFORMOTEROL TARTRATE 15 MCG: 15 SOLUTION RESPIRATORY (INHALATION) at 19:45

## 2017-10-12 RX ADMIN — SODIUM CHLORIDE, SODIUM LACTATE, POTASSIUM CHLORIDE, CALCIUM CHLORIDE, AND DEXTROSE MONOHYDRATE 75 ML/HR: 600; 310; 30; 20; 5 INJECTION, SOLUTION INTRAVENOUS at 20:10

## 2017-10-12 RX ADMIN — HYDROMORPHONE HYDROCHLORIDE 1 MG: 2 INJECTION INTRAMUSCULAR; INTRAVENOUS; SUBCUTANEOUS at 14:00

## 2017-10-12 RX ADMIN — CIPROFLOXACIN 400 MG: 2 INJECTION, SOLUTION INTRAVENOUS at 21:09

## 2017-10-12 RX ADMIN — PIPERACILLIN SODIUM,TAZOBACTAM SODIUM 3.38 G: 3; .375 INJECTION, POWDER, FOR SOLUTION INTRAVENOUS at 08:22

## 2017-10-12 RX ADMIN — BUDESONIDE 500 MCG: 0.5 INHALANT RESPIRATORY (INHALATION) at 19:45

## 2017-10-12 RX ADMIN — HYDROCODONE BITARTRATE AND ACETAMINOPHEN 1 TABLET: 5; 325 TABLET ORAL at 23:19

## 2017-10-12 RX ADMIN — PIPERACILLIN SODIUM,TAZOBACTAM SODIUM 3.38 G: 3; .375 INJECTION, POWDER, FOR SOLUTION INTRAVENOUS at 13:00

## 2017-10-12 RX ADMIN — SODIUM CHLORIDE 75 ML/HR: 900 INJECTION, SOLUTION INTRAVENOUS at 14:03

## 2017-10-12 RX ADMIN — SODIUM CHLORIDE, SODIUM LACTATE, POTASSIUM CHLORIDE, CALCIUM CHLORIDE, AND DEXTROSE MONOHYDRATE 75 ML/HR: 600; 310; 30; 20; 5 INJECTION, SOLUTION INTRAVENOUS at 18:14

## 2017-10-12 RX ADMIN — ALTEPLASE 2 MG: 2.2 INJECTION, POWDER, LYOPHILIZED, FOR SOLUTION INTRAVENOUS at 13:55

## 2017-10-12 RX ADMIN — HEPARIN SODIUM 5000 UNITS: 5000 INJECTION, SOLUTION INTRAVENOUS; SUBCUTANEOUS at 06:03

## 2017-10-12 RX ADMIN — Medication 10 ML: at 21:10

## 2017-10-12 RX ADMIN — SODIUM CHLORIDE 1250 MG: 900 INJECTION, SOLUTION INTRAVENOUS at 20:10

## 2017-10-12 RX ADMIN — FAMOTIDINE 20 MG: 20 TABLET ORAL at 20:20

## 2017-10-12 NOTE — ED NOTES
Spoke with nuclear med re: VQ scan. Patient currently unable to tolerate being off of bipap for 7 minutes.

## 2017-10-12 NOTE — ROUTINE PROCESS
Called to place PICC but IJ tunneled PICC in place. Tip placement confirmed mid SVC. Purple lumen flushes but does not have blood return, red lumen will not flush or return blood. Contacted Dr. Faustina Wilson and cath flow ordered for salvage attempt.

## 2017-10-12 NOTE — PROGRESS NOTES
Reason for Renal Dosing:  Per Renal Dosing Policy    Patient clinical status and labs ordered/reviewed. Pt Weight Weight: 68 kg (150 lb)   Serum Creatinine Lab Results   Component Value Date/Time    Creatinine 2.00 10/12/2017 08:15 AM       Creatinine Clearance Estimated Creatinine Clearance: 32.2 mL/min (based on Cr of 2). BUN Lab Results   Component Value Date/Time    BUN 28 10/12/2017 08:15 AM       WBC Lab Results   Component Value Date/Time    WBC 23.2 10/12/2017 08:15 AM      Temperature Temp: (!) 37.6 °F (3.1 °C)     HR Pulse (Heart Rate): (!) 110       BP BP: 132/70           Drug type: Antibiotic indicated for HAP     Drug/dose: Zosyn 4.5 grams every 6 hours was adjusted to Zosyn 3.375 grams every 6 hours while in ER, then on floor 3.375 grams every 6 hours adjusted back to 4.5 grams every 8 hours HECTOR. Continue to monitor.     Signed Fifi Cheung PHARMD  Date 10/12/2017  Time 5:32 PM

## 2017-10-12 NOTE — ED NOTES
Pt placed on hospital bed for increased comfort while in ED awaiting inpt bed. This patient has been recommended for inpatient treatment and is awaiting placement. The ED provider has reviewed the patients history and medications, diet, and treatments and will order as necessary.  The patient will be observed and attended to as their medical needs require and/or policy dictates

## 2017-10-12 NOTE — PROGRESS NOTES
Pharmacy Dosing Services: Vancomycin    Indication: HAP    Day of therapy: Continuing from home healthcare    Other Antimicrobials (Include dose, start day & day of therapy):  Zosyn 3.375 grams every 6 hours, Cipro 500 mg every 24 hours Day 0      Loading dose (date given): 1,000 mg in addition to previous home health care doses  Current Maintenance dose: 1,250 mg every 36 hours    Goal Vancomycin Level: 15-20 mcg/mL  (Trough 15-20 for most infections, 20 for meningitis/osteomyelitis, pre-HD level ~25)    Vancomycin Level (if drawn): 12.3 mcg/mL random level     Significant Cultures: Blood culture taken    Renal function stable? (unstable defined as SCr increase of 0.5 mg/dL or > 50% increase from baseline, whichever is greater) (Y/N): Y     CAPD, Hemodialysis or Renal Replacement Therapy (Y/N): N     Recent Labs      10/11/17   1741  10/09/17   0450   CREA  2.46*  1.20   BUN  33*  22*   WBC  18.5*  6.2     Temp (24hrs), Av °F (36.7 °C), Min:97.6 °F (36.4 °C), Max:98.4 °F (36.9 °C)    Creatinine Clearance (Creatinine Clearance (ml/min)): 26.2 mL/min     Regimen assessment: Continuing vancomycin HAP treatment from home healthcare  Maintenance dose: 1,250 mg every 36 hours  Next scheduled level: 10- at [address] [facility] will follow daily and adjust medications as appropriate for renal function and/or serum levels. Thank you,  [employer]IELA DENG

## 2017-10-12 NOTE — PROGRESS NOTES
attempted to complete  the initial Spiritual Assessment of the patient in bed 10 of the emergency room and offer Pastoral Care support to her. Patient however was resting peacefully on the by-pap machine. No family were seen at time of this visit. Patient does not have any Nondenominational/cultural needs that will affect patients preferences in health care.    Chaplains will continue to follow and will provide pastoral care on an as needed/requested basis     Chaplain Sachin Johnston   Board Certified [address] [place] [address]   [phone]

## 2017-10-12 NOTE — ED NOTES
SIDE RAILS UP X 2   BED IN LOW AND LOCKED  POSITION  FLUIDS AND TOILETING OFFERED  PLAN OF CARE REVIEWED WITH PT/FAMILY  IV SITE ASSESSED   PAIN ASSESSED  COMFORT ADDRESSED  CALL BELL WITHIN REACH      Resting on hospital bed. No acute discomfort or distress.

## 2017-10-12 NOTE — CONSULTS
[facility]  Pulmonary, Critical Care, and Sleep Medicine    Name: Artis Roe MRN: [id number]   : 1965 Hospital: [address] [place] [address]   Date: 10/11/2017        Pulmonary and Critical Care Initial Patient Consult    Requesting MD:   Dr Wily Gunter MD                                  Reason for CC Consult:Ac/Chronic Respiratory Failure  IMPRESSION:   Patient Active Problem List   Diagnosis Code    Acute respiratory failure with hypercapnia ([facility] J96.02    Sepsis ([facility] A41.9    HCAP (healthcare-associated pneumonia) J18.9    Hypertension I10    Diabetes mellitus ([facility] E11.9    SOB (shortness of breath) R06.02    Dysphagia R13.10    History of throat cancer Z85.819    Recurrent pneumonia J18.9    Respiratory failure with hypoxia ([facility] J96.91    KEATON (acute kidney injury) ([facility] N17.9 ·   Acute on Chronic Respiratory Failure, both Hypoxemic and Hypercapneic  · Unresolved Pneumonia vs  · New Aspiration pneumonia: LLL, previously had upper lobes predominant Alveolar Process  · ANEMIA  · LEUKOCYTOSIS  · KEATON  · ABNORMAL LFTS  · VISCERAL PROTEIN DEPLETION  · IRBBB with concordant ST/T changes      RECOMMENDATIONS:   · Sepsis protocol activated  · Blood C/S x 2  · O2  · NIPPV with BIPAP: She is known to have a narrow Upper airways secundary to CA and therapy for it  · Empiric ATBx coverage: Broad and de escalation as soon as feasible  · ID consult  · Gastric and DVT proph  · BDs  · ABGs  · PCXR  · BMP  · CBC  · Will inform Dr Perez Ritter in AM as his ICU team will continue her PCCM Care     Subjective/History: This patient has been seen and evaluated at the request of Dr. Wily Gunter for Ac/Chr Respiratory Failure  Patient is a 54 y.o. female recently admitted to [facility] with respiratory failure and recently discharged on home O2 and ATBx. She has known Dx of Throat CA and was admitted here and required MV temporarily.   Was brought by EMS after they were summoned to her home for increasing LYN and decreased SPO2 as low as in the 40% when EMTs first arrived. Was placed on NRBM (Near 100% FiO2): and her SPO2 rapidly increased to 95-97%. In ER she was febrile and declined to have her rectal temp measured. Cultures obtained expeditiously and IV ATBx started rapidly at the ER. As D/W with Dr Poppy Oneil, rapid improvement with O2 and NIPPV. The patient is critically ill and can not provide additional history due to Ventilated on BIPAP. Past Medical History:   Diagnosis Date    Anemia     Cancer ([facility]     throat    Coronary artery disease     Diabetes ([facility]     Diabetes mellitus ([facility]     ETOH abuse     HTN (hypertension)     Hypertension     Hypothyroid     Lupus     Osteonecrosis (HCC)     of jaw    PEG adjustment, replacement, or removal     S/P thoracentesis     Throat cancer [facility])       Past Surgical History:   Procedure Laterality Date    ABDOMEN SURGERY PROC UNLISTED      feeding tube placement    HX  SECTION      HX HEENT      throat cancer biopsy      Prior to Admission medications    Medication Sig Start Date End Date Taking? Authorizing Provider   folic acid (FOLVITE) 1 mg tablet Take 0.4 mg by mouth daily. Nikita Le MD   cetirizine (ZYRTEC) 10 mg tablet Take 10 mg by mouth daily as needed for Allergies, Rhinitis or Itching. Historical Provider   gabapentin (NEURONTIN) 300 mg capsule Take 400 mg by mouth three (3) times daily. Nikita Le MD   OXYGEN-AIR DELIVERY SYSTEMS 2 L by Nasal route continuous. Historical Provider   furosemide (LASIX) 20 mg tablet Take 2 Tabs by mouth daily. 10/9/17   Pablo Sparks MD   HYDROcodone-acetaminophen [facility]) 5-325 mg per tablet Take 1 Tab by mouth every four (4) hours as needed for Pain. Max Daily Amount: 6 Tabs. 10/9/17   Pablo Sparks MD   metoprolol tartrate (LOPRESSOR) 100 mg IR tablet Take 1 Tab by mouth every twelve (12) hours.  10/9/17   Pablo Sparks MD   ciprofloxacin HCl (CIPRO) 750 mg tablet Take 1 Tab by mouth every twelve (12) hours. 10/9/17   Justen More MD   doxycycline (ADOXA) 100 mg tablet Take 1 Tab by mouth two (2) times a day. 10/9/17   Justen More MD   cloNIDine HCl (CATAPRES) 0.1 mg tablet Take 0.1 mg by mouth two (2) times a day. Nikita Le MD   multivitamin (ONE A DAY) tablet Take 1 Tab by mouth daily. Nikita Le MD   polyethylene glycol (MIRALAX) 17 gram/dose powder Take 17 g by mouth daily. Nikita Le MD   docusate sodium (COLACE) 100 mg capsule Take 100 mg by mouth two (2) times a day. Nikita Le MD   amLODIPine-benazepril (LOTREL) 5-10 mg per capsule Take 1 Cap by mouth daily. Nikita Le MD   levothyroxine (SYNTHROID) 100 mcg tablet Take  by mouth Daily (before breakfast). Nikita Le MD   amylase-lipase-protease (CREON) capsule Take  by mouth three (3) times daily (with meals). Nikita Le MD   pantoprazole (PROTONIX) 40 mg tablet Take 1 Tab by mouth daily. 1/5/14   Barb Ames DO   ondansetron hcl (ZOFRAN) 4 mg tablet Take 1 Tab by mouth every eight (8) hours as needed for Nausea. 1/5/14   Barb Ames DO   aspirin 81 mg tablet Take 81 mg by mouth.       Nikita Le MD     Current Facility-Administered Medications   Medication Dose Route Frequency    [START ON 10/12/2017] piperacillin-tazobactam (ZOSYN) 3.375 g in 0.9% sodium chloride (MBP/ADV) 100 mL MBP  3.375 g IntraVENous Q6H    ciprofloxacin (CIPRO) 400 mg IVPB (premix)  400 mg IntraVENous Q24H    0.9% sodium chloride infusion  75 mL/hr IntraVENous CONTINUOUS    heparin (porcine) injection 5,000 Units  5,000 Units SubCUTAneous Q8H    vancomycin (VANCOCIN) 1,020 mg in 0.9% sodium chloride 250 mL IVPB  15 mg/kg IntraVENous Q24H    insulin lispro (HUMALOG) injection   SubCUTAneous Q6H    VANCOMYCIN INFORMATION NOTE   Other Rx Dosing/Monitoring     Allergies   Allergen Reactions    Amlodipine Swelling     Leg swelling per patient    Morphine Rash      Social History Substance Use Topics    Smoking status: Former Smoker    Smokeless tobacco: Never Used    Alcohol use No      Family History   Problem Relation Age of Onset    Lupus Sister         Review of Systems:  Review of systems not obtained due to patient factors. Objective:   Vital Signs:    Visit Vitals    BP 91/59    Pulse 70    Temp 98.4 °F (36.9 °C)    Resp 12    Wt 68 kg (150 lb)    SpO2 100%    BMI 25.75 kg/m2       O2 Device: BIPAP   O2 Flow Rate (L/min): 6 l/min   Temp (24hrs), Av °F (36.7 °C), Min:97.6 °F (36.4 °C), Max:98.4 °F (36.9 °C)       Intake/Output:   Last shift:         Last 3 shifts:    No intake or output data in the 24 hours ending 10/11/17 2142  Hemodynamics:   . @MAP     . @CVP       Ventilator Settings:  Mode Rate Tidal Volume Pressure FiO2 PEEP            50 %       Peak airway pressure:      Minute ventilation: 6 l/min      ARDS network Guidelines: Lung protective strategy and Pl pressure goals____________    Physical Exam:    General:  Alert, cooperative, no distress, appears stated age. Head:  Normocephalic, without obvious abnormality, atraumatic. Eyes:  Conjunctivae/corneas clear. PERRL, EOMs intact. Nose: Nares normal. Septum midline. Mucosa normal. No drainage or sinus tenderness. Throat: BIPAP MAsk   Neck: Supple, symmetrical, trachea midline, no adenopathy, thyroid: no enlargment/tenderness/nodules, no carotid bruit and no JVD. Back:   Symmetric, no curvature. ROM normal.   Lungs:   Rhonchi to auscultation bilaterally. Chest wall:  No tenderness or deformity. Heart:  Regular rate and rhythm, S1, S2 normal, no murmur, click, rub or gallop. Abdomen:   Soft, non-tender. Bowel sounds normal. No masses,  No organomegaly. Extremities: Extremities normal, atraumatic, no cyanosis or edema. Pulses: 2+ and symmetric all extremities.    Skin: Skin color, texture, turgor normal. No rashes or lesions   Lymph nodes: Cervical, supraclavicular, and axillary nodes normal.   Neurologic: Grossly non focal       Data:     Recent Results (from the past 24 hour(s))   METABOLIC PANEL, COMPREHENSIVE    Collection Time: 10/11/17  5:41 PM   Result Value Ref Range    Sodium 136 136 - 145 mmol/L    Potassium 4.3 3.5 - 5.5 mmol/L    Chloride 101 100 - 108 mmol/L    CO2 29 21 - 32 mmol/L    Anion gap 6 3.0 - 18 mmol/L    Glucose 103 (H) 74 - 99 mg/dL    BUN 33 (H) 7.0 - 18 MG/DL    Creatinine 2.46 (H) 0.6 - 1.3 MG/DL    BUN/Creatinine ratio 13 12 - 20      GFR est AA 25 (L) >60 ml/min/1.73m2    GFR est non-AA 21 (L) >60 ml/min/1.73m2    Calcium 8.6 8.5 - 10.1 MG/DL    Bilirubin, total 0.3 0.2 - 1.0 MG/DL    ALT (SGPT) 20 13 - 56 U/L    AST (SGOT) 35 15 - 37 U/L    Alk. phosphatase 125 (H) 45 - 117 U/L    Protein, total 8.4 (H) 6.4 - 8.2 g/dL    Albumin 2.4 (L) 3.4 - 5.0 g/dL    Globulin 6.0 (H) 2.0 - 4.0 g/dL    A-G Ratio 0.4 (L) 0.8 - 1.7     CBC WITH AUTOMATED DIFF    Collection Time: 10/11/17  5:41 PM   Result Value Ref Range    WBC 18.5 (H) 4.6 - 13.2 K/uL    RBC 2.79 (L) 4.20 - 5.30 M/uL    HGB 8.4 (L) 12.0 - 16.0 g/dL    HCT 26.3 (L) 35.0 - 45.0 %    MCV 94.3 74.0 - 97.0 FL    MCH 30.1 24.0 - 34.0 PG    MCHC 31.9 31.0 - 37.0 g/dL    RDW 16.6 (H) 11.6 - 14.5 %    PLATELET 889 366 - 340 K/uL    MPV 12.4 (H) 9.2 - 11.8 FL    NEUTROPHILS 87 (H) 40 - 73 %    LYMPHOCYTES 7 (L) 21 - 52 %    MONOCYTES 4 3 - 10 %    EOSINOPHILS 2 0 - 5 %    BASOPHILS 0 0 - 2 %    ABS. NEUTROPHILS 16.0 (H) 1.8 - 8.0 K/UL    ABS. LYMPHOCYTES 1.3 0.9 - 3.6 K/UL    ABS. MONOCYTES 0.8 0.05 - 1.2 K/UL    ABS. EOSINOPHILS 0.4 0.0 - 0.4 K/UL    ABS.  BASOPHILS 0.1 (H) 0.0 - 0.06 K/UL    DF AUTOMATED     NT-PRO BNP    Collection Time: 10/11/17  5:41 PM   Result Value Ref Range    NT pro-BNP 2830 (H) 0 - 450 PG/ML   CARDIAC PANEL,(CK, CKMB & TROPONIN)    Collection Time: 10/11/17  5:41 PM   Result Value Ref Range    CK 42 26 - 192 U/L    CK - MB <1.0 <3.6 ng/ml    CK-MB Index  0.0 - 4.0 %     CALCULATION NOT PERFORMED WHEN RESULT IS BELOW LINEAR LIMIT    Troponin-I, Qt. <0.02 0.0 - 0.045 NG/ML   D DIMER    Collection Time: 10/11/17  5:41 PM   Result Value Ref Range    D DIMER 0.57 (H) <0.46 ug/ml(FEU)   VANCOMYCIN, RANDOM    Collection Time: 10/11/17  5:41 PM   Result Value Ref Range    Vancomycin, random 12.3 5.0 - 40.0 UG/ML   POC G3    Collection Time: 10/11/17  5:44 PM   Result Value Ref Range    Device: NASAL CANNULA      Flow rate (POC) 4 L/M    FIO2 (POC) 0.36 %    pH (POC) 7.384 7.35 - 7.45      pCO2 (POC) 48.4 (H) 35.0 - 45.0 MMHG    pO2 (POC) 43 (LL) 80 - 100 MMHG    HCO3 (POC) 28.9 (H) 22 - 26 MMOL/L    sO2 (POC) 78 (L) 92 - 97 %    Base excess (POC) 4 mmol/L    Allens test (POC) YES      Total resp. rate 18      Site RIGHT RADIAL      Patient temp. 98.4      Specimen type (POC) ARTERIAL      Performed by Mehdi Nunez    EKG, 12 LEAD, INITIAL    Collection Time: 10/11/17  5:56 PM   Result Value Ref Range    Ventricular Rate 79 BPM    Atrial Rate 79 BPM    P-R Interval 142 ms    QRS Duration 98 ms    Q-T Interval 430 ms    QTC Calculation (Bezet) 493 ms    Calculated P Axis 34 degrees    Calculated T Axis -87 degrees    Diagnosis       Normal sinus rhythm  Incomplete right bundle branch block  T wave abnormality, consider inferior ischemia  T wave abnormality, consider anterolateral ischemia  Prolonged QT  Abnormal ECG  When compared with ECG of 25-SEP-2017 20:49,  Vent.  rate has decreased BY  83 BPM  ST no longer depressed in Anterolateral leads  Inverted T waves have replaced nonspecific T wave abnormality in Inferior   leads     POC LACTIC ACID    Collection Time: 10/11/17  6:35 PM   Result Value Ref Range    Lactic Acid (POC) 1.6 0.4 - 2.0 mmol/L   POC G3    Collection Time: 10/11/17  7:39 PM   Result Value Ref Range    Device: BIPAP      FIO2 (POC) 70 %    pH (POC) 7.356 7.35 - 7.45      pCO2 (POC) 52.5 (H) 35.0 - 45.0 MMHG    pO2 (POC) 226 (H) 80 - 100 MMHG    HCO3 (POC) 29.4 (H) 22 - 26 MMOL/L    sO2 (POC) 100 (H) 92 - 97 %    Base excess (POC) 4 mmol/L    PEEP/CPAP (POC) 8 cmH2O    PIP (POC) 18      Pressure support 10 cmH2O    Allens test (POC) YES      Total resp. rate 18      Site LEFT RADIAL      Patient temp. 98.6      Specimen type (POC) ARTERIAL      Performed by Avani Katz              Telemetry:normal sinus rhythm    Imaging:  I have personally reviewed the patients radiographs and have reviewed the reports: All notes read / D/W Dr Al Servin, Seen in the ER D/W ER RN   Best practice :  Not applicableCardiovascular:  An arterial systolic blood pressure (SBP) of < or equal to 90mm Hg or a mean arterial pressure (MAP) < or equal to 70mm Hg for at least 1 hour despite adequate fluid resuscitation or adequate intravascular volume status; or the need for vasopressors to maintain SBP>or equal to 90mm Hg or MAP > or equal to 70mm Hg., Renal: Urine output < 0.5mL/kg/hr for 1hour, despite adequate fluid resuscitation, Respiratory: PaO2/FiO2 < or equal to 250, or < or equal to 200 if the lung was the sole organ meeting the dysfunction criteria, Hematological: Platelet count of < 74,271/NX6 or a 50% decrease in the platelet count from the highest value recorded over the previous 3 days and Unexplained Metabolic: Acidosis pH < or equal to 7.3 or base deficit > or equal to 5.0mmol/L and a plasma lactate level > 1.5 times the upper limit of normal.BiPAP, Continue current therapy, Pulmonary consult   therapy at present     Total critical care time exclusive of procedures: 55 minutes  Mak Botello MD

## 2017-10-12 NOTE — PROGRESS NOTES
Problem: Falls - Risk of  Goal: *Absence of Falls  Document Kapil Fall Risk and appropriate interventions in the flowsheet.    Outcome: Progressing Towards Goal  Fall Risk Interventions:  Mobility Interventions: Bed/chair exit alarm, Patient to call before getting OOB           Medication Interventions: Bed/chair exit alarm, Patient to call before getting OOB     Elimination Interventions: Bed/chair exit alarm, Call light in reach

## 2017-10-12 NOTE — ED NOTES
Patient with PICC line R. Upper chest.  One port flushes with difficulty no blood return. Second port does not flush or return blood. PICC Nurse here might attempt TPA.

## 2017-10-12 NOTE — PROGRESS NOTES
10/12/17 1525   Oxygen Therapy   O2 Sat (%) 100 %   Pulse via Oximetry 88 beats per minute   O2 Device Hi flow nasal cannula   O2 Flow Rate (L/min) 45 l/min   FIO2 (%) 95 %     Patient taken off BIPAP machine around 1337 and not maintaining sats > 90% on 4 liters nasal cannula. Patient currently tolerating HFNC without any respiratory distress at this time. Will continue to monitor pt status and titrate FIO2 as tolerated per patient.

## 2017-10-12 NOTE — ED NOTES
Blood hemolyzed. Tech attempted to get blood unsuccessful. Called for PICC line nurse on her way in to work. Don't know when she will arrive.

## 2017-10-12 NOTE — PROGRESS NOTES
Titrated patient off BIPAP and placed patient on 4 lpm via NC. Patient Spo2 91-93%. - Will continue to monitor patient closely.

## 2017-10-12 NOTE — PROGRESS NOTES
Medicine Progress Note    Patient: Donald Hudson   Age:  54 y.o.  DOA: 10/11/2017   Admit Dx / CC: Respiratory failure with hypoxia ([address]  Recurrent pneumonia  Recurrent pneumonia  Respiratory failure with hypoxia ([address]  LOS:  LOS: 1 day     Assessment/Plan   Principal Problem:    Recurrent pneumonia (10/11/2017)    Active Problems:    Respiratory failure with hypoxia ([address] (10/11/2017)      KEATON (acute kidney injury) ([address] (10/11/2017)        Additional Plan notes     1)  Recurrent PNA   - recent extended hospital stay for Pseudomonal PNA about 3 weeks   - she was treated with IV vanc, zosyn and levaquin and cipro followed by change to doxy and cipro at discharge   - Back on vanc and zosyn now. Will call ID in am for consult   - off bipap on hi sunil. Step down today, pulm consult in am.    2)  Acute on chronic resp failure   - 2/2 above      DISPO     Anticipated Date of Discharge: 4 days  Anticipated Disposition (home, SNF) : home    Subjective:   Patient seen and examined. Patient without complaints    Objective:     Visit Vitals    /70    Pulse (!) 110    Temp (!) 37.6 °F (3.1 °C)    Resp 21    Wt 68 kg (150 lb)    SpO2 98%    BMI 25.75 kg/m2       Physical Exam:  General appearance: alert, cooperative, no distress, appears stated age  Head: Normocephalic, without obvious abnormality, atraumatic  Neck: supple, trachea midline  Lungs: B/L course breath sounds  Heart: regular rate and rhythm, S1, S2 normal, no murmur, click, rub or gallop  Abdomen: soft, non-tender.  Bowel sounds normal. No masses,  no organomegaly  Extremities: extremities normal, atraumatic, no cyanosis or edema  Skin: Skin color, texture, turgor normal. No rashes or lesions    Intake and Output:  Current Shift:     Last three shifts:  10/10 1901 - 10/12 0700  In: 898.8 [I.V.:898.8]  Out: 500 [Urine:500]    Lab/Data Reviewed:  CMP:   Lab Results   Component Value Date/Time     10/12/2017 08:15 AM    K 4.5 10/12/2017 08:15 AM     10/12/2017 08:15 AM    CO2 28 10/12/2017 08:15 AM    AGAP 6 10/12/2017 08:15 AM    GLU 79 10/12/2017 08:15 AM    BUN 28 (H) 10/12/2017 08:15 AM    CREA 2.00 (H) 10/12/2017 08:15 AM    GFRAA 32 (L) 10/12/2017 08:15 AM    GFRNA 26 (L) 10/12/2017 08:15 AM    CA 7.9 (L) 10/12/2017 08:15 AM     CBC:   Lab Results   Component Value Date/Time    WBC 23.2 (H) 10/12/2017 08:15 AM    HGB 8.2 (L) 10/12/2017 08:15 AM    HCT 25.2 (L) 10/12/2017 08:15 AM     10/12/2017 08:15 AM     All Cardiac Markers in the last 24 hours: No results found for: CPK, CK, CKMMB, CKMB, RCK3, CKMBT, CKNDX, CKND1, LAVELLE, TROPT, TROIQ, RAMIREZ, TROPT, TNIPOC, BNP, BNPP    Medications Reviewed:  Current Facility-Administered Medications   Medication Dose Route Frequency    albuterol (PROVENTIL VENTOLIN) nebulizer solution 2.5 mg  2.5 mg Nebulization Q4H PRN    alteplase (CATHFLO) 1 mg in sterile water (preservative free) 1 mL injection  1 mg InterCATHeter ONCE    heparin (porcine) pf 300-500 Units  300-500 Units InterCATHeter PRN    sodium chloride (NS) flush 10-30 mL  10-30 mL InterCATHeter PRN    sodium chloride (NS) flush 10 mL  10 mL InterCATHeter Q24H    sodium chloride (NS) flush 10 mL  10 mL InterCATHeter PRN    sodium chloride (NS) flush 10-40 mL  10-40 mL InterCATHeter Q8H    sodium chloride (NS) flush 20 mL  20 mL InterCATHeter Q24H    famotidine (PEPCID) tablet 20 mg  20 mg Oral BID    HYDROmorphone (DILAUDID) injection 0.2 mg  0.2 mg IntraVENous Q4H PRN    budesonide (PULMICORT) 500 mcg/2 ml nebulizer suspension  500 mcg Nebulization BID RT    arformoterol (BROVANA) neb solution 15 mcg  15 mcg Nebulization BID RT    sodium chloride (NS) flush 5-10 mL  5-10 mL IntraVENous PRN    piperacillin-tazobactam (ZOSYN) 3.375 g in 0.9% sodium chloride (MBP/ADV) 100 mL MBP  3.375 g IntraVENous Q6H    ciprofloxacin (CIPRO) 400 mg IVPB (premix)  400 mg IntraVENous Q24H    0.9% sodium chloride infusion  75 mL/hr IntraVENous CONTINUOUS    naloxone (NARCAN) injection 0.4 mg  0.4 mg IntraVENous PRN    heparin (porcine) injection 5,000 Units  5,000 Units SubCUTAneous Q8H    insulin lispro (HUMALOG) injection   SubCUTAneous Q6H    glucose chewable tablet 16 g  4 Tab Oral PRN    glucagon (GLUCAGEN) injection 1 mg  1 mg IntraMUSCular PRN    dextrose (D50W) injection syrg 12.5-25 g  25-50 mL IntraVENous PRN    vancomycin (VANCOCIN) 1,250 mg in 0.9% sodium chloride 250 mL IVPB  1,250 mg IntraVENous Q36H    [START ON 10/15/2017] VANCOMYCIN INFORMATION NOTE   Other ONCE    VANCOMYCIN INFORMATION NOTE   Other Rx Dosing/Monitoring       Brandon Frost MD    October 12, 2017

## 2017-10-12 NOTE — PROGRESS NOTES
[address] [place] [address] [facility]  ICU Progress Note      Name: Jayy Araujo   : 1965   MRN: [id number]   Date: 10/12/2017 5:00 PM     [x]I have reviewed the flowsheet and previous days notes. Events overnight reviewed and discussed with nursing staff. Vital signs and records reviewed.     55 yo female with PMH throat cancerm dysphagia, PEG, DM, chronic anemia, hypothyroidism, HTN, recent admission for MRSA and pseudomonas PNA requiring intubation-discharged 10/9 presented to the ED from home with SOB, hypoxia (sat 44%) found to have PNA, started on BiPAP and admitted to ICU.      10/12/2017  Pt transferred to ICU from ED  WBC up, currently on HFNC, off BiPAP  Cr improved, LA normal, good UO  BP stable, tachy in 100's  Sputum GS prelim GPC in pairs in groups    Medication Review:  · Pressors -  · Sedation -  · Antibiotics -cipro, zosyn, vanc  · Pain -  · GI/ DVT -pepcid, heparin  · Others (other gtts)    Safety Bundles: VAP Bundle/ CAUTI/ Severe Sepsis Protocol/ Electrolyte Replacement Protocol    Vital Signs:    Visit Vitals    /70    Pulse (!) 110    Temp (!) 37.6 °F (3.1 °C)    Resp 21    Wt 68 kg (150 lb)    SpO2 98%    BMI 25.75 kg/m2       O2 Device: Hi flow nasal cannula   O2 Flow Rate (L/min): 45 l/min   Temp (24hrs), Av.8 °F (5.4 °C), Min:37.4 °F (3 °C), Max:98.4 °F (36.9 °C)       Intake/Output:   Last shift:         Last 3 shifts: 10/10 1901 - 10/12 0700  In: 898.8 [I.V.:898.8]  Out: 500 [Urine:500]    Intake/Output Summary (Last 24 hours) at 10/12/17 1700  Last data filed at 10/12/17 6865   Gross per 24 hour   Intake           898.75 ml   Output              500 ml   Net           398.75 ml          Physical Exam:    General/Neuro: Awake and alert, NAD, oriented, moves all extremities, shivering, no FND  HEENT:  Anicteric sclerae; pink palpebral conjunctivae; mucosa dry, poor dentition, HFNC in place  Resp:  Adequate AE B, +accessory muscle use; +few rhonchi bilaterally R>L, R chest wall tunneled catheter in place  CV:  Tachycardic, no m  GI:  Abdomen soft, non-tender; (+) active bowel sounds, +PEG in place  Extremities:  +trace edema BLE, L>R chronic per pt and RN  Skin:  Warm; no rashes/ lesions noted  Devices:   Holly, R chest tunneled catheter      DATA:     Current Facility-Administered Medications   Medication Dose Route Frequency    albuterol (PROVENTIL VENTOLIN) nebulizer solution 2.5 mg  2.5 mg Nebulization Q4H PRN    alteplase (CATHFLO) 1 mg in sterile water (preservative free) 1 mL injection  1 mg InterCATHeter ONCE    heparin (porcine) pf 300-500 Units  300-500 Units InterCATHeter PRN    sodium chloride (NS) flush 10-30 mL  10-30 mL InterCATHeter PRN    sodium chloride (NS) flush 10 mL  10 mL InterCATHeter Q24H    sodium chloride (NS) flush 10 mL  10 mL InterCATHeter PRN    sodium chloride (NS) flush 10-40 mL  10-40 mL InterCATHeter Q8H    sodium chloride (NS) flush 20 mL  20 mL InterCATHeter Q24H    sodium chloride (NS) flush 5-10 mL  5-10 mL IntraVENous PRN    piperacillin-tazobactam (ZOSYN) 3.375 g in 0.9% sodium chloride (MBP/ADV) 100 mL MBP  3.375 g IntraVENous Q6H    ciprofloxacin (CIPRO) 400 mg IVPB (premix)  400 mg IntraVENous Q24H    HYDROmorphone (DILAUDID) injection 1 mg  1 mg IntraVENous Q4H PRN    0.9% sodium chloride infusion  75 mL/hr IntraVENous CONTINUOUS    naloxone (NARCAN) injection 0.4 mg  0.4 mg IntraVENous PRN    heparin (porcine) injection 5,000 Units  5,000 Units SubCUTAneous Q8H    insulin lispro (HUMALOG) injection   SubCUTAneous Q6H    glucose chewable tablet 16 g  4 Tab Oral PRN    glucagon (GLUCAGEN) injection 1 mg  1 mg IntraMUSCular PRN    dextrose (D50W) injection syrg 12.5-25 g  25-50 mL IntraVENous PRN    vancomycin (VANCOCIN) 1,250 mg in 0.9% sodium chloride 250 mL IVPB  1,250 mg IntraVENous Q36H    [START ON 10/15/2017] VANCOMYCIN INFORMATION NOTE   Other ONCE    VANCOMYCIN INFORMATION NOTE   Other Rx Dosing/Monitoring         Labs: Results:       Chemistry Recent Labs      10/12/17   0815  10/11/17   1741   GLU  79  103*   NA  141  136   K  4.5  4.3   CL  107  101   CO2  28  29   BUN  28*  33*   CREA  2.00*  2.46*   CA  7.9*  8.6   AGAP  6  6   BUCR  14  13   AP   --   125*   TP   --   8.4*   ALB   --   2.4*   GLOB   --   6.0*   AGRAT   --   0.4*      CBC w/Diff Recent Labs      10/12/17   0815  10/11/17   1741   WBC  23.2*  18.5*   RBC  2.69*  2.79*   HGB  8.2*  8.4*   HCT  25.2*  26.3*   PLT  232  249   GRANS  92*  87*   LYMPH  5*  7*   EOS  1  2      Coagulation No results for input(s): PTP, INR, APTT in the last 72 hours. No lab exists for component: INREXT    Liver Enzymes Recent Labs      10/11/17   1741   TP  8.4*   ALB  2.4*   AP  125*   SGOT  35      ABG Lab Results   Component Value Date/Time    PHI 7.356 10/11/2017 07:39 PM    PCO2I 52.5 (H) 10/11/2017 07:39 PM    PO2I 226 (H) 10/11/2017 07:39 PM    HCO3I 29.4 (H) 10/11/2017 07:39 PM    FIO2I 70 10/11/2017 07:39 PM      Microbiology Recent Labs      10/12/17   0530  10/11/17   1840  10/11/17   1832   CULT  PENDING  NO GROWTH AFTER 12 HOURS  NO GROWTH AFTER 12 HOURS          Telemetry: [x]Sinus to ST []A-flutter []Paced    []A-fib []Multiple PVCs                    Imaging:  [x]I have personally reviewed the patients radiographs  []Radiographs reviewed with radiologist   []No change from prior, tubes and lines in adequate position  []Improved   []Worsening    10/11/17 CXR    1.  Bilateral upper lower lobe patchy confluent parenchymal opacities,  representing pneumonia/edema which is similar to the most recent chest x-ray.         IMPRESSION:   · Acute hypoxemic respiratory failure requiring BIPAP likely due to recurrent PNA, probable aspiration, bilateral patchy opacities on CXR  · Recent MRSA and pseudomonas HCAP  · KEATON  · Chronic diastolic CHF  · Chronic anemia  · Chronic pain  · Hx throat cancer with upper airway obstruction  · Dysphagia, s/p PEG placement due to above  · Hx tobacco and etoh abuse, remote  · Hx HTN        PLAN:   · Resp -  BiPAP prn, transition to HFNC when stable. Follow CXR, cont abx, sputum if possible. Start brovana and pulmicort, has tolerated well in past with hx tachycardia. Pt previously difficult intubation with 6.5 ETT. VQ scan pending. CTA 9/24 negative for PE.  · ID - Continue current abx-could likely d/c zosyn and consider reconsulting ID. Await blood and urine cx, sputum GS prelim with GPC in pairs and groups. LA normal. Check procal  · CVS - Monitor HD. Currently not requiring pressor support. Recent ECHO EF 55-60 with mod TR. Resume home BP meds as tolerated (lisinopril, metoprolol). BNP lower than upon d/c on 10/9-2830  · Heme/onc -   Monitor hgb, transfuse prn for hgb<7. As per previous notes, if pt requires transfusion, can take hours as pt has multiple Ab's. Recent HIT negative. Plts wnL. · Metabolic - Replace lytes prn per protocol  · Renal - Cont IVF, monitor I/O, trend Cr  · Endocrine - Recent TSH normal, cont synthroid. SSI prn, avoid hypoglycemia  · Neuro/ Pain/ Sedation - Norco prn pain.  Significant lethargy with dilaudid  · GI - TF thru PEG if off BiPAP, pepcid ppx  · Prophylaxis - DVT-heparin, GI-pepcid  · Discussed in interdisciplinary rounds  · FULL CODE          The patient is: [] acutely ill Risk of deterioration: [] moderate    [x] critically ill  [x] high     [x]See my orders for details    My assessment/plan was discussed with:  [x]nursing []PT/OT    [x]respiratory therapy [x]   []family [x]patient       JOSE DAVID Bolanos

## 2017-10-12 NOTE — ED NOTES
SIDE RAILS UP X 2   BED IN LOW AND LOCKED  POSITION  FLUIDS AND TOILETING OFFERED  PLAN OF CARE REVIEWED WITH PT/FAMILY  IV SITE ASSESSED   PAIN ASSESSED  COMFORT ADDRESSED  CALL BELL WITHIN REACH      Repositioned, Patient with strong moist NPC.  VSS. Holly draining cyu qs. IV site left hand WNL.

## 2017-10-12 NOTE — ED NOTES
/ Arneta Apgar placed with ultrasound R. Arm 20g PIV.   Patient still need PICC line per Dr. Arneta Apgar

## 2017-10-12 NOTE — PROGRESS NOTES
Physical Exam   Skin:             Primary Nurse Omi Ray, LANDEN and Candido Dobbs RN performed a dual skin assessment on this patient Impairment noted- see wound doc flow sheet  Jamie score is 15

## 2017-10-12 NOTE — PROGRESS NOTES
[phone] patient arrived in room from ER on non rebreather. Bedside verbal report received from [facility] [place]    2001 All ports of PICC now have blood return and flush appropriately     1900 Bedside and Verbal shift change [facility]  Jeremías Tellez (oncoming nurse) by Xin Ferraro RN (offgoing nurse). Report included the following information SBAR, Kardex, ED Summary, Intake/Output, MAR, Recent Results and Cardiac Rhythm NSR.

## 2017-10-12 NOTE — PROGRESS NOTES
Day #1 of ciprofloxacin  Indication:  Sepsis    Current regimen:  Ciprofloxacin 400 mg IV every 12 hours   Abx regimen: Ciprofloxacin + piperacillin/tazobactam + vancomycin    Recent Labs      10/11/17   1741  10/09/17   0450   WBC  18.5*  6.2   CREA  2.46*  1.20   BUN  33*  22*     Est CrCl: 23-29 ml/min  Temp (24hrs), Av.4 °F (36.9 °C), Min:98.4 °F (36.9 °C), Max:98.4 °F (36.9 °C)    Cultures:   10/11 Blood - pending    Plan: Change to ciprofloxacin 400 mg every 24 hours per P&T-approved renal protocol     Thanks,  FAIZAN Marino

## 2017-10-12 NOTE — ED NOTES
Attempt venipuncture for additional IV access x 3 without success. Unable to obtain adequate blood sample, but some blood to lab for analysis.

## 2017-10-13 ENCOUNTER — APPOINTMENT (OUTPATIENT)
Dept: GENERAL RADIOLOGY | Age: 52
DRG: 177 | End: 2017-10-13
Attending: PHYSICIAN ASSISTANT
Payer: MEDICARE

## 2017-10-13 VITALS — DIASTOLIC BLOOD PRESSURE: 58 MMHG | HEART RATE: 96 BPM | OXYGEN SATURATION: 40 % | SYSTOLIC BLOOD PRESSURE: 91 MMHG

## 2017-10-13 LAB
ANION GAP SERPL CALC-SCNC: 4 MMOL/L (ref 3–18)
BASOPHILS # BLD: 0 K/UL (ref 0–0.06)
BASOPHILS NFR BLD: 0 % (ref 0–2)
BNP SERPL-MCNC: ABNORMAL PG/ML (ref 0–450)
BUN SERPL-MCNC: 22 MG/DL (ref 7–18)
BUN/CREAT SERPL: 14 (ref 12–20)
CALCIUM SERPL-MCNC: 8.1 MG/DL (ref 8.5–10.1)
CHLORIDE SERPL-SCNC: 109 MMOL/L (ref 100–108)
CO2 SERPL-SCNC: 28 MMOL/L (ref 21–32)
CREAT SERPL-MCNC: 1.56 MG/DL (ref 0.6–1.3)
DIFFERENTIAL METHOD BLD: ABNORMAL
EOSINOPHIL # BLD: 0.3 K/UL (ref 0–0.4)
EOSINOPHIL NFR BLD: 2 % (ref 0–5)
ERYTHROCYTE [DISTWIDTH] IN BLOOD BY AUTOMATED COUNT: 16.4 % (ref 11.6–14.5)
GLUCOSE BLD STRIP.AUTO-MCNC: 102 MG/DL (ref 70–110)
GLUCOSE BLD STRIP.AUTO-MCNC: 130 MG/DL (ref 70–110)
GLUCOSE BLD STRIP.AUTO-MCNC: 79 MG/DL (ref 70–110)
GLUCOSE BLD STRIP.AUTO-MCNC: 87 MG/DL (ref 70–110)
GLUCOSE SERPL-MCNC: 80 MG/DL (ref 74–99)
HCT VFR BLD AUTO: 20.3 % (ref 35–45)
HCT VFR BLD AUTO: 21.7 % (ref 35–45)
HCT VFR BLD AUTO: 24.7 % (ref 35–45)
HGB BLD-MCNC: 6.5 G/DL (ref 12–16)
HGB BLD-MCNC: 7 G/DL (ref 12–16)
HGB BLD-MCNC: 8 G/DL (ref 12–16)
LYMPHOCYTES # BLD: 1.5 K/UL (ref 0.9–3.6)
LYMPHOCYTES NFR BLD: 9 % (ref 21–52)
MCH RBC QN AUTO: 30.1 PG (ref 24–34)
MCHC RBC AUTO-ENTMCNC: 32 G/DL (ref 31–37)
MCV RBC AUTO: 94 FL (ref 74–97)
MONOCYTES # BLD: 0.6 K/UL (ref 0.05–1.2)
MONOCYTES NFR BLD: 4 % (ref 3–10)
NEUTS SEG # BLD: 14.9 K/UL (ref 1.8–8)
NEUTS SEG NFR BLD: 85 % (ref 40–73)
PLATELET # BLD AUTO: 214 K/UL (ref 135–420)
PMV BLD AUTO: 12 FL (ref 9.2–11.8)
POTASSIUM SERPL-SCNC: 4 MMOL/L (ref 3.5–5.5)
PROCALCITONIN SERPL-MCNC: 1.71 NG/ML (ref 0–0.08)
RBC # BLD AUTO: 2.16 M/UL (ref 4.2–5.3)
SODIUM SERPL-SCNC: 141 MMOL/L (ref 136–145)
WBC # BLD AUTO: 17.3 K/UL (ref 4.6–13.2)

## 2017-10-13 PROCEDURE — 74011250636 HC RX REV CODE- 250/636: Performed by: PHYSICIAN ASSISTANT

## 2017-10-13 PROCEDURE — 65660000001 HC RM ICU INTERMED STEPDOWN

## 2017-10-13 PROCEDURE — 86922 COMPATIBILITY TEST ANTIGLOB: CPT | Performed by: HOSPITALIST

## 2017-10-13 PROCEDURE — P9016 RBC LEUKOCYTES REDUCED: HCPCS | Performed by: HOSPITALIST

## 2017-10-13 PROCEDURE — 3331090002 HH PPS REVENUE DEBIT

## 2017-10-13 PROCEDURE — 83880 ASSAY OF NATRIURETIC PEPTIDE: CPT | Performed by: PHYSICIAN ASSISTANT

## 2017-10-13 PROCEDURE — 86900 BLOOD TYPING SEROLOGIC ABO: CPT | Performed by: HOSPITALIST

## 2017-10-13 PROCEDURE — 3331090001 HH PPS REVENUE CREDIT

## 2017-10-13 PROCEDURE — 80048 BASIC METABOLIC PNL TOTAL CA: CPT | Performed by: PHYSICIAN ASSISTANT

## 2017-10-13 PROCEDURE — 86902 BLOOD TYPE ANTIGEN DONOR EA: CPT | Performed by: HOSPITALIST

## 2017-10-13 PROCEDURE — 85025 COMPLETE CBC W/AUTO DIFF WBC: CPT | Performed by: PHYSICIAN ASSISTANT

## 2017-10-13 PROCEDURE — 74011000250 HC RX REV CODE- 250: Performed by: INTERNAL MEDICINE

## 2017-10-13 PROCEDURE — 36430 TRANSFUSION BLD/BLD COMPNT: CPT

## 2017-10-13 PROCEDURE — 82784 ASSAY IGA/IGD/IGG/IGM EACH: CPT | Performed by: INTERNAL MEDICINE

## 2017-10-13 PROCEDURE — 36415 COLL VENOUS BLD VENIPUNCTURE: CPT | Performed by: HOSPITALIST

## 2017-10-13 PROCEDURE — 86921 COMPATIBILITY TEST INCUBATE: CPT | Performed by: HOSPITALIST

## 2017-10-13 PROCEDURE — 36592 COLLECT BLOOD FROM PICC: CPT

## 2017-10-13 PROCEDURE — 74011000250 HC RX REV CODE- 250: Performed by: PHYSICIAN ASSISTANT

## 2017-10-13 PROCEDURE — 74011000258 HC RX REV CODE- 258: Performed by: FAMILY MEDICINE

## 2017-10-13 PROCEDURE — 74011250636 HC RX REV CODE- 250/636: Performed by: FAMILY MEDICINE

## 2017-10-13 PROCEDURE — 86920 COMPATIBILITY TEST SPIN: CPT | Performed by: HOSPITALIST

## 2017-10-13 PROCEDURE — 77010033711 HC HIGH FLOW OXYGEN

## 2017-10-13 PROCEDURE — 74011250636 HC RX REV CODE- 250/636: Performed by: HOSPITALIST

## 2017-10-13 PROCEDURE — 71010 XR CHEST PORT: CPT

## 2017-10-13 PROCEDURE — 85018 HEMOGLOBIN: CPT | Performed by: PHYSICIAN ASSISTANT

## 2017-10-13 PROCEDURE — 82962 GLUCOSE BLOOD TEST: CPT

## 2017-10-13 PROCEDURE — 94640 AIRWAY INHALATION TREATMENT: CPT

## 2017-10-13 PROCEDURE — 74011250637 HC RX REV CODE- 250/637: Performed by: PHYSICIAN ASSISTANT

## 2017-10-13 RX ORDER — IPRATROPIUM BROMIDE AND ALBUTEROL SULFATE 2.5; .5 MG/3ML; MG/3ML
3 SOLUTION RESPIRATORY (INHALATION)
Status: DISCONTINUED | OUTPATIENT
Start: 2017-10-13 | End: 2017-10-14

## 2017-10-13 RX ORDER — HYDROCODONE BITARTRATE AND ACETAMINOPHEN 5; 325 MG/1; MG/1
2 TABLET ORAL
Status: DISCONTINUED | OUTPATIENT
Start: 2017-10-13 | End: 2017-10-13

## 2017-10-13 RX ORDER — SODIUM CHLORIDE 9 MG/ML
250 INJECTION, SOLUTION INTRAVENOUS AS NEEDED
Status: DISCONTINUED | OUTPATIENT
Start: 2017-10-13 | End: 2017-10-23 | Stop reason: HOSPADM

## 2017-10-13 RX ORDER — LABETALOL HYDROCHLORIDE 5 MG/ML
20 INJECTION, SOLUTION INTRAVENOUS
Status: DISCONTINUED | OUTPATIENT
Start: 2017-10-13 | End: 2017-10-23 | Stop reason: HOSPADM

## 2017-10-13 RX ORDER — AMLODIPINE BESYLATE 5 MG/1
5 TABLET ORAL DAILY
Status: DISCONTINUED | OUTPATIENT
Start: 2017-10-13 | End: 2017-10-15

## 2017-10-13 RX ORDER — HYDROMORPHONE HYDROCHLORIDE 2 MG/ML
0.5 INJECTION, SOLUTION INTRAMUSCULAR; INTRAVENOUS; SUBCUTANEOUS ONCE
Status: COMPLETED | OUTPATIENT
Start: 2017-10-14 | End: 2017-10-13

## 2017-10-13 RX ORDER — HYDROMORPHONE HYDROCHLORIDE 1 MG/ML
0.5 INJECTION, SOLUTION INTRAMUSCULAR; INTRAVENOUS; SUBCUTANEOUS ONCE
Status: COMPLETED | OUTPATIENT
Start: 2017-10-13 | End: 2017-10-13

## 2017-10-13 RX ORDER — HYDROCODONE BITARTRATE AND ACETAMINOPHEN 7.5; 325 MG/1; MG/1
1 TABLET ORAL
Status: DISCONTINUED | OUTPATIENT
Start: 2017-10-13 | End: 2017-10-15

## 2017-10-13 RX ORDER — LABETALOL 100 MG/1
100 TABLET, FILM COATED ORAL EVERY 12 HOURS
Status: DISCONTINUED | OUTPATIENT
Start: 2017-10-13 | End: 2017-10-23 | Stop reason: HOSPADM

## 2017-10-13 RX ORDER — HYDROMORPHONE HYDROCHLORIDE 1 MG/ML
0.2 INJECTION, SOLUTION INTRAMUSCULAR; INTRAVENOUS; SUBCUTANEOUS ONCE
Status: COMPLETED | OUTPATIENT
Start: 2017-10-13 | End: 2017-10-13

## 2017-10-13 RX ADMIN — HYDROMORPHONE HYDROCHLORIDE 0.5 MG: 1 INJECTION, SOLUTION INTRAMUSCULAR; INTRAVENOUS; SUBCUTANEOUS at 02:30

## 2017-10-13 RX ADMIN — CIPROFLOXACIN 400 MG: 2 INJECTION, SOLUTION INTRAVENOUS at 22:36

## 2017-10-13 RX ADMIN — HEPARIN SODIUM 5000 UNITS: 5000 INJECTION, SOLUTION INTRAVENOUS; SUBCUTANEOUS at 21:10

## 2017-10-13 RX ADMIN — HEPARIN SODIUM 5000 UNITS: 5000 INJECTION, SOLUTION INTRAVENOUS; SUBCUTANEOUS at 12:38

## 2017-10-13 RX ADMIN — BUDESONIDE 500 MCG: 0.5 INHALANT RESPIRATORY (INHALATION) at 07:42

## 2017-10-13 RX ADMIN — LABETALOL HYDROCHLORIDE 100 MG: 100 TABLET, FILM COATED ORAL at 21:09

## 2017-10-13 RX ADMIN — HYDROMORPHONE HYDROCHLORIDE 0.2 MG: 1 INJECTION, SOLUTION INTRAMUSCULAR; INTRAVENOUS; SUBCUTANEOUS at 17:43

## 2017-10-13 RX ADMIN — HEPARIN SODIUM 5000 UNITS: 5000 INJECTION, SOLUTION INTRAVENOUS; SUBCUTANEOUS at 05:39

## 2017-10-13 RX ADMIN — IPRATROPIUM BROMIDE AND ALBUTEROL SULFATE 3 ML: .5; 3 SOLUTION RESPIRATORY (INHALATION) at 15:08

## 2017-10-13 RX ADMIN — LABETALOL HYDROCHLORIDE 100 MG: 100 TABLET, FILM COATED ORAL at 12:38

## 2017-10-13 RX ADMIN — Medication 10 ML: at 14:29

## 2017-10-13 RX ADMIN — FAMOTIDINE 20 MG: 20 TABLET ORAL at 08:43

## 2017-10-13 RX ADMIN — PIPERACILLIN SODIUM,TAZOBACTAM SODIUM 4.5 G: 4; .5 INJECTION, POWDER, FOR SOLUTION INTRAVENOUS at 02:49

## 2017-10-13 RX ADMIN — SODIUM CHLORIDE 1000 MG: 900 INJECTION, SOLUTION INTRAVENOUS at 14:27

## 2017-10-13 RX ADMIN — HYDROCODONE BITARTRATE AND ACETAMINOPHEN 1 TABLET: 7.5; 325 TABLET ORAL at 16:02

## 2017-10-13 RX ADMIN — Medication 10 ML: at 05:42

## 2017-10-13 RX ADMIN — AMLODIPINE BESYLATE 5 MG: 5 TABLET ORAL at 12:39

## 2017-10-13 RX ADMIN — HYDROCODONE BITARTRATE AND ACETAMINOPHEN 1 TABLET: 7.5; 325 TABLET ORAL at 20:00

## 2017-10-13 RX ADMIN — FAMOTIDINE 20 MG: 20 TABLET ORAL at 17:43

## 2017-10-13 RX ADMIN — PIPERACILLIN SODIUM,TAZOBACTAM SODIUM 4.5 G: 4; .5 INJECTION, POWDER, FOR SOLUTION INTRAVENOUS at 20:13

## 2017-10-13 RX ADMIN — IPRATROPIUM BROMIDE AND ALBUTEROL SULFATE 3 ML: .5; 3 SOLUTION RESPIRATORY (INHALATION) at 12:03

## 2017-10-13 RX ADMIN — HYDROCODONE BITARTRATE AND ACETAMINOPHEN 1 TABLET: 5; 325 TABLET ORAL at 10:30

## 2017-10-13 RX ADMIN — HYDROCODONE BITARTRATE AND ACETAMINOPHEN 1 TABLET: 5; 325 TABLET ORAL at 06:19

## 2017-10-13 RX ADMIN — Medication 10 ML: at 15:49

## 2017-10-13 RX ADMIN — Medication 20 ML: at 15:49

## 2017-10-13 RX ADMIN — PIPERACILLIN SODIUM,TAZOBACTAM SODIUM 4.5 G: 4; .5 INJECTION, POWDER, FOR SOLUTION INTRAVENOUS at 11:37

## 2017-10-13 RX ADMIN — HYDROMORPHONE HYDROCHLORIDE 0.5 MG: 2 INJECTION INTRAMUSCULAR; INTRAVENOUS; SUBCUTANEOUS at 23:35

## 2017-10-13 RX ADMIN — ARFORMOTEROL TARTRATE 15 MCG: 15 SOLUTION RESPIRATORY (INHALATION) at 07:42

## 2017-10-13 RX ADMIN — Medication 10 ML: at 23:35

## 2017-10-13 NOTE — INTERDISCIPLINARY ROUNDS
AM Rounds    CXR worse bilateral apical & genralized appearance of congestion. BNP still high. Echo in Sep showed normal LVEF and reportedly normal diastolic parameters but with a very large LA. She is hypertensive. She was discharged home 10/9 on 2L nc and immediately came back to ER after an apparent aspiration episode. Two things need to be done to prevent further relapses:  1. prevent further aspiration episodes  2. normotensive to decrease propensity for HF    - keep HOB elevated all times.    - stop IVF  - bp meds - see orders  - titrate O2; HFNC or BIPAP prn     -maksim

## 2017-10-13 NOTE — ROUTINE PROCESS
1930 Bedside shift change report given to christiano joseph (oncoming nurse) by Valeta Rinne (offgoing nurse). Report included the following information SBAR, Kardex and Recent Results. Side rails up x 3, call light within reach. Hob elevated 30 degrees. Pt denies c/o pain. 1938 assessment completed. Oral and foleyt care provided. 2200 resting in bed.  2315 norco for pain generalized. 0000 reassessment completed. When asked how pain was stated okay. 0115 dr Vic Conte notified patienthas c/o norco not holding her, notified that earilier in day hydromorphone stopped pt sleepy. orders for smalled one time dose. 0200 hydromorphone  Given to pt, and pt told it is a one time dose. 0400 reassessment completed. Pt resting quietly. 0600 incontinent of loose green-brown stool cleansed and bed pad changed. 0730 Bedside shift change report given to fahad joseph (oncoming nurse) by christiano claire rn(offgoing nurse). Report included the following information SBAR, Kardex and Recent Results. Side rails up x 3, call light within reach. Hob elevated 30 degrees, resting with eyes closed.     0730 dr Calin Giles paged to notify low h/h

## 2017-10-13 NOTE — ACP (ADVANCE CARE PLANNING)
Patient has designated ___mother and sister_____________________ to participate in his/her discharge plan and to receive any needed information.      Mother: Ondina Kely: [phone]  Sister: Jac Donovan: [phone]

## 2017-10-13 NOTE — PROGRESS NOTES
[facility]  ICU Progress Note      Name: Tanya Teran   : 1965   MRN: [id number]   Date: 10/13/2017 5:00 PM     [x]I have reviewed the flowsheet and previous days notes. Events overnight reviewed and discussed with nursing staff. Vital signs and records reviewed.     55 yo female with PMH throat cancer, dysphagia, PEG, DM, chronic anemia, hypothyroidism, HTN, recent admission for MRSA and pseudomonas PNA requiring intubation-discharged 10/9 presented to the ED from home with SOB, hypoxia (sat 44%) found to have PNA, started on BiPAP and admitted to ICU.      10/13/2017  No overnight events  CXR appears worse today, however weaning FiO2 well and has not req BiPAP  Sputum cx prelim mod staph aureus, WBC down  Cr improving, good UO, hgb low, awaiting transfusion, BP elevated this am    Medication Review:  · Pressors -  · Sedation -  · Antibiotics -zosyn, vanc  · Pain -  · GI/ DVT -pepcid, heparin  · Others (other gtts)    Safety Bundles: VAP Bundle/ CAUTI/ Severe Sepsis Protocol/ Electrolyte Replacement Protocol    Vital Signs:    Visit Vitals    /87    Pulse (!) 117    Temp 98.1 °F (36.7 °C)    Resp 27    Wt 68 kg (149 lb 14.6 oz)    SpO2 100%    BMI 25.73 kg/m2       O2 Device: Hi flow nasal cannula, Heated   O2 Flow Rate (L/min): 45 l/min   Temp (24hrs), Av.6 °F (29.2 °C), Min:37.5 °F (3.1 °C), Max:98.5 °F (36.9 °C)       Intake/Output:   Last shift:      10/13 0701 - 10/13 1900  In: -   Out: 350 [Urine:350]  Last 3 shifts: 10/11 1901 - 10/13 0700  In: 3887.5 [I.V.:3587.5]  Out: 2625 [Urine:2625]    Intake/Output Summary (Last 24 hours) at 10/13/17 1411  Last data filed at 10/13/17 1100   Gross per 24 hour   Intake          2988.75 ml   Output             2475 ml   Net           513.75 ml          Physical Exam:    General/Neuro: Awake and alert, NAD, oriented, moves all extremities,  no FND, more conversant  HEENT:  Anicteric sclerae; pink palpebral conjunctivae; mucosa dry, poor dentition, HFNC in place  Resp:  Adequate AE B,  No accessory muscle use; +few rhonchi bilaterally R>L, R chest wall tunneled catheter in place  CV:  Tachycardic, no m  GI:  Abdomen soft, non-tender; (+) active bowel sounds, +PEG in place  Extremities:  +improved trace edema BLE, L>R chronic per pt and RN  Skin:  Warm; no rashes/ lesions noted  Devices:   Holly, R chest tunneled catheter      DATA:     Current Facility-Administered Medications   Medication Dose Route Frequency    0.9% sodium chloride infusion 250 mL  250 mL IntraVENous PRN    HYDROcodone-acetaminophen (NORCO) 5-325 mg per tablet 2 Tab  2 Tab Oral Q4H PRN    vancomycin (VANCOCIN) 1,000 mg in 0.9% sodium chloride (MBP/ADV) 250 mL adv  1,000 mg IntraVENous Q18H    [START ON 10/14/2017] VANCOMYCIN INFORMATION NOTE   Other ONCE    amLODIPine (NORVASC) tablet 5 mg  5 mg Oral DAILY    labetalol (NORMODYNE) tablet 100 mg  100 mg Oral Q12H    labetalol (NORMODYNE;TRANDATE) injection 20 mg  20 mg IntraVENous Q4H PRN    albuterol-ipratropium (DUO-NEB) 2.5 MG-0.5 MG/3 ML  3 mL Nebulization Q8H RT    albuterol (PROVENTIL VENTOLIN) nebulizer solution 2.5 mg  2.5 mg Nebulization Q4H PRN    heparin (porcine) pf 300-500 Units  300-500 Units InterCATHeter PRN    sodium chloride (NS) flush 10-30 mL  10-30 mL InterCATHeter PRN    sodium chloride (NS) flush 10 mL  10 mL InterCATHeter Q24H    sodium chloride (NS) flush 10 mL  10 mL InterCATHeter PRN    sodium chloride (NS) flush 10-40 mL  10-40 mL InterCATHeter Q8H    sodium chloride (NS) flush 20 mL  20 mL InterCATHeter Q24H    famotidine (PEPCID) tablet 20 mg  20 mg Oral BID    piperacillin-tazobactam (ZOSYN) 4.5 g in 0.9% sodium chloride (MBP/ADV) 100 mL MBP Extended 4 Hour Interval###  4.5 g IntraVENous Q8H    sodium chloride (NS) flush 5-10 mL  5-10 mL IntraVENous PRN    ciprofloxacin (CIPRO) 400 mg IVPB (premix)  400 mg IntraVENous Q24H    naloxone (NARCAN) injection 0.4 mg  0.4 mg IntraVENous PRN    heparin (porcine) injection 5,000 Units  5,000 Units SubCUTAneous Q8H    insulin lispro (HUMALOG) injection   SubCUTAneous Q6H    glucose chewable tablet 16 g  4 Tab Oral PRN    glucagon (GLUCAGEN) injection 1 mg  1 mg IntraMUSCular PRN    dextrose (D50W) injection syrg 12.5-25 g  25-50 mL IntraVENous PRN    VANCOMYCIN INFORMATION NOTE   Other Rx Dosing/Monitoring         Labs: Results:       Chemistry Recent Labs      10/13/17   0358  10/12/17   0815  10/11/17   1741   GLU  80  79  103*   NA  141  141  136   K  4.0  4.5  4.3   CL  109*  107  101   CO2  28  28  29   BUN  22*  28*  33*   CREA  1.56*  2.00*  2.46*   CA  8.1*  7.9*  8.6   AGAP  4  6  6   BUCR  14  14  13   AP   --    --   125*   TP   --    --   8.4*   ALB   --    --   2.4*   GLOB   --    --   6.0*   AGRAT   --    --   0.4*      CBC w/Diff Recent Labs      10/13/17   0820  10/13/17   0358  10/12/17   0815  10/11/17   1741   WBC   --   17.3*  23.2*  18.5*   RBC   --   2.16*  2.69*  2.79*   HGB  7.0*  6.5*  8.2*  8.4*   HCT  21.7*  20.3*  25.2*  26.3*   PLT   --   214  232  249   GRANS   --   85*  92*  87*   LYMPH   --   9*  5*  7*   EOS   --   2  1  2      Coagulation No results for input(s): PTP, INR, APTT in the last 72 hours.     No lab exists for component: INREXT, INREXT    Liver Enzymes Recent Labs      10/11/17   1741   TP  8.4*   ALB  2.4*   AP  125*   SGOT  35      ABG No results found for: PH, PHI, PCO2, PCO2I, PO2, PO2I, HCO3, HCO3I, FIO2, FIO2I   Microbiology Recent Labs      10/12/17   1834  10/12/17   0530  10/11/17   1840   CULT  NO GROWTH AFTER 15 HOURS  MODERATE STAPHYLOCOCCUS AUREUS*  FEW NORMAL RESPIRATORY HECTOR  NO GROWTH 2 DAYS          Telemetry: [x]Sinus to ST []A-flutter []Paced    []A-fib []Multiple PVCs                    Imaging:  [x]I have personally reviewed the patients radiographs  []Radiographs reviewed with radiologist   []No change from prior, tubes and lines in adequate position  []Improved   []Worsening    10/13/17 CXR    Interval worsening of multifocal pulmonary infiltrate  10/11/17 CXR    1. Bilateral upper lower lobe patchy confluent parenchymal opacities,  representing pneumonia/edema which is similar to the most recent chest x-ray.         IMPRESSION:   · Acute hypoxemic respiratory failure requiring BIPAP likely due to recurrent PNA, probable aspiration,+/- heart failure, sputum +staph aureus  · Recent MRSA and pseudomonas HCAP  · KEATON, likely prerenal, improved  · HTN, poorly controlled  · Chronic diastolic CHF  · Chronic anemia  · Chronic pain  · Hx throat cancer with upper airway obstruction  · Dysphagia, s/p PEG placement due to above  · Hx tobacco and etoh abuse, remote        PLAN:   · Resp -  Has not required BiPAP. Continue to wean FiO2 on HFNC and transition to nc when able (recently d/c on 2Lnc). Follow CXR, cont abx, sputum cx. Cont duonebs, monitor tachycardia. Pt previously difficult intubation with 6.5 ETT. CTA 9/24 negative for PE.  · ID - ID following. Await final sputum cx +staph aureus. Continue zosyn/vanc. Await blood and urine cx, sputum final. LA normal. Await procal  · CVS - Monitor HD. Recent ECHO EF 55-60 with mod TR. Started on norvasc and labetalol. BNP lower than upon d/c on 10/9-2830  · Heme/onc -   Monitor hgb, transfuse prn for hgb<7. Will give 1 uPRBC today. As per previous notes, if pt requires transfusion, can take hours as pt has multiple Ab's. Recent HIT negative. Plts wnL. · Metabolic - Replace lytes prn per protocol  · Renal - d/c IVF, monitor I/O, trend Cr. Likely d/c mckeon in am  · Endocrine - Recent TSH normal, cont synthroid. SSI prn, avoid hypoglycemia  · Neuro/ Pain/ Sedation - Norco prn pain.  Significant lethargy with dilaudid  · GI - TF thru PEG to start today as she has not required BiPAP, pepcid ppx  · Prophylaxis - DVT-heparin, GI-pepcid  · Discussed in interdisciplinary rounds  · FULL CODE  · Transfer to stepdown status.   · PT/OT          The patient is: [] acutely ill Risk of deterioration: [] moderate    [x] critically ill  [x] high     [x]See my orders for details    My assessment/plan was discussed with:  [x]nursing []PT/OT    [x]respiratory therapy [x]   []family [x]patient       Christa Stanford, PA

## 2017-10-13 NOTE — PROGRESS NOTES
Problem: Falls - Risk of  Goal: *Absence of Falls  Document Kapil Fall Risk and appropriate interventions in the flowsheet.    Outcome: Progressing Towards Goal  Fall Risk Interventions:  Mobility Interventions: Bed/chair exit alarm, Strengthening exercises (ROM-active/passive), Patient to call before getting OOB, Communicate number of staff needed for ambulation/transfer, Assess mobility with egress test           Medication Interventions: Bed/chair exit alarm, Patient to call before getting OOB, Teach patient to arise slowly, Assess postural VS orthostatic hypotension     Elimination Interventions: Bed/chair exit alarm, Call light in reach, Toilet paper/wipes in reach, Toileting schedule/hourly rounds

## 2017-10-13 NOTE — CONSULTS
Infectious Disease Consultation Note    Requested by: Dr. Hugo Davidson    Reason: recurrent Pneumonia  Current abx Prior abx   Vancomycin 10/11 - 2   Levofloxacin 9/24 - 4  Zosyn 9/24 - 6 Tobramycin 9/30 - 2 Vancomycin 9/24-15  Ciprofloxacin 10/2-9 Doxycycline 10/9 - 2  Zosyn 10/11 -2      ASSESSMENT - > REC:     Recurrent Pneumonia  - MRSA, Pseudomonas in spcx 9/24  - same organisms in spcx last confinement [facility]) 8/11-18  - treated with Vancomycin/zosyn x 14 days (ending ~8/25)  - bilateral multifocal opacities - sl improved on CT 10/1  - CXR 10/9 similar to 10/3 (improved infiltrates c/w 9/29)  - CXR 10/11 similar to 10/9  - CXR 10/13 worsening multifocal infiltrate (BUL) (?aspiration) ? recurrent MRSA gpc on gs, no GNR - cx IP -> continue Vancomycin, Zosyn for now (Pseudomonas in prior sputum cx was resistant to Zosyn but no GNR in current sp gs). Likely dc or change Zosyn soon depending on cx.  -> check Ig levels   Respiratory Failure  - intubated 9/24, extubated 9/25  - readmitted 10/11 w/ shortness of breath and cough. Now on HFNC -> per PCCM   H/o throat CA     ETOH abuse    DM    HTN    CAD    GERD    PEG    DELFINA      MICROBIOLOGY:   10/11 blcx NGTD x 2   spcx gs many gpc prs/groups cx IP    9/24     blcx NG x 2                             urcx IP              spcx Ps aeruginosa sens aminoglycosides, cipro res pip-tazo, other beta lactams, MRSA R levaquin vanco rios 1 few CF  9/26     C diff neg       LINES AND CATHETERS:   Tunneled RIJ PICC    HPI:    54year-old female with h/o throat CA, ETOH abuse, DM, HTN, CAD, GERD, PEG, DELFINA admitted to [facility] 10/11/2017 due to recurrent shortness of breath. She had recently been confined to [facility] [place] from 8/11-8/18 for MRSA and Pseudomonas pneumonia for which she was given Vancomycin and zosyn for 14 days. She followed up with Kayla Sifuentes on 8/24 and no further antibiotic therapy was recommended.   She was sent to the [facility] [place] ED on 9/16 due to worsening anemia and was discharged that same day. She presented to [facility] ED on  with severe shortness of breath requiring intubation and admission to the NICU. She was septic with temp 100.5 degrees and tachycardic to 135/minute. Vancomycin, Zosyn and levofloxacin were given. WBC count was 18.3 but faheem to 32.8 with 30% bands on . CTA chest  showed no pulmonary embolism but diffuse alveolar, centrilobular and peribronchial opacities (multifocal pneumonia). Sputum culture again grew MRSA and Pseudomonas aeruginosa res to zosyn, Meropenem and all beta-lactams. It was sensitive to Ciprofloxacin and aminoglycosides. Blood cultures  were NG x 3.  Repeat CT Chest 10/1 showed slight interval improvement in the bilateral opacities. Zosyn was discontinued  and tobramycin started IV but quickly changed to Cipro. Fever resolved by  and WBC normalized by  and she was discharged on 10/9 on po Cipro, Doxycycline for a planned two more weeks. By 10/11 she had become severely short of breath at home and required re-admission. She claims her cough became worse but was non-productive. There was no fever at home but WBC on admission was 18.5 and faheem to 23.2 on 10/10. She reports loose stools around 5 times a day for past five months. Past Medical History:   Diagnosis Date    Anemia     Cancer ([facility]     throat    Coronary artery disease     Diabetes ([facility]     Diabetes mellitus ([facility]     ETOH abuse     HTN (hypertension)     Hypertension     Hypothyroid     Lupus     Osteonecrosis (HCC)     of jaw    PEG adjustment, replacement, or removal     S/P thoracentesis     Throat cancer [facility])        Past Surgical History:   Procedure Laterality Date    ABDOMEN SURGERY PROC UNLISTED      feeding tube placement    HX  SECTION      HX HEENT      throat cancer biopsy       Allergies: Amlodipine and Morphine .     Current Facility-Administered Medications   Medication Dose Route Frequency    0.9% sodium chloride infusion 250 mL  250 mL IntraVENous PRN    HYDROcodone-acetaminophen (NORCO) 5-325 mg per tablet 2 Tab  2 Tab Oral Q4H PRN    albuterol (PROVENTIL VENTOLIN) nebulizer solution 2.5 mg  2.5 mg Nebulization Q4H PRN    heparin (porcine) pf 300-500 Units  300-500 Units InterCATHeter PRN    sodium chloride (NS) flush 10-30 mL  10-30 mL InterCATHeter PRN    sodium chloride (NS) flush 10 mL  10 mL InterCATHeter Q24H    sodium chloride (NS) flush 10 mL  10 mL InterCATHeter PRN    sodium chloride (NS) flush 10-40 mL  10-40 mL InterCATHeter Q8H    sodium chloride (NS) flush 20 mL  20 mL InterCATHeter Q24H    famotidine (PEPCID) tablet 20 mg  20 mg Oral BID    budesonide (PULMICORT) 500 mcg/2 ml nebulizer suspension  500 mcg Nebulization BID RT    arformoterol (BROVANA) neb solution 15 mcg  15 mcg Nebulization BID RT    piperacillin-tazobactam (ZOSYN) 4.5 g in 0.9% sodium chloride (MBP/ADV) 100 mL MBP Extended 4 Hour Interval###  4.5 g IntraVENous Q8H    sodium chloride (NS) flush 5-10 mL  5-10 mL IntraVENous PRN    ciprofloxacin (CIPRO) 400 mg IVPB (premix)  400 mg IntraVENous Q24H    naloxone (NARCAN) injection 0.4 mg  0.4 mg IntraVENous PRN    heparin (porcine) injection 5,000 Units  5,000 Units SubCUTAneous Q8H    insulin lispro (HUMALOG) injection   SubCUTAneous Q6H    glucose chewable tablet 16 g  4 Tab Oral PRN    glucagon (GLUCAGEN) injection 1 mg  1 mg IntraMUSCular PRN    dextrose (D50W) injection syrg 12.5-25 g  25-50 mL IntraVENous PRN    vancomycin (VANCOCIN) 1,250 mg in 0.9% sodium chloride 250 mL IVPB  1,250 mg IntraVENous Q36H    [START ON 10/15/2017] VANCOMYCIN INFORMATION NOTE   Other ONCE    VANCOMYCIN INFORMATION NOTE   Other Rx Dosing/Monitoring       Family History   Problem Relation Age of Onset    Lupus Sister      Social History     Social History    Marital status: LEGALLY      Spouse name: N/A    Number of children: N/A    Years of education: N/A Occupational History    Not on file. Social History Main Topics    Smoking status: Former Smoker    Smokeless tobacco: Never Used    Alcohol use No    Drug use: No    Sexual activity: Not on file     Other Topics Concern    Not on file     Social History Narrative     History   Smoking Status    Former Smoker   Smokeless Tobacco    Never Used        Temp (24hrs), Av.5 °F (17.5 °C), Min:37.4 °F (3 °C), Max:98.5 °F (36.9 °C)    Visit Vitals    /78    Pulse 90    Temp 98.1 °F (36.7 °C)    Resp 14    Wt 68 kg (149 lb 14.6 oz)    SpO2 100%    BMI 25.73 kg/m2       ROS:A comprehensive review of systems was negative except for that written in the History of Present Illness. Physical Exam:    General: Well developed, well nourished 54 y.o.  female in no acute distress. ENT: ENT exam normal, no neck nodes or sinus tenderness  Head: normocephalic, without obvious abnormality  Mouth:  mucous membranes moist, pharynx normal without lesions  Neck: supple, symmetrical, trachea midline and no adenopathy   Cardio:  regular rate and rhythm, S1, S2 normal, no murmur, click, rub or gallop  Chest: inspection normal - no chest wall deformities or tenderness  Lungs: crackles in both upper lobes L>R, no wheezes  Abdomen: soft, non-tender. Bowel sounds normal. No masses, no organomegaly. Extremities:  no redness or tenderness in the calves or thighs, no edema  Neuro: Grossly normal  Alert and oriented to person, place, and time; normal strength and tone. Normal symmetric reflexes.  Normal coordination and gait      Labs: Results:   Chemistry Recent Labs      10/13/17   0358  10/12/17   0815  10/11/17   1741   GLU  80  79  103*   NA  141  141  136   K  4.0  4.5  4.3   CL  109*  107  101   CO2  28  28  29   BUN  22*  28*  33*   CREA  1.56*  2.00*  2.46*   CA  8.1*  7.9*  8.6   AGAP  4  6  6   BUCR  14  14  13   AP   --    --   125*   TP   --    --   8.4*   ALB   --    --   2.4*   GLOB --    --   6.0*   AGRAT   --    --   0.4*      CBC w/Diff Recent Labs      10/13/17   0820  10/13/17   0358  10/12/17   0815  10/11/17   1741   WBC   --   17.3*  23.2*  18.5*   RBC   --   2.16*  2.69*  2.79*   HGB  7.0*  6.5*  8.2*  8.4*   HCT  21.7*  20.3*  25.2*  26.3*   PLT   --   214  232  249   GRANS   --   85*  92*  87*   LYMPH   --   9*  5*  7*   EOS   --   2  1  2      Microbiology Recent Labs      10/12/17   0530  10/11/17   1840  10/11/17   1832   CULT  PENDING  NO GROWTH 2 DAYS  NO GROWTH 2 DAYS        Dominick Menendez M.D.  [facility] AT THE [facility]   [phone]

## 2017-10-13 NOTE — MANAGEMENT PLAN
[facility]   Discharge Planning/ Assessment    Reasons for Intervention:   Interviewed patient. Pt recently discharged on  10/9. Verified demographics listed on face sheet with patient; all information correct. Pt has [employer] for insurance. Patient stated their PCP is Dr Regina Watkins and it has been a few months since last appt due to frequent hospitalization and 2 week rehab stay post 1st admission to [facility]. Patient lives in 2nd floor apartment and mother has been staying with her to assist while pt has been ill. Patient's NOK is mother, Dorothy Knapp at [phone]. Sister, Kush Vargas at [phone] may also have information. Patient moderately independent with ADLs prior to admission. Has SOB with ambulation and stairs. DME prior to admission: d/c'd after last admission with home o2 via NC at 2 liters. Ambulates with no assistive device.  Discharge plan is Home with [facility] RN BSN  Outcomes Manager  Pager # [phone]    High Risk Criteria  [x] Yes  []No   Physician Referral  [] Yes  [x]No        Date    Nursing Referral  [] Yes  [x]No        Date    Patient/Family Request  [] Yes  [x]No        Date       Resources:    Medicare  [x] Yes  []No   Medicaid  [] Yes  [x]No   No Resources  [] Yes  [x]No   Private Insurance  [] Yes  [x]No    Name/Phone Number    Other  [] Yes  [x]No        (i.e. Workman's Comp)         Prior Services:    Prior Services  [x] Yes  []No   Home Health  [x] Yes  []No   Agency [id number] [place] [address]  [] Yes  [x]No        Number of Hours    Home Care Program  [] Yes  [x]No       Meals on Wheels  [] Yes  [x]No   Office on Aging  [] Yes  [x]No   Transportation Services  [] Yes  [x]No   Nursing Home  [] Yes  [x]No        Nursing Home Name    [address]  [] Yes  [x]No        [address] Name    Other       Information Source:      Information obtained from  [x] Patient  [] Parent   [] [address]  [] Child  [] Spouse [] Significant Other/Partner   [] Friend      [] EMS    [] Nursing Home Chart          [] Other:   Chart Review  [x] Yes  []No     Family/Support System:    Patient lives with  [] Alone    [] Spouse   [] Significant Other  [] Children  [] Caretaker   [x] Parent  [] Sibling     [] Other       Other Support System:    Is the patient responsible for care of others  [] Yes  [x]No   Information of person caring for patient on  discharge    Managers financial affairs independently  [x] Yes  []No   If no, explain:      Status Prior to Admission:    Mental Status  [x] Awake  [x] Alert  [x] Oriented  [] Quiet/Calm [] Lethargic/Sedated   [] Disoriented  [] Restless/Anxious  [] Combative   Personal Care  [] Dependent  [x] [address]  [] Requires Assistance   Meal Preparation Ability  [] Independent   [] Standby Assistance   [] Minimal Assistance   [x] Moderate Assistance  [] Maximum Assistance     [] Total Assistance   Chores  [] Independent with Chores   [] N/A Nursing Home Resident   [x] Requires Assistance   Bowel/Bladder  [x] Continent  [] Catheter  [] Incontinent  [] Ostomy Self-Care    [] Urine Diversion Self-Care  [] Maximum Assistance     [] Total Assistance   Number of Persons needed for assistance    DME at home  [] Selam Martines  [] Robert Martines   [] Commode    [] Bathroom/Grab Bars  [] Hospital Bed  [] Nebulizer  [x] Oxygen           [] Raised Toilet Seat  [] Shower Chair  [] Side Rails for Bed   [] Tub Transfer Bench   [] Barrera Nieves  [] Mckayla Man      [] Other:   Vendor      Treatment Presently Receiving:    Current Treatments  [] Chemotherapy  [] Dialysis  [] Insulin  [] IVAB [x] IVF   [] O2  [] PCA   [] PT   [] RT   [] Tube Feedings   [] Wound Care     Psychosocial Evaluation:    Verbalized Knowledge of Disease Process  [] Patient  []Family   Coping with Disease Process  [] Patient  []Family   Requires Further Counseling Coping with Disease Process  [] Patient  []Family     Identified Projected Needs:    Home Health   [x] Yes  []No   Transportation  [] Yes  [x]No   Education  [] Yes  [x]No        Specific Education     Financial Counseling  [] Yes  [x]No   Inability to Care for Self/Will Require 24 hour care  [] Yes  [x]No   Pain Management  [] Yes  [x]No   Home Infusion Therapy  [] Yes  [x]No   Oxygen Therapy  [] Yes  [x]No   DME  [] Yes  [x]No   Long Term Care Placement  [] Yes  [x]No   Rehab  [] Yes  [x]No   Physical Therapy  [x] Yes  []No   Needs Anticipated At This Time  [x] Yes  []No     Intra-Hospital Referral:    [address] [facility]  [] Yes  [x]No     [] Yes  [x]No   Patient Representative  [] Yes  [x]No   Staff for Teaching Needs  [] Yes  [x]No   Specialty Teaching Needs     Diabetic Educator  [] Yes  [x]No   Referral for Diabetic Educator Needed  [] Yes  [x]No  If Yes, place order for Nutritionist or Diabetic Consult     Tentative Discharge Plan:    Home with No Services  [] Yes  [x]No   Home with Home Health Follow-up  [] Yes  [x]No        If Yes, specify type    Home Care Program  [x] Yes  []No        If Yes, specify type    Meals on Wheels  [] Yes  [x]No   Office of Aging  [] Yes  [x]No   NHP  [] Yes  [x]No   Return to the Nursing Home  [] Yes  [x]No   Rehab Therapy  [] Yes  [x]No   Acute Rehab  [] Yes  [x]No   Subacute Rehab  [] Yes  [x]No   Private Care  [] Yes  [x]No   Substance Abuse Referral  [] Yes  [x]No   Transportation  [] Yes  [x]No   Chore Service  [] Yes  [x]No   Inpatient Hospice  [] Yes  [x]No   OP RT  [] Yes  [x] No   OP Hemo  [] Yes  [x] No   OP PT  [] Yes  [x]No   Support Group  [] Yes  [x]No   Reach to Recovery  [] Yes  [x]No   OP Oncology Clinic  [] Yes  [x]No   Clinic Appointment  [] Yes  [x]No   DME  [] Yes  [x]No   Comments    Name of D/C Planner or  Given to Patient or Family Eladio Hua RN BSN  Outcomes Manager  Pager # [phone]   Phone Number         Extension    Date 10/13/2017   Time 0930   If you are discharged home, whom do you designate to participate in your discharge plan and receive any information needed?      Enter name of designee Mother and sister        Phone # of designee         Address of designee         Updated         Patient refused to designate any           individual

## 2017-10-13 NOTE — PROGRESS NOTES
OT order received and chart reviewed. Order begins on 10/14/2017. Pt also has active bedrest orders. Will follow up. Thank you.     Cheryle Whitley MS OTR/L  Office Ext: [phone]  Pager: [phone]

## 2017-10-13 NOTE — PROGRESS NOTES
Medicine Progress Note    Patient: Mackenzie Kilgore   Age:  54 y.o.  DOA: 10/11/2017   Admit Dx / CC: Respiratory failure with hypoxia ([address]  Recurrent pneumonia  Recurrent pneumonia  Respiratory failure with hypoxia ([address]  LOS:  LOS: 2 days     Assessment/Plan   Principal Problem:    Recurrent pneumonia (10/11/2017)    Active Problems:    Respiratory failure with hypoxia ([address] (10/11/2017)      KEATON (acute kidney injury) ([address] (10/11/2017)        Additional Plan notes     1)  Recurrent PNA   - recent extended hospital stay for Pseudomonal PNA about 3 weeks   - she was treated with IV vanc, zosyn and levaquin and cipro followed by change to doxy and cipro at                discharge   - Back on vanc and zosyn now. ID consulted, continuing current abx, sputum with Staph aureas   - on hi sunil. Step down , pulm following. 2)  Acute on chronic resp failure   - 2/2 above      DISPO     Anticipated Date of Discharge: 4 days  Anticipated Disposition (home, SNF) : home    Subjective:   Patient seen and examined. Patient sleeping    Objective:     Visit Vitals    /77    Pulse (!) 104    Temp 99.6 °F (37.6 °C)    Resp 26    Wt 68 kg (149 lb 14.6 oz)    SpO2 100%    BMI 25.73 kg/m2       Physical Exam:  General appearance: alert, cooperative, no distress, appears stated age  Head: Normocephalic, without obvious abnormality, atraumatic  Neck: supple, trachea midline  Lungs: B/L course breath sounds  Heart: regular rate and rhythm, S1, S2 normal, no murmur, click, rub or gallop  Abdomen: soft, non-tender.  Bowel sounds normal. No masses,  no organomegaly  Extremities: extremities normal, atraumatic, no cyanosis or edema  Skin: Skin color, texture, turgor normal. No rashes or lesions    Intake and Output:  Current Shift:  10/13 0701 - 10/13 1900  In: 410 [I.V.:350]  Out: 950 [Urine:950]  Last three shifts:  10/11 1901 - 10/13 0700  In: 3887.5 [I.V.:3587.5]  Out: 2625 [Urine:2625]    Lab/Data Reviewed:  CMP: Lab Results   Component Value Date/Time     10/13/2017 03:58 AM    K 4.0 10/13/2017 03:58 AM     (H) 10/13/2017 03:58 AM    CO2 28 10/13/2017 03:58 AM    AGAP 4 10/13/2017 03:58 AM    GLU 80 10/13/2017 03:58 AM    BUN 22 (H) 10/13/2017 03:58 AM    CREA 1.56 (H) 10/13/2017 03:58 AM    GFRAA 43 (L) 10/13/2017 03:58 AM    GFRNA 35 (L) 10/13/2017 03:58 AM    CA 8.1 (L) 10/13/2017 03:58 AM     CBC:   Lab Results   Component Value Date/Time    WBC 17.3 (H) 10/13/2017 03:58 AM    HGB 7.0 (L) 10/13/2017 08:20 AM    HCT 21.7 (L) 10/13/2017 08:20 AM     10/13/2017 03:58 AM     All Cardiac Markers in the last 24 hours: No results found for: CPK, CK, CKMMB, CKMB, RCK3, CKMBT, CKNDX, CKND1, LAVELLE, TROPT, TROIQ, RAMIREZ, TROPT, TNIPOC, BNP, BNPP    Medications Reviewed:  Current Facility-Administered Medications   Medication Dose Route Frequency    0.9% sodium chloride infusion 250 mL  250 mL IntraVENous PRN    vancomycin (VANCOCIN) 1,000 mg in 0.9% sodium chloride (MBP/ADV) 250 mL adv  1,000 mg IntraVENous Q18H    [START ON 10/14/2017] VANCOMYCIN INFORMATION NOTE   Other ONCE    amLODIPine (NORVASC) tablet 5 mg  5 mg Oral DAILY    labetalol (NORMODYNE) tablet 100 mg  100 mg Oral Q12H    labetalol (NORMODYNE;TRANDATE) injection 20 mg  20 mg IntraVENous Q4H PRN    albuterol-ipratropium (DUO-NEB) 2.5 MG-0.5 MG/3 ML  3 mL Nebulization Q8H RT    HYDROcodone-acetaminophen (NORCO) 7.5-325 mg per tablet 1 Tab  1 Tab Oral Q4H PRN    albuterol (PROVENTIL VENTOLIN) nebulizer solution 2.5 mg  2.5 mg Nebulization Q4H PRN    heparin (porcine) pf 300-500 Units  300-500 Units InterCATHeter PRN    sodium chloride (NS) flush 10-30 mL  10-30 mL InterCATHeter PRN    sodium chloride (NS) flush 10 mL  10 mL InterCATHeter Q24H    sodium chloride (NS) flush 10 mL  10 mL InterCATHeter PRN    sodium chloride (NS) flush 10-40 mL  10-40 mL InterCATHeter Q8H    sodium chloride (NS) flush 20 mL  20 mL InterCATHeter Q24H    famotidine (PEPCID) tablet 20 mg  20 mg Oral BID    piperacillin-tazobactam (ZOSYN) 4.5 g in 0.9% sodium chloride (MBP/ADV) 100 mL MBP Extended 4 Hour Interval###  4.5 g IntraVENous Q8H    sodium chloride (NS) flush 5-10 mL  5-10 mL IntraVENous PRN    ciprofloxacin (CIPRO) 400 mg IVPB (premix)  400 mg IntraVENous Q24H    naloxone (NARCAN) injection 0.4 mg  0.4 mg IntraVENous PRN    heparin (porcine) injection 5,000 Units  5,000 Units SubCUTAneous Q8H    insulin lispro (HUMALOG) injection   SubCUTAneous Q6H    glucose chewable tablet 16 g  4 Tab Oral PRN    glucagon (GLUCAGEN) injection 1 mg  1 mg IntraMUSCular PRN    dextrose (D50W) injection syrg 12.5-25 g  25-50 mL IntraVENous PRN    VANCOMYCIN INFORMATION NOTE   Other Rx Dosing/Monitoring       Shayy Nixon MD    October 13, 2017

## 2017-10-13 NOTE — PROGRESS NOTES
1046: FIO2 on HFNC reduced to 75% as SPO2 was 100% on FIO2 80%. Will continue to titrate FIO2 as pt tolerates. 1130: Reduced FIO2 from 75% to 65% as SPO2 on 75% was 100%. 1206: Pt given scheduled DuoNeb treatment inline through heated HFNC.      1549: Pt now down to 60% FIO2. Scheduled DuoNeb treatment given. SPO2 ranging between 91-93%; believe 60% is lowest FIO2 she can tolerate at this time.

## 2017-10-13 NOTE — PROGRESS NOTES
0800 .Bedside and Verbal shift change report given to Mitchell Mahmood (oncoming nurse) by Jose Alejandro Apple (offgoing nurse). Report included the following information SBAR, Kardex and MAR.     0800 Patient is drowsy but easily oriented  Her H&H levels were re checked and JOSE DAVID Badillo ordered just 1 unit to be infused  Patient complained of pain through out the day she asked to receive one order of dilaudid in which Balaji Badillo did agree for one time 0.2mg. .    1425 Started 1 unit of blood  1652 Blood complete without any adverse reactions. .    1900 . Bedside and Verbal shift change report given to [employer] (oncoming nurse) by Mitchell Mahmood (offgoing nurse). Report included the following information SBAR, Kardex and MAR.

## 2017-10-13 NOTE — PROGRESS NOTES
Physical Exam   Skin:         Primary Nurse Edgar Castro, RN and Shakila RN, RN performed a dual skin assessment on this patient   Jamie score is 16.

## 2017-10-13 NOTE — PROGRESS NOTES
Pharmacy Dosing Services: Vancomycin     Indication: HAP     Day of therapy: Continuing from home healthcare     Other Antimicrobials (Include dose, start day & day of therapy):  Zosyn 4.5 grams every 8 hours, Cipro 400 mg every 24 hours Day 2    Loading dose (date given): 1,000 mg in addition to previous home health care doses  Current Maintenance dose: 1,250 mg every 36 hours    Goal Vancomycin Level: 15-20  (Trough 15-20 for most infections, 20 for meningitis/osteomyelitis, pre-HD level ~25)    Vancomycin Level (if drawn): none     Significant Cultures: 10/11 blood results pending  10/12 urine collected  10/12 sputum- many gram positive cocci    Renal function stable? (unstable defined as SCr increase of 0.5 mg/dL or > 50% increase from baseline, whichever is greater) (Y/N): Y     CAPD, Hemodialysis or Renal Replacement Therapy (Y/N): N     Recent Labs      10/13/17   0358  10/12/17   0815  10/11/17   1741   CREA  1.56*  2.00*  2.46*   BUN  22*  28*  33*   WBC  17.3*  23.2*  18.5*     Temp (24hrs), Av.5 °F (17.5 °C), Min:37.4 °F (3 °C), Max:98.5 °F (36.9 °C)      Creatinine Clearance (Creatinine Clearance (ml/min)): 41.3 mL/min     Regimen assessment: KEATON resolving, increased dose  Maintenance dose: 1 g every 18 hours  Next scheduled level: 10/14 at 0700       Pharmacy will follow daily and adjust medications as appropriate for renal function and/or serum levels. Thank you,  [employer]. D.

## 2017-10-13 NOTE — PROGRESS NOTES
Problem: General Medical Care Plan  Goal: *Skin integrity maintained  Outcome: Progressing Towards Goal  Will place zinc cream to excoriated areas  Goal: *Progressive mobility and function (eg: ADLs)  Outcome: Progressing Towards Goal  Will aid pt weak    Problem: Falls - Risk of  Goal: *Absence of Falls  Document Kapil Fall Risk and appropriate interventions in the flowsheet.    Outcome: Progressing Towards Goal  Fall Risk Interventions:  Mobility Interventions: Bed/chair exit alarm, Strengthening exercises (ROM-active/passive), Patient to call before getting OOB, Communicate number of staff needed for ambulation/transfer, Assess mobility with egress test           Medication Interventions: Bed/chair exit alarm, Patient to call before getting OOB, Teach patient to arise slowly, Assess postural VS orthostatic hypotension     Elimination Interventions: Bed/chair exit alarm, Call light in reach, Toilet paper/wipes in reach, Toileting schedule/hourly rounds

## 2017-10-13 NOTE — MED STUDENT NOTES
*ATTENTION:  This note has been created by a medical student for educational purposes only. Please do not refer to the content of this note for clinical decision-making, billing, or other purposes. Please see attending physicians note to obtain clinical information on this patient. *          Progress Note    Patient: Anurag Real MRN: [id number]  SSN: [id number]    YOB: 1965  Age: 54 y.o. Sex: female      Admit Date: 10/11/2017    LOS: 2 days     Subjective:     Anurag Real is a 54 y.o. female with Hx of throat CA, dysphagia, CAD, DM, HTN and s/p PEG placement presenting with SOB. Pt was recently d/c from [facility] x 3 days ago with recurrent PNA. Cultures positive for pseudomonas and MRSA. Pt returned to the ER with respiratory distress. Per EMS, pt O2 sat was 44% and was placed on a NRB with improvement to 98% O2 sat. Pt was placed on BiPAP in the ED. Pt started on IV Vancomycin and Levaquin. Pt with elevated d-dimer- V/Q scan ordered. Pt was admitted to the ICU. Today, pt states she is feeling better. Still having some SOB. Denies any other complaints. No major overnight events. Pt on HFNC. Objective:     Vitals:    10/13/17 0504 10/13/17 0600 10/13/17 0700 10/13/17 0800   BP:  136/77 147/78    Pulse:  86 90    Resp:  15 14    Temp:    98.1 °F (36.7 °C)   SpO2: 100% 100% 100%    Weight:            Intake and Output:  Current Shift:    Last three shifts: 10/11 1901 - 10/13 0700  In: 3887.5 [I.V.:3587.5]  Out: 2625 [Urine:2625]    Physical Exam   Constitutional: No distress. Resting comfortably in bed   HENT:   Head: Normocephalic and atraumatic. Cardiovascular: Normal rate and regular rhythm. Pulmonary/Chest: No accessory muscle usage. She has rhonchi. On HFNC   Neurological: She is alert. Moves all extremities   Skin: Skin is warm and dry.          Lab/Data Review:  CMP:   Lab Results   Component Value Date/Time     10/13/2017 03:58 AM    K 4.0 10/13/2017 03:58 AM     (H) 10/13/2017 03:58 AM    CO2 28 10/13/2017 03:58 AM    AGAP 4 10/13/2017 03:58 AM    GLU 80 10/13/2017 03:58 AM    BUN 22 (H) 10/13/2017 03:58 AM    CREA 1.56 (H) 10/13/2017 03:58 AM    GFRAA 43 (L) 10/13/2017 03:58 AM    GFRNA 35 (L) 10/13/2017 03:58 AM    CA 8.1 (L) 10/13/2017 03:58 AM     CBC:   Lab Results   Component Value Date/Time    WBC 17.3 (H) 10/13/2017 03:58 AM    HGB 7.0 (L) 10/13/2017 08:20 AM    HCT 21.7 (L) 10/13/2017 08:20 AM     10/13/2017 03:58 AM     ABG: No results found for: PH, PHI, PCO2, PCO2I, PO2, PO2I, HCO3, HCO3I, FIO2, FIO2I         Assessment:     Principal Problem:    Recurrent pneumonia (10/11/2017)    Active Problems:    Respiratory failure with hypoxia ([facility] [address] (10/11/2017)      KEATON (acute kidney injury) ([facility] [address] (10/11/2017)        Plan:     Recurrent PNA: Continue Vancomycin and Zosyn. Consult ID. Continue Nilsa Pulmicort. Consult Pulmonology.    Continue HFNC  Continue appropriate home meds for chronic conditions  Heparin for DVT prophylaxis       Signed By: Gennette Cowden     October 13, 2017

## 2017-10-14 ENCOUNTER — APPOINTMENT (OUTPATIENT)
Dept: GENERAL RADIOLOGY | Age: 52
DRG: 177 | End: 2017-10-14
Attending: PHYSICIAN ASSISTANT
Payer: MEDICARE

## 2017-10-14 LAB
ANION GAP SERPL CALC-SCNC: 7 MMOL/L (ref 3–18)
BACTERIA SPEC CULT: ABNORMAL
BACTERIA SPEC CULT: NORMAL
BASOPHILS # BLD: 0.1 K/UL (ref 0–0.06)
BASOPHILS NFR BLD: 0 % (ref 0–2)
BUN SERPL-MCNC: 17 MG/DL (ref 7–18)
BUN/CREAT SERPL: 12 (ref 12–20)
CALCIUM SERPL-MCNC: 8.1 MG/DL (ref 8.5–10.1)
CHLORIDE SERPL-SCNC: 108 MMOL/L (ref 100–108)
CO2 SERPL-SCNC: 27 MMOL/L (ref 21–32)
CREAT SERPL-MCNC: 1.45 MG/DL (ref 0.6–1.3)
DATE LAST DOSE: ABNORMAL
DIFFERENTIAL METHOD BLD: ABNORMAL
EOSINOPHIL # BLD: 0.4 K/UL (ref 0–0.4)
EOSINOPHIL NFR BLD: 3 % (ref 0–5)
ERYTHROCYTE [DISTWIDTH] IN BLOOD BY AUTOMATED COUNT: 17.4 % (ref 11.6–14.5)
FLUAV AG NPH QL IA: NEGATIVE
FLUBV AG NOSE QL IA: NEGATIVE
GLUCOSE BLD STRIP.AUTO-MCNC: 129 MG/DL (ref 70–110)
GLUCOSE BLD STRIP.AUTO-MCNC: 130 MG/DL (ref 70–110)
GLUCOSE BLD STRIP.AUTO-MCNC: 158 MG/DL (ref 70–110)
GLUCOSE SERPL-MCNC: 109 MG/DL (ref 74–99)
GRAM STN SPEC: ABNORMAL
HCT VFR BLD AUTO: 24 % (ref 35–45)
HGB BLD-MCNC: 7.9 G/DL (ref 12–16)
IGA SERPL-MCNC: 231 MG/DL (ref 87–352)
IGG SERPL-MCNC: 2981 MG/DL (ref 700–1600)
IGM SERPL-MCNC: 100 MG/DL (ref 26–217)
LYMPHOCYTES # BLD: 1.5 K/UL (ref 0.9–3.6)
LYMPHOCYTES NFR BLD: 10 % (ref 21–52)
MCH RBC QN AUTO: 30.2 PG (ref 24–34)
MCHC RBC AUTO-ENTMCNC: 32.9 G/DL (ref 31–37)
MCV RBC AUTO: 91.6 FL (ref 74–97)
MONOCYTES # BLD: 0.8 K/UL (ref 0.05–1.2)
MONOCYTES NFR BLD: 5 % (ref 3–10)
NEUTS SEG # BLD: 12.3 K/UL (ref 1.8–8)
NEUTS SEG NFR BLD: 82 % (ref 40–73)
PLATELET # BLD AUTO: 215 K/UL (ref 135–420)
PMV BLD AUTO: 11.4 FL (ref 9.2–11.8)
POTASSIUM SERPL-SCNC: 3.7 MMOL/L (ref 3.5–5.5)
RBC # BLD AUTO: 2.62 M/UL (ref 4.2–5.3)
REPORTED DOSE,DOSE: ABNORMAL UNITS
REPORTED DOSE/TIME,TMG: 1400
SERVICE CMNT-IMP: ABNORMAL
SERVICE CMNT-IMP: NORMAL
SODIUM SERPL-SCNC: 142 MMOL/L (ref 136–145)
VANCOMYCIN TROUGH SERPL-MCNC: 32.9 UG/ML (ref 10–20)
WBC # BLD AUTO: 15 K/UL (ref 4.6–13.2)

## 2017-10-14 PROCEDURE — 3331090002 HH PPS REVENUE DEBIT

## 2017-10-14 PROCEDURE — 36415 COLL VENOUS BLD VENIPUNCTURE: CPT | Performed by: HOSPITALIST

## 2017-10-14 PROCEDURE — 71010 XR CHEST PORT: CPT

## 2017-10-14 PROCEDURE — 74011250636 HC RX REV CODE- 250/636: Performed by: PHYSICIAN ASSISTANT

## 2017-10-14 PROCEDURE — 65660000001 HC RM ICU INTERMED STEPDOWN

## 2017-10-14 PROCEDURE — 74011250636 HC RX REV CODE- 250/636: Performed by: FAMILY MEDICINE

## 2017-10-14 PROCEDURE — 77010033711 HC HIGH FLOW OXYGEN

## 2017-10-14 PROCEDURE — 74011636637 HC RX REV CODE- 636/637: Performed by: FAMILY MEDICINE

## 2017-10-14 PROCEDURE — 85025 COMPLETE CBC W/AUTO DIFF WBC: CPT | Performed by: PHYSICIAN ASSISTANT

## 2017-10-14 PROCEDURE — 74011250637 HC RX REV CODE- 250/637: Performed by: PHYSICIAN ASSISTANT

## 2017-10-14 PROCEDURE — 80048 BASIC METABOLIC PNL TOTAL CA: CPT | Performed by: PHYSICIAN ASSISTANT

## 2017-10-14 PROCEDURE — 94640 AIRWAY INHALATION TREATMENT: CPT

## 2017-10-14 PROCEDURE — 82962 GLUCOSE BLOOD TEST: CPT

## 2017-10-14 PROCEDURE — 74011250636 HC RX REV CODE- 250/636: Performed by: HOSPITALIST

## 2017-10-14 PROCEDURE — 36591 DRAW BLOOD OFF VENOUS DEVICE: CPT

## 2017-10-14 PROCEDURE — 80202 ASSAY OF VANCOMYCIN: CPT | Performed by: HOSPITALIST

## 2017-10-14 PROCEDURE — 87804 INFLUENZA ASSAY W/OPTIC: CPT | Performed by: PHYSICIAN ASSISTANT

## 2017-10-14 PROCEDURE — 74011000250 HC RX REV CODE- 250: Performed by: PHYSICIAN ASSISTANT

## 2017-10-14 PROCEDURE — 74011000258 HC RX REV CODE- 258: Performed by: FAMILY MEDICINE

## 2017-10-14 PROCEDURE — 77030011256 HC DRSG MEPILEX <16IN NO BORD MOLN -A

## 2017-10-14 PROCEDURE — 94660 CPAP INITIATION&MGMT: CPT

## 2017-10-14 PROCEDURE — 3331090001 HH PPS REVENUE CREDIT

## 2017-10-14 PROCEDURE — 97162 PT EVAL MOD COMPLEX 30 MIN: CPT

## 2017-10-14 PROCEDURE — 74011000250 HC RX REV CODE- 250: Performed by: INTERNAL MEDICINE

## 2017-10-14 RX ORDER — IPRATROPIUM BROMIDE AND ALBUTEROL SULFATE 2.5; .5 MG/3ML; MG/3ML
3 SOLUTION RESPIRATORY (INHALATION)
Status: DISCONTINUED | OUTPATIENT
Start: 2017-10-14 | End: 2017-10-23 | Stop reason: HOSPADM

## 2017-10-14 RX ORDER — BUDESONIDE 0.5 MG/2ML
500 INHALANT ORAL
Status: DISCONTINUED | OUTPATIENT
Start: 2017-10-14 | End: 2017-10-23 | Stop reason: HOSPADM

## 2017-10-14 RX ORDER — CIPROFLOXACIN 500 MG/1
750 TABLET ORAL EVERY 12 HOURS
Status: DISCONTINUED | OUTPATIENT
Start: 2017-10-14 | End: 2017-10-14 | Stop reason: DRUGHIGH

## 2017-10-14 RX ORDER — GABAPENTIN 400 MG/1
400 CAPSULE ORAL 3 TIMES DAILY
Status: DISCONTINUED | OUTPATIENT
Start: 2017-10-14 | End: 2017-10-23 | Stop reason: HOSPADM

## 2017-10-14 RX ORDER — CIPROFLOXACIN 500 MG/1
500 TABLET ORAL EVERY 12 HOURS
Status: DISCONTINUED | OUTPATIENT
Start: 2017-10-14 | End: 2017-10-16

## 2017-10-14 RX ORDER — FUROSEMIDE 10 MG/ML
20 INJECTION INTRAMUSCULAR; INTRAVENOUS ONCE
Status: COMPLETED | OUTPATIENT
Start: 2017-10-14 | End: 2017-10-14

## 2017-10-14 RX ADMIN — Medication 10 ML: at 14:54

## 2017-10-14 RX ADMIN — Medication 10 ML: at 22:11

## 2017-10-14 RX ADMIN — Medication 10 ML: at 15:50

## 2017-10-14 RX ADMIN — CIPROFLOXACIN HYDROCHLORIDE 500 MG: 500 TABLET, FILM COATED ORAL at 21:24

## 2017-10-14 RX ADMIN — LABETALOL HYDROCHLORIDE 100 MG: 100 TABLET, FILM COATED ORAL at 21:25

## 2017-10-14 RX ADMIN — GABAPENTIN 400 MG: 400 CAPSULE ORAL at 15:51

## 2017-10-14 RX ADMIN — CIPROFLOXACIN HYDROCHLORIDE 500 MG: 500 TABLET, FILM COATED ORAL at 11:57

## 2017-10-14 RX ADMIN — BUDESONIDE 500 MCG: 0.5 INHALANT RESPIRATORY (INHALATION) at 11:20

## 2017-10-14 RX ADMIN — HYDROCODONE BITARTRATE AND ACETAMINOPHEN 1 TABLET: 7.5; 325 TABLET ORAL at 09:24

## 2017-10-14 RX ADMIN — AMLODIPINE BESYLATE 5 MG: 5 TABLET ORAL at 09:24

## 2017-10-14 RX ADMIN — SODIUM CHLORIDE 750 MG: 900 INJECTION, SOLUTION INTRAVENOUS at 23:08

## 2017-10-14 RX ADMIN — IPRATROPIUM BROMIDE AND ALBUTEROL SULFATE 3 ML: .5; 3 SOLUTION RESPIRATORY (INHALATION) at 14:49

## 2017-10-14 RX ADMIN — FAMOTIDINE 20 MG: 20 TABLET ORAL at 19:45

## 2017-10-14 RX ADMIN — Medication 20 ML: at 15:50

## 2017-10-14 RX ADMIN — HEPARIN SODIUM 5000 UNITS: 5000 INJECTION, SOLUTION INTRAVENOUS; SUBCUTANEOUS at 14:47

## 2017-10-14 RX ADMIN — HEPARIN SODIUM 5000 UNITS: 5000 INJECTION, SOLUTION INTRAVENOUS; SUBCUTANEOUS at 06:32

## 2017-10-14 RX ADMIN — IPRATROPIUM BROMIDE AND ALBUTEROL SULFATE 3 ML: .5; 3 SOLUTION RESPIRATORY (INHALATION) at 19:14

## 2017-10-14 RX ADMIN — GABAPENTIN 400 MG: 400 CAPSULE ORAL at 22:10

## 2017-10-14 RX ADMIN — FAMOTIDINE 20 MG: 20 TABLET ORAL at 09:24

## 2017-10-14 RX ADMIN — IPRATROPIUM BROMIDE AND ALBUTEROL SULFATE 3 ML: .5; 3 SOLUTION RESPIRATORY (INHALATION) at 08:32

## 2017-10-14 RX ADMIN — HEPARIN SODIUM 5000 UNITS: 5000 INJECTION, SOLUTION INTRAVENOUS; SUBCUTANEOUS at 21:24

## 2017-10-14 RX ADMIN — HYDROCODONE BITARTRATE AND ACETAMINOPHEN 1 TABLET: 7.5; 325 TABLET ORAL at 14:47

## 2017-10-14 RX ADMIN — PIPERACILLIN SODIUM,TAZOBACTAM SODIUM 4.5 G: 4; .5 INJECTION, POWDER, FOR SOLUTION INTRAVENOUS at 03:06

## 2017-10-14 RX ADMIN — Medication 10 ML: at 06:38

## 2017-10-14 RX ADMIN — BUDESONIDE 500 MCG: 0.5 INHALANT RESPIRATORY (INHALATION) at 19:14

## 2017-10-14 RX ADMIN — HYDROCODONE BITARTRATE AND ACETAMINOPHEN 1 TABLET: 7.5; 325 TABLET ORAL at 04:19

## 2017-10-14 RX ADMIN — GABAPENTIN 400 MG: 400 CAPSULE ORAL at 10:57

## 2017-10-14 RX ADMIN — INSULIN LISPRO 2 UNITS: 100 INJECTION, SOLUTION INTRAVENOUS; SUBCUTANEOUS at 11:57

## 2017-10-14 RX ADMIN — IPRATROPIUM BROMIDE AND ALBUTEROL SULFATE 3 ML: .5; 3 SOLUTION RESPIRATORY (INHALATION) at 00:04

## 2017-10-14 RX ADMIN — FUROSEMIDE 20 MG: 10 INJECTION, SOLUTION INTRAMUSCULAR; INTRAVENOUS at 10:57

## 2017-10-14 RX ADMIN — LABETALOL HYDROCHLORIDE 100 MG: 100 TABLET, FILM COATED ORAL at 09:24

## 2017-10-14 NOTE — DISCHARGE SUMMARY
Discharge Summary    Patient: Patel Chavis               Sex: female          DOA: 9/24/2017         YOB: 1965      Age:  54 y.o.        LOS:  LOS: 15 days                Admit Date: 9/24/2017    Discharge Date: 10/9/2017    Discharge Medications:     Discharge Medication List as of 10/9/2017  4:12 PM      START taking these medications    Details   ciprofloxacin HCl (CIPRO) 750 mg tablet Take 1 Tab by mouth every twelve (12) hours. , Print, Disp-50 Tab, R-0      doxycycline (ADOXA) 100 mg tablet Take 1 Tab by mouth two (2) times a day., Print, Disp-28 Tab, R-0         CONTINUE these medications which have CHANGED    Details   furosemide (LASIX) 20 mg tablet Take 2 Tabs by mouth daily. , Print, Disp-60 Tab, R-0      HYDROcodone-acetaminophen (NORCO) 5-325 mg per tablet Take 1 Tab by mouth every four (4) hours as needed for Pain. Max Daily Amount: 6 Tabs., Print, Disp-10 Tab, R-0      metoprolol tartrate (LOPRESSOR) 100 mg IR tablet Take 1 Tab by mouth every twelve (12) hours. , Print, Disp-60 Tab, R-0         CONTINUE these medications which have NOT CHANGED    Details   cloNIDine HCl (CATAPRES) 0.1 mg tablet Take 0.1 mg by mouth two (2) times a day., Historical Med      multivitamin (ONE A DAY) tablet Take 1 Tab by mouth daily. , Historical Med      polyethylene glycol (MIRALAX) 17 gram/dose powder Take 17 g by mouth daily. , Historical Med      docusate sodium (COLACE) 100 mg capsule Take 100 mg by mouth two (2) times a day., Historical Med      amLODIPine-benazepril (LOTREL) 5-10 mg per capsule Take 1 Cap by mouth daily. , Historical Med      levothyroxine (SYNTHROID) 100 mcg tablet Take  by mouth Daily (before breakfast). , Historical Med      amylase-lipase-protease (CREON) capsule Take  by mouth three (3) times daily (with meals). , Historical Med      pantoprazole (PROTONIX) 40 mg tablet Take 1 Tab by mouth daily. , Print, Disp-30 Tab, R-0      ondansetron hcl (ZOFRAN) 4 mg tablet Take 1 Tab by mouth every eight (8) hours as needed for Nausea. , Print, Disp-20 Tab, R-0      gabapentin (NEURONTIN) 300 mg capsule Take 300 mg by mouth three (3) times daily. , Historical Med      aspirin 81 mg tablet Take 81 mg by mouth. Historical Med, 81 mg      folic acid (FOLVITE) 1 mg tablet Take 1 mg by mouth daily. Historical Med, 1 mg         STOP taking these medications       oxyCODONE-acetaminophen (PERCOCET) 5-325 mg per tablet Comments:   Reason for Stopping:         sodium bicarbonate 325 mg tablet Comments:   Reason for Stopping: Follow-up: PCP    Discharge Condition: Stable    Activity: Activity as tolerated    Diet: Resume previous diet    Labs:  Labs: Results:       Chemistry Recent Labs      10/14/17   0400  10/13/17   0358  10/12/17   0815  10/11/17   1741   GLU  109*  80  79  103*   NA  142  141  141  136   K  3.7  4.0  4.5  4.3   CL  108  109*  107  101   CO2  27  28  28  29   BUN  17  22*  28*  33*   CREA  1.45*  1.56*  2.00*  2.46*   CA  8.1*  8.1*  7.9*  8.6   AGAP  7  4  6  6   BUCR  12  14  14  13   AP   --    --    --   125*   TP   --    --    --   8.4*   ALB   --    --    --   2.4*   GLOB   --    --    --   6.0*   AGRAT   --    --    --   0.4*      CBC w/Diff Recent Labs      10/14/17   0400  10/13/17   2000  10/13/17   0820  10/13/17   0358  10/12/17   0815   WBC  15.0*   --    --   17.3*  23.2*   RBC  2.62*   --    --   2.16*  2.69*   HGB  7.9*  8.0*  7.0*  6.5*  8.2*   HCT  24.0*  24.7*  21.7*  20.3*  25.2*   PLT  215   --    --   214  232   GRANS  82*   --    --   85*  92*   LYMPH  10*   --    --   9*  5*   EOS  3   --    --   2  1      Cardiac Enzymes Recent Labs      10/11/17   1741   CPK  42   CKND1  CALCULATION NOT PERFORMED WHEN RESULT IS BELOW LINEAR LIMIT      Coagulation No results for input(s): PTP, INR, APTT in the last 72 hours.     No lab exists for component: INREXT    Lipid Panel No results found for: CHOL, CHOLPOCT, CHOLX, CHLST, CHOLV, 340007, HDL, LDL, LDLC, DLDLP, 987751, VLDLC, VLDL, TGLX, TRIGL, TRIGP, TGLPOCT, CHHD, CHHDX   BNP No results for input(s): BNPP in the last 72 hours. Liver Enzymes Recent Labs      10/11/17   1741   TP  8.4*   ALB  2.4*   AP  125*   SGOT  35      Thyroid Studies Lab Results   Component Value Date/Time    TSH 1.54 09/26/2017 09:56 AM          Imaging:     CTA 9/24  1. No evidence of pulmonary embolism.     2. Diffuse alveolar, centrilobular, and peribronchial opacities, the appearance  of which is most in keeping with a multifocal pneumonia. No evidence of pleural  effusion.     3. Percutaneous gastrostomy catheter in expected position, with mild fluid  distention of the stomach noted.     Consults: Infectious Disease and Pulmonary/Critical Care    Treatment Team: Treatment Team: Consulting Provider: Maile Zee MD; Utilization Review: Joyce Gardner RN; Consulting Provider: Ismael Garcia MD; Physician Assistant: JOSE DAVID Gill; Consulting Provider: Ross Brito MD; Physical Therapist: Tatiana Trejo PT    Significant Diagnostic Studies: labs:   Recent Results (from the past 24 hour(s))   GLUCOSE, POC    Collection Time: 10/13/17  5:52 PM   Result Value Ref Range    Glucose (POC) 102 70 - 110 mg/dL   HGB & HCT    Collection Time: 10/13/17  8:00 PM   Result Value Ref Range    HGB 8.0 (L) 12.0 - 16.0 g/dL    HCT 24.7 (L) 35.0 - 45.0 %   GLUCOSE, POC    Collection Time: 10/13/17 11:44 PM   Result Value Ref Range    Glucose (POC) 130 (H) 70 - 110 mg/dL   CBC WITH AUTOMATED DIFF    Collection Time: 10/14/17  4:00 AM   Result Value Ref Range    WBC 15.0 (H) 4.6 - 13.2 K/uL    RBC 2.62 (L) 4.20 - 5.30 M/uL    HGB 7.9 (L) 12.0 - 16.0 g/dL    HCT 24.0 (L) 35.0 - 45.0 %    MCV 91.6 74.0 - 97.0 FL    MCH 30.2 24.0 - 34.0 PG    MCHC 32.9 31.0 - 37.0 g/dL    RDW 17.4 (H) 11.6 - 14.5 %    PLATELET 685 903 - 862 K/uL    MPV 11.4 9.2 - 11.8 FL    NEUTROPHILS 82 (H) 40 - 73 %    LYMPHOCYTES 10 (L) 21 - 52 %    MONOCYTES 5 3 - 10 %    EOSINOPHILS 3 0 - 5 %    BASOPHILS 0 0 - 2 %    ABS. NEUTROPHILS 12.3 (H) 1.8 - 8.0 K/UL    ABS. LYMPHOCYTES 1.5 0.9 - 3.6 K/UL    ABS. MONOCYTES 0.8 0.05 - 1.2 K/UL    ABS. EOSINOPHILS 0.4 0.0 - 0.4 K/UL    ABS.  BASOPHILS 0.1 (H) 0.0 - 0.06 K/UL    DF AUTOMATED     METABOLIC PANEL, BASIC    Collection Time: 10/14/17  4:00 AM   Result Value Ref Range    Sodium 142 136 - 145 mmol/L    Potassium 3.7 3.5 - 5.5 mmol/L    Chloride 108 100 - 108 mmol/L    CO2 27 21 - 32 mmol/L    Anion gap 7 3.0 - 18 mmol/L    Glucose 109 (H) 74 - 99 mg/dL    BUN 17 7.0 - 18 MG/DL    Creatinine 1.45 (H) 0.6 - 1.3 MG/DL    BUN/Creatinine ratio 12 12 - 20      GFR est AA 47 (L) >60 ml/min/1.73m2    GFR est non-AA 38 (L) >60 ml/min/1.73m2    Calcium 8.1 (L) 8.5 - 10.1 MG/DL   GLUCOSE, POC    Collection Time: 10/14/17  5:52 AM   Result Value Ref Range    Glucose (POC) 130 (H) 70 - 110 mg/dL   VANCOMYCIN, TROUGH    Collection Time: 10/14/17  7:25 AM   Result Value Ref Range    Vancomycin,trough 32.9 (HH) 10.0 - 20.0 ug/mL    Reported dose date: 20171013      Reported dose time: 1400      Reported dose: 1G IV Q18H UNITS   GLUCOSE, POC    Collection Time: 10/14/17 11:23 AM   Result Value Ref Range    Glucose (POC) 158 (H) 70 - 110 mg/dL         Discharge diagnoses:    Problem List as of 10/9/2017  Date Reviewed: 9/24/2017          Codes Class Noted - Resolved    SOB (shortness of breath) ICD-10-CM: R06.02  ICD-9-CM: 786.05  10/9/2017 - Present        Dysphagia ICD-10-CM: R13.10  ICD-9-CM: 787.20  10/9/2017 - Present        History of throat cancer ICD-10-CM: S02.346  ICD-9-CM: V10.02  10/9/2017 - Present        * (Principal)Acute respiratory failure with hypercapnia (HCC) ICD-10-CM: J96.02  ICD-9-CM: 518.81  9/24/2017 - Present        Sepsis ([address] ICD-10-CM: A41.9  ICD-9-CM: 038.9, 995.91  9/24/2017 - Present        HCAP (healthcare-associated pneumonia) ICD-10-CM: J18.9  ICD-9-CM: 170  9/24/2017 - Present        Hypertension ICD-10-CM: I10  ICD-9-CM: 401.9  9/24/2017 - Present        Diabetes mellitus ([facility] ICD-10-CM: E11.9  ICD-9-CM: 250.00  9/24/2017 - Present            Hospital Course:   Major issues addressed during hospitalization outlined  below. Colin Hernandez is a 54 y.o.  female with h/o throat cancer,cad,hypertension,hypothyroidism,etoh abuse,anxiety,depression,diabetes,brought to ER because of short of breath. She was just discharged from [facility] for pneumonia. In the ER patient was tachypnic. CXR c/w increased conspicuity to multifocal alveolar opacities. As patient was agitated,he was given ativan 1 mg. Then was taken for CTA. At return from CT,patient was noted unresponsive and was intubated. She was admitted for respiratory failure and pneumonia. Patient had an extended admission please see EMR for full records    In summary:  Patient was admitted for acute respiratory failure 2/2 what ended up being psuedomonal PNA in addition to findings or MRSA in sputum. Blood cxs remained negative. She was initially treated with Vanc and zosyn. Tobramycin was added and zosyn stopped and then later in admission tobra change to ciprofloxacin. All abx changes made by ID. The cipro was eventually changed to PO for home and doxy was used in substitute of vanc for home to give complete course for MRSA and pseudomonas. She was initially intubated in ICU for several days and this was apparently difficult 2/2 throat cancer. She was eventually extubated and put on high sunil. This was weaned to [place] and patient transferred to floor. At this point patient was discharged home. PCP f/u in 1 week. Pulm f/u .      Shayy Nixon MD  October 14, 2017        Total time spent 45 minutes

## 2017-10-14 NOTE — PROGRESS NOTES
0700 . Received bedside verbal report from Kintera. Pt was calm with no distress noted. On Hi-Flow oxygen with an FI02 of 50%  Pt denied any pain at the time. Holly catheter in place and draining clear straw colored urine. Pt. On tube feed at 50 mls /hr with a goal rate of 60 mls/hr. Tolerating well. 0830 Results for Kayla Barrow (MRN [id number]) as of 10/14/2017 11:21  Nataliya MAIN informed about results. Pharmacy called. New order from pharmacy to hold morning dose of vancomycin. Ref. Range 10/14/2017 07:25   Vancomycin,trough Latest Ref Range: 10.0 - 20.0 ug/mL 32.9 ()   0900 Nataliya Alicea. In to see the pt.   0930 Pt noted to be desaturating to 88% . RT increased Oxygen to 50 L/MIn and FIO2 of 55.7%   1000 SPO2 at 100% pt resting in bed. 1030   Culture result:  (A)    Preliminary      MODERATE **METHICILLIN RESISTANT STAPHYLOCOCCUS AUREUS**     Culture result:      Preliminary     FEW NORMAL RESPIRATORY HECTOR     Culture result:        Nataliya Alicea made aware of results. Pt already on Vancomycin. 1120 Dr Maya Fonpilar in to see the pt.   1900 Bedside and Verbal shift change report given to Ondina Vanessa  (oncoming nurse) by Pura Barragan  ** (offgoing nurse). Report included the following information SBAR, Kardex, ED Summary, Intake/Output, MAR and Recent Results.

## 2017-10-14 NOTE — PROGRESS NOTES
Found patient off bipap on Opti flow  45 lpm and 40% fi02   Patient worn bipap last night   bbs are coarse with a productive cough will continue to monitor

## 2017-10-14 NOTE — ROUTINE PROCESS
Bedside and Verbal shift change report given to Eloisa 91Cuong (oncoming nurse) by Velia Mi RN   (offgoing nurse). Report included the following information SBAR, Kardex, Intake/Output, MAR and Recent Results.

## 2017-10-14 NOTE — PROGRESS NOTES
Problem: Falls - Risk of  Goal: *Absence of Falls  Document Kapil Fall Risk and appropriate interventions in the flowsheet.    Outcome: Progressing Towards Goal  Fall Risk Interventions:  Mobility Interventions: Patient to call before getting OOB           Medication Interventions: Patient to call before getting OOB     Elimination Interventions: Call light in reach, Patient to call for help with toileting needs

## 2017-10-14 NOTE — PROGRESS NOTES
Day #4 of ciprofloxacin    Indication:  PNA  Current regimen:  ciprofloxacin 750 mg PO every 12 hours  Abx regimen: vancomycin + ciprofloxacin  Recent Labs      10/14/17   0400  10/13/17   0358  10/12/17   0815   WBC  15.0*  17.3*  23.2*   CREA  1.45*  1.56*  2.00*   BUN  17  22*  28*     Est CrCl: 45-50 ml/min; UO: 1.3 ml/kg/hr  Temp (24hrs), Av.7 °F (37.1 °C), Min:97.6 °F (36.4 °C), Max:99.6 °F (37.6 °C)    Cultures:   10/13 Blood - pending  10/12 Sputum - MRSA  10/12 Urine - NG - final      Plan: Change to ciprofloxacin 500 mg every 12 hours per P&T-approved renal protocol     Thanks,  Wiley CourserFAIZAN

## 2017-10-14 NOTE — PROGRESS NOTES
2020 Nursing assessment done, drowsy, woke up complaining of pain. [place] as ordered. 2320 Pt asking for Dilaudid for pain, Dr. [place] [facility] called, new order given. 0100 Saturation drops when sleeping, bipap applied. 0200 Incontinent of liquid brown stool, bath given and repositioned. 0600 Nauseated as stated, no emesis noted. Ariel Hassan

## 2017-10-14 NOTE — PROGRESS NOTES
[facility]  ICU Progress Note      Name: Sina Moreland   : 1965   MRN: [id number]   Date: 10/14/2017 5:00 PM     [x]I have reviewed the flowsheet and previous days notes. Events overnight reviewed and discussed with nursing staff. Vital signs and records reviewed.     55 yo female with PMH throat cancer, dysphagia, PEG, DM, chronic anemia, hypothyroidism, HTN, recent admission for MRSA and pseudomonas PNA requiring intubation-discharged 10/9 presented to the ED from home with SOB, hypoxia (sat 44%) found to have PNA, started on BiPAP and admitted to ICU.      10/14/2017  Increased generalized pain and SOB this am  Refusing BiPAP, FiO2 increased  CXR worse, BNP up  Sputum +MRSA, WBC down  Cr improving, good UO, s/p transfusion yesterday  procal 1.71    Medication Review:  · Pressors -  · Sedation -  · Antibiotics -vanc, cipro  · Pain -  · GI/ DVT -pepcid, heparin  · Others (other gtts)    Safety Bundles: VAP Bundle/ CAUTI/ Severe Sepsis Protocol/ Electrolyte Replacement Protocol    Vital Signs:    Visit Vitals    /76    Pulse (!) 105    Temp 99 °F (37.2 °C)    Resp 21    Wt 68 kg (149 lb 14.6 oz)    SpO2 92%    BMI 25.73 kg/m2       O2 Device: Hi flow nasal cannula   O2 Flow Rate (L/min): 50 l/min   Temp (24hrs), Av.7 °F (37.1 °C), Min:97.6 °F (36.4 °C), Max:99.6 °F (37.6 °C)       Intake/Output:   Last shift:         Last 3 shifts: 10/12 1901 - 10/14 0700  In: 3222.6 [I.V.:2111.3]  Out: 3445 [Urine:3445]    Intake/Output Summary (Last 24 hours) at 10/14/17 0955  Last data filed at 10/14/17 0600   Gross per 24 hour   Intake           1301.3 ml   Output             1920 ml   Net           -618.7 ml          Physical Exam:    General/Neuro: Awake and alert, +mild respiratory distress/WOB, oriented, moves all extremities,  no FND, +conversational dyspnea  HEENT:  Anicteric sclerae; pink palpebral conjunctivae; mucosa dry, poor dentition, HFNC in place  Resp:  Adequate AE B,  + accessory muscle use; +crackles bilaterally R>L, R chest wall tunneled catheter in place, no wheeze  CV:  Tachycardic, no m  GI:  Abdomen soft, non-tender; (+) active bowel sounds, +PEG in place  Extremities:  +improved trace edema BLE, L>R chronic per pt and RN  Skin:  Warm; no rashes/ lesions noted  Devices:   Holly, R chest tunneled catheter      DATA:     Current Facility-Administered Medications   Medication Dose Route Frequency    promethazine (PHENERGAN) 25 mg in 0.9% sodium chloride 50 mL IVPB  25 mg IntraVENous Q6H PRN    vancomycin (VANCOCIN) 750 mg in 0.9% sodium chloride (MBP/ADV) 250 mL ADV  750 mg IntraVENous Q24H    [START ON 10/17/2017] VANCOMYCIN INFORMATION NOTE   Other ONCE    gabapentin (NEURONTIN) capsule 400 mg  400 mg Oral TID    furosemide (LASIX) injection 20 mg  20 mg IntraVENous ONCE    albuterol-ipratropium (DUO-NEB) 2.5 MG-0.5 MG/3 ML  3 mL Nebulization Q6H RT    budesonide (PULMICORT) 500 mcg/2 ml nebulizer suspension  500 mcg Nebulization BID RT    0.9% sodium chloride infusion 250 mL  250 mL IntraVENous PRN    amLODIPine (NORVASC) tablet 5 mg  5 mg Oral DAILY    labetalol (NORMODYNE) tablet 100 mg  100 mg Oral Q12H    labetalol (NORMODYNE;TRANDATE) injection 20 mg  20 mg IntraVENous Q4H PRN    HYDROcodone-acetaminophen (NORCO) 7.5-325 mg per tablet 1 Tab  1 Tab Oral Q4H PRN    albuterol (PROVENTIL VENTOLIN) nebulizer solution 2.5 mg  2.5 mg Nebulization Q4H PRN    heparin (porcine) pf 300-500 Units  300-500 Units InterCATHeter PRN    sodium chloride (NS) flush 10-30 mL  10-30 mL InterCATHeter PRN    sodium chloride (NS) flush 10 mL  10 mL InterCATHeter Q24H    sodium chloride (NS) flush 10 mL  10 mL InterCATHeter PRN    sodium chloride (NS) flush 10-40 mL  10-40 mL InterCATHeter Q8H    sodium chloride (NS) flush 20 mL  20 mL InterCATHeter Q24H    famotidine (PEPCID) tablet 20 mg  20 mg Oral BID    sodium chloride (NS) flush 5-10 mL  5-10 mL IntraVENous PRN  ciprofloxacin (CIPRO) 400 mg IVPB (premix)  400 mg IntraVENous Q24H    naloxone (NARCAN) injection 0.4 mg  0.4 mg IntraVENous PRN    heparin (porcine) injection 5,000 Units  5,000 Units SubCUTAneous Q8H    insulin lispro (HUMALOG) injection   SubCUTAneous Q6H    glucose chewable tablet 16 g  4 Tab Oral PRN    glucagon (GLUCAGEN) injection 1 mg  1 mg IntraMUSCular PRN    dextrose (D50W) injection syrg 12.5-25 g  25-50 mL IntraVENous PRN    VANCOMYCIN INFORMATION NOTE   Other Rx Dosing/Monitoring         Labs: Results:       Chemistry Recent Labs      10/14/17   0400  10/13/17   0358  10/12/17   0815  10/11/17   1741   GLU  109*  80  79  103*   NA  142  141  141  136   K  3.7  4.0  4.5  4.3   CL  108  109*  107  101   CO2  27  28  28  29   BUN  17  22*  28*  33*   CREA  1.45*  1.56*  2.00*  2.46*   CA  8.1*  8.1*  7.9*  8.6   AGAP  7  4  6  6   BUCR  12  14  14  13   AP   --    --    --   125*   TP   --    --    --   8.4*   ALB   --    --    --   2.4*   GLOB   --    --    --   6.0*   AGRAT   --    --    --   0.4*      CBC w/Diff Recent Labs      10/14/17   0400  10/13/17   2000  10/13/17   0820  10/13/17   0358  10/12/17   0815   WBC  15.0*   --    --   17.3*  23.2*   RBC  2.62*   --    --   2.16*  2.69*   HGB  7.9*  8.0*  7.0*  6.5*  8.2*   HCT  24.0*  24.7*  21.7*  20.3*  25.2*   PLT  215   --    --   214  232   GRANS  82*   --    --   85*  92*   LYMPH  10*   --    --   9*  5*   EOS  3   --    --   2  1      Coagulation No results for input(s): PTP, INR, APTT in the last 72 hours.     No lab exists for component: INREXT, INREXT    Liver Enzymes Recent Labs      10/11/17   1741   TP  8.4*   ALB  2.4*   AP  125*   SGOT  35      ABG No results found for: PH, PHI, PCO2, PCO2I, PO2, PO2I, HCO3, HCO3I, FIO2, FIO2I   Microbiology        Telemetry: [x]Sinus to ST []A-flutter []Paced    []A-fib []Multiple PVCs                    Imaging:  [x]I have personally reviewed the patients radiographs  []Radiographs reviewed with radiologist   []No change from prior, tubes and lines in adequate position  []Improved   []Worsening    10/14/17 CXR      10/13/17 CXR    Interval worsening of multifocal pulmonary infiltrate  10/11/17 CXR    1. Bilateral upper lower lobe patchy confluent parenchymal opacities,  representing pneumonia/edema which is similar to the most recent chest x-ray.         IMPRESSION:   · Acute hypoxemic respiratory failure requiring BIPAP due to recurrent MRSA PNA, probable aspiration,+/- heart failure  · Recent MRSA and pseudomonas HCAP  · KEATON, likely prerenal, improved  · HTN, poorly controlled  · Chronic diastolic CHF  · Chronic anemia  · Chronic pain  · Hx throat cancer with upper airway obstruction  · Dysphagia, s/p PEG placement due to above  · Hx tobacco and etoh abuse, remote        PLAN:   · Resp -  CXR worse, refusing BiPAP, cont HFNC . Follow CXR, cont abx, sputum cx. Cont duonebs, add pulmicort, monitor tachycardia. Lasix x 1 today. Pt previously difficult intubation with 6.5 ETT. CTA 9/24 negative for PE.  · ID - ID following. Sputum +MRSA. Continue vanc. Completing 4 week course of cipro from previous admission (start date 10/2). Await blood and urine cx, sputum final. LA normal. Procal 1.71  · CVS - Monitor HD. Recent ECHO EF 55-60 with mod TR. Started on norvasc and labetalol. BNP up, lasix given. · Heme/onc -   Monitor hgb, transfuse prn for hgb<7. s/p 1 uPRBC 10/13. As per previous notes, if pt requires transfusion, can take hours as pt has multiple Ab's. Recent HIT negative. Plts wnL. · Metabolic - Replace lytes prn per protocol  · Renal - monitor I/O, trend Cr. Cont mckoen for now  · Endocrine - Recent TSH normal, cont synthroid. SSI prn, avoid hypoglycemia  · Neuro/ Pain/ Sedation - Norco prn pain.  Significant lethargy with dilaudid in past. Resume neurontin  · GI - TF thru PEG, pepcid ppx  · Prophylaxis - DVT-heparin, GI-pepcid  · Discussed in interdisciplinary rounds  · FULL CODE  · Continue to monitor in ICU for now  · PT/OT          The patient is: [] acutely ill Risk of deterioration: [] moderate    [x] critically ill  [x] high     [x]See my orders for details    My assessment/plan was discussed with:  [x]nursing []PT/OT    [x]respiratory therapy [x]   []family [x]patient       JOSE DAVID Washington

## 2017-10-14 NOTE — PROGRESS NOTES
Physical Exam   Skin:           Primary Nurse Masha Whitmore and GEO Delatorre , RN performed a dual skin assessment on this patient No impairment noted  Jamie score is 15

## 2017-10-14 NOTE — PROGRESS NOTES
1337 Patient placed on new bed with scale to obtain accurate weight     1517 Flu swab collected and sent to lab

## 2017-10-14 NOTE — PROGRESS NOTES
Pharmacy Dosing Services: Vancomycin     Indication: HAP     Day of therapy: Continuing from home healthcare     Other Antimicrobials (Include dose, start day & day of therapy):  Zosyn 4.5 grams every 8 hours (started 10/11)  Cipro 400 mg every 24 hours (started 10/11)    Loading dose (date given): 1,000 mg in addition to previous home health care doses  Current Maintenance dose: 1000 mg every 18 hours    Goal Vancomycin Level: 15-20  (Trough 15-20 for most infections, 20 for meningitis/osteomyelitis, pre-HD level ~25)    Vancomycin Level (if drawn):   10/11 at 1741 - 12.3 (Random - continued from home health care)  10/14 at 0725 - 32.9 (~17 hours post-dose)    Significant Cultures:   10/11 blood - NGTD - pending  10/12 urine - NGTD - pending  10/12 sputum- moderate MRSA - pending    Renal function stable? (unstable defined as SCr increase of 0.5 mg/dL or > 50% increase from baseline, whichever is greater) (Y/N): Y     CAPD, Hemodialysis or Renal Replacement Therapy (Y/N): N     Recent Labs      10/14/17   0400  10/13/17   0358  10/12/17   0815   CREA  1.45*  1.56*  2.00*   BUN  17  22*  28*   WBC  15.0*  17.3*  23.2*     Temp (24hrs), Av.7 °F (37.1 °C), Min:97.6 °F (36.4 °C), Max:99.6 °F (37.6 °C)      Creatinine Clearance (Creatinine Clearance (ml/min)): ~40-50 mL/min     Regimen assessment: Trough level supratherapeutic - will adjust dose  Maintenance dose: 750 mg every 24 hours for an extrapolated trough of 16.2 and AUC > 400 (to start on 10/14 at 1600)  Next scheduled level: 10/17 at [address] will follow daily and adjust medications as appropriate for renal function and/or serum levels.     Thank you,  Lesa Cameron, PHARMD

## 2017-10-14 NOTE — PROGRESS NOTES
Problem: Mobility Impaired (Adult and Pediatric)  Goal: *Acute Goals and Plan of Care (Insert Text)  Physical Therapy Goals  Initiated 10/14/2017 and to be accomplished within 7 day(s)  1. Patient will move from supine to sit and sit to supine , scoot up and down and roll side to side in bed with modified independence. 2. Patient will transfer from bed to chair and chair to bed with modified independence using the least restrictive device. 3. Patient will perform sit to stand with modified independence. 4. Patient will ambulate with supervision for 25 feet with the least restrictive device. PHYSICAL THERAPY EVALUATION     Patient: Sarai Rudd (78 y.o. female)  Date: 10/14/2017  Primary Diagnosis: Respiratory failure with hypoxia (HCC)  Recurrent pneumonia  Recurrent pneumonia  Respiratory failure with hypoxia (HCC)        Precautions:   Aspiration, Fall      PROBLEM LIST:  Patient presents with the following problems:   Bed Mobility, Transfers, Gait, Strength and Balance  ASSESSMENT :   Patient requires between moderate assistance  and minimal assistance/contact guard assist for bed mobility, transfers and ambulation. Pt with significant deconditioning due to recent dx of pneumonia and resp failure, SOB very quickly with bed mobility and supine to sit. O2 desats to low 80s with mobility, returns to baseline with rest in supine. Pt would benefit from skilled PT to address weakness and deconditioning and progress to prior level of function. 3     Patient will benefit from skilled intervention to address the above impairments.   Patients rehabilitation potential is considered to be Good  Factors which may influence rehabilitation potential include:   [ ]         None noted  [ ]         Mental ability/status  [X]         Medical condition  [ ]         Home/family situation and support systems  [ ]         Safety awareness  [ ]         Pain tolerance/management  [ ]         Other:        PLAN :  Recommendations and Planned Interventions:  [X]           Bed Mobility Training             [X]    Neuromuscular Re-Education  [X]           Transfer Training                   [ ]    Orthotic/Prosthetic Training  [X]           Gait Training                          [ ]    Modalities  [X]           Therapeutic Exercises          [ ]    Edema Management/Control  [X]           Therapeutic Activities            [X]    Patient and Family Training/Education  [ ]           Other (comment):     Frequency/Duration: Patient will be followed by physical therapy 1-2 times per day/4-7 days per week to address goals. Discharge Recommendations: Isiah Medel  Further Equipment Recommendations for Discharge: N/A       SUBJECTIVE:   Patient stated I'm cold, but I'll do it.       OBJECTIVE DATA SUMMARY:       Past Medical History:   Diagnosis Date    Anemia      Cancer ([facility]       throat    Coronary artery disease      Diabetes ([facility]      Diabetes mellitus ([facility]      ETOH abuse      HTN (hypertension)      Hypertension      Hypothyroid      Lupus      Osteonecrosis ([facility]       of jaw    PEG adjustment, replacement, or removal      S/P thoracentesis      Throat cancer ([facility]       Past Surgical History:   Procedure Laterality Date    ABDOMEN SURGERY PROC UNLISTED         feeding tube placement    HX  SECTION        HX HEENT         throat cancer biopsy     Barriers to Learning/Limitations: None  Compensate with: visual, verbal, tactile, kinesthetic cues/model     G CODES:Mobility  Current  CL= 60-79%   Goal  CI= 1-19%. The severity rating is based on the [facility]. Sitting Balance     [facility] Sitting Balance Scale  0: Pt performs 25% or less of sitting activity (Max assist) CN, 100% impaired. 1: Pt supports self with upper extremities but requires therapist assistance. Pt performs 25-50% of effort (Mod assist) CM, 80% to <100% impaired.   1+: Pt supports self with upper extremities but requires therapist assistance. Pt performs >50% effort. (Min assist). CL, 60% to <80% impaired. 2: Pt supports self independently with both upper extremities. CL, 60% to <80% impaired. 2+: Pt support self independently with 1 upper extremity. CK, 40% to <60% impaired. 3: Pt sits without upper extremity support for up to 30 seconds. CK, 40% to <60% impaired. 3+: Pt sits without upper extremity support for 30 seconds or greater. CJ, 20% to <40% impaired. 4: Pt moves and returns trunkal midpoint 1-2 inches in one plane. CJ, 20% to <40% impaired. 4+: Pt moves and returns trunkal midpoint 1-2 inches in multiple planes. CI, 1% to <20% impaired. 5: Pt moves and returns trunkal midpoint in all planes greater than 2 inches. CH, 0% impaired. Eval Complexity: History: MEDIUM  Complexity : 1-2 comorbidities / personal factors will impact the outcome/ POC Exam:LOW Complexity : 1-2 Standardized tests and measures addressing body structure, function, activity limitation and / or participation in recreation  Presentation: MEDIUM Complexity : Evolving with changing characteristics  Clinical Decision Making:Medium Complexity .  Overall Complexity:MEDIUM     Prior Level of Function/Home Situation: independent  Home Situation  Home Environment: Apartment  One/Two Story Residence: One story  Living Alone: Yes  Support Systems: Child(chasidy)  Patient Expects to be Discharged to[de-identified] Apartment  Current DME Used/Available at Home: Oxygen, portable  Critical Behavior:  Neurologic State: Alert  Orientation Level: Oriented to person;Oriented to place;Oriented to situation        Psychosocial  Purposeful Interaction: Yes  Caring Interventions: Therapeutic modalities        Skin Integrity: Excoriation  Skin Integumentary  Skin Integrity: Excoriation     Strength:    Strength: Generally decreased, functional  Manual Muscle Testing (LE)                                                     R                     L Hip Flexion:                           4/5 4/5    Knee EXT:                            4/5 4/5  Knee FLEX:                            4/5 4/5      Ankle DF:                           4/5 4/5  _________________________________________________      Range Of Motion:  AROM: Generally decreased, functional  PROM: Generally decreased, functional  Functional Mobility:  Bed Mobility:  Rolling: Moderate assistance  Supine to Sit: Moderate assistance  Sit to Supine: Moderate assistance  Balance:   Sitting: With support; Impaired  Sitting - Static: Fair (occasional)  Sitting - Dynamic: Fair (occasional)     Pain:  Pre treatment pain level: 9  Post treatment pain level: 9  Pain Scale 1: Numeric (0 - 10)  Pain Intensity 1: 10  Pain Location 1: Generalized        Pain Intervention(s) 1: Medication (see MAR)  Activity Tolerance:   poor  Please refer to the flowsheet for vital signs taken during this treatment. After treatment:   [ ]         Patient left in no apparent distress sitting up in chair  [X]         Patient left in no apparent distress in bed  [X]         Call bell left within reach  [ ]         Nursing notified  [ ]         Caregiver present  [ ]         Bed alarm activated      COMMUNICATION/EDUCATION:   [X]         Fall prevention education was provided and the patient/caregiver indicated understanding. [X]         Patient/family have participated as able in goal setting and plan of care. [ ]         Patient/family agree to work toward stated goals and plan of care. [ ]         Patient understands intent and goals of therapy, but is neutral about his/her participation. [ ]         Patient is unable to participate in goal setting and plan of care. Patient educated on the role of physical therapy during the acute stay  and the importance of mobility. MABEL.        Thank you for this referral.  Mary Bartlett, PT   Time Calculation: 16 mins

## 2017-10-14 NOTE — PROGRESS NOTES
Medicine Progress Note    Patient: Sina Moreland   Age:  54 y.o.  DOA: 10/11/2017   Admit Dx / CC: Respiratory failure with hypoxia ([address]  Recurrent pneumonia  Recurrent pneumonia  Respiratory failure with hypoxia ([address]  LOS:  LOS: 3 days     Assessment/Plan   Principal Problem:    Recurrent pneumonia (10/11/2017)    Active Problems:    Respiratory failure with hypoxia ([address] (10/11/2017)      KEATON (acute kidney injury) ([address] (10/11/2017)        Additional Plan notes     1)  Recurrent PNA   - recent extended hospital stay for Pseudomonal PNA about 3 weeks   - ID on board, continue cipro and vanc, stop zosyn   - on hi sunil. Step down , pulm following. 2)  Acute on chronic resp failure   - 2/2 above    DISPO     Anticipated Date of Discharge: ? ??? Anticipated Disposition (home, SNF) : home    Subjective:   Patient seen and examined. Patient much more symptomatic today, more lethargic as well. Objective:     Visit Vitals    /82    Pulse 99    Temp 99 °F (37.2 °C)    Resp 21    Wt 68 kg (149 lb 14.6 oz)    SpO2 91%    BMI 25.73 kg/m2       Physical Exam:  General appearance: alert, cooperative, no distress, appears stated age  Head: Normocephalic, without obvious abnormality, atraumatic  Neck: supple, trachea midline  Lungs: B/L course breath sounds b/l rhonchi  Heart: regular rate and rhythm, S1, S2 normal, no murmur, click, rub or gallop  Abdomen: soft, non-tender.  Bowel sounds normal. No masses,  no organomegaly  Extremities: extremities normal, atraumatic, no cyanosis or edema  Skin: Skin color, texture, turgor normal. No rashes or lesions    Intake and Output:  Current Shift:  10/14 0701 - 10/14 1900  In: 320   Out: 375 [Urine:375]  Last three shifts:  10/12 1901 - 10/14 0700  In: 3222.6 [I.V.:2111.3]  Out: 6548 [Urine:3445]    Lab/Data Reviewed:  CMP:   Lab Results   Component Value Date/Time     10/14/2017 04:00 AM    K 3.7 10/14/2017 04:00 AM     10/14/2017 04:00 AM    CO2 27 10/14/2017 04:00 AM    AGAP 7 10/14/2017 04:00 AM     (H) 10/14/2017 04:00 AM    BUN 17 10/14/2017 04:00 AM    CREA 1.45 (H) 10/14/2017 04:00 AM    GFRAA 47 (L) 10/14/2017 04:00 AM    GFRNA 38 (L) 10/14/2017 04:00 AM    CA 8.1 (L) 10/14/2017 04:00 AM     CBC:   Lab Results   Component Value Date/Time    WBC 15.0 (H) 10/14/2017 04:00 AM    HGB 7.9 (L) 10/14/2017 04:00 AM    HCT 24.0 (L) 10/14/2017 04:00 AM     10/14/2017 04:00 AM     All Cardiac Markers in the last 24 hours: No results found for: CPK, CK, CKMMB, CKMB, RCK3, CKMBT, CKNDX, CKND1, LAVELLE, TROPT, TROIQ, RAMIREZ, TROPT, TNIPOC, BNP, BNPP    Medications Reviewed:  Current Facility-Administered Medications   Medication Dose Route Frequency    promethazine (PHENERGAN) 25 mg in 0.9% sodium chloride 50 mL IVPB  25 mg IntraVENous Q6H PRN    vancomycin (VANCOCIN) 750 mg in 0.9% sodium chloride (MBP/ADV) 250 mL ADV  750 mg IntraVENous Q24H    [START ON 10/17/2017] VANCOMYCIN INFORMATION NOTE   Other ONCE    gabapentin (NEURONTIN) capsule 400 mg  400 mg Oral TID    albuterol-ipratropium (DUO-NEB) 2.5 MG-0.5 MG/3 ML  3 mL Nebulization Q6H RT    budesonide (PULMICORT) 500 mcg/2 ml nebulizer suspension  500 mcg Nebulization BID RT    ciprofloxacin HCl (CIPRO) tablet 500 mg  500 mg Oral Q12H    0.9% sodium chloride infusion 250 mL  250 mL IntraVENous PRN    amLODIPine (NORVASC) tablet 5 mg  5 mg Oral DAILY    labetalol (NORMODYNE) tablet 100 mg  100 mg Oral Q12H    labetalol (NORMODYNE;TRANDATE) injection 20 mg  20 mg IntraVENous Q4H PRN    HYDROcodone-acetaminophen (NORCO) 7.5-325 mg per tablet 1 Tab  1 Tab Oral Q4H PRN    albuterol (PROVENTIL VENTOLIN) nebulizer solution 2.5 mg  2.5 mg Nebulization Q4H PRN    heparin (porcine) pf 300-500 Units  300-500 Units InterCATHeter PRN    sodium chloride (NS) flush 10-30 mL  10-30 mL InterCATHeter PRN    sodium chloride (NS) flush 10 mL  10 mL InterCATHeter Q24H    sodium chloride (NS) flush 10 mL  10 mL InterCATHeter PRN    sodium chloride (NS) flush 10-40 mL  10-40 mL InterCATHeter Q8H    sodium chloride (NS) flush 20 mL  20 mL InterCATHeter Q24H    famotidine (PEPCID) tablet 20 mg  20 mg Oral BID    sodium chloride (NS) flush 5-10 mL  5-10 mL IntraVENous PRN    naloxone (NARCAN) injection 0.4 mg  0.4 mg IntraVENous PRN    heparin (porcine) injection 5,000 Units  5,000 Units SubCUTAneous Q8H    insulin lispro (HUMALOG) injection   SubCUTAneous Q6H    glucose chewable tablet 16 g  4 Tab Oral PRN    glucagon (GLUCAGEN) injection 1 mg  1 mg IntraMUSCular PRN    dextrose (D50W) injection syrg 12.5-25 g  25-50 mL IntraVENous PRN    VANCOMYCIN INFORMATION NOTE   Other Rx Dosing/Monitoring       Brice Meigs, MD    October 14, 2017

## 2017-10-14 NOTE — PROGRESS NOTES
0105: Called to bedside due to desaturation after pain medications. Ms. Mohan Shi was breathing shallow after her medication. FIO2 and flow titrated up, but SPO2 remained <85%. She was placed on BiPap at 16/6, 40%, back-up rate of 8. Tolerated well. [place]ur suction set-up and available to patient. SPO2 100% and no respiratory distress noted. Will continue to monitor and return to HFNC when tolerated. 0405: Patient requesting to come off BiPap. Resumed HFNC at 45 LPM and 50%. Will continue to monitor.

## 2017-10-14 NOTE — PROGRESS NOTES
0910-Respond to alarm - Pt sat dropped to 85% on HFNC  -wean Fio2 = 55%, flow remains 45 lpm to obtain 92% sat  -will continue to monitor for wean    0940-Pt WOB increasing & periods of desat  -xray shows worsening infiltrate - discussed with PA & agreed I would increase flow & we will increase frequency of nebs & possible lasix  Titrate settings:  Fio2=55%, Flow=50 lpm  Encouraged use of bipap but pt declined - felt would feel better following repositioning & pain meds  -will continue to monitor  1120-Pt resting on HFNC   Administer neb  1150-Titrate HFNC:  Fio2=46%, Flow=50 lpm:  Pt sat = 94-95%   1210-Pt desat into mid 80's - Titrate Fio2=56%, sat = 90-91%    1449-Pt remains on HFNC: 50lpm flow & 53.9 Fio2  Pt awake & alert - administer neb

## 2017-10-15 ENCOUNTER — APPOINTMENT (OUTPATIENT)
Dept: GENERAL RADIOLOGY | Age: 52
DRG: 177 | End: 2017-10-15
Attending: PHYSICIAN ASSISTANT
Payer: MEDICARE

## 2017-10-15 LAB
ANION GAP SERPL CALC-SCNC: 6 MMOL/L (ref 3–18)
BASOPHILS # BLD: 0.1 K/UL (ref 0–0.06)
BASOPHILS NFR BLD: 1 % (ref 0–2)
BNP SERPL-MCNC: ABNORMAL PG/ML (ref 0–450)
BUN SERPL-MCNC: 15 MG/DL (ref 7–18)
BUN/CREAT SERPL: 12 (ref 12–20)
CALCIUM SERPL-MCNC: 8.2 MG/DL (ref 8.5–10.1)
CHLORIDE SERPL-SCNC: 105 MMOL/L (ref 100–108)
CO2 SERPL-SCNC: 31 MMOL/L (ref 21–32)
CREAT SERPL-MCNC: 1.28 MG/DL (ref 0.6–1.3)
DIFFERENTIAL METHOD BLD: ABNORMAL
EOSINOPHIL # BLD: 0.5 K/UL (ref 0–0.4)
EOSINOPHIL NFR BLD: 4 % (ref 0–5)
ERYTHROCYTE [DISTWIDTH] IN BLOOD BY AUTOMATED COUNT: 17 % (ref 11.6–14.5)
GLUCOSE BLD STRIP.AUTO-MCNC: 110 MG/DL (ref 70–110)
GLUCOSE BLD STRIP.AUTO-MCNC: 121 MG/DL (ref 70–110)
GLUCOSE BLD STRIP.AUTO-MCNC: 139 MG/DL (ref 70–110)
GLUCOSE BLD STRIP.AUTO-MCNC: 84 MG/DL (ref 70–110)
GLUCOSE SERPL-MCNC: 144 MG/DL (ref 74–99)
HCT VFR BLD AUTO: 24 % (ref 35–45)
HGB BLD-MCNC: 7.8 G/DL (ref 12–16)
LYMPHOCYTES # BLD: 1.5 K/UL (ref 0.9–3.6)
LYMPHOCYTES NFR BLD: 12 % (ref 21–52)
MCH RBC QN AUTO: 29.7 PG (ref 24–34)
MCHC RBC AUTO-ENTMCNC: 32.5 G/DL (ref 31–37)
MCV RBC AUTO: 91.3 FL (ref 74–97)
MONOCYTES # BLD: 0.9 K/UL (ref 0.05–1.2)
MONOCYTES NFR BLD: 7 % (ref 3–10)
NEUTS SEG # BLD: 10.3 K/UL (ref 1.8–8)
NEUTS SEG NFR BLD: 76 % (ref 40–73)
PLATELET # BLD AUTO: 220 K/UL (ref 135–420)
PMV BLD AUTO: 11.5 FL (ref 9.2–11.8)
POTASSIUM SERPL-SCNC: 3.5 MMOL/L (ref 3.5–5.5)
RBC # BLD AUTO: 2.63 M/UL (ref 4.2–5.3)
SODIUM SERPL-SCNC: 142 MMOL/L (ref 136–145)
WBC # BLD AUTO: 13.3 K/UL (ref 4.6–13.2)

## 2017-10-15 PROCEDURE — 74011000250 HC RX REV CODE- 250: Performed by: PHYSICIAN ASSISTANT

## 2017-10-15 PROCEDURE — 77030011943

## 2017-10-15 PROCEDURE — 36592 COLLECT BLOOD FROM PICC: CPT

## 2017-10-15 PROCEDURE — 85025 COMPLETE CBC W/AUTO DIFF WBC: CPT | Performed by: PHYSICIAN ASSISTANT

## 2017-10-15 PROCEDURE — 82962 GLUCOSE BLOOD TEST: CPT

## 2017-10-15 PROCEDURE — 3331090002 HH PPS REVENUE DEBIT

## 2017-10-15 PROCEDURE — 80048 BASIC METABOLIC PNL TOTAL CA: CPT | Performed by: PHYSICIAN ASSISTANT

## 2017-10-15 PROCEDURE — 51798 US URINE CAPACITY MEASURE: CPT

## 2017-10-15 PROCEDURE — 83880 ASSAY OF NATRIURETIC PEPTIDE: CPT | Performed by: PHYSICIAN ASSISTANT

## 2017-10-15 PROCEDURE — 94640 AIRWAY INHALATION TREATMENT: CPT

## 2017-10-15 PROCEDURE — 74011250636 HC RX REV CODE- 250/636: Performed by: FAMILY MEDICINE

## 2017-10-15 PROCEDURE — 65660000001 HC RM ICU INTERMED STEPDOWN

## 2017-10-15 PROCEDURE — 74011250637 HC RX REV CODE- 250/637: Performed by: PHYSICIAN ASSISTANT

## 2017-10-15 PROCEDURE — 77010033711 HC HIGH FLOW OXYGEN

## 2017-10-15 PROCEDURE — 71010 XR CHEST PORT: CPT

## 2017-10-15 PROCEDURE — 3331090001 HH PPS REVENUE CREDIT

## 2017-10-15 RX ORDER — GUAIFENESIN 100 MG/5ML
100 SOLUTION ORAL
Status: DISCONTINUED | OUTPATIENT
Start: 2017-10-15 | End: 2017-10-23 | Stop reason: HOSPADM

## 2017-10-15 RX ORDER — AMLODIPINE BESYLATE 10 MG/1
10 TABLET ORAL DAILY
Status: DISCONTINUED | OUTPATIENT
Start: 2017-10-16 | End: 2017-10-23 | Stop reason: HOSPADM

## 2017-10-15 RX ORDER — HYDROCODONE BITARTRATE AND ACETAMINOPHEN 7.5; 325 MG/1; MG/1
1 TABLET ORAL
Status: DISCONTINUED | OUTPATIENT
Start: 2017-10-15 | End: 2017-10-23 | Stop reason: HOSPADM

## 2017-10-15 RX ORDER — AMLODIPINE BESYLATE 5 MG/1
5 TABLET ORAL ONCE
Status: COMPLETED | OUTPATIENT
Start: 2017-10-15 | End: 2017-10-15

## 2017-10-15 RX ADMIN — BUDESONIDE 500 MCG: 0.5 INHALANT RESPIRATORY (INHALATION) at 09:10

## 2017-10-15 RX ADMIN — IPRATROPIUM BROMIDE AND ALBUTEROL SULFATE 3 ML: .5; 3 SOLUTION RESPIRATORY (INHALATION) at 09:10

## 2017-10-15 RX ADMIN — GABAPENTIN 400 MG: 400 CAPSULE ORAL at 08:21

## 2017-10-15 RX ADMIN — GUAIFENESIN 100 MG: 100 SOLUTION ORAL at 09:36

## 2017-10-15 RX ADMIN — HYDROCODONE BITARTRATE AND ACETAMINOPHEN 1 TABLET: 7.5; 325 TABLET ORAL at 13:00

## 2017-10-15 RX ADMIN — IPRATROPIUM BROMIDE AND ALBUTEROL SULFATE 3 ML: .5; 3 SOLUTION RESPIRATORY (INHALATION) at 01:14

## 2017-10-15 RX ADMIN — Medication 10 ML: at 17:26

## 2017-10-15 RX ADMIN — HYDROCODONE BITARTRATE AND ACETAMINOPHEN 1 TABLET: 7.5; 325 TABLET ORAL at 05:01

## 2017-10-15 RX ADMIN — GABAPENTIN 400 MG: 400 CAPSULE ORAL at 17:25

## 2017-10-15 RX ADMIN — FAMOTIDINE 20 MG: 20 TABLET ORAL at 08:20

## 2017-10-15 RX ADMIN — Medication 10 ML: at 21:50

## 2017-10-15 RX ADMIN — HYDROCODONE BITARTRATE AND ACETAMINOPHEN 1 TABLET: 7.5; 325 TABLET ORAL at 18:24

## 2017-10-15 RX ADMIN — LABETALOL HYDROCHLORIDE 100 MG: 100 TABLET, FILM COATED ORAL at 08:21

## 2017-10-15 RX ADMIN — CIPROFLOXACIN HYDROCHLORIDE 500 MG: 500 TABLET, FILM COATED ORAL at 20:54

## 2017-10-15 RX ADMIN — CIPROFLOXACIN HYDROCHLORIDE 500 MG: 500 TABLET, FILM COATED ORAL at 08:21

## 2017-10-15 RX ADMIN — HEPARIN SODIUM 5000 UNITS: 5000 INJECTION, SOLUTION INTRAVENOUS; SUBCUTANEOUS at 21:50

## 2017-10-15 RX ADMIN — Medication 20 ML: at 17:25

## 2017-10-15 RX ADMIN — HEPARIN SODIUM 5000 UNITS: 5000 INJECTION, SOLUTION INTRAVENOUS; SUBCUTANEOUS at 13:00

## 2017-10-15 RX ADMIN — Medication 10 ML: at 13:01

## 2017-10-15 RX ADMIN — IPRATROPIUM BROMIDE AND ALBUTEROL SULFATE 3 ML: .5; 3 SOLUTION RESPIRATORY (INHALATION) at 19:41

## 2017-10-15 RX ADMIN — AMLODIPINE BESYLATE 5 MG: 5 TABLET ORAL at 08:21

## 2017-10-15 RX ADMIN — HEPARIN SODIUM 5000 UNITS: 5000 INJECTION, SOLUTION INTRAVENOUS; SUBCUTANEOUS at 06:15

## 2017-10-15 RX ADMIN — HYDROCODONE BITARTRATE AND ACETAMINOPHEN 1 TABLET: 7.5; 325 TABLET ORAL at 21:50

## 2017-10-15 RX ADMIN — HYDROCODONE BITARTRATE AND ACETAMINOPHEN 1 TABLET: 7.5; 325 TABLET ORAL at 00:26

## 2017-10-15 RX ADMIN — FAMOTIDINE 20 MG: 20 TABLET ORAL at 17:25

## 2017-10-15 RX ADMIN — IPRATROPIUM BROMIDE AND ALBUTEROL SULFATE 3 ML: .5; 3 SOLUTION RESPIRATORY (INHALATION) at 13:52

## 2017-10-15 RX ADMIN — BUDESONIDE 500 MCG: 0.5 INHALANT RESPIRATORY (INHALATION) at 19:41

## 2017-10-15 RX ADMIN — HYDROCODONE BITARTRATE AND ACETAMINOPHEN 1 TABLET: 7.5; 325 TABLET ORAL at 09:36

## 2017-10-15 RX ADMIN — Medication 10 ML: at 06:08

## 2017-10-15 RX ADMIN — LABETALOL HYDROCHLORIDE 100 MG: 100 TABLET, FILM COATED ORAL at 20:54

## 2017-10-15 RX ADMIN — GABAPENTIN 400 MG: 400 CAPSULE ORAL at 21:50

## 2017-10-15 RX ADMIN — AMLODIPINE BESYLATE 5 MG: 5 TABLET ORAL at 09:37

## 2017-10-15 NOTE — PROGRESS NOTES
Problem: Pneumonia: Day 4  Goal: Activity/Safety  Outcome: Not Progressing Towards Goal  C/o sob with activity    Problem: Falls - Risk of  Goal: *Absence of Falls  Document Kapil Fall Risk and appropriate interventions in the flowsheet.    Outcome: Progressing Towards Goal  Fall Risk Interventions:  Mobility Interventions: Assess mobility with egress test, Bed/chair exit alarm, Communicate number of staff needed for ambulation/transfer           Medication Interventions: Assess postural VS orthostatic hypotension, Bed/chair exit alarm, Evaluate medications/consider consulting pharmacy     Elimination Interventions: Bed/chair exit alarm, Call light in reach, Patient to call for help with toileting needs, Toileting schedule/hourly rounds

## 2017-10-15 NOTE — PROGRESS NOTES
Bedside and Verbal shift change report given to Kavon Út 43. (oncoming nurse) by Saint Martin RN(offgoing nurse). Report included the following information SBAR, Kardex, MAR and Med Rec Status.

## 2017-10-15 NOTE — PROGRESS NOTES
Received bedside report from [address] [place] [address] Drive. Pt AOx4, complains of generalized pain. Pt currently on HFNC, Tube feeding Osmolite @60, bed in low position, wheels locked, call bell within reach. 0820-Pt turned/rpositioned in bed, perineal care provided, bed pad changed. Meds provided Via PEG tube. Pt left resting in bed/vbed in low position, wheels locked, call bell within reach. 0950-Pt's mckeon catheter removed per order. Pt tolerated procedure well no adverse reaction noted. Pt request bedpan. Pt assisted on/off bedpan.

## 2017-10-15 NOTE — PROGRESS NOTES
[address] [place] [address] [facility]  ICU Progress Note      Name: Tanisha Martinez   : 1965   MRN: [id number]   Date: 10/15/2017 5:00 PM     [x]I have reviewed the flowsheet and previous days notes. Events overnight reviewed and discussed with nursing staff. Vital signs and records reviewed.     55 yo female with PMH throat cancer, dysphagia, PEG, DM, chronic anemia, hypothyroidism, HTN, recent admission for MRSA and pseudomonas PNA requiring intubation-discharged 10/9 presented to the ED from home with SOB, hypoxia (sat 44%) found to have PNA, started on BiPAP and admitted to ICU.      10/15/2017  Improved since yesterday, CXR appears worse, much less SOB  On 50LHFNC  C/o chronic back pain  WBC, Cr improved  Net even, BP remains elevated, does not keep HOB up      Medication Review:  · Pressors -  · Sedation -  · Antibiotics -vanc, cipro  · Pain -  · GI/ DVT -pepcid, heparin  · Others (other gtts)    Safety Bundles: VAP Bundle/ CAUTI/ Severe Sepsis Protocol/ Electrolyte Replacement Protocol    Vital Signs:    Visit Vitals    /86    Pulse (!) 105    Temp 99.1 °F (37.3 °C)    Resp 21    Wt 65.2 kg (143 lb 11.8 oz)    SpO2 97%    BMI 24.67 kg/m2       O2 Device: Hi flow nasal cannula   O2 Flow Rate (L/min): 50 l/min   Temp (24hrs), Av.7 °F (37.1 °C), Min:98.2 °F (36.8 °C), Max:99.1 °F (37.3 °C)       Intake/Output:   Last shift:         Last 3 shifts: 10/13 1901 - 10/15 0700  In: 2570 [I.V.:450]  Out: 3195 [Urine:3195]    Intake/Output Summary (Last 24 hours) at 10/15/17 0912  Last data filed at 10/15/17 0700   Gross per 24 hour   Intake             1800 ml   Output             2275 ml   Net             -475 ml          Physical Exam:    General/Neuro: Awake and alert-appears more comfortable, no respiratory distress orWOB, oriented, moves all extremities,  no FND, no conversational dyspnea  HEENT:  Anicteric sclerae; pink palpebral conjunctivae; mucosa dry, poor dentition, HFNC in place  Resp:  Adequate AE B,  + accessory muscle use; +crackles bilaterally L>R-improved, R chest wall tunneled catheter in place, no wheeze  CV:  Tachycardic, no m  GI:  Abdomen soft, non-tender; (+) active bowel sounds, +PEG in place  Extremities:  +improved trace edema BLE, L>R chronic per pt and RN  Skin:  Warm; no rashes/ lesions noted  Devices:   Holly, R chest tunneled catheter      DATA:     Current Facility-Administered Medications   Medication Dose Route Frequency    HYDROcodone-acetaminophen (NORCO) 7.5-325 mg per tablet 1 Tab  1 Tab Oral Q3H PRN    promethazine (PHENERGAN) 25 mg in 0.9% sodium chloride 50 mL IVPB  25 mg IntraVENous Q6H PRN    vancomycin (VANCOCIN) 750 mg in 0.9% sodium chloride (MBP/ADV) 250 mL ADV  750 mg IntraVENous Q24H    [START ON 10/17/2017] VANCOMYCIN INFORMATION NOTE   Other ONCE    gabapentin (NEURONTIN) capsule 400 mg  400 mg Oral TID    albuterol-ipratropium (DUO-NEB) 2.5 MG-0.5 MG/3 ML  3 mL Nebulization Q6H RT    budesonide (PULMICORT) 500 mcg/2 ml nebulizer suspension  500 mcg Nebulization BID RT    ciprofloxacin HCl (CIPRO) tablet 500 mg  500 mg Oral Q12H    0.9% sodium chloride infusion 250 mL  250 mL IntraVENous PRN    amLODIPine (NORVASC) tablet 5 mg  5 mg Oral DAILY    labetalol (NORMODYNE) tablet 100 mg  100 mg Oral Q12H    labetalol (NORMODYNE;TRANDATE) injection 20 mg  20 mg IntraVENous Q4H PRN    albuterol (PROVENTIL VENTOLIN) nebulizer solution 2.5 mg  2.5 mg Nebulization Q4H PRN    heparin (porcine) pf 300-500 Units  300-500 Units InterCATHeter PRN    sodium chloride (NS) flush 10-30 mL  10-30 mL InterCATHeter PRN    sodium chloride (NS) flush 10 mL  10 mL InterCATHeter Q24H    sodium chloride (NS) flush 10 mL  10 mL InterCATHeter PRN    sodium chloride (NS) flush 10-40 mL  10-40 mL InterCATHeter Q8H    sodium chloride (NS) flush 20 mL  20 mL InterCATHeter Q24H    famotidine (PEPCID) tablet 20 mg  20 mg Oral BID    sodium chloride (NS) flush 5-10 mL  5-10 mL IntraVENous PRN    naloxone (NARCAN) injection 0.4 mg  0.4 mg IntraVENous PRN    heparin (porcine) injection 5,000 Units  5,000 Units SubCUTAneous Q8H    insulin lispro (HUMALOG) injection   SubCUTAneous Q6H    glucose chewable tablet 16 g  4 Tab Oral PRN    glucagon (GLUCAGEN) injection 1 mg  1 mg IntraMUSCular PRN    dextrose (D50W) injection syrg 12.5-25 g  25-50 mL IntraVENous PRN    VANCOMYCIN INFORMATION NOTE   Other Rx Dosing/Monitoring         Labs: Results:       Chemistry Recent Labs      10/15/17   0340  10/14/17   0400  10/13/17   0358   GLU  144*  109*  80   NA  142  142  141   K  3.5  3.7  4.0   CL  105  108  109*   CO2  31  27  28   BUN  15  17  22*   CREA  1.28  1.45*  1.56*   CA  8.2*  8.1*  8.1*   AGAP  6  7  4   BUCR  12  12  14      CBC w/Diff Recent Labs      10/15/17   0340  10/14/17   0400  10/13/17   2000   10/13/17   0358   WBC  13.3*  15.0*   --    --   17.3*   RBC  2.63*  2.62*   --    --   2.16*   HGB  7.8*  7.9*  8.0*   < >  6.5*   HCT  24.0*  24.0*  24.7*   < >  20.3*   PLT  220  215   --    --   214   GRANS  76*  82*   --    --   85*   LYMPH  12*  10*   --    --   9*   EOS  4  3   --    --   2    < > = values in this interval not displayed. Coagulation No results for input(s): PTP, INR, APTT in the last 72 hours. No lab exists for component: INREXT, INREXT    Liver Enzymes No results for input(s): TP, ALB, TBIL, AP, SGOT, GPT in the last 72 hours.     No lab exists for component: DBIL   ABG No results found for: PH, PHI, PCO2, PCO2I, PO2, PO2I, HCO3, HCO3I, FIO2, FIO2I   Microbiology        Telemetry: [x]Sinus to ST []A-flutter []Paced    []A-fib []Multiple PVCs                    Imaging:  [x]I have personally reviewed the patients radiographs  []Radiographs reviewed with radiologist   []No change from prior, tubes and lines in adequate position  []Improved   []Worsening    10/15/17 CXR      10/14/17 CXR      10/13/17 CXR    Interval worsening of multifocal pulmonary infiltrate  10/11/17 CXR    1. Bilateral upper lower lobe patchy confluent parenchymal opacities,  representing pneumonia/edema which is similar to the most recent chest x-ray.         IMPRESSION:   · Acute hypoxemic respiratory failure requiring BIPAP due to recurrent MRSA PNA, probable aspiration,+/- heart failure  · Recent MRSA and pseudomonas HCAP  · KEATON, likely prerenal, improving  · HTN, poorly controlled  · Chronic diastolic CHF  · Chronic anemia  · Chronic pain  · Hx throat cancer with upper airway obstruction  · Dysphagia, s/p PEG placement due to above  · Hx tobacco and etoh abuse, remote        PLAN:   · Resp -  CXR unchanged to somewhat worse, clinically improved and has not required BiPAP, cont HFNC-wean FiO2 . Follow CXR, cont abx. Cont duonebs, pulmicort, monitor tachycardia. Lasix prn, responded well yesterday. Pt previously difficult intubation with 6.5 ETT. CTA 9/24 negative for PE. Aspiration precautions. · ID - ID following. Sputum +MRSA. Continue vanc. Completing 4 week course of cipro from previous admission (start date 10/2). Await blood and urine cx. LA normal. Procal 1.71  · CVS - Monitor HD. Recent ECHO EF 55-60 with mod TR. Started on norvasc-will increase today and labetalol. Recheck BNP. Lasix prn. Net even now  · Heme/onc -   Monitor hgb, transfuse prn for hgb<7. s/p 1 uPRBC 10/13. As per previous notes, if pt requires transfusion, can take hours as pt has multiple Ab's. Recent HIT negative. Plts wnL. · Metabolic - Replace lytes prn per protocol  · Renal - monitor I/O, trend Cr. D/c mckeon  · Endocrine - Recent TSH normal, cont synthroid. SSI prn, avoid hypoglycemia  · Neuro/ Pain/ Sedation - Norco prn pain. Significant lethargy with dilaudid in past. Resumed neurontin  · GI - TF thru PEG, pepcid ppx.  Keep HOB up  · Prophylaxis - DVT-heparin, GI-pepcid  · Discussed in interdisciplinary rounds  · FULL CODE  · Continue to monitor in stepdown for now, may go to tele if remains stable  · PT/OT          [x]See my orders for details    My assessment/plan was discussed with:  [x]nursing []PT/OT    [x]respiratory therapy [x]   []family [x]patient       JOSE DAVID Farley

## 2017-10-15 NOTE — PROGRESS NOTES
2000--Assumed care of patient. Assessment completed. VSS. CLean and dry. HI sunil oxygen on. No distress noted. 2300--Patient asking for a doctor to make her more comfortable. Pulled patient up in bed and repositioned to left side per patient request. HOB 20 degrees. Patient states that feels much better. 0026--Patient asking if there is a doctor in the hospital that will order Dilaudid. Explained that her doctor wants her to have Norco, Norco 1 po given via peg tube. No distress noted. 0100--Sleeping/dozing quietly. No distress noted. 0501--Norco 1 po given for c/o back pain all over. No distress noted. Resting quietly watching tv. 0600--Sleeping/dozing quietly at this time. VSS. Clean and dry. No distress noted. 0710 Bedside shift change report given to Saint Martin , RN (oncoming nurse) by John Lennon RN   (offgoing nurse).  Report included the following information SBAR, Intake/Output, MAR, Recent Results and Cardiac Rhythm sinus tachycardia. '

## 2017-10-15 NOTE — PROGRESS NOTES
Physical Exam   Skin: Skin is warm and dry. Primary Nurse Raul Narayan RN and Ronald Arrington RN performed a dual skin assessment on this patient Impairment noted- see wound doc flow sheet.

## 2017-10-15 NOTE — PROGRESS NOTES
10:00 Pt has mckeon removed and currently on bed pan per nurse. Will follow up later. 1326: 2nd attempt, pt sleeping sound. Will follow up tomorrow.

## 2017-10-15 NOTE — PROGRESS NOTES
5 - Received report from Claire, [facility]. Pt is alert, stable, and on hi flow oxygen. No complaints at this time. Will continue to monitor. 2145 - Pt complaining that she is unable to void. Bladder scan showed 398. Straight cath 500 ml clear yellow urine. Will continue to monitor. 10/16/17  0000 - Pt resting quietly. Stable. No complaints at this time. Will continue to monitor. 0400 - Pt complains that she cannot void. Tried bedpan anyway and was able to void 450 ml. Felt relieved. Pt cleaned and changed linens. No complaints. Will continue to monitor. 0715 - Pt used bed pan. Voided urine and loose bowel movement. 500 ml total.  Bedside and Verbal shift change report given to Lupe Lee RN (oncoming nurse) by Gloria Quintero RN (offgoing nurse). Report included the following information SBAR, Kardex, Intake/Output, MAR, Recent Results and Med Rec Status.

## 2017-10-15 NOTE — PROGRESS NOTES
Physical Exam   Constitutional: She is oriented to person, place, and time. HENT:   Head: Normocephalic. Eyes: Pupils are equal, round, and reactive to light. Neck: Normal range of motion. Cardiovascular: Normal rate and regular rhythm. Pulmonary/Chest:   Coarse breath sounds  C/o sob with activity. On High Flow Nacal O2. O2 sat at 98-99   Abdominal: Soft. Peg tube on left quadrant intact. Peg site with greenish mucoid drainage. Peg Care done. Tube feeding Osmolite 1.2 narda ongoing via peg @ 60 ml/hr. Genitourinary: Vagina normal.   Genitourinary Comments: Pt is due to void. Musculoskeletal: Normal range of motion. Neurological: She is alert and oriented to person, place, and time. Skin: Skin is warm and dry. [place] done. Obtained 550 ml joseph colored urine  1834 C/o generalized pain. Norco 1 tab given via peg.  1925 Bedside and Verbal shift change report given to Laura SCHUSTER (oncoming nurse) by Cassandra Jimenez RN   (offgoing nurse).  Report included the following information SBAR, Kardex, Intake/Output, MAR, Recent Results and Cardiac Rhythm SR.

## 2017-10-15 NOTE — PROGRESS NOTES
Problem: Falls - Risk of  Goal: *Absence of Falls  Document Kapil Fall Risk and appropriate interventions in the flowsheet.    Outcome: Progressing Towards Goal  Fall Risk Interventions:  Mobility Interventions: Patient to call before getting OOB           Medication Interventions: Patient to call before getting OOB     Elimination Interventions: Call light in reach, Patient to call for help with toileting needs, Toileting schedule/hourly rounds

## 2017-10-15 NOTE — PROGRESS NOTES
Rec'd pt on HFNC:  Fio2=50%, flow=50lpm  Pt awake & alert - no distress observed or reported  -administer neb    1352-Pt resting on HFNC:  Fio2=50%, flow=50 lpm  Administer neb    Maintained pt on current HFNC settings due to xray status - chest xray appears slightly worse but pt has been comfortable throughout day on HFNC settings - over past 24 hrs pt has had difficulty maintaining sats when Fio2 decreased below 50%    1715- Pt awake & alert - has maintained good sats & confirmed feels breathing ok  Begin slow wean (due to status of xray)  Wean flow = 45 lpm  Will monitor to ensure tolerates & to continue wean

## 2017-10-16 ENCOUNTER — APPOINTMENT (OUTPATIENT)
Dept: GENERAL RADIOLOGY | Age: 52
DRG: 177 | End: 2017-10-16
Attending: PHYSICIAN ASSISTANT
Payer: MEDICARE

## 2017-10-16 LAB
ANION GAP SERPL CALC-SCNC: 4 MMOL/L (ref 3–18)
BASOPHILS # BLD: 0.1 K/UL (ref 0–0.06)
BASOPHILS NFR BLD: 1 % (ref 0–2)
BUN SERPL-MCNC: 16 MG/DL (ref 7–18)
BUN/CREAT SERPL: 16 (ref 12–20)
CALCIUM SERPL-MCNC: 8.1 MG/DL (ref 8.5–10.1)
CHLORIDE SERPL-SCNC: 105 MMOL/L (ref 100–108)
CO2 SERPL-SCNC: 33 MMOL/L (ref 21–32)
CREAT SERPL-MCNC: 1 MG/DL (ref 0.6–1.3)
DIFFERENTIAL METHOD BLD: ABNORMAL
EOSINOPHIL # BLD: 0.7 K/UL (ref 0–0.4)
EOSINOPHIL NFR BLD: 8 % (ref 0–5)
ERYTHROCYTE [DISTWIDTH] IN BLOOD BY AUTOMATED COUNT: 16.9 % (ref 11.6–14.5)
GLUCOSE BLD STRIP.AUTO-MCNC: 105 MG/DL (ref 70–110)
GLUCOSE BLD STRIP.AUTO-MCNC: 106 MG/DL (ref 70–110)
GLUCOSE BLD STRIP.AUTO-MCNC: 108 MG/DL (ref 70–110)
GLUCOSE BLD STRIP.AUTO-MCNC: 108 MG/DL (ref 70–110)
GLUCOSE BLD STRIP.AUTO-MCNC: 123 MG/DL (ref 70–110)
GLUCOSE SERPL-MCNC: 111 MG/DL (ref 74–99)
HCT VFR BLD AUTO: 25.5 % (ref 35–45)
HGB BLD-MCNC: 8.2 G/DL (ref 12–16)
LYMPHOCYTES # BLD: 1.8 K/UL (ref 0.9–3.6)
LYMPHOCYTES NFR BLD: 20 % (ref 21–52)
MCH RBC QN AUTO: 29.5 PG (ref 24–34)
MCHC RBC AUTO-ENTMCNC: 32.2 G/DL (ref 31–37)
MCV RBC AUTO: 91.7 FL (ref 74–97)
MONOCYTES # BLD: 0.8 K/UL (ref 0.05–1.2)
MONOCYTES NFR BLD: 9 % (ref 3–10)
NEUTS SEG # BLD: 5.6 K/UL (ref 1.8–8)
NEUTS SEG NFR BLD: 62 % (ref 40–73)
PLATELET # BLD AUTO: 233 K/UL (ref 135–420)
PMV BLD AUTO: 11.2 FL (ref 9.2–11.8)
POTASSIUM SERPL-SCNC: 3.8 MMOL/L (ref 3.5–5.5)
RBC # BLD AUTO: 2.78 M/UL (ref 4.2–5.3)
SODIUM SERPL-SCNC: 142 MMOL/L (ref 136–145)
WBC # BLD AUTO: 8.9 K/UL (ref 4.6–13.2)

## 2017-10-16 PROCEDURE — 97530 THERAPEUTIC ACTIVITIES: CPT

## 2017-10-16 PROCEDURE — 80048 BASIC METABOLIC PNL TOTAL CA: CPT | Performed by: PHYSICIAN ASSISTANT

## 2017-10-16 PROCEDURE — 74011250636 HC RX REV CODE- 250/636: Performed by: HOSPITALIST

## 2017-10-16 PROCEDURE — 77030020847 HC STATLOK BARD -A

## 2017-10-16 PROCEDURE — 3331090002 HH PPS REVENUE DEBIT

## 2017-10-16 PROCEDURE — 74011000250 HC RX REV CODE- 250: Performed by: PHYSICIAN ASSISTANT

## 2017-10-16 PROCEDURE — 80202 ASSAY OF VANCOMYCIN: CPT | Performed by: INTERNAL MEDICINE

## 2017-10-16 PROCEDURE — 71010 XR CHEST PORT: CPT

## 2017-10-16 PROCEDURE — 97535 SELF CARE MNGMENT TRAINING: CPT

## 2017-10-16 PROCEDURE — 94640 AIRWAY INHALATION TREATMENT: CPT

## 2017-10-16 PROCEDURE — 77030018846 HC SOL IRR STRL H20 ICUM -A

## 2017-10-16 PROCEDURE — 74011250637 HC RX REV CODE- 250/637: Performed by: PHYSICIAN ASSISTANT

## 2017-10-16 PROCEDURE — 65660000001 HC RM ICU INTERMED STEPDOWN

## 2017-10-16 PROCEDURE — 77010033711 HC HIGH FLOW OXYGEN

## 2017-10-16 PROCEDURE — 97166 OT EVAL MOD COMPLEX 45 MIN: CPT

## 2017-10-16 PROCEDURE — 36591 DRAW BLOOD OFF VENOUS DEVICE: CPT

## 2017-10-16 PROCEDURE — 82962 GLUCOSE BLOOD TEST: CPT

## 2017-10-16 PROCEDURE — 77030011943

## 2017-10-16 PROCEDURE — 85025 COMPLETE CBC W/AUTO DIFF WBC: CPT | Performed by: PHYSICIAN ASSISTANT

## 2017-10-16 PROCEDURE — 36592 COLLECT BLOOD FROM PICC: CPT

## 2017-10-16 PROCEDURE — 74011250636 HC RX REV CODE- 250/636: Performed by: FAMILY MEDICINE

## 2017-10-16 PROCEDURE — 97110 THERAPEUTIC EXERCISES: CPT

## 2017-10-16 PROCEDURE — 3331090001 HH PPS REVENUE CREDIT

## 2017-10-16 RX ADMIN — HYDROCODONE BITARTRATE AND ACETAMINOPHEN 1 TABLET: 7.5; 325 TABLET ORAL at 09:43

## 2017-10-16 RX ADMIN — SODIUM CHLORIDE 750 MG: 900 INJECTION, SOLUTION INTRAVENOUS at 00:02

## 2017-10-16 RX ADMIN — HYDROCODONE BITARTRATE AND ACETAMINOPHEN 1 TABLET: 7.5; 325 TABLET ORAL at 12:46

## 2017-10-16 RX ADMIN — HEPARIN SODIUM 5000 UNITS: 5000 INJECTION, SOLUTION INTRAVENOUS; SUBCUTANEOUS at 12:41

## 2017-10-16 RX ADMIN — CIPROFLOXACIN HYDROCHLORIDE 500 MG: 500 TABLET, FILM COATED ORAL at 08:07

## 2017-10-16 RX ADMIN — IPRATROPIUM BROMIDE AND ALBUTEROL SULFATE 3 ML: .5; 3 SOLUTION RESPIRATORY (INHALATION) at 08:49

## 2017-10-16 RX ADMIN — IPRATROPIUM BROMIDE AND ALBUTEROL SULFATE 3 ML: .5; 3 SOLUTION RESPIRATORY (INHALATION) at 20:49

## 2017-10-16 RX ADMIN — IPRATROPIUM BROMIDE AND ALBUTEROL SULFATE 3 ML: .5; 3 SOLUTION RESPIRATORY (INHALATION) at 01:13

## 2017-10-16 RX ADMIN — FAMOTIDINE 20 MG: 20 TABLET ORAL at 17:00

## 2017-10-16 RX ADMIN — Medication 10 ML: at 06:42

## 2017-10-16 RX ADMIN — HYDROCODONE BITARTRATE AND ACETAMINOPHEN 1 TABLET: 7.5; 325 TABLET ORAL at 20:54

## 2017-10-16 RX ADMIN — IPRATROPIUM BROMIDE AND ALBUTEROL SULFATE 3 ML: .5; 3 SOLUTION RESPIRATORY (INHALATION) at 14:24

## 2017-10-16 RX ADMIN — LABETALOL HYDROCHLORIDE 100 MG: 100 TABLET, FILM COATED ORAL at 08:08

## 2017-10-16 RX ADMIN — GABAPENTIN 400 MG: 400 CAPSULE ORAL at 17:00

## 2017-10-16 RX ADMIN — HYDROCODONE BITARTRATE AND ACETAMINOPHEN 1 TABLET: 7.5; 325 TABLET ORAL at 01:16

## 2017-10-16 RX ADMIN — HEPARIN SODIUM 5000 UNITS: 5000 INJECTION, SOLUTION INTRAVENOUS; SUBCUTANEOUS at 20:54

## 2017-10-16 RX ADMIN — HEPARIN SODIUM 5000 UNITS: 5000 INJECTION, SOLUTION INTRAVENOUS; SUBCUTANEOUS at 06:42

## 2017-10-16 RX ADMIN — HYDROCODONE BITARTRATE AND ACETAMINOPHEN 1 TABLET: 7.5; 325 TABLET ORAL at 06:42

## 2017-10-16 RX ADMIN — BUDESONIDE 500 MCG: 0.5 INHALANT RESPIRATORY (INHALATION) at 08:49

## 2017-10-16 RX ADMIN — Medication 10 ML: at 21:56

## 2017-10-16 RX ADMIN — GABAPENTIN 400 MG: 400 CAPSULE ORAL at 21:56

## 2017-10-16 RX ADMIN — FAMOTIDINE 20 MG: 20 TABLET ORAL at 08:07

## 2017-10-16 RX ADMIN — HYDROCODONE BITARTRATE AND ACETAMINOPHEN 1 TABLET: 7.5; 325 TABLET ORAL at 17:01

## 2017-10-16 RX ADMIN — BUDESONIDE 500 MCG: 0.5 INHALANT RESPIRATORY (INHALATION) at 20:49

## 2017-10-16 RX ADMIN — LABETALOL HYDROCHLORIDE 100 MG: 100 TABLET, FILM COATED ORAL at 20:54

## 2017-10-16 RX ADMIN — GABAPENTIN 400 MG: 400 CAPSULE ORAL at 08:07

## 2017-10-16 RX ADMIN — AMLODIPINE BESYLATE 10 MG: 10 TABLET ORAL at 08:07

## 2017-10-16 RX ADMIN — Medication 10 ML: at 13:00

## 2017-10-16 NOTE — DIABETES MGMT
NUTRITIONAL ASSESSMENT AND GLYCEMIC CONTROL PLAN OF CARE   Kelsea Simmons    51y.o.     10/11/2017    INTERVENTIONS/PLAN:   Pt is receiving Osmolite 1.2 narda tube feeding via PEG at 60 ml/hr. TF is providing 1728 calories, 80 grams protein, ~1.2 L L free water/d  from tube feeding. Monitor tube feeding tolerance. ASSESSMENT:   Pt is 115% ideal wt with adequate fat  stores; BMI  23.7 kg/m2 . Pt at risk for malnutrition due to  hx of  Throat CA and PEG/TF. Inadequate oral food and beverage intake  R/t npo status due to throat CA and dysphagia as evidenced by reqrt for PEG tube/enteral nutrition. Pt w/hypoalbuminemia as evidenced by albumin=2.4 mg/dl. Unintentional weight loss due to inadequate energy intake AEB 19 # weight loss (12%) since June 2017. SUBJECTIVE/OBJECTIVE:   Information obtained from:  ICU rounds  PMHx includes throat CA, dysphagia and PEG tube,  stage 2 ulcers sacral area    Diet: Osmolite 1.2 narda tube feeding via PEG at 60 ml/hr.    Medications: [x]                Reviewed   corrective lispro q 6 hours, normal insulin sensitivity,        Labs:   Lab Results   Component Value Date/Time    Sodium 142 10/16/2017 03:40 AM    Potassium 3.8 10/16/2017 03:40 AM    Chloride 105 10/16/2017 03:40 AM    CO2 33 10/16/2017 03:40 AM    Anion gap 4 10/16/2017 03:40 AM    Glucose 111 10/16/2017 03:40 AM    BUN 16 10/16/2017 03:40 AM    Creatinine 1.00 10/16/2017 03:40 AM    BUN/Creatinine ratio 16 10/16/2017 03:40 AM    GFR est AA >60 10/16/2017 03:40 AM    GFR est non-AA 59 10/16/2017 03:40 AM    Calcium 8.1 10/16/2017 03:40 AM     Lab Results   Component Value Date/Time    Protein, total 8.4 10/11/2017 05:41 PM    Albumin 2.4 10/11/2017 05:41 PM           Lab Results   Component Value Date/Time     Hemoglobin A1c 5.3 09/25/2017 09:46 AM       Anthropometrics:  BMI (calculated): 25.2   Ht - 64\"  weight - 66 kg  Ideal wt -  54.5 kg       Wt Readings from Last 1 Encounters:   10/05/17 66.6 kg (146 lb 13.2 oz)    10/16/17    62.8kg   06/12/17 71.2 kg (157 lb)        Ht Readings from Last 1 Encounters:   10/03/17 5' 4\" (1.626 m)       Estimated Nutrition Needs:  3767 Kcals/day, Protein (g): 86 g Fluid (ml): 1800 ml  Based on:   [x]          Actual BW                     []          ABW                    []            Adjusted BW       Nutrition Diagnoses:   Pt with unintentional weight loss of 19 lbs since 6/2017. Nutrition Interventions:  Tube feeding  monitoring. Goal:    Blood glucose will be within target range of  mg/dL by  10/19/17-met. Pt will maintain weight (+/- 1-2 kg) by 10/26/17. Intake from TF  will meet >75% of energy and protein requirements. by 10/21.      Nutrition Monitoring and Evaluation       []     Monitor po intake on meal rounds  [x]     Continue inpatient monitoring and intervention  []     Other:      Nutrition Risk:  [x]   High                 []  Moderate                 []  Minimal/Uncompromised

## 2017-10-16 NOTE — PROGRESS NOTES
Problem: Mobility Impaired (Adult and Pediatric)  Goal: *Acute Goals and Plan of Care (Insert Text)  Physical Therapy Goals  Initiated 10/14/2017 and to be accomplished within 7 day(s)  1. Patient will move from supine to sit and sit to supine , scoot up and down and roll side to side in bed with modified independence. 2. Patient will transfer from bed to chair and chair to bed with modified independence using the least restrictive device. 3. Patient will perform sit to stand with modified independence. 4. Patient will ambulate with supervision for 25 feet with the least restrictive device. Outcome: Progressing Towards Goal  PHYSICAL THERAPY TREATMENT     Patient: Hermes Lee (51 y.o. female)  Date: 10/16/2017  Diagnosis: Respiratory failure with hypoxia (HCC)  Recurrent pneumonia  Recurrent pneumonia  Respiratory failure with hypoxia (HCC) Recurrent pneumonia  Precautions: Fall, Aspiration (oxygen)  Chart, physical therapy assessment, plan of care and goals were reviewed. ASSESSMENT:  Pt tolerated treatment well with O2 sats maintaining >92% throughout therapy session. Pt able to perform bed mobility task with SBA, sit<->stand from EOB CGA, and lateral stepped 3ft CGA without AD towards HOB with min cues for technique. Vitals after standing/gait: /93, HR: 95 bpm, O2: 93-95%. Pt also completed seated LE ther-ex to improve overall strength and endurance in prep for performing functional upright activities. EDUCATION: Pt education on activity pacing, energy conservation, and deep breathing technique to maximize overall functional gains during transfers/gait activities. Pt verbalized and demonstrated understanding.       Progression toward goals:  X    Improving appropriately and progressing toward goals        Improving slowly and progressing toward goals        Not making progress toward goals and plan of care will be adjusted       PLAN:  Patient continues to benefit from skilled intervention to address the above impairments. Continue treatment per established plan of care. Discharge Recommendations:  Rehab vs.Home Health pending on progress  Further Equipment Recommendations for Discharge: Anticipated none       SUBJECTIVE:   Patient stated \"I'm okay\". OBJECTIVE DATA SUMMARY:   Critical Behavior:  Neurologic State: Alert  Orientation Level: Oriented X4  Cognition: Appropriate decision making, Appropriate for age attention/concentration, Appropriate safety awareness, Follows commands  Safety/Judgement: Awareness of environment, Good awareness of safety precautions, Fall prevention     G CODE:  Functional Mobility Training:  Bed Mobility:  Rolling: Stand-by asssistance  Supine to Sit: Stand-by asssistance  Sit to Supine: Stand-by asssistance  Transfers:  Sit to Stand: Contact guard assistance; Additional time  Stand to Sit: Contact guard assistance  Balance:  Sitting: Impaired  Sitting - Static: Good (unsupported)  Sitting - Dynamic: Fair (occasional)  Standing: Impaired  Standing - Static: Fair  Standing - Dynamic : Fair  Ambulation/Gait Training:  Distance (ft): 3 Feet (ft) (lateral stepped 3ft towards HOB)  Assistive Device: Gait belt (Without AD)  Ambulation - Level of Assistance: Contact guard assistance  Neuro Re-Education:  Therapeutic Exercises:   Seated TE AROM BLE's: ankle pumps, LAQ's, and hip flexion marches 2x10 reps with rest breaks  Vital Signs  Temp: 97.9 °F (36.6 °C)     Pulse (Heart Rate): 97     BP: (!) 168/96     Resp Rate: 17     O2 Sat (%): 97 %  Pain:  Pre treatment pain level:  0  Post treatment pain level: 0  Pain Scale 1: Numeric (0 - 10)  Pain Intensity 1:   Activity Tolerance:   Fair      After treatment:   Patient left in no apparent distress sitting up in chair  Patient left in no apparent distress in bed X  Call bell left within reach X  Nursing notified  Caregiver present  Bed alarm activated      Randa Zaman PTA   Time Calculation: 24 mins

## 2017-10-16 NOTE — PROGRESS NOTES
Pt states she no longer wants to consider SNF. She wants to return home with home health. Discharge plan is home with home health.

## 2017-10-16 NOTE — PROGRESS NOTES
0845-Rec'd pt on HFNC:  Fio2=50%, Flow=45 lpm  Pt awake & alert, no distress observed or reported  Sat 96% when arrived at bedside - wean flow = 40 lpm - will monitor to ensure pt tolerates  -administer neb    1420-Pt resting on HFNC - sat 96% on Fio2=52%, Flow=40 lpm  Wean Fio2= 45% - pt tolerating initially with sat at 95%  Administer Neb    1700-Wean Fio2=38.9 & flow = 35 lpm  Sat: 96%, RR=24, HR=98    Resp Plan/Goal:  Improved xray image for today. Continue wean from HFNC as tolerated while maintaining pt sats & comfort. --Per rounds - pt being referred for trach consult due to repeated episodes of aspiration pneumonia. EXAM: One-view chest     IMPRESSION  IMPRESSION: Improving aeration of the lungs overall. Residual interstitial and  scattered airspace opacities again demonstrated bilaterally.

## 2017-10-16 NOTE — PROGRESS NOTES
10/15/17 2323   Oxygen Therapy   O2 Sat (%) 100 %   Pulse via Oximetry 90 beats per minute   O2 Device Hi flow nasal cannula   O2 Flow Rate (L/min) 45 l/min   O2 Temperature 87.6 °F (30.9 °C)   FIO2 (%) 50 %     Patient without any respiratory distress at this time and resting comfortably. Will continue to monitor pt status.

## 2017-10-16 NOTE — PROGRESS NOTES
Problem: Self Care Deficits Care Plan (Adult)  Goal: *Acute Goals and Plan of Care (Insert Text)  Occupational Therapy Goals  Initiated 10/16/2017 within 7 day(s). 1. Patient will perform grooming tasks while standing with supervision and minimal verbal cues for breathing techniques. 2. Patient will perform lower body dressing with modified independence and minimal verbal cues for rest breaks. 3. Patient will perform functional task in standing for 8 minutes with supervision for balance and good safety awareness. 4. Patient will perform toilet transfers with supervision. 5. Patient will perform all aspects of toileting with supervision/set-up. 6. Patient will participate in upper extremity therapeutic exercise/activities with supervision/set-up for 8 minutes to increase BUE strength for ADLs. 7. Patient will utilize energy conservation techniques during functional activities with minimal verbal cues. Outcome: Progressing Towards Goal  OCCUPATIONAL THERAPY TREATMENT     Patient: Joy Delong (79 y.o. female)  Date: 10/16/2017  Diagnosis: Respiratory failure with hypoxia (HCC)  Recurrent pneumonia  Recurrent pneumonia  Respiratory failure with hypoxia (HCC) Recurrent pneumonia  Precautions: Fall, Aspiration (oxygen)  Chart, occupational therapy assessment, plan of care, and goals were reviewed. ASSESSMENT:  Pt is pleasant and cooperative, motivated to participate w/therapy. Pt requires increase time w/bed mobility to EOB 2/2 multiple line mgt. Pt demonstrates energy conservation techniques performing ADL grooming tasks w/set-up. Pt desats to 84% w/seated activity, requiring vc's for PLB.  No c/o SOB  EDUCATION Pt educated on energy conservation techniques w/ADLs and PLB  Progression toward goals:  [X]          Improving appropriately and progressing toward goals  [ ]          Improving slowly and progressing toward goals  [ ]          Not making progress toward goals and plan of care will be adjusted       PLAN:  Patient continues to benefit from skilled intervention to address the above impairments. Continue treatment per established plan of care. Discharge Recommendations:  Home Health  Further Equipment Recommendations for Discharge:  shower chair       SUBJECTIVE:   Patient stated I feel so much better.       OBJECTIVE DATA SUMMARY:         Cognitive/Behavioral Status:  Neurologic State: Alert  Orientation Level: Oriented X4  Cognition: Appropriate for age attention/concentration, Follows commands  Safety/Judgement: Awareness of environment, Good awareness of safety precautions, Fall prevention  Functional Mobility and Transfers for ADLs:              Bed Mobility:  Rolling: Stand-by asssistance  Supine to Sit: Stand-by asssistance  Sit to Supine: Stand-by asssistance  Balance:  Sitting: Impaired  Sitting - Static: Good (unsupported)  Sitting - Dynamic: Fair (occasional)  Standing: Impaired  Standing - Static: Fair  Standing - Dynamic : Fair  ADL Intervention:  Grooming  Washing Face: Supervision/set-up  Washing Hands: Supervision/set-up  Brushing Teeth: Supervision/set-up     Cognitive Retraining  Safety/Judgement: Awareness of environment;Good awareness of safety precautions; Fall prevention     Pain:  Pre Treatment:0  Post Treatment:0     Activity Tolerance:    Fair     Please refer to the flowsheet for vital signs taken during this treatment.   After treatment:   [ ]  Patient left in no apparent distress sitting up in chair  [X]  Patient left in no apparent distress in bed  [X]  Call bell left within reach  [ ]  Nursing notified  [ ]  Caregiver present  [ ]  Bed alarm activated     KOREY Medrano  Time Calculation: 26 mins

## 2017-10-16 NOTE — PROGRESS NOTES
Progress Note      Patient: Aurelia Sheikh               Sex: female          DOA: 10/11/2017       YOB: 1965      Age:  54 y.o.        LOS:  LOS: 5 days               Subjective:   Discussed with pulmonary . difficult problem. The pt apparently aspirates her sputum and in her last admission she grew out pseudomonas and in this admission she has grown out MRSA. The question has now been raised as to the advisability of having the pt undergo a tracheostomy . The risk of growing out a completely resistant bacteria is becoming very high as the pt is being exposed to a large number of different antibiotics. She is presently comfortable on hi flow nasal o2 . Objective:      Visit Vitals    BP (!) 168/96    Pulse 97    Temp 97.9 °F (36.6 °C)    Resp 17    Ht 5' 4\" (1.626 m)    Wt 62.8 kg (138 lb 6.4 oz)    SpO2 98%    BMI 23.76 kg/m2       Physical Exam:  Pt is awake and is comfortable but is hypertensive at a bp of 168/96  heart reg rate and rhythm   Lungs coarse breath sounds heard   Abdomen soft and nontender.  S/p peg   Neuro nonfocal          Lab/Data Reviewed:  CMP:   Lab Results   Component Value Date/Time     10/16/2017 03:40 AM    K 3.8 10/16/2017 03:40 AM     10/16/2017 03:40 AM    CO2 33 (H) 10/16/2017 03:40 AM    AGAP 4 10/16/2017 03:40 AM     (H) 10/16/2017 03:40 AM    BUN 16 10/16/2017 03:40 AM    CREA 1.00 10/16/2017 03:40 AM    GFRAA >60 10/16/2017 03:40 AM    GFRNA 59 (L) 10/16/2017 03:40 AM    CA 8.1 (L) 10/16/2017 03:40 AM     CBC:   Lab Results   Component Value Date/Time    WBC 8.9 10/16/2017 03:40 AM    HGB 8.2 (L) 10/16/2017 03:40 AM    HCT 25.5 (L) 10/16/2017 03:40 AM     10/16/2017 03:40 AM           Assessment/Plan     Principal Problem:    Recurrent pneumonia (10/11/2017)probably from aspiration     Active Problems:    Respiratory failure with hypoxia ([address] (10/11/2017)      KEATON (acute kidney injury) ([facility] (10/11/2017)  diabetes mellitus Laryngeal cancer   htn       Plan:  Pt is now awake and is on hi flow  As well as antibiotics as per id .  Her throat cancer is playing a role in her aspiration and recurrent infections

## 2017-10-16 NOTE — MED STUDENT NOTES
*ATTENTION:  This note has been created by a medical student for educational purposes only. Please do not refer to the content of this note for clinical decision-making, billing, or other purposes. Please see attending physicians note to obtain clinical information on this patient. *        Progress Note    Patient: Hermes Lee MRN: [id number]  SSN: [id number]    YOB: 1965  Age: 54 y.o. Sex: female      Admit Date: 10/11/2017    LOS: 5 days     Subjective:     Hermes Lee is a 54 y.o. female with Hx of throat CA, dysphagia, CAD, DM, HTN and s/p PEG placement presenting with SOB. Pt was recently d/c from [facility]  with recurrent PNA. Cultures positive for pseudomonas and MRSA. Pt returned to the ER with respiratory distress. Per EMS, pt O2 sat was 44% and was placed on a NRB with improvement to 98% O2 sat. Pt was placed on BiPAP in the ED. Pt started on IV Vancomycin and Zosyn. Pt with elevated d-dimer- V/Q scan ordered. Pt was admitted to the ICU. ID consulted. D/c zosyn and continued on Cipro and Vancomycin. CXR on 10/15/17 appeared worse from prior. Pt is on 50LHFNC. Today, pt reports some SOB. She notes she is feeling better. Pt is currently on 40L HFNC. Review of Systems   Constitutional: Negative for chills and fever. Respiratory: Positive for shortness of breath. Negative for cough. Cardiovascular: Negative for chest pain. Gastrointestinal: Negative for nausea and vomiting. Neurological: Negative for headaches. Objective:     Vitals:    10/16/17 0804 10/16/17 0849 10/16/17 0920 10/16/17 1200   BP:       Pulse:       Resp:       Temp: 97.8 °F (36.6 °C)   97.9 °F (36.6 °C)   SpO2:  93% 97%    Weight:       Height:            Intake and Output:  Current Shift:    Last three shifts: 10/14 1901 - 10/16 0700  In: 9429 [I.V.:500]  Out: 3260 [Urine:3260]    Physical Exam   HENT:   Head: Normocephalic and atraumatic. Cardiovascular: Normal rate and regular rhythm. Pulmonary/Chest: Effort normal.   +Crackles  On 40L HFNC   Neurological: She is alert. Moves all extremities    Skin: Skin is warm and dry. Lab/Data Review:  CMP:   Lab Results   Component Value Date/Time     10/16/2017 03:40 AM    K 3.8 10/16/2017 03:40 AM     10/16/2017 03:40 AM    CO2 33 (H) 10/16/2017 03:40 AM    AGAP 4 10/16/2017 03:40 AM     (H) 10/16/2017 03:40 AM    BUN 16 10/16/2017 03:40 AM    CREA 1.00 10/16/2017 03:40 AM    GFRAA >60 10/16/2017 03:40 AM    GFRNA 59 (L) 10/16/2017 03:40 AM    CA 8.1 (L) 10/16/2017 03:40 AM     CBC:   Lab Results   Component Value Date/Time    WBC 8.9 10/16/2017 03:40 AM    HGB 8.2 (L) 10/16/2017 03:40 AM    HCT 25.5 (L) 10/16/2017 03:40 AM     10/16/2017 03:40 AM       CXR 10/16/17  IMPRESSION: Improving aeration of the lungs overall.  Residual interstitial and scattered airspace opacities again demonstrated bilaterally    Assessment:     Principal Problem:    Recurrent pneumonia (10/11/2017)    Active Problems:    Respiratory failure with hypoxia ([address] (10/11/2017)      KEATON (acute kidney injury) ([address] (10/11/2017)        Plan:     Continue Vancomycin for MRSA PNA  Continue supplemental O2 via HFNC  Consult ID     Signed By: Ferol Mom     October 16, 2017

## 2017-10-16 NOTE — PROGRESS NOTES
0730 received report from off going RN. Patient alert and oriented. [place]. Right upper chest tunnel catheter intact. Patient NPO except meds through peg tube and osmolite 1.2 at 60ml/hr. High flow oxygen at 45L, 50% FiO2. Callbell within reach. Patient instructed to use callbell and not to get out of bed wthout assist    0943 norco 1 po given crushed and placed through peg tube for generalized pain. 1256 NG tube residual 180ml. NG contents returned through peg tube. Peg tube feeding placed on hold. HOB elevated. 1420 NG Tube residual 60 ml. Restarted tube feeding at 60ml/hr after flushing peg with 30ml water. HOB elevated. 1600 sitting up in bed visiting with family. Callbell within reach. 1720 Assisted patient to bedpan. Voided 500ml of urine mixed in with loose stool. Incontinence pad placed and repositioned for comfort. 1920 Bedside and Verbal shift change report given to jake bradshaw (oncoming nurse) by Sandrita Johnson (offgoing nurse). Report included the following information SBAR, Kardex and MAR.

## 2017-10-16 NOTE — PROGRESS NOTES
Problem: Falls - Risk of  Goal: *Absence of Falls  Document Kapil Fall Risk and appropriate interventions in the flowsheet.    Outcome: Progressing Towards Goal  Fall Risk Interventions:  Mobility Interventions: Bed/chair exit alarm, Assess mobility with egress test           Medication Interventions: Bed/chair exit alarm     Elimination Interventions: Call light in reach, Bed/chair exit alarm, Patient to call for help with toileting needs

## 2017-10-16 NOTE — PROGRESS NOTES
Problem: Self Care Deficits Care Plan (Adult)  Goal: *Acute Goals and Plan of Care (Insert Text)  Occupational Therapy Goals  Initiated 10/16/2017 within 7 day(s). 1. Patient will perform grooming tasks while standing with supervision and minimal verbal cues for breathing techniques. 2. Patient will perform lower body dressing with modified independence and minimal verbal cues for rest breaks. 3. Patient will perform functional task in standing for 8 minutes with supervision for balance and good safety awareness. 4. Patient will perform toilet transfers with supervision. 5. Patient will perform all aspects of toileting with supervision/set-up. 6. Patient will participate in upper extremity therapeutic exercise/activities with supervision/set-up for 8 minutes to increase BUE strength for ADLs. 7. Patient will utilize energy conservation techniques during functional activities with minimal verbal cues. Outcome: Progressing Towards Goal  OCCUPATIONAL THERAPY EVALUATION     Patient: Ginger Zhao (15 y.o. female)  Date: 10/16/2017  Primary Diagnosis: Respiratory failure with hypoxia (HCC)  Recurrent pneumonia  Recurrent pneumonia  Respiratory failure with hypoxia (HCC)        Precautions:  Fall, Aspiration (oxygen)      ASSESSMENT :  Based on the objective data described below, the patient presents with impairments with regard to bed mobility, activity tolerance, BUE strength, and independence in ADLs due to respiratory failure & pneumonia. Pt c/o 9/10 pain on arrival (agreeable to therapy; Sathish Escoto RN provided pain meds). Pt on hi-flow O2. SBA for bed mobility to EOB. Supervision/SBA For LB dressing. Pt remained within 91-93% O2 at rest; desats to 83-84% during bed mobility & functional transfers. Sit to stand transfers from EOB to standing with CGA x3 trials in prep for [facility] transfer. Good safety awareness and decision making.  Pt educated on energy conservation techniques and activity pacing; she verbalized understanding. Recommend SNF upon d/c as pt has flight of stairs to enter apt & lives alone. Pt educated on sitting at EOB/in chair 3x day (declined to sit in chair at this time). Needs left within reach. Patient will benefit from skilled intervention to address the above impairments. Patients rehabilitation potential is considered to be Fair  Factors which may influence rehabilitation potential include:   [ ]             None noted  [ ]             Mental ability/status  [ ]             Medical condition  [ ]             Home/family situation and support systems  [ ]             Safety awareness  [ ]             Pain tolerance/management  [ ]             Other:      Recommendations for nursing: up with assist x1          PLAN :  Recommendations and Planned Interventions:  [X]               Self Care Training                  [X]        Therapeutic Activities  [X]               Functional Mobility Training    [ ]        Cognitive Retraining  [X]               Therapeutic Exercises           [X]        Endurance Activities  [X]               Balance Training                   [ ]        Neuromuscular Re-Education  [ ]               Visual/Perceptual Training     [X]   Home Safety Training  [X]               Patient Education                 [X]        Family Training/Education  [ ]               Other (comment):     Frequency/Duration: Patient will be followed by occupational therapy 3-5 times a week to address goals. Discharge Recommendations: Skilled Nursing Facility  Further Equipment Recommendations for Discharge: bedside commode, shower chair       SUBJECTIVE:   Patient stated I wasn't home for long before I had to come back here. \"      OBJECTIVE DATA SUMMARY:       Past Medical History:   Diagnosis Date    Anemia      Cancer ([facility]       throat    Coronary artery disease      Diabetes ([facility]      Diabetes mellitus ([facility]      ETOH abuse      HTN (hypertension)      Hypertension      Hypothyroid      Lupus      Osteonecrosis (HCC)       of jaw    PEG adjustment, replacement, or removal      S/P thoracentesis      Throat cancer [facility])       Past Surgical History:   Procedure Laterality Date    ABDOMEN SURGERY PROC UNLISTED         feeding tube placement    HX  SECTION        HX HEENT         throat cancer biopsy     Barriers to Learning/Limitations: None  Compensate with: visual, verbal, tactile, kinesthetic cues/model     GCODES:  Self Care  Current  CJ= 20-39%   Goal  CI= 1-19%. The severity rating is based on the Other Functional Assessment, MMT, ROm     Eval Complexity: History: MEDIUM Complexity : Expanded review of history including physical, cognitive and psychosocial  history ; Examination: MEDIUM Complexity : 3-5 performance deficits relating to physical, cognitive , or psychosocial skils that result in activity limitations and / or participation restrictions; Decision Making:MEDIUM Complexity : Patient may present with comorbidities that affect occupational performnce. Miniml to moderate modification of tasks or assistance (eg, physical or verbal ) with assesment(s) is necessary to enable patient to complete evaluation      Prior Level of Function/Home Situation:Pt was modified independent with basic self care tasks and functional mobility PTA. Home Situation  Home Environment: Apartment  # Steps to Enter: 15  Rails to Enter: Yes  Hand Rails : Bilateral  One/Two Story Residence: One story  Living Alone: Yes  Support Systems: Child(chasidy), Family member(s)  Patient Expects to be Discharged to[de-identified] Apartment  Current DME Used/Available at Home: Oxygen, portable  Tub or Shower Type: Tub/Shower combination (no grab bars or shower chair)  [X]  Right hand dominant          [ ]  Left hand dominant     Cognitive/Behavioral Status:  Neurologic State: Alert  Orientation Level: Oriented X4  Cognition: Appropriate decision making; Appropriate for age attention/concentration; Appropriate safety awareness; Follows commands  Safety/Judgement: Awareness of environment;Good awareness of safety precautions; Fall prevention      Skin: Intact (BUEs)  Edema: None noted (BUEs)     Vision/Perceptual:    Acuity: Within Defined Limits       Coordination:  Fine Motor Skills-Upper: Right Intact; Left Intact    Gross Motor Skills-Upper: Right Intact; Left Intact      Balance:  Sitting: Impaired  Sitting - Static: Good (unsupported)  Sitting - Dynamic: Fair (occasional)  Standing: Impaired  Standing - Static: Fair  Standing - Dynamic : Fair      Strength:  Strength: Generally decreased, functional (BUes: approx 4/5)     Range of Motion:  AROM: Within functional limits (BUEs: full shoulder/elbow flex)  PROM: Within functional limits (BUE)     Functional Mobility and Transfers for ADLs:  Bed Mobility:  Rolling: Stand-by asssistance  Supine to Sit: Stand-by asssistance  Sit to Supine: Stand-by asssistance     Transfers:  Sit to Stand: Contact guard assistance; Additional time              Toilet Transfer :  (not assessed; pt declined)     ADL Assessment:  Feeding: Maximum assistance; Total assistance (peg tube)  Oral Facial Hygiene/Grooming: Setup;Supervision  Bathing: Moderate assistance; Additional time  Upper Body Dressing: Setup;Supervision  Lower Body Dressing: Stand-by assistance  Toileting: Minimum assistance     Cognitive Retraining  Safety/Judgement: Awareness of environment;Good awareness of safety precautions; Fall prevention     Therapeutic Activity:  Sit to stand transfers from EOB to standing x3 trials in prep for [facility] transfer for toileting task to address activity tolerance, static/dynamic standing balance, and overall strength/endurance for ADLs. Good safety awareness and decision making; no vc's required for BUE placement. CGA to stand; tolerated standing ~5 mins t/o each sit to stand. Pt declined to transfer to chair at this time.      Pain:  Pre treatment pain level: 9/10  Post treatment pain level: 9/10  Pain Scale 1: Numeric (0 - 10)  Pain Intensity 1: 9  Pain Location 1: Generalized     Activity Tolerance:  Fair  Please refer to the flowsheet for vital signs taken during this treatment. After treatment:   [ ] Patient left in no apparent distress sitting up in chair  [X] Patient left in no apparent distress in bed  [X] Call bell left within reach  [X] Nursing notified  [ ] Caregiver present  [ ] Bed alarm activated      COMMUNICATION/EDUCATION: Pt educated on role of OT, POC, and home safety. SHe verbalized understanding.   [X] Home safety education was provided and the patient/caregiver indicated understanding. [X] Patient/family have participated as able in goal setting and plan of care. [X] Patient/family agree to work toward stated goals and plan of care. [ ] Patient understands intent and goals of therapy, but is neutral about his/her participation. [ ] Patient is unable to participate in goal setting and plan of care.      Thank you for this referral.     Tyson Teran MS OTR/L  Time Calculation: 20 mins

## 2017-10-16 NOTE — PROGRESS NOTES
INFECTIOUS DISEASE FOLLOW UP NOTE :-     Admit Date: 10/11/2017    Reason: recurrent Pneumonia  Current abx Prior abx   Vancomycin 10/11 - 5, Levofloxacin 9/24 - 4  Zosyn 9/24 - 6 Tobramycin 9/30 - 2 Vancomycin 9/24-15  Ciprofloxacin 10/2-9 Doxycycline 10/9 - 2  Zosyn 10/11 -2, Cipro 10/14-2       ASSESSMENT - > REC:      Recurrent Pneumonia  - MRSA, Pseudomonas in spcx 9/24  - same organisms in spcx last confinement [facility]) 8/11-18  - treated with Vancomycin/zosyn x 14 days (ending ~8/25)  - bilateral multifocal opacities - sl improved on CT 10/1  - CXR 10/9 similar to 10/3 (improved infiltrates c/w 9/29)  - CXR 10/11 similar to 10/9  - CXR 10/13 worsening multifocal infiltrate (BUL) but improving on CXR 10/16 (?aspiration) ? recurrent MRSA  -> Sputum cx with MRSA only  -> Ig levels high  -> CXR showing improvement  -> continue Vancomycin but will dc Cipro   Respiratory Failure  - recent admission with intubated 9/24, extubated 9/25  - readmitted 10/11 w/ shortness of breath and cough.  Now on HFNC -> per PCCM   H/o throat CA      ETOH abuse     DM     HTN     CAD     GERD     PEG     DELFINA        MICROBIOLOGY:   (9/24     blcx NG x 2                             urcx IP              spcx Ps aeruginosa S AG, cipro R pip-tazo, other beta lactams, MRSA R levaquin vanco rios 1 few CF)  9/26     C diff neg )      10/11             blcx      NGTD x 2                        spcx gs many gpc prs/groups, Cx MRSA vanco rios 1                              LINES AND CATHETERS:   Tunneled RIJ PICC    MEDICATIONS:     Current Facility-Administered Medications   Medication Dose Route Frequency    HYDROcodone-acetaminophen (NORCO) 7.5-325 mg per tablet 1 Tab  1 Tab Oral Q3H PRN    guaiFENesin (ROBITUSSIN) 100 mg/5 mL oral liquid 100 mg  100 mg Oral Q4H PRN    amLODIPine (NORVASC) tablet 10 mg  10 mg Oral DAILY    promethazine (PHENERGAN) 25 mg in 0.9% sodium chloride 50 mL IVPB  25 mg IntraVENous Q6H PRN    vancomycin (VANCOCIN) 750 mg in 0.9% sodium chloride (MBP/ADV) 250 mL ADV  750 mg IntraVENous Q24H    [START ON 10/17/2017] VANCOMYCIN INFORMATION NOTE   Other ONCE    gabapentin (NEURONTIN) capsule 400 mg  400 mg Oral TID    albuterol-ipratropium (DUO-NEB) 2.5 MG-0.5 MG/3 ML  3 mL Nebulization Q6H RT    budesonide (PULMICORT) 500 mcg/2 ml nebulizer suspension  500 mcg Nebulization BID RT    ciprofloxacin HCl (CIPRO) tablet 500 mg  500 mg Oral Q12H    0.9% sodium chloride infusion 250 mL  250 mL IntraVENous PRN    labetalol (NORMODYNE) tablet 100 mg  100 mg Oral Q12H    labetalol (NORMODYNE;TRANDATE) injection 20 mg  20 mg IntraVENous Q4H PRN    albuterol (PROVENTIL VENTOLIN) nebulizer solution 2.5 mg  2.5 mg Nebulization Q4H PRN    heparin (porcine) pf 300-500 Units  300-500 Units InterCATHeter PRN    sodium chloride (NS) flush 10-30 mL  10-30 mL InterCATHeter PRN    sodium chloride (NS) flush 10 mL  10 mL InterCATHeter Q24H    sodium chloride (NS) flush 10 mL  10 mL InterCATHeter PRN    sodium chloride (NS) flush 10-40 mL  10-40 mL InterCATHeter Q8H    sodium chloride (NS) flush 20 mL  20 mL InterCATHeter Q24H    famotidine (PEPCID) tablet 20 mg  20 mg Oral BID    sodium chloride (NS) flush 5-10 mL  5-10 mL IntraVENous PRN    naloxone (NARCAN) injection 0.4 mg  0.4 mg IntraVENous PRN    heparin (porcine) injection 5,000 Units  5,000 Units SubCUTAneous Q8H    insulin lispro (HUMALOG) injection   SubCUTAneous Q6H    glucose chewable tablet 16 g  4 Tab Oral PRN    glucagon (GLUCAGEN) injection 1 mg  1 mg IntraMUSCular PRN    dextrose (D50W) injection syrg 12.5-25 g  25-50 mL IntraVENous PRN    VANCOMYCIN INFORMATION NOTE   Other Rx Dosing/Monitoring       SUBJECTIVE :     Interval notes reviewed. Afebrile. Remains in ICU on high flow. Minimal cough. Remi TF. Voiding well after removal of mckeon though required straight cath once.      OBJECTIVE     Visit Vitals  BP (!) 168/96    Pulse 97    Temp 97.8 °F (36.6 °C)    Resp 17    Ht 5' 4\" (1.626 m)    Wt 62.8 kg (138 lb 6.4 oz)    SpO2 93%    BMI 23.76 kg/m2       Temp (24hrs), Av °F (36.7 °C), Min:97.7 °F (36.5 °C), Max:98.7 °F (37.1 °C)      Gen-Fairly built and nourished female on high flow, no distress  HENT- No thrush  Chest- Symmetric expansion, Crackles+  CV-S1S2 RR, No JVD, No edema  Abd-Soft, NT, ND, BS+  Skin-No jaundice, cyanosis, clubbing. No decubitus  Muscsk- Normal muscle tone/strength        Labs: Results:   Chemistry Recent Labs      10/16/17   0340  10/15/17   0340  10/14/17   0400   GLU  111*  144*  109*   NA  142  142  142   K  3.8  3.5  3.7   CL  105  105  108   CO2  33*  31  27   BUN  16  15  17   CREA  1.00  1.28  1.45*   CA  8.1*  8.2*  8.1*   AGAP  4  6  7   BUCR  16  12  12      CBC w/Diff Recent Labs      10/16/17   0340  10/15/17   0340  10/14/17   0400   WBC  8.9  13.3*  15.0*   RBC  2.78*  2.63*  2.62*   HGB  8.2*  7.8*  7.9*   HCT  25.5*  24.0*  24.0*   PLT  233  220  215   GRANS  62  76*  82*   LYMPH  20*  12*  10*   EOS  8*  4  3          RADIOLOGY :          Imaging    Results from Hospital Encounter encounter on 10/11/17   XR CHEST PORT   Narrative EXAM: One-view chest    CLINICAL HISTORY: Pneumonia ,    COMPARISON: 10/15/2017    FINDINGS:    Frontal view of the chest demonstrate improved aeration of both lungs with  residual reticulonodular opacities bilaterally. Right approach central venous  catheter tip in the mid SVC. . Cardiac silhouette is normal in size and contour. No acute bony or soft tissue abnormality. Impression IMPRESSION: Improving aeration of the lungs overall. Residual interstitial and  scattered airspace opacities again demonstrated bilaterally. Results from [place] encounter on 17   CT CHEST WO CONT   Narrative COMPARISON: 2017.     INDICATION: MRSA, pneumonia, prolonged oxygen requirements    TECHNIQUE: Axial was performed through the chest without contrast.  Coronal and  sagittal reformations were generated. One or more dose reduction techniques were  used on this CT: automated exposure control, adjustment of the mAs and/or kVp  according to patient's size, and iterative reconstruction techniques. The  specific techniques utilized on this CT exam have been documented in the  patient's electronic medical record.     ============    FINDINGS:    LUNGS: There are widespread patchy consolidative and groundglass airspace  opacities throughout the lungs bilaterally, overall slight interval improvement  but a similar distribution. Additionally, there are areas of superimposed septal  thickening and faint reticulonodular infiltrates which are somewhat more  pronounced in the lower lung zones. PLEURA: Trace pleural effusions. No pneumothorax. AIRWAY: Unremarkable. MEDIASTINUM: Low-attenuation of the cardiac blood pool relative to the  myocardium compatible with underlying anemia. Right IJ central venous catheter  is noted. Normal heart size. Trace pericardial effusion. Great vessels are  unremarkable. Lymph nodes: There are scattered prominent mediastinal lymph nodes including a  upper right paratracheal node measuring 10 mm. Cluster of prevascular nodes  measure up to 10 mm as well. Assessment for adenopathy is limited due to the  lack of contrast. Prominent axillary lymph nodes are also noted. UPPER ABDOMEN: Unremarkable. OTHER: No aggressive osseous lesions.    ============         Impression IMPRESSION:    1. Slight interval improvement in the patchy bilateral airspace opacities with  overall similar distribution as above suggesting multifocal pneumonia and likely  superimposed interstitial edema. 2.  Small new bilateral pleural effusions and a trace pericardial effusion. 3.  Probable reactive mediastinal adenopathy. Preliminary report provided by on-call radiology resident.       I have independently examined the patient and reviewed all lab studies and imgaing as well as review of nursing notes and physican notes from the past 24 hours. The plan of care has been discussed with the patient/relative and all questions are answered.      Hao Saba MD  October 16, 2017    [facility] AT THE [facility]  [phone]

## 2017-10-17 ENCOUNTER — APPOINTMENT (OUTPATIENT)
Dept: GENERAL RADIOLOGY | Age: 52
DRG: 177 | End: 2017-10-17
Attending: PHYSICIAN ASSISTANT
Payer: MEDICARE

## 2017-10-17 LAB
ABO + RH BLD: NORMAL
ALBUMIN SERPL-MCNC: 1.7 G/DL (ref 3.4–5)
ALBUMIN/GLOB SERPL: 0.3 {RATIO} (ref 0.8–1.7)
ALP SERPL-CCNC: 109 U/L (ref 45–117)
ALT SERPL-CCNC: 11 U/L (ref 13–56)
ANION GAP SERPL CALC-SCNC: 4 MMOL/L (ref 3–18)
ANTIGENS PRESENT RBC DONR: NORMAL
ANTIGENS PRESENT RBC DONR: NORMAL
AST SERPL-CCNC: 16 U/L (ref 15–37)
BACTERIA SPEC CULT: NORMAL
BACTERIA SPEC CULT: NORMAL
BASOPHILS # BLD: 0.1 K/UL (ref 0–0.06)
BASOPHILS NFR BLD: 1 % (ref 0–2)
BILIRUB SERPL-MCNC: 0.2 MG/DL (ref 0.2–1)
BLD PROD TYP BPU: NORMAL
BLD PROD TYP BPU: NORMAL
BLOOD GROUP ANTIBODIES SERPL: NORMAL
BPU ID: NORMAL
BPU ID: NORMAL
BUN SERPL-MCNC: 20 MG/DL (ref 7–18)
BUN/CREAT SERPL: 18 (ref 12–20)
CALCIUM SERPL-MCNC: 7.9 MG/DL (ref 8.5–10.1)
CALLED TO:,BCALL1: NORMAL
CHLORIDE SERPL-SCNC: 105 MMOL/L (ref 100–108)
CO2 SERPL-SCNC: 32 MMOL/L (ref 21–32)
CREAT SERPL-MCNC: 1.09 MG/DL (ref 0.6–1.3)
CROSSMATCH RESULT,%XM: NORMAL
CROSSMATCH RESULT,%XM: NORMAL
DATE LAST DOSE: NORMAL
DIFFERENTIAL METHOD BLD: ABNORMAL
EOSINOPHIL # BLD: 0.9 K/UL (ref 0–0.4)
EOSINOPHIL NFR BLD: 10 % (ref 0–5)
ERYTHROCYTE [DISTWIDTH] IN BLOOD BY AUTOMATED COUNT: 16.4 % (ref 11.6–14.5)
GLOBULIN SER CALC-MCNC: 5.1 G/DL (ref 2–4)
GLUCOSE BLD STRIP.AUTO-MCNC: 103 MG/DL (ref 70–110)
GLUCOSE BLD STRIP.AUTO-MCNC: 106 MG/DL (ref 70–110)
GLUCOSE BLD STRIP.AUTO-MCNC: 121 MG/DL (ref 70–110)
GLUCOSE BLD STRIP.AUTO-MCNC: 129 MG/DL (ref 70–110)
GLUCOSE SERPL-MCNC: 125 MG/DL (ref 74–99)
HCT VFR BLD AUTO: 24.2 % (ref 35–45)
HGB BLD-MCNC: 7.7 G/DL (ref 12–16)
LYMPHOCYTES # BLD: 1.7 K/UL (ref 0.9–3.6)
LYMPHOCYTES NFR BLD: 20 % (ref 21–52)
MCH RBC QN AUTO: 29.5 PG (ref 24–34)
MCHC RBC AUTO-ENTMCNC: 31.8 G/DL (ref 31–37)
MCV RBC AUTO: 92.7 FL (ref 74–97)
MONOCYTES # BLD: 0.7 K/UL (ref 0.05–1.2)
MONOCYTES NFR BLD: 8 % (ref 3–10)
NEUTS SEG # BLD: 5.3 K/UL (ref 1.8–8)
NEUTS SEG NFR BLD: 61 % (ref 40–73)
PLATELET # BLD AUTO: 236 K/UL (ref 135–420)
PMV BLD AUTO: 12 FL (ref 9.2–11.8)
POTASSIUM SERPL-SCNC: 4.2 MMOL/L (ref 3.5–5.5)
PROT SERPL-MCNC: 6.8 G/DL (ref 6.4–8.2)
RBC # BLD AUTO: 2.61 M/UL (ref 4.2–5.3)
REPORTED DOSE,DOSE: NORMAL UNITS
REPORTED DOSE/TIME,TMG: 120
SERVICE CMNT-IMP: NORMAL
SERVICE CMNT-IMP: NORMAL
SODIUM SERPL-SCNC: 141 MMOL/L (ref 136–145)
SPECIMEN EXP DATE BLD: NORMAL
STATUS OF UNIT,%ST: NORMAL
STATUS OF UNIT,%ST: NORMAL
UNIT DIVISION, %UDIV: 0
UNIT DIVISION, %UDIV: 0
VANCOMYCIN TROUGH SERPL-MCNC: 19 UG/ML (ref 10–20)
WBC # BLD AUTO: 8.7 K/UL (ref 4.6–13.2)

## 2017-10-17 PROCEDURE — 74011000250 HC RX REV CODE- 250: Performed by: PHYSICIAN ASSISTANT

## 2017-10-17 PROCEDURE — 65660000000 HC RM CCU STEPDOWN

## 2017-10-17 PROCEDURE — 80053 COMPREHEN METABOLIC PANEL: CPT | Performed by: HOSPITALIST

## 2017-10-17 PROCEDURE — 3331090001 HH PPS REVENUE CREDIT

## 2017-10-17 PROCEDURE — 94760 N-INVAS EAR/PLS OXIMETRY 1: CPT

## 2017-10-17 PROCEDURE — 77010033711 HC HIGH FLOW OXYGEN

## 2017-10-17 PROCEDURE — 82962 GLUCOSE BLOOD TEST: CPT

## 2017-10-17 PROCEDURE — 85025 COMPLETE CBC W/AUTO DIFF WBC: CPT | Performed by: HOSPITALIST

## 2017-10-17 PROCEDURE — 74011250637 HC RX REV CODE- 250/637: Performed by: PHYSICIAN ASSISTANT

## 2017-10-17 PROCEDURE — 94640 AIRWAY INHALATION TREATMENT: CPT

## 2017-10-17 PROCEDURE — 77030018846 HC SOL IRR STRL H20 ICUM -A

## 2017-10-17 PROCEDURE — 36592 COLLECT BLOOD FROM PICC: CPT

## 2017-10-17 PROCEDURE — 97530 THERAPEUTIC ACTIVITIES: CPT

## 2017-10-17 PROCEDURE — 74011250636 HC RX REV CODE- 250/636: Performed by: FAMILY MEDICINE

## 2017-10-17 PROCEDURE — 3331090002 HH PPS REVENUE DEBIT

## 2017-10-17 PROCEDURE — 97110 THERAPEUTIC EXERCISES: CPT

## 2017-10-17 PROCEDURE — 74011250636 HC RX REV CODE- 250/636: Performed by: HOSPITALIST

## 2017-10-17 PROCEDURE — 71010 XR CHEST PORT: CPT

## 2017-10-17 PROCEDURE — 97535 SELF CARE MNGMENT TRAINING: CPT

## 2017-10-17 RX ADMIN — HYDROCODONE BITARTRATE AND ACETAMINOPHEN 1 TABLET: 7.5; 325 TABLET ORAL at 23:19

## 2017-10-17 RX ADMIN — GABAPENTIN 400 MG: 400 CAPSULE ORAL at 23:19

## 2017-10-17 RX ADMIN — IPRATROPIUM BROMIDE AND ALBUTEROL SULFATE 3 ML: .5; 3 SOLUTION RESPIRATORY (INHALATION) at 19:48

## 2017-10-17 RX ADMIN — HYDROCODONE BITARTRATE AND ACETAMINOPHEN 1 TABLET: 7.5; 325 TABLET ORAL at 06:00

## 2017-10-17 RX ADMIN — AMLODIPINE BESYLATE 10 MG: 10 TABLET ORAL at 08:39

## 2017-10-17 RX ADMIN — GABAPENTIN 400 MG: 400 CAPSULE ORAL at 08:39

## 2017-10-17 RX ADMIN — GABAPENTIN 400 MG: 400 CAPSULE ORAL at 16:16

## 2017-10-17 RX ADMIN — HYDROCODONE BITARTRATE AND ACETAMINOPHEN 1 TABLET: 7.5; 325 TABLET ORAL at 01:39

## 2017-10-17 RX ADMIN — IPRATROPIUM BROMIDE AND ALBUTEROL SULFATE 3 ML: .5; 3 SOLUTION RESPIRATORY (INHALATION) at 01:36

## 2017-10-17 RX ADMIN — BUDESONIDE 500 MCG: 0.5 INHALANT RESPIRATORY (INHALATION) at 19:48

## 2017-10-17 RX ADMIN — Medication 40 ML: at 05:53

## 2017-10-17 RX ADMIN — LABETALOL HYDROCHLORIDE 100 MG: 100 TABLET, FILM COATED ORAL at 23:19

## 2017-10-17 RX ADMIN — HYDROCODONE BITARTRATE AND ACETAMINOPHEN 1 TABLET: 7.5; 325 TABLET ORAL at 16:16

## 2017-10-17 RX ADMIN — SODIUM CHLORIDE 750 MG: 900 INJECTION, SOLUTION INTRAVENOUS at 00:48

## 2017-10-17 RX ADMIN — SODIUM CHLORIDE 750 MG: 900 INJECTION, SOLUTION INTRAVENOUS at 23:20

## 2017-10-17 RX ADMIN — HYDROCODONE BITARTRATE AND ACETAMINOPHEN 1 TABLET: 7.5; 325 TABLET ORAL at 18:42

## 2017-10-17 RX ADMIN — HYDROCODONE BITARTRATE AND ACETAMINOPHEN 1 TABLET: 7.5; 325 TABLET ORAL at 08:51

## 2017-10-17 RX ADMIN — BUDESONIDE 500 MCG: 0.5 INHALANT RESPIRATORY (INHALATION) at 07:44

## 2017-10-17 RX ADMIN — Medication 10 ML: at 23:22

## 2017-10-17 RX ADMIN — FAMOTIDINE 20 MG: 20 TABLET ORAL at 18:42

## 2017-10-17 RX ADMIN — HEPARIN SODIUM 5000 UNITS: 5000 INJECTION, SOLUTION INTRAVENOUS; SUBCUTANEOUS at 23:20

## 2017-10-17 RX ADMIN — HEPARIN SODIUM 5000 UNITS: 5000 INJECTION, SOLUTION INTRAVENOUS; SUBCUTANEOUS at 13:11

## 2017-10-17 RX ADMIN — FAMOTIDINE 20 MG: 20 TABLET ORAL at 08:39

## 2017-10-17 RX ADMIN — HEPARIN SODIUM 5000 UNITS: 5000 INJECTION, SOLUTION INTRAVENOUS; SUBCUTANEOUS at 05:50

## 2017-10-17 RX ADMIN — IPRATROPIUM BROMIDE AND ALBUTEROL SULFATE 3 ML: .5; 3 SOLUTION RESPIRATORY (INHALATION) at 13:42

## 2017-10-17 RX ADMIN — LABETALOL HYDROCHLORIDE 100 MG: 100 TABLET, FILM COATED ORAL at 08:39

## 2017-10-17 RX ADMIN — IPRATROPIUM BROMIDE AND ALBUTEROL SULFATE 3 ML: .5; 3 SOLUTION RESPIRATORY (INHALATION) at 07:44

## 2017-10-17 RX ADMIN — HYDROCODONE BITARTRATE AND ACETAMINOPHEN 1 TABLET: 7.5; 325 TABLET ORAL at 13:11

## 2017-10-17 NOTE — PROGRESS NOTES
Problem: Mobility Impaired (Adult and Pediatric)  Goal: *Acute Goals and Plan of Care (Insert Text)  Physical Therapy Goals  Initiated 10/14/2017 and to be accomplished within 7 day(s)  1. Patient will move from supine to sit and sit to supine , scoot up and down and roll side to side in bed with modified independence. 2. Patient will transfer from bed to chair and chair to bed with modified independence using the least restrictive device. 3. Patient will perform sit to stand with modified independence. 4. Patient will ambulate with supervision for 25 feet with the least restrictive device. Outcome: Progressing Towards Goal  PHYSICAL THERAPY TREATMENT     Patient: Yane Martínez (65 y.o. female)  Date: 10/17/2017  Diagnosis: Respiratory failure with hypoxia (HCC)  Recurrent pneumonia  Recurrent pneumonia  Respiratory failure with hypoxia (HCC) Recurrent pneumonia       Precautions: Fall, Aspiration (oxygen)  Chart, physical therapy assessment, plan of care and goals were reviewed. ASSESSMENT:  Pt sleeping x 2 attempts after busy morning. Now rouses with tactile/verbal stim. Agreeing to PT. Demos MOD IND for bed mobs. Sitting/standing activity at EOB x 20 minutes. Worked on deep breathing w manual cues for I/E x 5 reps, B LAQ x 10 , trunk rotation R<>L x 2 trials w VC/tactile prior to standing activity at eob. In standing w RW support march in place 10 reps x 2 , heel raises x 10. Side stepping activity along side eob at this time 2/2 limited by high flow. Resting 02 91-93% on hi flow; w standing ex., dec 87-89, occ to 90; w continuous side stepping activity decreased to 74-75% w pt reporting mild sob. Returned to sitting and 02 sats return to 90-91% <3 min seated rest. -116  EDUCATION:  Deep breathing. Pacing activity.    Progression toward goals:        Improving slowly and progressing toward goals       PLAN:  Patient continues to benefit from skilled intervention to address the above impairments. Continue treatment per established plan of care. Discharge Recommendations:home w hh v snf  Further Equipment Recommendations for Discharge: rw       SUBJECTIVE:   Patient stated you're back.       OBJECTIVE DATA SUMMARY:   Critical Behavior:  Neurologic State: Alert  Orientation Level: Oriented X4  Cognition: Appropriate decision making, Appropriate for age attention/concentration, Appropriate safety awareness, Follows commands  Safety/Judgement: Awareness of environment, Good awareness of safety precautions, Fall prevention     G CODE:na  Functional Mobility Training:  Bed Mobility:  Supine to Sit: Modified independent  Sit to Supine: Modified independent  Transfers:  Sit to Stand: Modified independent  Stand to Sit: Modified independent  Interventions: Verbal cues  Balance:  Sitting: Intact  Sitting - Static: Good (unsupported)  Sitting - Dynamic: Fair (occasional)  Standing: With support  Standing - Static: Good  Standing - Dynamic : Good  Ambulation/Gait Training:  Distance (ft): 30 Feet (ft) (5 steps R<> L x 3 each direction )  Assistive Device: Walker, rolling (hi flow, PEG)  Speed/Doretha: Slow  Interventions: Verbal cues; Other (comment) (monitor 02 sats & HR)  Therapeutic Exercises:   See assessment  Vital Signs  Temp: 97.6 °F (36.4 °C)     Pulse (Heart Rate): 87     BP: 138/84     Resp Rate: 12        Pain:  Pre treatment pain level:8  Post treatment pain level:7  Pain Scale 1: Numeric (0 - 10)  Pain Intensity 1: 7  Pain Location 1: Back  Pain Orientation 1: Posterior  Pain Description 1: Aching  Pain Intervention(s) 1: Medication (see MAR)  Activity Tolerance:   poor  After treatment:   Patient left in no apparent distress in bed HOB >35 deg  Call bell left within reach  [address] present and administered pain medication      Soledad Chambers PTA   Time Calculation: 33 mins

## 2017-10-17 NOTE — PROGRESS NOTES
Problem: Self Care Deficits Care Plan (Adult)  Goal: *Acute Goals and Plan of Care (Insert Text)  Occupational Therapy Goals  Initiated 10/16/2017 within 7 day(s). 1. Patient will perform grooming tasks while standing with supervision and minimal verbal cues for breathing techniques. 2. Patient will perform lower body dressing with modified independence and minimal verbal cues for rest breaks. 3. Patient will perform functional task in standing for 8 minutes with supervision for balance and good safety awareness. 4. Patient will perform toilet transfers with supervision. 5. Patient will perform all aspects of toileting with supervision/set-up. 6. Patient will participate in upper extremity therapeutic exercise/activities with supervision/set-up for 8 minutes to increase BUE strength for ADLs. 7. Patient will utilize energy conservation techniques during functional activities with minimal verbal cues. OCCUPATIONAL THERAPY TREATMENT     Patient: Luan Dill (75 y.o. female)  Date: 10/17/2017  Diagnosis: Respiratory failure with hypoxia (HCC)  Recurrent pneumonia  Recurrent pneumonia  Respiratory failure with hypoxia (HCC) Recurrent pneumonia       Precautions: Fall, Aspiration (oxygen)  Chart, occupational therapy assessment, plan of care, and goals were reviewed. ASSESSMENT:  Pt seen for am ADL retraining w/emphasis on energy conservation/pacing. Pt remains on Hi-sunil O2 and desats w/minimal exertion. Pt requires vc's throughout session for pacing ADL. Pt demonstrates good use PLB and c/o SOB 2x throughout session. Pt requires increase for task completion and appears brighter. Pt left in chair w/all items within reach. EDUCATION Pt educated on energy conservation techniques w/ADL and importance of pacing.   Progression toward goals:  [X]          Improving appropriately and progressing toward goals  [ ]          Improving slowly and progressing toward goals  [ ]          Not making progress toward goals and plan of care will be adjusted       PLAN:  Patient continues to benefit from skilled intervention to address the above impairments. Continue treatment per established plan of care. Discharge Recommendations:  Home Health  Further Equipment Recommendations for Discharge:  shower chair       SUBJECTIVE:   Patient stated That feels so much better.  reference ADL and OOB      OBJECTIVE DATA SUMMARY:         Cognitive/Behavioral Status:  Neurologic State: Alert  Orientation Level: Oriented X4  Cognition: Appropriate for age attention/concentration, Follows commands  Safety/Judgement: Awareness of environment, Good awareness of safety precautions, Fall prevention  Functional Mobility and Transfers for ADLs:              Bed Mobility:  Supine to Sit: Supervision              Transfers:  Sit to Stand: Stand-by asssistance  Bed to Chair: Contact guard assistance;Stand-by asssistance;Assist x2 (requires assist x 2 for multiple line mgt)  Balance:  Sitting: Intact  Sitting - Static: Good (unsupported)  Sitting - Dynamic: Fair (occasional)  Standing: With support  Standing - Static: Fair  Standing - Dynamic : Fair  ADL Intervention:  Grooming  Washing Face: Supervision/set-up  Washing Hands: Supervision/set-up  Applying Makeup: Supervision/set-up (applying lotion to face and BUE/BLE)     Upper Body Bathing  Bathing Assistance: Stand-by assistance  Position Performed: Seated edge of bed     Lower Body Bathing  Bathing Assistance: Stand-by assistance  Perineal  : Stand-by assistance  Position Performed: Standing  Cues: Verbal cues provided  Lower Body : Stand-by assistance  Position Performed: Seated edge of bed  Cues: Verbal cues provided     Upper Body [address]: Stand-by assistance     Lower Body Dressing Assistance  Socks: Stand-by assistance  Leg Crossed Method Used: Yes  Position Performed: Seated edge of bed  Cues: Don;Doff     Pain:  Pre Treatment:0  Post Treatment:0 Activity Tolerance:    Fair 2/2 desating w/minimal exertion     Please refer to the flowsheet for vital signs taken during this treatment.   After treatment:   [ ]  Patient left in no apparent distress sitting up in chair  [ ]  Patient left in no apparent distress in bed  [ ]  Call bell left within reach  [ ]  Nursing notified  [ ]  Caregiver present  [ ]  Bed alarm activated     KOREY Medrano  Time Calculation: 53 mins

## 2017-10-17 NOTE — CONSULTS
[facility]  Name: Donald Hudson   : 1965   MRN: [id number]   Date: 10/17/2017    []I have reviewed the flowsheet and previous days notes. []The patient is unable to give any meaningful history or review of systems because the patient is:  []Intubated []Sedated   []Unresponsive      []The patient is critically ill on      []Mechanical ventilation []Pressors   []BiPAP []   S: Feels somewhat better. ROS:Review of systems not obtained due to patient factors. Events and notes from last 24 hours reviewed. Care plan discussed on multidisciplinary rounds. Vital Signs:    Visit Vitals    /81    Pulse 84    Temp 98.3 °F (36.8 °C)    Resp 13    Ht 5' 4\" (1.626 m)    Wt 62.9 kg (138 lb 9.6 oz)    SpO2 96%    BMI 23.79 kg/m2       O2 Device: Hi flow nasal cannula   O2 Flow Rate (L/min): 40 l/min   Temp (24hrs), Av.9 °F (36.6 °C), Min:97.5 °F (36.4 °C), Max:98.4 °F (36.9 °C)       Intake/Output:   Last shift:      10/17 0701 - 10/17 190  In: 190   Out: 300 [Urine:300]  Last 3 shifts: 10/15 1901 - 10/17 0700  In: 0323 [I.V.:500]  Out: 2200 [Urine:2100]    Intake/Output Summary (Last 24 hours) at 10/17/17 1231  Last data filed at 10/17/17 0900   Gross per 24 hour   Intake             1680 ml   Output             1550 ml   Net              130 ml     Hemodynamics:       :      Ventilator Settings:        Ventilator Volumes  Vt Spont (ml): 393 ml  Ve Observed (l/min): 7.5 l/min       Physical Exam:    General: in no apparent distress and in no respiratory distress and acyanotic   HEENT: Normal   Neck: No abnormally enlarged lymph nodes.    Chest: increased AP diameter   Lungs: decreased air exchange bilaterally, distant lung sounds and prolonged expiration  no dullness/ tenderness/ rash   Heart: Regular rate and rhythm or S1S2 present   Abdomen: non distended, bowel sounds normoactive, tympanic, abdomen is soft without significant tenderness, masses, organomegaly or guarding   Extremity: Trace edema   Neuro: alert   Skin: Skin color, texture, turgor normal. No rashes or lesions      DATA:   Current Facility-Administered Medications   Medication Dose Route Frequency    [START ON 10/19/2017] VANCOMYCIN INFORMATION NOTE   Other ONCE    HYDROcodone-acetaminophen (NORCO) 7.5-325 mg per tablet 1 Tab  1 Tab Oral Q3H PRN    guaiFENesin (ROBITUSSIN) 100 mg/5 mL oral liquid 100 mg  100 mg Oral Q4H PRN    amLODIPine (NORVASC) tablet 10 mg  10 mg Oral DAILY    promethazine (PHENERGAN) 25 mg in 0.9% sodium chloride 50 mL IVPB  25 mg IntraVENous Q6H PRN    vancomycin (VANCOCIN) 750 mg in 0.9% sodium chloride (MBP/ADV) 250 mL ADV  750 mg IntraVENous Q24H    gabapentin (NEURONTIN) capsule 400 mg  400 mg Oral TID    albuterol-ipratropium (DUO-NEB) 2.5 MG-0.5 MG/3 ML  3 mL Nebulization Q6H RT    budesonide (PULMICORT) 500 mcg/2 ml nebulizer suspension  500 mcg Nebulization BID RT    0.9% sodium chloride infusion 250 mL  250 mL IntraVENous PRN    labetalol (NORMODYNE) tablet 100 mg  100 mg Oral Q12H    labetalol (NORMODYNE;TRANDATE) injection 20 mg  20 mg IntraVENous Q4H PRN    albuterol (PROVENTIL VENTOLIN) nebulizer solution 2.5 mg  2.5 mg Nebulization Q4H PRN    heparin (porcine) pf 300-500 Units  300-500 Units InterCATHeter PRN    sodium chloride (NS) flush 10-30 mL  10-30 mL InterCATHeter PRN    sodium chloride (NS) flush 10 mL  10 mL InterCATHeter Q24H    sodium chloride (NS) flush 10 mL  10 mL InterCATHeter PRN    sodium chloride (NS) flush 10-40 mL  10-40 mL InterCATHeter Q8H    sodium chloride (NS) flush 20 mL  20 mL InterCATHeter Q24H    famotidine (PEPCID) tablet 20 mg  20 mg Oral BID    sodium chloride (NS) flush 5-10 mL  5-10 mL IntraVENous PRN    naloxone (NARCAN) injection 0.4 mg  0.4 mg IntraVENous PRN    heparin (porcine) injection 5,000 Units  5,000 Units SubCUTAneous Q8H    insulin lispro (HUMALOG) injection   SubCUTAneous Q6H    glucose chewable tablet 16 g  4 Tab Oral PRN    glucagon (GLUCAGEN) injection 1 mg  1 mg IntraMUSCular PRN    dextrose (D50W) injection syrg 12.5-25 g  25-50 mL IntraVENous PRN       Telemetry: [x]Sinus []A-flutter []Paced    []A-fib []Multiple PVCs                  Labs:  Recent Labs      10/17/17   0335  10/16/17   0340  10/15/17   0340   WBC  8.7  8.9  13.3*   HGB  7.7*  8.2*  7.8*   HCT  24.2*  25.5*  24.0*   PLT  236  233  220     Recent Labs      10/17/17   0335  10/16/17   0340  10/15/17   0340   NA  141  142  142   K  4.2  3.8  3.5   CL  105  105  105   CO2  32  33*  31   GLU  125*  111*  144*   BUN  20*  16  15   CREA  1.09  1.00  1.28   CA  7.9*  8.1*  8.2*   ALB  1.7*   --    --    SGOT  16   --    --    ALT  11*   --    --      No results for input(s): PH, PCO2, PO2, HCO3, FIO2 in the last 72 hours.     Imaging:  []I have personally reviewed the patients radiographs  []Radiographs reviewed with radiologist   [] No CXR study available for review today   []No change from prior, tubes and lines in adequate position  []Improved   []Worsening       IMPRESSION:   Patient Active Problem List   Diagnosis Code    Acute respiratory failure with hypercapnia ([facility] [address] J96.02    Sepsis ([facility] A41.9    HCAP (healthcare-associated pneumonia) J18.9    Hypertension I10    Diabetes mellitus ([address] E11.9    SOB (shortness of breath) R06.02    Dysphagia R13.10    History of throat cancer Z85.819    Recurrent pneumonia J18.9    Respiratory failure with hypoxia (HCC) J96.91    KEATON (acute kidney injury) ([facility] [address] N17.9     · Acute hypoxemic respiratory failure requiring BIPAP due to recurrent MRSA PNA, probable aspiration,+/- heart failure  · Recent MRSA and pseudomonas HCAP  · KEATON, likely prerenal, improving  · HTN, poorly controlled  · Chronic diastolic CHF  · Chronic anemia  · Chronic pain  · Hx throat cancer with upper airway obstruction  · Dysphagia, s/p PEG placement due to above  · Hx tobacco and etoh abuse, remote    ·   ·    PLAN:   · Continue present treatment  · Follow up CXR   · Will follow uo intermittently     The patient is: [x] acutely ill Risk of deterioration: [x] moderate    [] critically ill  [] high     []See my orders for details    My assessment, plan of care, findings, medications, side effects etc were discussed with:  [x]nursing []PT/OT    [x]respiratory therapy []Dr. Echavarria Postal []     [x]Total critical care time exclusive of procedures   25 minutes  Shelton Singleton MD

## 2017-10-17 NOTE — PROGRESS NOTES
0600  Pt reports pain well controlled throughout night. Pt denies nausea. PEG with residuals of 20-50 mL overnight. Patient Vitals for the past 12 hrs:   Temp Pulse Resp BP SpO2   10/17/17 0600 - 92 14 (!) 144/91 96 %   10/17/17 0408 - 80 14 - 97 %   10/17/17 0400 97.6 °F (36.4 °C) 88 14 130/86 95 %   10/17/17 0300 - 80 12 125/83 100 %   10/17/17 0200 - 86 12 145/75 97 %   10/17/17 0100 - 80 23 128/78 100 %   10/17/17 0004 - - - - 91 %   10/17/17 0003 - 82 18 - 91 %   10/16/17 2358 98.4 °F (36.9 °C) 88 15 126/78 92 %   10/16/17 2200 - 81 15 121/75 97 %   10/16/17 2100 - 94 14 140/84 98 %   10/16/17 2052 - - - - 96 %   10/16/17 2000 97.5 °F (36.4 °C) 88 12 158/84 96 %   10/16/17 1900 - 96 16 152/89 99 %     Bedside and Verbal shift change report given to [address] by Bertha Oneal RN. Report included the following information SBAR, Kardex, Intake/Output and MAR.

## 2017-10-17 NOTE — PROGRESS NOTES
Per [facility] general surgery consult warranted for possible trach placement due to pt's hx of throat ca and recurrent multi drug resistant pneumonias thought to be caused from aspiration of sputum. Pt transferred from ICU to . Spoke with Dr. Coco Brito. He stated he is not pursuing the plan for trach at this time. Discharge plan remains home with home health.

## 2017-10-17 NOTE — ROUTINE PROCESS
1240 Verbal shift change report given to geovany cruz RN (oncoming nurse) by Bradford Ocasio (ICU) (offgoing nurse). Report included the following information SBAR and Kardex. No orders in place for osmolite, christa will enter them in, NSR in ICU.    1300 patient arrived from ICU, alert and oriented, tele box #11 applied and verified, continuous pulse ox applied, pain 7/10, oriented patient to room, and call bell, assessment completed, vitals completed    1315 medicated for pain     1620 medicated for pain, PT working with patient, O2 sats dropped as low as 82% but patient recovered quickly with rest and came back up to 91%    1800 medicated for pain    1930 Bedside, Verbal and Written shift change report given to Perico Enriquez RN (oncoming nurse) by Yeimi Lee RN (offgoing nurse). Report included the following information SBAR and Kardex.  Patient resting in bed, drowsy but aroused easily to verbal stimulation, pain 6/10 after getting pain meds, call bell in reach, bed locked in low position

## 2017-10-17 NOTE — PROGRESS NOTES
INFECTIOUS DISEASE FOLLOW UP NOTE :-     Admit Date: 10/11/2017      Current abx Prior abx   Vancomycin 10/11 - 6 Levofloxacin 9/24 - 4  Zosyn 9/24 - 6 Tobramycin 9/30 - 2 Vancomycin 9/24-15  Ciprofloxacin 10/2-9 Doxycycline 10/9 - 2  Zosyn 10/11 -2, Cipro 10/14-2       ASSESSMENT - > REC:      Recurrent Pneumonia  - MRSA, Pseudomonas in spcx 9/24  - same organisms in spcx last confinement [facility]) 8/11-18  - treated with Vancomycin/zosyn x 14 days (ending ~8/25)  - bilateral multifocal opacities - sl improved on CT 10/1  - CXR 10/9 similar to 10/3 (improved infiltrates c/w 9/29)  - CXR 10/11 similar to 10/9  - CXR 10/13 worsening multifocal infiltrate (BUL) but improving on CXR 10/16 (?aspiration) ? recurrent MRSA  -> Sputum cx with MRSA only  -> Ig levels high  -> CXR showing improvement  -> continue Vancomycin and plan to complete 14 days   Respiratory Failure  - recent admission with intubated 9/24, extubated 9/25  - readmitted 10/11 w/ shortness of breath and cough.  Now on HFNC -> per PCCM   H/o throat CA      ETOH abuse     DM     HTN     CAD     GERD     PEG     DELFINA        MICROBIOLOGY:   (9/24     blcx NG x 2                             urcx IP              spcx Ps aeruginosa S AG, cipro R pip-tazo, other beta lactams, MRSA R levaquin vanco rios 1 few CF)  9/26     C diff neg )      10/11             blcx      NGTD x 2                        spcx gs many gpc prs/groups, Cx MRSA vanco rios 1                              LINES AND CATHETERS:   Tunneled [facility] PICC    MEDICATIONS:     Current Facility-Administered Medications   Medication Dose Route Frequency    HYDROcodone-acetaminophen (NORCO) 7.5-325 mg per tablet 1 Tab  1 Tab Oral Q3H PRN    guaiFENesin (ROBITUSSIN) 100 mg/5 mL oral liquid 100 mg  100 mg Oral Q4H PRN    amLODIPine (NORVASC) tablet 10 mg  10 mg Oral DAILY    promethazine (PHENERGAN) 25 mg in 0.9% sodium chloride 50 mL IVPB  25 mg IntraVENous Q6H PRN    vancomycin (VANCOCIN) 750 mg in 0.9% sodium chloride (MBP/ADV) 250 mL ADV  750 mg IntraVENous Q24H    gabapentin (NEURONTIN) capsule 400 mg  400 mg Oral TID    albuterol-ipratropium (DUO-NEB) 2.5 MG-0.5 MG/3 ML  3 mL Nebulization Q6H RT    budesonide (PULMICORT) 500 mcg/2 ml nebulizer suspension  500 mcg Nebulization BID RT    0.9% sodium chloride infusion 250 mL  250 mL IntraVENous PRN    labetalol (NORMODYNE) tablet 100 mg  100 mg Oral Q12H    labetalol (NORMODYNE;TRANDATE) injection 20 mg  20 mg IntraVENous Q4H PRN    albuterol (PROVENTIL VENTOLIN) nebulizer solution 2.5 mg  2.5 mg Nebulization Q4H PRN    heparin (porcine) pf 300-500 Units  300-500 Units InterCATHeter PRN    sodium chloride (NS) flush 10-30 mL  10-30 mL InterCATHeter PRN    sodium chloride (NS) flush 10 mL  10 mL InterCATHeter Q24H    sodium chloride (NS) flush 10 mL  10 mL InterCATHeter PRN    sodium chloride (NS) flush 10-40 mL  10-40 mL InterCATHeter Q8H    sodium chloride (NS) flush 20 mL  20 mL InterCATHeter Q24H    famotidine (PEPCID) tablet 20 mg  20 mg Oral BID    sodium chloride (NS) flush 5-10 mL  5-10 mL IntraVENous PRN    naloxone (NARCAN) injection 0.4 mg  0.4 mg IntraVENous PRN    heparin (porcine) injection 5,000 Units  5,000 Units SubCUTAneous Q8H    insulin lispro (HUMALOG) injection   SubCUTAneous Q6H    glucose chewable tablet 16 g  4 Tab Oral PRN    glucagon (GLUCAGEN) injection 1 mg  1 mg IntraMUSCular PRN    dextrose (D50W) injection syrg 12.5-25 g  25-50 mL IntraVENous PRN       SUBJECTIVE :     Interval notes reviewed. Afebrile. Remains in ICU on high flow. Minimal cough. Remi TF.  Working with OT.    OBJECTIVE     Visit Vitals    BP (!) 144/91    Pulse 92    Temp 97.6 °F (36.4 °C)    Resp 14    Ht 5' 4\" (1.626 m)    Wt 62.9 kg (138 lb 9.6 oz)    SpO2 94%    BMI 23.79 kg/m2       Temp (24hrs), Av.8 °F (36.6 °C), Min:97.5 °F (36.4 °C), Max:98.4 °F (36.9 °C)      Gen-Fairly built and nourished female on high flow, no distress  HENT- No thrush  Chest- Symmetric expansion, Crackles+  CV-S1S2 RR, No JVD, No edema  Abd-Soft, NT, ND, BS+  Skin-No jaundice, cyanosis, clubbing. No decubitus  Muscsk- Normal muscle tone/strength        Labs: Results:   Chemistry Recent Labs      10/17/17   0335  10/16/17   0340  10/15/17   0340   GLU  125*  111*  144*   NA  141  142  142   K  4.2  3.8  3.5   CL  105  105  105   CO2  32  33*  31   BUN  20*  16  15   CREA  1.09  1.00  1.28   CA  7.9*  8.1*  8.2*   AGAP  4  4  6   BUCR  18  16  12   AP  109   --    --    TP  6.8   --    --    ALB  1.7*   --    --    GLOB  5.1*   --    --    AGRAT  0.3*   --    --       CBC w/Diff Recent Labs      10/17/17   0335  10/16/17   0340  10/15/17   0340   WBC  8.7  8.9  13.3*   RBC  2.61*  2.78*  2.63*   HGB  7.7*  8.2*  7.8*   HCT  24.2*  25.5*  24.0*   PLT  236  233  220   GRANS  61  62  76*   LYMPH  20*  20*  12*   EOS  10*  8*  4          RADIOLOGY :          Imaging    Results from Hospital Encounter encounter on 10/11/17   XR CHEST PORT   Narrative EXAM: One-view chest    CLINICAL HISTORY: Pneumonia ,    COMPARISON: 10/15/2017    FINDINGS:    Frontal view of the chest demonstrate improved aeration of both lungs with  residual reticulonodular opacities bilaterally. Right approach central venous  catheter tip in the mid SVC. . Cardiac silhouette is normal in size and contour. No acute bony or soft tissue abnormality. Impression IMPRESSION: Improving aeration of the lungs overall. Residual interstitial and  scattered airspace opacities again demonstrated bilaterally. Results from [place] encounter on 09/24/17   CT CHEST WO CONT   Narrative COMPARISON: September 24, 2017. INDICATION: MRSA, pneumonia, prolonged oxygen requirements    TECHNIQUE: Axial was performed through the chest without contrast.  Coronal and  sagittal reformations were generated.  One or more dose reduction techniques were  used on this CT: automated exposure control, adjustment of the mAs and/or kVp  according to patient's size, and iterative reconstruction techniques. The  specific techniques utilized on this CT exam have been documented in the  patient's electronic medical record.     ============    FINDINGS:    LUNGS: There are widespread patchy consolidative and groundglass airspace  opacities throughout the lungs bilaterally, overall slight interval improvement  but a similar distribution. Additionally, there are areas of superimposed septal  thickening and faint reticulonodular infiltrates which are somewhat more  pronounced in the lower lung zones. PLEURA: Trace pleural effusions. No pneumothorax. AIRWAY: Unremarkable. MEDIASTINUM: Low-attenuation of the cardiac blood pool relative to the  myocardium compatible with underlying anemia. Right IJ central venous catheter  is noted. Normal heart size. Trace pericardial effusion. Great vessels are  unremarkable. Lymph nodes: There are scattered prominent mediastinal lymph nodes including a  upper right paratracheal node measuring 10 mm. Cluster of prevascular nodes  measure up to 10 mm as well. Assessment for adenopathy is limited due to the  lack of contrast. Prominent axillary lymph nodes are also noted. UPPER ABDOMEN: Unremarkable. OTHER: No aggressive osseous lesions.    ============         Impression IMPRESSION:    1. Slight interval improvement in the patchy bilateral airspace opacities with  overall similar distribution as above suggesting multifocal pneumonia and likely  superimposed interstitial edema. 2.  Small new bilateral pleural effusions and a trace pericardial effusion. 3.  Probable reactive mediastinal adenopathy. Preliminary report provided by on-call radiology resident.       I have independently examined the patient and reviewed all lab studies and imgaing as well as review of nursing notes and physican notes from the past 24 hours. The plan of care has been discussed with the patient/relative and all questions are answered.      Mayuri Holly MD  October 17, 2017    [facility] AT THE [facility]  [phone]

## 2017-10-17 NOTE — PROGRESS NOTES
1945 assumed care of pt.  2000 assessment done  2005 call light within reach/needs attended. 2100 pt.c/o generalized pain medicated with Norco p.o.as ordered.   2230 pt.sleeping

## 2017-10-17 NOTE — PROGRESS NOTES
Problem: Pneumonia: Discharge Outcomes  Goal: *Respiratory status at baseline  Outcome: Not Progressing Towards Goal  Pt on hi flow NC    Problem: Falls - Risk of  Goal: *Absence of Falls  Document Kapil Fall Risk and appropriate interventions in the flowsheet.    Outcome: Progressing Towards Goal  Fall Risk Interventions:  Mobility Interventions: PT Consult for mobility concerns, PT Consult for assist device competence, Patient to call before getting OOB, Communicate number of staff needed for ambulation/transfer           Medication Interventions: Patient to call before getting OOB     Elimination Interventions: Bed/chair exit alarm, Call light in reach, Patient to call for help with toileting needs, Toileting schedule/hourly rounds, Toilet paper/wipes in reach

## 2017-10-17 NOTE — PROGRESS NOTES
Pharmacy Dosing Services: Vancomycin    Indication: HAP    Day of therapy: ? continue from home infusion    Other Antimicrobials (Include dose, start day & day of therapy):  None    Loading dose (date given): 1g booster dose  after homecare dose  Current Maintenance dose: 750 mg iv q24h    Goal Vancomycin Level: 15-20  (Trough 15-20 for most infections, 20 for meningitis/osteomyelitis, pre-HD level ~25)    Vancomycin Level (if drawn): 19.0    Significant Cultures:   Resp: moderate MRSA (Vanco VIKTORIA=1)  Others: negative/pending    Renal function stable? (unstable defined as SCr increase of 0.5 mg/dL or > 50% increase from baseline, whichever is greater) (Y/N): Y     CAPD, Hemodialysis or Renal Replacement Therapy (Y/N): N     Recent Labs      10/17/17   0335  10/16/17   0340  10/15/17   034   CREA  1.09  1.00  1.28   BUN  20*  16  15   WBC  8.7  8.9  13.3*     Temp (24hrs), Av.9 °F (36.6 °C), Min:97.5 °F (36.4 °C), Max:98.4 °F (36.9 °C)    Creatinine Clearance (Creatinine Clearance (ml/min)): 53.9     Regimen assessment: Met goal (tr 19, extrapolated AUC/VIKTORIA 567>400)  Maintenance dose: continue same dose  Next scheduled level:  2330       Pharmacy will follow daily and adjust medications as appropriate for renal function and/or serum levels.     Thank you,  Jennifer Medley

## 2017-10-17 NOTE — MED STUDENT NOTES
*ATTENTION:  This note has been created by a medical student for educational purposes only. Please do not refer to the content of this note for clinical decision-making, billing, or other purposes. Please see attending physicians note to obtain clinical information on this patient. *          Progress Note    Patient: Jennifer Serrano MRN: [id number]  SSN: [id number]    YOB: 1965  Age: 54 y.o. Sex: female      Admit Date: 10/11/2017    LOS: 6 days     Subjective:     Natty Masters a 54 y.o. female with Hx of throat CA, dysphagia, CAD, DM, HTN and s/p PEG placement presenting with SOB. Pt was recently d/c from [facility]  with recurrent PNA. Cultures positive for pseudomonas and MRSA. Pt returned to the ER with respiratory distress. Per EMS, pt O2 sat was 44% and was placed on a NRB with improvement to 98% O2 sat. Pt was placed on BiPAP in the ED. Pt started on IV Vancomycin and Zosyn. Pt with elevated d-dimer- V/Q scan ordered. Pt was admitted to the ICU. ID consulted. D/c zosyn and continued on Cipro and Vancomycin. CXR on 10/15/17 appeared worse from prior. Pt d/c on Cipro and is now only on Vanco.     Today, pt states she is feeling well. Denies any complaints. Pt has gone down to 40L of HFNC. CXR this morning showed slight interval worsening. Objective:     Vitals:    10/17/17 0800 10/17/17 0900 10/17/17 1000 10/17/17 1100   BP: (!) 143/96 146/87 136/86 129/81   Pulse: 94 (!) 108 98 84   Resp: 13 18 16 13   Temp: 98.3 °F (36.8 °C)      SpO2: 99% 91% 91% 96%   Weight:       Height:            Intake and Output:  Current Shift: 10/17 0701 - 10/17 1900  In: 190   Out: 300 [Urine:300]  Last three shifts: 10/15 1901 - 10/17 0700  In: 2820 [I.V.:500]  Out: 2200 [Urine:2100]    Physical Exam   HENT:   Head: Normocephalic and atraumatic. Eyes: Conjunctivae are normal. Pupils are equal, round, and reactive to light. Cardiovascular: Normal rate and regular rhythm.     Pulmonary/Chest: Effort normal.   On 40L HFNC  +Coarse breath sounds bilaterally    Abdominal: Soft. Musculoskeletal: She exhibits no edema. Neurological: She is alert. Moves all extremities   Skin: Skin is warm and dry. Lab/Data Review:  CMP:   Lab Results   Component Value Date/Time     10/17/2017 03:35 AM    K 4.2 10/17/2017 03:35 AM     10/17/2017 03:35 AM    CO2 32 10/17/2017 03:35 AM    AGAP 4 10/17/2017 03:35 AM     (H) 10/17/2017 03:35 AM    BUN 20 (H) 10/17/2017 03:35 AM    CREA 1.09 10/17/2017 03:35 AM    GFRAA >60 10/17/2017 03:35 AM    GFRNA 53 (L) 10/17/2017 03:35 AM    CA 7.9 (L) 10/17/2017 03:35 AM    ALB 1.7 (L) 10/17/2017 03:35 AM    TP 6.8 10/17/2017 03:35 AM    GLOB 5.1 (H) 10/17/2017 03:35 AM    AGRAT 0.3 (L) 10/17/2017 03:35 AM    SGOT 16 10/17/2017 03:35 AM    ALT 11 (L) 10/17/2017 03:35 AM     CBC:   Lab Results   Component Value Date/Time    WBC 8.7 10/17/2017 03:35 AM    HGB 7.7 (L) 10/17/2017 03:35 AM    HCT 24.2 (L) 10/17/2017 03:35 AM     10/17/2017 03:35 AM     CXR 10/17/11  IMPRESSION: Apparent slight interval worsening. Some of the interval difference may be related to positioning.     Assessment:     Principal Problem:    Recurrent pneumonia (10/11/2017)    Active Problems:    Respiratory failure with hypoxia ([address] (10/11/2017)      KEATON (acute kidney injury) ([facility] [address] (10/11/2017)        Plan:     Continue Vancomycin  Continue ID consult   Consider possibility of trach d/t Hx of multiple serious lung infections and exposure to multiple abx   Continue HFNC    Signed By: Roderick Chambers     October 17, 2017

## 2017-10-18 LAB
ALBUMIN SERPL-MCNC: 1.7 G/DL (ref 3.4–5)
ALBUMIN/GLOB SERPL: 0.3 {RATIO} (ref 0.8–1.7)
ALP SERPL-CCNC: 102 U/L (ref 45–117)
ALT SERPL-CCNC: 11 U/L (ref 13–56)
ANION GAP SERPL CALC-SCNC: 5 MMOL/L (ref 3–18)
AST SERPL-CCNC: 17 U/L (ref 15–37)
BILIRUB SERPL-MCNC: 0.2 MG/DL (ref 0.2–1)
BUN SERPL-MCNC: 22 MG/DL (ref 7–18)
BUN/CREAT SERPL: 17 (ref 12–20)
CALCIUM SERPL-MCNC: 7.8 MG/DL (ref 8.5–10.1)
CHLORIDE SERPL-SCNC: 103 MMOL/L (ref 100–108)
CO2 SERPL-SCNC: 32 MMOL/L (ref 21–32)
CREAT SERPL-MCNC: 1.27 MG/DL (ref 0.6–1.3)
GLOBULIN SER CALC-MCNC: 5.2 G/DL (ref 2–4)
GLUCOSE BLD STRIP.AUTO-MCNC: 112 MG/DL (ref 70–110)
GLUCOSE BLD STRIP.AUTO-MCNC: 116 MG/DL (ref 70–110)
GLUCOSE BLD STRIP.AUTO-MCNC: 120 MG/DL (ref 70–110)
GLUCOSE BLD STRIP.AUTO-MCNC: 135 MG/DL (ref 70–110)
GLUCOSE BLD STRIP.AUTO-MCNC: 137 MG/DL (ref 70–110)
GLUCOSE SERPL-MCNC: 125 MG/DL (ref 74–99)
POTASSIUM SERPL-SCNC: 4.5 MMOL/L (ref 3.5–5.5)
PROT SERPL-MCNC: 6.9 G/DL (ref 6.4–8.2)
SODIUM SERPL-SCNC: 140 MMOL/L (ref 136–145)

## 2017-10-18 PROCEDURE — 94640 AIRWAY INHALATION TREATMENT: CPT

## 2017-10-18 PROCEDURE — 80053 COMPREHEN METABOLIC PANEL: CPT | Performed by: HOSPITALIST

## 2017-10-18 PROCEDURE — 3331090002 HH PPS REVENUE DEBIT

## 2017-10-18 PROCEDURE — 36592 COLLECT BLOOD FROM PICC: CPT

## 2017-10-18 PROCEDURE — 97110 THERAPEUTIC EXERCISES: CPT

## 2017-10-18 PROCEDURE — 74011250637 HC RX REV CODE- 250/637: Performed by: PHYSICIAN ASSISTANT

## 2017-10-18 PROCEDURE — 65660000000 HC RM CCU STEPDOWN

## 2017-10-18 PROCEDURE — 3331090001 HH PPS REVENUE CREDIT

## 2017-10-18 PROCEDURE — 74011000250 HC RX REV CODE- 250: Performed by: PHYSICIAN ASSISTANT

## 2017-10-18 PROCEDURE — 97116 GAIT TRAINING THERAPY: CPT

## 2017-10-18 PROCEDURE — 82962 GLUCOSE BLOOD TEST: CPT

## 2017-10-18 PROCEDURE — 74011250636 HC RX REV CODE- 250/636: Performed by: FAMILY MEDICINE

## 2017-10-18 PROCEDURE — 77030019604 HC DRSG WND CA ALG S&N -A

## 2017-10-18 PROCEDURE — 77010033711 HC HIGH FLOW OXYGEN

## 2017-10-18 PROCEDURE — 97535 SELF CARE MNGMENT TRAINING: CPT

## 2017-10-18 RX ADMIN — IPRATROPIUM BROMIDE AND ALBUTEROL SULFATE 3 ML: .5; 3 SOLUTION RESPIRATORY (INHALATION) at 20:34

## 2017-10-18 RX ADMIN — Medication 10 ML: at 05:04

## 2017-10-18 RX ADMIN — AMLODIPINE BESYLATE 10 MG: 10 TABLET ORAL at 09:46

## 2017-10-18 RX ADMIN — IPRATROPIUM BROMIDE AND ALBUTEROL SULFATE 3 ML: .5; 3 SOLUTION RESPIRATORY (INHALATION) at 01:56

## 2017-10-18 RX ADMIN — GABAPENTIN 400 MG: 400 CAPSULE ORAL at 09:46

## 2017-10-18 RX ADMIN — Medication 20 ML: at 18:07

## 2017-10-18 RX ADMIN — HYDROCODONE BITARTRATE AND ACETAMINOPHEN 1 TABLET: 7.5; 325 TABLET ORAL at 14:17

## 2017-10-18 RX ADMIN — HEPARIN SODIUM 5000 UNITS: 5000 INJECTION, SOLUTION INTRAVENOUS; SUBCUTANEOUS at 05:03

## 2017-10-18 RX ADMIN — HEPARIN SODIUM 5000 UNITS: 5000 INJECTION, SOLUTION INTRAVENOUS; SUBCUTANEOUS at 14:19

## 2017-10-18 RX ADMIN — LABETALOL HYDROCHLORIDE 100 MG: 100 TABLET, FILM COATED ORAL at 09:47

## 2017-10-18 RX ADMIN — GABAPENTIN 400 MG: 400 CAPSULE ORAL at 18:07

## 2017-10-18 RX ADMIN — BUDESONIDE 500 MCG: 0.5 INHALANT RESPIRATORY (INHALATION) at 07:42

## 2017-10-18 RX ADMIN — Medication 10 ML: at 18:07

## 2017-10-18 RX ADMIN — HYDROCODONE BITARTRATE AND ACETAMINOPHEN 1 TABLET: 7.5; 325 TABLET ORAL at 07:05

## 2017-10-18 RX ADMIN — FAMOTIDINE 20 MG: 20 TABLET ORAL at 18:07

## 2017-10-18 RX ADMIN — HYDROCODONE BITARTRATE AND ACETAMINOPHEN 1 TABLET: 7.5; 325 TABLET ORAL at 09:47

## 2017-10-18 RX ADMIN — HYDROCODONE BITARTRATE AND ACETAMINOPHEN 1 TABLET: 7.5; 325 TABLET ORAL at 03:30

## 2017-10-18 RX ADMIN — GABAPENTIN 400 MG: 400 CAPSULE ORAL at 22:53

## 2017-10-18 RX ADMIN — IPRATROPIUM BROMIDE AND ALBUTEROL SULFATE 3 ML: .5; 3 SOLUTION RESPIRATORY (INHALATION) at 07:42

## 2017-10-18 RX ADMIN — LABETALOL HYDROCHLORIDE 100 MG: 100 TABLET, FILM COATED ORAL at 22:53

## 2017-10-18 RX ADMIN — IPRATROPIUM BROMIDE AND ALBUTEROL SULFATE 3 ML: .5; 3 SOLUTION RESPIRATORY (INHALATION) at 14:08

## 2017-10-18 RX ADMIN — HYDROCODONE BITARTRATE AND ACETAMINOPHEN 1 TABLET: 7.5; 325 TABLET ORAL at 18:07

## 2017-10-18 RX ADMIN — BUDESONIDE 500 MCG: 0.5 INHALANT RESPIRATORY (INHALATION) at 20:34

## 2017-10-18 RX ADMIN — FAMOTIDINE 20 MG: 20 TABLET ORAL at 09:47

## 2017-10-18 RX ADMIN — HEPARIN SODIUM 5000 UNITS: 5000 INJECTION, SOLUTION INTRAVENOUS; SUBCUTANEOUS at 22:53

## 2017-10-18 NOTE — PROGRESS NOTES
Problem: Self Care Deficits Care Plan (Adult)  Goal: *Acute Goals and Plan of Care (Insert Text)  Occupational Therapy Goals  Initiated 10/16/2017 within 7 day(s). 1.  Patient will perform grooming tasks while standing with supervision and minimal verbal cues for breathing techniques. 2.  Patient will perform lower body dressing with modified independence and minimal verbal cues for rest breaks. 3.  Patient will perform functional task in standing for 8 minutes with supervision for balance and good safety awareness. 4.  Patient will perform toilet transfers with supervision. 5.  Patient will perform all aspects of toileting with supervision/set-up. 6.  Patient will participate in upper extremity therapeutic exercise/activities with supervision/set-up for 8 minutes to increase BUE strength for ADLs. 7.  Patient will utilize energy conservation techniques during functional activities with minimal verbal cues. Outcome: Progressing Towards Goal  Occupational Therapy TREATMENT    Patient: Iona Barnett (52 y.o. female)  Date: 10/18/2017  Diagnosis: Respiratory failure with hypoxia ([address]  Recurrent pneumonia  Recurrent pneumonia  Respiratory failure with hypoxia (HCC) Recurrent pneumonia       Precautions: Fall, Aspiration (oxygen)  Chart, occupational therapy assessment, plan of care, and goals were reviewed. ASSESSMENT:  Pt now out of ICU, on telemetry unit. Pt appears brighter today, motivated for OOB. Pt requires vc's for safety w/RW and functional mobility to bathroom 2/2 line mgt and performs toileting ADL w/o assist. Pt demonstrates energy conservation techniques w/hand hygiene seated on toilet. [facility] w/o c/o SOB, however requires vc's for pacing. Provided resistive \"glove\" ball for UE strengthening.    EDUCATION Pt educated on importance OOB and encouraged 3x/day and UE TherEx  Progression toward goals:  [x]          Improving appropriately and progressing toward goals  []          Improving slowly and progressing toward goals  []          Not making progress toward goals and plan of care will be adjusted     PLAN:  Patient continues to benefit from skilled intervention to address the above impairments. Continue treatment per established plan of care. Discharge Recommendations:  Home Health  Further Equipment Recommendations for Discharge:  shower chair     SUBJECTIVE:   Patient stated I feel better.     OBJECTIVE DATA SUMMARY:       Cognitive/Behavioral Status:  Neurologic State: Alert  Orientation Level: Oriented X4  Cognition: Follows commands  Safety/Judgement: Awareness of environment, Good awareness of safety precautions, Fall prevention  Functional Mobility and Transfers for ADLs:   Bed Mobility:  Supine to Sit: Modified independent   Transfers:  Sit to Stand: Modified independent  Bed to Chair: Supervision (w/RW)   Toilet Transfer : Supervision (w/RW)   Bathroom Mobility: Supervision/set up (w/RW)  Balance:  Sitting: Intact  Standing: With support  Standing - Static: Good  Standing - Dynamic : Fair (fair/fair plus)  ADL Intervention:  Grooming  Washing Hands: Supervision/set-up    Therapeutic Exercises:   AROM BUE elbow flexion/extension  BUE hand squeezes w/resistive \"glove\" ball    Pain:  Pre Treatment:0  Post Treatment:0    Activity Tolerance:    Good    Please refer to the flowsheet for vital signs taken during this treatment.   After treatment:   [x]  Patient left in no apparent distress sitting up in chair  []  Patient left in no apparent distress in bed  [x]  Call bell left within reach  [x]  Nursing notified  []  Caregiver present  []  Bed alarm activated    KOREY Medrano  Time Calculation: 23 mins

## 2017-10-18 NOTE — ROUTINE PROCESS
0740 - Bedside, Verbal and Written shift change report given to [facility], RN/Ramona Ward RN (oncoming nurse) by Rina Siegel RN (offgoing nurse). Report included the following information SBAR, Kardex, Intake/Output, MAR, Recent Results, Med Rec Status, Procedure Summary and Cardiac Rhythm NSR.     1935 - Bedside, Verbal and Written shift change report given to Kevon Maddox RN (oncoming nurse) by [facility], RN/Ramona Potts RN (offgoing nurse). Report included the following information SBAR, Kardex, Intake/Output, MAR, Recent Results, Med Rec Status, Procedure Summary and Cardiac Rhythm NSR.

## 2017-10-18 NOTE — PROGRESS NOTES
Bedside and Verbal shift change report given to [address] (oncoming nurse) by Lauren Clarke (offgoing nurse). Report included the following information SBAR, Kardex and MAR. Pt is currently on high flow cannula at 40L/min, ST @ 105 BPM, and is currently taking Amlodipine for BP even though is listed in her Allergies. Pt states she used to have a reaction to the medication of swelling in her legs but no longer has this reaction since starting the medication again.

## 2017-10-18 NOTE — PROGRESS NOTES
INFECTIOUS DISEASE FOLLOW UP NOTE :-     Admit Date: 10/11/2017      Current abx Prior abx   Vancomycin 10/11 - 7 Levofloxacin 9/24 - 4  Zosyn 9/24 - 6 Tobramycin 9/30 - 2 Vancomycin 9/24-15  Ciprofloxacin 10/2-9 Doxycycline 10/9 - 2  Zosyn 10/11 -2, Cipro 10/14-2       ASSESSMENT - > REC:      Recurrent Pneumonia  - MRSA, Pseudomonas in spcx 9/24  - same organisms in spcx last confinement . [facility]) 8/11-18  - treated with Vancomycin/zosyn x 14 days (ending ~8/25)  - bilateral multifocal opacities - sl improved on CT 10/1  - CXR 10/9 similar to 10/3 (improved infiltrates c/w 9/29)  - CXR 10/11 similar to 10/9  - CXR 10/13 worsening multifocal infiltrate (BUL) but improving on CXR 10/16 (?aspiration) ? recurrent MRSA   - IgG level high  -> Sputum cx with MRSA only  -> CXR showing improvement  -> continue Vancomycin and plan to complete 14 days [ 7 more days ] , will write OPAT. Other alternative could be zyvox for 7 more days. Respiratory Failure  - recent admission with intubated 9/24, extubated 9/25  - readmitted 10/11 w/ shortness of breath and cough.  Now on HFNC -> per PCCM   H/o throat CA      ETOH abuse     DM     HTN     CAD     GERD     PEG     DELFINA        MICROBIOLOGY:   (9/24     blcx NG x 2                             urcx IP              spcx Ps aeruginosa S AG, cipro R pip-tazo, other beta lactams, MRSA R levaquin vanco rios 1 few CF)  9/26     C diff neg )      10/11             blcx      NGTD x 2                        spcx gs many gpc prs/groups, Cx MRSA vanco rios 1                              LINES AND CATHETERS:   Tunneled RIJ PICC    MEDICATIONS:     Current Facility-Administered Medications   Medication Dose Route Frequency    [START ON 10/19/2017] VANCOMYCIN INFORMATION NOTE   Other ONCE    HYDROcodone-acetaminophen (NORCO) 7.5-325 mg per tablet 1 Tab  1 Tab Oral Q3H PRN    guaiFENesin (ROBITUSSIN) 100 mg/5 mL oral liquid 100 mg  100 mg Oral Q4H PRN    amLODIPine (NORVASC) tablet 10 mg  10 mg Oral DAILY    promethazine (PHENERGAN) 25 mg in 0.9% sodium chloride 50 mL IVPB  25 mg IntraVENous Q6H PRN    vancomycin (VANCOCIN) 750 mg in 0.9% sodium chloride (MBP/ADV) 250 mL ADV  750 mg IntraVENous Q24H    gabapentin (NEURONTIN) capsule 400 mg  400 mg Oral TID    albuterol-ipratropium (DUO-NEB) 2.5 MG-0.5 MG/3 ML  3 mL Nebulization Q6H RT    budesonide (PULMICORT) 500 mcg/2 ml nebulizer suspension  500 mcg Nebulization BID RT    0.9% sodium chloride infusion 250 mL  250 mL IntraVENous PRN    labetalol (NORMODYNE) tablet 100 mg  100 mg Oral Q12H    labetalol (NORMODYNE;TRANDATE) injection 20 mg  20 mg IntraVENous Q4H PRN    albuterol (PROVENTIL VENTOLIN) nebulizer solution 2.5 mg  2.5 mg Nebulization Q4H PRN    heparin (porcine) pf 300-500 Units  300-500 Units InterCATHeter PRN    sodium chloride (NS) flush 10-30 mL  10-30 mL InterCATHeter PRN    sodium chloride (NS) flush 10 mL  10 mL InterCATHeter Q24H    sodium chloride (NS) flush 10 mL  10 mL InterCATHeter PRN    sodium chloride (NS) flush 10-40 mL  10-40 mL InterCATHeter Q8H    sodium chloride (NS) flush 20 mL  20 mL InterCATHeter Q24H    famotidine (PEPCID) tablet 20 mg  20 mg Oral BID    sodium chloride (NS) flush 5-10 mL  5-10 mL IntraVENous PRN    naloxone (NARCAN) injection 0.4 mg  0.4 mg IntraVENous PRN    heparin (porcine) injection 5,000 Units  5,000 Units SubCUTAneous Q8H    insulin lispro (HUMALOG) injection   SubCUTAneous Q6H    glucose chewable tablet 16 g  4 Tab Oral PRN    glucagon (GLUCAGEN) injection 1 mg  1 mg IntraMUSCular PRN    dextrose (D50W) injection syrg 12.5-25 g  25-50 mL IntraVENous PRN       SUBJECTIVE :     Interval notes reviewed. Afebrile. Out of ICU, on HFNC currently. Feeling overall slowly better.     OBJECTIVE     Visit Vitals    BP (!) 140/92    Pulse 67    Temp 98.5 °F (36.9 °C)    Resp 20    Ht 5' 4\" (1.626 m)    Wt 62.9 kg (138 lb 9.6 oz)    SpO2 100%    BMI 23.79 kg/m2       Temp (24hrs), Av.9 °F (36.6 °C), Min:97.5 °F (36.4 °C), Max:98.5 °F (36.9 °C)      Gen-Fairly built and nourished female on high flow, no distress  HENT- No thrush  Chest- Symmetric expansion, Crackles+  CV-S1S2 RR, No JVD, No edema  Abd-Soft, NT, ND, BS+  Skin-No jaundice, cyanosis, clubbing. No decubitus  Muscsk- Normal muscle tone/strength        Labs: Results:   Chemistry Recent Labs      10/18/17   0506  10/17/17   0335  10/16/17   0340   GLU  125*  125*  111*   NA  140  141  142   K  4.5  4.2  3.8   CL  103  105  105   CO2  32  32  33*   BUN  22*  20*  16   CREA  1.27  1.09  1.00   CA  7.8*  7.9*  8.1*   AGAP  5  4  4   BUCR    18  16   AP  102  109   --    TP  6.9  6.8   --    ALB  1.7*  1.7*   --    GLOB  5.2*  5.1*   --    AGRAT  0.3*  0.3*   --       CBC w/Diff Recent Labs      10/17/17   0335  10/16/17   0340   WBC  8.7  8.9   RBC  2.61*  2.78*   HGB  7.7*  8.2*   HCT  24.2*  25.5*   PLT  236  233   GRANS  61  62   LYMPH  20*  20*   EOS  10*  8*          RADIOLOGY :          Imaging    Results from Hospital Encounter encounter on 10/11/17   XR CHEST PORT   Narrative INDICATION:  PNA    COMPARISON:  10/16/2017    FINDINGS: A portable AP radiograph of the chest was obtained. The patient is on  a cardiac monitor. Tunneled right neck central line in stable position. Slight  progression of patchy bilateral diffuse parenchymal infiltrates with upper lobe  dominance. No pneumothorax. Indistinct pulmonary vasculature. Mediastinal  contour stable. No sizable pleural effusion. Impression IMPRESSION: Apparent slight interval worsening. Some of the interval difference  may be related to positioning. Results from [place] encounter on 17   CT CHEST WO CONT   Narrative COMPARISON: 2017.     INDICATION: MRSA, pneumonia, prolonged oxygen requirements    TECHNIQUE: Axial was performed through the chest without contrast. Coronal and  sagittal reformations were generated. One or more dose reduction techniques were  used on this CT: automated exposure control, adjustment of the mAs and/or kVp  according to patient's size, and iterative reconstruction techniques. The  specific techniques utilized on this CT exam have been documented in the  patient's electronic medical record.     ============    FINDINGS:    LUNGS: There are widespread patchy consolidative and groundglass airspace  opacities throughout the lungs bilaterally, overall slight interval improvement  but a similar distribution. Additionally, there are areas of superimposed septal  thickening and faint reticulonodular infiltrates which are somewhat more  pronounced in the lower lung zones. PLEURA: Trace pleural effusions. No pneumothorax. AIRWAY: Unremarkable. MEDIASTINUM: Low-attenuation of the cardiac blood pool relative to the  myocardium compatible with underlying anemia. Right IJ central venous catheter  is noted. Normal heart size. Trace pericardial effusion. Great vessels are  unremarkable. Lymph nodes: There are scattered prominent mediastinal lymph nodes including a  upper right paratracheal node measuring 10 mm. Cluster of prevascular nodes  measure up to 10 mm as well. Assessment for adenopathy is limited due to the  lack of contrast. Prominent axillary lymph nodes are also noted. UPPER ABDOMEN: Unremarkable. OTHER: No aggressive osseous lesions.    ============         Impression IMPRESSION:    1. Slight interval improvement in the patchy bilateral airspace opacities with  overall similar distribution as above suggesting multifocal pneumonia and likely  superimposed interstitial edema. 2.  Small new bilateral pleural effusions and a trace pericardial effusion. 3.  Probable reactive mediastinal adenopathy. Preliminary report provided by on-call radiology resident.       I have independently examined the patient and reviewed all lab studies and imgaing as well as review of nursing notes and physican notes from the past 24 hours. The plan of care has been discussed with the patient/relative and all questions are answered.      Arturo Orantes MD  October 18, 2017    [facility] AT THE [facility]  [phone]

## 2017-10-18 NOTE — PROGRESS NOTES
Problem: Mobility Impaired (Adult and Pediatric)  Goal: *Acute Goals and Plan of Care (Insert Text)  Physical Therapy Goals  Initiated 10/14/2017 and to be accomplished within 7 day(s)  1. Patient will move from supine to sit and sit to supine , scoot up and down and roll side to side in bed with modified independence. 2.  Patient will transfer from bed to chair and chair to bed with modified independence using the least restrictive device. 3.  Patient will perform sit to stand with modified independence. 4.  Patient will ambulate with supervision for 25 feet with the least restrictive device. Outcome: Progressing Towards Goal  physical Therapy TREATMENT    Patient: Jane Her (57 y.o. female)  Date: 10/18/2017  Diagnosis: Respiratory failure with hypoxia (HCC)  Recurrent pneumonia  Recurrent pneumonia  Respiratory failure with hypoxia (HCC) Recurrent pneumonia  Precautions: Fall, Aspiration (oxygen)   Chart, physical therapy assessment, plan of care and goals were reviewed. ASSESSMENT:  Pt on high flow O2. No assistance needed for bed mobility. Ambulated 8ft x2 in room without an assistive device, limited by lines and room space. No LOB or difficulty ambulating. Supine, seated, and standing strengthening exercises performed. Performed SLS 2x on each leg with unilateral UE support and CGA. Pt left supine in bed, tube feed resumed. SpO2 82-87% throughout activity with high flow O2. Education: Cont ROM TE independently to maintain strength  Progression toward goals:  []      Improving appropriately and progressing toward goals  [x]      Improving slowly and progressing toward goals  []      Not making progress toward goals and plan of care will be adjusted     PLAN:  Patient continues to benefit from skilled intervention to address the above impairments. Continue treatment per established plan of care.   Discharge Recommendations:  Home Health  Further Equipment Recommendations for Discharge:  N/A     SUBJECTIVE:   Patient stated I was just in the chair, he helped me back to bed.     OBJECTIVE DATA SUMMARY:   Critical Behavior:  Neurologic State: Alert  Orientation Level: Oriented X4  Cognition: Follows commands  Safety/Judgement: Awareness of environment, Good awareness of safety precautions, Fall prevention  Functional Mobility Training:  Bed Mobility:  Supine to Sit: Supervision  Sit to Supine: Supervision  Transfers:  Sit to Stand: Contact guard assistance  Stand to Sit: Supervision  Bed to Chair: Supervision (w/RW)  Balance:  Sitting: Intact  Sitting - Static: Good (unsupported)  Sitting - Dynamic: Good (unsupported)  Standing: Intact; Without support  Standing - Static: Good  Standing - Dynamic : Good (-)  Ambulation/Gait Training:  Distance (ft): 16 Feet (ft)  Ambulation - Level of Assistance: Contact guard assistance  Therapeutic Exercises:   Standing: SLS LLE 12sec and 18sec, RLE 12sec and 15sec  Seated: AP, LAQ, seated march 10x ea  Supine: QS, HS, SLR 10x ea  Pain:  Pre:0  Post:0  Pain Scale 1: Numeric (0 - 10)    Activity Tolerance:   Good  Please refer to the flowsheet for vital signs taken during this treatment.   After treatment:   [] Patient left in no apparent distress sitting up in chair  [x] Patient left in no apparent distress in bed  [x] Call bell left within reach  [] Nursing notified  [] Caregiver present  [] Bed alarm activated      Julio César Chin PTA   Time Calculation: 23 mins

## 2017-10-18 NOTE — PROGRESS NOTES
Problem: Falls - Risk of  Goal: *Absence of Falls  Document Kapil Fall Risk and appropriate interventions in the flowsheet.    Outcome: Progressing Towards Goal  Fall Risk Interventions:  Mobility Interventions: Communicate number of staff needed for ambulation/transfer, Patient to call before getting OOB         Medication Interventions: Patient to call before getting OOB    Elimination Interventions: Call light in reach, Patient to call for help with toileting needs

## 2017-10-18 NOTE — CONSULTS
[facility]  Name: Jelly Arellano   : 1965   MRN: [id number]   Date: 10/18/2017    []I have reviewed the flowsheet and previous days notes. []The patient is unable to give any meaningful history or review of systems because the patient is:  []Intubated []Sedated   []Unresponsive      []The patient is critically ill on      []Mechanical ventilation []Pressors   []BiPAP []   S: Feels better. Less cough, sputum is clearing but is still yellowish, no fever, chills or hemoptysis. No CP, No N/V/D. Fair apetite. ROS:A comprehensive review of systems was negative except for that written in the HPI. Events and notes from last 24 hours reviewed. Care plan discussed on multidisciplinary rounds.     Vital Signs:    Visit Vitals    /88 (BP 1 Location: Left arm, BP Patient Position: Head of bed elevated (Comment degrees))  Comment (BP Patient Position): 75    Pulse 88    Temp 98.1 °F (36.7 °C)    Resp 18    Ht 5' 4\" (1.626 m)    Wt 62.9 kg (138 lb 9.6 oz)    SpO2 95%    BMI 23.79 kg/m2       O2 Device: Hi flow nasal cannula   O2 Flow Rate (L/min): 40 l/min   Temp (24hrs), Av.8 °F (36.6 °C), Min:97.5 °F (36.4 °C), Max:98.1 °F (36.7 °C)       Intake/Output:   Last shift:      10/18 0701 - 10/18 1900  In: 50   Out: -   Last 3 shifts: 10/16 1901 - 10/18 0700  In: 2850 [I.V.:500]  Out: 1050 [Urine:950]    Intake/Output Summary (Last 24 hours) at 10/18/17 0807  Last data filed at 10/18/17 0705   Gross per 24 hour   Intake             1340 ml   Output                0 ml   Net             1340 ml     Hemodynamics:       :      Ventilator Settings:        Ventilator Volumes  Vt Spont (ml): 393 ml  Ve Observed (l/min): 7.5 l/min       Physical Exam:    General: in no apparent distress, well developed and well nourished, non-toxic, in no respiratory distress and acyanotic, alert and oriented times 3   HEENT: HFNC in place   Neck: No abnormally enlarged lymph nodes.   Chest: increased AP diameter   Lungs: decreased air exchange bilaterally, distant lung sounds, prolonged expiration and rhonchi  no dullness/ tenderness/ rash   Heart: Regular rate and rhythm or S1S2 present   Abdomen: non distended, bowel sounds normoactive, tympanic, abdomen is soft without significant tenderness, masses, organomegaly or guarding   Extremity: Trace edema   Neuro: alert   Skin: Skin color, texture, turgor normal. No rashes or lesions      DATA:   Current Facility-Administered Medications   Medication Dose Route Frequency    [START ON 10/19/2017] VANCOMYCIN INFORMATION NOTE   Other ONCE    HYDROcodone-acetaminophen (NORCO) 7.5-325 mg per tablet 1 Tab  1 Tab Oral Q3H PRN    guaiFENesin (ROBITUSSIN) 100 mg/5 mL oral liquid 100 mg  100 mg Oral Q4H PRN    amLODIPine (NORVASC) tablet 10 mg  10 mg Oral DAILY    promethazine (PHENERGAN) 25 mg in 0.9% sodium chloride 50 mL IVPB  25 mg IntraVENous Q6H PRN    vancomycin (VANCOCIN) 750 mg in 0.9% sodium chloride (MBP/ADV) 250 mL ADV  750 mg IntraVENous Q24H    gabapentin (NEURONTIN) capsule 400 mg  400 mg Oral TID    albuterol-ipratropium (DUO-NEB) 2.5 MG-0.5 MG/3 ML  3 mL Nebulization Q6H RT    budesonide (PULMICORT) 500 mcg/2 ml nebulizer suspension  500 mcg Nebulization BID RT    0.9% sodium chloride infusion 250 mL  250 mL IntraVENous PRN    labetalol (NORMODYNE) tablet 100 mg  100 mg Oral Q12H    labetalol (NORMODYNE;TRANDATE) injection 20 mg  20 mg IntraVENous Q4H PRN    albuterol (PROVENTIL VENTOLIN) nebulizer solution 2.5 mg  2.5 mg Nebulization Q4H PRN    heparin (porcine) pf 300-500 Units  300-500 Units InterCATHeter PRN    sodium chloride (NS) flush 10-30 mL  10-30 mL InterCATHeter PRN    sodium chloride (NS) flush 10 mL  10 mL InterCATHeter Q24H    sodium chloride (NS) flush 10 mL  10 mL InterCATHeter PRN    sodium chloride (NS) flush 10-40 mL  10-40 mL InterCATHeter Q8H    sodium chloride (NS) flush 20 mL  20 mL InterCATHeter Q24H    famotidine (PEPCID) tablet 20 mg  20 mg Oral BID    sodium chloride (NS) flush 5-10 mL  5-10 mL IntraVENous PRN    naloxone (NARCAN) injection 0.4 mg  0.4 mg IntraVENous PRN    heparin (porcine) injection 5,000 Units  5,000 Units SubCUTAneous Q8H    insulin lispro (HUMALOG) injection   SubCUTAneous Q6H    glucose chewable tablet 16 g  4 Tab Oral PRN    glucagon (GLUCAGEN) injection 1 mg  1 mg IntraMUSCular PRN    dextrose (D50W) injection syrg 12.5-25 g  25-50 mL IntraVENous PRN       Telemetry: []Sinus []A-flutter []Paced    []A-fib []Multiple PVCs                  Labs:  Recent Labs      10/17/17   0335  10/16/17   0340   WBC  8.7  8.9   HGB  7.7*  8.2*   HCT  24.2*  25.5*   PLT  236  233     Recent Labs      10/18/17   0506  10/17/17   0335  10/16/17   0340   NA  140  141  142   K  4.5  4.2  3.8   CL  103  105  105   CO2  32  32  33*   GLU  125*  125*  111*   BUN  22*  20*  16   CREA  1.27  1.09  1.00   CA  7.8*  7.9*  8.1*   ALB  1.7*  1.7*   --    SGOT  17  16   --    ALT  11*  11*   --      No results for input(s): PH, PCO2, PO2, HCO3, FIO2 in the last 72 hours.     Imaging:  [x]I have personally reviewed the patients radiographs  []Radiographs reviewed with radiologist   [x] No CXR study available for review today   []No change from prior, tubes and lines in adequate position  []Improved   []Worsening       IMPRESSION:   Patient Active Problem List   Diagnosis Code    Acute respiratory failure with hypercapnia ([facility] J96.02    Sepsis ([facility] A41.9    HCAP (healthcare-associated pneumonia) J18.9    Hypertension I10    Diabetes mellitus ([facility] E11.9    SOB (shortness of breath) R06.02    Dysphagia R13.10    History of throat cancer Z85.819    Recurrent pneumonia J18.9    Respiratory failure with hypoxia (HCC) J96.91    KEATON (acute kidney injury) ([facility] N17.9 ·     ·    PLAN:   · Continue present treatment  · Titrate O2 by HFNC to  · NC to SPO2 of 90%  · PT eval  · Rehab eval The patient is: [] acutely ill Risk of deterioration: [] moderate    [] critically ill  [] high     []See my orders for details    My assessment, plan of care, findings, medications, side effects etc were discussed with:  []nursing []PT/OT    []respiratory therapy [x]Dr.El Silva   []family []     [x]Total critical care time exclusive of procedures 25 minutes  Anisha Juarez MD

## 2017-10-18 NOTE — PROGRESS NOTES
conducted a Follow up consultation and Spiritual Assessment for Sina Moreland, who is a 54 y.o.,female. The  provided the following Interventions:  Continued the relationship of care and support. Listened empathically. Offered prayer and assurance of continued prayer on patients behalf. Chart reviewed. The following outcomes were achieved:  Patient expressed gratitude for pastoral care visit. Assessment:  There are no further spiritual or [employer]ism issues which require Spiritual Care Services interventions at this time. Plan:  Chaplains will continue to follow and will provide pastoral care on an as needed/requested basis.  recommends bedside caregivers page  on duty if patient shows signs of acute spiritual or emotional distress.      [address] [facility]   Staff [address] [facility]   [phone]

## 2017-10-18 NOTE — PROGRESS NOTES
Hospitalist Progress Note  Nicole Aguillon MD  Internal medicine/ Hospitalist    Daily Progress Note: 10/18/2017 5:34 PM      Interval history / Subjective:   Admitted for recurrent pneumonia,resp failure,KEATON. Feeling better today,she on High flow oxygen 30.9 liters. Asking when she is going home.     Current Facility-Administered Medications   Medication Dose Route Frequency    [START ON 10/19/2017] VANCOMYCIN INFORMATION NOTE   Other ONCE    HYDROcodone-acetaminophen (NORCO) 7.5-325 mg per tablet 1 Tab  1 Tab Oral Q3H PRN    guaiFENesin (ROBITUSSIN) 100 mg/5 mL oral liquid 100 mg  100 mg Oral Q4H PRN    amLODIPine (NORVASC) tablet 10 mg  10 mg Oral DAILY    promethazine (PHENERGAN) 25 mg in 0.9% sodium chloride 50 mL IVPB  25 mg IntraVENous Q6H PRN    vancomycin (VANCOCIN) 750 mg in 0.9% sodium chloride (MBP/ADV) 250 mL ADV  750 mg IntraVENous Q24H    gabapentin (NEURONTIN) capsule 400 mg  400 mg Oral TID    albuterol-ipratropium (DUO-NEB) 2.5 MG-0.5 MG/3 ML  3 mL Nebulization Q6H RT    budesonide (PULMICORT) 500 mcg/2 ml nebulizer suspension  500 mcg Nebulization BID RT    0.9% sodium chloride infusion 250 mL  250 mL IntraVENous PRN    labetalol (NORMODYNE) tablet 100 mg  100 mg Oral Q12H    labetalol (NORMODYNE;TRANDATE) injection 20 mg  20 mg IntraVENous Q4H PRN    albuterol (PROVENTIL VENTOLIN) nebulizer solution 2.5 mg  2.5 mg Nebulization Q4H PRN    heparin (porcine) pf 300-500 Units  300-500 Units InterCATHeter PRN    sodium chloride (NS) flush 10-30 mL  10-30 mL InterCATHeter PRN    sodium chloride (NS) flush 10 mL  10 mL InterCATHeter Q24H    sodium chloride (NS) flush 10 mL  10 mL InterCATHeter PRN    sodium chloride (NS) flush 10-40 mL  10-40 mL InterCATHeter Q8H    sodium chloride (NS) flush 20 mL  20 mL InterCATHeter Q24H    famotidine (PEPCID) tablet 20 mg  20 mg Oral BID    sodium chloride (NS) flush 5-10 mL  5-10 mL IntraVENous PRN    naloxone (NARCAN) injection 0.4 mg  0.4 mg IntraVENous PRN    heparin (porcine) injection 5,000 Units  5,000 Units SubCUTAneous Q8H    insulin lispro (HUMALOG) injection   SubCUTAneous Q6H    glucose chewable tablet 16 g  4 Tab Oral PRN    glucagon (GLUCAGEN) injection 1 mg  1 mg IntraMUSCular PRN    dextrose (D50W) injection syrg 12.5-25 g  25-50 mL IntraVENous PRN          Objective:     Visit Vitals    /80    Pulse 86    Temp 98.2 °F (36.8 °C)    Resp 19    Ht 5' 4\" (1.626 m)    Wt 63.5 kg (140 lb)    SpO2 92%    BMI 24.03 kg/m2    O2 Flow Rate (L/min): 35 l/min O2 Device: Hi flow nasal cannula    Temp (24hrs), Av.1 °F (36.7 °C), Min:97.5 °F (36.4 °C), Max:98.5 °F (36.9 °C)      10/18 0701 - 10/18 1900  In: 300   Out: -   10/16 1901 - 10/18 07  In: 2850 [I.V.:500]  Out: 1050 [Urine:950]  Physical Exam:  General appearance: alert, cooperative, no distress, appears stated age  Head: Normocephalic, without obvious abnormality, atraumatic  Neck: supple, trachea midline  Lungs: B/L coarse , B/L crackles R>L  Heart: regular rate and rhythm, S1, S2 normal, no murmur, click, rub or gallop  Abdomen: soft, non-tender.  Bowel sounds normal. No masses,  no organomegaly  Extremities: extremities normal, atraumatic, no cyanosis or edema  Skin: Skin color, texture, turgor normal. No rashes or lesions    Data Review    Recent Results (from the past 12 hour(s))   GLUCOSE, POC    Collection Time: 10/18/17  7:21 AM   Result Value Ref Range    Glucose (POC) 112 (H) 70 - 110 mg/dL   GLUCOSE, POC    Collection Time: 10/18/17 11:44 AM   Result Value Ref Range    Glucose (POC) 135 (H) 70 - 110 mg/dL   GLUCOSE, POC    Collection Time: 10/18/17  3:07 PM   Result Value Ref Range    Glucose (POC) 137 (H) 70 - 110 mg/dL         Assessment/Plan:     Principal Problem:    Recurrent pneumonia (10/11/2017)    Active Problems:    Res[facility] failure with hypoxia ([facility] (10/11/2017)      KEATON (acute kidney injury) ([facility] (10/11/2017)        Care Plan  1)  Recurrent PNA                        - recent extended hospital stay for MRSA,Pseudomonal PNA about 3 weeks                        - ID on board, continue vanc, stopped zosyn and vanc                        - on hi sunil, weaning,on 30.9 liters today , pulm following.     2)  Acute on chronic resp failure                        - 2/2 above    3)KEATON:                                      -Resolved  DVT prophylaxis:per protocol  Full code  Disposition:tbd

## 2017-10-18 NOTE — PROGRESS NOTES
1950 Bedside and Verbal shift change report given to Talha Jacobs) by Rita Spear, RN (offgoing nurse). Report included the following information SBAR, Kardex, Lab results, Flowsheet, telemetry strips and MAR.      2032 Shift assessment completed. Pt resting comfortable. No s/s of pain or discomfort noted or stated.      0035 Reassessment completed. No s/s of pain reported or noted     0435 Reassessment completed. No s/s of pain reported or noted     0715 Bedside and Verbal shift change report given to Kurtisbarbara) by Sara Boyd RN (offgoing nurse). Report included the following information SBAR, Kardex, Lab results, Flowsheet, telemetry strips and MAR.

## 2017-10-18 NOTE — ROUTINE PROCESS
1935: Assumed care. Awake. Assessment done. HOB elevated at 45 degrees. On Hi flow oxygen at 40 LPM. Rhonchi breath sounds. (+) bowel sounds. On TF via peg tube. Infusing Osmolite 1.2 narda at 60 ml/hr. Skin intact. No edema. Call light within reach. 2200:Sleeping. 2319: Due meds given. : No coverage given per protocol. Medicated for pain per patient request. Will continue to monitor. 0011: No change from previous assessment. 0300: TF bag changed & labeled. Peg site w/ small yellow amount drainage noted. Dressing done. 0330: Medicated for pain per patient request. Will continue to monitor. 6088: Due med given. Draw blood from the Right SC power PICC. Aseptic technique observed. 0532: . No coverage given per protocol. Slept on & off thru night. Needs attended. 0996: Medicated for pain per patient request. Will continue to monitor. 0740: Bedside and Verbal shift change report given to Tracy Kelly RN (oncoming nurse) by me (offgoing nurse). Report included the following information SBAR, Kardex, Intake/Output, MAR and Recent Results.

## 2017-10-19 LAB
ANION GAP SERPL CALC-SCNC: 4 MMOL/L (ref 3–18)
BASOPHILS # BLD: 0.1 K/UL (ref 0–0.06)
BASOPHILS NFR BLD: 1 % (ref 0–2)
BUN SERPL-MCNC: 24 MG/DL (ref 7–18)
BUN/CREAT SERPL: 17 (ref 12–20)
CALCIUM SERPL-MCNC: 8.3 MG/DL (ref 8.5–10.1)
CHLORIDE SERPL-SCNC: 103 MMOL/L (ref 100–108)
CO2 SERPL-SCNC: 32 MMOL/L (ref 21–32)
CREAT SERPL-MCNC: 1.4 MG/DL (ref 0.6–1.3)
DIFFERENTIAL METHOD BLD: ABNORMAL
EOSINOPHIL # BLD: 1 K/UL (ref 0–0.4)
EOSINOPHIL NFR BLD: 11 % (ref 0–5)
ERYTHROCYTE [DISTWIDTH] IN BLOOD BY AUTOMATED COUNT: 16.2 % (ref 11.6–14.5)
GLUCOSE BLD STRIP.AUTO-MCNC: 106 MG/DL (ref 70–110)
GLUCOSE BLD STRIP.AUTO-MCNC: 107 MG/DL (ref 70–110)
GLUCOSE BLD STRIP.AUTO-MCNC: 107 MG/DL (ref 70–110)
GLUCOSE BLD STRIP.AUTO-MCNC: 109 MG/DL (ref 70–110)
GLUCOSE BLD STRIP.AUTO-MCNC: 129 MG/DL (ref 70–110)
GLUCOSE SERPL-MCNC: 95 MG/DL (ref 74–99)
HCT VFR BLD AUTO: 25.2 % (ref 35–45)
HGB BLD-MCNC: 7.8 G/DL (ref 12–16)
LYMPHOCYTES # BLD: 2.3 K/UL (ref 0.9–3.6)
LYMPHOCYTES NFR BLD: 23 % (ref 21–52)
MCH RBC QN AUTO: 29 PG (ref 24–34)
MCHC RBC AUTO-ENTMCNC: 31 G/DL (ref 31–37)
MCV RBC AUTO: 93.7 FL (ref 74–97)
MONOCYTES # BLD: 0.8 K/UL (ref 0.05–1.2)
MONOCYTES NFR BLD: 8 % (ref 3–10)
NEUTS SEG # BLD: 5.6 K/UL (ref 1.8–8)
NEUTS SEG NFR BLD: 57 % (ref 40–73)
PLATELET # BLD AUTO: 255 K/UL (ref 135–420)
PMV BLD AUTO: 11.4 FL (ref 9.2–11.8)
POTASSIUM SERPL-SCNC: 5 MMOL/L (ref 3.5–5.5)
RBC # BLD AUTO: 2.69 M/UL (ref 4.2–5.3)
SODIUM SERPL-SCNC: 139 MMOL/L (ref 136–145)
WBC # BLD AUTO: 9.8 K/UL (ref 4.6–13.2)

## 2017-10-19 PROCEDURE — 97530 THERAPEUTIC ACTIVITIES: CPT

## 2017-10-19 PROCEDURE — 94640 AIRWAY INHALATION TREATMENT: CPT

## 2017-10-19 PROCEDURE — 97535 SELF CARE MNGMENT TRAINING: CPT

## 2017-10-19 PROCEDURE — 65660000000 HC RM CCU STEPDOWN

## 2017-10-19 PROCEDURE — 3331090001 HH PPS REVENUE CREDIT

## 2017-10-19 PROCEDURE — 97110 THERAPEUTIC EXERCISES: CPT

## 2017-10-19 PROCEDURE — 80048 BASIC METABOLIC PNL TOTAL CA: CPT | Performed by: INTERNAL MEDICINE

## 2017-10-19 PROCEDURE — 74011000250 HC RX REV CODE- 250: Performed by: PHYSICIAN ASSISTANT

## 2017-10-19 PROCEDURE — 74011250637 HC RX REV CODE- 250/637: Performed by: PHYSICIAN ASSISTANT

## 2017-10-19 PROCEDURE — 82962 GLUCOSE BLOOD TEST: CPT

## 2017-10-19 PROCEDURE — 3331090002 HH PPS REVENUE DEBIT

## 2017-10-19 PROCEDURE — 74011250637 HC RX REV CODE- 250/637: Performed by: INTERNAL MEDICINE

## 2017-10-19 PROCEDURE — 77030018836 HC SOL IRR NACL ICUM -A

## 2017-10-19 PROCEDURE — 85025 COMPLETE CBC W/AUTO DIFF WBC: CPT | Performed by: INTERNAL MEDICINE

## 2017-10-19 PROCEDURE — 74011250636 HC RX REV CODE- 250/636: Performed by: FAMILY MEDICINE

## 2017-10-19 PROCEDURE — 94760 N-INVAS EAR/PLS OXIMETRY 1: CPT

## 2017-10-19 PROCEDURE — 97116 GAIT TRAINING THERAPY: CPT

## 2017-10-19 PROCEDURE — 74011250636 HC RX REV CODE- 250/636: Performed by: HOSPITALIST

## 2017-10-19 RX ORDER — LINEZOLID 600 MG/1
600 TABLET, FILM COATED ORAL EVERY 12 HOURS
Status: DISCONTINUED | OUTPATIENT
Start: 2017-10-19 | End: 2017-10-23 | Stop reason: HOSPADM

## 2017-10-19 RX ADMIN — GABAPENTIN 400 MG: 400 CAPSULE ORAL at 22:39

## 2017-10-19 RX ADMIN — IPRATROPIUM BROMIDE AND ALBUTEROL SULFATE 3 ML: .5; 3 SOLUTION RESPIRATORY (INHALATION) at 20:29

## 2017-10-19 RX ADMIN — GABAPENTIN 400 MG: 400 CAPSULE ORAL at 17:17

## 2017-10-19 RX ADMIN — LINEZOLID 600 MG: 600 TABLET, FILM COATED ORAL at 17:17

## 2017-10-19 RX ADMIN — HYDROCODONE BITARTRATE AND ACETAMINOPHEN 1 TABLET: 7.5; 325 TABLET ORAL at 13:45

## 2017-10-19 RX ADMIN — Medication 10 ML: at 10:10

## 2017-10-19 RX ADMIN — LABETALOL HYDROCHLORIDE 100 MG: 100 TABLET, FILM COATED ORAL at 10:10

## 2017-10-19 RX ADMIN — Medication 10 ML: at 17:18

## 2017-10-19 RX ADMIN — FAMOTIDINE 20 MG: 20 TABLET ORAL at 10:10

## 2017-10-19 RX ADMIN — HYDROCODONE BITARTRATE AND ACETAMINOPHEN 1 TABLET: 7.5; 325 TABLET ORAL at 04:58

## 2017-10-19 RX ADMIN — SODIUM CHLORIDE 750 MG: 900 INJECTION, SOLUTION INTRAVENOUS at 00:11

## 2017-10-19 RX ADMIN — HEPARIN SODIUM 5000 UNITS: 5000 INJECTION, SOLUTION INTRAVENOUS; SUBCUTANEOUS at 13:45

## 2017-10-19 RX ADMIN — AMLODIPINE BESYLATE 10 MG: 10 TABLET ORAL at 10:10

## 2017-10-19 RX ADMIN — LABETALOL HYDROCHLORIDE 100 MG: 100 TABLET, FILM COATED ORAL at 22:39

## 2017-10-19 RX ADMIN — BUDESONIDE 500 MCG: 0.5 INHALANT RESPIRATORY (INHALATION) at 20:29

## 2017-10-19 RX ADMIN — IPRATROPIUM BROMIDE AND ALBUTEROL SULFATE 3 ML: .5; 3 SOLUTION RESPIRATORY (INHALATION) at 01:39

## 2017-10-19 RX ADMIN — Medication 10 ML: at 00:11

## 2017-10-19 RX ADMIN — HYDROCODONE BITARTRATE AND ACETAMINOPHEN 1 TABLET: 7.5; 325 TABLET ORAL at 22:40

## 2017-10-19 RX ADMIN — IPRATROPIUM BROMIDE AND ALBUTEROL SULFATE 3 ML: .5; 3 SOLUTION RESPIRATORY (INHALATION) at 13:21

## 2017-10-19 RX ADMIN — HYDROCODONE BITARTRATE AND ACETAMINOPHEN 1 TABLET: 7.5; 325 TABLET ORAL at 17:17

## 2017-10-19 RX ADMIN — HYDROCODONE BITARTRATE AND ACETAMINOPHEN 1 TABLET: 7.5; 325 TABLET ORAL at 00:10

## 2017-10-19 RX ADMIN — HYDROCODONE BITARTRATE AND ACETAMINOPHEN 1 TABLET: 7.5; 325 TABLET ORAL at 10:10

## 2017-10-19 RX ADMIN — HEPARIN SODIUM 5000 UNITS: 5000 INJECTION, SOLUTION INTRAVENOUS; SUBCUTANEOUS at 06:58

## 2017-10-19 RX ADMIN — HEPARIN SODIUM 5000 UNITS: 5000 INJECTION, SOLUTION INTRAVENOUS; SUBCUTANEOUS at 22:40

## 2017-10-19 RX ADMIN — Medication 10 ML: at 22:00

## 2017-10-19 RX ADMIN — GABAPENTIN 400 MG: 400 CAPSULE ORAL at 10:10

## 2017-10-19 RX ADMIN — IPRATROPIUM BROMIDE AND ALBUTEROL SULFATE 3 ML: .5; 3 SOLUTION RESPIRATORY (INHALATION) at 07:46

## 2017-10-19 RX ADMIN — LINEZOLID 600 MG: 600 TABLET, FILM COATED ORAL at 22:46

## 2017-10-19 RX ADMIN — FAMOTIDINE 20 MG: 20 TABLET ORAL at 17:17

## 2017-10-19 RX ADMIN — BUDESONIDE 500 MCG: 0.5 INHALANT RESPIRATORY (INHALATION) at 07:46

## 2017-10-19 NOTE — PROGRESS NOTES
Problem: Falls - Risk of  Goal: *Absence of Falls  Document Kapil Fall Risk and appropriate interventions in the flowsheet.    Outcome: Progressing Towards Goal  Fall Risk Interventions:  Mobility Interventions: Communicate number of staff needed for ambulation/transfer         Medication Interventions: Patient to call before getting OOB    Elimination Interventions: Call light in reach

## 2017-10-19 NOTE — PROGRESS NOTES
Hospitalist Progress Note  Abram Cooks, MD  Internal medicine/ Hospitalist    Daily Progress Note: 10/19/2017 5:34 PM      Interval history / Subjective:   Admitted for recurrent pneumonia,resp failure,KEATON. Feeling better today,she on 15 liters nc liters. Asking when she is going home.     Current Facility-Administered Medications   Medication Dose Route Frequency    linezolid (ZYVOX) tablet 600 mg  600 mg Oral Q12H    HYDROcodone-acetaminophen (NORCO) 7.5-325 mg per tablet 1 Tab  1 Tab Oral Q3H PRN    guaiFENesin (ROBITUSSIN) 100 mg/5 mL oral liquid 100 mg  100 mg Oral Q4H PRN    amLODIPine (NORVASC) tablet 10 mg  10 mg Oral DAILY    promethazine (PHENERGAN) 25 mg in 0.9% sodium chloride 50 mL IVPB  25 mg IntraVENous Q6H PRN    gabapentin (NEURONTIN) capsule 400 mg  400 mg Oral TID    albuterol-ipratropium (DUO-NEB) 2.5 MG-0.5 MG/3 ML  3 mL Nebulization Q6H RT    budesonide (PULMICORT) 500 mcg/2 ml nebulizer suspension  500 mcg Nebulization BID RT    0.9% sodium chloride infusion 250 mL  250 mL IntraVENous PRN    labetalol (NORMODYNE) tablet 100 mg  100 mg Oral Q12H    labetalol (NORMODYNE;TRANDATE) injection 20 mg  20 mg IntraVENous Q4H PRN    albuterol (PROVENTIL VENTOLIN) nebulizer solution 2.5 mg  2.5 mg Nebulization Q4H PRN    heparin (porcine) pf 300-500 Units  300-500 Units InterCATHeter PRN    sodium chloride (NS) flush 10-30 mL  10-30 mL InterCATHeter PRN    sodium chloride (NS) flush 10 mL  10 mL InterCATHeter Q24H    sodium chloride (NS) flush 10 mL  10 mL InterCATHeter PRN    sodium chloride (NS) flush 10-40 mL  10-40 mL InterCATHeter Q8H    sodium chloride (NS) flush 20 mL  20 mL InterCATHeter Q24H    famotidine (PEPCID) tablet 20 mg  20 mg Oral BID    sodium chloride (NS) flush 5-10 mL  5-10 mL IntraVENous PRN    naloxone (NARCAN) injection 0.4 mg  0.4 mg IntraVENous PRN    heparin (porcine) injection 5,000 Units  5,000 Units SubCUTAneous Q8H    insulin lispro (HUMALOG) injection   SubCUTAneous Q6H    glucose chewable tablet 16 g  4 Tab Oral PRN    glucagon (GLUCAGEN) injection 1 mg  1 mg IntraMUSCular PRN    dextrose (D50W) injection syrg 12.5-25 g  25-50 mL IntraVENous PRN          Objective:     Visit Vitals    /78    Pulse 95    Temp 98.2 °F (36.8 °C)    Resp 18    Ht 5' 4\" (1.626 m)    Wt 63.5 kg (140 lb)    SpO2 96%    BMI 24.03 kg/m2    O2 Flow Rate (L/min): 15 l/min O2 Device: Hi flow nasal cannula    Temp (24hrs), Av.4 °F (36.9 °C), Min:98 °F (36.7 °C), Max:99.1 °F (37.3 °C)      10/19 0701 - 10/19 1900  In: 240 [P.O.:240]  Out: -   10/17 1901 - 10/19 0700  In: 7340 [I.V.:500]  Out: 0   Physical Exam:  General appearance: alert, cooperative, no distress, appears stated age  Head: Normocephalic, without obvious abnormality, atraumatic  Neck: supple, trachea midline  Lungs: B/L coarse , B/L crackles R>L  Heart: regular rate and rhythm, S1, S2 normal, no murmur, click, rub or gallop  Abdomen: soft, non-tender. Bowel sounds normal. No masses,  no organomegaly  Extremities: extremities normal, atraumatic, no cyanosis or edema  Skin: Skin color, texture, turgor normal. No rashes or lesions    Data Review    Recent Results (from the past 12 hour(s))   CBC WITH AUTOMATED DIFF    Collection Time: 10/19/17  5:05 AM   Result Value Ref Range    WBC 9.8 4.6 - 13.2 K/uL    RBC 2.69 (L) 4.20 - 5.30 M/uL    HGB 7.8 (L) 12.0 - 16.0 g/dL    HCT 25.2 (L) 35.0 - 45.0 %    MCV 93.7 74.0 - 97.0 FL    MCH 29.0 24.0 - 34.0 PG    MCHC 31.0 31.0 - 37.0 g/dL    RDW 16.2 (H) 11.6 - 14.5 %    PLATELET 359 542 - 203 K/uL    MPV 11.4 9.2 - 11.8 FL    NEUTROPHILS 57 40 - 73 %    LYMPHOCYTES 23 21 - 52 %    MONOCYTES 8 3 - 10 %    EOSINOPHILS 11 (H) 0 - 5 %    BASOPHILS 1 0 - 2 %    ABS. NEUTROPHILS 5.6 1.8 - 8.0 K/UL    ABS. LYMPHOCYTES 2.3 0.9 - 3.6 K/UL    ABS. MONOCYTES 0.8 0.05 - 1.2 K/UL    ABS. EOSINOPHILS 1.0 (H) 0.0 - 0.4 K/UL    ABS.  BASOPHILS 0.1 (H) 0.0 - 0.06 K/UL    DF AUTOMATED     METABOLIC PANEL, BASIC    Collection Time: 10/19/17  5:05 AM   Result Value Ref Range    Sodium 139 136 - 145 mmol/L    Potassium 5.0 3.5 - 5.5 mmol/L    Chloride 103 100 - 108 mmol/L    CO2 32 21 - 32 mmol/L    Anion gap 4 3.0 - 18 mmol/L    Glucose 95 74 - 99 mg/dL    BUN 24 (H) 7.0 - 18 MG/DL    Creatinine 1.40 (H) 0.6 - 1.3 MG/DL    BUN/Creatinine ratio 17 12 - 20      GFR est AA 48 (L) >60 ml/min/1.73m2    GFR est non-AA 40 (L) >60 ml/min/1.73m2    Calcium 8.3 (L) 8.5 - 10.1 MG/DL   GLUCOSE, POC    Collection Time: 10/19/17  6:00 AM   Result Value Ref Range    Glucose (POC) 107 70 - 110 mg/dL   GLUCOSE, POC    Collection Time: 10/19/17  7:36 AM   Result Value Ref Range    Glucose (POC) 129 (H) 70 - 110 mg/dL   GLUCOSE, POC    Collection Time: 10/19/17 11:53 AM   Result Value Ref Range    Glucose (POC) 106 70 - 110 mg/dL         Assessment/Plan:     Principal Problem:    Recurrent pneumonia (10/11/2017)    Active Problems:    Respiratory failure with hypoxia (HCC) (10/11/2017)      KEATON (acute kidney injury) ([facility] [address] (10/11/2017)        Care Plan  1)  Recurrent PNA                        - recent extended hospital stay for MRSA,Pseudomonal PNA about 3 weeks                        - ID on board, continue vanc, stopped zosyn and vanc                        - on hi flow, weaning,on 30.9 liters yesterday;patient started on 15 liters nasal cannula which she is tolerating well.     2)  Acute on chronic resp failure                        - 2/2 above    3)KEATON:                                      -Resolved  DVT prophylaxis:per protocol  Full code  Disposition:tbd

## 2017-10-19 NOTE — PROGRESS NOTES
Titrated patient to 15 lpm via Salter. Patient stated she like the SALTER better and she was not SOB. Will continue to monitor patient closely.

## 2017-10-19 NOTE — PROGRESS NOTES
Bedside and Verbal shift change report given to Yadira (oncoming nurse) by Jeanette Lin (offgoing nurse). Report included the following information SBAR, Kardex and MAR.

## 2017-10-19 NOTE — PROGRESS NOTES
Problem: Mobility Impaired (Adult and Pediatric)  Goal: *Acute Goals and Plan of Care (Insert Text)  Physical Therapy Goals  Initiated 10/14/2017 and to be accomplished within 7 day(s)  1. Patient will move from supine to sit and sit to supine , scoot up and down and roll side to side in bed with modified independence. 2.  Patient will transfer from bed to chair and chair to bed with modified independence using the least restrictive device. 3.  Patient will perform sit to stand with modified independence. 4.  Patient will ambulate with supervision for 25 feet with the least restrictive device. Outcome: Progressing Towards Goal  physical Therapy TREATMENT    Patient: oJy Delong (57 y.o. female)  Date: 10/19/2017  Diagnosis: Respiratory failure with hypoxia (HCC)  Recurrent pneumonia  Recurrent pneumonia  Respiratory failure with hypoxia (HCC) Recurrent pneumonia  Precautions: Fall, Aspiration (oxygen)   Chart, physical therapy assessment, plan of care and goals were reviewed. ASSESSMENT:  Pt sitting in chair upon entering room. Ambulated 50ft in room without an assistive device on 10L supplemental O2, reciprocal gt pattern, equal stride, no LOB. Returned to sitting in chair, SpO2 99% on 10L s/p activity. Reviewed and demonstrated LE ROM strengthening exercises. Education:Cont with ROM TE independently  Progression toward goals:  [x]      Improving appropriately and progressing toward goals  []      Improving slowly and progressing toward goals  []      Not making progress toward goals and plan of care will be adjusted     PLAN:  Patient continues to benefit from skilled intervention to address the above impairments. Continue treatment per established plan of care. Discharge Recommendations:  Home Health  Further Equipment Recommendations for Discharge:  Portable O2     SUBJECTIVE:   Patient stated I am doing good.     OBJECTIVE DATA SUMMARY:   Critical Behavior:  Neurologic State: Alert  Orientation Level: Oriented X4  Cognition: Follows commands  Safety/Judgement: Awareness of environment, Good awareness of safety precautions, Fall prevention  Functional Mobility Training:  Transfers:  Sit to Stand: Supervision  Stand to Sit: Supervision  Balance:  Sitting: Intact  Sitting - Static: Good (unsupported)  Sitting - Dynamic: Good (unsupported)  Standing: Intact; Without support  Standing - Static: Good  Standing - Dynamic : Good  Ambulation/Gait Training:  Distance (ft): 50 Feet (ft)  Ambulation - Level of Assistance: Stand-by asssistance  Pain:  Pre:0  Post:0  Pain Scale 1: Visual  Activity Tolerance:   Good  Please refer to the flowsheet for vital signs taken during this treatment.   After treatment:   [x] Patient left in no apparent distress sitting up in chair  [] Patient left in no apparent distress in bed  [x] Call bell left within reach  [] Nursing notified  [] Caregiver present  [] Bed alarm activated      [address], PTA   Time Calculation: 23 mins

## 2017-10-19 NOTE — PROGRESS NOTES
INFECTIOUS DISEASE FOLLOW UP NOTE :-     Admit Date: 10/11/2017      Current abx Prior abx   Linezolid 10/19-0, Abx 8 Levofloxacin 9/24 - 4  Zosyn 9/24 - 6 Tobramycin 9/30 - 2 Vancomycin 9/24-15  Ciprofloxacin 10/2-9 Doxycycline 10/9 - 2  Zosyn 10/11 -2, Cipro 10/14-2, Vancomycin 10/11 - 8       ASSESSMENT - > REC:      Recurrent Pneumonia  - MRSA, Pseudomonas in spcx 9/24  - same organisms in spcx last confinement . [facility]) 8/11-18  - treated with Vancomycin/zosyn x 14 days (ending ~8/25)  - bilateral multifocal opacities - sl improved on CT 10/1  - CXR 10/9 similar to 10/3 (improved infiltrates c/w 9/29)  - CXR 10/11 similar to 10/9  - CXR 10/13 worsening multifocal infiltrate (BUL) but improving on CXR 10/16 (?aspiration) ? recurrent MRSA   - IgG level high  -> Sputum cx with MRSA   -> CXR showing improvement  ->on Vancomycin and plan to complete 14 days [ 6 more days ] , will change to oral Zyvox  -> dc PICC at discharge  -> d/w Dr Leon Chambers    Respiratory Failure  - recent admission with intubated 9/24, extubated 9/25  - readmitted 10/11 w/ shortness of breath and cough.  Now on HFNC -> per PCCM   H/o throat CA      ETOH abuse     DM     HTN     CAD     GERD     PEG     DELFINA        MICROBIOLOGY:   (9/24     blcx NG x 2                             urcx IP              spcx Ps aeruginosa S AG, cipro R pip-tazo, other beta lactams, MRSA R levaquin vanco rios 1 few CF)  9/26     C diff neg )      10/11             blcx      NGTD x 2                        spcx gs many gpc prs/groups, Cx MRSA vanco rios 1                              LINES AND CATHETERS:   Tunneled RIJ PICC    MEDICATIONS:     Current Facility-Administered Medications   Medication Dose Route Frequency    VANCOMYCIN INFORMATION NOTE   Other ONCE    HYDROcodone-acetaminophen (NORCO) 7.5-325 mg per tablet 1 Tab  1 Tab Oral Q3H PRN    guaiFENesin (ROBITUSSIN) 100 mg/5 mL oral liquid 100 mg  100 mg Oral Q4H PRN    amLODIPine (NORVASC) tablet 10 mg  10 mg Oral DAILY    promethazine (PHENERGAN) 25 mg in 0.9% sodium chloride 50 mL IVPB  25 mg IntraVENous Q6H PRN    vancomycin (VANCOCIN) 750 mg in 0.9% sodium chloride (MBP/ADV) 250 mL ADV  750 mg IntraVENous Q24H    gabapentin (NEURONTIN) capsule 400 mg  400 mg Oral TID    albuterol-ipratropium (DUO-NEB) 2.5 MG-0.5 MG/3 ML  3 mL Nebulization Q6H RT    budesonide (PULMICORT) 500 mcg/2 ml nebulizer suspension  500 mcg Nebulization BID RT    0.9% sodium chloride infusion 250 mL  250 mL IntraVENous PRN    labetalol (NORMODYNE) tablet 100 mg  100 mg Oral Q12H    labetalol (NORMODYNE;TRANDATE) injection 20 mg  20 mg IntraVENous Q4H PRN    albuterol (PROVENTIL VENTOLIN) nebulizer solution 2.5 mg  2.5 mg Nebulization Q4H PRN    heparin (porcine) pf 300-500 Units  300-500 Units InterCATHeter PRN    sodium chloride (NS) flush 10-30 mL  10-30 mL InterCATHeter PRN    sodium chloride (NS) flush 10 mL  10 mL InterCATHeter Q24H    sodium chloride (NS) flush 10 mL  10 mL InterCATHeter PRN    sodium chloride (NS) flush 10-40 mL  10-40 mL InterCATHeter Q8H    sodium chloride (NS) flush 20 mL  20 mL InterCATHeter Q24H    famotidine (PEPCID) tablet 20 mg  20 mg Oral BID    sodium chloride (NS) flush 5-10 mL  5-10 mL IntraVENous PRN    naloxone (NARCAN) injection 0.4 mg  0.4 mg IntraVENous PRN    heparin (porcine) injection 5,000 Units  5,000 Units SubCUTAneous Q8H    insulin lispro (HUMALOG) injection   SubCUTAneous Q6H    glucose chewable tablet 16 g  4 Tab Oral PRN    glucagon (GLUCAGEN) injection 1 mg  1 mg IntraMUSCular PRN    dextrose (D50W) injection syrg 12.5-25 g  25-50 mL IntraVENous PRN       SUBJECTIVE :     Interval notes reviewed. Afebrile. On NC currently. Feeling overall better. Still some cough. Asking about discharge.     OBJECTIVE     Visit Vitals    /88    Pulse 97    Temp 98.6 °F (37 °C)    Resp 18    Ht 5' 4\" (1.626 m)    Wt 63.5 kg (140 lb)    SpO2 96%    BMI 24.03 kg/m2       Temp (24hrs), Av.5 °F (36.9 °C), Min:98.2 °F (36.8 °C), Max:99.1 °F (37.3 °C)      Gen-Fairly built and nourished female on NC, no distress  HENT- No thrush  Chest- Symmetric expansion, Crackles+  CV-S1S2 RR, No JVD, No edema  Abd-Soft, NT, ND, BS+  Skin-No jaundice, cyanosis, clubbing. No decubitus  Muscsk- Normal muscle tone/strength        Labs: Results:   Chemistry Recent Labs      10/19/17   0505  10/18/17   0506  10/17/17   0335   GLU  95  125*  125*   NA  139  140  141   K  5.0  4.5  4.2   CL  103  103  105   CO2  32  32  32   BUN  24*  22*  20*   CREA  1.40*  1.27  1.09   CA  8.3*  7.8*  7.9*   AGAP  4  5  4   BUCR     AP   --   102  109   TP   --   6.9  6.8   ALB   --   1.7*  1.7*   GLOB   --   5.2*  5.1*   AGRAT   --   0.3*  0.3*      CBC w/Diff Recent Labs      10/19/17   0505  10/17/17   0335   WBC  9.8  8.7   RBC  2.69*  2.61*   HGB  7.8*  7.7*   HCT  25.2*  24.2*   PLT  255  236   GRANS  57  61   LYMPH  23  20*   EOS  11*  10*          RADIOLOGY :          Imaging    Results from Hospital Encounter encounter on 10/11/17   XR CHEST PORT   Narrative INDICATION:  PNA    COMPARISON:  10/16/2017    FINDINGS: A portable AP radiograph of the chest was obtained. The patient is on  a cardiac monitor. Tunneled right neck central line in stable position. Slight  progression of patchy bilateral diffuse parenchymal infiltrates with upper lobe  dominance. No pneumothorax. Indistinct pulmonary vasculature. Mediastinal  contour stable. No sizable pleural effusion. Impression IMPRESSION: Apparent slight interval worsening. Some of the interval difference  may be related to positioning. Results from [place] encounter on 17   CT CHEST WO CONT   Narrative COMPARISON: 2017.     INDICATION: MRSA, pneumonia, prolonged oxygen requirements    TECHNIQUE: Axial was performed through the chest without contrast. Coronal and  sagittal reformations were generated. One or more dose reduction techniques were  used on this CT: automated exposure control, adjustment of the mAs and/or kVp  according to patient's size, and iterative reconstruction techniques. The  specific techniques utilized on this CT exam have been documented in the  patient's electronic medical record.     ============    FINDINGS:    LUNGS: There are widespread patchy consolidative and groundglass airspace  opacities throughout the lungs bilaterally, overall slight interval improvement  but a similar distribution. Additionally, there are areas of superimposed septal  thickening and faint reticulonodular infiltrates which are somewhat more  pronounced in the lower lung zones. PLEURA: Trace pleural effusions. No pneumothorax. AIRWAY: Unremarkable. MEDIASTINUM: Low-attenuation of the cardiac blood pool relative to the  myocardium compatible with underlying anemia. Right IJ central venous catheter  is noted. Normal heart size. Trace pericardial effusion. Great vessels are  unremarkable. Lymph nodes: There are scattered prominent mediastinal lymph nodes including a  upper right paratracheal node measuring 10 mm. Cluster of prevascular nodes  measure up to 10 mm as well. Assessment for adenopathy is limited due to the  lack of contrast. Prominent axillary lymph nodes are also noted. UPPER ABDOMEN: Unremarkable. OTHER: No aggressive osseous lesions.    ============         Impression IMPRESSION:    1. Slight interval improvement in the patchy bilateral airspace opacities with  overall similar distribution as above suggesting multifocal pneumonia and likely  superimposed interstitial edema. 2.  Small new bilateral pleural effusions and a trace pericardial effusion. 3.  Probable reactive mediastinal adenopathy. Preliminary report provided by on-call radiology resident.       I have independently examined the patient and reviewed all lab studies and imgaing as well as review of nursing notes and physican notes from the past 24 hours. The plan of care has been discussed with the patient/relative and all questions are answered.      Earline Nails MD  October 19, 2017    [facility] AT THE [facility]  [phone]

## 2017-10-19 NOTE — PROGRESS NOTES
Problem: Self Care Deficits Care Plan (Adult)  Goal: *Acute Goals and Plan of Care (Insert Text)  Occupational Therapy Goals  Initiated 10/16/2017 within 7 day(s). 1.  Patient will perform grooming tasks while standing with supervision and minimal verbal cues for breathing techniques. 2.  Patient will perform lower body dressing with modified independence and minimal verbal cues for rest breaks. 3.  Patient will perform functional task in standing for 8 minutes with supervision for balance and good safety awareness. 4.  Patient will perform toilet transfers with supervision. 5.  Patient will perform all aspects of toileting with supervision/set-up. 6.  Patient will participate in upper extremity therapeutic exercise/activities with supervision/set-up for 8 minutes to increase BUE strength for ADLs. 7.  Patient will utilize energy conservation techniques during functional activities with minimal verbal cues. Outcome: Progressing Towards Goal  Occupational Therapy TREATMENT    Patient: Yane Martínez (13 y.o. female)  Date: 10/19/2017  Diagnosis: Respiratory failure with hypoxia (HCC)  Recurrent pneumonia  Recurrent pneumonia  Respiratory failure with hypoxia (HCC) Recurrent pneumonia       Precautions: Fall, Aspiration (oxygen)  Chart, occupational therapy assessment, plan of care, and goals were reviewed. ASSESSMENT:  Pt sleeping upon entry, arouses to voice and motivated to participate w/therapy. Pt tolerates standing ~ 6 minutes participating in therapeutic reaching activity and requires seated rest break. Pt performs toileting ADL w/increase time and SBA 2/2 multiple line mgt. Pt left in chair and reinforced importance of calling for assistance.  No c/o SOB throughout session, pt maintains O2 sats > 98% on 10L O2 nc  EDUCATION Continued education on pacing w/ADLs  Progression toward goals:  [x]          Improving appropriately and progressing toward goals  []          Improving slowly and progressing toward goals  []          Not making progress toward goals and plan of care will be adjusted     PLAN:  Patient continues to benefit from skilled intervention to address the above impairments. Continue treatment per established plan of care. Discharge Recommendations:  Home Health  Further Equipment Recommendations for Discharge:  shower chair     SUBJECTIVE:   Patient stated I need to use the bathroom.     OBJECTIVE DATA SUMMARY:       Cognitive/Behavioral Status:  Neurologic State: Alert  Orientation Level: Oriented X4  Cognition: Follows commands  Safety/Judgement: Awareness of environment, Good awareness of safety precautions, Fall prevention  Functional Mobility and Transfers for ADLs:   Bed Mobility:     Supine to Sit: Supervision   Transfers:  Sit to Stand: Stand-by asssistance   Bed to Chair: Supervision   Toilet Transfer : Supervision   Bathroom Mobility: Stand-by assistance   Balance:  Sitting: Intact   Sitting - Static: Good (unsupported)   Sitting - Dynamic: Good   Standing: With support   Standing - Static: Good   Standing - Dynamic : Good   ADL Intervention:  Grooming  Washing Hands: Supervision/set-up    Lower Body Bathing  Perineal  : Supervision/set-up  Position Performed: Standing    Toileting  Toileting Assistance: Stand-by assistance  Bladder Hygiene: Stand-by assistance  Bowel Hygiene: Stand-by assistance  Clothing Management: Supervision/set-up    Therapeutic Activity:  Pt demonstrates good safety and balance w/reaching activity using RW for support. Pt alternates L/R BUE for item retrieval/toss in multiple planes. No LOB    Therapeutic Exercises:   BUE hand squeezes w/resistive \"glove\" ball    Pain:  Pre Treatment:0  Post Treatment:0  Pain Scale 1: Visual  Pain Intensity 1: 0    Activity Tolerance:    Good    Please refer to the flowsheet for vital signs taken during this treatment.   After treatment:   [x]  Patient left in no apparent distress sitting up in chair  []  Patient left in no apparent distress in bed  [x]  Call bell left within reach  []  Nursing notified  []  Caregiver present  []  Bed alarm activated    KOREY Medrano  Time Calculation: 25 mins

## 2017-10-19 NOTE — CDMP QUERY
Please clarify if this patient is being treated/managed for:    =>Hypoalbuminemia as evidenced by serum albumin of 1.7; dietician following  =>Mild protein calorie malnutrition  =>Other Explanation of clinical findings  =>Unable to Determine (no explanation of clinical findings)    The medical record reflects the following:    Risk: PMHx includes throat CA, dysphagia and PEG tube; recent hospitalization    Clinical Indicators:  Serum albumin down to 1.7  Unintentional weight loss due to inadequate energy intake AEB 19 # weight loss (12%) since June 2017. Treatment: Pt is receiving Osmolite 1.2 narda tube feeding via PEG at 60 ml/hr. Dietician following    Please clarify and document your clinical opinion in the progress notes and discharge summary including the definitive and/or presumptive diagnosis, (suspected or probable), related to the above clinical findings. Please include clinical findings supporting your diagnosis. If you DECLINE this query or would like to communicate with [facility], please utilize the \"[employer] message box\" at the TOP of the Progress Note on the right.       Thank you,  Isa Man RN/CCDS  [phone]

## 2017-10-19 NOTE — PROGRESS NOTES
NUTRITION follow-up/Plan of care    RECOMMENDATIONS:     1. TF: Osmolite 1.2 @ 60 ml/hr to provide 1728 Kcal, 80 g protein and 1181 ml free water  2. Monitor weight, labs and PO intake  3. RD to follow    GOALS:     1. Met/Ongoing: Enteral nutrition meets >75% of protein/calorie needs by 10/24  2. Met/Ongoing: Maintain weight (+/- 1-2 lb by 10/24)    ASSESSMENT:     Weight status is classified as normal per BMI of 24. However, patient with 11% weight loss over past 4 months. Patient is meeting nutrition needs with Osmolite 1.2 @ goal of 60 ml/hr. Labs noted. Nutrition recommendations listed. RD to follow. Nutrition Risk:  []   High [x]  Moderate [] Low    SUBJECTIVE/OBJECTIVE:     Transferred from ICU. Patient with history of throat cancer, dysphagia and PEG/TF. Osmolite 1.2 currently infusing at goal of 60 ml/hr without difficulty. 10 ml residuals over past 24 hours per I/O's. Will monitor. Information Obtained From:   [x] Chart Review  [x] Patient  [] Family/Caregiver  [] Nurse/Physician   [] Patient Rounds/Interdisciplinary Meeting    Diet: TF: Osmolite 1.2 @ 60 ml/hr  No data found. Medications: [x] Reviewed   Encounter Diagnoses     ICD-10-CM ICD-9-CM   1. Acute on chronic respiratory failure with hypoxia (HCC) J96.21 518.84     799.02   2. Acute kidney injury ([facility] [address] N17.9 584.9   3.  HCAP (healthcare-associated pneumonia) J18.9 5     Past Medical History:   Diagnosis Date    Anemia     Cancer ([address]     throat    Coronary artery disease     Diabetes ([address]     Diabetes mellitus ([address]     ETOH abuse     HTN (hypertension)     Hypertension     Hypothyroid     Lupus     Osteonecrosis ([address]     of jaw    PEG adjustment, replacement, or removal     S/P thoracentesis     Throat cancer ([address] 2011    SCC of the nasopharynx, s/p chemo/radiation     Labs:    Lab Results   Component Value Date/Time    Sodium 139 10/19/2017 05:05 AM    Potassium 5.0 10/19/2017 05:05 AM    Chloride 103 10/19/2017 05:05 AM    CO2 32 10/19/2017 05:05 AM    Anion gap 4 10/19/2017 05:05 AM    Glucose 95 10/19/2017 05:05 AM    BUN 24 10/19/2017 05:05 AM    Creatinine 1.40 10/19/2017 05:05 AM    Calcium 8.3 10/19/2017 05:05 AM    Magnesium 1.3 10/06/2017 04:30 AM    Phosphorus 5.1 10/06/2017 04:30 AM    Albumin 1.7 10/18/2017 05:06 AM     Anthropometrics: BMI (calculated): 24   Last 3 Recorded Weights in this Encounter    10/16/17 0615 10/17/17 0635 10/18/17 1416   Weight: 62.8 kg (138 lb 6.4 oz) 62.9 kg (138 lb 9.6 oz) 63.5 kg (140 lb)      Ht Readings from Last 1 Encounters:   10/16/17 5' 4\" (1.626 m)     []  Weight Loss  []  Weight Gain  [x]  Weight Stable   []  New wt n/a on record     Estimated Nutrition Needs:   Kcal: 1755 Kcal  Protein: 86 g    Nutrition Problems Identified:   [x] Suboptimal PO intake   [] Food Allergies  [x] Difficulty chewing/swallowing/poor dentition  [] Constipation/Diarrhea   [] Nausea/Vomiting   [] None  [x] Other: Enteral nutrition via PEG    Plan:   [] Therapeutic Diet  []  Obtained/adjusted food preferences/tolerances and/or snacks options   []  Supplements added   [] Occupational therapy following for feeding techniques  []  HS snack added   []  Modify diet texture   []  Modify diet for food allergies   []  Assist with menu selection   []  Monitor PO intake on meal rounds   [x]  Continue inpatient monitoring and intervention   []  Participated in discharge planning/Interdisciplinary rounds/Team meetings   [x]  Other: Enteral nutrition recommendations listed    Education Needs:   [x] Not appropriate for teaching at this time    [] Identified and addressed    Nutrition Monitoring and Evaluation:   [x] Continue inpatient monitoring and interventions    [] Other:     Neno Irving

## 2017-10-20 LAB
ANION GAP SERPL CALC-SCNC: 5 MMOL/L (ref 3–18)
BASOPHILS # BLD: 0.1 K/UL (ref 0–0.06)
BASOPHILS NFR BLD: 1 % (ref 0–2)
BUN SERPL-MCNC: 26 MG/DL (ref 7–18)
BUN/CREAT SERPL: 17 (ref 12–20)
CALCIUM SERPL-MCNC: 8.2 MG/DL (ref 8.5–10.1)
CHLORIDE SERPL-SCNC: 105 MMOL/L (ref 100–108)
CO2 SERPL-SCNC: 30 MMOL/L (ref 21–32)
CREAT SERPL-MCNC: 1.52 MG/DL (ref 0.6–1.3)
DATE LAST DOSE: ABNORMAL
DIFFERENTIAL METHOD BLD: ABNORMAL
EOSINOPHIL # BLD: 1.1 K/UL (ref 0–0.4)
EOSINOPHIL NFR BLD: 11 % (ref 0–5)
ERYTHROCYTE [DISTWIDTH] IN BLOOD BY AUTOMATED COUNT: 16.1 % (ref 11.6–14.5)
GLUCOSE BLD STRIP.AUTO-MCNC: 112 MG/DL (ref 70–110)
GLUCOSE BLD STRIP.AUTO-MCNC: 123 MG/DL (ref 70–110)
GLUCOSE BLD STRIP.AUTO-MCNC: 124 MG/DL (ref 70–110)
GLUCOSE BLD STRIP.AUTO-MCNC: 132 MG/DL (ref 70–110)
GLUCOSE SERPL-MCNC: 110 MG/DL (ref 74–99)
HCT VFR BLD AUTO: 24.5 % (ref 35–45)
HGB BLD-MCNC: 7.7 G/DL (ref 12–16)
LYMPHOCYTES # BLD: 1.9 K/UL (ref 0.9–3.6)
LYMPHOCYTES NFR BLD: 21 % (ref 21–52)
MCH RBC QN AUTO: 29.6 PG (ref 24–34)
MCHC RBC AUTO-ENTMCNC: 31.4 G/DL (ref 31–37)
MCV RBC AUTO: 94.2 FL (ref 74–97)
MONOCYTES # BLD: 0.7 K/UL (ref 0.05–1.2)
MONOCYTES NFR BLD: 8 % (ref 3–10)
NEUTS SEG # BLD: 5.5 K/UL (ref 1.8–8)
NEUTS SEG NFR BLD: 59 % (ref 40–73)
PLATELET # BLD AUTO: 227 K/UL (ref 135–420)
PMV BLD AUTO: 11.4 FL (ref 9.2–11.8)
POTASSIUM SERPL-SCNC: 5 MMOL/L (ref 3.5–5.5)
RBC # BLD AUTO: 2.6 M/UL (ref 4.2–5.3)
REPORTED DOSE,DOSE: ABNORMAL UNITS
REPORTED DOSE/TIME,TMG: 0
SODIUM SERPL-SCNC: 140 MMOL/L (ref 136–145)
VANCOMYCIN TROUGH SERPL-MCNC: 26.7 UG/ML (ref 10–20)
WBC # BLD AUTO: 9.3 K/UL (ref 4.6–13.2)

## 2017-10-20 PROCEDURE — 94640 AIRWAY INHALATION TREATMENT: CPT

## 2017-10-20 PROCEDURE — 77010033678 HC OXYGEN DAILY

## 2017-10-20 PROCEDURE — 82962 GLUCOSE BLOOD TEST: CPT

## 2017-10-20 PROCEDURE — 74011000250 HC RX REV CODE- 250: Performed by: PHYSICIAN ASSISTANT

## 2017-10-20 PROCEDURE — 80048 BASIC METABOLIC PNL TOTAL CA: CPT | Performed by: INTERNAL MEDICINE

## 2017-10-20 PROCEDURE — 74011250637 HC RX REV CODE- 250/637: Performed by: PHYSICIAN ASSISTANT

## 2017-10-20 PROCEDURE — 80202 ASSAY OF VANCOMYCIN: CPT | Performed by: PHYSICIAN ASSISTANT

## 2017-10-20 PROCEDURE — 74011250636 HC RX REV CODE- 250/636: Performed by: FAMILY MEDICINE

## 2017-10-20 PROCEDURE — 65660000000 HC RM CCU STEPDOWN

## 2017-10-20 PROCEDURE — 74011250637 HC RX REV CODE- 250/637: Performed by: INTERNAL MEDICINE

## 2017-10-20 PROCEDURE — 36415 COLL VENOUS BLD VENIPUNCTURE: CPT | Performed by: INTERNAL MEDICINE

## 2017-10-20 PROCEDURE — 3331090001 HH PPS REVENUE CREDIT

## 2017-10-20 PROCEDURE — 3331090002 HH PPS REVENUE DEBIT

## 2017-10-20 PROCEDURE — 85025 COMPLETE CBC W/AUTO DIFF WBC: CPT | Performed by: INTERNAL MEDICINE

## 2017-10-20 RX ADMIN — Medication 10 ML: at 07:11

## 2017-10-20 RX ADMIN — IPRATROPIUM BROMIDE AND ALBUTEROL SULFATE 3 ML: .5; 3 SOLUTION RESPIRATORY (INHALATION) at 01:31

## 2017-10-20 RX ADMIN — FAMOTIDINE 20 MG: 20 TABLET ORAL at 17:52

## 2017-10-20 RX ADMIN — HYDROCODONE BITARTRATE AND ACETAMINOPHEN 1 TABLET: 7.5; 325 TABLET ORAL at 10:31

## 2017-10-20 RX ADMIN — Medication 10 ML: at 00:35

## 2017-10-20 RX ADMIN — IPRATROPIUM BROMIDE AND ALBUTEROL SULFATE 3 ML: .5; 3 SOLUTION RESPIRATORY (INHALATION) at 20:20

## 2017-10-20 RX ADMIN — LABETALOL HYDROCHLORIDE 100 MG: 100 TABLET, FILM COATED ORAL at 08:45

## 2017-10-20 RX ADMIN — HEPARIN SODIUM 5000 UNITS: 5000 INJECTION, SOLUTION INTRAVENOUS; SUBCUTANEOUS at 14:32

## 2017-10-20 RX ADMIN — FAMOTIDINE 20 MG: 20 TABLET ORAL at 08:45

## 2017-10-20 RX ADMIN — HYDROCODONE BITARTRATE AND ACETAMINOPHEN 1 TABLET: 7.5; 325 TABLET ORAL at 07:06

## 2017-10-20 RX ADMIN — BUDESONIDE 500 MCG: 0.5 INHALANT RESPIRATORY (INHALATION) at 20:20

## 2017-10-20 RX ADMIN — HYDROCODONE BITARTRATE AND ACETAMINOPHEN 1 TABLET: 7.5; 325 TABLET ORAL at 03:51

## 2017-10-20 RX ADMIN — HYDROCODONE BITARTRATE AND ACETAMINOPHEN 1 TABLET: 7.5; 325 TABLET ORAL at 17:52

## 2017-10-20 RX ADMIN — GABAPENTIN 400 MG: 400 CAPSULE ORAL at 22:13

## 2017-10-20 RX ADMIN — HYDROCODONE BITARTRATE AND ACETAMINOPHEN 1 TABLET: 7.5; 325 TABLET ORAL at 14:31

## 2017-10-20 RX ADMIN — GABAPENTIN 400 MG: 400 CAPSULE ORAL at 08:45

## 2017-10-20 RX ADMIN — LINEZOLID 600 MG: 600 TABLET, FILM COATED ORAL at 22:13

## 2017-10-20 RX ADMIN — HEPARIN SODIUM 5000 UNITS: 5000 INJECTION, SOLUTION INTRAVENOUS; SUBCUTANEOUS at 06:40

## 2017-10-20 RX ADMIN — HYDROCODONE BITARTRATE AND ACETAMINOPHEN 1 TABLET: 7.5; 325 TABLET ORAL at 22:13

## 2017-10-20 RX ADMIN — LABETALOL HYDROCHLORIDE 100 MG: 100 TABLET, FILM COATED ORAL at 22:13

## 2017-10-20 RX ADMIN — GABAPENTIN 400 MG: 400 CAPSULE ORAL at 17:52

## 2017-10-20 RX ADMIN — HEPARIN SODIUM 5000 UNITS: 5000 INJECTION, SOLUTION INTRAVENOUS; SUBCUTANEOUS at 21:36

## 2017-10-20 RX ADMIN — LINEZOLID 600 MG: 600 TABLET, FILM COATED ORAL at 08:45

## 2017-10-20 NOTE — ROUTINE PROCESS
Bedside, Verbal and Written shift change report given to Eden Meehan RN (oncoming nurse) by Angela Au RN (offgoing nurse). Report included the following information SBAR, Kardex, Intake/Output, MAR, Recent Results, Med Rec Status, Procedure Summary and Cardiac Rhythm NSR.     3631 - Shift assessment completed. Pt alert and oriented x4. No respiratory distress noted. Pt c/o pain to back and chest, unable to administer pain medication at this time. Call bell within reach, bed in low position. Will continue to monitor.      1010 - Pt c/o pain to back and chest, PRN Norco administered. 1235 - Shift re-assessment completed, no change in pt condition.      1345 - Pt c/o pain to back and chest, PRN Norco administered. 1635 - Shift re-assessment completed, no change in pt condition.     1717 - Pt c/o pain to back and chest, PRN Norco administered.     Bedside, Verbal and Written shift change report given to Angela Au RN (oncoming nurse) by Eden Meehan RN (offgoing nurse). Report included the following information SBAR, Kardex, Intake/Output, MAR, Recent Results, Med Rec Status, Procedure Summary and Cardiac Rhythm NSR.

## 2017-10-20 NOTE — PROGRESS NOTES
1330: Pt not feeling well, refusing PT at this time. 1548: 2nd attempt pt refusing due to pain. Will follow up tomorrow.

## 2017-10-20 NOTE — PROGRESS NOTES
1950 Bedside and Verbal shift change report given to 40 Anjelica Jacobs) by Roderick Ellington RN (offgoing nurse). Report included the following information SBAR, Kardex, Lab results, Flowsheet, telemetry strips and MAR.     2029 Shift assessment completed. Pt resting comfortable. No s/s of pain or discomfort noted or stated. 2240 Requested pain medication. Pt unable to describe pain. Meds crushed and give via peg tub. Tolerated well.       0031 Reassessment completed. No s/s of pain reported or noted. PICC line noted to be dislodged from the patient's chest and resting underneath her. Insertion site was scabbed over and starting healing process. No bleeding noted. When asked what happened she verbalized not recalling pulling it off.      0351 Requested pain medication due to her right side tooth hurting. Pt continues to have difficulty expressing her pain. Meds crushed and give via peg tub. Tolerated well.                                                                                                                                                                                                                                                                                                                                                                                                                                     [id number] Reassessment completed. No s/s of pain reported or noted.     0706 Requested pain medication due to her right side tooth hurting. Pt continues to have difficulty expressing her pain. Meds crushed and give via peg tub. Tolerated well.       0715 Bedside and Verbal shift change report given to LANDEN Jenkins (oncoming nurse) by Cielo Lovell RN (offgoing nurse). Report included the following information SBAR, Kardex, Lab results, Flowsheet, telemetry strips and MAR.

## 2017-10-20 NOTE — PROGRESS NOTES
OT attempted 2x, pt c/o toothache 8/10, politely refusing therapy today. Will cont to follow.     KOREY Franks/L

## 2017-10-20 NOTE — PROGRESS NOTES
INFECTIOUS DISEASE FOLLOW UP NOTE :-     We are available over weekend and plan to f/u Monday. Dr Joseph Gutierrez is on call and can be reached at [phone] if any problem or question for ID prior. Admit Date: 10/11/2017      Current abx Prior abx   Linezolid 10/19-1, Abx 9 Levofloxacin 9/24 - 4  Zosyn 9/24 - 6 Tobramycin 9/30 - 2 Vancomycin 9/24-15  Ciprofloxacin 10/2-9 Doxycycline 10/9 - 2  Zosyn 10/11 -2, Cipro 10/14-2, Vancomycin 10/11 - 8       ASSESSMENT - > REC:      Recurrent Pneumonia  - MRSA, Pseudomonas in spcx 9/24  - same organisms in spcx last confinement [facility]) 8/11-18  - treated with Vancomycin/zosyn x 14 days (ending ~8/25)  - bilateral multifocal opacities - sl improved on CT 10/1  - CXR 10/9 similar to 10/3 (improved infiltrates c/w 9/29)  - CXR 10/11 similar to 10/9  - CXR 10/13 worsening multifocal infiltrate (BUL) but improving on CXR 10/16 (?aspiration) ? recurrent MRSA   - IgG level high  -> Sputum cx with MRSA   -> CXR showing improvement  ->on oral Linezolid now and plan to complete 14 days [ 5 more days ]   -> dc PICC at discharge  -> d/w Dr Sandy Mabry    Respiratory Failure  - recent admission with intubated 9/24, extubated 9/25  - readmitted 10/11 w/ shortness of breath and cough.  Now on HFNC -> per PCCM   H/o throat CA      ETOH abuse     DM     HTN     CAD     GERD     PEG     DELFINA        MICROBIOLOGY:   (9/24     blcx NG x 2                             urcx IP              spcx Ps aeruginosa S AG, cipro R pip-tazo, other beta lactams, MRSA R levaquin vanco rios 1 few CF)  9/26     C diff neg )      10/11             blcx      NGTD x 2                        spcx gs many gpc prs/groups, Cx MRSA vanco rios 1                              LINES AND CATHETERS:   Tunneled RIJ PICC    MEDICATIONS:     Current Facility-Administered Medications   Medication Dose Route Frequency    linezolid (ZYVOX) tablet 600 mg  600 mg Oral Q12H    HYDROcodone-acetaminophen (NORCO) 7.5-325 mg per tablet 1 Tab  1 Tab Oral Q3H PRN    guaiFENesin (ROBITUSSIN) 100 mg/5 mL oral liquid 100 mg  100 mg Oral Q4H PRN    amLODIPine (NORVASC) tablet 10 mg  10 mg Oral DAILY    promethazine (PHENERGAN) 25 mg in 0.9% sodium chloride 50 mL IVPB  25 mg IntraVENous Q6H PRN    gabapentin (NEURONTIN) capsule 400 mg  400 mg Oral TID    albuterol-ipratropium (DUO-NEB) 2.5 MG-0.5 MG/3 ML  3 mL Nebulization Q6H RT    budesonide (PULMICORT) 500 mcg/2 ml nebulizer suspension  500 mcg Nebulization BID RT    0.9% sodium chloride infusion 250 mL  250 mL IntraVENous PRN    labetalol (NORMODYNE) tablet 100 mg  100 mg Oral Q12H    labetalol (NORMODYNE;TRANDATE) injection 20 mg  20 mg IntraVENous Q4H PRN    albuterol (PROVENTIL VENTOLIN) nebulizer solution 2.5 mg  2.5 mg Nebulization Q4H PRN    heparin (porcine) pf 300-500 Units  300-500 Units InterCATHeter PRN    sodium chloride (NS) flush 10 mL  10 mL InterCATHeter Q24H    sodium chloride (NS) flush 10-40 mL  10-40 mL InterCATHeter Q8H    sodium chloride (NS) flush 20 mL  20 mL InterCATHeter Q24H    famotidine (PEPCID) tablet 20 mg  20 mg Oral BID    naloxone (NARCAN) injection 0.4 mg  0.4 mg IntraVENous PRN    heparin (porcine) injection 5,000 Units  5,000 Units SubCUTAneous Q8H    insulin lispro (HUMALOG) injection   SubCUTAneous Q6H    glucose chewable tablet 16 g  4 Tab Oral PRN    glucagon (GLUCAGEN) injection 1 mg  1 mg IntraMUSCular PRN    dextrose (D50W) injection syrg 12.5-25 g  25-50 mL IntraVENous PRN       SUBJECTIVE :     Interval notes reviewed. Afebrile. On NC currently. Feeling overall better. Asking about discharge.     OBJECTIVE     Visit Vitals    /89 (BP 1 Location: Left arm, BP Patient Position: At rest)    Pulse 87    Temp 97.9 °F (36.6 °C)    Resp 18    Ht 5' 4\" (1.626 m)    Wt 63.5 kg (140 lb)    SpO2 100%    BMI 24.03 kg/m2       Temp (24hrs), Av °F (36.7 °C), Min:97.5 °F (36.4 °C), Max:98.3 °F (36.8 °C)      Gen-Fairly built and nourished female on NC, no distress  HENT- No thrush  Chest- Symmetric expansion, Crackles+  CV-S1S2 RR, No JVD, No edema  Abd-Soft, NT, ND, BS+  Skin-No jaundice, cyanosis, clubbing. No decubitus  Muscsk- Normal muscle tone/strength        Labs: Results:   Chemistry Recent Labs      10/20/17   0450  10/19/17   0505  10/18/17   0506   GLU  110*  95  125*   NA  140  139  140   K  5.0  5.0  4.5   CL  105  103  103   CO2  30  32  32   BUN  26*  24*  22*   CREA  1.52*  1.40*  1.27   CA  8.2*  8.3*  7.8*   AGAP  5  4  5   BUCR  17 17 17   AP   --    --   102   TP   --    --   6.9   ALB   --    --   1.7*   GLOB   --    --   5.2*   AGRAT   --    --   0.3*      CBC w/Diff Recent Labs      10/20/17   0450  10/19/17   0505   WBC  9.3  9.8   RBC  2.60*  2.69*   HGB  7.7*  7.8*   HCT  24.5*  25.2*   PLT  227  255   GRANS  59  57   LYMPH  21  23   EOS  11*  11*          RADIOLOGY :          Imaging    Results from Hospital Encounter encounter on 10/11/17   XR CHEST PORT   Narrative INDICATION:  PNA    COMPARISON:  10/16/2017    FINDINGS: A portable AP radiograph of the chest was obtained. The patient is on  a cardiac monitor. Tunneled right neck central line in stable position. Slight  progression of patchy bilateral diffuse parenchymal infiltrates with upper lobe  dominance. No pneumothorax. Indistinct pulmonary vasculature. Mediastinal  contour stable. No sizable pleural effusion. Impression IMPRESSION: Apparent slight interval worsening. Some of the interval difference  may be related to positioning. Results from [place] encounter on 09/24/17   CT CHEST WO CONT   Narrative COMPARISON: September 24, 2017. INDICATION: MRSA, pneumonia, prolonged oxygen requirements    TECHNIQUE: Axial was performed through the chest without contrast.  Coronal and  sagittal reformations were generated.  One or more dose reduction techniques were  used on this CT: automated exposure control, adjustment of the mAs and/or kVp  according to patient's size, and iterative reconstruction techniques. The  specific techniques utilized on this CT exam have been documented in the  patient's electronic medical record.     ============    FINDINGS:    LUNGS: There are widespread patchy consolidative and groundglass airspace  opacities throughout the lungs bilaterally, overall slight interval improvement  but a similar distribution. Additionally, there are areas of superimposed septal  thickening and faint reticulonodular infiltrates which are somewhat more  pronounced in the lower lung zones. PLEURA: Trace pleural effusions. No pneumothorax. AIRWAY: Unremarkable. MEDIASTINUM: Low-attenuation of the cardiac blood pool relative to the  myocardium compatible with underlying anemia. Right IJ central venous catheter  is noted. Normal heart size. Trace pericardial effusion. Great vessels are  unremarkable. Lymph nodes: There are scattered prominent mediastinal lymph nodes including a  upper right paratracheal node measuring 10 mm. Cluster of prevascular nodes  measure up to 10 mm as well. Assessment for adenopathy is limited due to the  lack of contrast. Prominent axillary lymph nodes are also noted. UPPER ABDOMEN: Unremarkable. OTHER: No aggressive osseous lesions.    ============         Impression IMPRESSION:    1. Slight interval improvement in the patchy bilateral airspace opacities with  overall similar distribution as above suggesting multifocal pneumonia and likely  superimposed interstitial edema. 2.  Small new bilateral pleural effusions and a trace pericardial effusion. 3.  Probable reactive mediastinal adenopathy. Preliminary report provided by on-call radiology resident. I have independently examined the patient and reviewed all lab studies and imgaing as well as review of nursing notes and physican notes from the past 24 hours.  The plan of care has been discussed with the patient/relative and all questions are answered.      Ariadna Jones MD  October 20, 2017    [facility] THE [facility]  [phone]

## 2017-10-20 NOTE — PROGRESS NOTES
Chart reviewed. Plan remains home with home health when medically stable, will need orders, thanks. Will cont to follow for further needs. Linda Lynn RN,ext. [phone].

## 2017-10-20 NOTE — CONSULTS
[facility]  Name: Jayy Araujo   : 1965   MRN: [id number]   Date: 10/20/2017    []I have reviewed the flowsheet and previous days notes. []The patient is unable to give any meaningful history or review of systems because the patient is:  []Intubated []Sedated   []Unresponsive      []The patient is critically ill on      []Mechanical ventilation []Pressors   []BiPAP []   S: Feels better, less cough, sputu is scant and clearing, No CP              ROS:A comprehensive review of systems was negative except for that written in the HPI. Events and notes from last 24 hours reviewed. Care plan discussed on multidisciplinary rounds. Vital Signs:    Visit Vitals    /89    Pulse 87    Temp 97.9 °F (36.6 °C)    Resp 18    Ht 5' 4\" (1.626 m)    Wt 63.5 kg (140 lb)    SpO2 100%    BMI 24.03 kg/m2       O2 Device: Hi flow nasal cannula   O2 Flow Rate (L/min): 10 l/min   Temp (24hrs), Av °F (36.7 °C), Min:97.5 °F (36.4 °C), Max:98.3 °F (36.8 °C)       Intake/Output:   Last shift:         Last 3 shifts: 10/18 1901 - 10/20 0700  In: 5639 [P.O.:240; I.V.:250]  Out: 0     Intake/Output Summary (Last 24 hours) at 10/20/17 0818  Last data filed at 10/20/17 0031   Gross per 24 hour   Intake             1215 ml   Output                0 ml   Net             1215 ml     Hemodynamics:       :      Ventilator Settings:        Ventilator Volumes  Vt Spont (ml): 393 ml  Ve Observed (l/min): 7.5 l/min       Physical Exam:    General: in no apparent distress, non-toxic, in no respiratory distress and acyanotic and alert   HEENT: Normal   Neck: No abnormally enlarged lymph nodes.    Chest: increased AP diameter   Lungs: decreased air exchange bilaterally, distant lung sounds and prolonged expiration  no dullness/ tenderness/ rash   Heart: Regular rate and rhythm or S1S2 present   Abdomen: non distended, bowel sounds normoactive, tympanic, abdomen is soft without significant tenderness, masses, organomegaly or guarding   Extremity: Trace edema   Neuro: alert   Skin: Skin color, texture, turgor normal. No rashes or lesions      DATA:   Current Facility-Administered Medications   Medication Dose Route Frequency    linezolid (ZYVOX) tablet 600 mg  600 mg Oral Q12H    HYDROcodone-acetaminophen (NORCO) 7.5-325 mg per tablet 1 Tab  1 Tab Oral Q3H PRN    guaiFENesin (ROBITUSSIN) 100 mg/5 mL oral liquid 100 mg  100 mg Oral Q4H PRN    amLODIPine (NORVASC) tablet 10 mg  10 mg Oral DAILY    promethazine (PHENERGAN) 25 mg in 0.9% sodium chloride 50 mL IVPB  25 mg IntraVENous Q6H PRN    gabapentin (NEURONTIN) capsule 400 mg  400 mg Oral TID    albuterol-ipratropium (DUO-NEB) 2.5 MG-0.5 MG/3 ML  3 mL Nebulization Q6H RT    budesonide (PULMICORT) 500 mcg/2 ml nebulizer suspension  500 mcg Nebulization BID RT    0.9% sodium chloride infusion 250 mL  250 mL IntraVENous PRN    labetalol (NORMODYNE) tablet 100 mg  100 mg Oral Q12H    labetalol (NORMODYNE;TRANDATE) injection 20 mg  20 mg IntraVENous Q4H PRN    albuterol (PROVENTIL VENTOLIN) nebulizer solution 2.5 mg  2.5 mg Nebulization Q4H PRN    heparin (porcine) pf 300-500 Units  300-500 Units InterCATHeter PRN    sodium chloride (NS) flush 10 mL  10 mL InterCATHeter Q24H    sodium chloride (NS) flush 10-40 mL  10-40 mL InterCATHeter Q8H    sodium chloride (NS) flush 20 mL  20 mL InterCATHeter Q24H    famotidine (PEPCID) tablet 20 mg  20 mg Oral BID    naloxone (NARCAN) injection 0.4 mg  0.4 mg IntraVENous PRN    heparin (porcine) injection 5,000 Units  5,000 Units SubCUTAneous Q8H    insulin lispro (HUMALOG) injection   SubCUTAneous Q6H    glucose chewable tablet 16 g  4 Tab Oral PRN    glucagon (GLUCAGEN) injection 1 mg  1 mg IntraMUSCular PRN    dextrose (D50W) injection syrg 12.5-25 g  25-50 mL IntraVENous PRN       Telemetry: []Sinus []A-flutter []Paced    []A-fib []Multiple PVCs                  Labs:  Recent Labs      10/20/17   7466 10/19/17   0505   WBC  9.3  9.8   HGB  7.7*  7.8*   HCT  24.5*  25.2*   PLT  227  255     Recent Labs      10/20/17   0450  10/19/17   0505  10/18/17   0506   NA  140  139  140   K  5.0  5.0  4.5   CL  105  103  103   CO2  30  32  32   GLU  110*  95  125*   BUN  26*  24*  22*   CREA  1.52*  1.40*  1.27   CA  8.2*  8.3*  7.8*   ALB   --    --   1.7*   SGOT   --    --   17   ALT   --    --   11*     No results for input(s): PH, PCO2, PO2, HCO3, FIO2 in the last 72 hours.     Imaging:  [x]I have personally reviewed the patients radiographs  []Radiographs reviewed with radiologist   [] No CXR study available for review today   [x]No change from prior, tubes and lines in adequate position  []Improved   []Worsening       IMPRESSION:   Patient Active Problem List   Diagnosis Code    Acute respiratory failure with hypercapnia ([address] J96.02    Sepsis ([address] A41.9    HCAP (healthcare-associated pneumonia) J18.9    Hypertension I10    Diabetes mellitus ([address] E11.9    SOB (shortness of breath) R06.02    Dysphagia R13.10    History of throat cancer Z85.819    Recurrent pneumonia J18.9    Respiratory failure with hypoxia ([address] J96.91    KEATON (acute kidney injury) ([address] N17.9 ·     ·    PLAN:   · Continue therapy  · Dr Carrera Mention will follow up intermittently     The patient is: [] acutely ill Risk of deterioration: [x] moderate    [] critically ill  [] high     []See my orders for details    My assessment, plan of care, findings, medications, side effects etc were discussed with:  []nursing []PT/OT    [x]respiratory therapy [x]   []family []     [x]Total critical care time exclusive of procedures 25 minutes  Nakul Kelley MD

## 2017-10-20 NOTE — ANCILLARY DISCHARGE INSTRUCTIONS
[facility]/[address] Readmission Patient Gregg Botello    The following concerns the patient's last admission and the events following discharge from the hospital:      Date of last hospital discharge:  10/09/17    Date of Readmission:  10/11/17    Reason for Readmission:  Respiratory failure    Were you kept informed about your diagnoses during your stay in the hospital and what was being done to further evaluate and treat them? [] None of time   [] Some of time   [] Most of time   [x] All of time   At the time of your discharge, did someone talk to you about:  1. What your diagnoses were? 2. What tests or procedures needed to be done after you left? 3. What to watch out for regarding worsening of your disease? 4. What to do if you were experiencing worsening of your disease? 5. Who to contact (and how) if you were experiencing worsening of your disease? [x] Yes  [] No  [] Not Sure   [x] Yes  [] No  [] Not Sure   [x] Yes  [] No  [] Not Sure   [x] Yes  [] No  [] Not Sure   [x] Yes  [] No  [] Not Sure   Were you asked about your understanding of these instructions? [x] Yes  [] No  [] Not Sure   Were the discharge instructions written down and given to you before you left? [x] Yes  [] No  [] Not Sure   Were the written discharge instructions and plans easy to read and understand? [x] Yes  [] No  [] Not Sure   How confident were you about understanding these instructions? [x] Very confident   [] Somewhat confident   [] Not confident   [] Not Sure   Do you have a regular doctor who takes care of you for most things? [x] Yes  [] No  [] Not Sure   At the time of your discharge, did someone talk to you about which medications to take when you left and which ones to discontinue? [x] Yes  [] No  [] Not Sure   Did you take your medications as they were prescribed? [x] Yes  [] No  [] Not Sure   If not, what were the difficulties you experienced with taking your medications?    Comment: After you left the hospital, did you have an appointment with your doctor? [x] Yes  [x] No  [] Not Sure       If yes, who made the appointment? [x] I did    [] My family   [] Hospital staff   [] Not Sure   Were you able to get to this appointment?    [] Yes  [x] No  [] Not Sure   How do you think you became sick enough to be readmitted to the hospital?   Comment:          Source:  Modified from [facility], [place], [place]

## 2017-10-20 NOTE — PROGRESS NOTES
6947 -- Bedside, Verbal and Written shift change report given to [address] (oncoming nurse) by Benedicto Perkins (offgoing nurse). Report included the following information SBAR, Kardex, Intake/Output, MAR and Recent Results. Pt has pulled out PICC overnight, will contact RAD RN to reinsert. 9985 -- AM medications administered, pt tolerated with ease, will continue to monitor. Pt has Amlodipine allergy medication was held. 1030 -- PRN pain medication administered. Additional dose pulled after wasted on floor in error, done under dual control with Kimberlyn Olmos RN. 1100 -- RAD RN at bedside, IV restarted. 1140 -- IDR completed with pt, plan is continue care throughout weekend, taper pt to home O2 level from current 10 to 4L. 1230 -- Shift reassessment, pt condition changed, now has IV access, will continue to monitor. 1615 -- Shift reassessment, pt condition unchanged, will continue to monitor. 1800 -- PRN pain medication administered, will continue to monitor. 1900 -- Bedside, Verbal and Written shift change report given to [address] (oncoming nurse) by [address] (offgoing nurse).   Report included the following information SBAR, Kardex, Intake/Output, MAR and Recent Results

## 2017-10-20 NOTE — PROGRESS NOTES
Hospitalist Progress Note  Bard Leon MD  Internal medicine/ Hospitalist    Daily Progress Note: 10/20/2017 5:34 PM      Interval history / Subjective:   Admitted for recurrent pneumonia,resp failure,KEATON. Feeling better today,she is now down to 10 liters nc. Asking when she is going home.     Current Facility-Administered Medications   Medication Dose Route Frequency    linezolid (ZYVOX) tablet 600 mg  600 mg Oral Q12H    HYDROcodone-acetaminophen (NORCO) 7.5-325 mg per tablet 1 Tab  1 Tab Oral Q3H PRN    guaiFENesin (ROBITUSSIN) 100 mg/5 mL oral liquid 100 mg  100 mg Oral Q4H PRN    amLODIPine (NORVASC) tablet 10 mg  10 mg Oral DAILY    promethazine (PHENERGAN) 25 mg in 0.9% sodium chloride 50 mL IVPB  25 mg IntraVENous Q6H PRN    gabapentin (NEURONTIN) capsule 400 mg  400 mg Oral TID    albuterol-ipratropium (DUO-NEB) 2.5 MG-0.5 MG/3 ML  3 mL Nebulization Q6H RT    budesonide (PULMICORT) 500 mcg/2 ml nebulizer suspension  500 mcg Nebulization BID RT    0.9% sodium chloride infusion 250 mL  250 mL IntraVENous PRN    labetalol (NORMODYNE) tablet 100 mg  100 mg Oral Q12H    labetalol (NORMODYNE;TRANDATE) injection 20 mg  20 mg IntraVENous Q4H PRN    albuterol (PROVENTIL VENTOLIN) nebulizer solution 2.5 mg  2.5 mg Nebulization Q4H PRN    heparin (porcine) pf 300-500 Units  300-500 Units InterCATHeter PRN    famotidine (PEPCID) tablet 20 mg  20 mg Oral BID    naloxone (NARCAN) injection 0.4 mg  0.4 mg IntraVENous PRN    heparin (porcine) injection 5,000 Units  5,000 Units SubCUTAneous Q8H    insulin lispro (HUMALOG) injection   SubCUTAneous Q6H    glucose chewable tablet 16 g  4 Tab Oral PRN    glucagon (GLUCAGEN) injection 1 mg  1 mg IntraMUSCular PRN    dextrose (D50W) injection syrg 12.5-25 g  25-50 mL IntraVENous PRN          Objective:     Visit Vitals    /90 (BP 1 Location: Left arm, BP Patient Position: At rest)    Pulse 91    Temp 98 °F (36.7 °C)    Resp 16    Ht 5' 4\" (1.626 m)    Wt 63.5 kg (140 lb)    SpO2 100%    BMI 24.03 kg/m2    O2 Flow Rate (L/min): 10 l/min O2 Device: Nasal cannula    Temp (24hrs), Av °F (36.7 °C), Min:97.5 °F (36.4 °C), Max:98.3 °F (36.8 °C)      10/20 0701 - 10/20 1900  In: 903   Out: 1000 [Urine:1000]  10/18 1901 - 10/20 0700  In: 7851 [P.O.:240; I.V.:250]  Out: 0   Physical Exam:  General appearance: alert, cooperative, no distress, appears stated age  Head: Normocephalic, without obvious abnormality, atraumatic  Neck: supple, trachea midline  Lungs:B/L crackles R>L  Heart: regular rate and rhythm, S1, S2 normal, no murmur, click, rub or gallop  Abdomen: soft, non-tender. Bowel sounds normal. No masses,  no organomegaly  Extremities: extremities normal, atraumatic, no cyanosis or edema  Skin: Skin color, texture, turgor normal. No rashes or lesions    Data Review    Recent Results (from the past 12 hour(s))   CBC WITH AUTOMATED DIFF    Collection Time: 10/20/17  4:50 AM   Result Value Ref Range    WBC 9.3 4.6 - 13.2 K/uL    RBC 2.60 (L) 4.20 - 5.30 M/uL    HGB 7.7 (L) 12.0 - 16.0 g/dL    HCT 24.5 (L) 35.0 - 45.0 %    MCV 94.2 74.0 - 97.0 FL    MCH 29.6 24.0 - 34.0 PG    MCHC 31.4 31.0 - 37.0 g/dL    RDW 16.1 (H) 11.6 - 14.5 %    PLATELET 305 125 - 977 K/uL    MPV 11.4 9.2 - 11.8 FL    NEUTROPHILS 59 40 - 73 %    LYMPHOCYTES 21 21 - 52 %    MONOCYTES 8 3 - 10 %    EOSINOPHILS 11 (H) 0 - 5 %    BASOPHILS 1 0 - 2 %    ABS. NEUTROPHILS 5.5 1.8 - 8.0 K/UL    ABS. LYMPHOCYTES 1.9 0.9 - 3.6 K/UL    ABS. MONOCYTES 0.7 0.05 - 1.2 K/UL    ABS. EOSINOPHILS 1.1 (H) 0.0 - 0.4 K/UL    ABS.  BASOPHILS 0.1 (H) 0.0 - 0.06 K/UL    DF AUTOMATED     METABOLIC PANEL, BASIC    Collection Time: 10/20/17  4:50 AM   Result Value Ref Range    Sodium 140 136 - 145 mmol/L    Potassium 5.0 3.5 - 5.5 mmol/L    Chloride 105 100 - 108 mmol/L    CO2 30 21 - 32 mmol/L    Anion gap 5 3.0 - 18 mmol/L    Glucose 110 (H) 74 - 99 mg/dL    BUN 26 (H) 7.0 - 18 MG/DL    Creatinine 1.52 (H) 0.6 - 1.3 MG/DL    BUN/Creatinine ratio 17 12 - 20      GFR est AA 44 (L) >60 ml/min/1.73m2    GFR est non-AA 36 (L) >60 ml/min/1.73m2    Calcium 8.2 (L) 8.5 - 10.1 MG/DL   GLUCOSE, POC    Collection Time: 10/20/17  7:16 AM   Result Value Ref Range    Glucose (POC) 112 (H) 70 - 110 mg/dL   GLUCOSE, POC    Collection Time: 10/20/17 11:26 AM   Result Value Ref Range    Glucose (POC) 123 (H) 70 - 110 mg/dL         Assessment/Plan:     Principal Problem:    Recurrent pneumonia (10/11/2017)    Active Problems:    Respiratory failure with hypoxia ([facility] [address] (10/11/2017)      KEATON (acute kidney injury) ([facility] (10/11/2017)        Care Plan  1)  Recurrent PNA                        - recent extended hospital stay for MRSA,Pseudomonal PNA about 3 weeks                        - ID on board,stopped zosyn and vanc. Now on Linezolid since 10/19                        - on hi flow, weaning,on 10 liters and pt is comfortable.     2)  Acute on chronic resp failure                        - 2/2 above    3)KEATON:                                      -Resolved  DVT prophylaxis:per protocol  Full code  Disposition:tbd

## 2017-10-21 LAB
GLUCOSE BLD STRIP.AUTO-MCNC: 129 MG/DL (ref 70–110)
GLUCOSE BLD STRIP.AUTO-MCNC: 135 MG/DL (ref 70–110)
GLUCOSE BLD STRIP.AUTO-MCNC: 137 MG/DL (ref 70–110)
GLUCOSE BLD STRIP.AUTO-MCNC: 140 MG/DL (ref 70–110)
GLUCOSE BLD STRIP.AUTO-MCNC: 149 MG/DL (ref 70–110)

## 2017-10-21 PROCEDURE — 3331090001 HH PPS REVENUE CREDIT

## 2017-10-21 PROCEDURE — 94640 AIRWAY INHALATION TREATMENT: CPT

## 2017-10-21 PROCEDURE — 74011250636 HC RX REV CODE- 250/636: Performed by: FAMILY MEDICINE

## 2017-10-21 PROCEDURE — 3331090002 HH PPS REVENUE DEBIT

## 2017-10-21 PROCEDURE — 77010033711 HC HIGH FLOW OXYGEN

## 2017-10-21 PROCEDURE — 74011250637 HC RX REV CODE- 250/637: Performed by: INTERNAL MEDICINE

## 2017-10-21 PROCEDURE — 74011250637 HC RX REV CODE- 250/637: Performed by: PHYSICIAN ASSISTANT

## 2017-10-21 PROCEDURE — 94760 N-INVAS EAR/PLS OXIMETRY 1: CPT

## 2017-10-21 PROCEDURE — 77030018846 HC SOL IRR STRL H20 ICUM -A

## 2017-10-21 PROCEDURE — 82962 GLUCOSE BLOOD TEST: CPT

## 2017-10-21 PROCEDURE — 74011000250 HC RX REV CODE- 250: Performed by: PHYSICIAN ASSISTANT

## 2017-10-21 PROCEDURE — 65660000000 HC RM CCU STEPDOWN

## 2017-10-21 RX ORDER — FAMOTIDINE 20 MG/1
20 TABLET, FILM COATED ORAL DAILY
Status: DISCONTINUED | OUTPATIENT
Start: 2017-10-22 | End: 2017-10-23 | Stop reason: HOSPADM

## 2017-10-21 RX ADMIN — BUDESONIDE 500 MCG: 0.5 INHALANT RESPIRATORY (INHALATION) at 19:46

## 2017-10-21 RX ADMIN — HYDROCODONE BITARTRATE AND ACETAMINOPHEN 1 TABLET: 7.5; 325 TABLET ORAL at 07:13

## 2017-10-21 RX ADMIN — HYDROCODONE BITARTRATE AND ACETAMINOPHEN 1 TABLET: 7.5; 325 TABLET ORAL at 02:10

## 2017-10-21 RX ADMIN — LINEZOLID 600 MG: 600 TABLET, FILM COATED ORAL at 21:17

## 2017-10-21 RX ADMIN — AMLODIPINE BESYLATE 10 MG: 10 TABLET ORAL at 09:44

## 2017-10-21 RX ADMIN — LABETALOL HYDROCHLORIDE 100 MG: 100 TABLET, FILM COATED ORAL at 09:44

## 2017-10-21 RX ADMIN — GABAPENTIN 400 MG: 400 CAPSULE ORAL at 09:44

## 2017-10-21 RX ADMIN — HEPARIN SODIUM 5000 UNITS: 5000 INJECTION, SOLUTION INTRAVENOUS; SUBCUTANEOUS at 21:15

## 2017-10-21 RX ADMIN — HYDROCODONE BITARTRATE AND ACETAMINOPHEN 1 TABLET: 7.5; 325 TABLET ORAL at 13:59

## 2017-10-21 RX ADMIN — HYDROCODONE BITARTRATE AND ACETAMINOPHEN 1 TABLET: 7.5; 325 TABLET ORAL at 19:37

## 2017-10-21 RX ADMIN — LABETALOL HYDROCHLORIDE 100 MG: 100 TABLET, FILM COATED ORAL at 21:17

## 2017-10-21 RX ADMIN — BUDESONIDE 500 MCG: 0.5 INHALANT RESPIRATORY (INHALATION) at 08:22

## 2017-10-21 RX ADMIN — GABAPENTIN 400 MG: 400 CAPSULE ORAL at 15:39

## 2017-10-21 RX ADMIN — IPRATROPIUM BROMIDE AND ALBUTEROL SULFATE 3 ML: .5; 3 SOLUTION RESPIRATORY (INHALATION) at 02:10

## 2017-10-21 RX ADMIN — LINEZOLID 600 MG: 600 TABLET, FILM COATED ORAL at 09:44

## 2017-10-21 RX ADMIN — HYDROCODONE BITARTRATE AND ACETAMINOPHEN 1 TABLET: 7.5; 325 TABLET ORAL at 10:49

## 2017-10-21 RX ADMIN — FAMOTIDINE 20 MG: 20 TABLET ORAL at 09:44

## 2017-10-21 RX ADMIN — HEPARIN SODIUM 5000 UNITS: 5000 INJECTION, SOLUTION INTRAVENOUS; SUBCUTANEOUS at 13:59

## 2017-10-21 RX ADMIN — IPRATROPIUM BROMIDE AND ALBUTEROL SULFATE 3 ML: .5; 3 SOLUTION RESPIRATORY (INHALATION) at 08:22

## 2017-10-21 RX ADMIN — HEPARIN SODIUM 5000 UNITS: 5000 INJECTION, SOLUTION INTRAVENOUS; SUBCUTANEOUS at 05:29

## 2017-10-21 RX ADMIN — GABAPENTIN 400 MG: 400 CAPSULE ORAL at 21:17

## 2017-10-21 RX ADMIN — IPRATROPIUM BROMIDE AND ALBUTEROL SULFATE 3 ML: .5; 3 SOLUTION RESPIRATORY (INHALATION) at 19:46

## 2017-10-21 RX ADMIN — IPRATROPIUM BROMIDE AND ALBUTEROL SULFATE 3 ML: .5; 3 SOLUTION RESPIRATORY (INHALATION) at 13:14

## 2017-10-21 NOTE — PROGRESS NOTES
1130: Pt sleeping sound did not arouse when name called. Will follow up later. 1440:  Pt still sleeping sound will follow up tomorrow.

## 2017-10-21 NOTE — PROGRESS NOTES
Hospitalist Progress Note  Avila Baer MD  Internal medicine/ Hospitalist    Daily Progress Note: 10/21/2017 5:34 PM      Interval history / Subjective:   Admitted for recurrent pneumonia,resp failure,KEATON. Feeling better today,she is now down to 8 liters nc. Asking when she is going home.     Current Facility-Administered Medications   Medication Dose Route Frequency    [START ON 10/22/2017] famotidine (PEPCID) tablet 20 mg  20 mg Oral DAILY    linezolid (ZYVOX) tablet 600 mg  600 mg Oral Q12H    HYDROcodone-acetaminophen (NORCO) 7.5-325 mg per tablet 1 Tab  1 Tab Oral Q3H PRN    guaiFENesin (ROBITUSSIN) 100 mg/5 mL oral liquid 100 mg  100 mg Oral Q4H PRN    amLODIPine (NORVASC) tablet 10 mg  10 mg Oral DAILY    promethazine (PHENERGAN) 25 mg in 0.9% sodium chloride 50 mL IVPB  25 mg IntraVENous Q6H PRN    gabapentin (NEURONTIN) capsule 400 mg  400 mg Oral TID    albuterol-ipratropium (DUO-NEB) 2.5 MG-0.5 MG/3 ML  3 mL Nebulization Q6H RT    budesonide (PULMICORT) 500 mcg/2 ml nebulizer suspension  500 mcg Nebulization BID RT    0.9% sodium chloride infusion 250 mL  250 mL IntraVENous PRN    labetalol (NORMODYNE) tablet 100 mg  100 mg Oral Q12H    labetalol (NORMODYNE;TRANDATE) injection 20 mg  20 mg IntraVENous Q4H PRN    albuterol (PROVENTIL VENTOLIN) nebulizer solution 2.5 mg  2.5 mg Nebulization Q4H PRN    heparin (porcine) pf 300-500 Units  300-500 Units InterCATHeter PRN    naloxone (NARCAN) injection 0.4 mg  0.4 mg IntraVENous PRN    heparin (porcine) injection 5,000 Units  5,000 Units SubCUTAneous Q8H    insulin lispro (HUMALOG) injection   SubCUTAneous Q6H    glucose chewable tablet 16 g  4 Tab Oral PRN    glucagon (GLUCAGEN) injection 1 mg  1 mg IntraMUSCular PRN    dextrose (D50W) injection syrg 12.5-25 g  25-50 mL IntraVENous PRN          Objective:     Visit Vitals    /54 (BP 1 Location: Left arm, BP Patient Position: At rest)    Pulse 98    Temp 97.8 °F (36.6 °C)    Resp 17    Ht 5' 4\" (1.626 m)    Wt 61.6 kg (135 lb 14.4 oz)    SpO2 92%    BMI 23.33 kg/m2    O2 Flow Rate (L/min): 8 l/min O2 Device: Hi flow nasal cannula    Temp (24hrs), Av.4 °F (36.9 °C), Min:97.8 °F (36.6 °C), Max:98.7 °F (37.1 °C)      10/21 0701 - 10/21 1900  In: 670   Out: -   10/19 1901 - 10/21 0700  In: 2670   Out: 1200 [Urine:1200]  Physical Exam:  General appearance: alert, cooperative, no distress, appears stated age  Head: Normocephalic, without obvious abnormality, atraumatic  Neck: supple, trachea midline  Lungs:B/L crackles R>L  Heart: regular rate and rhythm, S1, S2 normal, no murmur, click, rub or gallop  Abdomen: soft, non-tender. Bowel sounds normal. No masses,  no organomegaly  Extremities: extremities normal, atraumatic, no cyanosis or edema  Skin: Skin color, texture, turgor normal. No rashes or lesions    Data Review    Recent Results (from the past 12 hour(s))   GLUCOSE, POC    Collection Time: 10/21/17  6:16 AM   Result Value Ref Range    Glucose (POC) 140 (H) 70 - 110 mg/dL         Assessment/Plan:     Principal Problem:    Recurrent pneumonia (10/11/2017)    Active Problems:    Respiratory failure with hypoxia ([facility] [address] (10/11/2017)      [facility] (10[facility]11/2017)        Care Plan  1)  Recurrent PNA                        - recent extended hospital stay for MRSA,Pseudomonal PNA about 3 weeks                        - ID on board,stopped zosyn and vanc. Now on Linezolid since 10/19                        - on hi flow, weaning,on 8 liters now and pt is comfortable. Patient uses 2 liters at home. I thinks that she can go with 6 liters.      2)  Acute on chronic resp failure                        - 2/2 above    3)KEATON:                                      -Resolved  DVT prophylaxis:per protocol  Full code  Disposition:tbd

## 2017-10-21 NOTE — PROGRESS NOTES
Shift Progress Note:  Assumed care of patient in bed with visitor @ bedside remains on high flow nasal cannula. Patient able to ambulate without difficulty to bathroom. Medicated x3 for pain, VSS, call bell within reach. Tolerating peg tube feeds.   Patient Vitals for the past 12 hrs:   Temp Pulse Resp BP SpO2   10/21/17 0454 98.4 °F (36.9 °C) 77 18 140/84 98 %   10/21/17 0004 98.7 °F (37.1 °C) 78 17 135/82 100 %   10/20/17 2002 98.6 °F (37 °C) (!) 102 17 148/88 100 %

## 2017-10-21 NOTE — PROGRESS NOTES
Problem: Falls - Risk of  Goal: *Absence of Falls  Document Kapil Fall Risk and appropriate interventions in the flowsheet.    Outcome: Progressing Towards Goal  Fall Risk Interventions:  Mobility Interventions: Patient to call before getting OOB, Strengthening exercises (ROM-active/passive)         Medication Interventions: Patient to call before getting OOB    Elimination Interventions: Call light in reach

## 2017-10-21 NOTE — ROUTINE PROCESS
Bedside and Verbal shift change report given to [address] (oncoming nurse) by Earlnie Siddiqi RN   (offgoing nurse). Report included the following information SBAR, Kardex, Intake/Output, MAR and Recent Results.

## 2017-10-21 NOTE — PROGRESS NOTES
1881 -- Bedside, Verbal and Written shift change report given to [address]  (oncoming nurse) by Corina Baez (offgoing nurse). Report included the following information SBAR, Kardex, Intake/Output, MAR and Recent Results. Will attempt to remove Amlodipine from allergy list.     0945 -- AM medications administered, pt tolerated with ease, will continue to monitor.       1049 -- PRN pain medication administered, will continue to monitor. 1200  -- Shift reassessment, pt condition unchanged, will continue to monitor. 1400 --  PRN pain medication administered, will continue to monitor. 1645 -- Shift reassessment, pt condition unchanged, will continue to monitor.      1900 -- Bedside, Verbal and Written shift change report given to [address] (oncoming nurse) by Lv Thomas nurse).   Report included the following information SBAR, Kardex, Intake/Output, MAR and Recent Results

## 2017-10-21 NOTE — PROGRESS NOTES
PCCM  Progress   10/21/2017     A&A, no distress says breathing is better    Coughing up thick dark green sputum (I saw)     Last CXR:  INDICATION:  PNA  COMPARISON:  10/16/2017  FINDINGS: A portable AP radiograph of the chest was obtained. The patient is on  a cardiac monitor. Tunneled right neck central line in stable position. Slight  progression of patchy bilateral diffuse parenchymal infiltrates with upper lobe  dominance. No pneumothorax. Indistinct pulmonary vasculature. Mediastinal  contour stable. No sizable pleural effusion. IMPRESSION: Apparent slight interval worsening. Some of the interval difference  may be related to positioning.                                     PEx:   No JVD or HJR  tachy no appreciable M  Lungs: bb crackles most clear after DI; no wheeze  abd obese, g-tube, soft  Extr: thin ankles no edema    IMPRESSION:  Diffuse pulm infiltrates  CHF component seems to be resolved/ much improved  Purulent sputum c/w LRTI   Others per prior notes    PLAN:  Will see again Monday as needed  Please call for any Q or concerns    -[facility]   [phone]

## 2017-10-21 NOTE — PROGRESS NOTES
Pharmacy adjusting dose for Famotidine (Pepcid) per renal protocol. Was on a regimen of: 20 mg PO q12h. Labs:   Serum Creatinine - 1.52 mg/dl  CrCl - 38.7 ml/min    Plan:  Changed Famotidine to 20 mg PO q24h. Pharmacy to follow and will redose based on renal function changes. Thanks.

## 2017-10-22 LAB
GLUCOSE BLD STRIP.AUTO-MCNC: 113 MG/DL (ref 70–110)
GLUCOSE BLD STRIP.AUTO-MCNC: 130 MG/DL (ref 70–110)
GLUCOSE BLD STRIP.AUTO-MCNC: 84 MG/DL (ref 70–110)

## 2017-10-22 PROCEDURE — 77030020263 HC SOL INJ SOD CL0.9% LFCR 1000ML

## 2017-10-22 PROCEDURE — 65660000000 HC RM CCU STEPDOWN

## 2017-10-22 PROCEDURE — 94640 AIRWAY INHALATION TREATMENT: CPT

## 2017-10-22 PROCEDURE — 77010033678 HC OXYGEN DAILY

## 2017-10-22 PROCEDURE — 74011250637 HC RX REV CODE- 250/637: Performed by: INTERNAL MEDICINE

## 2017-10-22 PROCEDURE — 94760 N-INVAS EAR/PLS OXIMETRY 1: CPT

## 2017-10-22 PROCEDURE — 74011250637 HC RX REV CODE- 250/637: Performed by: PHYSICIAN ASSISTANT

## 2017-10-22 PROCEDURE — 74011000250 HC RX REV CODE- 250: Performed by: PHYSICIAN ASSISTANT

## 2017-10-22 PROCEDURE — 3331090001 HH PPS REVENUE CREDIT

## 2017-10-22 PROCEDURE — 74011250636 HC RX REV CODE- 250/636: Performed by: FAMILY MEDICINE

## 2017-10-22 PROCEDURE — 74011250636 HC RX REV CODE- 250/636: Performed by: INTERNAL MEDICINE

## 2017-10-22 PROCEDURE — 3331090002 HH PPS REVENUE DEBIT

## 2017-10-22 PROCEDURE — 82962 GLUCOSE BLOOD TEST: CPT

## 2017-10-22 RX ORDER — SODIUM CHLORIDE 9 MG/ML
100 INJECTION, SOLUTION INTRAVENOUS ONCE
Status: COMPLETED | OUTPATIENT
Start: 2017-10-22 | End: 2017-10-22

## 2017-10-22 RX ADMIN — HEPARIN SODIUM 5000 UNITS: 5000 INJECTION, SOLUTION INTRAVENOUS; SUBCUTANEOUS at 05:31

## 2017-10-22 RX ADMIN — GABAPENTIN 400 MG: 400 CAPSULE ORAL at 15:30

## 2017-10-22 RX ADMIN — AMLODIPINE BESYLATE 10 MG: 10 TABLET ORAL at 09:15

## 2017-10-22 RX ADMIN — LINEZOLID 600 MG: 600 TABLET, FILM COATED ORAL at 09:15

## 2017-10-22 RX ADMIN — HYDROCODONE BITARTRATE AND ACETAMINOPHEN 1 TABLET: 7.5; 325 TABLET ORAL at 12:26

## 2017-10-22 RX ADMIN — LINEZOLID 600 MG: 600 TABLET, FILM COATED ORAL at 22:50

## 2017-10-22 RX ADMIN — LABETALOL HYDROCHLORIDE 100 MG: 100 TABLET, FILM COATED ORAL at 09:14

## 2017-10-22 RX ADMIN — BUDESONIDE 500 MCG: 0.5 INHALANT RESPIRATORY (INHALATION) at 08:09

## 2017-10-22 RX ADMIN — HYDROCODONE BITARTRATE AND ACETAMINOPHEN 1 TABLET: 7.5; 325 TABLET ORAL at 18:20

## 2017-10-22 RX ADMIN — IPRATROPIUM BROMIDE AND ALBUTEROL SULFATE 3 ML: .5; 3 SOLUTION RESPIRATORY (INHALATION) at 13:28

## 2017-10-22 RX ADMIN — FAMOTIDINE 20 MG: 20 TABLET, FILM COATED ORAL at 09:14

## 2017-10-22 RX ADMIN — HYDROCODONE BITARTRATE AND ACETAMINOPHEN 1 TABLET: 7.5; 325 TABLET ORAL at 00:14

## 2017-10-22 RX ADMIN — HEPARIN SODIUM 5000 UNITS: 5000 INJECTION, SOLUTION INTRAVENOUS; SUBCUTANEOUS at 22:49

## 2017-10-22 RX ADMIN — HYDROCODONE BITARTRATE AND ACETAMINOPHEN 1 TABLET: 7.5; 325 TABLET ORAL at 06:55

## 2017-10-22 RX ADMIN — GABAPENTIN 400 MG: 400 CAPSULE ORAL at 09:15

## 2017-10-22 RX ADMIN — LABETALOL HYDROCHLORIDE 100 MG: 100 TABLET, FILM COATED ORAL at 22:49

## 2017-10-22 RX ADMIN — HYDROCODONE BITARTRATE AND ACETAMINOPHEN 1 TABLET: 7.5; 325 TABLET ORAL at 22:50

## 2017-10-22 RX ADMIN — GABAPENTIN 400 MG: 400 CAPSULE ORAL at 22:50

## 2017-10-22 RX ADMIN — SODIUM CHLORIDE 100 ML/HR: 900 INJECTION, SOLUTION INTRAVENOUS at 15:28

## 2017-10-22 RX ADMIN — HEPARIN SODIUM 5000 UNITS: 5000 INJECTION, SOLUTION INTRAVENOUS; SUBCUTANEOUS at 12:28

## 2017-10-22 RX ADMIN — IPRATROPIUM BROMIDE AND ALBUTEROL SULFATE 3 ML: .5; 3 SOLUTION RESPIRATORY (INHALATION) at 08:09

## 2017-10-22 RX ADMIN — HYDROCODONE BITARTRATE AND ACETAMINOPHEN 1 TABLET: 7.5; 325 TABLET ORAL at 03:40

## 2017-10-22 NOTE — PROGRESS NOTES
Problem: Falls - Risk of  Goal: *Absence of Falls  Document Kapil Fall Risk and appropriate interventions in the flowsheet.    Outcome: Progressing Towards Goal  Fall Risk Interventions:  Mobility Interventions: Assess mobility with egress test, Communicate number of staff needed for ambulation/transfer, OT consult for ADLs, Patient to call before getting OOB, PT Consult for mobility concerns, PT Consult for assist device competence         Medication Interventions: Patient to call before getting OOB, Teach patient to arise slowly    Elimination Interventions: Call light in reach, Toileting schedule/hourly rounds, Patient to call for help with toileting needs

## 2017-10-22 NOTE — ROUTINE PROCESS
Bedside and Verbal shift change report given to [facility] RN by Zelda Trimble RN. Report included the following information SBAR, Kardex, Intake/Output and MAR.

## 2017-10-22 NOTE — PROGRESS NOTES
730 Report received from [employer]. Gail received in bed awake and alert x 4 and is able to make her needs known at this time. Gail is received with tube feeding running Osmolite 1.2 at 60cc/hr. She is noted on 5 L oxygen via nasal cannula. Patient c/o zero pain and shows zero s/s of distress. Patient was admitted on 10/11/2017, she was brought in by EMS which found her at home on 2 L of oxygen tripoding and combative at 44 % oxygen. , with a recent history of pneumonia, she arrived with a PICC line to the right upper chest. Patient was noted in respiratory failure requiring intubation. Patient was started on IV ABT, she has a history of throat cancer, DM, KEATON, and  HTN. Pain medication was provided through out this shift for chronic back pain and was noted to be effective. Patient is currently in bed with zero c/o pain and zero s/s of distress. Bedside and Verbal shift change report given to [address] [place] (oncoming nurse) by Bryant Guerrero RN (offgoing nurse). Report included the following informatiNSRSBAR, OR Summary, Procedure Summary, Intake/Output, MAR, Med Rec Status and Cardiac Rhythm NSR.

## 2017-10-22 NOTE — PROGRESS NOTES
Hospitalist Progress Note  Sari Brown MD  Internal medicine/ Hospitalist    Daily Progress Note: 10/22/2017 5:34 PM      Interval history / Subjective:   H/O throat cancer,dysphagia,s/p PEG,CAD,DM,hypothyroidism,HTN,recent pneumonia,admitted for recurrent pneumonia,resp failure,KEATON. Her last admission she grew pseudomonas and in this admission she has grown out MRSA. ID has been following and has switched patient to Linezolid on 10/19(4 more doses) to complete 14 days of vancomycin. The change has been weaning Oxygen down as patient was on High flow oxygen around 50 liters early this week. She normally is on 2 liters at home. She has responded well and today she is on 3 liters nasal cannula oxygen and O2sat 99%. She is feeling much better.     Current Facility-Administered Medications   Medication Dose Route Frequency    famotidine (PEPCID) tablet 20 mg  20 mg Oral DAILY    linezolid (ZYVOX) tablet 600 mg  600 mg Oral Q12H    HYDROcodone-acetaminophen (NORCO) 7.5-325 mg per tablet 1 Tab  1 Tab Oral Q3H PRN    guaiFENesin (ROBITUSSIN) 100 mg/5 mL oral liquid 100 mg  100 mg Oral Q4H PRN    amLODIPine (NORVASC) tablet 10 mg  10 mg Oral DAILY    promethazine (PHENERGAN) 25 mg in 0.9% sodium chloride 50 mL IVPB  25 mg IntraVENous Q6H PRN    gabapentin (NEURONTIN) capsule 400 mg  400 mg Oral TID    albuterol-ipratropium (DUO-NEB) 2.5 MG-0.5 MG/3 ML  3 mL Nebulization Q6H RT    budesonide (PULMICORT) 500 mcg/2 ml nebulizer suspension  500 mcg Nebulization BID RT    0.9% sodium chloride infusion 250 mL  250 mL IntraVENous PRN    labetalol (NORMODYNE) tablet 100 mg  100 mg Oral Q12H    labetalol (NORMODYNE;TRANDATE) injection 20 mg  20 mg IntraVENous Q4H PRN    albuterol (PROVENTIL VENTOLIN) nebulizer solution 2.5 mg  2.5 mg Nebulization Q4H PRN    heparin (porcine) pf 300-500 Units  300-500 Units InterCATHeter PRN    naloxone (NARCAN) injection 0.4 mg  0.4 mg IntraVENous PRN    heparin (porcine) injection 5,000 Units  5,000 Units SubCUTAneous Q8H    insulin lispro (HUMALOG) injection   SubCUTAneous Q6H    glucose chewable tablet 16 g  4 Tab Oral PRN    glucagon (GLUCAGEN) injection 1 mg  1 mg IntraMUSCular PRN    dextrose (D50W) injection syrg 12.5-25 g  25-50 mL IntraVENous PRN          Objective:     Visit Vitals    /85    Pulse 64    Temp 98.9 °F (37.2 °C)    Resp 18    Ht 5' 4\" (1.626 m)    Wt 59.1 kg (130 lb 6.4 oz)    SpO2 97%    BMI 22.38 kg/m2    O2 Flow Rate (L/min): 3 l/min O2 Device: Nasal cannula    Temp (24hrs), Av.4 °F (36.9 °C), Min:97.7 °F (36.5 °C), Max:98.9 °F (37.2 °C)      10/22 0701 - 10/22 1900  In: 640   Out: 0   10/20 1901 - 10/22 07  In: 3710   Out: 1600 [Urine:1600]  Physical Exam:  General appearance: alert, cooperative, no distress, appears stated age  Head: Normocephalic, without obvious abnormality, atraumatic  Neck: supple, trachea midline  Lungs:CTAB  Heart: regular rate and rhythm, S1, S2 normal, no murmur, click, rub or gallop  Abdomen: soft, non-tender. Bowel sounds normal. No masses,  no organomegaly  Extremities: extremities normal, atraumatic, no cyanosis or edema  Skin: Skin color, texture, turgor normal. No rashes or lesions    Data Review    Recent Results (from the past 12 hour(s))   GLUCOSE, POC    Collection Time: 10/22/17  6:35 AM   Result Value Ref Range    Glucose (POC) 84 70 - 110 mg/dL   GLUCOSE, POC    Collection Time: 10/22/17 11:46 AM   Result Value Ref Range    Glucose (POC) 130 (H) 70 - 110 mg/dL         Assessment/Plan:     Principal Problem:    Recurrent pneumonia (10/11/2017)    Active Problems:    Respiratory failure with hypoxia ([facility] [address] (10/11/2017)      KEATON (acute kidney injury) ([facility] [address] (10/11/2017)        Care Plan  1)  Recurrent PNA                        - recent extended hospital stay for Pseudomonal PNA about 3 weeks                        - Now MRSA pna                        - ID on board,stopped zosyn and vanc. Now on Linezolid since 10/19                        - Today down to 3 liters nasal cannula oxygen form High flow oxygen in 50's early this week.                          -Patient's oxygen at Home 2 liters.                         -Consider d/c on 10/23 if clinically stable                         - Pulmonary following.     2)  Acute on chronic resp failure                        - 2/2 above    3)KEATON:                                      -Bolus iv fluid x 1                        -Lab in am    4)Dysphagia                           -S/p PEG    DVT prophylaxis:per protocol  Full code  Disposition:tbd

## 2017-10-22 NOTE — PROGRESS NOTES
Problem: Breathing Pattern - Ineffective    Goal: *Absence of hypoxia  Outcome: Progressing Towards Goal  Pt 95-99% on 5L NC    Problem: General Medical Care Plan    Goal: *Labs within defined limits  Outcome: Not Progressing Towards Goal  Mag replaced today    Goal: *Optimal pain control at patient's stated goal  Outcome: Progressing Towards Goal  Pt reports pain well controlled with PRN Norco    Goal: *Optimize nutritional status  Outcome: Progressing Towards Goal  Pt tolerating tube feed at goal with 0 mL residual    Problem: Pneumonia: Discharge Outcomes  Goal: *Respiratory status at baseline  Outcome: Progressing Towards Goal  Pt O2 reduced from 10L to 5L during day, and pt maintaining 95-99%   Patient Vitals for the past 12 hrs:   Temp Pulse Resp BP SpO2   10/22/17 0540 - - - - 98 %   10/22/17 0403 97.7 °F (36.5 °C) 85 18 117/80 (!) 86 %   10/22/17 0006 98 °F (36.7 °C) 91 17 129/79 93 %   10/21/17 2011 98.6 °F (37 °C) 100 18 136/85 95 %   10/21/17 1947 - - - - 95 %     Problem: Falls - Risk of    Goal: *Absence of Falls  Document Kapil Fall Risk and appropriate interventions in the flowsheet.    Outcome: Progressing Towards Goal  Fall Risk Interventions:  Mobility Interventions: Assess mobility with egress test, Communicate number of staff needed for ambulation/transfer, OT consult for ADLs, Patient to call before getting OOB, PT Consult for mobility concerns, PT Consult for assist device competence     Medication Interventions: Patient to call before getting OOB, Teach patient to arise slowly    Elimination Interventions: Call light in reach, Toileting schedule/hourly rounds, Patient to call for help with toileting needs

## 2017-10-23 VITALS
TEMPERATURE: 99 F | DIASTOLIC BLOOD PRESSURE: 82 MMHG | SYSTOLIC BLOOD PRESSURE: 120 MMHG | RESPIRATION RATE: 20 BRPM | WEIGHT: 135.7 LBS | HEIGHT: 64 IN | HEART RATE: 65 BPM | OXYGEN SATURATION: 95 % | BODY MASS INDEX: 23.17 KG/M2

## 2017-10-23 LAB
ANION GAP SERPL CALC-SCNC: 5 MMOL/L (ref 3–18)
BASOPHILS # BLD: 0.1 K/UL (ref 0–0.06)
BASOPHILS NFR BLD: 1 % (ref 0–2)
BNP SERPL-MCNC: 598 PG/ML (ref 0–450)
BUN SERPL-MCNC: 25 MG/DL (ref 7–18)
BUN/CREAT SERPL: 15 (ref 12–20)
CALCIUM SERPL-MCNC: 8.6 MG/DL (ref 8.5–10.1)
CHLORIDE SERPL-SCNC: 102 MMOL/L (ref 100–108)
CO2 SERPL-SCNC: 28 MMOL/L (ref 21–32)
CREAT SERPL-MCNC: 1.72 MG/DL (ref 0.6–1.3)
DIFFERENTIAL METHOD BLD: ABNORMAL
EOSINOPHIL # BLD: 1 K/UL (ref 0–0.4)
EOSINOPHIL NFR BLD: 9 % (ref 0–5)
ERYTHROCYTE [DISTWIDTH] IN BLOOD BY AUTOMATED COUNT: 16.1 % (ref 11.6–14.5)
GLUCOSE BLD STRIP.AUTO-MCNC: 115 MG/DL (ref 70–110)
GLUCOSE BLD STRIP.AUTO-MCNC: 117 MG/DL (ref 70–110)
GLUCOSE BLD STRIP.AUTO-MCNC: 130 MG/DL (ref 70–110)
GLUCOSE SERPL-MCNC: 100 MG/DL (ref 74–99)
HCT VFR BLD AUTO: 22 % (ref 35–45)
HGB BLD-MCNC: 7 G/DL (ref 12–16)
LYMPHOCYTES # BLD: 1.9 K/UL (ref 0.9–3.6)
LYMPHOCYTES NFR BLD: 17 % (ref 21–52)
MCH RBC QN AUTO: 30 PG (ref 24–34)
MCHC RBC AUTO-ENTMCNC: 31.8 G/DL (ref 31–37)
MCV RBC AUTO: 94.4 FL (ref 74–97)
MONOCYTES # BLD: 0.6 K/UL (ref 0.05–1.2)
MONOCYTES NFR BLD: 5 % (ref 3–10)
NEUTS SEG # BLD: 7.2 K/UL (ref 1.8–8)
NEUTS SEG NFR BLD: 68 % (ref 40–73)
PLATELET # BLD AUTO: 267 K/UL (ref 135–420)
PMV BLD AUTO: 11 FL (ref 9.2–11.8)
POTASSIUM SERPL-SCNC: 5.1 MMOL/L (ref 3.5–5.5)
RBC # BLD AUTO: 2.33 M/UL (ref 4.2–5.3)
SODIUM SERPL-SCNC: 135 MMOL/L (ref 136–145)
WBC # BLD AUTO: 10.7 K/UL (ref 4.6–13.2)

## 2017-10-23 PROCEDURE — 74011000250 HC RX REV CODE- 250: Performed by: PHYSICIAN ASSISTANT

## 2017-10-23 PROCEDURE — 74011250637 HC RX REV CODE- 250/637: Performed by: PHYSICIAN ASSISTANT

## 2017-10-23 PROCEDURE — 83880 ASSAY OF NATRIURETIC PEPTIDE: CPT | Performed by: INTERNAL MEDICINE

## 2017-10-23 PROCEDURE — 3331090002 HH PPS REVENUE DEBIT

## 2017-10-23 PROCEDURE — 80048 BASIC METABOLIC PNL TOTAL CA: CPT | Performed by: INTERNAL MEDICINE

## 2017-10-23 PROCEDURE — 94640 AIRWAY INHALATION TREATMENT: CPT

## 2017-10-23 PROCEDURE — 74011250637 HC RX REV CODE- 250/637: Performed by: INTERNAL MEDICINE

## 2017-10-23 PROCEDURE — 3331090001 HH PPS REVENUE CREDIT

## 2017-10-23 PROCEDURE — 36415 COLL VENOUS BLD VENIPUNCTURE: CPT | Performed by: INTERNAL MEDICINE

## 2017-10-23 PROCEDURE — 82962 GLUCOSE BLOOD TEST: CPT

## 2017-10-23 PROCEDURE — 85025 COMPLETE CBC W/AUTO DIFF WBC: CPT | Performed by: INTERNAL MEDICINE

## 2017-10-23 PROCEDURE — 74011250636 HC RX REV CODE- 250/636: Performed by: FAMILY MEDICINE

## 2017-10-23 RX ORDER — LINEZOLID 600 MG/1
600 TABLET, FILM COATED ORAL EVERY 12 HOURS
Qty: 20 TAB | Refills: 0 | Status: ON HOLD | OUTPATIENT
Start: 2017-10-23 | End: 2017-12-06

## 2017-10-23 RX ORDER — AMLODIPINE BESYLATE 10 MG/1
10 TABLET ORAL DAILY
Qty: 30 TAB | Refills: 0 | Status: SHIPPED | OUTPATIENT
Start: 2017-10-24

## 2017-10-23 RX ADMIN — HYDROCODONE BITARTRATE AND ACETAMINOPHEN 1 TABLET: 7.5; 325 TABLET ORAL at 03:43

## 2017-10-23 RX ADMIN — IPRATROPIUM BROMIDE AND ALBUTEROL SULFATE 3 ML: .5; 3 SOLUTION RESPIRATORY (INHALATION) at 07:41

## 2017-10-23 RX ADMIN — BUDESONIDE 500 MCG: 0.5 INHALANT RESPIRATORY (INHALATION) at 07:41

## 2017-10-23 RX ADMIN — HYDROCODONE BITARTRATE AND ACETAMINOPHEN 1 TABLET: 7.5; 325 TABLET ORAL at 11:28

## 2017-10-23 RX ADMIN — GABAPENTIN 400 MG: 400 CAPSULE ORAL at 08:17

## 2017-10-23 RX ADMIN — FAMOTIDINE 20 MG: 20 TABLET, FILM COATED ORAL at 08:17

## 2017-10-23 RX ADMIN — HEPARIN SODIUM 5000 UNITS: 5000 INJECTION, SOLUTION INTRAVENOUS; SUBCUTANEOUS at 07:14

## 2017-10-23 RX ADMIN — HYDROCODONE BITARTRATE AND ACETAMINOPHEN 1 TABLET: 7.5; 325 TABLET ORAL at 08:17

## 2017-10-23 RX ADMIN — LINEZOLID 600 MG: 600 TABLET, FILM COATED ORAL at 11:16

## 2017-10-23 RX ADMIN — AMLODIPINE BESYLATE 10 MG: 10 TABLET ORAL at 08:17

## 2017-10-23 RX ADMIN — LABETALOL HYDROCHLORIDE 100 MG: 100 TABLET, FILM COATED ORAL at 11:17

## 2017-10-23 RX ADMIN — IPRATROPIUM BROMIDE AND ALBUTEROL SULFATE 3 ML: .5; 3 SOLUTION RESPIRATORY (INHALATION) at 13:38

## 2017-10-23 NOTE — ANCILLARY DISCHARGE INSTRUCTIONS
Core Measures Patient - RRAT Score is 16. Dr. Dowell Book office will contact patient with follow up appointment.

## 2017-10-23 NOTE — PROGRESS NOTES
conducted a Follow up consultation and Spiritual Assessment for Arthur Yates, who is a 54 y.o.,female. The  provided the following Interventions:  Continued the relationship of care and support. Listened empathically. Offered prayer and assurance of continued prayer on patients behalf. Chart reviewed. The following outcomes were achieved:  Patient expressed gratitude for pastoral care visit. Assessment:  There are no further spiritual or Restoration issues which require Spiritual Care Services interventions at this time. Plan:  Chaplains will continue to follow and will provide pastoral care on an as needed/requested basis.  recommends bedside caregivers page  on duty if patient shows signs of acute spiritual or emotional distress.      [address] [facility]   Staff [address] [facility]   [phone]

## 2017-10-23 NOTE — PROGRESS NOTES
INFECTIOUS DISEASE FOLLOW UP NOTE :    Admit Date: 10/11/2017      Current abx Prior abx   Linezolid 10/19-4, Abx 13 Levofloxacin 9/24 - 4  Zosyn 9/24 - 6 Tobramycin 9/30 - 2 Vancomycin 9/24-15  Ciprofloxacin 10/2-9 Doxycycline 10/9 - 2  Zosyn 10/11 -2, Cipro 10/14-2, Vancomycin 10/11 - 8       ASSESSMENT - > REC:      Recurrent Pneumonia  - MRSA, Pseudomonas in spcx 9/24  - same organisms in spcx last confinement . [facility]) 8/11-18  - treated with Vancomycin/zosyn x 14 days (ending ~8/25)  - bilateral multifocal opacities - sl improved on CT 10/1  - CXR 10/9 similar to 10/3 (improved infiltrates c/w 9/29)  - CXR 10/11 similar to 10/9  - CXR 10/13 worsening multifocal infiltrate (BUL) but improving on CXR 10/16 (?aspiration) ? recurrent MRSA   - IgG level high (IgM, IgA normal) - no Ig deficiency )? IgG subclass def)  - CXR 10/17 worsening vs positional change  - still expectorating green purulent sputum -> extend oral linezolid x 10 more days (end 11/2/2017). -> repeat sputum cx (to check for recurrent Pseudomonas)  -> OK to discharge from ID standpoint   -> dc PICC at discharge     Respiratory Failure  - recent admission with intubated 9/24, extubated 9/25  - readmitted 10/11 w/ shortness of breath and cough.  Now on HFNC -> per PCCM   H/o throat CA      ETOH abuse     DM     HTN     CAD     GERD     PEG     DELFINA        MICROBIOLOGY:   (9/24     blcx NG x 2                             urcx IP              spcx Ps aeruginosa S AG, cipro R pip-tazo, other beta lactams, MRSA R levaquin vanco rios 1 few CF)  9/26     C diff neg )      10/11             blcx      NGTD x 2                        spcx gs many gpc prs/groups, Cx MRSA vanco rios 1                              LINES AND CATHETERS:   Tunneled [facility] PICC    MEDICATIONS:     Current Facility-Administered Medications   Medication Dose Route Frequency    famotidine (PEPCID) tablet 20 mg  20 mg Oral DAILY    linezolid (ZYVOX) tablet 600 mg  600 mg Oral Q12H    HYDROcodone-acetaminophen (NORCO) 7.5-325 mg per tablet 1 Tab  1 Tab Oral Q3H PRN    guaiFENesin (ROBITUSSIN) 100 mg/5 mL oral liquid 100 mg  100 mg Oral Q4H PRN    amLODIPine (NORVASC) tablet 10 mg  10 mg Oral DAILY    promethazine (PHENERGAN) 25 mg in 0.9% sodium chloride 50 mL IVPB  25 mg IntraVENous Q6H PRN    gabapentin (NEURONTIN) capsule 400 mg  400 mg Oral TID    albuterol-ipratropium (DUO-NEB) 2.5 MG-0.5 MG/3 ML  3 mL Nebulization Q6H RT    budesonide (PULMICORT) 500 mcg/2 ml nebulizer suspension  500 mcg Nebulization BID RT    0.9% sodium chloride infusion 250 mL  250 mL IntraVENous PRN    labetalol (NORMODYNE) tablet 100 mg  100 mg Oral Q12H    labetalol (NORMODYNE;TRANDATE) injection 20 mg  20 mg IntraVENous Q4H PRN    albuterol (PROVENTIL VENTOLIN) nebulizer solution 2.5 mg  2.5 mg Nebulization Q4H PRN    heparin (porcine) pf 300-500 Units  300-500 Units InterCATHeter PRN    naloxone (NARCAN) injection 0.4 mg  0.4 mg IntraVENous PRN    heparin (porcine) injection 5,000 Units  5,000 Units SubCUTAneous Q8H    insulin lispro (HUMALOG) injection   SubCUTAneous Q6H    glucose chewable tablet 16 g  4 Tab Oral PRN    glucagon (GLUCAGEN) injection 1 mg  1 mg IntraMUSCular PRN    dextrose (D50W) injection syrg 12.5-25 g  25-50 mL IntraVENous PRN       SUBJECTIVE :     Interval notes reviewed. Afebrile. On NC currently. Feeling overall better. Asking about discharge.     OBJECTIVE     Visit Vitals    /74 (BP 1 Location: Right arm, BP Patient Position: Head of bed elevated (Comment degrees))    Pulse 80    Temp 97.8 °F (36.6 °C)    Resp 18    Ht 5' 4\" (1.626 m)    Wt 61.6 kg (135 lb 11.2 oz)    SpO2 99%    BMI 23.29 kg/m2       Temp (24hrs), Av.3 °F (36.8 °C), Min:97.8 °F (36.6 °C), Max:98.9 °F (37.2 °C)      Gen- well developed, thin AA female on NC, no distress  HENT- No thrush, no scleral icterus  Chest- Symmetric expansion, bilateral Crackles+ scattered   CV-S1S2 RR, No JVD, No edema  Abd-Soft, NT, ND, BS+  Skin-No jaundice, cyanosis, clubbing. No decubitus  Muscsk- Normal muscle tone/strength        Labs: Results:   Chemistry Recent Labs      10/23/17   0505   GLU  100*   NA  135*   K  5.1   CL  102   CO2  28   BUN  25*   CREA  1.72*   CA  8.6   AGAP  5   BUCR  15      CBC w/Diff Recent Labs      10/23/17   0505   WBC  10.7   RBC  2.33*   HGB  7.0*   HCT  22.0*   PLT  267   GRANS  68   LYMPH  17*   EOS  9*          RADIOLOGY :          Imaging    Results from Hospital Encounter encounter on 10/11/17   XR CHEST PORT   Narrative INDICATION:  PNA    COMPARISON:  10/16/2017    FINDINGS: A portable AP radiograph of the chest was obtained. The patient is on  a cardiac monitor. Tunneled right neck central line in stable position. Slight  progression of patchy bilateral diffuse parenchymal infiltrates with upper lobe  dominance. No pneumothorax. Indistinct pulmonary vasculature. Mediastinal  contour stable. No sizable pleural effusion. Impression IMPRESSION: Apparent slight interval worsening. Some of the interval difference  may be related to positioning. Results from [place] encounter on 09/24/17   CT CHEST WO CONT   Narrative COMPARISON: September 24, 2017. INDICATION: MRSA, pneumonia, prolonged oxygen requirements    TECHNIQUE: Axial was performed through the chest without contrast.  Coronal and  sagittal reformations were generated. One or more dose reduction techniques were  used on this CT: automated exposure control, adjustment of the mAs and/or kVp  according to patient's size, and iterative reconstruction techniques.  The  specific techniques utilized on this CT exam have been documented in the  patient's electronic medical record.     ============    FINDINGS:    LUNGS: There are widespread patchy consolidative and groundglass airspace  opacities throughout the lungs bilaterally, overall slight interval improvement  but a similar distribution. Additionally, there are areas of superimposed septal  thickening and faint reticulonodular infiltrates which are somewhat more  pronounced in the lower lung zones. PLEURA: Trace pleural effusions. No pneumothorax. AIRWAY: Unremarkable. MEDIASTINUM: Low-attenuation of the cardiac blood pool relative to the  myocardium compatible with underlying anemia. Right IJ central venous catheter  is noted. Normal heart size. Trace pericardial effusion. Great vessels are  unremarkable. Lymph nodes: There are scattered prominent mediastinal lymph nodes including a  upper right paratracheal node measuring 10 mm. Cluster of prevascular nodes  measure up to 10 mm as well. Assessment for adenopathy is limited due to the  lack of contrast. Prominent axillary lymph nodes are also noted. UPPER ABDOMEN: Unremarkable. OTHER: No aggressive osseous lesions.    ============         Impression IMPRESSION:    1. Slight interval improvement in the patchy bilateral airspace opacities with  overall similar distribution as above suggesting multifocal pneumonia and likely  superimposed interstitial edema. 2.  Small new bilateral pleural effusions and a trace pericardial effusion. 3.  Probable reactive mediastinal adenopathy. Preliminary report provided by on-call radiology resident.       Surinder Klein MD  October 23, 2017    [facility] THE [facility]  [phone]

## 2017-10-23 NOTE — PROGRESS NOTES
1920 Bedside and Verbal shift change report given to Talha Jacobs) by Walter Garza RN (offgoing nurse). Report included the following information SBAR, Kardex, Lab results, Flowsheet, telemetry strips and MAR.     2038 Shift assessment completed. Pt resting comfortable. No s/s of pain or discomfort noted or stated.      2250 Requested pain medication. Meds crushed and given via peg tub. Tolerated well.       0031 Reassessment completed. No s/s of pain reported or noted.                                                                                                                                                                                                                                          0715 Bedside and Verbal shift change report given to LANDEN Gallo (oncoming nurse) by Laura Benjamin RN (offgoing nurse). Report included the following information SBAR, Kardex, Lab results, Flowsheet, telemetry strips and MAR.

## 2017-10-23 NOTE — PROGRESS NOTES
Problem: Falls - Risk of  Goal: *Absence of Falls  Document Kapil Fall Risk and appropriate interventions in the flowsheet.    Outcome: Progressing Towards Goal  Fall Risk Interventions:  Mobility Interventions: Assess mobility with egress test         Medication Interventions: Patient to call before getting OOB    Elimination Interventions: Call light in reach

## 2017-10-23 NOTE — PROGRESS NOTES
Care Management Interventions  PCP Verified by CM: Yes (Dr Niyah Castro)  Last Visit to PCP: 07/13/17  Mode of Transport at Discharge: Other (see comment) (family)  Transition of Care Consult (CM Consult): [address]: Yes  Current Support Network: Other (apartment.  mother is staying with her)  Confirm Follow Up Transport: Family  Plan discussed with Pt/Family/Caregiver: Yes  Freedom of Choice Offered: Yes  Discharge Location  Discharge Placement: Home with home health

## 2017-10-23 NOTE — PROGRESS NOTES
Pt is active with [facility] for skilled services and receives her tube feelings and supplies from [facility]. Pt chooses to resume care with both agencies. Home care referral sent to [facility] via [address] [place] [address] and called to the [facility] for f/u. Sent [facility]  #[phone]  Option 5 the referral for [facility] the [facility] feedings [facility] and called the referral to their intake repBreanne.

## 2017-10-23 NOTE — ROUTINE PROCESS
56- Assumed care. Pt sleeping. No acute distress. 6019- Due meds given. Tolerated well. Shift assessment completed. No respiratory distress noted. Complaints of back pain 8/10. Norco given. Alert and oriented x4. Call light in reach. [address] given for back pain 7/10. Tolerated well. Will continue to monitor. 1400- Pt reassessment completed. No changes to previous. Discharge instructions given to the patient. All questions answered. Antonella Vázquez called to retrieve pt's O2 tank from home for discharge. 0- Pt discharged via wheelchair with home O2 with family member.

## 2017-10-23 NOTE — DISCHARGE INSTRUCTIONS
Pneumonia: Care Instructions  Your Care Instructions    Pneumonia is an infection of the lungs. Most cases are caused by infections from bacteria or viruses. Pneumonia may be mild or very severe. If it is caused by bacteria, you will be treated with antibiotics. It may take a few weeks to a few months to recover fully from pneumonia, depending on how sick you were and whether your overall health is good. Follow-up care is a key part of your treatment and safety. Be sure to make and go to all appointments, and call your doctor if you are having problems. Its also a good idea to know your test results and keep a list of the medicines you take. How can you care for yourself at home? · Take your antibiotics exactly as directed. Do not stop taking the medicine just because you are feeling better. You need to take the full course of antibiotics. · Take your medicines exactly as prescribed. Call your doctor if you think you are having a problem with your medicine. · Get plenty of rest and sleep. You may feel weak and tired for a while, but your energy level will improve with time. · To prevent dehydration, drink plenty of fluids, enough so that your urine is light yellow or clear like water. Choose water and other caffeine-free clear liquids until you feel better. If you have kidney, heart, or liver disease and have to limit fluids, talk with your doctor before you increase the amount of fluids you drink. · Take care of your cough so you can rest. A cough that brings up mucus from your lungs is common with pneumonia. It is one way your body gets rid of the infection. But if coughing keeps you from resting or causes severe fatigue and chest-wall pain, talk to your doctor. He or she may suggest that you take a medicine to reduce the cough. · Use a vaporizer or humidifier to add moisture to your bedroom. Follow the directions for cleaning the machine. · Do not smoke or allow others to smoke around you.  Smoke will make your cough last longer. If you need help quitting, talk to your doctor about stop-smoking programs and medicines. These can increase your chances of quitting for good. · Take an over-the-counter pain medicine, such as acetaminophen (Tylenol), ibuprofen (Advil, Motrin), or naproxen (Aleve). Read and follow all instructions on the label. · Do not take two or more pain medicines at the same time unless the doctor told you to. Many pain medicines have acetaminophen, which is Tylenol. Too much acetaminophen (Tylenol) can be harmful. · If you were given a spirometer to measure how well your lungs are working, use it as instructed. This can help your doctor tell how your recovery is going. · To prevent pneumonia in the future, talk to your doctor about getting a flu vaccine (once a year) and a pneumococcal vaccine (one time only for most people). When should you call for help? Call 911 anytime you think you may need emergency care. For example, call if:  · You have severe trouble breathing. Call your doctor now or seek immediate medical care if:  · You cough up dark brown or bloody mucus (sputum). · You have new or worse trouble breathing. · You are dizzy or lightheaded, or you feel like you may faint. Watch closely for changes in your health, and be sure to contact your doctor if:  · You have a new or higher fever. · You are coughing more deeply or more often. · You are not getting better after 2 days (48 hours). · You do not get better as expected. Where can you learn more? Go to http://brandon-courtney.info/. Enter [id number] in the search box to learn more about \"Pneumonia: Care Instructions. \"  Current as of: March 25, 2017  Content Version: 11.3  © 8712-6891 [employer]. Care instructions adapted under license by [employer] (which disclaims liability or warranty for this information).  If you have questions about a medical condition or this instruction, always ask your healthcare professional. [place] [address] any warranty or liability for your use of this information. Learning About COPD and How to Prevent Lung Infections  How do lung infections affect COPD? Lung infections like pneumonia and acute bronchitis are common causes of COPD flare-ups. And people who have COPD are more likely to get these lung infections, especially if they smoke. When you have COPD, it is important to know the symptoms of pneumonia and acute bronchitis and call your doctor if you have them. Symptoms include:  · A cough that brings up more mucus than usual.  · Fever. · Shortness of breath. What can you do to prevent these infections? Stay healthy  · Get a flu shot every year. · Get a pneumococcal vaccine shot. If you have had one before, ask your doctor whether you need another dose. Two different types of pneumococcal vaccines are recommended for people ages 72 and older. · If you must be around people with colds or the flu, wash your hands often. · Do not smoke. This is the most important step you can take to prevent more damage to your lungs. If you need help quitting, talk to your doctor about stop-smoking programs and medicines. These can increase your chances of quitting for good. · Avoid secondhand smoke, air pollution, and high altitudes. Also avoid cold, dry air and hot, humid air. Stay at home with your windows closed when air pollution is bad. Exercise and eat well  · If your doctor recommends it, get more exercise. Walking is a good choice. Bit by bit, increase the amount you walk every day. Try for at least 30 minutes on most days of the week. · Eat regular, well-balanced meals. Eating right keeps your energy levels up and helps your body fight infection. · Get plenty of rest and sleep. Follow-up care is a key part of your treatment and safety. Be sure to make and go to all appointments, and call your doctor if you are having problems.  It's also a good idea to know your test results and keep a list of the medicines you take. Where can you learn more? Go to http://brandon-courtney.info/. Enter Z135 in the search box to learn more about \"Learning About COPD and How to Prevent Lung Infections. \"  Current as of: March 25, 2017  Content Version: 11.3  © 1474-4865 [employer]. Care instructions adapted under license by [employer] (which disclaims liability or warranty for this information). If you have questions about a medical condition or this instruction, always ask your healthcare professional. [place] [address] any warranty or liability for your use of this information. Patient armband removed and shredded    DISCHARGE SUMMARY from Nurse    The following personal items are in your possession at time of discharge:    Dental Appliances: None  Visual Aid: None     Home Medications: None  Jewelry: None  Clothing: Shirt, Undergarments  Other Valuables: Cell Phone             PATIENT INSTRUCTIONS:    After general anesthesia or intravenous sedation, for 24 hours or while taking prescription Narcotics:  · Limit your activities  · Do not drive and operate hazardous machinery  · Do not make important personal or business decisions  · Do  not drink alcoholic beverages  · If you have not urinated within 8 hours after discharge, please contact your surgeon on call.     Report the following to your surgeon:  · Excessive pain, swelling, redness or odor of or around the surgical area  · Temperature over 100.5  · Nausea and vomiting lasting longer than 4 hours or if unable to take medications  · Any signs of decreased circulation or nerve impairment to extremity: change in color, persistent  numbness, tingling, coldness or increase pain  · Any questions        What to do at Home:  Recommended activity: Activity as tolerated    If you experience any of the following symptoms fever greater than 100.5, shortness of breath, chest pain, dizziness, fatigue, profuse sweating, chills, please follow up with your primary care doctor or call 911. *  Please give a list of your current medications to your Primary Care Provider. *  Please update this list whenever your medications are discontinued, doses are      changed, or new medications (including over-the-counter products) are added. *  Please carry medication information at all times in case of emergency situations. These are general instructions for a healthy lifestyle:    No smoking/ No tobacco products/ Avoid exposure to second hand smoke    Surgeon General's Warning:  Quitting smoking now greatly reduces serious risk to your health. Obesity, smoking, and sedentary lifestyle greatly increases your risk for illness    A healthy diet, regular physical exercise & weight monitoring are important for maintaining a healthy lifestyle    You may be retaining fluid if you have a history of heart failure or if you experience any of the following symptoms:  Weight gain of 3 pounds or more overnight or 5 pounds in a week, increased swelling in our hands or feet or shortness of breath while lying flat in bed. Please call your doctor as soon as you notice any of these symptoms; do not wait until your next office visit. Recognize signs and symptoms of STROKE:    F-face looks uneven    A-arms unable to move or move unevenly    S-speech slurred or non-existent    T-time-call 911 as soon as signs and symptoms begin-DO NOT go       Back to bed or wait to see if you get better-TIME IS BRAIN. Warning Signs of HEART ATTACK     Call 911 if you have these symptoms:   Chest discomfort. Most heart attacks involve discomfort in the center of the chest that lasts more than a few minutes, or that goes away and comes back. It can feel like uncomfortable pressure, squeezing, fullness, or pain.  Discomfort in other areas of the upper body.  Symptoms can include pain or discomfort in one or both arms, the back, neck, jaw, or stomach.  Shortness of breath with or without chest discomfort.  Other signs may include breaking out in a cold sweat, nausea, or lightheadedness. Don't wait more than five minutes to call 911 - MINUTES MATTER! Fast action can save your life. Calling 911 is almost always the fastest way to get lifesaving treatment. Emergency Medical Services staff can begin treatment when they arrive -- up to an hour sooner than if someone gets to the hospital by car. The discharge information has been reviewed with the patient. The patient verbalized understanding. Discharge medications reviewed with the patient and appropriate educational materials and side effects teaching were provided.

## 2017-10-24 PROCEDURE — 3331090002 HH PPS REVENUE DEBIT

## 2017-10-24 PROCEDURE — 3331090001 HH PPS REVENUE CREDIT

## 2017-10-24 NOTE — ANCILLARY DISCHARGE INSTRUCTIONS
[facility] [address]  Discharge Phone Call       After-Care Discharge Phone Call Questions: no answer    Were you able to get your prescriptions filled? Comment:      [] Yes  []No    Comment if answer is \"No\"   Are you taking your medication(s) as your doctor ordered? Do you understand the purpose of your medications? Comment:    [] Yes  []No    Comment if answer is \"No\"   Are you taking any other medications that are not on the list?  Comment:      [] Yes  []No    Comment if answer is \"Yes\"   Do you have any questions about your medications? Are you aware of potential side effects? Comment:    [] Yes  []No    Comment if answer is \"Yes\"   Did you make your follow-up appointments (if the hospital did not do this before  discharge)? Comment:    [] Yes  []No    Comment if answer is \"No\"   Is there any reason you might not be able to keep your follow-up appointments? Comment:     [] Yes  []No    Comment if answer is \"Yes\"   Do you have any questions about your care plan? Are you aware of what health problems to be alert for? Comment:    [] Yes  []No    Comment if answer is \"Yes\"   Do you have a good understanding of how you should manage your health? Comment:    [] Yes  []No    Comment if answer is \"Yes\"   Do you know which symptoms to watch for that would mean you would need to call your doctor right away? Comment:      [] Yes  []No    Comment if answer is \"No\"   Do you have any questions about the follow up process or any instructions that we have provided? Comment:    [] Yes  []No    Comment if answer is \"Yes\"   Did staff take your preferences into account?         [] Yes  []No    Comment if answer is \"Yes\"

## 2017-10-25 ENCOUNTER — HOME CARE VISIT (OUTPATIENT)
Dept: SCHEDULING | Facility: HOME HEALTH | Age: 52
End: 2017-10-25
Payer: MEDICARE

## 2017-10-25 VITALS
OXYGEN SATURATION: 99 % | HEART RATE: 104 BPM | DIASTOLIC BLOOD PRESSURE: 70 MMHG | SYSTOLIC BLOOD PRESSURE: 110 MMHG | TEMPERATURE: 97.6 F | RESPIRATION RATE: 20 BRPM

## 2017-10-25 PROCEDURE — 3331090002 HH PPS REVENUE DEBIT

## 2017-10-25 PROCEDURE — 3331090001 HH PPS REVENUE CREDIT

## 2017-10-25 PROCEDURE — G0299 HHS/HOSPICE OF RN EA 15 MIN: HCPCS

## 2017-10-25 NOTE — DISCHARGE SUMMARY
Discharge Summary    Patient: Selma García               Sex: female          DOA: 10/11/2017         YOB: 1965      Age:  54 y.o.        LOS:  LOS: 12 days                Admit Date: 10/11/2017    Discharge Date: 10/22/2017    Discharge Medications:     Discharge Medication List as of 10/23/2017  1:38 PM      START taking these medications    Details   linezolid (ZYVOX) 600 mg tablet Take 1 Tab by mouth every twelve (12) hours. , Print, Disp-20 Tab, R-0      amLODIPine (NORVASC) 10 mg tablet Take 1 Tab by mouth daily. , Print, Disp-30 Tab, R-0         CONTINUE these medications which have NOT CHANGED    Details   folic acid (FOLVITE) 1 mg tablet Take 0.4 mg by mouth daily. , Historical Med      cetirizine (ZYRTEC) 10 mg tablet Take 10 mg by mouth daily as needed for Allergies, Rhinitis or Itching., Historical Med      gabapentin (NEURONTIN) 300 mg capsule Take 400 mg by mouth three (3) times daily. , Historical Med      furosemide (LASIX) 20 mg tablet Take 2 Tabs by mouth daily. , Print, Disp-60 Tab, R-0      HYDROcodone-acetaminophen (NORCO) 5-325 mg per tablet Take 1 Tab by mouth every four (4) hours as needed for Pain. Max Daily Amount: 6 Tabs., Print, Disp-10 Tab, R-0      multivitamin (ONE A DAY) tablet Take 1 Tab by mouth daily. , Historical Med      polyethylene glycol (MIRALAX) 17 gram/dose powder Take 17 g by mouth daily. , Historical Med      docusate sodium (COLACE) 100 mg capsule Take 100 mg by mouth two (2) times a day., Historical Med      levothyroxine (SYNTHROID) 100 mcg tablet Take  by mouth Daily (before breakfast). , Historical Med      amylase-lipase-protease (CREON) capsule Take  by mouth three (3) times daily (with meals). , Historical Med      pantoprazole (PROTONIX) 40 mg tablet Take 1 Tab by mouth daily. , Print, Disp-30 Tab, R-0      ondansetron hcl (ZOFRAN) 4 mg tablet Take 1 Tab by mouth every eight (8) hours as needed for Nausea. , Print, Disp-20 Tab, R-0      aspirin 81 mg tablet Take 81 mg by mouth. Historical Med, 81 mg         STOP taking these medications       OXYGEN-AIR DELIVERY SYSTEMS Comments:   Reason for Stopping:         metoprolol tartrate (LOPRESSOR) 100 mg IR tablet Comments:   Reason for Stopping:         ciprofloxacin HCl (CIPRO) 750 mg tablet Comments:   Reason for Stopping:         doxycycline (ADOXA) 100 mg tablet Comments:   Reason for Stopping:         cloNIDine HCl (CATAPRES) 0.1 mg tablet Comments:   Reason for Stopping:         amLODIPine-benazepril (LOTREL) 5-10 mg per capsule Comments:   Reason for Stopping: Follow-up: PCP, needs bmp f/u if creatinine without improvement then renal consult by PCP, pulmonology    Discharge Condition: Stable    Activity: Activity as tolerated    Diet: Resume previous diet    Labs:  Labs: Results:       Chemistry Recent Labs      10/23/17   0505   GLU  100*   NA  135*   K  5.1   CL  102   CO2  28   BUN  25*   CREA  1.72*   CA  8.6   AGAP  5   BUCR  15      CBC w/Diff Recent Labs      10/23/17   0505   WBC  10.7   RBC  2.33*   HGB  7.0*   HCT  22.0*   PLT  267   GRANS  68   LYMPH  17*   EOS  9*      Cardiac Enzymes No results for input(s): CPK, CKND1, LAVELLE in the last 72 hours. No lab exists for component: CKRMB, TROIP   Coagulation No results for input(s): PTP, INR, APTT in the last 72 hours. No lab exists for component: INREXT    Lipid Panel No results found for: CHOL, CHOLPOCT, CHOLX, CHLST, CHOLV, 393306, HDL, LDL, LDLC, DLDLP, 502904, VLDLC, VLDL, TGLX, TRIGL, TRIGP, TGLPOCT, CHHD, CHHDX   BNP No results for input(s): BNPP in the last 72 hours. Liver Enzymes No results for input(s): TP, ALB, TBIL, AP, SGOT, GPT in the last 72 hours. No lab exists for component: DBIL   Thyroid Studies Lab Results   Component Value Date/Time    TSH 1.54 09/26/2017 09:56 AM          Imaging:      CT chest  1.   Slight interval improvement in the patchy bilateral airspace opacities with  overall similar distribution as above suggesting multifocal pneumonia and likely  superimposed interstitial edema. 2.  Small new bilateral pleural effusions and a trace pericardial effusion. 3.  Probable reactive mediastinal adenopathy. Consults: Infectious Disease and Pulmonary/Critical Care    Treatment Team: Treatment Team: Consulting Provider: Lige Nageotte, MD; Consulting Provider: Marietta Camarillo MD; Physician Assistant: JOSE DAVID Barrett; Consulting Provider: Stephanie Cuevas MD; Care Manager: Ramesh Cardenas; Occupational Therapist: Farooq Ge OT; Physical Therapist: Nancy Regan PT; Care Manager: Sherley Richardson RN    Significant Diagnostic Studies: labs: No results found for this or any previous visit (from the past 25 hour(s)).       Discharge diagnoses:    Problem List as of 10/23/2017  Date Reviewed: 9/24/2017          Codes Class Noted - Resolved    * (Principal)Recurrent pneumonia ICD-10-CM: J18.9  ICD-9-CM: 486  10/11/2017 - Present        Respiratory failure with hypoxia ([facility] ICD-10-CM: J96.91  ICD-9-CM: 518.81  10/11/2017 - Present        KEATON (acute kidney injury) ([facility] ICD-10-CM: N17.9  ICD-9-CM: 584.9  10/11/2017 - Present        SOB (shortness of breath) ICD-10-CM: R06.02  ICD-9-CM: 786.05  10/9/2017 - Present        Dysphagia ICD-10-CM: R13.10  ICD-9-CM: 787.20  10/9/2017 - Present        History of throat cancer ICD-10-CM: Z85.819  ICD-9-CM: V10.02  10/9/2017 - Present        Acute respiratory failure with hypercapnia (HCC) ICD-10-CM: J96.02  ICD-9-CM: 518.81  9/24/2017 - Present        Sepsis ([facility] ICD-10-CM: A41.9  ICD-9-CM: 038.9, 995.91  9/24/2017 - Present        HCAP (healthcare-associated pneumonia) ICD-10-CM: J18.9  ICD-9-CM: 293  9/24/2017 - Present        Hypertension ICD-10-CM: I10  ICD-9-CM: 401.9  9/24/2017 - Present        Diabetes mellitus ([facility] ICD-10-CM: E11.9  ICD-9-CM: 250.00  9/24/2017 - Present            Hospital Course:   Major issues addressed during hospitalization outlined  below. Dayanara Maxwell is a 54 y.o. female with pmhx of throat cancer, dysphagia , peg, CAD,DM, hypothyroid , recent pneumonia, htn,who presents with sob. Patient arrived by EMS from home. EMS report she was in respiratory distress and sating 44%. She was placed on a NRBR at sat went up to 98%. Patient was recently discharged 3 days ago from [place] [id number] with recurrent pneumonia. She then had sputum culture positive for MRSA and pseudomonas sensitive . Today in ED she was placed on BIPAP. ABG PH 7.384,PO2 43, PCO2 48.4. Patient was started on IV vanco and levaquin by ED. She also had elevated ddimer. vq scan ordered. Repeat CT scan as above on admission. Admitted to ICU on admission on Bipap and shortly after to [facility]. Her last admission she grew pseudomonas and in this admission she has grown out MRSA. ID has been following and has switched patient to Linezolid from vanc. Mariel Adams Has been weaning Oxygen down as patient was on High flow oxygen around 50 liters early this week. She normally is on 2 liters at home. She has responded well slowly weaning 02. ID recommended 10 more days of linezolid oral as op. This was provided on DC. Pulm and PCP follow up recommended.        Guille Dasilva MD  October 25, 2017        Total time spent 45 minutes

## 2017-10-26 ENCOUNTER — HOME CARE VISIT (OUTPATIENT)
Dept: SCHEDULING | Facility: HOME HEALTH | Age: 52
End: 2017-10-26
Payer: MEDICARE

## 2017-10-26 ENCOUNTER — HOME CARE VISIT (OUTPATIENT)
Dept: HOME HEALTH SERVICES | Facility: HOME HEALTH | Age: 52
End: 2017-10-26
Payer: MEDICARE

## 2017-10-26 VITALS — SYSTOLIC BLOOD PRESSURE: 122 MMHG | HEART RATE: 123 BPM | DIASTOLIC BLOOD PRESSURE: 80 MMHG | OXYGEN SATURATION: 94 %

## 2017-10-26 PROCEDURE — 3331090002 HH PPS REVENUE DEBIT

## 2017-10-26 PROCEDURE — 3331090001 HH PPS REVENUE CREDIT

## 2017-10-26 PROCEDURE — G0151 HHCP-SERV OF PT,EA 15 MIN: HCPCS

## 2017-10-27 ENCOUNTER — HOME CARE VISIT (OUTPATIENT)
Dept: HOME HEALTH SERVICES | Facility: HOME HEALTH | Age: 52
End: 2017-10-27
Payer: MEDICARE

## 2017-10-27 ENCOUNTER — HOME CARE VISIT (OUTPATIENT)
Dept: SCHEDULING | Facility: HOME HEALTH | Age: 52
End: 2017-10-27
Payer: MEDICARE

## 2017-10-27 VITALS
SYSTOLIC BLOOD PRESSURE: 118 MMHG | HEART RATE: 112 BPM | DIASTOLIC BLOOD PRESSURE: 74 MMHG | OXYGEN SATURATION: 92 % | TEMPERATURE: 96.5 F | RESPIRATION RATE: 22 BRPM

## 2017-10-27 PROCEDURE — 3331090001 HH PPS REVENUE CREDIT

## 2017-10-27 PROCEDURE — 3331090002 HH PPS REVENUE DEBIT

## 2017-10-27 PROCEDURE — G0299 HHS/HOSPICE OF RN EA 15 MIN: HCPCS

## 2017-10-28 PROCEDURE — 3331090002 HH PPS REVENUE DEBIT

## 2017-10-28 PROCEDURE — 3331090001 HH PPS REVENUE CREDIT

## 2017-10-29 PROCEDURE — 3331090001 HH PPS REVENUE CREDIT

## 2017-10-29 PROCEDURE — 3331090002 HH PPS REVENUE DEBIT

## 2017-10-30 ENCOUNTER — HOME CARE VISIT (OUTPATIENT)
Dept: HOME HEALTH SERVICES | Facility: HOME HEALTH | Age: 52
End: 2017-10-30
Payer: MEDICARE

## 2017-10-30 PROCEDURE — 3331090001 HH PPS REVENUE CREDIT

## 2017-10-30 PROCEDURE — 3331090002 HH PPS REVENUE DEBIT

## 2017-10-31 ENCOUNTER — HOME CARE VISIT (OUTPATIENT)
Dept: SCHEDULING | Facility: HOME HEALTH | Age: 52
End: 2017-10-31
Payer: MEDICARE

## 2017-10-31 PROCEDURE — 3331090002 HH PPS REVENUE DEBIT

## 2017-10-31 PROCEDURE — 3331090001 HH PPS REVENUE CREDIT

## 2017-10-31 PROCEDURE — G0299 HHS/HOSPICE OF RN EA 15 MIN: HCPCS

## 2017-10-31 PROCEDURE — G0157 HHC PT ASSISTANT EA 15: HCPCS

## 2017-11-01 ENCOUNTER — HOME CARE VISIT (OUTPATIENT)
Dept: SCHEDULING | Facility: HOME HEALTH | Age: 52
End: 2017-11-01
Payer: MEDICARE

## 2017-11-01 VITALS
DIASTOLIC BLOOD PRESSURE: 70 MMHG | SYSTOLIC BLOOD PRESSURE: 100 MMHG | TEMPERATURE: 97.6 F | HEART RATE: 102 BPM | OXYGEN SATURATION: 96 %

## 2017-11-01 PROCEDURE — G0152 HHCP-SERV OF OT,EA 15 MIN: HCPCS

## 2017-11-01 PROCEDURE — 3331090001 HH PPS REVENUE CREDIT

## 2017-11-01 PROCEDURE — 3331090002 HH PPS REVENUE DEBIT

## 2017-11-02 ENCOUNTER — HOME CARE VISIT (OUTPATIENT)
Dept: SCHEDULING | Facility: HOME HEALTH | Age: 52
End: 2017-11-02
Payer: MEDICARE

## 2017-11-02 VITALS
TEMPERATURE: 99 F | OXYGEN SATURATION: 99 % | HEART RATE: 94 BPM | SYSTOLIC BLOOD PRESSURE: 110 MMHG | DIASTOLIC BLOOD PRESSURE: 70 MMHG

## 2017-11-02 VITALS
TEMPERATURE: 99 F | SYSTOLIC BLOOD PRESSURE: 103 MMHG | DIASTOLIC BLOOD PRESSURE: 74 MMHG | HEART RATE: 102 BPM | OXYGEN SATURATION: 94 %

## 2017-11-02 PROCEDURE — 3331090002 HH PPS REVENUE DEBIT

## 2017-11-02 PROCEDURE — 3331090001 HH PPS REVENUE CREDIT

## 2017-11-02 PROCEDURE — G0157 HHC PT ASSISTANT EA 15: HCPCS

## 2017-11-03 ENCOUNTER — HOME CARE VISIT (OUTPATIENT)
Dept: SCHEDULING | Facility: HOME HEALTH | Age: 52
End: 2017-11-03
Payer: MEDICARE

## 2017-11-03 VITALS
DIASTOLIC BLOOD PRESSURE: 84 MMHG | OXYGEN SATURATION: 99 % | HEART RATE: 105 BPM | SYSTOLIC BLOOD PRESSURE: 130 MMHG | TEMPERATURE: 98.5 F

## 2017-11-03 VITALS
OXYGEN SATURATION: 98 % | SYSTOLIC BLOOD PRESSURE: 107 MMHG | DIASTOLIC BLOOD PRESSURE: 71 MMHG | RESPIRATION RATE: 20 BRPM | HEART RATE: 104 BPM | TEMPERATURE: 98.4 F

## 2017-11-03 PROCEDURE — 3331090001 HH PPS REVENUE CREDIT

## 2017-11-03 PROCEDURE — G0299 HHS/HOSPICE OF RN EA 15 MIN: HCPCS

## 2017-11-03 PROCEDURE — 3331090002 HH PPS REVENUE DEBIT

## 2017-11-03 PROCEDURE — G0157 HHC PT ASSISTANT EA 15: HCPCS

## 2017-11-04 PROCEDURE — 3331090002 HH PPS REVENUE DEBIT

## 2017-11-04 PROCEDURE — 3331090001 HH PPS REVENUE CREDIT

## 2017-11-05 VITALS
DIASTOLIC BLOOD PRESSURE: 60 MMHG | HEART RATE: 70 BPM | OXYGEN SATURATION: 99 % | RESPIRATION RATE: 18 BRPM | SYSTOLIC BLOOD PRESSURE: 120 MMHG | TEMPERATURE: 98.1 F

## 2017-11-05 PROCEDURE — 3331090002 HH PPS REVENUE DEBIT

## 2017-11-05 PROCEDURE — 3331090001 HH PPS REVENUE CREDIT

## 2017-11-06 ENCOUNTER — HOME CARE VISIT (OUTPATIENT)
Dept: SCHEDULING | Facility: HOME HEALTH | Age: 52
End: 2017-11-06
Payer: MEDICARE

## 2017-11-06 PROCEDURE — G0152 HHCP-SERV OF OT,EA 15 MIN: HCPCS

## 2017-11-06 PROCEDURE — G0157 HHC PT ASSISTANT EA 15: HCPCS

## 2017-11-06 PROCEDURE — 3331090001 HH PPS REVENUE CREDIT

## 2017-11-06 PROCEDURE — 3331090002 HH PPS REVENUE DEBIT

## 2017-11-07 ENCOUNTER — HOME CARE VISIT (OUTPATIENT)
Dept: HOME HEALTH SERVICES | Facility: HOME HEALTH | Age: 52
End: 2017-11-07
Payer: MEDICARE

## 2017-11-07 VITALS
OXYGEN SATURATION: 99 % | TEMPERATURE: 98.7 F | DIASTOLIC BLOOD PRESSURE: 88 MMHG | HEART RATE: 116 BPM | SYSTOLIC BLOOD PRESSURE: 123 MMHG

## 2017-11-07 VITALS
HEART RATE: 110 BPM | SYSTOLIC BLOOD PRESSURE: 110 MMHG | TEMPERATURE: 98.7 F | OXYGEN SATURATION: 92 % | DIASTOLIC BLOOD PRESSURE: 70 MMHG

## 2017-11-07 PROCEDURE — 3331090002 HH PPS REVENUE DEBIT

## 2017-11-07 PROCEDURE — 3331090001 HH PPS REVENUE CREDIT

## 2017-11-08 ENCOUNTER — HOME CARE VISIT (OUTPATIENT)
Dept: SCHEDULING | Facility: HOME HEALTH | Age: 52
End: 2017-11-08
Payer: MEDICARE

## 2017-11-08 VITALS
OXYGEN SATURATION: 100 % | SYSTOLIC BLOOD PRESSURE: 130 MMHG | TEMPERATURE: 98.1 F | DIASTOLIC BLOOD PRESSURE: 78 MMHG | HEART RATE: 88 BPM

## 2017-11-08 VITALS
TEMPERATURE: 98.8 F | SYSTOLIC BLOOD PRESSURE: 134 MMHG | HEART RATE: 99 BPM | OXYGEN SATURATION: 94 % | DIASTOLIC BLOOD PRESSURE: 92 MMHG

## 2017-11-08 PROCEDURE — 3331090002 HH PPS REVENUE DEBIT

## 2017-11-08 PROCEDURE — G0152 HHCP-SERV OF OT,EA 15 MIN: HCPCS

## 2017-11-08 PROCEDURE — 3331090001 HH PPS REVENUE CREDIT

## 2017-11-08 PROCEDURE — G0157 HHC PT ASSISTANT EA 15: HCPCS

## 2017-11-09 PROCEDURE — 3331090001 HH PPS REVENUE CREDIT

## 2017-11-09 PROCEDURE — 3331090002 HH PPS REVENUE DEBIT

## 2017-11-10 ENCOUNTER — HOME CARE VISIT (OUTPATIENT)
Dept: SCHEDULING | Facility: HOME HEALTH | Age: 52
End: 2017-11-10
Payer: MEDICARE

## 2017-11-10 VITALS — DIASTOLIC BLOOD PRESSURE: 78 MMHG | HEART RATE: 100 BPM | OXYGEN SATURATION: 100 % | SYSTOLIC BLOOD PRESSURE: 126 MMHG

## 2017-11-10 PROCEDURE — G0151 HHCP-SERV OF PT,EA 15 MIN: HCPCS

## 2017-11-10 PROCEDURE — 3331090002 HH PPS REVENUE DEBIT

## 2017-11-10 PROCEDURE — 3331090001 HH PPS REVENUE CREDIT

## 2017-11-11 PROCEDURE — 3331090002 HH PPS REVENUE DEBIT

## 2017-11-11 PROCEDURE — 3331090001 HH PPS REVENUE CREDIT

## 2017-11-12 PROCEDURE — 3331090002 HH PPS REVENUE DEBIT

## 2017-11-12 PROCEDURE — 3331090001 HH PPS REVENUE CREDIT

## 2017-11-13 ENCOUNTER — HOME CARE VISIT (OUTPATIENT)
Dept: SCHEDULING | Facility: HOME HEALTH | Age: 52
End: 2017-11-13
Payer: MEDICARE

## 2017-11-13 VITALS
SYSTOLIC BLOOD PRESSURE: 134 MMHG | OXYGEN SATURATION: 99 % | HEART RATE: 110 BPM | DIASTOLIC BLOOD PRESSURE: 86 MMHG | TEMPERATURE: 99 F

## 2017-11-13 PROCEDURE — G0157 HHC PT ASSISTANT EA 15: HCPCS

## 2017-11-13 PROCEDURE — 3331090001 HH PPS REVENUE CREDIT

## 2017-11-13 PROCEDURE — 3331090002 HH PPS REVENUE DEBIT

## 2017-11-14 PROCEDURE — 3331090001 HH PPS REVENUE CREDIT

## 2017-11-14 PROCEDURE — 3331090002 HH PPS REVENUE DEBIT

## 2017-11-15 ENCOUNTER — HOME CARE VISIT (OUTPATIENT)
Dept: SCHEDULING | Facility: HOME HEALTH | Age: 52
End: 2017-11-15
Payer: MEDICARE

## 2017-11-15 PROCEDURE — G0152 HHCP-SERV OF OT,EA 15 MIN: HCPCS

## 2017-11-15 PROCEDURE — 3331090001 HH PPS REVENUE CREDIT

## 2017-11-15 PROCEDURE — 3331090002 HH PPS REVENUE DEBIT

## 2017-11-16 VITALS
TEMPERATURE: 98.6 F | DIASTOLIC BLOOD PRESSURE: 87 MMHG | SYSTOLIC BLOOD PRESSURE: 120 MMHG | HEART RATE: 104 BPM | OXYGEN SATURATION: 98 %

## 2017-11-16 PROCEDURE — 3331090002 HH PPS REVENUE DEBIT

## 2017-11-16 PROCEDURE — 3331090001 HH PPS REVENUE CREDIT

## 2017-11-17 ENCOUNTER — HOME CARE VISIT (OUTPATIENT)
Dept: SCHEDULING | Facility: HOME HEALTH | Age: 52
End: 2017-11-17
Payer: MEDICARE

## 2017-11-17 VITALS
SYSTOLIC BLOOD PRESSURE: 140 MMHG | OXYGEN SATURATION: 99 % | DIASTOLIC BLOOD PRESSURE: 88 MMHG | TEMPERATURE: 98 F | HEART RATE: 100 BPM

## 2017-11-17 PROCEDURE — 3331090001 HH PPS REVENUE CREDIT

## 2017-11-17 PROCEDURE — G0157 HHC PT ASSISTANT EA 15: HCPCS

## 2017-11-17 PROCEDURE — 3331090002 HH PPS REVENUE DEBIT

## 2017-11-18 PROCEDURE — 3331090001 HH PPS REVENUE CREDIT

## 2017-11-18 PROCEDURE — 3331090002 HH PPS REVENUE DEBIT

## 2017-11-19 PROCEDURE — 3331090002 HH PPS REVENUE DEBIT

## 2017-11-19 PROCEDURE — 3331090001 HH PPS REVENUE CREDIT

## 2017-11-20 ENCOUNTER — HOME CARE VISIT (OUTPATIENT)
Dept: SCHEDULING | Facility: HOME HEALTH | Age: 52
End: 2017-11-20
Payer: MEDICARE

## 2017-11-20 VITALS
HEART RATE: 114 BPM | OXYGEN SATURATION: 92 % | SYSTOLIC BLOOD PRESSURE: 132 MMHG | TEMPERATURE: 99.4 F | DIASTOLIC BLOOD PRESSURE: 80 MMHG

## 2017-11-20 PROCEDURE — 3331090002 HH PPS REVENUE DEBIT

## 2017-11-20 PROCEDURE — 3331090001 HH PPS REVENUE CREDIT

## 2017-11-20 PROCEDURE — G0157 HHC PT ASSISTANT EA 15: HCPCS

## 2017-11-21 ENCOUNTER — HOME CARE VISIT (OUTPATIENT)
Dept: SCHEDULING | Facility: HOME HEALTH | Age: 52
End: 2017-11-21
Payer: MEDICARE

## 2017-11-21 PROCEDURE — 3331090001 HH PPS REVENUE CREDIT

## 2017-11-21 PROCEDURE — G0157 HHC PT ASSISTANT EA 15: HCPCS

## 2017-11-21 PROCEDURE — 3331090002 HH PPS REVENUE DEBIT

## 2017-11-22 VITALS
HEART RATE: 104 BPM | SYSTOLIC BLOOD PRESSURE: 112 MMHG | TEMPERATURE: 98.1 F | OXYGEN SATURATION: 97 % | DIASTOLIC BLOOD PRESSURE: 64 MMHG

## 2017-11-22 PROCEDURE — 3331090001 HH PPS REVENUE CREDIT

## 2017-11-22 PROCEDURE — 3331090002 HH PPS REVENUE DEBIT

## 2017-11-23 PROCEDURE — 3331090002 HH PPS REVENUE DEBIT

## 2017-11-23 PROCEDURE — 3331090001 HH PPS REVENUE CREDIT

## 2017-11-24 PROCEDURE — 3331090002 HH PPS REVENUE DEBIT

## 2017-11-24 PROCEDURE — 3331090001 HH PPS REVENUE CREDIT

## 2017-11-25 PROCEDURE — 3331090001 HH PPS REVENUE CREDIT

## 2017-11-25 PROCEDURE — 3331090002 HH PPS REVENUE DEBIT

## 2017-11-26 PROCEDURE — 3331090001 HH PPS REVENUE CREDIT

## 2017-11-26 PROCEDURE — 3331090002 HH PPS REVENUE DEBIT

## 2017-11-27 PROCEDURE — 3331090001 HH PPS REVENUE CREDIT

## 2017-11-27 PROCEDURE — 3331090002 HH PPS REVENUE DEBIT

## 2017-11-28 PROCEDURE — 3331090002 HH PPS REVENUE DEBIT

## 2017-11-28 PROCEDURE — 3331090001 HH PPS REVENUE CREDIT

## 2017-11-29 PROCEDURE — 3331090001 HH PPS REVENUE CREDIT

## 2017-11-29 PROCEDURE — 3331090002 HH PPS REVENUE DEBIT

## 2017-11-30 PROCEDURE — 3331090001 HH PPS REVENUE CREDIT

## 2017-11-30 PROCEDURE — 3331090002 HH PPS REVENUE DEBIT

## 2017-12-01 ENCOUNTER — HOME CARE VISIT (OUTPATIENT)
Dept: SCHEDULING | Facility: HOME HEALTH | Age: 52
End: 2017-12-01
Payer: MEDICARE

## 2017-12-01 VITALS
SYSTOLIC BLOOD PRESSURE: 122 MMHG | TEMPERATURE: 98 F | DIASTOLIC BLOOD PRESSURE: 84 MMHG | HEART RATE: 84 BPM | OXYGEN SATURATION: 99 %

## 2017-12-01 PROCEDURE — 3331090001 HH PPS REVENUE CREDIT

## 2017-12-01 PROCEDURE — 3331090002 HH PPS REVENUE DEBIT

## 2017-12-01 PROCEDURE — 3331090003 HH PPS REVENUE ADJ

## 2017-12-01 PROCEDURE — G0299 HHS/HOSPICE OF RN EA 15 MIN: HCPCS

## 2017-12-01 PROCEDURE — G0151 HHCP-SERV OF PT,EA 15 MIN: HCPCS

## 2017-12-02 VITALS
RESPIRATION RATE: 20 BRPM | DIASTOLIC BLOOD PRESSURE: 88 MMHG | TEMPERATURE: 97.6 F | OXYGEN SATURATION: 98 % | SYSTOLIC BLOOD PRESSURE: 138 MMHG

## 2017-12-02 PROCEDURE — 3331090002 HH PPS REVENUE DEBIT

## 2017-12-02 PROCEDURE — 3331090001 HH PPS REVENUE CREDIT

## 2017-12-03 PROCEDURE — 3331090001 HH PPS REVENUE CREDIT

## 2017-12-03 PROCEDURE — 3331090002 HH PPS REVENUE DEBIT

## 2017-12-04 PROCEDURE — 3331090002 HH PPS REVENUE DEBIT

## 2017-12-04 PROCEDURE — 3331090001 HH PPS REVENUE CREDIT

## 2017-12-05 ENCOUNTER — APPOINTMENT (OUTPATIENT)
Dept: CT IMAGING | Age: 52
DRG: 394 | End: 2017-12-05
Attending: EMERGENCY MEDICINE
Payer: MEDICARE

## 2017-12-05 ENCOUNTER — HOSPITAL ENCOUNTER (INPATIENT)
Age: 52
LOS: 10 days | Discharge: HOME HEALTH | DRG: 394 | End: 2017-12-16
Attending: EMERGENCY MEDICINE | Admitting: FAMILY MEDICINE
Payer: MEDICARE

## 2017-12-05 DIAGNOSIS — D72.829 LEUKOCYTOSIS, UNSPECIFIED TYPE: ICD-10-CM

## 2017-12-05 DIAGNOSIS — R11.2 NON-INTRACTABLE VOMITING WITH NAUSEA, UNSPECIFIED VOMITING TYPE: ICD-10-CM

## 2017-12-05 DIAGNOSIS — L03.311 CELLULITIS, ABDOMINAL WALL: Primary | ICD-10-CM

## 2017-12-05 LAB
ALBUMIN SERPL-MCNC: 3.1 G/DL (ref 3.4–5)
ALBUMIN/GLOB SERPL: 0.5 {RATIO} (ref 0.8–1.7)
ALP SERPL-CCNC: 104 U/L (ref 45–117)
ALT SERPL-CCNC: 38 U/L (ref 13–56)
ANION GAP SERPL CALC-SCNC: 7 MMOL/L (ref 3–18)
ARTERIAL PATENCY WRIST A: YES
AST SERPL-CCNC: 36 U/L (ref 15–37)
BASE EXCESS BLD CALC-SCNC: 6 MMOL/L
BASOPHILS # BLD: 0 K/UL (ref 0–0.06)
BASOPHILS NFR BLD: 0 % (ref 0–2)
BDY SITE: ABNORMAL
BILIRUB DIRECT SERPL-MCNC: 0.1 MG/DL (ref 0–0.2)
BILIRUB SERPL-MCNC: 0.3 MG/DL (ref 0.2–1)
BODY TEMPERATURE: 98
BUN SERPL-MCNC: 38 MG/DL (ref 7–18)
BUN/CREAT SERPL: 27 (ref 12–20)
CALCIUM SERPL-MCNC: 8.8 MG/DL (ref 8.5–10.1)
CHLORIDE SERPL-SCNC: 96 MMOL/L (ref 100–108)
CO2 SERPL-SCNC: 31 MMOL/L (ref 21–32)
CREAT SERPL-MCNC: 1.39 MG/DL (ref 0.6–1.3)
DIFFERENTIAL METHOD BLD: ABNORMAL
EOSINOPHIL # BLD: 0.3 K/UL (ref 0–0.4)
EOSINOPHIL NFR BLD: 2 % (ref 0–5)
ERYTHROCYTE [DISTWIDTH] IN BLOOD BY AUTOMATED COUNT: 15.6 % (ref 11.6–14.5)
GAS FLOW.O2 O2 DELIVERY SYS: ABNORMAL L/MIN
GAS FLOW.O2 SETTING OXYMISER: 15 L/M
GLOBULIN SER CALC-MCNC: 6.6 G/DL (ref 2–4)
GLUCOSE SERPL-MCNC: 174 MG/DL (ref 74–99)
HCG SERPL QL: NEGATIVE
HCO3 BLD-SCNC: 30.5 MMOL/L (ref 22–26)
HCT VFR BLD AUTO: 24.1 % (ref 35–45)
HGB BLD-MCNC: 7.8 G/DL (ref 12–16)
LIPASE SERPL-CCNC: 200 U/L (ref 73–393)
LYMPHOCYTES # BLD: 1.6 K/UL (ref 0.9–3.6)
LYMPHOCYTES NFR BLD: 9 % (ref 21–52)
MAGNESIUM SERPL-MCNC: 2.4 MG/DL (ref 1.6–2.6)
MCH RBC QN AUTO: 30.5 PG (ref 24–34)
MCHC RBC AUTO-ENTMCNC: 32.4 G/DL (ref 31–37)
MCV RBC AUTO: 94.1 FL (ref 74–97)
MONOCYTES # BLD: 0.8 K/UL (ref 0.05–1.2)
MONOCYTES NFR BLD: 4 % (ref 3–10)
NEUTS SEG # BLD: 15 K/UL (ref 1.8–8)
NEUTS SEG NFR BLD: 85 % (ref 40–73)
O2/TOTAL GAS SETTING VFR VENT: 100 %
PCO2 BLD: 48.5 MMHG (ref 35–45)
PH BLD: 7.41 [PH] (ref 7.35–7.45)
PLATELET # BLD AUTO: 218 K/UL (ref 135–420)
PMV BLD AUTO: 11.7 FL (ref 9.2–11.8)
PO2 BLD: 445 MMHG (ref 80–100)
POTASSIUM SERPL-SCNC: 4.1 MMOL/L (ref 3.5–5.5)
PROT SERPL-MCNC: 9.7 G/DL (ref 6.4–8.2)
RBC # BLD AUTO: 2.56 M/UL (ref 4.2–5.3)
SAO2 % BLD: 100 % (ref 92–97)
SERVICE CMNT-IMP: ABNORMAL
SODIUM SERPL-SCNC: 134 MMOL/L (ref 136–145)
SPECIMEN TYPE: ABNORMAL
TOTAL RESP. RATE, ITRR: 23
WBC # BLD AUTO: 17.7 K/UL (ref 4.6–13.2)

## 2017-12-05 PROCEDURE — 82803 BLOOD GASES ANY COMBINATION: CPT

## 2017-12-05 PROCEDURE — 85025 COMPLETE CBC W/AUTO DIFF WBC: CPT | Performed by: EMERGENCY MEDICINE

## 2017-12-05 PROCEDURE — 74011000258 HC RX REV CODE- 258: Performed by: EMERGENCY MEDICINE

## 2017-12-05 PROCEDURE — 83690 ASSAY OF LIPASE: CPT | Performed by: EMERGENCY MEDICINE

## 2017-12-05 PROCEDURE — 36600 WITHDRAWAL OF ARTERIAL BLOOD: CPT

## 2017-12-05 PROCEDURE — 96361 HYDRATE IV INFUSION ADD-ON: CPT

## 2017-12-05 PROCEDURE — 80048 BASIC METABOLIC PNL TOTAL CA: CPT | Performed by: EMERGENCY MEDICINE

## 2017-12-05 PROCEDURE — 81001 URINALYSIS AUTO W/SCOPE: CPT | Performed by: EMERGENCY MEDICINE

## 2017-12-05 PROCEDURE — 99285 EMERGENCY DEPT VISIT HI MDM: CPT

## 2017-12-05 PROCEDURE — 3331090002 HH PPS REVENUE DEBIT

## 2017-12-05 PROCEDURE — 83735 ASSAY OF MAGNESIUM: CPT | Performed by: EMERGENCY MEDICINE

## 2017-12-05 PROCEDURE — 87040 BLOOD CULTURE FOR BACTERIA: CPT | Performed by: EMERGENCY MEDICINE

## 2017-12-05 PROCEDURE — 74176 CT ABD & PELVIS W/O CONTRAST: CPT

## 2017-12-05 PROCEDURE — 84703 CHORIONIC GONADOTROPIN ASSAY: CPT | Performed by: EMERGENCY MEDICINE

## 2017-12-05 PROCEDURE — 96375 TX/PRO/DX INJ NEW DRUG ADDON: CPT

## 2017-12-05 PROCEDURE — 3331090001 HH PPS REVENUE CREDIT

## 2017-12-05 PROCEDURE — 74011636320 HC RX REV CODE- 636/320: Performed by: EMERGENCY MEDICINE

## 2017-12-05 PROCEDURE — 96365 THER/PROPH/DIAG IV INF INIT: CPT

## 2017-12-05 PROCEDURE — 80076 HEPATIC FUNCTION PANEL: CPT | Performed by: EMERGENCY MEDICINE

## 2017-12-05 PROCEDURE — 74011250636 HC RX REV CODE- 250/636: Performed by: EMERGENCY MEDICINE

## 2017-12-05 PROCEDURE — 94762 N-INVAS EAR/PLS OXIMTRY CONT: CPT

## 2017-12-05 RX ORDER — NALOXONE HYDROCHLORIDE 0.4 MG/ML
INJECTION, SOLUTION INTRAMUSCULAR; INTRAVENOUS; SUBCUTANEOUS
Status: DISPENSED
Start: 2017-12-05 | End: 2017-12-06

## 2017-12-05 RX ORDER — HYDROMORPHONE HCL IN 0.9% NACL 50 MG/50ML
1 PLASTIC BAG, INJECTION (ML) INJECTION
Status: COMPLETED | OUTPATIENT
Start: 2017-12-05 | End: 2017-12-05

## 2017-12-05 RX ORDER — NALOXONE HYDROCHLORIDE 1 MG/ML
0.4 INJECTION INTRAMUSCULAR; INTRAVENOUS; SUBCUTANEOUS
Status: DISCONTINUED | OUTPATIENT
Start: 2017-12-05 | End: 2017-12-05

## 2017-12-05 RX ORDER — ONDANSETRON 2 MG/ML
4 INJECTION INTRAMUSCULAR; INTRAVENOUS
Status: COMPLETED | OUTPATIENT
Start: 2017-12-05 | End: 2017-12-05

## 2017-12-05 RX ORDER — NALOXONE HYDROCHLORIDE 1 MG/ML
0.2 INJECTION INTRAMUSCULAR; INTRAVENOUS; SUBCUTANEOUS
Status: COMPLETED | OUTPATIENT
Start: 2017-12-05 | End: 2017-12-05

## 2017-12-05 RX ADMIN — IOHEXOL 50 ML: 240 INJECTION, SOLUTION INTRATHECAL; INTRAVASCULAR; INTRAVENOUS; ORAL at 21:01

## 2017-12-05 RX ADMIN — ONDANSETRON 4 MG: 2 INJECTION INTRAMUSCULAR; INTRAVENOUS at 21:01

## 2017-12-05 RX ADMIN — SODIUM CHLORIDE 1000 ML: 900 INJECTION, SOLUTION INTRAVENOUS at 21:01

## 2017-12-05 RX ADMIN — Medication 1 MG: at 21:01

## 2017-12-05 RX ADMIN — PIPERACILLIN SODIUM,TAZOBACTAM SODIUM 3.38 G: 3; .375 INJECTION, POWDER, FOR SOLUTION INTRAVENOUS at 23:30

## 2017-12-05 RX ADMIN — NALOXONE HYDROCHLORIDE 0.2 MG: 1 INJECTION PARENTERAL at 22:41

## 2017-12-05 NOTE — IP AVS SNAPSHOT
Miller Casas 
 
 
 [address] [place] [address] Patient: Manoj Farrell MRN: [id number] :1965 About your hospitalization You were admitted on:  2017 You last received care in the:  [address] [place] [address],[address] You were discharged on:  2017 Why you were hospitalized Your primary diagnosis was:  Abdominal Wall Cellulitis Your diagnoses also included:  G Tube Feedings (Hcc), Anemia, Enmanuel (Acute Kidney Injury) (Hcc), Hypertension, Diabetes Mellitus (Hcc), Hypothyroid Things You Need To Do (next 8 weeks) Follow up with Eboni Golden MD in 3 day(s)  
follow up in 3-5 days Phone:  [phone] Where:  [facility] Discharge Orders None A check ted indicates which time of day the medication should be taken. My Medications STOP taking these medications HYDROcodone-acetaminophen 5-325 mg per tablet Commonly known as:  Robert Ill HYDROcodone-acetaminophen 7.5-325 mg per tablet Commonly known as:  NORCO  
   
  
 metoprolol tartrate 100 mg IR tablet Commonly known as:  LOPRESSOR  
   
  
  
TAKE these medications as instructed Instructions Each Dose to Equal  
 Morning Noon Evening Bedtime  
 amLODIPine 10 mg tablet Commonly known as:  Voncile Cropsey Your last dose was: Your next dose is: Take 1 Tab by mouth daily. 10 mg  
    
   
   
   
  
 aspirin 81 mg tablet Your last dose was: Your next dose is: Take 81 mg by mouth. 81 mg  
    
   
   
   
  
 carvedilol 6.25 mg tablet Commonly known as:  Media [place] Your last dose was: Your next dose is: Take 1 Tab by mouth two (2) times daily (with meals). 6.25 mg  
    
   
   
   
  
 cetirizine 10 mg tablet Commonly known as:  ZYRTEC Your last dose was: Your next dose is: Take 10 mg by mouth daily as needed for Allergies, Rhinitis or Itching. 10 mg  
    
   
   
   
  
 * COLACE 100 mg capsule Generic drug:  docusate sodium Your last dose was: Your next dose is: Take 100 mg by mouth two (2) times a day. 100 mg  
    
   
   
   
  
 * docusate 50 mg/5 mL liquid Commonly known as:  Jayy Levy Start taking on:  12/17/2017 Your last dose was: Your next dose is:    
   
   
 10 mL by Per G Tube route daily. 100 mg  
    
   
   
   
  
 diphenhydrAMINE 25 mg capsule Commonly known as:  BENADRYL Your last dose was: Your next dose is: Take 1 Cap by mouth every six (6) hours as needed for Itching for up to 10 days. 25 mg  
    
   
   
   
  
 folic acid 1 mg tablet Commonly known as:  Google Your last dose was: Your next dose is: Take 0.4 mg by mouth daily. 0.4 mg  
    
   
   
   
  
 furosemide 20 mg tablet Commonly known as:  LASIX Your last dose was: Your next dose is: Take 2 Tabs by mouth daily. 40 mg  
    
   
   
   
  
 gabapentin 300 mg capsule Commonly known as:  NEURONTIN Your last dose was: Your next dose is: Take 400 mg by mouth two (2) times a day. 400 mg JEVITY 1.5 SYMONE PO Your last dose was: Your next dose is:    
   
   
 5 Cans by PEG Tube route daily. TOTAL OF 5 CANS DAILY  
 5 Can  
    
   
   
   
  
 lisinopril 10 mg tablet Commonly known as:  Aditi Gordon Start taking on:  12/17/2017 Your last dose was: Your next dose is: Take 1 Tab by mouth daily. 10 mg Mapap 160 mg/5 mL suspension Generic drug:  acetaminophen Your last dose was: Your next dose is: Take 10.2 mL by mouth every six (6) hours as needed for Fever or Pain.   
 10.2 mL  
    
   
   
   
  
 MIRALAX 17 gram/dose powder Generic drug:  polyethylene glycol Your last dose was: Your next dose is: Take 17 g by mouth daily. 17 g  
    
   
   
   
  
 multivitamin tablet Commonly known as:  ONE A DAY Your last dose was: Your next dose is: Take 1 Tab by mouth daily. 1 Tab  
    
   
   
   
  
 nystatin powder Commonly known as:  MYCOSTATIN Your last dose was: Your next dose is:    
   
   
 Apply  to affected area two (2) times a day. ondansetron hcl 4 mg tablet Commonly known as:  ZOFRAN (AS HYDROCHLORIDE) Your last dose was: Your next dose is: Take 1 Tab by mouth every eight (8) hours as needed for Nausea. 4 mg OXYGEN-AIR DELIVERY SYSTEMS Your last dose was: Your next dose is:    
   
   
 3 L by Nasal route continuous. 3 L  
    
   
   
   
  
 pantoprazole 40 mg tablet Commonly known as:  PROTONIX Your last dose was: Your next dose is: Take 1 Tab by mouth daily. 40 mg  
    
   
   
   
  
 SALINE NOSE 0.65 % nasal spray Generic drug:  sodium chloride Your last dose was: Your next dose is: 2 Sprays by Both Nostrils route as needed for Congestion. 2 Spray SYNTHROID 100 mcg tablet Generic drug:  levothyroxine Your last dose was: Your next dose is: Take 75 mcg by mouth Daily (before breakfast). 75 mcg  
    
   
   
   
  
 traMADol 50 mg tablet Commonly known as:  ULTRAM  
   
Your last dose was: Your next dose is: Take 1 Tab by mouth every six (6) hours as needed. Max Daily Amount: 200 mg.  
 50 mg  
    
   
   
   
  
 * Notice: This list has 2 medication(s) that are the same as other medications prescribed for you.  Read the directions carefully, and ask your doctor or other care provider to review them with you. Where to Get Your Medications Information on where to get these meds will be given to you by the nurse or doctor. ! Ask your nurse or doctor about these medications  
  carvedilol 6.25 mg tablet  
 diphenhydrAMINE 25 mg capsule  
 docusate 50 mg/5 mL liquid  
 lisinopril 10 mg tablet  
 nystatin powder  
 traMADol 50 mg tablet Discharge Instructions DISCHARGE SUMMARY from Nurse PATIENT INSTRUCTIONS: 
 
 
F-face looks uneven A-arms unable to move or move unevenly S-speech slurred or non-existent T-time-call 911 as soon as signs and symptoms begin-DO NOT go Back to bed or wait to see if you get better-TIME IS BRAIN. Warning Signs of HEART ATTACK Call 911 if you have these symptoms: 
? Chest discomfort. Most heart attacks involve discomfort in the center of the chest that lasts more than a few minutes, or that goes away and comes back. It can feel like uncomfortable pressure, squeezing, fullness, or pain. ? Discomfort in other areas of the upper body. Symptoms can include pain or discomfort in one or both arms, the back, neck, jaw, or stomach. ? Shortness of breath with or without chest discomfort. ? Other signs may include breaking out in a cold sweat, nausea, or lightheadedness. Don't wait more than five minutes to call [address]! Fast action can save your life. Calling 911 is almost always the fastest way to get lifesaving treatment. Emergency Medical Services staff can begin treatment when they arrive  up to an hour sooner than if someone gets to the hospital by car. HackMyPichart Activation Thank you for requesting access to [employer].  Please follow the instructions below to securely access and download your online medical record. Xpresso allows you to send messages to your doctor, view your test results, renew your prescriptions, schedule appointments, and more. How Do I Sign Up? 1. In your internet browser, go to [email or web address] Targeting 
2. Click on the First Time User? Click Here link in the Sign In box. You will be redirect to the New Member Sign Up page. 3. Enter your Xpresso Access Code exactly as it appears below. You will not need to use this code after youve completed the sign-up process. If you do not sign up before the expiration date, you must request a new code. Xpresso Access Code: [email or web address] Expires: 2018  3:12 PM (This is the date your Xpresso access code will ) 4. Enter the last four digits of your Social Security Number (xxxx) and Date of Birth (mm/dd/yyyy) as indicated and click Submit. You will be taken to the next sign-up page. 5. Create a Xpresso ID. This will be your Xpresso login ID and cannot be changed, so think of one that is secure and easy to remember. 6. Create a Xpresso password. You can change your password at any time. 7. Enter your Password Reset Question and Answer. This can be used at a later time if you forget your password. 8. Enter your e-mail address. You will receive e-mail notification when new information is available in [address] 9. Click Sign Up. You can now view and download portions of your medical record. 10. Click the Download Summary menu link to download a portable copy of your medical information. Additional Information If you have questions, please visit the Frequently Asked Questions section of the Xpresso website at https://[email or web address]. Nomad[employer] [email or web address] Remember, Xpresso is NOT to be used for urgent needs. For medical emergencies, dial 911. Patient armband removed and shredded. The discharge information has been reviewed with the patient and spouse. The patient and spouse verbalized understanding. Discharge medications reviewed with the patient and spouse and appropriate educational materials and side effects teaching were provided. ___________________________________________________________________________________________________________________________________ Introducing [facility]! Iris Charlie introduces [employer] System patient portal. Now you can access parts of your medical record, email your doctor's office, and request medication refills online. 1. In your internet browser, go to [email or web address]://[email or web address] [email or web address] 2. Click on the First Time User? Click Here link in the Sign In box. You will see the New Member Sign Up page. 3. Enter your Cista System Access Code exactly as it appears below. You will not need to use this code after youve completed the sign-up process. If you do not sign up before the expiration date, you must request a new code. · Cista System Access Code: [email or web address] Expires: 1/7/2018  3:12 PM 
 
4. Enter the last four digits of your Social Security Number (xxxx) and Date of Birth (mm/dd/yyyy) as indicated and click Submit. You will be taken to the next sign-up page. 5. Create a Savaari Car Rentalst ID. This will be your Cista System login ID and cannot be changed, so think of one that is secure and easy to remember. 6. Create a Savaari [employer] Rentalst password. You can change your password at any time. 7. Enter your Password Reset Question and Answer. This can be used at a later time if you forget your password. 8. Enter your e-mail address. You will receive e-mail notification when new information is available in [address] 9. Click Sign Up. You can now view and download portions of your medical record. 10. Click the Download Summary menu link to download a portable copy of your medical information.  
 
If you have questions, please visit the Frequently Asked Questions section of the Bion[employer]. Remember, MyChart is NOT to be used for urgent needs. For medical emergencies, dial 911. Now available from your iPhone and Android! Providers Seen During Your Hospitalization Provider Specialty Primary office phone Zay Avalos MD Emergency Medicine [phone] Adrienne Larson MD Family Practice [phone] Sudarshan Youssef MD Internal Medicine [phone] Allyson Foster MD Internal Medicine [phone] Your Primary Care Physician (PCP) Primary Care Physician Office Phone Office Fax Bill  [phone] You are allergic to the following Allergen Reactions Morphine Rash Amlodipine Swelling Leg swelling per patient, but no problem taking Lotrel (amlodipine-benazepril) Recent Documentation Height Weight Breastfeeding? BMI OB Status Smoking Status 1.626 m 56.1 kg No 21.22 kg/m2 Postmenopausal Former Smoker Emergency Contacts Name Discharge Info Relation Home Work Mobile Kari Molina CAREGIVER [3] Parent [1] [phone] David Malawian  Sister [23]   [phone] Patient Belongings The following personal items are in your possession at time of discharge: 
  Dental Appliances: None  Visual Aid: None      Home Medications: None   Jewelry: None  Clothing: At bedside    Other Valuables: Cell Phone Discharge Instructions Attachments/References PEG (PERCUTANEOUS ENDOSCOPIC GASTROSTOMY): POST-OP (ENGLISH) Patient Handouts Percutaneous Endoscopic Gastrostomy: What to Expect at [facility] Your Recovery A percutaneous endoscopic gastrostomy (PEG) is a procedure to make an opening between the skin of your belly and your stomach. The doctor put a thin tube called a gastrostomy tube (G-tube or feeding tube) into your stomach through the opening.  The tube can put liquid nutrition, fluid, and medicines directly into your stomach. The tube also may be used to drain liquid or air from the stomach. Your belly may feel sore, like you pulled a muscle, for several days. Your doctor will give you pain medicine for this. It will take about a week for the skin around your feeding tube to heal. You may have some yellowish mucus where the feeding tube comes out of your belly. This is normal and is not a sign of infection. You will need to learn how to use and care for your feeding tube. Your doctor may recommend that you have a nurse or dietitian visit you at home to help you get started with your feeding tube. At first you may need a friend or family member to help you with your tube feedings. But with practice, you may be able to do it yourself. A feeding tube can break down over time. If this happens, the tube will be removed and replaced. Sometimes a tube is removed if you have an infection that is getting worse. Sometimes a tube will come out by itself. Your doctor will give your instructions about what to do if this happens. This care sheet gives you a general idea about how long it will take for you to recover. But each person recovers at a different pace. Follow the steps below to feel better as quickly as possible. How can you care for yourself at home? Activity ? · Rest when you feel tired. Getting enough sleep will help you recover. ? · Try to walk each day. Start by walking a little more than you did the day before. Bit by bit, increase the amount you walk. Walking boosts blood flow and helps prevent pneumonia and constipation. ? · Ask your doctor when you can drive again. ? · Until your doctor says it is okay, avoid lifting anything that would make you strain. This may include heavy grocery bags and milk containers, a heavy briefcase or backpack, cat litter or dog food bags, a vacuum , or a child. ? · You can shower as usual. Pat dry the skin around your feeding tube.  Do not take a bath unless your doctor tells you it is okay. Diet ? · Follow your doctor's instructions about eating and drinking. ? · Follow your doctor's instructions about what nutrition and fluids to feed through your tube. Medicines ? · Your doctor will tell you if and when you can restart your medicines. He or she will also give you instructions about taking any new medicines. ? · If you take blood thinners, such as warfarin (Coumadin), clopidogrel (Plavix), or aspirin, be sure to talk to your doctor. He or she will tell you if and when to start taking those medicines again. Make sure that you understand exactly what your doctor wants you to do. ? · If you take medicine through your feeding tube: ¨ Follow exactly your doctor's instructions about how to do this. Do not try to put whole pills in your feeding tube. They may get stuck. Ask your doctor if liquid medicine is available. ¨ Do not mix your medicine with tube-feeding formula. This can cause a clog in the feeding tube. ¨ Do not put more than one medicine down your feeding tube at a time. ¨ Flush the tube with water before and after you put each medicine down your tube. ? · Be safe with medicines. Take pain medicines exactly as directed. ¨ If the doctor gave you a prescription medicine for pain, take it as prescribed. ¨ If you are not taking a prescription pain medicine, ask your doctor if you can take an over-the-counter medicine. ¨ Do not take two or more pain medicines at the same time unless the doctor told you to. Many pain medicines have acetaminophen, which is Tylenol. Too much acetaminophen (Tylenol) can be harmful. ? · If you think your pain medicine is making you sick to your stomach: 
¨ Take your pain medicine after meals (unless your doctor has told you not to). ¨ Ask your doctor for a different pain medicine. ? · If your doctor prescribed antibiotics, take them as directed.  Do not stop taking them just because you feel better. You need to take the full course of antibiotics. Incision care ? · Your doctor or nurse will show you how to care for the skin around the tube. Be sure to follow the instructions on keeping the area clean. ? · Wash the skin around your feeding tube daily with warm, soapy water, and pat it dry. Other cleaning products, such as hydrogen peroxide, can make the wound heal more slowly. You may cover the area with a gauze bandage if it weeps or rubs against clothing. Change the bandage every day. ? · Keep the area clean and dry. ? Using your feeding tube ? · Follow your doctor's instructions about using the feeding tube. Your doctor or nurse will: ¨ Tell you what to put through the tube. ¨ Teach you how to watch for a leaking tube, infection at the tube site, or a clog in the tube. ¨ Tell you what activities you can do.  
? · Keep your feeding tube clamped unless you are using it. ? · Keep the tube taped to your skin at all times, and leave some slack in the tube. This helps prevent pain and keeps the tube from coming out. ? · Wash your hands before you handle the tube and tube-feeding formula. Wash the top of the can of formula before you open it. ? · Keep the formula in the refrigerator after you open it. Do not let the formula sit at room temperature for more than 8 hours. ? · Sit up or keep your head up during the feeding and for 30 minutes after. ? · If you feel sick to your stomach or have stomach cramps during the tube feeding, slow the rate that the formula comes through the tube. Then gradually increase the rate as you can tolerate it. Follow-up care is a key part of your treatment and safety. Be sure to make and go to all appointments, and call your doctor if you are having problems. It's also a good idea to know your test results and keep a list of the medicines you take. When should you call for help? Call 911 anytime you think you may need emergency care. For example, call if: 
? · You pass out (lose consciousness). ? · You have severe trouble breathing. ? · You have sudden chest pain and shortness of breath, or you cough up blood. ? · You have severe belly pain. ?Call your doctor now or seek immediate medical care if: 
? · You have pain that does not get better after you take pain medicine. ? · You have a fever over 100°F.  
? · You have signs of infection, such as: 
¨ Increased pain, swelling, warmth, or redness. ¨ Red streaks leading from the area. ¨ Pus draining from the area. ¨ A fever. ? · The stitches that hold the feeding tube in place are loose or come out. ? · Your feeding tube comes out. ? · Your feeding tube is clogged, or liquid will not go down the tube. ? · You cough a lot or have other trouble during tube feedings. ? · You have nausea, vomiting, or diarrhea. ? Watch closely for any changes in your health, and be sure to contact your doctor if: 
? · You have any problems with your feeding tube. Where can you learn more? Go to http://brandon-courtney.info/. Enter K197 in the search box to learn more about \"Percutaneous Endoscopic Gastrostomy: What to Expect at Home. \" Current as of: May 12, 2017 Content Version: 11.4 © 3674-9747 [employer]on [employer]. Care instructions adapted under license by [employer] (which disclaims liability or warranty for this information). If you have questions about a medical condition or this instruction, always ask your healthcare professional. [place] [address] any warranty or liability for your use of this information. Please provide this summary of care documentation to your next provider. Signatures-by signing, you are acknowledging that this After Visit Summary has been reviewed with you and you have received a copy.   
  
 
  
    
    
 Patient Signature: ____________________________________________________________ Date:  ____________________________________________________________  
  
Khadar Shove Provider Signature:  ____________________________________________________________ Date:  ____________________________________________________________

## 2017-12-05 NOTE — Clinical Note
Status[de-identified] Inpatient [101] Type of Bed: Medical [8] Inpatient Hospitalization Certified Necessary for the Following Reasons: 3. Patient receiving treatment that can only be provided in an inpatient setting (further clarification in H&P documentation) Admitting Diagnosis: Abdominal wall cellulitis [[id number]] Admitting Physician: Tk Tse Attending Physician: Tk Tse Estimated Length of Stay: 2 Midnights Discharge Plan[de-identified] Home with Office Follow-up

## 2017-12-06 ENCOUNTER — APPOINTMENT (OUTPATIENT)
Dept: GENERAL RADIOLOGY | Age: 52
DRG: 394 | End: 2017-12-06
Attending: NURSE PRACTITIONER
Payer: MEDICARE

## 2017-12-06 PROBLEM — Z93.1 G TUBE FEEDINGS (HCC): Status: ACTIVE | Noted: 2017-12-06

## 2017-12-06 PROBLEM — L03.311 ABDOMINAL WALL CELLULITIS: Status: ACTIVE | Noted: 2017-12-06

## 2017-12-06 PROBLEM — E03.9 HYPOTHYROID: Status: ACTIVE | Noted: 2017-12-06

## 2017-12-06 PROBLEM — D64.9 ANEMIA: Status: ACTIVE | Noted: 2017-12-06

## 2017-12-06 LAB
ANION GAP SERPL CALC-SCNC: 8 MMOL/L (ref 3–18)
APPEARANCE UR: CLEAR
BACTERIA URNS QL MICRO: NEGATIVE /HPF
BASOPHILS # BLD: 0 K/UL (ref 0–0.06)
BASOPHILS NFR BLD: 0 % (ref 0–2)
BILIRUB UR QL: NEGATIVE
BUN SERPL-MCNC: 32 MG/DL (ref 7–18)
BUN/CREAT SERPL: 24 (ref 12–20)
CALCIUM SERPL-MCNC: 7.7 MG/DL (ref 8.5–10.1)
CHLORIDE SERPL-SCNC: 103 MMOL/L (ref 100–108)
CO2 SERPL-SCNC: 28 MMOL/L (ref 21–32)
COLOR UR: YELLOW
CREAT SERPL-MCNC: 1.34 MG/DL (ref 0.6–1.3)
DIFFERENTIAL METHOD BLD: ABNORMAL
EOSINOPHIL # BLD: 0 K/UL (ref 0–0.4)
EOSINOPHIL NFR BLD: 0 % (ref 0–5)
EPITH CASTS URNS QL MICRO: NORMAL /LPF (ref 0–5)
ERYTHROCYTE [DISTWIDTH] IN BLOOD BY AUTOMATED COUNT: 15.5 % (ref 11.6–14.5)
EST. AVERAGE GLUCOSE BLD GHB EST-MCNC: NORMAL MG/DL
GLUCOSE BLD STRIP.AUTO-MCNC: 103 MG/DL (ref 70–110)
GLUCOSE BLD STRIP.AUTO-MCNC: 108 MG/DL (ref 70–110)
GLUCOSE BLD STRIP.AUTO-MCNC: 118 MG/DL (ref 70–110)
GLUCOSE BLD STRIP.AUTO-MCNC: 131 MG/DL (ref 70–110)
GLUCOSE BLD STRIP.AUTO-MCNC: 98 MG/DL (ref 70–110)
GLUCOSE SERPL-MCNC: 99 MG/DL (ref 74–99)
GLUCOSE UR STRIP.AUTO-MCNC: NEGATIVE MG/DL
HBA1C MFR BLD: 4.6 % (ref 4.2–5.6)
HCT VFR BLD AUTO: 21.3 % (ref 35–45)
HGB BLD-MCNC: 6.7 G/DL (ref 12–16)
HGB UR QL STRIP: NEGATIVE
INR PPP: 1.1 (ref 0.8–1.2)
KETONES UR QL STRIP.AUTO: NEGATIVE MG/DL
LEUKOCYTE ESTERASE UR QL STRIP.AUTO: NEGATIVE
LYMPHOCYTES # BLD: 0.3 K/UL (ref 0.9–3.6)
LYMPHOCYTES NFR BLD: 2 % (ref 21–52)
MCH RBC QN AUTO: 29.9 PG (ref 24–34)
MCHC RBC AUTO-ENTMCNC: 31.5 G/DL (ref 31–37)
MCV RBC AUTO: 95.1 FL (ref 74–97)
MONOCYTES # BLD: 0.1 K/UL (ref 0.05–1.2)
MONOCYTES NFR BLD: 0 % (ref 3–10)
NEUTS SEG # BLD: 15.6 K/UL (ref 1.8–8)
NEUTS SEG NFR BLD: 98 % (ref 40–73)
NITRITE UR QL STRIP.AUTO: NEGATIVE
PH UR STRIP: 8.5 [PH] (ref 5–8)
PLATELET # BLD AUTO: 166 K/UL (ref 135–420)
PMV BLD AUTO: 10.9 FL (ref 9.2–11.8)
POTASSIUM SERPL-SCNC: 4.2 MMOL/L (ref 3.5–5.5)
PROT UR STRIP-MCNC: 100 MG/DL
PROTHROMBIN TIME: 14.1 SEC (ref 11.5–15.2)
RBC # BLD AUTO: 2.24 M/UL (ref 4.2–5.3)
RBC #/AREA URNS HPF: NORMAL /HPF (ref 0–5)
SODIUM SERPL-SCNC: 139 MMOL/L (ref 136–145)
SP GR UR REFRACTOMETRY: 1.01 (ref 1–1.03)
UROBILINOGEN UR QL STRIP.AUTO: 0.2 EU/DL (ref 0.2–1)
WBC # BLD AUTO: 16 K/UL (ref 4.6–13.2)
WBC URNS QL MICRO: NORMAL /HPF (ref 0–4)

## 2017-12-06 PROCEDURE — 74011000250 HC RX REV CODE- 250: Performed by: NURSE PRACTITIONER

## 2017-12-06 PROCEDURE — 77030020253 HC SOL INJ D545NS .05 DEX .45 SAL

## 2017-12-06 PROCEDURE — 77030018846 HC SOL IRR STRL H20 ICUM -A

## 2017-12-06 PROCEDURE — 85025 COMPLETE CBC W/AUTO DIFF WBC: CPT | Performed by: FAMILY MEDICINE

## 2017-12-06 PROCEDURE — 77030032490 HC SLV COMPR SCD KNE COVD -B

## 2017-12-06 PROCEDURE — 87086 URINE CULTURE/COLONY COUNT: CPT | Performed by: NURSE PRACTITIONER

## 2017-12-06 PROCEDURE — 74011000258 HC RX REV CODE- 258: Performed by: HOSPITALIST

## 2017-12-06 PROCEDURE — 76450000000

## 2017-12-06 PROCEDURE — 74011250636 HC RX REV CODE- 250/636: Performed by: NURSE PRACTITIONER

## 2017-12-06 PROCEDURE — 74011250637 HC RX REV CODE- 250/637: Performed by: FAMILY MEDICINE

## 2017-12-06 PROCEDURE — 36415 COLL VENOUS BLD VENIPUNCTURE: CPT | Performed by: FAMILY MEDICINE

## 2017-12-06 PROCEDURE — 74011250636 HC RX REV CODE- 250/636: Performed by: FAMILY MEDICINE

## 2017-12-06 PROCEDURE — 77030027138 HC INCENT SPIROMETER -A

## 2017-12-06 PROCEDURE — 82962 GLUCOSE BLOOD TEST: CPT

## 2017-12-06 PROCEDURE — 65270000029 HC RM PRIVATE

## 2017-12-06 PROCEDURE — 74011000258 HC RX REV CODE- 258: Performed by: NURSE PRACTITIONER

## 2017-12-06 PROCEDURE — 77010033678 HC OXYGEN DAILY

## 2017-12-06 PROCEDURE — 80048 BASIC METABOLIC PNL TOTAL CA: CPT | Performed by: FAMILY MEDICINE

## 2017-12-06 PROCEDURE — 74011250637 HC RX REV CODE- 250/637: Performed by: HOSPITALIST

## 2017-12-06 PROCEDURE — 71010 XR CHEST PORT: CPT

## 2017-12-06 PROCEDURE — 74011250636 HC RX REV CODE- 250/636: Performed by: HOSPITALIST

## 2017-12-06 PROCEDURE — 85610 PROTHROMBIN TIME: CPT | Performed by: FAMILY MEDICINE

## 2017-12-06 PROCEDURE — 83036 HEMOGLOBIN GLYCOSYLATED A1C: CPT | Performed by: FAMILY MEDICINE

## 2017-12-06 PROCEDURE — 74011250636 HC RX REV CODE- 250/636: Performed by: EMERGENCY MEDICINE

## 2017-12-06 RX ORDER — LEVOTHYROXINE SODIUM 75 UG/1
75 TABLET ORAL
Status: DISCONTINUED | OUTPATIENT
Start: 2017-12-06 | End: 2017-12-08

## 2017-12-06 RX ORDER — CETIRIZINE HCL 10 MG
10 TABLET ORAL
Status: DISCONTINUED | OUTPATIENT
Start: 2017-12-06 | End: 2017-12-16 | Stop reason: HOSPADM

## 2017-12-06 RX ORDER — GABAPENTIN 400 MG/1
400 CAPSULE ORAL 2 TIMES DAILY
Status: DISCONTINUED | OUTPATIENT
Start: 2017-12-06 | End: 2017-12-08

## 2017-12-06 RX ORDER — FAMOTIDINE 20 MG/1
20 TABLET, FILM COATED ORAL 2 TIMES DAILY
Status: DISCONTINUED | OUTPATIENT
Start: 2017-12-06 | End: 2017-12-07

## 2017-12-06 RX ORDER — GUAIFENESIN 100 MG/5ML
81 LIQUID (ML) ORAL DAILY
Status: DISCONTINUED | OUTPATIENT
Start: 2017-12-06 | End: 2017-12-08

## 2017-12-06 RX ORDER — INSULIN LISPRO 100 [IU]/ML
INJECTION, SOLUTION INTRAVENOUS; SUBCUTANEOUS
Status: DISCONTINUED | OUTPATIENT
Start: 2017-12-06 | End: 2017-12-07

## 2017-12-06 RX ORDER — DOCUSATE SODIUM 100 MG/1
100 CAPSULE, LIQUID FILLED ORAL 2 TIMES DAILY
Status: DISCONTINUED | OUTPATIENT
Start: 2017-12-06 | End: 2017-12-08

## 2017-12-06 RX ORDER — DEXTROSE MONOHYDRATE AND SODIUM CHLORIDE 5; .45 G/100ML; G/100ML
125 INJECTION, SOLUTION INTRAVENOUS CONTINUOUS
Status: DISCONTINUED | OUTPATIENT
Start: 2017-12-06 | End: 2017-12-09

## 2017-12-06 RX ORDER — ONDANSETRON 2 MG/ML
4 INJECTION INTRAMUSCULAR; INTRAVENOUS
Status: DISCONTINUED | OUTPATIENT
Start: 2017-12-06 | End: 2017-12-16 | Stop reason: HOSPADM

## 2017-12-06 RX ORDER — HEPARIN SODIUM 5000 [USP'U]/ML
5000 INJECTION, SOLUTION INTRAVENOUS; SUBCUTANEOUS EVERY 8 HOURS
Status: DISCONTINUED | OUTPATIENT
Start: 2017-12-06 | End: 2017-12-16 | Stop reason: HOSPADM

## 2017-12-06 RX ORDER — DEXTROSE 50 % IN WATER (D50W) INTRAVENOUS SYRINGE
25-50 AS NEEDED
Status: DISCONTINUED | OUTPATIENT
Start: 2017-12-06 | End: 2017-12-06 | Stop reason: RX

## 2017-12-06 RX ORDER — MAGNESIUM SULFATE 100 %
4 CRYSTALS MISCELLANEOUS AS NEEDED
Status: DISCONTINUED | OUTPATIENT
Start: 2017-12-06 | End: 2017-12-16 | Stop reason: HOSPADM

## 2017-12-06 RX ORDER — SODIUM CHLORIDE 9 MG/ML
125 INJECTION, SOLUTION INTRAVENOUS CONTINUOUS
Status: DISCONTINUED | OUTPATIENT
Start: 2017-12-06 | End: 2017-12-06

## 2017-12-06 RX ORDER — VANCOMYCIN/0.9 % SOD CHLORIDE 1 G/100 ML
1000 PLASTIC BAG, INJECTION (ML) INTRAVENOUS ONCE
Status: COMPLETED | OUTPATIENT
Start: 2017-12-06 | End: 2017-12-06

## 2017-12-06 RX ORDER — POLYETHYLENE GLYCOL 3350 17 G/17G
17 POWDER, FOR SOLUTION ORAL DAILY
Status: DISCONTINUED | OUTPATIENT
Start: 2017-12-06 | End: 2017-12-08

## 2017-12-06 RX ORDER — ACETAMINOPHEN 325 MG/1
650 TABLET ORAL
Status: DISCONTINUED | OUTPATIENT
Start: 2017-12-06 | End: 2017-12-16 | Stop reason: HOSPADM

## 2017-12-06 RX ORDER — LANOLIN ALCOHOL/MO/W.PET/CERES
0.4 CREAM (GRAM) TOPICAL DAILY
Status: DISCONTINUED | OUTPATIENT
Start: 2017-12-06 | End: 2017-12-08

## 2017-12-06 RX ORDER — PANTOPRAZOLE SODIUM 40 MG/1
40 TABLET, DELAYED RELEASE ORAL DAILY
Status: DISCONTINUED | OUTPATIENT
Start: 2017-12-06 | End: 2017-12-08

## 2017-12-06 RX ORDER — TRAMADOL HYDROCHLORIDE 50 MG/1
50 TABLET ORAL
Status: DISCONTINUED | OUTPATIENT
Start: 2017-12-06 | End: 2017-12-16 | Stop reason: HOSPADM

## 2017-12-06 RX ORDER — ASPIRIN 81 MG/1
81 TABLET ORAL DAILY
Status: DISCONTINUED | OUTPATIENT
Start: 2017-12-06 | End: 2017-12-06

## 2017-12-06 RX ORDER — DIPHENHYDRAMINE HCL 25 MG
25 CAPSULE ORAL
Status: DISCONTINUED | OUTPATIENT
Start: 2017-12-06 | End: 2017-12-16 | Stop reason: HOSPADM

## 2017-12-06 RX ORDER — NALOXONE HYDROCHLORIDE 0.4 MG/ML
0.4 INJECTION, SOLUTION INTRAMUSCULAR; INTRAVENOUS; SUBCUTANEOUS AS NEEDED
Status: DISCONTINUED | OUTPATIENT
Start: 2017-12-06 | End: 2017-12-16 | Stop reason: HOSPADM

## 2017-12-06 RX ORDER — FUROSEMIDE 40 MG/1
40 TABLET ORAL DAILY
Status: DISCONTINUED | OUTPATIENT
Start: 2017-12-06 | End: 2017-12-08

## 2017-12-06 RX ADMIN — HEPARIN SODIUM 5000 UNITS: 5000 INJECTION, SOLUTION INTRAVENOUS; SUBCUTANEOUS at 12:48

## 2017-12-06 RX ADMIN — LEVOTHYROXINE SODIUM 75 MCG: 75 TABLET ORAL at 08:52

## 2017-12-06 RX ADMIN — MEROPENEM 1 G: 1 INJECTION, POWDER, FOR SOLUTION INTRAVENOUS at 15:55

## 2017-12-06 RX ADMIN — DEXTROSE MONOHYDRATE AND SODIUM CHLORIDE 50 ML/HR: 5; .45 INJECTION, SOLUTION INTRAVENOUS at 12:48

## 2017-12-06 RX ADMIN — DIPHENHYDRAMINE HYDROCHLORIDE 25 MG: 25 CAPSULE ORAL at 10:11

## 2017-12-06 RX ADMIN — SODIUM CHLORIDE 750 MG: 900 INJECTION, SOLUTION INTRAVENOUS at 10:06

## 2017-12-06 RX ADMIN — TRAMADOL HYDROCHLORIDE 50 MG: 50 TABLET, FILM COATED ORAL at 05:19

## 2017-12-06 RX ADMIN — TRAMADOL HYDROCHLORIDE 50 MG: 50 TABLET, FILM COATED ORAL at 20:20

## 2017-12-06 RX ADMIN — PANTOPRAZOLE SODIUM 40 MG: 40 TABLET, DELAYED RELEASE ORAL at 08:52

## 2017-12-06 RX ADMIN — SODIUM CHLORIDE 125 ML/HR: 900 INJECTION, SOLUTION INTRAVENOUS at 04:44

## 2017-12-06 RX ADMIN — DEXTROSE MONOHYDRATE AND SODIUM CHLORIDE 125 ML/HR: 5; .45 INJECTION, SOLUTION INTRAVENOUS at 23:44

## 2017-12-06 RX ADMIN — FAMOTIDINE 20 MG: 20 TABLET, FILM COATED ORAL at 08:51

## 2017-12-06 RX ADMIN — GABAPENTIN 400 MG: 400 CAPSULE ORAL at 08:52

## 2017-12-06 RX ADMIN — FUROSEMIDE 40 MG: 40 TABLET ORAL at 08:51

## 2017-12-06 RX ADMIN — FOLIC ACID TAB 400 MCG 0.4 MG: 400 TAB at 08:51

## 2017-12-06 RX ADMIN — ACETAMINOPHEN 650 MG: 325 TABLET, FILM COATED ORAL at 14:55

## 2017-12-06 RX ADMIN — ACETAMINOPHEN 650 MG: 325 TABLET, FILM COATED ORAL at 08:51

## 2017-12-06 RX ADMIN — ASPIRIN 81 MG 81 MG: 81 TABLET ORAL at 09:22

## 2017-12-06 RX ADMIN — VANCOMYCIN HYDROCHLORIDE 1000 MG: 10 INJECTION, POWDER, LYOPHILIZED, FOR SOLUTION INTRAVENOUS at 02:18

## 2017-12-06 RX ADMIN — ACETAMINOPHEN 650 MG: 325 TABLET, FILM COATED ORAL at 23:41

## 2017-12-06 RX ADMIN — DIPHENHYDRAMINE HYDROCHLORIDE 25 MG: 25 CAPSULE ORAL at 05:18

## 2017-12-06 RX ADMIN — SODIUM CHLORIDE 3.38 G: 900 INJECTION INTRAVENOUS at 09:28

## 2017-12-06 RX ADMIN — HEPARIN SODIUM 5000 UNITS: 5000 INJECTION, SOLUTION INTRAVENOUS; SUBCUTANEOUS at 20:20

## 2017-12-06 RX ADMIN — HEPARIN SODIUM 5000 UNITS: 5000 INJECTION, SOLUTION INTRAVENOUS; SUBCUTANEOUS at 05:23

## 2017-12-06 RX ADMIN — GABAPENTIN 400 MG: 400 CAPSULE ORAL at 20:20

## 2017-12-06 RX ADMIN — FAMOTIDINE 20 MG: 20 TABLET, FILM COATED ORAL at 05:19

## 2017-12-06 NOTE — PROGRESS NOTES
Precepting Matthew Magaña RN.    3331 contacted Dr. Samia Penaloza, radiologist, to see if G tube was good to use. Was given the ok to use the G-tube, as there were no abnormalities that he was able to visualize that would cause concern with using.

## 2017-12-06 NOTE — PROGRESS NOTES
[place]   Discharge Planning/ Assessment    Reasons for Intervention: Interviewed patient, she is lethargic but agrees to share her discharge information with her mother, see below. She was moderately  independent prior to admission and sees Dr Michael Kelly for her primary care needs. She was recently discharged and from [facility] and is active with [facility] care. [facility] infusion was contracted for her tube feedings. [facility] oxygen was ordered at discharge last admission. She has had multiple hospitalization since September. Demographic information verified. Her discharge plan is to return home with continuation of home care services.      High Risk Criteria  [x] Yes  []No   Physician Referral  [] Yes  [x]No        Date    Nursing Referral  [] Yes  [x]No        Date    Patient/Family Request  [] Yes  [x]No        Date       Resources:    Medicare  [x] Yes  []No humana   Medicaid  [] Yes  [x]No   No Resources  [] Yes  [x]No   Private Insurance  [] Yes  [x]No    Name/Phone Number    Other  [] Yes  [x]No        (i.e. Workman's Comp)         Prior Services:    Prior Services  [x] Yes  [x]No   Home Health  [x] Yes  []No   [facility]  [] Yes  [x]No        Number of [address] infusions  [x] Yes  []No [facility] for PEG feedings       Meals on Wheels  [] Yes  [x]No   Office on Aging  [] Yes  [x]No   Transportation Services  [] Yes  [x]No   Nursing Home  [] Yes  [x]No        Nursing Home Name    [facility]  [] Yes  [x]No        [address] Name    Other       Information Source:      Information obtained from  [x] Patient  [] Parent   [] [address]   [] Child  [] Spouse   [] Significant Other/Partner   [] Friend      [] EMS    [] Nursing Home Chart          [x] Other: chart   Chart Review  [x] Yes  []No     Family/Support System:    Patient lives with  [] Alone    [] Spouse   [] Significant Other  [] Children  [] Caretaker   [x] Parent  [] Sibling     [] Other       Other Support System:    Is the patient responsible for care of others  [] Yes  [x]No   Information of person caring for patient on  discharge self   Managers financial affairs independently  [x] Yes  []No   If no, explain:      Status Prior to Admission:    Mental Status  [x] Awake  [x] Alert  [x] Oriented  [] Quiet/Calm [] Lethargic/Sedated   [] Disoriented  [] Restless/Anxious  [] Combative   Personal Care  [] Dependent  [] [address]  [x] Requires Assistance   Meal Preparation Ability  [] Independent   [] Standby Assistance   [] Minimal Assistance   [x] Moderate Assistance  [] Maximum Assistance     [] Total Assistance   Chores  [] Independent with Chores   [] N/A Nursing Home Resident   [x] Requires Assistance   Bowel/Bladder  [x] Continent  [] Catheter  [] Incontinent  [] Ostomy Self-Care    [] Urine Diversion Self-Care  [] Maximum Assistance     [] Total Assistance   Number of Persons needed for assistance    DME at home  [] Macky Kussmaul, Fausto Si  [] Macky Kussmaul, Straight   [] Commode    [] Bathroom/Grab Bars  [] Hospital Bed  [] Nebulizer  [] Oxygen           [] Raised Toilet Seat  [] Shower Chair  [] Side Rails for Bed   [] Tub Transfer Bench   [] Julia Blunt  [] [address], [place]      [] Other:   Vendor      Treatment Presently Receiving:    Current Treatments  [] Chemotherapy  [] Dialysis  [] Insulin  [x] IVAB [x] IVF   [] O2  [] PCA   [] PT   [] RT   [] Tube Feedings   [] Wound Care     Psychosocial Evaluation:    Verbalized Knowledge of Disease Process  [x] Patient  [x]Family   Coping with Disease Process  [x] Patient  [x]Family   Requires Further Counseling Coping with Disease Process  [] Patient  []Family     Identified Projected Needs:    Home Health Aid  [] Yes  [x]No   Transportation  [] Yes  [x]No   Education  [] Yes  [x]No        Specific Education     Financial Counseling  [] Yes  [x]No   Inability to Care for Self/Will Require 24 hour care  [] Yes  [x]No Pain Management  [] Yes  [x]No   Home Infusion Therapy  [x] Yes  []No PEG   Oxygen Therapy  [x] Yes  []No   DME  [] Yes  [x]No   Long Term Care Placement  [] Yes  [x]No   Rehab  [] Yes  [x]No   Physical Therapy  [] Yes  [x]No   Needs Anticipated At This Time  [] Yes  [x]No     Intra-Hospital Referral:    [facility]  [x] Yes  []No     [] Yes  [x]No   Patient Representative  [] Yes  [x]No   Staff for Teaching Needs  [] Yes  [x]No   Specialty Teaching Needs     Diabetic Educator  [] Yes  [x]No   Referral for Diabetic Educator Needed  [] Yes  [x]No  If Yes, place order for Nutritionist or Diabetic Consult     Tentative Discharge Plan:    Home with No Services  [] Yes  [x]No   Home with Home Health Follow-up  [x] Yes  []No        If Yes, specify type Nursing/ infusions( PEG)   Home Care Program  [] Yes  [x]No        If Yes, specify type    Meals on Wheels  [] Yes  [x]No   Office of Aging  [] Yes  [x]No   NHP  [] Yes  [x]No   Return to the Nursing Home  [] Yes  [x]No   Rehab Therapy  [] Yes  [x]No   Acute Rehab  [] Yes  [x]No   Subacute Rehab  [] Yes  [x]No   Private Care  [] Yes  [x]No   Substance Abuse Referral  [] Yes  [x]No   Transportation  [] Yes  [x]No   Chore Service  [] Yes  [x]No   Inpatient Hospice  [] Yes  [x]No   OP RT  [] Yes  [x] No   OP Hemo  [] Yes  [x] No   OP PT  [] Yes  [x]No   Support Group  [] Yes  [x]No   Reach to Recovery  [] Yes  [x]No   OP Oncology Clinic  [] Yes  [x]No   Clinic Appointment  [] Yes  [x]No   DME  [] Yes  [x]No   Comments    Name of D/C Planner or  Given to Patient or Flower Luna   Phone Number         Extension [phone]   Date Dec 6, 2017   Time 718 am   If you are discharged home, whom do you designate to participate in your discharge plan and receive any information needed?      Enter name of Zoe Cowan  mother        Phone # of sanchez         Address of [address] [address]        Updated Dec 6, 2017        Patient refused to designate any           individual

## 2017-12-06 NOTE — PROGRESS NOTES
Patient has designated her mother to participate in his/her discharge plan and to receive any needed information.      Name:   Cody Flood  mother      [address] [address]   Dec 6, 2017        Address:  Phone number:

## 2017-12-06 NOTE — CDMP QUERY
Please clarify if this patient is being treated/managed for:    =>Chronic Hypoxic Respiratory failure on continuous  home 02 at 3L   =>Other Explanation of clinical findings  =>Unable to Determine (no explanation of clinical findings)    The medical record reflects the following:    Risk: throat ca, peg tube     Clinical Indicators:     Treatment:   home 02 at 3 L continuous     Please clarify and document your clinical opinion in the progress notes and discharge summary including the definitive and/or presumptive diagnosis, (suspected  probable), related to the above clinical findings. Please include clinical findings supporting your diagnosis. If you DECLINE this query or would like to communicate with [facility], please utilize the \"[employer] message box\" at the TOP of the Progress Note on the right.       Thank you,  Norma Delgado RN/CCDS   [phone],

## 2017-12-06 NOTE — PROGRESS NOTES
Problem: Falls - Risk of  Goal: *Absence of Falls  Document Kapil Fall Risk and appropriate interventions in the flowsheet.    Outcome: Progressing Towards Goal  Fall Risk Interventions:  Mobility Interventions: Communicate number of staff needed for ambulation/transfer, Patient to call before getting OOB         Medication Interventions: Patient to call before getting OOB, Teach patient to arise slowly    Elimination Interventions: Call light in reach, Patient to call for help with toileting needs, Toileting schedule/hourly rounds

## 2017-12-06 NOTE — ED NOTES
Patient left for CT with ED Tech. No acute distress noted. Alert and oriented.  Sats remain at 100% via NC at 2L

## 2017-12-06 NOTE — PROGRESS NOTES
Reason for Renal Dosing:  Per Renal Dosing Policy    Indication: Sepsis of Unknown Etiology    Patient clinical status and labs ordered/reviewed. Pt Weight Weight: 57.2 kg (126 lb)   Serum Creatinine Lab Results   Component Value Date/Time    Creatinine 1.34 12/06/2017 08:41 AM       Creatinine Clearance Estimated Creatinine Clearance: 43.4 mL/min (based on Cr of 1.34). BUN Lab Results   Component Value Date/Time    BUN 32 12/06/2017 08:41 AM       WBC Lab Results   Component Value Date/Time    WBC 16.0 12/06/2017 08:41 AM      Temperature Temp: (!) 101.2 °F (38.4 °C)     HR Pulse (Heart Rate): (!) 118       BP BP: 126/80           Drug type: Carbapenem Antibiotic      Drug/dose: Renally dosed meropenem to 1 gram IV push every 12 hours. Continue to monitor.     Signed Pam Paredes, PHARMLEA  Date 12/6/2017  Time 3:44 PM

## 2017-12-06 NOTE — PROGRESS NOTES
Pharmacy Dosing Services: Vancomycin    Indication: Central nervous system infection    Day of therapy: 0    Other Antimicrobials (Include dose, start day & day of therapy):  Zosyn 3.375g started       Loading dose (date given): 1g + 750mg  Current Maintenance dose: new start    Goal Vancomycin Level: 15-20  (Trough 15-20 for most infections, 20 for meningitis/osteomyelitis, pre-HD level ~25)    Vancomycin Level (if drawn): none at this time     Significant Cultures:    blood- pending    Renal function stable? (unstable defined as SCr increase of 0.5 mg/dL or > 50% increase from baseline, whichever is greater) (Y/N): only 1 level     CAPD, Hemodialysis or Renal Replacement Therapy (Y/N): N     Recent Labs      17   CREA  1.39*   BUN  38*   WBC  17.7*     Temp (24hrs), Av.9 °F (37.7 °C), Min:98.4 °F (36.9 °C), Max:101.9 °F (38.8 °C)    Creatinine Clearance (Creatinine Clearance (ml/min)): ~40 ml/min     Regimen assessment: New start; finish loading 1g + 750mg  Maintenance dose: 1250mg IV q24h  Next scheduled level:  @ 0900       Pharmacy will follow daily and adjust medications as appropriate for renal function and/or serum levels.     Thank you,  Quintin Kee

## 2017-12-06 NOTE — ED NOTES
Unable to obtain IV access. MD made aware. Requested tech assistance. Will administer pain meds. Patient to receive contrast to PEG.

## 2017-12-06 NOTE — PROGRESS NOTES
conducted an initial consultation and Spiritual Assessment for Rosalinda Arevalo, who is a 50 y.o.,female. Patients Primary Language is: [place]. According to the patients EMR Mormon Affiliation is: Kleber Ferguson. The reason the Patient came to the hospital is:   Patient Active Problem List    Diagnosis Date Noted    Abdominal wall cellulitis 12/06/2017    G tube feedings ([address] 12/06/2017    Anemia 12/06/2017    Hypothyroid 12/06/2017    Recurrent pneumonia 10/11/2017    Respiratory failure with hypoxia ([address] 10/11/2017    KEATON (acute kidney injury) ([address] 10/11/2017    SOB (shortness of breath) 10/09/2017    Dysphagia 10/09/2017    History of throat cancer 10/09/2017    Acute respiratory failure with hypercapnia ([address] 09/24/2017    Sepsis ([address] 09/24/2017    HCAP (healthcare-associated pneumonia) 09/24/2017    Hypertension 09/24/2017    Diabetes mellitus ([address] 09/24/2017        The  provided the following Interventions:   attempted to initiate a relationship of care and support for patient in room 2214, but found patient in isolation and she was resting peacefully. No family were seen at time of this visit. Provided information about Spiritual Care Services. Offered prayer and assurance of continued prayers on patients behalf. Assessment:  Patient does not have any Orthodoxy/cultural needs that will affect patients preferences in health care. There are no further spiritual or Orthodoxy issues which require Spiritual Care Services interventions at this time. Plan:  Chaplains will continue to follow and will provide pastoral care on an as needed/requested basis    . Tino Sexton   Spiritual Care   [phone]

## 2017-12-06 NOTE — ED NOTES
Patient is being taken to 2200. Tele strip printed. Patient alert and oriented. Stopped Vanco due to facial itching noted per patient. No acute distress noted. Needs met.

## 2017-12-06 NOTE — PROGRESS NOTES
Bedside shift change report given to Lawyer Yap   (oncoming nurse) by Carmen Barahona RN (offgoing nurse). Report included the following information SBAR, Kardex, OR Summary, Intake/Output, MAR and Recent Results. White board updated. Encouraged patient to use ICS due to temperature of 101.9, taken this morning. SCDs applied. 0801: Called pharmacy regarding extended release medication, pharmacy will change extended release medication to chewable tablet, able to be given through PEG.     9546: Paged Dr. Faustina Wilson regarding MEWS score of 6, heart rate sustained above 125, temperature of 101.9, patient resting in bed, NAD noted. 0818: Received callback from Dr. Faustina Wilson, informed of elevated heart rate and morning temperature, received telephone order from Dr. Faustina Wilson for Tylenol, Q6H PRN for fever, mild pain, headache.     0901: Changed drainage sponge surrounding PEG. 0625: Zosyn hung. Patient resting in bed, denies chills. PEG tube flushing without difficulty, IJ flushing without difficulty. 1022: Patient had event of dizziness, weakness, and decreased blood pressure, assisted safely to floor by CNA, no injuries upon assessment, vital signs stable, returned safely to bed, call bell within reach, three side rails up. 1027: Paged Dr. Faustina Wilson for MEWS score of 6, patient alert, NAD noted, patients blood pressure 97/58, temperature 101.6 after Tylenol and ICS usage, patient still tachycardic, resting heart rate 110-120. Vancomycin hanging. ICS encouraged and demonstrated with patient. Patient reports severe pain, too early for pain medication, and patient's blood pressure out of range. Patient not answering admission required documentation, stating, \"I'm too tired\". 1049: Patient sleeping. Paged Dr. Faustina Wilson.      1052: Called infectious disease regarding current isolation status, patient under contact isolation, however MRSA culture from September, spoke with infectious disease, precaution not necessary, will discontinue. 1113: Rounded with Dr. Cain Goldmann during IDRs, informed of patient's temperature, vital signs/blood pressure, MEWS score, and condition, recommends continuing Tylenol and increased monitoring, no other orders given. 1230: Paged Dr. Mery Casillas regarding patients hematology results, H&H 6.7, 21.3, platelet decreased from 218 to 166, will hold heparin until I receive a call back from Dr. Mery Casillas. 1238: Dr. Mery Casillas on unit, informed of current lab results (H&H), no orders received, patient's platelet decreased to 166, Dr. Mery Casillas states it is ok to give heparin, will administer. Informed doctor the patient is not eating or taking in PO fluids, and receives tube feeds at home via PEG, received verbal order to change IVF to D5 0.45% NS, 50ml/hour. 1256: Patient's tempeture 100.4, had patient demonstrate ICS, patient not preforming ICS when not prompted. Reviewed benefits of ICS and provided additional instruction, will continue to encourage during rounding. 1350: Rounded on patient, patient sleeping, safety measures in place. 1428: Rounded on patient, updated white board, patient sleeping, NAD noted. Call bell with patient. Safety measures in place. 1504: Patient resting in bed, vitals taken, blood pressure improving, will reassess prior to medication administration. Patient will not answer admission required documentation. 1313: MEWS score of 3, will continue to assess, provider aware of MEWS score of 6 and 3 earlier in day, stated to continue to monitor and give Tylenol Q6H, will call provider is patient condition changes, blood pressure and heart rate improving, temperature remaining elevated. Patient refusing to use ICS. Will continue to encourage and provide patient teaching. 53-69-10-18: Patient requesting narcotic pain medication, blood pressure now within normal range, however patient sleeping, only arousing when woken for medication or vital signs.  Will continue to monitor to patient, if blood pressure remains in normal range and patient remains easily woken will administer pain medication, educated patient on ICS use, continuing to refuse ICS and deep breathing. Patient will not answer admission required documentation. 1601: Informed Lauren Dueñas NP of MEWS score, vital signs, and patient condition, recommended to continue to monitor patient, antibiotic orders changed to IVP. Patient sleeping, NAD noted. 1633: Attempted to do admission required documents, patient will not stay awake. [phone]: Educated family on patient care, received permission from patient. Bedside shift change report given to Conrado Fontenot RN (oncoming nurse) by Lamonte Waggoner   (offgoing nurse). Report included the following information SBAR, Kardex, Intake/Output, MAR, Recent Results and Cardiac Rhythm Sinus tach.

## 2017-12-06 NOTE — PROGRESS NOTES
9566 Patient given to this nurse by Charge Nurse. Patient is alert and oriented x 4.  0500 Called and retimed Zosyn IV related to inability to locate medication in Pyxis. Administered Vancomycin. 0715 Bedside and Verbal shift change report given to Pete Cruz RN (oncoming nurse) by Maikel Rosales RN (offgoing nurse). Report included the following information SBAR, Kardex, Procedure Summary, Intake/Output, MAR and Recent Results.

## 2017-12-06 NOTE — ED NOTES
Cell phone , pink coat, meds from home, slippers, underwear, pants, shirt, and scarf in belongings bag taken to floor with patient.

## 2017-12-06 NOTE — PROGRESS NOTES
[facility]  Hospitalist Division        Inpatient Daily Progress Note    Daily progress Note    Patient: Suman Hernandez MRN: [id number]  CSN: [id number]    YOB: 1965  Age: 50 y.o. Sex: female    DOA: 12/5/2017 LOS:  LOS: 0 days                    Chief Complaint:  PEG site discomfort, abdominal pain       Subjective:      C/o abdominal pain     Objective:      Visit Vitals    /74 (BP 1 Location: Right arm, BP Patient Position: At rest)    Pulse (!) 120    Temp (!) 100.8 °F (38.2 °C)    Resp 20    Ht 5' 4\" (1.626 m)    Wt 57.2 kg (126 lb)    SpO2 97%    BMI 21.63 kg/m2         Physical Exam:  General appearance: lethargic  Lungs: clear to auscultation bilaterally, no wheezes   Heart: regular rate and rhythm, S1, S2 normal, no murmur, click, rub or gallop  Abdomen: soft, mildly distended. PEG clamped- minimal redness around insertion site   Extremities: extremities normal, atraumatic, no cyanosis. Trace BLE edema   Skin: Skin color, texture, turgor normal. No rashes or lesions  Neurologic: moves all 4 extremities         A/P:  Patient admitted early this morning with PEG site discomfort. No evidence of cellulitis on CT Abd/pelvis. Assessment of PEG insertion site not concerning for infectious process. CXR, urine culture ordered as patient is febrile with leokocytosis without clear source. Continue Vanc. Change Zosyn to [address] to dose.    Will resume PEG feedings tomorrow   Continue all home medications via PEG          LISA Loya [address]  Pager:  [phone]  Office:  [phone]

## 2017-12-06 NOTE — ED TRIAGE NOTES
Alert female c/o pain around feeding tube x4 days, pain 10/10 constant, sharp. Pt reports hx throat CA.

## 2017-12-06 NOTE — H&P
History and Physical    Patient: Laqueta Olszewski               Sex: female          DOA: 2017       YOB: 1965      Age:  50 y.o.        LOS:  LOS: 0 days        No chief complaint on file. HPI:     Laqueta Olszewski is a 50 y.o. female who presents with abdominal pain around her feeding tube onset 2 days. She reports the pain as severe. She denies fever. Pain reports having similar problem in the past when the feeding tube was dislodged. In the ED she was given dilaudid for pain and had respiratory distress requiring narcan. She otherwise denies chest pain , sob. She on on chronic home O2 3 L NC. 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 Past Surgical History:   Procedure Laterality Date    ABDOMEN SURGERY PROC UNLISTED      feeding tube placement    HX  SECTION      HX HEENT      throat cancer biopsy       No current facility-administered medications on file prior to encounter. Current Outpatient Prescriptions on File Prior to Encounter   Medication Sig Dispense Refill    sodium chloride (SALINE NOSE) 0.65 % nasal spray 2 Sprays by Both Nostrils route as needed for Congestion.  metoprolol tartrate (LOPRESSOR) 100 mg IR tablet Take 100 mg by mouth two (2) times a day.  HYDROcodone-acetaminophen (NORCO) 7.5-325 mg per tablet Take 1 Tab by mouth every eight (8) hours as needed for Pain.  OXYGEN-AIR DELIVERY SYSTEMS 3 L by Nasal route continuous.  LACTOSE-REDUCED FOOD/FIBER (JEVITY 1.5 SYMONE PO) 5 Cans by PEG Tube route daily.  TOTAL OF 5 CANS DAILY      acetaminophen (MAPAP) 160 mg/5 mL suspension Take 10.2 mL by mouth every six (6) hours as needed for Fever or Pain.  linezolid (ZYVOX) 600 mg tablet Take 1 Tab by mouth every twelve (12) hours. (Patient not taking: Reported on 12/1/2017) 20 Tab 0    amLODIPine (NORVASC) 10 mg tablet Take 1 Tab by mouth daily. 30 Tab 0    folic acid (FOLVITE) 1 mg tablet Take 0.4 mg by mouth daily.  cetirizine (ZYRTEC) 10 mg tablet Take 10 mg by mouth daily as needed for Allergies, Rhinitis or Itching.  gabapentin (NEURONTIN) 300 mg capsule Take 400 mg by mouth two (2) times a day.  furosemide (LASIX) 20 mg tablet Take 2 Tabs by mouth daily. 60 Tab 0    HYDROcodone-acetaminophen (NORCO) 5-325 mg per tablet Take 1 Tab by mouth every four (4) hours as needed for Pain. Max Daily Amount: 6 Tabs. (Patient not taking: Reported on 11/10/2017) 10 Tab 0    multivitamin (ONE A DAY) tablet Take 1 Tab by mouth daily.  polyethylene glycol (MIRALAX) 17 gram/dose powder Take 17 g by mouth daily.  docusate sodium (COLACE) 100 mg capsule Take 100 mg by mouth two (2) times a day.  levothyroxine (SYNTHROID) 100 mcg tablet Take 75 mcg by mouth Daily (before breakfast).  amylase-lipase-protease (CREON) capsule Take  by mouth three (3) times daily (with meals).  pantoprazole (PROTONIX) 40 mg tablet Take 1 Tab by mouth daily. 30 Tab 0    ondansetron hcl (ZOFRAN) 4 mg tablet Take 1 Tab by mouth every eight (8) hours as needed for Nausea. (Patient not taking: Reported on 12/1/2017) 20 Tab 0    aspirin 81 mg tablet Take 81 mg by mouth. Social History     Social History    Marital status: LEGALLY      Spouse name: N/A    Number of children: N/A    Years of education: N/A     Occupational History    Not on file.      Social History Main Topics    Smoking status: Former Smoker    Smokeless tobacco: Never Used    Alcohol use No    Drug use: No    Sexual activity: Not on file     Other Topics Concern  Not on file     Social History Narrative       Prior to Admission Medications   Prescriptions Last Dose Informant Patient Reported? Taking? HYDROcodone-acetaminophen (NORCO) 5-325 mg per tablet   No No   Sig: Take 1 Tab by mouth every four (4) hours as needed for Pain. Max Daily Amount: 6 Tabs. Patient not taking: Reported on 11/10/2017   HYDROcodone-acetaminophen (NORCO) 7.5-325 mg per tablet   Yes No   Sig: Take 1 Tab by mouth every eight (8) hours as needed for Pain. LACTOSE-REDUCED FOOD/FIBER (JEVITY 1.5 SYMONE PO)   Yes No   Si Cans by PEG Tube route daily. TOTAL OF 5 CANS DAILY   OXYGEN-AIR DELIVERY SYSTEMS   Yes No   Sig: 3 L by Nasal route continuous. acetaminophen (MAPAP) 160 mg/5 mL suspension   Yes No   Sig: Take 10.2 mL by mouth every six (6) hours as needed for Fever or Pain. amLODIPine (NORVASC) 10 mg tablet   No No   Sig: Take 1 Tab by mouth daily. amylase-lipase-protease (CREON) capsule   Yes No   Sig: Take  by mouth three (3) times daily (with meals). aspirin 81 mg tablet   Yes No   Sig: Take 81 mg by mouth. cetirizine (ZYRTEC) 10 mg tablet   Yes No   Sig: Take 10 mg by mouth daily as needed for Allergies, Rhinitis or Itching. docusate sodium (COLACE) 100 mg capsule   Yes No   Sig: Take 100 mg by mouth two (2) times a day. folic acid (FOLVITE) 1 mg tablet   Yes No   Sig: Take 0.4 mg by mouth daily. furosemide (LASIX) 20 mg tablet   No No   Sig: Take 2 Tabs by mouth daily. gabapentin (NEURONTIN) 300 mg capsule   Yes No   Sig: Take 400 mg by mouth two (2) times a day. levothyroxine (SYNTHROID) 100 mcg tablet   Yes No   Sig: Take 75 mcg by mouth Daily (before breakfast). linezolid (ZYVOX) 600 mg tablet   No No   Sig: Take 1 Tab by mouth every twelve (12) hours. Patient not taking: Reported on 2017   metoprolol tartrate (LOPRESSOR) 100 mg IR tablet   Yes No   Sig: Take 100 mg by mouth two (2) times a day.    multivitamin (ONE A DAY) tablet   Yes No   Sig: Take 1 Tab by mouth daily. ondansetron hcl (ZOFRAN) 4 mg tablet   No No   Sig: Take 1 Tab by mouth every eight (8) hours as needed for Nausea. Patient not taking: Reported on 2017   pantoprazole (PROTONIX) 40 mg tablet   No No   Sig: Take 1 Tab by mouth daily. polyethylene glycol (MIRALAX) 17 gram/dose powder   Yes No   Sig: Take 17 g by mouth daily. sodium chloride (SALINE NOSE) 0.65 % nasal spray   Yes No   Si Sprays by Both Nostrils route as needed for Congestion. Facility-Administered Medications: None       Family History   Problem Relation Age of Onset    Lupus Sister        Allergies   Allergen Reactions    Morphine Rash    Amlodipine Swelling     Leg swelling per patient, but no problem taking Lotrel (amlodipine-benazepril)       Review of Systems   Constitutional: Negative. HENT: Negative. Eyes: Negative. Respiratory: Negative. Cardiovascular: Negative. Gastrointestinal: Positive for abdominal pain. Genitourinary: Negative. Musculoskeletal: Negative. Skin: Negative. Neurological: Negative. Endo/Heme/Allergies: Negative. Psychiatric/Behavioral: Negative. Physical Exam:       Visit Vitals    /82    Pulse 92    Temp 99.5 °F (37.5 °C)    Resp 15    Wt 57.2 kg (126 lb)    SpO2 100%    BMI 21.63 kg/m2       Physical Exam   Constitutional: She is oriented to person, place, and time. She appears well-developed and well-nourished. No distress. HENT:   Head: Normocephalic and atraumatic. Eyes: Conjunctivae are normal. No scleral icterus. Neck: Neck supple. Cardiovascular: Normal rate, regular rhythm and normal heart sounds. No murmur heard. Pulmonary/Chest: Effort normal and breath sounds normal. No respiratory distress. She has no wheezes. She has no rales. Abdominal: Soft. Bowel sounds are normal. She exhibits no distension. G TUBE    Musculoskeletal: She exhibits no edema.    Neurological: She is alert and oriented to person, place, and time. Skin: There is erythema. Erythematous , swelling , tenderness in the left abdominal wall surrounding G tube   Psychiatric: She has a normal mood and affect. Ancillary Studies: All lab and imaging reviewed for the past 24 hours. Recent Results (from the past 24 hour(s))   CBC WITH AUTOMATED DIFF    Collection Time: 12/05/17  9:33 PM   Result Value Ref Range    WBC 17.7 (H) 4.6 - 13.2 K/uL    RBC 2.56 (L) 4.20 - 5.30 M/uL    HGB 7.8 (L) 12.0 - 16.0 g/dL    HCT 24.1 (L) 35.0 - 45.0 %    MCV 94.1 74.0 - 97.0 FL    MCH 30.5 24.0 - 34.0 PG    MCHC 32.4 31.0 - 37.0 g/dL    RDW 15.6 (H) 11.6 - 14.5 %    PLATELET 935 602 - 939 K/uL    MPV 11.7 9.2 - 11.8 FL    NEUTROPHILS 85 (H) 40 - 73 %    LYMPHOCYTES 9 (L) 21 - 52 %    MONOCYTES 4 3 - 10 %    EOSINOPHILS 2 0 - 5 %    BASOPHILS 0 0 - 2 %    ABS. NEUTROPHILS 15.0 (H) 1.8 - 8.0 K/UL    ABS. LYMPHOCYTES 1.6 0.9 - 3.6 K/UL    ABS. MONOCYTES 0.8 0.05 - 1.2 K/UL    ABS. EOSINOPHILS 0.3 0.0 - 0.4 K/UL    ABS. BASOPHILS 0.0 0.0 - 0.06 K/UL    DF AUTOMATED     HEPATIC FUNCTION PANEL    Collection Time: 12/05/17  9:33 PM   Result Value Ref Range    Protein, total 9.7 (H) 6.4 - 8.2 g/dL    Albumin 3.1 (L) 3.4 - 5.0 g/dL    Globulin 6.6 (H) 2.0 - 4.0 g/dL    A-G Ratio 0.5 (L) 0.8 - 1.7      Bilirubin, total 0.3 0.2 - 1.0 MG/DL    Bilirubin, direct 0.1 0.0 - 0.2 MG/DL    Alk.  phosphatase 104 45 - 117 U/L    AST (SGOT) 36 15 - 37 U/L    ALT (SGPT) 38 13 - 56 U/L   LIPASE    Collection Time: 12/05/17  9:33 PM   Result Value Ref Range    Lipase 200 73 - 114 U/L   METABOLIC PANEL, BASIC    Collection Time: 12/05/17  9:33 PM   Result Value Ref Range    Sodium 134 (L) 136 - 145 mmol/L    Potassium 4.1 3.5 - 5.5 mmol/L    Chloride 96 (L) 100 - 108 mmol/L    CO2 31 21 - 32 mmol/L    Anion gap 7 3.0 - 18 mmol/L    Glucose 174 (H) 74 - 99 mg/dL    BUN 38 (H) 7.0 - 18 MG/DL    Creatinine 1.39 (H) 0.6 - 1.3 MG/DL    BUN/Creatinine ratio 27 (H) 12 - 20      GFR est AA 49 (L) >60 ml/min/1.73m2    GFR est non-AA 40 (L) >60 ml/min/1.73m2    Calcium 8.8 8.5 - 10.1 MG/DL   MAGNESIUM    Collection Time: 12/05/17  9:33 PM   Result Value Ref Range    Magnesium 2.4 1.6 - 2.6 mg/dL   HCG QL SERUM    Collection Time: 12/05/17  9:33 PM   Result Value Ref Range    HCG, Ql. NEGATIVE  NEG     POC G3    Collection Time: 12/05/17 10:53 PM   Result Value Ref Range    Device: Non rebreather      Flow rate (POC) 15 L/M    FIO2 (POC) 100 %    pH (POC) 7.406 7.35 - 7.45      pCO2 (POC) 48.5 (H) 35.0 - 45.0 MMHG    pO2 (POC) 445 (H) 80 - 100 MMHG    HCO3 (POC) 30.5 (H) 22 - 26 MMOL/L    sO2 (POC) 100 (H) 92 - 97 %    Base excess (POC) 6 mmol/L    Allens test (POC) YES      Total resp. rate 23      Site LEFT RADIAL      Patient temp. 98.0      Specimen type (POC) ARTERIAL      Performed by Sara Connolly W/ RFLX MICROSCOPIC    Collection Time: 12/05/17 11:27 PM   Result Value Ref Range    Color YELLOW      Appearance CLEAR      Specific gravity 1.014 1.005 - 1.030      pH (UA) 8.5 (H) 5.0 - 8.0      Protein 100 (A) NEG mg/dL    Glucose NEGATIVE  NEG mg/dL    Ketone NEGATIVE  NEG mg/dL    Bilirubin NEGATIVE  NEG      Blood NEGATIVE  NEG      Urobilinogen 0.2 0.2 - 1.0 EU/dL    Nitrites NEGATIVE  NEG      Leukocyte Esterase NEGATIVE  NEG     URINE MICROSCOPIC ONLY    Collection Time: 12/05/17 11:27 PM   Result Value Ref Range    WBC NONE 0 - 4 /hpf    RBC 0 to 3 0 - 5 /hpf    Epithelial cells 1+ 0 - 5 /lpf    Bacteria NEGATIVE  NEG /hpf       Assessment/Plan     Principal Problem:    Abdominal wall cellulitis (12/6/2017)    Active Problems:    Hypertension (9/24/2017)      Diabetes mellitus ([facility] (9/24/2017)      KEATON (acute kidney injury) ([facility] (10/11/2017)      G tube feedings ([facility] (12/6/2017)      Anemia (12/6/2017)      Hypothyroid (12/6/2017)        PLAN:    Abdominal wall cellulitis - continue IV antibiotics and pain management. Recent reaction to dilaudid noted.     KEATON - IVF and avoid nephrotoxins     Hx Throat cancer - G tube for feeding     Chronic Home O2    Continue chronic medications as appropriate     DVT prophylaxis     Code status : Eleazar Nieves MD  12/6/2017  1:39 AM

## 2017-12-06 NOTE — ED PROVIDER NOTES
HPI Comments: Dayanara Maxwell is a 50 y.o. Female with h/o throat ca, with chronic indwelling feeding tube with c/o increased pain around feeding tube site, swelling for last 4 days. Pain is sharp, stabbing, worse with tube feeds and palpation. No relief with anything at home. Also with c/o urinary freq, dysuria, no hematuria  Feeding tube has been present for 5 months. States it is leaking and bleeding    The history is provided by the patient and medical records. Past Medical History:   Diagnosis Date    Anemia     Cancer ([facility]     throat    Coronary artery disease     Diabetes ([facility]     Diabetes mellitus ([facility]     ETOH abuse     HTN (hypertension)     Hypertension     Hypothyroid     Lupus     Osteonecrosis ([facility])     of jaw    PEG adjustment, replacement, or removal     S/P thoracentesis     Throat cancer ([facility]     SCC of the nasopharynx, s/p chemo/radiation       Past Surgical History:   Procedure Laterality Date    ABDOMEN SURGERY PROC UNLISTED      feeding tube placement    HX  SECTION      HX HEENT      throat cancer biopsy         Family History:   Problem Relation Age of Onset    Lupus Sister        Social History     Social History    Marital status: LEGALLY      Spouse name: N/A    Number of children: N/A    Years of education: N/A     Occupational History    Not on file. Social History Main Topics    Smoking status: Former Smoker    Smokeless tobacco: Never Used    Alcohol use No    Drug use: No    Sexual activity: Not on file     Other Topics Concern    Not on file     Social History Narrative         ALLERGIES: Morphine and Amlodipine    Review of Systems   Constitutional: Positive for appetite change. Negative for fever. HENT: Positive for trouble swallowing. Negative for sore throat. Eyes: Negative for visual disturbance. Respiratory: Positive for cough. Cardiovascular: Negative for leg swelling.    Gastrointestinal: Positive for abdominal distention and abdominal pain. Negative for blood in stool. Endocrine: Negative for polyuria. Genitourinary: Positive for difficulty urinating. Musculoskeletal: Positive for myalgias. Skin: Positive for color change (around feeding tube). Allergic/Immunologic: Negative for immunocompromised state. Neurological: Negative for syncope. Psychiatric/Behavioral: Positive for sleep disturbance. Vitals:    12/05/17 2330 12/06/17 0015 12/06/17 0030 12/06/17 0045   BP: 116/75 143/85 129/78 130/77   Pulse: (!) 103 (!) 104 99 97   Resp: 16 14 15 18   Temp:       SpO2: 100% 100% 100% 100%   Weight:                Physical Exam   Constitutional: She is oriented to person, place, and time. She appears well-developed and well-nourished. She appears distressed (ill appearance, sickly appearing older than ntoed age). HENT:   Head: Normocephalic and atraumatic. Right Ear: External ear normal.   Left Ear: External ear normal.   Nose: Nose normal.   Mouth/Throat: Uvula is midline, oropharynx is clear and moist and mucous membranes are normal.   Eyes: Conjunctivae are normal. No scleral icterus. Neck: Neck supple. Cardiovascular: Normal rate, regular rhythm, normal heart sounds and intact distal pulses. Pulmonary/Chest: Effort normal and breath sounds normal.   Abdominal: Soft. She exhibits distension. There is tenderness. There is guarding. There is no rigidity. Musculoskeletal: She exhibits no edema. Neurological: She is alert and oriented to person, place, and time. Gait normal.   Skin: Skin is warm and dry. She is not diaphoretic. Psychiatric: Her behavior is normal.   Nursing note and vitals reviewed.        [facility]  ED Course       Procedures  Vitals:  Patient Vitals for the past 12 hrs:   Temp Pulse Resp BP SpO2   12/06/17 0045 - 97 18 130/77 100 %   12/06/17 0030 - 99 15 129/78 100 %   12/06/17 0015 - (!) 104 14 143/85 100 %   12/05/17 2330 - (!) 103 16 116/75 100 %   12/05/17 2315 - (!) 110 18 127/78 96 %   12/05/17 2300 - (!) 120 22 123/75 (!) 76 %   12/05/17 2246 - - - - 97 %   12/05/17 2245 - (!) 122 22 (!) 152/138 -   12/05/17 2240 - (!) 125 18 - 100 %   12/05/17 2230 - (!) 133 23 128/80 -   12/05/17 2215 - (!) 127 13 111/72 -   12/05/17 2200 - (!) 130 25 119/70 -   12/05/17 2130 - (!) 126 18 138/73 -   12/05/17 2115 - (!) 125 (!) 32 132/80 -   12/05/17 2100 - - - 136/85 -   12/05/17 2045 - - - 133/79 -   12/05/17 2030 99.5 °F (37.5 °C) (!) 126 15 139/81 96 %   12/05/17 2015 - - - 140/89 -   12/05/17 2000 - - - (!) 142/93 -   12/05/17 1957 98.4 °F (36.9 °C) - 16 143/87 95 %         Medications ordered:   Medications   naloxone (NARCAN) 0.4 mg/mL injection (  Canceled Entry 12/5/17 2238)   vancomycin (VANCOCIN) 1000 mg in  ml infusion (not administered)   sodium chloride 0.9 % bolus infusion 1,000 mL (1,000 mL IntraVENous New Bag 12/5/17 2101)   HYDROmorphone in NS (DILAUDID) injection 1 mg (1 mg IntraVENous Given 12/5/17 2101)   ondansetron (ZOFRAN) injection 4 mg (4 mg IntraVENous Given 12/5/17 2101)   iohexol (OMNIPAQUE) solution 50 mL (50 mL Other Given 12/5/17 2101)   naloxone [facility]) injection 0.2 mg (0.2 mg IntraVENous Given 12/5/17 2241)   piperacillin-tazobactam (ZOSYN) 3.375 g in 0.9% sodium chloride (MBP/ADV) 100 mL MBP (3.375 g IntraVENous New Bag 12/5/17 2330)         Lab findings:  Recent Results (from the past 12 hour(s))   CBC WITH AUTOMATED DIFF    Collection Time: 12/05/17  9:33 PM   Result Value Ref Range    WBC 17.7 (H) 4.6 - 13.2 K/uL    RBC 2.56 (L) 4.20 - 5.30 M/uL    HGB 7.8 (L) 12.0 - 16.0 g/dL    HCT 24.1 (L) 35.0 - 45.0 %    MCV 94.1 74.0 - 97.0 FL    MCH 30.5 24.0 - 34.0 PG    MCHC 32.4 31.0 - 37.0 g/dL    RDW 15.6 (H) 11.6 - 14.5 %    PLATELET 683 875 - 468 K/uL    MPV 11.7 9.2 - 11.8 FL    NEUTROPHILS 85 (H) 40 - 73 %    LYMPHOCYTES 9 (L) 21 - 52 %    MONOCYTES 4 3 - 10 %    EOSINOPHILS 2 0 - 5 %    BASOPHILS 0 0 - 2 %    ABS.  NEUTROPHILS 15.0 (H) 1.8 - 8.0 K/UL    ABS. LYMPHOCYTES 1.6 0.9 - 3.6 K/UL    ABS. MONOCYTES 0.8 0.05 - 1.2 K/UL    ABS. EOSINOPHILS 0.3 0.0 - 0.4 K/UL    ABS. BASOPHILS 0.0 0.0 - 0.06 K/UL    DF AUTOMATED     HEPATIC FUNCTION PANEL    Collection Time: 12/05/17  9:33 PM   Result Value Ref Range    Protein, total 9.7 (H) 6.4 - 8.2 g/dL    Albumin 3.1 (L) 3.4 - 5.0 g/dL    Globulin 6.6 (H) 2.0 - 4.0 g/dL    A-G Ratio 0.5 (L) 0.8 - 1.7      Bilirubin, total 0.3 0.2 - 1.0 MG/DL    Bilirubin, direct 0.1 0.0 - 0.2 MG/DL    Alk. phosphatase 104 45 - 117 U/L    AST (SGOT) 36 15 - 37 U/L    ALT (SGPT) 38 13 - 56 U/L   LIPASE    Collection Time: 12/05/17  9:33 PM   Result Value Ref Range    Lipase 200 73 - 817 U/L   METABOLIC PANEL, BASIC    Collection Time: 12/05/17  9:33 PM   Result Value Ref Range    Sodium 134 (L) 136 - 145 mmol/L    Potassium 4.1 3.5 - 5.5 mmol/L    Chloride 96 (L) 100 - 108 mmol/L    CO2 31 21 - 32 mmol/L    Anion gap 7 3.0 - 18 mmol/L    Glucose 174 (H) 74 - 99 mg/dL    BUN 38 (H) 7.0 - 18 MG/DL    Creatinine 1.39 (H) 0.6 - 1.3 MG/DL    BUN/Creatinine ratio 27 (H) 12 - 20      GFR est AA 49 (L) >60 ml/min/1.73m2    GFR est non-AA 40 (L) >60 ml/min/1.73m2    Calcium 8.8 8.5 - 10.1 MG/DL   MAGNESIUM    Collection Time: 12/05/17  9:33 PM   Result Value Ref Range    Magnesium 2.4 1.6 - 2.6 mg/dL   HCG QL SERUM    Collection Time: 12/05/17  9:33 PM   Result Value Ref Range    HCG, Ql. NEGATIVE  NEG     POC G3    Collection Time: 12/05/17 10:53 PM   Result Value Ref Range    Device: Non rebreather      Flow rate (POC) 15 L/M    FIO2 (POC) 100 %    pH (POC) 7.406 7.35 - 7.45      pCO2 (POC) 48.5 (H) 35.0 - 45.0 MMHG    pO2 (POC) 445 (H) 80 - 100 MMHG    HCO3 (POC) 30.5 (H) 22 - 26 MMOL/L    sO2 (POC) 100 (H) 92 - 97 %    Base excess (POC) 6 mmol/L    Allens test (POC) YES      Total resp. rate 23      Site LEFT RADIAL      Patient temp.  98.0      Specimen type (POC) ARTERIAL      Performed by Nasreen GARRETT/ JOYCE MICROSCOPIC    Collection Time: 12/05/17 11:27 PM   Result Value Ref Range    Color YELLOW      Appearance CLEAR      Specific gravity 1.014 1.005 - 1.030      pH (UA) 8.5 (H) 5.0 - 8.0      Protein 100 (A) NEG mg/dL    Glucose NEGATIVE  NEG mg/dL    Ketone NEGATIVE  NEG mg/dL    Bilirubin NEGATIVE  NEG      Blood NEGATIVE  NEG      Urobilinogen 0.2 0.2 - 1.0 EU/dL    Nitrites NEGATIVE  NEG      Leukocyte Esterase NEGATIVE  NEG     URINE MICROSCOPIC ONLY    Collection Time: 12/05/17 11:27 PM   Result Value Ref Range    WBC NONE 0 - 4 /hpf    RBC 0 to 3 0 - 5 /hpf    Epithelial cells 1+ 0 - 5 /lpf    Bacteria NEGATIVE  NEG /hpf       EKG interpretation by ED Physician:      X-Ray, CT or other radiology findings or impressions:  CT ABD PELV WO CONT    (Results Pending)   soft tissue edema, stranding surrounding g-tube. No abscess, leakage of contrast.     Progress notes, Consult notes or additional Procedure notes:   Clinical concern for cellulitis especially given appearance. Will need admission for exchange of gtube  After receiving dilaudid pt did experience sig resp depression req small dose of narcan which resolved issue  D/w pt results need for admission. abx ordered; doubt need for surgical consult  D/w Dr Lorenzo Hernandez who will admit  Will need gi consult in am    Reevaluation of patient:   stable    Disposition:  Diagnosis:   1. Cellulitis, abdominal wall    2. Leukocytosis, unspecified type    3. Non-intractable vomiting with nausea, unspecified vomiting type        Disposition: admit    Follow-up Information     None            Patient's Medications   Start Taking    No medications on file   Continue Taking    ACETAMINOPHEN (MAPAP) 160 MG/5 ML SUSPENSION    Take 10.2 mL by mouth every six (6) hours as needed for Fever or Pain. AMLODIPINE (NORVASC) 10 MG TABLET    Take 1 Tab by mouth daily. AMYLASE-LIPASE-PROTEASE (CREON) CAPSULE    Take  by mouth three (3) times daily (with meals).     ASPIRIN 81 MG TABLET Take 81 mg by mouth. CETIRIZINE (ZYRTEC) 10 MG TABLET    Take 10 mg by mouth daily as needed for Allergies, Rhinitis or Itching. DOCUSATE SODIUM (COLACE) 100 MG CAPSULE    Take 100 mg by mouth two (2) times a day. FOLIC ACID (FOLVITE) 1 MG TABLET    Take 0.4 mg by mouth daily. FUROSEMIDE (LASIX) 20 MG TABLET    Take 2 Tabs by mouth daily. GABAPENTIN (NEURONTIN) 300 MG CAPSULE    Take 400 mg by mouth two (2) times a day. HYDROCODONE-ACETAMINOPHEN (NORCO) 5-325 MG PER TABLET    Take 1 Tab by mouth every four (4) hours as needed for Pain. Max Daily Amount: 6 Tabs. HYDROCODONE-ACETAMINOPHEN (NORCO) 7.5-325 MG PER TABLET    Take 1 Tab by mouth every eight (8) hours as needed for Pain. LACTOSE-REDUCED FOOD/FIBER (JEVITY 1.5 SYMONE PO)    5 Cans by PEG Tube route daily. TOTAL OF 5 CANS DAILY    LEVOTHYROXINE (SYNTHROID) 100 MCG TABLET    Take 75 mcg by mouth Daily (before breakfast). LINEZOLID (ZYVOX) 600 MG TABLET    Take 1 Tab by mouth every twelve (12) hours. METOPROLOL TARTRATE (LOPRESSOR) 100 MG IR TABLET    Take 100 mg by mouth two (2) times a day. MULTIVITAMIN (ONE A DAY) TABLET    Take 1 Tab by mouth daily. ONDANSETRON HCL (ZOFRAN) 4 MG TABLET    Take 1 Tab by mouth every eight (8) hours as needed for Nausea. OXYGEN-AIR DELIVERY SYSTEMS    3 L by Nasal route continuous. PANTOPRAZOLE (PROTONIX) 40 MG TABLET    Take 1 Tab by mouth daily. POLYETHYLENE GLYCOL (MIRALAX) 17 GRAM/DOSE POWDER    Take 17 g by mouth daily. SODIUM CHLORIDE (SALINE NOSE) 0.65 % NASAL SPRAY    2 Sprays by Both Nostrils route as needed for Congestion.    These Medications have changed    No medications on file   Stop Taking    No medications on file     s

## 2017-12-07 ENCOUNTER — APPOINTMENT (OUTPATIENT)
Dept: GENERAL RADIOLOGY | Age: 52
DRG: 394 | End: 2017-12-07
Attending: INTERNAL MEDICINE
Payer: MEDICARE

## 2017-12-07 PROBLEM — R06.02 SOB (SHORTNESS OF BREATH): Status: RESOLVED | Noted: 2017-10-09 | Resolved: 2017-12-07

## 2017-12-07 PROBLEM — A41.9 SEPSIS (HCC): Status: RESOLVED | Noted: 2017-09-24 | Resolved: 2017-12-07

## 2017-12-07 PROBLEM — J96.02 ACUTE RESPIRATORY FAILURE WITH HYPERCAPNIA (HCC): Status: RESOLVED | Noted: 2017-09-24 | Resolved: 2017-12-07

## 2017-12-07 PROBLEM — J18.9 HCAP (HEALTHCARE-ASSOCIATED PNEUMONIA): Status: RESOLVED | Noted: 2017-09-24 | Resolved: 2017-12-07

## 2017-12-07 PROBLEM — J18.9 RECURRENT PNEUMONIA: Status: RESOLVED | Noted: 2017-10-11 | Resolved: 2017-12-07

## 2017-12-07 PROBLEM — J96.91 RESPIRATORY FAILURE WITH HYPOXIA (HCC): Status: RESOLVED | Noted: 2017-10-11 | Resolved: 2017-12-07

## 2017-12-07 LAB
ANION GAP SERPL CALC-SCNC: 8 MMOL/L (ref 3–18)
BASOPHILS # BLD: 0 K/UL (ref 0–0.06)
BASOPHILS NFR BLD: 0 % (ref 0–2)
BUN SERPL-MCNC: 28 MG/DL (ref 7–18)
BUN/CREAT SERPL: 19 (ref 12–20)
CALCIUM SERPL-MCNC: 7.6 MG/DL (ref 8.5–10.1)
CHLORIDE SERPL-SCNC: 105 MMOL/L (ref 100–108)
CO2 SERPL-SCNC: 24 MMOL/L (ref 21–32)
CREAT SERPL-MCNC: 1.45 MG/DL (ref 0.6–1.3)
DIFFERENTIAL METHOD BLD: ABNORMAL
EOSINOPHIL # BLD: 0.6 K/UL (ref 0–0.4)
EOSINOPHIL NFR BLD: 4 % (ref 0–5)
ERYTHROCYTE [DISTWIDTH] IN BLOOD BY AUTOMATED COUNT: 15.5 % (ref 11.6–14.5)
GLUCOSE BLD STRIP.AUTO-MCNC: 73 MG/DL (ref 70–110)
GLUCOSE BLD STRIP.AUTO-MCNC: 98 MG/DL (ref 70–110)
GLUCOSE BLD STRIP.AUTO-MCNC: 99 MG/DL (ref 70–110)
GLUCOSE SERPL-MCNC: 81 MG/DL (ref 74–99)
HCT VFR BLD AUTO: 19.5 % (ref 35–45)
HGB BLD-MCNC: 6 G/DL (ref 12–16)
LYMPHOCYTES # BLD: 0.7 K/UL (ref 0.9–3.6)
LYMPHOCYTES NFR BLD: 5 % (ref 21–52)
MCH RBC QN AUTO: 29.6 PG (ref 24–34)
MCHC RBC AUTO-ENTMCNC: 30.8 G/DL (ref 31–37)
MCV RBC AUTO: 96.1 FL (ref 74–97)
MONOCYTES # BLD: 0.2 K/UL (ref 0.05–1.2)
MONOCYTES NFR BLD: 1 % (ref 3–10)
NEUTS SEG # BLD: 13 K/UL (ref 1.8–8)
NEUTS SEG NFR BLD: 90 % (ref 40–73)
PLATELET # BLD AUTO: 166 K/UL (ref 135–420)
PMV BLD AUTO: 11.6 FL (ref 9.2–11.8)
POTASSIUM SERPL-SCNC: 4.5 MMOL/L (ref 3.5–5.5)
RBC # BLD AUTO: 2.03 M/UL (ref 4.2–5.3)
SODIUM SERPL-SCNC: 137 MMOL/L (ref 136–145)
WBC # BLD AUTO: 14.5 K/UL (ref 4.6–13.2)

## 2017-12-07 PROCEDURE — 87186 SC STD MICRODIL/AGAR DIL: CPT | Performed by: NURSE PRACTITIONER

## 2017-12-07 PROCEDURE — 74011250636 HC RX REV CODE- 250/636: Performed by: FAMILY MEDICINE

## 2017-12-07 PROCEDURE — 74011000258 HC RX REV CODE- 258: Performed by: NURSE PRACTITIONER

## 2017-12-07 PROCEDURE — 86921 COMPATIBILITY TEST INCUBATE: CPT | Performed by: HOSPITALIST

## 2017-12-07 PROCEDURE — 77010033678 HC OXYGEN DAILY

## 2017-12-07 PROCEDURE — 80048 BASIC METABOLIC PNL TOTAL CA: CPT | Performed by: FAMILY MEDICINE

## 2017-12-07 PROCEDURE — 74011250637 HC RX REV CODE- 250/637: Performed by: HOSPITALIST

## 2017-12-07 PROCEDURE — 77030020253 HC SOL INJ D545NS .05 DEX .45 SAL

## 2017-12-07 PROCEDURE — 86922 COMPATIBILITY TEST ANTIGLOB: CPT | Performed by: HOSPITALIST

## 2017-12-07 PROCEDURE — 74011636320 HC RX REV CODE- 636/320: Performed by: HOSPITALIST

## 2017-12-07 PROCEDURE — 49465 FLUORO EXAM OF G/COLON TUBE: CPT

## 2017-12-07 PROCEDURE — 65270000029 HC RM PRIVATE

## 2017-12-07 PROCEDURE — 87077 CULTURE AEROBIC IDENTIFY: CPT | Performed by: NURSE PRACTITIONER

## 2017-12-07 PROCEDURE — 86900 BLOOD TYPING SEROLOGIC ABO: CPT | Performed by: HOSPITALIST

## 2017-12-07 PROCEDURE — P9016 RBC LEUKOCYTES REDUCED: HCPCS | Performed by: HOSPITALIST

## 2017-12-07 PROCEDURE — 86920 COMPATIBILITY TEST SPIN: CPT | Performed by: HOSPITALIST

## 2017-12-07 PROCEDURE — 85025 COMPLETE CBC W/AUTO DIFF WBC: CPT | Performed by: FAMILY MEDICINE

## 2017-12-07 PROCEDURE — 36415 COLL VENOUS BLD VENIPUNCTURE: CPT | Performed by: FAMILY MEDICINE

## 2017-12-07 PROCEDURE — 74011000250 HC RX REV CODE- 250: Performed by: NURSE PRACTITIONER

## 2017-12-07 PROCEDURE — 82962 GLUCOSE BLOOD TEST: CPT

## 2017-12-07 PROCEDURE — 74011250637 HC RX REV CODE- 250/637: Performed by: FAMILY MEDICINE

## 2017-12-07 PROCEDURE — 0D20XUZ CHANGE FEEDING DEVICE IN UPPER INTESTINAL TRACT, EXTERNAL APPROACH: ICD-10-PCS | Performed by: INTERNAL MEDICINE

## 2017-12-07 PROCEDURE — 36430 TRANSFUSION BLD/BLD COMPNT: CPT

## 2017-12-07 PROCEDURE — 87070 CULTURE OTHR SPECIMN AEROBIC: CPT | Performed by: NURSE PRACTITIONER

## 2017-12-07 PROCEDURE — 86902 BLOOD TYPE ANTIGEN DONOR EA: CPT | Performed by: HOSPITALIST

## 2017-12-07 PROCEDURE — 74011250636 HC RX REV CODE- 250/636: Performed by: HOSPITALIST

## 2017-12-07 PROCEDURE — 74011250636 HC RX REV CODE- 250/636: Performed by: NURSE PRACTITIONER

## 2017-12-07 RX ORDER — FAMOTIDINE 20 MG/1
20 TABLET, FILM COATED ORAL DAILY
Status: DISCONTINUED | OUTPATIENT
Start: 2017-12-08 | End: 2017-12-08

## 2017-12-07 RX ORDER — METOCLOPRAMIDE HYDROCHLORIDE 5 MG/ML
5 INJECTION INTRAMUSCULAR; INTRAVENOUS EVERY 6 HOURS
Status: DISCONTINUED | OUTPATIENT
Start: 2017-12-07 | End: 2017-12-08

## 2017-12-07 RX ORDER — INSULIN LISPRO 100 [IU]/ML
INJECTION, SOLUTION INTRAVENOUS; SUBCUTANEOUS EVERY 6 HOURS
Status: DISCONTINUED | OUTPATIENT
Start: 2017-12-08 | End: 2017-12-16 | Stop reason: HOSPADM

## 2017-12-07 RX ORDER — SODIUM CHLORIDE 9 MG/ML
250 INJECTION, SOLUTION INTRAVENOUS AS NEEDED
Status: DISCONTINUED | OUTPATIENT
Start: 2017-12-07 | End: 2017-12-16 | Stop reason: HOSPADM

## 2017-12-07 RX ADMIN — HEPARIN SODIUM 5000 UNITS: 5000 INJECTION, SOLUTION INTRAVENOUS; SUBCUTANEOUS at 22:47

## 2017-12-07 RX ADMIN — PANCRELIPASE 1 CAPSULE: 10000; 34000; 55000 CAPSULE, DELAYED RELEASE ORAL at 18:19

## 2017-12-07 RX ADMIN — IOHEXOL 20 ML: 240 INJECTION, SOLUTION INTRATHECAL; INTRAVASCULAR; INTRAVENOUS; ORAL at 13:57

## 2017-12-07 RX ADMIN — MEROPENEM 1 G: 1 INJECTION, POWDER, FOR SOLUTION INTRAVENOUS at 18:09

## 2017-12-07 RX ADMIN — HEPARIN SODIUM 5000 UNITS: 5000 INJECTION, SOLUTION INTRAVENOUS; SUBCUTANEOUS at 05:13

## 2017-12-07 RX ADMIN — FUROSEMIDE 40 MG: 40 TABLET ORAL at 09:08

## 2017-12-07 RX ADMIN — LEVOTHYROXINE SODIUM 75 MCG: 75 TABLET ORAL at 06:44

## 2017-12-07 RX ADMIN — GABAPENTIN 400 MG: 400 CAPSULE ORAL at 22:47

## 2017-12-07 RX ADMIN — MEROPENEM 1 G: 1 INJECTION, POWDER, FOR SOLUTION INTRAVENOUS at 04:05

## 2017-12-07 RX ADMIN — TRAMADOL HYDROCHLORIDE 50 MG: 50 TABLET, FILM COATED ORAL at 09:09

## 2017-12-07 RX ADMIN — PANCRELIPASE 1 CAPSULE: 10000; 34000; 55000 CAPSULE, DELAYED RELEASE ORAL at 09:08

## 2017-12-07 RX ADMIN — HEPARIN SODIUM 5000 UNITS: 5000 INJECTION, SOLUTION INTRAVENOUS; SUBCUTANEOUS at 15:57

## 2017-12-07 RX ADMIN — METOCLOPRAMIDE 5 MG: 5 INJECTION, SOLUTION INTRAMUSCULAR; INTRAVENOUS at 18:09

## 2017-12-07 RX ADMIN — GABAPENTIN 400 MG: 400 CAPSULE ORAL at 09:09

## 2017-12-07 RX ADMIN — FOLIC ACID TAB 400 MCG 0.4 MG: 400 TAB at 09:09

## 2017-12-07 RX ADMIN — TRAMADOL HYDROCHLORIDE 50 MG: 50 TABLET, FILM COATED ORAL at 15:56

## 2017-12-07 RX ADMIN — DEXTROSE MONOHYDRATE AND SODIUM CHLORIDE 125 ML/HR: 5; .45 INJECTION, SOLUTION INTRAVENOUS at 08:00

## 2017-12-07 RX ADMIN — TRAMADOL HYDROCHLORIDE 50 MG: 50 TABLET, FILM COATED ORAL at 02:00

## 2017-12-07 RX ADMIN — PANCRELIPASE 1 CAPSULE: 10000; 34000; 55000 CAPSULE, DELAYED RELEASE ORAL at 12:29

## 2017-12-07 RX ADMIN — PANTOPRAZOLE SODIUM 40 MG: 40 TABLET, DELAYED RELEASE ORAL at 09:08

## 2017-12-07 RX ADMIN — SODIUM CHLORIDE 1250 MG: 900 INJECTION, SOLUTION INTRAVENOUS at 12:24

## 2017-12-07 RX ADMIN — ASPIRIN 81 MG 81 MG: 81 TABLET ORAL at 09:08

## 2017-12-07 NOTE — PROGRESS NOTES
Palliative consult received and appreciated. Patient is known to Palliative service from last admission in October 2017. During that admission patient completed AMD to name mother Abbey Morton primary medical agent. And she noted wishes for no life prolonging measures at EOL if not improving or if no ability to interact and was not recovering. She had clear understanding of her continuous risk of aspiration pneumonia and would be willing to have same aggressive interventions, including intubation, if she were to recover. During that hospitalization wished to be Full code. 8:45-SW attempted to meet with patient to review past goals of care conversations/wishes for any changes. Patient was asleep upon entering. Did wake to name but shock head \"no\". Questioned if she wanted SW to return later after she rested. She shock head \"yes\". Plan to return later this morning. 1100- SW returned to speak with patient. She was awake sitting up in bed. She was AAOx4 stating her name, was at [facility] for an infection and pain in Peg tube site. She was able to state month, date and year December 7, 2017. She had weak voice and was tired. She mentioned having pain at Peg tube site. She stated received Tramadol 2 hours ago and takes Norco at home for pain. Reviewed the AMD on file with patient. She confirmed her mother Abeby Morton is still her primary MPOA and sister Quiana Plan secondary MPOA. Her wishes for at EOL haven't changed, still wishing for no life prolonging if not improving or recovering and if no ability to interact. She would still be okay with intuabtion and aggressive measures if needed for aspiration pneumonia. Code status is to remain FULL CODE. No further Palliative Medicine interventions needed at this time. Will sign off for now. Please re-consult if an changes during hospital course. Thank you for the opportunity to assist in the care of Ms. Jolly Ordaz.     CHARISMA Fragoso  Palliative Medicine

## 2017-12-07 NOTE — PROCEDURES
Procedure:  G-J feeding tube removal and replacement with a balloon type G tube. Indication:  G-J tube leakage and deterioration. Findings:  After informed consent was obtained, the balloon of the existing G-J feeding tube was deflated and device removed completely and intact. A lubricated 20 Fr, angled, [employer], balloon type feeding tube was easily inserted into the stoma. The 6 mL internal balloon was inflated and external bumper advanced loosely to the abdominal wall at the 5-6 cm marking on its shaft. Able to rotate feeding tube 360 degrees without resistance. Internal position of the feeding tube was confirmed by the aspiration of gastric fluid and auscultation of insufflated air into the stomach. Dressings were applied. Impression:  Successful removal of GJ feeding tube and replacement to a G-tube. Plan:  1.  Schedule limited gastrograffin UGI XR today to confirm intragastric balloon position. Once confirmed then recommend continuous pump tube feedings and monitor Gtube residuals. 2.  Recommend Reglan 5-10 mg IV every 6 hours once feedings begin. 3.  If patient could not tolerated PEG tube due to vomiting and/or high residuals then will have to arrange for either IR or percutaneous endoscopic guided replacement to a G-J feeding tube device.     MD Cecily Farnsworth [facility]

## 2017-12-07 NOTE — PROGRESS NOTES
Nutrition initial assessment/Plan of care      RECOMMENDATIONS:   1. Confirm PEG placement  2. Enteral Nutrition: Goal Rate of Osmolite 1.2 @ 60 ml/hr (1728 Kcal, 80 g PRO and 1181 mL free water)  3. When IV fluids discontinued; Water Flushes: suggest 150 mL every 6 hours  4. Monitor TF, labs and weight. 5. RD to follow     GOALS:   1. Enteral Nutrition intake meets >75% of protein/calorie needs by 12/10  2. Weight Maintenance (+/- 1-2 lb) by 12/14     ASSESSMENT:   Wt status is classified as normal per BMI of 21.6. TF currently on hold d/t procedure. Labs noted. Nutrition recommendations listed. RD to follow. Nutrition Diagnoses:   Inadequate energy intake related to PEG tube malfunction as evidenced by TF being held for procedure. Nutrition Risk:  [x] High  [] Moderate []  Low    SUBJECTIVE/OBJECTIVE:    Pt admitted for abdominal wall cellulitis. PMHx of throat cancer, dysphagia, PEG/TF, DM, HTN and KEATON. Wt loss noted since last admission, but question accuracy of current wt recorded as unsure how obtained. Variable weights recorded (09/2017) at 125 lb, (10/2017) at 140 lb and (12/2017) 126 lb. Will obtain new weight. Abdomen and sacral wounds noted in chart. Pt seen in bed s/p PEG removal and replacement today (12/7). Reports no ABD pain/N/V but stated she has tenderness at the PEG site still d/t procedure performed. Pt confirmed wt fluctuation over last 6 months (between 140-125 lb) equaling a 10% wt change. Per MD notes (12/7) once PEG placement confirmed recommends pt can resume TF. Will monitor. Information Obtained from:    [x] Chart Review   [x] Patient   [] Family/Caregiver   [] Nurse/Physician   [] Interdisciplinary Meeting/Rounds      Diet: Consistent CHO Diet  Medications: [x] Reviewed    Allergies: [x] Reviewed   Encounter Diagnoses     ICD-10-CM ICD-9-CM   1. Cellulitis, abdominal wall L03.311 682.2   2. Leukocytosis, unspecified type D72.829 288.60   3.  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    PEG adjustment, replacement, or removal     S/P thoracentesis     Throat cancer ([facility] 2011    SCC of the nasopharynx, s/p chemo/radiation      Labs:  Lab Results   Component Value Date/Time    Sodium 137 12/07/2017 04:40 AM    Potassium 4.5 12/07/2017 04:40 AM    Chloride 105 12/07/2017 04:40 AM    CO2 24 12/07/2017 04:40 AM    Anion gap 8 12/07/2017 04:40 AM    Glucose 81 12/07/2017 04:40 AM    BUN 28 12/07/2017 04:40 AM    Creatinine 1.45 12/07/2017 04:40 AM    Calcium 7.6 12/07/2017 04:40 AM    Magnesium 2.4 12/05/2017 09:33 PM    Phosphorus 5.1 10/06/2017 04:30 AM    Albumin 3.1 12/05/2017 09:33 PM     Anthropometrics: BMI (calculated): 21.6  Last 3 Recorded Weights in this Encounter    12/05/17 1957 12/06/17 0941   Weight: 57.2 kg (126 lb) 57.2 kg (126 lb)    Ht Readings from Last 1 Encounters:   12/06/17 5' 4\" (1.626 m)     Patient Vitals for the past 100 hrs:   % Diet Eaten   12/06/17 0830 0 %       IBW: 120 lb %IBW: 105%  [] Weight Loss [] Weight Gain [] Weight Stable    Estimated Nutrition Needs: [x] MSJ  [] Other:  Calories: 7279-8887 kcal Based on:   [x] Actual BW    Protein:   70-85 g Based on:   [x] Actual BW    Fluid:       4980-8156 ml Based on:   [x] Actual BW      [x] No Cultural, Buddhism or ethnic dietary need identified.     [] Cultural, Buddhism and ethnic food preferences identified and addressed     Wt Status:  [x] Normal (18.6 - 24.9) [] Underweight (<18.5) [] Overweight (25 - 29.9) [] Mild Obesity (30 - 34.9)  [] Moderate Obesity (35 - 39.9) [] Morbid Obesity (40+)       Nutrition Problems Identified:   [x] Suboptimal PO intake   [] Food Allergies  [x] Difficulty chewing/swallowing/poor dentition  [] Constipation/Diarrhea   [] Nausea/Vomiting   [] None  [x] Other: Enteral nutrition via PEG    Plan:   [] Therapeutic Diet  []  Obtained/adjusted food preferences/tolerances and/or snacks options   []  Supplements added   [] Occupational therapy following for feeding techniques  []  HS snack added   []  Modify diet texture   []  Modify diet for food allergies   []  Educate patient   []  Assist with menu selection   []  Monitor PO intake on meal rounds   [x]  Continue inpatient monitoring and intervention   []  Participated in discharge planning/Interdisciplinary rounds/Team meetings   [x]  Other: Enteral Nutrition recommendations listed.        Education Needs:   [] Not appropriate for teaching at this time due to:   [x] Identified and addressed    Nutrition Monitoring and Evaluation:  [x] Continue ongoing monitoring and intervention  [] Other    Isael Sewell

## 2017-12-07 NOTE — ROUTINE PROCESS
Bedside and Verbal shift change report given to [address] (oncoming nurse) by Magdalena Mortensen RN   (offgoing nurse).  Report included the following information SBAR, Kardex, Recent Results and Cardiac Rhythm SR St.

## 2017-12-07 NOTE — ACP (ADVANCE CARE PLANNING)
AMD on file with patient completed 10/2017. Mother Dorothy Knapp is still her primary MPOA and sister Kush Vargas secondary MPOA. Wishing for no life prolonging if not improving or recovering and if no ability to interact. She would still be okay with intuabtion and aggressive measures if needed for aspiration pneumonia. Code status is to remain FULL CODE.      Primary MPOA: Dorothy Knapp (mother)  [phone]    2nd MPOA: Kush Vargas (sister)  [phone]

## 2017-12-07 NOTE — ROUTINE PROCESS
1722 assumed care of pt after bedside verbal report, pt resting in bed awake, PRBC infusing via PIV to right wrist at 100 ml/hr, no distress noted, will monitor     1840 started osmolite 1.2 narda peg tube infusion at 30 ml/hr, no distress noted, will monitor    1947 Bedside and Verbal shift change report given to LANDEN Chavez (oncoming nurse) by LANDEN Chauhan (offgoing nurse). Report included the following information SBAR, Kardex and MAR.

## 2017-12-07 NOTE — PROGRESS NOTES
2020-assessment  Complete, received pt in bed, awake alert and oriented, c/o abdominal pain, ultram per peg tube given, dressing changed to peg site, purulent yellow drainage noted, peg tubing is stained with brown drainage, patient stated that she has a hole in her peg tube and has placed extra tape to tubing to prevent leakage, EJ PIV intact, assisted out of bed to [facility], gait steady with one assist, unable to obtain urine for culture at this time as urine is mixed with stool, patient declined straight cath for urine sample, will monitor  2345-tylenol 650 mg po given for co headache, assisted pt to [facility], urine culture sent  0400-pt resting quielty  734-  Bedside and Verbal shift change report given to Angela Lamb RN (oncoming nurse) by Lizzy Garcia RN   (offgoing nurse). Report included the following information MAR, Accordion, Recent Results, Med Rec Status, Cardiac Rhythm ST and Alarm Parameters .

## 2017-12-07 NOTE — CONSULTS
Gastroenterology Consult    Patient: Mackenzie Kilgore MRN: [id number]  SSN: [id number]    YOB: 1965  Age: 50 y.o. Sex: female        Assessment:   1. PEG-J feeding tube deterioration with external leakage complicated by migration of internal balloon into the stomal tract that could very well be the cause of her acute PEG stomal pain. CT without evidence of abdominal wall abscess or free intraperitoneal air. 2.  Hx of throat CA with proximal esophageal obstruction and severe OP dysphagia requiring PEG tube feedings. Hx of chemoradiation. 3.  Reported DM gastroparesis requiring PEG change to PEG-J device last 8/2017 at [facility]. Plan:   1. Will urgently change her PEG-J to a standard balloon type PEG tube to allow administration of meds and continuous pump tube feedings. If she truly cannot tolerate PEG tube feedings then will have to order a PEG-J feeding tube replacement kit and change under percutaneous endoscopic-fluoroscopic guidance. 2.  Once new PEG tube is radiographically confirmed to be in position then will recommend Reglan 5-10 mg IV every 6 hours as prokinetic therapy. Chief Complaint:   PEG stoma pain and leaking PEG tube. Subjective:      Mackenzie Kilgore is a 50 y.o. 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 Her original feeding tube was an 18 Fr gastrostomy tube placed by IR at [facility] last 11/2016. Medical records indicated this device changed to a gastrojejunal feeding tube last August 2017 at [facility] because of her severe vomiting reported as gastroparesis. . According to the patient she began developing pain over the stoma this past 5 days and has experienced this in the past when the feeding tube gets dislodged.  Her CT scan this admission showed migration of the internal balloon into the Gtube tract without evidence of abdominal wall abscess or pneumoperitoneum. GI consultation was requested to replace this device.     Hospital Problems  Date Reviewed: 2017          Codes Class Noted POA    * (Principal)Abdominal wall cellulitis ICD-10-CM: T20.842  ICD-9-CM: 682.2  2017 Unknown        G tube feedings ([facility] ICD-10-CM: Z93.1  ICD-9-CM: V44.1  2017 Yes        Anemia ICD-10-CM: D64.9  ICD-9-CM: 285.9  2017 Yes        Hypothyroid ICD-10-CM: E03.9  ICD-9-CM: 244.9  2017 Yes        KEATON (acute kidney injury) ([facility] ICD-10-CM: N17.9  ICD-9-CM: 584.9  10/11/2017 Yes        Hypertension ICD-10-CM: I10  ICD-9-CM: 401.9  2017 Yes        Diabetes mellitus ([facility] ICD-10-CM: E11.9  ICD-9-CM: 250.00  2017 Yes            Past Medical History:   Diagnosis Date    Anemia     Cancer ([facility]     throat    Coronary artery disease     Diabetes ([facility]     Diabetes mellitus ([facility]     ETOH abuse     HTN (hypertension)     Hypertension     Hypothyroid     Lupus     Osteonecrosis ([facility]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    PEG adjustment, replacement, or removal     S/P thoracentesis     Throat cancer ([facility]     SCC of the nasopharynx, s/p chemo/radiation     Past Surgical History:   Procedure Laterality Date    ABDOMEN SURGERY PROC UNLISTED      feeding tube placement    HX  SECTION      HX HEENT      throat cancer biopsy      Family History   Problem Relation Age of Onset    Lupus Sister      Social History   Substance Use Topics    Smoking status: Former Smoker    Smokeless tobacco: Never Used    Alcohol use No      Current Facility-Administered Medications   Medication Dose Route Frequency Provider Last Rate Last Dose    0.9% sodium chloride infusion 250 mL  250 mL IntraVENous PRN Mathew Santana MD        [START ON 2017] famotidine (PEPCID) tablet 20 mg  20 mg Oral DAILY Mathew Santana MD        naloxone [facility]) injection 0.4 mg  0.4 mg IntraVENous PRN Jose Maria Darci Ritter MD        ondansetron (ZOFRAN) injection 4 mg  4 mg IntraVENous Q4H PRN Jose Maria Ritter MD        heparin (porcine) injection 5,000 Units  5,000 Units SubCUTAneous Roberto Kendrick MD   5,000 Units at 12/07/17 0513    lipase-protease-amylase (ZENPEP 10,000) capsule 1 Cap  1 Cap Oral TID WITH MEALS Idalia Rhodes MD   1 Cap at 12/07/17 1229    cetirizine (ZYRTEC) tablet 10 mg  10 mg Oral DAILY PRN Idalia Rhodes MD        docusate sodium (COLACE) capsule 100 mg  100 mg Oral BID Idalia Rhodes MD   Stopped at 99/92/82 8305    folic acid tablet 0.4 mg  0.4 mg Oral DAILY Jose Maria Bronson MD   0.4 mg at 12/07/17 0909    furosemide (LASIX) tablet 40 mg  40 mg Oral DAILY Idalia Rhodes MD   40 mg at 12/07/17 0908    gabapentin (NEURONTIN) capsule 400 mg  400 mg Oral BID Idalia Rhodes MD   400 mg at 12/07/17 0909    levothyroxine (SYNTHROID) tablet 75 mcg  75 mcg Oral ACB Jose Maria Bronson MD   75 mcg at 12/07/17 0644    pantoprazole (PROTONIX) tablet 40 mg  40 mg Oral DAILY Jose Maria Bronson MD   40 mg at 12/07/17 0908    polyethylene glycol (MIRALAX) packet 17 g  17 g Oral DAILY Idalia Rhodes MD   Stopped at 12/07/17 0900    sodium chloride (OCEAN) 0.65 % nasal spray 2 Spray  2 Spray Both Nostrils PRN Jose Maria Ritter MD        insulin lispro (HUMALOG) injection   SubCUTAneous AC&HS Idalia Rhodes MD   Stopped at 12/06/17 0519    glucose chewable tablet 16 g  4 Tab Oral PRN Jose Maria Ritter MD        glucagon (GLUCAGEN) injection 1 mg  1 mg IntraMUSCular PRN Jose Maria Ritter MD        diphenhydrAMINE (BENADRYL) capsule 25 mg  25 mg Oral Q6H PRN Jose Maria Bronson MD   25 mg at 12/06/17 1011    traMADol (ULTRAM) tablet 50 mg  50 mg Oral Q6H PRN Idalia Rhodes MD   50 mg at 12/07/17 0909    VANCOMYCIN INFORMATION NOTE   Other Rx Dosing/Monitoring Piyush Blanton MD        vancomycin (VANCOCIN) 1,250 mg in 0.9% sodium chloride 250 mL IVPB  1,250 mg IntraVENous Q24H Sky Huynh  mL/hr at 12/07/17 1224 1,250 mg at 12/07/17 1224    [START ON 12/9/2017] VANCOMYCIN INFORMATION NOTE   Other ONCE Sky Huynh MD        aspirin chewable tablet 81 mg  81 mg Oral DAILY Sky Huynh MD   81 mg at 12/07/17 0908    acetaminophen (TYLENOL) tablet 650 mg  650 mg Oral Q6H PRN Sky Huynh MD   650 mg at 12/06/17 2341    dextrose 5 % - 0.45% NaCl infusion  125 mL/hr IntraVENous CONTINUOUS Zain Hauser NP   Stopped at 12/07/17 1259    meropenem (MERREM) 1 g in sterile water (preservative free) 20 mL IV syringe  1 g IntraVENous Q12H Zain Hauser NP   1 g at 12/07/17 0405        Allergies   Allergen Reactions    Morphine Rash    Amlodipine Swelling     Leg swelling per patient, but no problem taking Lotrel (amlodipine-benazepril)       Review of Systems:  A comprehensive review of systems was negative except for that written in the History of Present Illness.     Objective:     Patient Vitals for the past 24 hrs:   Temp Pulse Resp BP SpO2   12/07/17 1254 99.2 °F (37.3 °C) 85 20 113/71 -   12/07/17 1229 98.9 °F (37.2 °C) 98 19 122/78 100 %   12/07/17 1133 99.6 °F (37.6 °C) (!) 106 18 99/69 98 %   12/07/17 0737 98.7 °F (37.1 °C) (!) 103 18 136/86 100 %   12/07/17 0513 97.9 °F (36.6 °C) 95 15 104/69 100 %   12/07/17 0100 - (!) 110 - - -   12/07/17 0054 98.2 °F (36.8 °C) (!) 117 16 136/87 100 %   12/06/17 1904 98.1 °F (36.7 °C) (!) 106 17 118/79 100 %   12/06/17 1733 98.6 °F (37 °C) (!) 101 18 98/60 100 %   12/06/17 1519 (!) 101.2 °F (38.4 °C) (!) 118 18 126/80 98 %   12/06/17 1502 (!) 100.8 °F (38.2 °C) (!) 120 20 123/74 97 %         Intake/Output Summary (Last 24 hours) at 12/07/17 1302  Last data filed at 12/07/17 1054   Gross per 24 hour   Intake             2195 ml   Output             1226 ml   Net 969 ml       Physical Exam:  Well developed, thin, awake, alert, oriented. Anicteric sclerae. No oral thrush. Scarred and fibrotic neck without JVD. Clear breath sounds. RRR, no murmurs. Abdomen flabby, normal BS, G-J tube exteriorized to the 1.5 cm marking on shaft. Discolored and brittle shaft with multiple leaks that are bandaged. Stoma with mild increased granulation tissue without streaking redness, induration or crepitance. Mild tenderness with manipulation of the G-J tube. Warm extermities,  2+ pulses, no edema. Laboratory Results:    Labs: Results:   Chemistry Recent Labs      12/07/17 0440 12/06/17   0841  12/05/17   2133   GLU  81  99  174*   NA  137  139  134*   K  4.5  4.2  4.1   CL  105  103  96*   CO2  24  28  31   BUN  28*  32*  38*   CREA  1.45*  1.34*  1.39*   CA  7.6*  7.7*  8.8   AGAP  8  8  7   BUCR  19  24*  27*   AP   --    --   104   TP   --    --   9.7*   ALB   --    --   3.1*   GLOB   --    --   6.6*   AGRAT   --    --   0.5*    Estimated Creatinine Clearance: 40.1 mL/min (based on Cr of 1.45). CBC w/Diff Recent Labs      12/07/17 0440 12/06/17   0841  12/05/17   2133   WBC  14.5*  16.0*  17.7*   RBC  2.03*  2.24*  2.56*   HGB  6.0*  6.7*  7.8*   HCT  19.5*  21.3*  24.1*   PLT  166  166  218   GRANS  90*  98*  85*   LYMPH  5*  2*  9*   EOS  4  0  2      Cardiac Enzymes No results for input(s): CPK, CKND1, LAVELLE in the last 72 hours.     No lab exists for component: CKRMB, TROIP   Coagulation Recent Labs      12/06/17   0841   PTP  14.1   INR  1.1       Hepatitis Panel No results found for: HAMAT, HAAB, HABT, HAAT, HBSAG, HBSB, HBSAT, HBABN, HBCM, HBCAB, HBCAT, XBCABS, HBEAB, HBEAG, XHEPCS, [id number], HBEGLT, HBCMLT, HBCLT, HBEBLT, [id number], [id number], HAVMLT, [id number], HBCMLT, [id number], HCGAT   Amylase Lipase    Liver Enzymes Recent Labs      12/05/17   2133   TP  9.7*   ALB  3.1*   AP  104   SGOT  36   ALT  38      Thyroid Studies No results for input(s): T4, T3U, TSH, TSHEXT in the last 72 hours. No lab exists for component: T3RU     Pathology pathology         Signed By: Carlean Mcburney.  Cody Valdez MD, FACP    December 7, 2017

## 2017-12-07 NOTE — PROGRESS NOTES
Pharmacy adjusting dose for Famotidine (Pepcid) per renal protocol. Was on a regimen of: 20 mg PO q12h. Labs:   Serum Creatinine - 1.45 mg/dl  CrCl - 40 ml/min    Plan:  Changed Famotidine to 20 mg  q24h. Pharmacy to follow and will redose based on renal function changes. Thanks.

## 2017-12-07 NOTE — ROUTINE PROCESS
Bedside and Verbal shift change report given to Kevin Rodriguez RN   (oncoming nurse) by Chip Tang (offgoing nurse).  Report included the following information SBAR, Kardex, Recent Results and Cardiac Rhythm SR ST.

## 2017-12-07 NOTE — PROGRESS NOTES
GI - Addendum:    G-tube in position per limited gastrograffin study today. Begin use of G-tube for meds and feedings.     Binh Bridges MD

## 2017-12-07 NOTE — PROGRESS NOTES
2 [facility]  Hospitalist Division        Inpatient Daily Progress Note    Daily progress Note    Patient: Yane Martínez MRN: [id number]  CSN: [id number]    YOB: 1965  Age: 50 y.o. Sex: female    DOA: 12/5/2017 LOS:  LOS: 1 day                    Chief Complaint:  Abdominal pain at PEG site       Subjective:      Awake and alert. C/o abdominal pain. + BM. Objective:      Visit Vitals    /86 (BP 1 Location: Left arm, BP Patient Position: At rest)    Pulse (!) 103    Temp 98.7 °F (37.1 °C)    Resp 18    Ht 5' 4\" (1.626 m)    Wt 57.2 kg (126 lb)    SpO2 100%    Breastfeeding No    BMI 21.63 kg/m2           Physical Exam:  General appearance: alert, cooperative, no distress, appears stated age  Lungs: bases diminished bilaterally, no wheezes   Heart: regular rate and rhythm, S1, S2 normal, no murmur, click, rub or gallop  Abdomen: soft, non distended. TTP.  PEG clapmed  Extremities: extremities normal, atraumatic, no cyanosis or edema  Skin: Skin color, texture, turgor normal. No rashes or lesions  Neurologic: moves all 4 extremities equally  PSY: appropriately behaved      Intake and Output:  Current Shift:     Last three shifts:  12/05 1901 - 12/07 0700  In: 1201 [I.V.:2080]  Out: 1326 [Urine:1325]    Recent Results (from the past 24 hour(s))   GLUCOSE, POC    Collection Time: 12/06/17 12:08 PM   Result Value Ref Range    Glucose (POC) 131 (H) 70 - 110 mg/dL   GLUCOSE, POC    Collection Time: 12/06/17  3:47 PM   Result Value Ref Range    Glucose (POC) 108 70 - 110 mg/dL   GLUCOSE, POC    Collection Time: 12/06/17  9:01 PM   Result Value Ref Range    Glucose (POC) 98 70 - 661 mg/dL   METABOLIC PANEL, BASIC    Collection Time: 12/07/17  4:40 AM   Result Value Ref Range    Sodium 137 136 - 145 mmol/L    Potassium 4.5 3.5 - 5.5 mmol/L    Chloride 105 100 - 108 mmol/L    CO2 24 21 - 32 mmol/L    Anion gap 8 3.0 - 18 mmol/L    Glucose 81 74 - 99 mg/dL BUN 28 (H) 7.0 - 18 MG/DL    Creatinine 1.45 (H) 0.6 - 1.3 MG/DL    BUN/Creatinine ratio 19 12 - 20      GFR est AA 46 (L) >60 ml/min/1.73m2    GFR est non-AA 38 (L) >60 ml/min/1.73m2    Calcium 7.6 (L) 8.5 - 10.1 MG/DL   CBC WITH AUTOMATED DIFF    Collection Time: 12/07/17  4:40 AM   Result Value Ref Range    WBC 14.5 (H) 4.6 - 13.2 K/uL    RBC 2.03 (L) 4.20 - 5.30 M/uL    HGB 6.0 (L) 12.0 - 16.0 g/dL    HCT 19.5 (L) 35.0 - 45.0 %    MCV 96.1 74.0 - 97.0 FL    MCH 29.6 24.0 - 34.0 PG    MCHC 30.8 (L) 31.0 - 37.0 g/dL    RDW 15.5 (H) 11.6 - 14.5 %    PLATELET 270 564 - 398 K/uL    MPV 11.6 9.2 - 11.8 FL    NEUTROPHILS 90 (H) 40 - 73 %    LYMPHOCYTES 5 (L) 21 - 52 %    MONOCYTES 1 (L) 3 - 10 %    EOSINOPHILS 4 0 - 5 %    BASOPHILS 0 0 - 2 %    ABS. NEUTROPHILS 13.0 (H) 1.8 - 8.0 K/UL    ABS. LYMPHOCYTES 0.7 (L) 0.9 - 3.6 K/UL    ABS. MONOCYTES 0.2 0.05 - 1.2 K/UL    ABS. EOSINOPHILS 0.6 (H) 0.0 - 0.4 K/UL    ABS.  BASOPHILS 0.0 0.0 - 0.06 K/UL    DF AUTOMATED     TYPE + CROSSMATCH    Collection Time: 12/07/17  8:30 AM   Result Value Ref Range    Crossmatch Expiration 12/10/2017     ABO/Rh(D) A POSITIVE     Antibody screen NEG     CALLED TO:  Dulce Hamilton, 2200, AT 1023 ON 12/07/2017 BY 1 [facility]     Unit number [id number]     Blood component type  LR AS1     Unit division 00     Status of unit ALLOCATED     ANTIGEN/ANTIBODY INFO C NEGATIVE,     Crossmatch result Compatible     Unit number [id number]     Blood component type RC LR AS1     Unit division 00     Status of unit ALLOCATED     ANTIGEN/ANTIBODY INFO C NEGATIVE,     Crossmatch result Compatible            Lab Results   Component Value Date/Time    Glucose 81 12/07/2017 04:40 AM    Glucose 99 12/06/2017 08:41 AM    Glucose 174 12/05/2017 09:33 PM    Glucose 100 10/23/2017 05:05 AM    Glucose 110 10/20/2017 04:50 AM        Assessment/Plan:     Patient Active Problem List   Diagnosis Code    Hypertension I10    Diabetes mellitus ([facility] [address] E11.9    Dysphagia R13.10    History of throat cancer Z85.819    KEATON (acute kidney injury) ([facility] [address] N17.9    Abdominal wall cellulitis L03.311    G tube feedings ([facility]) Z93.1    Anemia D64.9    Hypothyroid E03.9       A/P:  - Leukocytosis ? Abdominal wall cellulitis- Vanc, Merrem. GI consulted for possible PEG exchange   - Blood cultures- no growth after 9 hours- continue to follow. Urine culture collected- no preliminary result as of yet- follow   - CXR 12/6/17- reviewed- Improved appearance to previously demonstrated upper lung predominant alveolar opacities, without focal pneumonic consolidation, pneumothorax, or pleural effusion. Sputum culture ordered- hx pseudomonas and MRSA   - Hx throat cancer  - Anemia of chronic disease likely 2/2 above: Hgb 6.0- transfuse 2 units PRBC.  Monitor CBC  - Dysphagia 2/2 above: Nutrition consult placed for resumption of tube feeds  - Chronic Hypoxic Respiratory failure: continuous home 02 at 3L  - DM: SSI   - Hypothyroidism: levothyroxine   - Constipation: Colace, Miralax   - DVT prophylaxis: Heparin           CLIFTON García-YAQUELIN John [address]  Pager:  [phone]  Office:  [phone]

## 2017-12-08 LAB
ABO + RH BLD: NORMAL
ANION GAP SERPL CALC-SCNC: 8 MMOL/L (ref 3–18)
ANTIGENS PRESENT RBC DONR: NORMAL
ANTIGENS PRESENT RBC DONR: NORMAL
BASOPHILS # BLD: 0 K/UL (ref 0–0.06)
BASOPHILS NFR BLD: 0 % (ref 0–2)
BLD PROD TYP BPU: NORMAL
BLD PROD TYP BPU: NORMAL
BLOOD GROUP ANTIBODIES SERPL: NORMAL
BPU ID: NORMAL
BPU ID: NORMAL
BUN SERPL-MCNC: 24 MG/DL (ref 7–18)
BUN/CREAT SERPL: 23 (ref 12–20)
CALCIUM SERPL-MCNC: 8 MG/DL (ref 8.5–10.1)
CALLED TO:,BCALL1: NORMAL
CHLORIDE SERPL-SCNC: 106 MMOL/L (ref 100–108)
CO2 SERPL-SCNC: 24 MMOL/L (ref 21–32)
CREAT SERPL-MCNC: 1.06 MG/DL (ref 0.6–1.3)
CROSSMATCH RESULT,%XM: NORMAL
CROSSMATCH RESULT,%XM: NORMAL
DIFFERENTIAL METHOD BLD: ABNORMAL
EOSINOPHIL # BLD: 1.2 K/UL (ref 0–0.4)
EOSINOPHIL NFR BLD: 8 % (ref 0–5)
ERYTHROCYTE [DISTWIDTH] IN BLOOD BY AUTOMATED COUNT: 17.4 % (ref 11.6–14.5)
GLUCOSE BLD STRIP.AUTO-MCNC: 103 MG/DL (ref 70–110)
GLUCOSE BLD STRIP.AUTO-MCNC: 123 MG/DL (ref 70–110)
GLUCOSE BLD STRIP.AUTO-MCNC: 135 MG/DL (ref 70–110)
GLUCOSE BLD STRIP.AUTO-MCNC: 160 MG/DL (ref 70–110)
GLUCOSE BLD STRIP.AUTO-MCNC: 177 MG/DL (ref 70–110)
GLUCOSE BLD STRIP.AUTO-MCNC: 53 MG/DL (ref 70–110)
GLUCOSE SERPL-MCNC: 109 MG/DL (ref 74–99)
HCT VFR BLD AUTO: 26.6 % (ref 35–45)
HGB BLD-MCNC: 8.7 G/DL (ref 12–16)
LYMPHOCYTES # BLD: 0.9 K/UL (ref 0.9–3.6)
LYMPHOCYTES NFR BLD: 7 % (ref 21–52)
MCH RBC QN AUTO: 29.5 PG (ref 24–34)
MCHC RBC AUTO-ENTMCNC: 32.7 G/DL (ref 31–37)
MCV RBC AUTO: 90.2 FL (ref 74–97)
MONOCYTES # BLD: 0.3 K/UL (ref 0.05–1.2)
MONOCYTES NFR BLD: 2 % (ref 3–10)
NEUTS SEG # BLD: 11.5 K/UL (ref 1.8–8)
NEUTS SEG NFR BLD: 83 % (ref 40–73)
PLATELET # BLD AUTO: 155 K/UL (ref 135–420)
PMV BLD AUTO: 11.8 FL (ref 9.2–11.8)
POTASSIUM SERPL-SCNC: 3.7 MMOL/L (ref 3.5–5.5)
RBC # BLD AUTO: 2.95 M/UL (ref 4.2–5.3)
SODIUM SERPL-SCNC: 138 MMOL/L (ref 136–145)
SPECIMEN EXP DATE BLD: NORMAL
STATUS OF UNIT,%ST: NORMAL
STATUS OF UNIT,%ST: NORMAL
UNIT DIVISION, %UDIV: 0
UNIT DIVISION, %UDIV: 0
WBC # BLD AUTO: 13.9 K/UL (ref 4.6–13.2)

## 2017-12-08 PROCEDURE — 74011250636 HC RX REV CODE- 250/636: Performed by: NURSE PRACTITIONER

## 2017-12-08 PROCEDURE — 77010033678 HC OXYGEN DAILY

## 2017-12-08 PROCEDURE — 36415 COLL VENOUS BLD VENIPUNCTURE: CPT | Performed by: HOSPITALIST

## 2017-12-08 PROCEDURE — 74011636637 HC RX REV CODE- 636/637: Performed by: HOSPITALIST

## 2017-12-08 PROCEDURE — 74011000258 HC RX REV CODE- 258: Performed by: NURSE PRACTITIONER

## 2017-12-08 PROCEDURE — 74011000250 HC RX REV CODE- 250: Performed by: HOSPITALIST

## 2017-12-08 PROCEDURE — 80048 BASIC METABOLIC PNL TOTAL CA: CPT | Performed by: HOSPITALIST

## 2017-12-08 PROCEDURE — 82962 GLUCOSE BLOOD TEST: CPT

## 2017-12-08 PROCEDURE — 85025 COMPLETE CBC W/AUTO DIFF WBC: CPT | Performed by: HOSPITALIST

## 2017-12-08 PROCEDURE — 74011000250 HC RX REV CODE- 250: Performed by: NURSE PRACTITIONER

## 2017-12-08 PROCEDURE — 74011250637 HC RX REV CODE- 250/637: Performed by: NURSE PRACTITIONER

## 2017-12-08 PROCEDURE — 65270000029 HC RM PRIVATE

## 2017-12-08 PROCEDURE — 77030020253 HC SOL INJ D545NS .05 DEX .45 SAL

## 2017-12-08 PROCEDURE — 74011250636 HC RX REV CODE- 250/636: Performed by: FAMILY MEDICINE

## 2017-12-08 PROCEDURE — 74011250637 HC RX REV CODE- 250/637: Performed by: FAMILY MEDICINE

## 2017-12-08 PROCEDURE — 74011000250 HC RX REV CODE- 250

## 2017-12-08 PROCEDURE — 74011250637 HC RX REV CODE- 250/637: Performed by: HOSPITALIST

## 2017-12-08 PROCEDURE — 77030019604 HC DRSG WND CA ALG S&N -A

## 2017-12-08 PROCEDURE — 74011250636 HC RX REV CODE- 250/636: Performed by: HOSPITALIST

## 2017-12-08 RX ORDER — DOCUSATE SODIUM 50 MG/5ML
100 LIQUID ORAL DAILY
Status: DISCONTINUED | OUTPATIENT
Start: 2017-12-08 | End: 2017-12-08

## 2017-12-08 RX ORDER — METOCLOPRAMIDE HYDROCHLORIDE 5 MG/ML
10 INJECTION INTRAMUSCULAR; INTRAVENOUS EVERY 6 HOURS
Status: DISCONTINUED | OUTPATIENT
Start: 2017-12-08 | End: 2017-12-12

## 2017-12-08 RX ORDER — FAMOTIDINE 20 MG/1
20 TABLET, FILM COATED ORAL DAILY
Status: DISCONTINUED | OUTPATIENT
Start: 2017-12-09 | End: 2017-12-08

## 2017-12-08 RX ORDER — DOCUSATE SODIUM 50 MG/5ML
100 LIQUID ORAL DAILY
Status: DISCONTINUED | OUTPATIENT
Start: 2017-12-08 | End: 2017-12-16 | Stop reason: HOSPADM

## 2017-12-08 RX ORDER — DEXTROSE 50 % IN WATER (D50W) INTRAVENOUS SYRINGE
25
Status: COMPLETED | OUTPATIENT
Start: 2017-12-08 | End: 2017-12-08

## 2017-12-08 RX ORDER — POLYETHYLENE GLYCOL 3350 17 G/17G
17 POWDER, FOR SOLUTION ORAL DAILY
Status: DISCONTINUED | OUTPATIENT
Start: 2017-12-09 | End: 2017-12-16 | Stop reason: HOSPADM

## 2017-12-08 RX ORDER — FAMOTIDINE 10 MG/ML
20 INJECTION INTRAVENOUS EVERY 12 HOURS
Status: DISCONTINUED | OUTPATIENT
Start: 2017-12-08 | End: 2017-12-13 | Stop reason: CLARIF

## 2017-12-08 RX ORDER — NYSTATIN 100000 [USP'U]/G
POWDER TOPICAL 2 TIMES DAILY
Status: DISCONTINUED | OUTPATIENT
Start: 2017-12-08 | End: 2017-12-08

## 2017-12-08 RX ORDER — HYDROMORPHONE HCL IN 0.9% NACL 50 MG/50ML
1 PLASTIC BAG, INJECTION (ML) INJECTION
Status: DISCONTINUED | OUTPATIENT
Start: 2017-12-08 | End: 2017-12-16 | Stop reason: HOSPADM

## 2017-12-08 RX ORDER — GUAIFENESIN 100 MG/5ML
81 LIQUID (ML) ORAL DAILY
Status: DISCONTINUED | OUTPATIENT
Start: 2017-12-09 | End: 2017-12-16 | Stop reason: HOSPADM

## 2017-12-08 RX ORDER — GABAPENTIN 400 MG/1
400 CAPSULE ORAL 2 TIMES DAILY
Status: DISCONTINUED | OUTPATIENT
Start: 2017-12-08 | End: 2017-12-16 | Stop reason: HOSPADM

## 2017-12-08 RX ORDER — LEVOTHYROXINE SODIUM 75 UG/1
75 TABLET ORAL
Status: DISCONTINUED | OUTPATIENT
Start: 2017-12-09 | End: 2017-12-16 | Stop reason: HOSPADM

## 2017-12-08 RX ORDER — LANOLIN ALCOHOL/MO/W.PET/CERES
0.4 CREAM (GRAM) TOPICAL DAILY
Status: DISCONTINUED | OUTPATIENT
Start: 2017-12-09 | End: 2017-12-16 | Stop reason: HOSPADM

## 2017-12-08 RX ORDER — PANTOPRAZOLE SODIUM 40 MG/10ML
40 INJECTION, POWDER, LYOPHILIZED, FOR SOLUTION INTRAVENOUS EVERY 24 HOURS
Status: DISCONTINUED | OUTPATIENT
Start: 2017-12-08 | End: 2017-12-08 | Stop reason: CLARIF

## 2017-12-08 RX ORDER — NYSTATIN 100000 [USP'U]/G
POWDER TOPICAL 2 TIMES DAILY
Status: DISCONTINUED | OUTPATIENT
Start: 2017-12-08 | End: 2017-12-16 | Stop reason: HOSPADM

## 2017-12-08 RX ORDER — FUROSEMIDE 40 MG/1
40 TABLET ORAL DAILY
Status: DISCONTINUED | OUTPATIENT
Start: 2017-12-09 | End: 2017-12-16 | Stop reason: HOSPADM

## 2017-12-08 RX ORDER — DEXTROSE 50 % IN WATER (D50W) INTRAVENOUS SYRINGE
Status: COMPLETED
Start: 2017-12-08 | End: 2017-12-08

## 2017-12-08 RX ADMIN — PANCRELIPASE 1 CAPSULE: 10000; 34000; 55000 CAPSULE, DELAYED RELEASE ORAL at 18:24

## 2017-12-08 RX ADMIN — FOLIC ACID TAB 400 MCG 0.4 MG: 400 TAB at 09:30

## 2017-12-08 RX ADMIN — GABAPENTIN 400 MG: 400 CAPSULE ORAL at 09:30

## 2017-12-08 RX ADMIN — TRAMADOL HYDROCHLORIDE 50 MG: 50 TABLET, FILM COATED ORAL at 20:48

## 2017-12-08 RX ADMIN — ASPIRIN 81 MG 81 MG: 81 TABLET ORAL at 09:30

## 2017-12-08 RX ADMIN — INSULIN LISPRO 2 UNITS: 100 INJECTION, SOLUTION INTRAVENOUS; SUBCUTANEOUS at 18:37

## 2017-12-08 RX ADMIN — METOCLOPRAMIDE 10 MG: 5 INJECTION, SOLUTION INTRAMUSCULAR; INTRAVENOUS at 13:23

## 2017-12-08 RX ADMIN — POLYETHYLENE GLYCOL 3350 17 G: 17 POWDER, FOR SOLUTION ORAL at 09:31

## 2017-12-08 RX ADMIN — MEROPENEM 1 G: 1 INJECTION, POWDER, FOR SOLUTION INTRAVENOUS at 20:23

## 2017-12-08 RX ADMIN — DEXTROSE MONOHYDRATE AND SODIUM CHLORIDE 125 ML/HR: 5; .45 INJECTION, SOLUTION INTRAVENOUS at 16:53

## 2017-12-08 RX ADMIN — FUROSEMIDE 40 MG: 40 TABLET ORAL at 09:30

## 2017-12-08 RX ADMIN — DEXTROSE 50 % IN WATER (D50W) INTRAVENOUS SYRINGE 25 G: at 21:11

## 2017-12-08 RX ADMIN — Medication 1 MG: at 13:26

## 2017-12-08 RX ADMIN — HEPARIN SODIUM 5000 UNITS: 5000 INJECTION, SOLUTION INTRAVENOUS; SUBCUTANEOUS at 13:22

## 2017-12-08 RX ADMIN — METOCLOPRAMIDE 5 MG: 5 INJECTION, SOLUTION INTRAMUSCULAR; INTRAVENOUS at 05:25

## 2017-12-08 RX ADMIN — PANCRELIPASE 1 CAPSULE: 10000; 34000; 55000 CAPSULE, DELAYED RELEASE ORAL at 09:30

## 2017-12-08 RX ADMIN — MEROPENEM 1 G: 1 INJECTION, POWDER, FOR SOLUTION INTRAVENOUS at 04:32

## 2017-12-08 RX ADMIN — METOCLOPRAMIDE 5 MG: 5 INJECTION, SOLUTION INTRAMUSCULAR; INTRAVENOUS at 00:06

## 2017-12-08 RX ADMIN — TRAMADOL HYDROCHLORIDE 50 MG: 50 TABLET, FILM COATED ORAL at 04:33

## 2017-12-08 RX ADMIN — TRAMADOL HYDROCHLORIDE 50 MG: 50 TABLET, FILM COATED ORAL at 18:24

## 2017-12-08 RX ADMIN — DEXTROSE MONOHYDRATE AND SODIUM CHLORIDE 125 ML/HR: 5; .45 INJECTION, SOLUTION INTRAVENOUS at 06:45

## 2017-12-08 RX ADMIN — HEPARIN SODIUM 5000 UNITS: 5000 INJECTION, SOLUTION INTRAVENOUS; SUBCUTANEOUS at 04:33

## 2017-12-08 RX ADMIN — PANTOPRAZOLE SODIUM 40 MG: 40 TABLET, DELAYED RELEASE ORAL at 09:30

## 2017-12-08 RX ADMIN — PANCRELIPASE 1 CAPSULE: 10000; 34000; 55000 CAPSULE, DELAYED RELEASE ORAL at 13:21

## 2017-12-08 RX ADMIN — LEVOTHYROXINE SODIUM 75 MCG: 75 TABLET ORAL at 06:57

## 2017-12-08 RX ADMIN — FAMOTIDINE 20 MG: 20 TABLET, FILM COATED ORAL at 09:30

## 2017-12-08 RX ADMIN — HEPARIN SODIUM 5000 UNITS: 5000 INJECTION, SOLUTION INTRAVENOUS; SUBCUTANEOUS at 20:23

## 2017-12-08 RX ADMIN — METOCLOPRAMIDE 10 MG: 5 INJECTION, SOLUTION INTRAMUSCULAR; INTRAVENOUS at 18:24

## 2017-12-08 RX ADMIN — MEROPENEM 1 G: 1 INJECTION, POWDER, FOR SOLUTION INTRAVENOUS at 13:25

## 2017-12-08 RX ADMIN — NYSTATIN: 100000 POWDER TOPICAL at 23:52

## 2017-12-08 RX ADMIN — GABAPENTIN 400 MG: 400 CAPSULE ORAL at 20:23

## 2017-12-08 RX ADMIN — SODIUM CHLORIDE 1250 MG: 900 INJECTION, SOLUTION INTRAVENOUS at 11:24

## 2017-12-08 RX ADMIN — DEXTROSE MONOHYDRATE 25 G: 25 INJECTION, SOLUTION INTRAVENOUS at 21:11

## 2017-12-08 RX ADMIN — FAMOTIDINE 20 MG: 10 INJECTION, SOLUTION INTRAVENOUS at 20:28

## 2017-12-08 RX ADMIN — DEXTROSE MONOHYDRATE AND SODIUM CHLORIDE 125 ML/HR: 5; .45 INJECTION, SOLUTION INTRAVENOUS at 00:07

## 2017-12-08 NOTE — PROGRESS NOTES
0331 Bedside and Verbal shift change report given to Lina Felipe RN (oncoming nurse) by LANDEN Shetty (offgoing nurse). Report included the following information SBAR, Kardex and MAR.     0930 Due meds administered via peg tube tolerated, residual checked with 5 ml noted at this time. Shift assessment completed       1130 Site care done to peg tube site, barrier cream applied  and changed with new with new antimicrobial drain sponge. Pt tolerated with no complaint. 1547 Peg tube site continue with greenish drainage. Site care done,  barrier cream applied  and changed with new with new antimicrobial drain sponge. Pt tolerated with no complaint. 1841 Nystatin powder not available in pt personal bin or pyxis to administer pharm to supply. 1925 Bedside and Verbal shift change report given to Jason Bar RN (oncoming nurse) by Lina Felipe RN (offgoing nurse). Report included the following information SBAR, Kardex and MAR.

## 2017-12-08 NOTE — PROGRESS NOTES
Pharmacy adjusting dose for Meropenem (Merrem) per renal protocol. Was on a regimen of: 1 gm IV q12h. Labs:   Serum Creatinine - 1.06 mg/dl  CrCl - 54.8 ml/min    Plan:  Renal function improved, changed Meropenem to 1 gm IV q8h. Pharmacy to follow and will redose based on renal function changes. Thanks.

## 2017-12-08 NOTE — PROGRESS NOTES
Shift progress Note:  Assumed care of patient in bed awake, no complaints offered. Low grade temperature x1  Tolerating peg tube feeds with leakage around peg tube site x1 of bilious contents, otherwise uneventful night. Complained of  Abdominal pain x1. Eliminated one stool on bedside commode Medicated and now sleeping. No s/s of distress or discomfort. Call bell within reach.   Patient Vitals for the past 12 hrs:   Temp Pulse Resp BP SpO2   12/08/17 0346 98 °F (36.7 °C) (!) 105 18 131/89 100 %   12/08/17 0000 99 °F (37.2 °C) 99 16 (!) 146/99 100 %   12/07/17 1944 98.7 °F (37.1 °C) 99 18 136/89 100 %   12/07/17 1845 98.6 °F (37 °C) 100 18 129/84 100 %

## 2017-12-08 NOTE — PROGRESS NOTES
Problem: Falls - Risk of  Goal: *Absence of Falls  Document Kapil Fall Risk and appropriate interventions in the flowsheet.    Outcome: Progressing Towards Goal  Fall Risk Interventions:  Mobility Interventions: Patient to call before getting OOB, Utilize walker, cane, or other assitive device         Medication Interventions: Patient to call before getting OOB, Teach patient to arise slowly    Elimination Interventions: Call light in reach    History of Falls Interventions: Door open when patient unattended

## 2017-12-08 NOTE — PROGRESS NOTES
[address] [place] [address] primary care of the patient     [phone] MD Mittal made aware of liquid green bile leaking around PEG tube site, instructed to continue reglan regime, reduced rate to 30mL, apply barrier ointment around PEG tube site, check residual Qshift and report to MD if residual greater than 200 mL, made mentioned will be up later to check on patient

## 2017-12-08 NOTE — PROGRESS NOTES
Gastrointestinal Progress Note    Patient Name: Tanya HARRINGTON Date: 12/8/2017    Admit Date: 12/5/2017      Assessment:   1. PEG-J feeding tube deterioration with external leakage complicated by migration of internal balloon into the stomal tract that could very well be the cause of her acute PEG stomal pain. CT without evidence of abdominal wall abscess or free intraperitoneal air. GJ feeding tube removed and replaced with balloon type PEG tube yesterday. Having some bilious drainage from PEG stoma. 2.  Hx of throat CA with proximal esophageal obstruction and severe OP dysphagia requiring PEG tube feedings. Hx of chemoradiation. 3.  Suspected DM gastroparesis requiring PEG change to PEG-J device last 8/2017 at [facility]. Recommendation:   1.   TF rate decreased to 30 mL/hour and advance slowly to goal rate. 2.   Reglan 5 mg IV every 6 hours. 3.   RN to check G-tube residuals every shift and report if > 200 mL. 4.   If she is not tolerating G-tube feedings then I spoke to IR and they can change over to a G-J feeding tube under fluoro guidance on Monday. Subjective:   She is not having any pain but with bilious stomal drainage requiring dressing changes and application of Desitin ointment. TF rate reduced to 30 mL/hr and Reglan 5 mg IV every 6 hours started this morning. No vomiting, gagging or signs of aspiration. No chest pain or SOB.     Current Facility-Administered Medications   Medication Dose Route Frequency    meropenem (MERREM) 1 g in sterile water (preservative free) 20 mL IV syringe  1 g IntraVENous Q8H    vancomycin (VANCOCIN) 1,250 mg in 0.9% sodium chloride 250 mL IVPB  1,250 mg IntraVENous Q18H    [START ON 12/9/2017] VANCOMYCIN INFORMATION NOTE   Other ONCE    HYDROmorphone in NS (DILAUDID) injection 1 mg  1 mg IntraVENous Q4H PRN    metoclopramide HCl (REGLAN) injection 10 mg  10 mg IntraVENous Q6H    [START ON 12/9/2017] aspirin chewable tablet 81 mg  81 mg Per G Tube DAILY    docusate (COLACE) 50 mg/5 mL oral liquid 100 mg  100 mg Per G Tube DAILY    [START ON 45/7/6629] folic acid tablet 0.4 mg  0.4 mg Oral DAILY    [START ON 12/9/2017] furosemide (LASIX) tablet 40 mg  40 mg Per G Tube DAILY    gabapentin (NEURONTIN) capsule 400 mg  400 mg Per G Tube BID    [START ON 12/9/2017] levothyroxine (SYNTHROID) tablet 75 mcg  75 mcg Per G Tube Daily    lipase-protease-amylase (ZENPEP 10,000) capsule 1 Cap  1 Cap Per G Tube TID WITH MEALS    [START ON 12/9/2017] polyethylene glycol (MIRALAX) packet 17 g  17 g Per G Tube DAILY    famotidine (PF) (PEPCID) injection 20 mg  20 mg IntraVENous Q12H    0.9% sodium chloride infusion 250 mL  250 mL IntraVENous PRN    insulin lispro (HUMALOG) injection   SubCUTAneous Q6H    naloxone (NARCAN) injection 0.4 mg  0.4 mg IntraVENous PRN    ondansetron (ZOFRAN) injection 4 mg  4 mg IntraVENous Q4H PRN    heparin (porcine) injection 5,000 Units  5,000 Units SubCUTAneous Q8H    cetirizine (ZYRTEC) tablet 10 mg  10 mg Oral DAILY PRN    sodium chloride (OCEAN) 0.65 % nasal spray 2 Spray  2 Spray Both Nostrils PRN    glucose chewable tablet 16 g  4 Tab Oral PRN    glucagon (GLUCAGEN) injection 1 mg  1 mg IntraMUSCular PRN    diphenhydrAMINE (BENADRYL) capsule 25 mg  25 mg Oral Q6H PRN    traMADol (ULTRAM) tablet 50 mg  50 mg Oral Q6H PRN    VANCOMYCIN INFORMATION NOTE   Other Rx Dosing/Monitoring    acetaminophen (TYLENOL) tablet 650 mg  650 mg Oral Q6H PRN    dextrose 5 % - 0.45% NaCl infusion  125 mL/hr IntraVENous CONTINUOUS          Objective:     Visit Vitals    /71 (BP 1 Location: Right arm, BP Patient Position: At rest)    Pulse 95    Temp 98.6 °F (37 °C)    Resp 16    Ht 5' 4\" (1.626 m)    Wt 57.2 kg (126 lb)    SpO2 100%    Breastfeeding No    BMI 21.63 kg/m2           Intake/Output Summary (Last 24 hours) at 12/08/17 1601  Last data filed at 12/08/17 1158   Gross per 24 hour   Intake          2198.13 ml   Output             2000 ml   Net           198.13 ml       Examination:  Chronically ill appearing. Abdomen with PEG tube in position and peristomal area coated with Desitin ointment. Nontender, no rebound or guarding, no mass. Warm extremities,  2+ pulses. Data Review:    Labs: Results:   Chemistry Recent Labs      12/08/17 0427 12/07/17 0440  12/06/17   0841  12/05/17   2133   GLU  109*  81  99  174*   NA  138  137  139  134*   K  3.7  4.5  4.2  4.1   CL  106  105  103  96*   CO2  24  24  28  31   BUN  24*  28*  32*  38*   CREA  1.06  1.45*  1.34*  1.39*   CA  8.0*  7.6*  7.7*  8.8   AGAP  8  8  8  7   BUCR  23*  19  24*  27*   AP   --    --    --   104   TP   --    --    --   9.7*   ALB   --    --    --   3.1*   GLOB   --    --    --   6.6*   AGRAT   --    --    --   0.5*    Estimated Creatinine Clearance: 54.8 mL/min (based on Cr of 1.06). CBC w/Diff Recent Labs      12/08/17 0427 12/07/17 0440  12/06/17   0841   WBC  13.9*  14.5*  16.0*   RBC  2.95*  2.03*  2.24*   HGB  8.7*  6.0*  6.7*   HCT  26.6*  19.5*  21.3*   PLT  155  166  166   GRANS  83*  90*  98*   LYMPH  7*  5*  2*   EOS  8*  4  0      Coagulation Recent Labs      12/06/17   0841   PTP  14.1   INR  1.1       Hepatitis Panel No results found for: HAMAT, HAAB, HABT, HAAT, HBSAG, HBSB, HBSAT, HBABN, HBCM, HBCAB, HBCAT, XBCABS, HBEAB, HBEAG, XHEPCS, [id number], HBEGLT, HBCMLT, HBCLT, HBEBLT, [id number], [id number], HAVMLT, [id number], HBCMLT, [id number], HCGAT   Amylase Lipase . Liver Enzymes Recent Labs      12/05/17 2133   TP  9.7*   ALB  3.1*   AP  104   SGOT  36   ALT  38      Thyroid Studies No results for input(s): T4, T3U, TSH, TSHEXT in the last 72 hours. No lab exists for component: T3RU     Pathology pathology       Michael Howell MD, [address]  Pager: [phone]  December 8, 2017

## 2017-12-08 NOTE — PROGRESS NOTES
Pharmacy Dosing Services: Vancomycin    Indication: Central nervous system infection    Day of therapy: 2    Other Antimicrobials (Include dose, start day & day of therapy):  Merrem 1g IV q8-      Loading dose (date given): 1750 mg -   Current Maintenance dose: 1250mg IV q 24h    Goal Vancomycin Level: 15-20  (Trough 15-20 for most infections, 20 for meningitis/osteomyelitis, pre-HD level ~25)    Vancomycin Level (if drawn): none at this time     Significant Cultures:    blood x 2- NGTD x 1   Sputum: GPC, GNR    Renal function stable? (unstable defined as SCr increase of 0.5 mg/dL or > 50% increase from baseline, whichever is greater) (Y/N): Y    CAPD, Hemodialysis or Renal Replacement Therapy (Y/N): N     Recent Labs      17   0427  17   0440  17   0841   CREA  1.06  1.45*  1.34*   BUN  24*  28*  32*   WBC  13.9*  14.5*  16.0*     Temp (24hrs), Av.9 °F (37.2 °C), Min:98 °F (36.7 °C), Max:100.2 °F (37.9 °C)    Creatinine Clearance (Creatinine Clearance (ml/min)): 54.8 ml/min     Regimen assessment: renal fxn improvement; will increase frequency to q18h  Maintenance dose: 1250mg IV q18h  Next scheduled level: early trough on  prior to 2300 dose       Pharmacy will follow daily and adjust medications as appropriate for renal function and/or serum levels.     Thank you,  Janet Glover, PHARMD

## 2017-12-08 NOTE — PROGRESS NOTES
Discharge plan is to return home with home care as indicated. Discussed during rounds. Discharge not anticipated this weekend. Continue to C/O leaking at PEG tube site.  Soto Burton RN

## 2017-12-08 NOTE — PROGRESS NOTES
2 [facility]  Hospitalist Division        Inpatient Daily Progress Note    Daily progress Note    Patient: Jonathan Sesay MRN: [id number]  CSN: [id number]    YOB: 1965  Age: 50 y.o. Sex: female    DOA: 12/5/2017 LOS:  LOS: 2 days                    Chief Complaint:  Abdominal pain at PEG site       Subjective:      C/o leaking around PEG site. Objective:      Visit Vitals    /77 (BP 1 Location: Right arm, BP Patient Position: At rest)    Pulse (!) 107    Temp 98.7 °F (37.1 °C)    Resp 16    Ht 5' 4\" (1.626 m)    Wt 57.2 kg (126 lb)    SpO2 99%    Breastfeeding No    BMI 21.63 kg/m2           Physical Exam:  General appearance: alert, cooperative, no distress, appears stated age  Lungs: clear to auscultation throughout, no wheezes   Heart: regular rate and rhythm, S1, S2 normal, no murmur, click, rub or gallop  Abdomen: soft, non distended. Mildly TTP.  PEG infusing- dressing around site saturated with tube feeding and bilious drainage   Extremities: extremities normal, atraumatic, no cyanosis or edema  Skin: Skin color, texture, turgor normal. No rashes or lesions  Neurologic: moves all 4 extremities equally  PSY: appropriately behaved      Intake and Output:  Current Shift:     Last three shifts:  12/06 1901 - 12/08 0700  In: 4918.1 [I.V.:3563.3]  Out: 2726 [Urine:2725]    Recent Results (from the past 24 hour(s))   GLUCOSE, POC    Collection Time: 12/07/17 11:30 AM   Result Value Ref Range    Glucose (POC) 99 70 - 110 mg/dL   CULTURE, RESPIRATORY/SPUTUM/BRONCH W GRAM STAIN    Collection Time: 12/07/17 12:00 PM   Result Value Ref Range    Special Requests: NO SPECIAL REQUESTS      GRAM STAIN >25 WBC/lpf      GRAM STAIN >25 EPI/lpf      GRAM STAIN MUCUS PRESENT      GRAM STAIN FEW GRAM POSITIVE COCCI IN PAIRS      GRAM STAIN RARE GRAM NEGATIVE RODS      Culture result: PENDING    GLUCOSE, POC    Collection Time: 12/07/17  3:56 PM   Result Value Ref Range    Glucose (POC) 73 70 - 110 mg/dL   GLUCOSE, POC    Collection Time: 12/07/17  9:44 PM   Result Value Ref Range    Glucose (POC) 98 70 - 110 mg/dL   CBC WITH AUTOMATED DIFF    Collection Time: 12/08/17  4:27 AM   Result Value Ref Range    WBC 13.9 (H) 4.6 - 13.2 K/uL    RBC 2.95 (L) 4.20 - 5.30 M/uL    HGB 8.7 (L) 12.0 - 16.0 g/dL    HCT 26.6 (L) 35.0 - 45.0 %    MCV 90.2 74.0 - 97.0 FL    MCH 29.5 24.0 - 34.0 PG    MCHC 32.7 31.0 - 37.0 g/dL    RDW 17.4 (H) 11.6 - 14.5 %    PLATELET 387 329 - 336 K/uL    MPV 11.8 9.2 - 11.8 FL    NEUTROPHILS 83 (H) 40 - 73 %    LYMPHOCYTES 7 (L) 21 - 52 %    MONOCYTES 2 (L) 3 - 10 %    EOSINOPHILS 8 (H) 0 - 5 %    BASOPHILS 0 0 - 2 %    ABS. NEUTROPHILS 11.5 (H) 1.8 - 8.0 K/UL    ABS. LYMPHOCYTES 0.9 0.9 - 3.6 K/UL    ABS. MONOCYTES 0.3 0.05 - 1.2 K/UL    ABS. EOSINOPHILS 1.2 (H) 0.0 - 0.4 K/UL    ABS.  BASOPHILS 0.0 0.0 - 0.06 K/UL    DF AUTOMATED     METABOLIC PANEL, BASIC    Collection Time: 12/08/17  4:27 AM   Result Value Ref Range    Sodium 138 136 - 145 mmol/L    Potassium 3.7 3.5 - 5.5 mmol/L    Chloride 106 100 - 108 mmol/L    CO2 24 21 - 32 mmol/L    Anion gap 8 3.0 - 18 mmol/L    Glucose 109 (H) 74 - 99 mg/dL    BUN 24 (H) 7.0 - 18 MG/DL    Creatinine 1.06 0.6 - 1.3 MG/DL    BUN/Creatinine ratio 23 (H) 12 - 20      GFR est AA >60 >60 ml/min/1.73m2    GFR est non-AA 55 (L) >60 ml/min/1.73m2    Calcium 8.0 (L) 8.5 - 10.1 MG/DL   GLUCOSE, POC    Collection Time: 12/08/17  5:22 AM   Result Value Ref Range    Glucose (POC) 123 (H) 70 - 110 mg/dL           Lab Results   Component Value Date/Time    Glucose 109 12/08/2017 04:27 AM    Glucose 81 12/07/2017 04:40 AM    Glucose 99 12/06/2017 08:41 AM    Glucose 174 12/05/2017 09:33 PM    Glucose 100 10/23/2017 05:05 AM        Assessment/Plan:     Patient Active Problem List   Diagnosis Code    Hypertension I10    Diabetes mellitus ([facility] [address] E11.9    Dysphagia R13.10    History of throat cancer Z85.819    KEATON (acute kidney injury) ([facility] [address] N17.9    Abdominal wall cellulitis L03.311    G tube feedings ([facility]) Z93.1    Anemia D64.9    Hypothyroid E03.9       A/P:  Iona Barnett is a 50 y.o. female who presents with abdominal pain around her feeding tube onset 2 days. She reports the pain as severe. She denies fever. Pain reports having similar problem in the past when the feeding tube was dislodged. In the ED she was given dilaudid for pain and had respiratory distress requiring narcan. She otherwise denies chest pain , sob. She on on chronic home O2 3 L NC. GI consulted on 12/7- PEG tube replaced. Tube feedings resumed with IV Reglan 5 mg q6h after gastrograffin study confirmed tube position. Patient with PEG site leaking evening/overnight 12/7-12/8. Tube feedings decreased to 30 mL/hr and IV Reglan increased to 10 mg IV q6h. GI to see patient afternoon 12/8 for possible percutaneous endoscopic guided replacement to a G-J feeding tube device.       - Leukocytosis- improving. ? Abdominal wall cellulitis- continue Gwenevere Ekaterina. GI following  - PEG tube leaking- discussed with GI- decrease tube feeds, increase Reglan- will see this afternoon    - Blood cultures- no growth to date- continue to follow. Urine culture collected- no preliminary result as of yet- follow   - CXR 12/6/17- reviewed- Improved appearance to previously demonstrated upper lung predominant alveolar opacities, without focal pneumonic consolidation, pneumothorax, or pleural effusion. Sputum culture ordered- hx pseudomonas and MRSA   - Hx throat cancer  - Anemia of chronic disease likely 2/2 above: Hgb 6.0- transfuse 2 units PRBC.  Monitor CBC  - Dysphagia 2/2 above: Nutrition consult placed for resumption of tube feeds  - Chronic Hypoxic Respiratory failure: continuous home 02 at 3L  - DM: SSI   - Hypothyroidism: levothyroxine   - Constipation: Colace, Miralax   - DVT prophylaxis: Heparin           CLIFTON García-[address] [facility]  Hospitalist Division  Pager:  [phone]  Office:  [phone]

## 2017-12-08 NOTE — ROUTINE PROCESS
Bedside and Verbal shift change report given to [address] [place] [address], [address] (oncoming nurse) by Bryan Gresham RN   (offgoing nurse). Report included the following information SBAR, Kardex, MAR and Recent Results.

## 2017-12-09 LAB
ANION GAP SERPL CALC-SCNC: 8 MMOL/L (ref 3–18)
BACTERIA SPEC CULT: NORMAL
BASOPHILS # BLD: 0 K/UL (ref 0–0.06)
BASOPHILS NFR BLD: 0 % (ref 0–2)
BUN SERPL-MCNC: 18 MG/DL (ref 7–18)
BUN/CREAT SERPL: 21 (ref 12–20)
CALCIUM SERPL-MCNC: 7.6 MG/DL (ref 8.5–10.1)
CHLORIDE SERPL-SCNC: 106 MMOL/L (ref 100–108)
CO2 SERPL-SCNC: 25 MMOL/L (ref 21–32)
CREAT SERPL-MCNC: 0.85 MG/DL (ref 0.6–1.3)
DATE LAST DOSE: ABNORMAL
DIFFERENTIAL METHOD BLD: ABNORMAL
EOSINOPHIL # BLD: 1.5 K/UL (ref 0–0.4)
EOSINOPHIL NFR BLD: 16 % (ref 0–5)
ERYTHROCYTE [DISTWIDTH] IN BLOOD BY AUTOMATED COUNT: 16.6 % (ref 11.6–14.5)
GLUCOSE BLD STRIP.AUTO-MCNC: 117 MG/DL (ref 70–110)
GLUCOSE BLD STRIP.AUTO-MCNC: 124 MG/DL (ref 70–110)
GLUCOSE BLD STRIP.AUTO-MCNC: 155 MG/DL (ref 70–110)
GLUCOSE SERPL-MCNC: 122 MG/DL (ref 74–99)
HCT VFR BLD AUTO: 26.4 % (ref 35–45)
HGB BLD-MCNC: 8.6 G/DL (ref 12–16)
LYMPHOCYTES # BLD: 1.1 K/UL (ref 0.9–3.6)
LYMPHOCYTES NFR BLD: 12 % (ref 21–52)
MCH RBC QN AUTO: 29.5 PG (ref 24–34)
MCHC RBC AUTO-ENTMCNC: 32.6 G/DL (ref 31–37)
MCV RBC AUTO: 90.4 FL (ref 74–97)
MONOCYTES # BLD: 0.4 K/UL (ref 0.05–1.2)
MONOCYTES NFR BLD: 4 % (ref 3–10)
NEUTS SEG # BLD: 6.3 K/UL (ref 1.8–8)
NEUTS SEG NFR BLD: 68 % (ref 40–73)
PLATELET # BLD AUTO: 161 K/UL (ref 135–420)
PMV BLD AUTO: 11.9 FL (ref 9.2–11.8)
POTASSIUM SERPL-SCNC: 3.5 MMOL/L (ref 3.5–5.5)
RBC # BLD AUTO: 2.92 M/UL (ref 4.2–5.3)
REPORTED DOSE,DOSE: 1250 UNITS
REPORTED DOSE/TIME,TMG: 1100
SERVICE CMNT-IMP: NORMAL
SODIUM SERPL-SCNC: 139 MMOL/L (ref 136–145)
VANCOMYCIN TROUGH SERPL-MCNC: 31.8 UG/ML (ref 10–20)
WBC # BLD AUTO: 9.2 K/UL (ref 4.6–13.2)

## 2017-12-09 PROCEDURE — 74011000250 HC RX REV CODE- 250: Performed by: HOSPITALIST

## 2017-12-09 PROCEDURE — 80048 BASIC METABOLIC PNL TOTAL CA: CPT | Performed by: HOSPITALIST

## 2017-12-09 PROCEDURE — 36415 COLL VENOUS BLD VENIPUNCTURE: CPT | Performed by: HOSPITALIST

## 2017-12-09 PROCEDURE — 74011000258 HC RX REV CODE- 258: Performed by: NURSE PRACTITIONER

## 2017-12-09 PROCEDURE — 77030020253 HC SOL INJ D545NS .05 DEX .45 SAL

## 2017-12-09 PROCEDURE — 82962 GLUCOSE BLOOD TEST: CPT

## 2017-12-09 PROCEDURE — 74011250636 HC RX REV CODE- 250/636: Performed by: NURSE PRACTITIONER

## 2017-12-09 PROCEDURE — 85025 COMPLETE CBC W/AUTO DIFF WBC: CPT | Performed by: HOSPITALIST

## 2017-12-09 PROCEDURE — 74011000258 HC RX REV CODE- 258: Performed by: HOSPITALIST

## 2017-12-09 PROCEDURE — 74011000250 HC RX REV CODE- 250: Performed by: NURSE PRACTITIONER

## 2017-12-09 PROCEDURE — 74011250636 HC RX REV CODE- 250/636: Performed by: HOSPITALIST

## 2017-12-09 PROCEDURE — 80202 ASSAY OF VANCOMYCIN: CPT | Performed by: HOSPITALIST

## 2017-12-09 PROCEDURE — 77030020245 HC SOL INJ 5% D/0.9%NACL

## 2017-12-09 PROCEDURE — 74011636637 HC RX REV CODE- 636/637: Performed by: HOSPITALIST

## 2017-12-09 PROCEDURE — 74011250637 HC RX REV CODE- 250/637: Performed by: NURSE PRACTITIONER

## 2017-12-09 PROCEDURE — 74011250636 HC RX REV CODE- 250/636: Performed by: FAMILY MEDICINE

## 2017-12-09 PROCEDURE — 65270000029 HC RM PRIVATE

## 2017-12-09 RX ADMIN — METOCLOPRAMIDE 10 MG: 5 INJECTION, SOLUTION INTRAMUSCULAR; INTRAVENOUS at 11:54

## 2017-12-09 RX ADMIN — DOCUSATE SODIUM 100 MG: 50 LIQUID ORAL at 09:49

## 2017-12-09 RX ADMIN — FUROSEMIDE 40 MG: 40 TABLET ORAL at 09:35

## 2017-12-09 RX ADMIN — INSULIN LISPRO 2 UNITS: 100 INJECTION, SOLUTION INTRAVENOUS; SUBCUTANEOUS at 18:36

## 2017-12-09 RX ADMIN — Medication 1 MG: at 17:16

## 2017-12-09 RX ADMIN — PANCRELIPASE 1 CAPSULE: 10000; 34000; 55000 CAPSULE, DELAYED RELEASE ORAL at 10:42

## 2017-12-09 RX ADMIN — FAMOTIDINE 20 MG: 10 INJECTION, SOLUTION INTRAVENOUS at 09:49

## 2017-12-09 RX ADMIN — METOCLOPRAMIDE 10 MG: 5 INJECTION, SOLUTION INTRAMUSCULAR; INTRAVENOUS at 17:16

## 2017-12-09 RX ADMIN — Medication 1 MG: at 06:21

## 2017-12-09 RX ADMIN — SODIUM CHLORIDE: 234 INJECTION INTRAMUSCULAR; INTRAVENOUS; SUBCUTANEOUS at 11:54

## 2017-12-09 RX ADMIN — NYSTATIN: 100000 POWDER TOPICAL at 09:49

## 2017-12-09 RX ADMIN — NYSTATIN: 100000 POWDER TOPICAL at 21:28

## 2017-12-09 RX ADMIN — Medication 1 MG: at 23:25

## 2017-12-09 RX ADMIN — GABAPENTIN 400 MG: 400 CAPSULE ORAL at 21:22

## 2017-12-09 RX ADMIN — FAMOTIDINE 20 MG: 10 INJECTION, SOLUTION INTRAVENOUS at 21:21

## 2017-12-09 RX ADMIN — DEXTROSE MONOHYDRATE AND SODIUM CHLORIDE 125 ML/HR: 5; .45 INJECTION, SOLUTION INTRAVENOUS at 00:55

## 2017-12-09 RX ADMIN — MEROPENEM 1 G: 1 INJECTION, POWDER, FOR SOLUTION INTRAVENOUS at 06:06

## 2017-12-09 RX ADMIN — FOLIC ACID TAB 400 MCG 0.4 MG: 400 TAB at 09:49

## 2017-12-09 RX ADMIN — POLYETHYLENE GLYCOL 3350 17 G: 17 POWDER, FOR SOLUTION ORAL at 09:35

## 2017-12-09 RX ADMIN — ASPIRIN 81 MG 81 MG: 81 TABLET ORAL at 09:34

## 2017-12-09 RX ADMIN — HEPARIN SODIUM 5000 UNITS: 5000 INJECTION, SOLUTION INTRAVENOUS; SUBCUTANEOUS at 06:07

## 2017-12-09 RX ADMIN — HEPARIN SODIUM 5000 UNITS: 5000 INJECTION, SOLUTION INTRAVENOUS; SUBCUTANEOUS at 13:28

## 2017-12-09 RX ADMIN — MEROPENEM 1 G: 1 INJECTION, POWDER, FOR SOLUTION INTRAVENOUS at 13:29

## 2017-12-09 RX ADMIN — Medication 1 MG: at 01:51

## 2017-12-09 RX ADMIN — SODIUM CHLORIDE 1250 MG: 900 INJECTION, SOLUTION INTRAVENOUS at 06:07

## 2017-12-09 RX ADMIN — GABAPENTIN 400 MG: 400 CAPSULE ORAL at 09:35

## 2017-12-09 RX ADMIN — LEVOTHYROXINE SODIUM 75 MCG: 75 TABLET ORAL at 06:21

## 2017-12-09 RX ADMIN — MEROPENEM 1 G: 1 INJECTION, POWDER, FOR SOLUTION INTRAVENOUS at 21:23

## 2017-12-09 RX ADMIN — HEPARIN SODIUM 5000 UNITS: 5000 INJECTION, SOLUTION INTRAVENOUS; SUBCUTANEOUS at 21:22

## 2017-12-09 RX ADMIN — METOCLOPRAMIDE 10 MG: 5 INJECTION, SOLUTION INTRAMUSCULAR; INTRAVENOUS at 06:09

## 2017-12-09 NOTE — PROGRESS NOTES
NUTRITION follow-up/Plan of care  (Late entry Pt seen (12/8/2017)  RECOMMENDATIONS:   1. Enteral Nutrition: Goal Rate of Osmolite 1.2 @ 60 ml/hr (1728 Kcal, 80 g PRO and 1181 mL free water)  2. When IV fluids discontinued; Water Flushes: suggest 150 mL every 6 hours  3. Monitor TF, labs and weight. 4. RD to follow          GOALS:   1. Ongoing: PO intake meets >75% of protein/calorie needs by 12/11  2. Ongoing: Maintain weight (+/- 1-2 lb) by 12/15    ASSESSMENT:   Wt status is classified as normal per BMI of 21.6. TF currently on hold d/t procedure. Labs noted. Nutrition recommendations listed. RD to follow.        Nutrition Risk:  [x]   High []  Moderate [] Low    SUBJECTIVE/OBJECTIVE:   Pt admitted for abdominal wall cellulitis. PMHx of throat cancer, dysphagia, PEG/TF, DM, HTN and KEATON. Wt loss noted since last admission, but question accuracy of current wt recorded as unsure how obtained. Variable weights recorded (09/2017) at 125 lb, (10/2017) at 140 lb and (12/2017) 126 lb. Will obtain new weight. Abdomen and sacral wounds noted in chart. Pt confirmed wt fluctuation over last 6 months (between 140-125 lb) equaling a 10% wt change. Pt seen in bed (12/8) s/p PEG removal and replacement on (12/7) TF infusing Osm 1.2 @ 30 mL/hr. Stated the new PEG is still leaking so TF rate @ 30 mL/hr. Per Chart review (12/9) Dr. Vincent Dakins reported there was leakage around PEGJ - some greenish bile seen, not much of tube feeds. Recommended to continue tube feeds and if no improvement in leakage will need PEGJ placement by IR at a new site. Will monitor. Information Obtained From:   [x] Chart Review  [x] Patient  [] Family/Caregiver  [] Nurse/Physician   [] Patient Rounds/Interdisciplinary Meeting    Diet: NPO with TF: Osmolite 1.2 @ 60 mL/hr  Patient Vitals for the past 100 hrs:   % Diet Eaten   12/06/17 0830 0 %     Medications: [x] Reviewed   Encounter Diagnoses     ICD-10-CM ICD-9-CM   1.  Cellulitis, abdominal wall L03.311 682.2   2. Leukocytosis, unspecified type D72.829 288.60   3.  Non-intractable vomiting with nausea, unspecified vomiting type R11.2 787.01     Past Medical History:   Diagnosis Date    Anemia     Cancer ([facility]     throat    Coronary artery disease     Diabetes ([facility]     Diabetes mellitus ([facility]     ETOH abuse     HTN (hypertension)     Hypertension     Hypothyroid     Lupus     Osteonecrosis ([facility]     of jaw    PEG adjustment, replacement, or removal     S/P thoracentesis     Throat cancer ([facility] 2011    SCC of the nasopharynx, s/p chemo/radiation     Labs:  Lab Results   Component Value Date/Time    Sodium 139 12/09/2017 04:35 AM    Potassium 3.5 12/09/2017 04:35 AM    Chloride 106 12/09/2017 04:35 AM    CO2 25 12/09/2017 04:35 AM    Anion gap 8 12/09/2017 04:35 AM    Glucose 122 12/09/2017 04:35 AM    BUN 18 12/09/2017 04:35 AM    Creatinine 0.85 12/09/2017 04:35 AM    Calcium 7.6 12/09/2017 04:35 AM    Magnesium 2.4 12/05/2017 09:33 PM    Phosphorus 5.1 10/06/2017 04:30 AM    Albumin 3.1 12/05/2017 09:33 PM     Anthropometrics: BMI (calculated): 21.6 Last 3 Recorded Weights in this Encounter    12/05/17 1957 12/06/17 0941   Weight: 57.2 kg (126 lb) 57.2 kg (126 lb)    Ht Readings from Last 1 Encounters:   12/06/17 5' 4\" (1.626 m)     []  Weight Loss  []  Weight Gain  []  Weight Stable   [x]  New wt n/a on record     Estimated Nutrition Needs:   1728 Kcals/day (9626-3223 Kcal)  Protein (g): 80 g (70-85g)    Nutrition Problems Identified:   [x] Suboptimal PO intake   [] Food Allergies  [x] Difficulty chewing/swallowing/poor dentition  [] Constipation/Diarrhea   [] Nausea/Vomiting   [] None  [x] Other: Enteral nutrition via PEG     Plan:   [] Therapeutic Diet  []  Obtained/adjusted food preferences/tolerances and/or snacks options   []  Supplements added   [] Occupational therapy following for feeding techniques  []  HS snack added   []  Modify diet texture   []  Modify diet for food allergies []  Educate patient   []  Assist with menu selection   []  Monitor PO intake on meal rounds   [x]  Continue inpatient monitoring and intervention   []  Participated in discharge planning/Interdisciplinary rounds/Team meetings   [x]  Other:Enteral Nutrition recommendations listed     Education Needs:   [] Not appropriate for teaching at this time   [x] Identified and addressed    Nutrition Monitoring and Evaluation:   [x] Continue inpatient monitoring and interventions    [] Other:     Ezekiel Davis

## 2017-12-09 NOTE — PROGRESS NOTES
Problem: Falls - Risk of  Goal: *Absence of Falls  Document Kapil Fall Risk and appropriate interventions in the flowsheet.    Outcome: Progressing Towards Goal  Fall Risk Interventions:  Mobility Interventions: Patient to call before getting OOB         Medication Interventions: Patient to call before getting OOB    Elimination Interventions: Call light in reach, Patient to call for help with toileting needs    History of Falls Interventions: Door open when patient unattended

## 2017-12-09 NOTE — PROGRESS NOTES
2 [facility]  Hospitalist Division        Inpatient Daily Progress Note    Daily progress Note    Patient: Laqueta Olszewski MRN: [id number]  CSN: [id number]    YOB: 1965  Age: 50 y.o. Sex: female    DOA: 12/5/2017 LOS:  LOS: 3 days                    Chief Complaint:  Abdominal pain at PEG site       Subjective:      No complaints today, NAD    Objective:      Visit Vitals    /80 (BP 1 Location: Right arm, BP Patient Position: At rest)    Pulse 98    Temp 98.1 °F (36.7 °C)    Resp 18    Ht 5' 4\" (1.626 m)    Wt 57.2 kg (126 lb)    SpO2 98%    Breastfeeding No    BMI 21.63 kg/m2           Physical Exam:  General appearance: alert, cooperative, no distress, appears stated age  Lungs: clear to auscultation throughout, no wheezes   Heart: regular rate and rhythm, S1, S2 normal, no murmur, click, rub or gallop  Abdomen: soft, non distended. Mildly TTP.  PEG infusing- dressing around site saturated with tube feeding and bilious drainage   Extremities: extremities normal, atraumatic, no cyanosis or edema  Skin: Skin color, texture, turgor normal. No rashes or lesions  Neurologic: moves all 4 extremities equally  PSY: appropriately behaved      Intake and Output:  Current Shift:  12/09 0701 - 12/09 1900  In: 860 [I.V.:500]  Out: -   Last three shifts:  12/07 1901 - 12/09 0700  In: 4288.3 [I.V.:2358.3]  Out: 3850 [Urine:3850]    Recent Results (from the past 24 hour(s))   GLUCOSE, POC    Collection Time: 12/08/17 11:57 AM   Result Value Ref Range    Glucose (POC) 135 (H) 70 - 110 mg/dL   GLUCOSE, POC    Collection Time: 12/08/17  6:23 PM   Result Value Ref Range    Glucose (POC) 160 (H) 70 - 110 mg/dL   GLUCOSE, POC    Collection Time: 12/08/17  9:01 PM   Result Value Ref Range    Glucose (POC) 50 (LL) 70 - 110 mg/dL   GLUCOSE, POC    Collection Time: 12/08/17  9:05 PM   Result Value Ref Range    Glucose (POC) 53 (LL) 70 - 110 mg/dL   GLUCOSE, POC Collection Time: 12/08/17  9:33 PM   Result Value Ref Range    Glucose (POC) 177 (H) 70 - 110 mg/dL   GLUCOSE, POC    Collection Time: 12/08/17 11:52 PM   Result Value Ref Range    Glucose (POC) 103 70 - 110 mg/dL   CBC WITH AUTOMATED DIFF    Collection Time: 12/09/17  4:35 AM   Result Value Ref Range    WBC 9.2 4.6 - 13.2 K/uL    RBC 2.92 (L) 4.20 - 5.30 M/uL    HGB 8.6 (L) 12.0 - 16.0 g/dL    HCT 26.4 (L) 35.0 - 45.0 %    MCV 90.4 74.0 - 97.0 FL    MCH 29.5 24.0 - 34.0 PG    MCHC 32.6 31.0 - 37.0 g/dL    RDW 16.6 (H) 11.6 - 14.5 %    PLATELET 357 639 - 914 K/uL    MPV 11.9 (H) 9.2 - 11.8 FL    NEUTROPHILS 68 40 - 73 %    LYMPHOCYTES 12 (L) 21 - 52 %    MONOCYTES 4 3 - 10 %    EOSINOPHILS 16 (H) 0 - 5 %    BASOPHILS 0 0 - 2 %    ABS. NEUTROPHILS 6.3 1.8 - 8.0 K/UL    ABS. LYMPHOCYTES 1.1 0.9 - 3.6 K/UL    ABS. MONOCYTES 0.4 0.05 - 1.2 K/UL    ABS. EOSINOPHILS 1.5 (H) 0.0 - 0.4 K/UL    ABS.  BASOPHILS 0.0 0.0 - 0.06 K/UL    DF AUTOMATED     METABOLIC PANEL, BASIC    Collection Time: 12/09/17  4:35 AM   Result Value Ref Range    Sodium 139 136 - 145 mmol/L    Potassium 3.5 3.5 - 5.5 mmol/L    Chloride 106 100 - 108 mmol/L    CO2 25 21 - 32 mmol/L    Anion gap 8 3.0 - 18 mmol/L    Glucose 122 (H) 74 - 99 mg/dL    BUN 18 7.0 - 18 MG/DL    Creatinine 0.85 0.6 - 1.3 MG/DL    BUN/Creatinine ratio 21 (H) 12 - 20      GFR est AA >60 >60 ml/min/1.73m2    GFR est non-AA >60 >60 ml/min/1.73m2    Calcium 7.6 (L) 8.5 - 10.1 MG/DL   GLUCOSE, POC    Collection Time: 12/09/17  5:38 AM   Result Value Ref Range    Glucose (POC) 124 (H) 70 - 110 mg/dL           Lab Results   Component Value Date/Time    Glucose 122 12/09/2017 04:35 AM    Glucose 109 12/08/2017 04:27 AM    Glucose 81 12/07/2017 04:40 AM    Glucose 99 12/06/2017 08:41 AM    Glucose 174 12/05/2017 09:33 PM        Assessment/Plan:     Patient Active Problem List   Diagnosis Code    Hypertension I10    Diabetes mellitus ([facility] E11.9    Dysphagia R13.10    History of throat cancer Z85.819    KEATON (acute kidney injury) ([facility] N17.9    Abdominal wall cellulitis L03.311    G tube feedings ([facility]) Z93.1    Anemia D64.9    Hypothyroid E03.9       A/P:  Kamaljit White is a 50 y.o. female who presents with abdominal pain around her feeding tube onset 2 days. She reports the pain as severe. She denies fever. Pain reports having similar problem in the past when the feeding tube was dislodged. In the ED she was given dilaudid for pain and had respiratory distress requiring narcan. She otherwise denies chest pain , sob. She on on chronic home O2 3 L NC. GI consulted on 12/7- PEG tube replaced. Tube feedings resumed with IV Reglan 5 mg q6h after gastrograffin study confirmed tube position. Patient with PEG site leaking evening/overnight 12/7-12/8. Tube feedings decreased to 30 mL/hr and IV Reglan increased to 10 mg IV q6h. GI to see patient afternoon 12/8 for possible percutaneous endoscopic guided replacement to a G-J feeding tube device.       - Leukocytosis- resolved. ? Abdominal wall cellulitis- continue Vanc and Merrem for now. GI following  - PEG tube leaking- discussed with GI- decrease tube feeds, increase Reglan- will see this afternoon    - Blood cultures- no growth to date- continue to follow. - CXR 12/6/17- reviewed- Improved appearance to previously demonstrated upper lung predominant alveolar opacities, without focal pneumonic consolidation, pneumothorax, or pleural effusion. Sputum culture ordered- hx pseudomonas and MRSA , pending result, G- rods thus far  - Hx throat cancer  - Anemia of chronic disease likely 2/2 above: Hgb 6.0- transfuse 2 units PRBC.  Stable , monitor  - Dysphagia 2/2 above: Tube feeds started, watching closely for residuals over weekend if not tolerating then 1230 [facility] by IR monday  - Chronic Hypoxic Respiratory failure: continuous home 02 at 3L  - DM: SSI   - Hypothyroidism: levothyroxine   - Constipation: Colace, Miralax   - DVT prophylaxis: Heparin

## 2017-12-09 NOTE — PROGRESS NOTES
2130> Assumed care from [facility], [address] [place] [address]. Patient is awake and alert, oriented x 4. Patient verbalized pain at 7/10 after outgoing RN given ultram. Rechecked BG= 177 after D50 was given. Assisted to bedside commode, tolerated well. G-site has an old greenish drainage to dressing, changed dressing at this time, noted some redness to site. 0151> Dilaudid given PRN. Tolerated well. Tolerating tube feeding, no leakage noted at PEG site. Will continue to monitor. 0400> Pt is resting comfortably at this time. 0745> Bedside and Verbal shift change report given to Junior Tucker RN (oncoming nurse) by Karen De La Vega RN (offgoing nurse). Report included the following information SBAR, Kardex, Intake/Output, MAR and Med Rec Status.

## 2017-12-09 NOTE — PROGRESS NOTES
2130> Assumed care. Patient is awake and alert, oriented x 4 verbalized pain at 7/10, pain medicine by Chicho Givens, earlier. Blood sugar recheck at 177 after D50 was given by off-going RN. Assisted to bedside commode. Weakness noted but able to transfer from bed to bedside commode with very minimal assistance. PEG tube site has a minimal greenish drainage, dressing changed. Some redness noted around stoma. Tolerating tube feeding. Will continue to monitor. 0151> Patient c/o pain 8/10, dilaudid given prn as ordered. Assisted to bedside commode.

## 2017-12-09 NOTE — PROGRESS NOTES
PROGRESS NOTE   PATIENT:  Saud Graf           MRN: [id number]           [facility], [address]           12/9/2017, 10:33 AM      I  Ms. Fariha Wilson is a 50 y.o. female who is being seen for Peg J site leakage    SUBJECTIVE:  Pt has no complaints. Reports leakage around the peg site    OBJECTIVE:  Patient Vitals for the past 24 hrs:   Temp Pulse Resp BP SpO2   12/09/17 0828 98.1 °F (36.7 °C) 98 18 136/80 98 %   12/09/17 0438 98.6 °F (37 °C) 91 16 134/76 99 %   12/09/17 0036 97.9 °F (36.6 °C) 95 16 145/85 97 %   12/08/17 1923 97.8 °F (36.6 °C) 94 16 140/87 98 %   12/08/17 1457 98.6 °F (37 °C) 95 16 117/71 100 %   12/08/17 1158 97.5 °F (36.4 °C) 96 16 (!) 161/95 98 %         Intake/Output Summary (Last 24 hours) at 12/09/17 1033  Last data filed at 12/09/17 0700   Gross per 24 hour   Intake             2530 ml   Output             2250 ml   Net              280 ml       Gen: NAD  Abd  : Soft, non tender, BS +, Peg site some leakage of greenish liquid, surrounding erythema. Labs: Results:   Chemistry Recent Labs      12/09/17 0435 12/08/17 0427  12/07/17   0440   GLU  122*  109*  81   NA  139  138  137   K  3.5  3.7  4.5   CL  106  106  105   CO2  25  24  24   BUN  18  24*  28*   CREA  0.85  1.06  1.45*   CA  7.6*  8.0*  7.6*   AGAP  8  8  8   BUCR  21*  23*  19    Estimated Creatinine Clearance: 68.4 mL/min (based on Cr of 0.85). CBC w/Diff Recent Labs      12/09/17 0435 12/08/17 0427  12/07/17   0440   WBC  9.2  13.9*  14.5*   RBC  2.92*  2.95*  2.03*   HGB  8.6*  8.7*  6.0*   HCT  26.4*  26.6*  19.5*   PLT  161  155  166   GRANS  68  83*  90*   LYMPH  12*  7*  5*   EOS  16*  8*  4      Cardiac Enzymes No results for input(s): CPK, CKND1, LAVELLE in the last 72 hours. No lab exists for component: CKRMB, TROIP   Coagulation No results for input(s): PTP, INR, APTT in the last 72 hours.     No lab exists for component: INREXT    Hepatitis Panel No results found for: HAMAT, HAAB, HABT, HAAT, HBSAG, HBSB, HBSAT, HBABN, HBCM, HBCAB, HBCAT, XBCABS, HBEAB, HBEAG, XHEPCS, [id number], HBEGLT, HBCMLT, HBCLT, HBEBLT, [id number], [id number], HAVMLT, [id number], HBCMLT, [id number], HCGAT   Amylase Lipase    Liver Enzymes No results for input(s): TP, ALB, TBILI, AP, SGOT, ALT in the last 72 hours. No lab exists for component: DBIL   Thyroid Studies No results for input(s): T4, T3U, TSH, TSHEXT in the last 72 hours.     No lab exists for component: T3RU     Pathology pathology         Allergies   Allergen Reactions    Morphine Rash    Amlodipine Swelling     Leg swelling per patient, but no problem taking Lotrel (amlodipine-benazepril)       Current Facility-Administered Medications   Medication Dose Route Frequency    sodium chloride 0.45 % in dextrose 10% 1,019.6 mL infusion   IntraVENous CONTINUOUS    meropenem (MERREM) 1 g in sterile water (preservative free) 20 mL IV syringe  1 g IntraVENous Q8H    vancomycin (VANCOCIN) 1,250 mg in 0.9% sodium chloride 250 mL IVPB  1,250 mg IntraVENous Q18H    VANCOMYCIN INFORMATION NOTE   Other ONCE    HYDROmorphone in NS (DILAUDID) injection 1 mg  1 mg IntraVENous Q4H PRN    metoclopramide HCl (REGLAN) injection 10 mg  10 mg IntraVENous Q6H    aspirin chewable tablet 81 mg  81 mg Per G Tube DAILY    docusate (COLACE) 50 mg/5 mL oral liquid 100 mg  100 mg Per G Tube DAILY    folic acid tablet 0.4 mg  0.4 mg Oral DAILY    furosemide (LASIX) tablet 40 mg  40 mg Per G Tube DAILY    gabapentin (NEURONTIN) capsule 400 mg  400 mg Per G Tube BID    levothyroxine (SYNTHROID) tablet 75 mcg  75 mcg Per G Tube Daily    lipase-protease-amylase (ZENPEP 10,000) capsule 1 Cap  1 Cap Per G Tube TID WITH MEALS    polyethylene glycol (MIRALAX) packet 17 g  17 g Per G Tube DAILY    famotidine (PF) (PEPCID) injection 20 mg  20 mg IntraVENous Q12H    nystatin (MYCOSTATIN) 100,000 unit/gram powder   Topical BID    0.9% sodium chloride infusion 250 mL  250 mL IntraVENous PRN    insulin lispro (HUMALOG) injection   SubCUTAneous Q6H    naloxone (NARCAN) injection 0.4 mg  0.4 mg IntraVENous PRN    ondansetron (ZOFRAN) injection 4 mg  4 mg IntraVENous Q4H PRN    heparin (porcine) injection 5,000 Units  5,000 Units SubCUTAneous Q8H    cetirizine (ZYRTEC) tablet 10 mg  10 mg Oral DAILY PRN    sodium chloride (OCEAN) 0.65 % nasal spray 2 Spray  2 Spray Both Nostrils PRN    glucose chewable tablet 16 g  4 Tab Oral PRN    glucagon (GLUCAGEN) injection 1 mg  1 mg IntraMUSCular PRN    diphenhydrAMINE (BENADRYL) capsule 25 mg  25 mg Oral Q6H PRN    traMADol (ULTRAM) tablet 50 mg  50 mg Oral Q6H PRN    VANCOMYCIN INFORMATION NOTE   Other Rx Dosing/Monitoring    acetaminophen (TYLENOL) tablet 650 mg  650 mg Oral Q6H PRN       ASSESSMENT:  1. Leakage around PEGJ - some greenish bile seen, not much of tube feeds. It was replaced yesterday. Had burried balloon. 2 . Hx of throat CA with proximal esophageal obstruction and severe OP dysphagia requiring PEG tube placement .  S/P of chemoradiation. 3.  Suspected DM gastroparesis requiring PEG change to PEG-J device last 8/2017 at [facility]. RECOMMENDATIONS:   1. Cont tube feeds  2.  If no improvement in leakage will need PEGJ placement by IR at a new site   Debbie Aragon MD

## 2017-12-09 NOTE — PROGRESS NOTES
Physical Exam   Skin:        NURSES NOTES:    0800 Assessment completed. Alert, oriented x 4. Heart rate regular rhythm    Wearing O2 @ 2l/min of nasal cannula. Lungs diminished more on the left . With non productive cough. Using IS pulling 500 ml. Abdomen: bowels sound active. New peg on same old peg site. Site is leaking greenish gastric output saturating dressing on site. Tube feeding: Osmolite 1.2 narda infusing @ 50 ml. Goal:60ml/hr    ; Uses the BSC. Musculoskeletal Moves all extremities. Need assistance in getting out of bed;    IVF: D5 1/2 NS at 125 ml/hr infusing via left hand PIV. Rt forearm PIV hepock. 1154 IVF changed to D10 0.45% NS    1600 Reassessment done. Not in distress. 1800 watching tv. NAD.    1915 Bedside and Verbal shift change report given to Lily Chang RN (oncoming nurse) by Cecilia Fernandez RN   (offgoing nurse). Report included the following information SBAR, Kardex, Intake/Output, MAR and Recent Results.

## 2017-12-10 LAB
ANION GAP SERPL CALC-SCNC: 6 MMOL/L (ref 3–18)
BACTERIA SPEC CULT: ABNORMAL
BASOPHILS # BLD: 0 K/UL (ref 0–0.06)
BASOPHILS NFR BLD: 0 % (ref 0–2)
BUN SERPL-MCNC: 14 MG/DL (ref 7–18)
BUN/CREAT SERPL: 18 (ref 12–20)
CALCIUM SERPL-MCNC: 7.9 MG/DL (ref 8.5–10.1)
CHLORIDE SERPL-SCNC: 105 MMOL/L (ref 100–108)
CO2 SERPL-SCNC: 29 MMOL/L (ref 21–32)
CREAT SERPL-MCNC: 0.78 MG/DL (ref 0.6–1.3)
DIFFERENTIAL METHOD BLD: ABNORMAL
EOSINOPHIL # BLD: 1.2 K/UL (ref 0–0.4)
EOSINOPHIL NFR BLD: 17 % (ref 0–5)
ERYTHROCYTE [DISTWIDTH] IN BLOOD BY AUTOMATED COUNT: 16 % (ref 11.6–14.5)
GLUCOSE BLD STRIP.AUTO-MCNC: 109 MG/DL (ref 70–110)
GLUCOSE BLD STRIP.AUTO-MCNC: 119 MG/DL (ref 70–110)
GLUCOSE BLD STRIP.AUTO-MCNC: 126 MG/DL (ref 70–110)
GLUCOSE BLD STRIP.AUTO-MCNC: 128 MG/DL (ref 70–110)
GLUCOSE BLD STRIP.AUTO-MCNC: 158 MG/DL (ref 70–110)
GLUCOSE BLD STRIP.AUTO-MCNC: 50 MG/DL (ref 70–110)
GLUCOSE SERPL-MCNC: 129 MG/DL (ref 74–99)
GRAM STN SPEC: ABNORMAL
HCT VFR BLD AUTO: 28.6 % (ref 35–45)
HGB BLD-MCNC: 9.3 G/DL (ref 12–16)
LYMPHOCYTES # BLD: 1.1 K/UL (ref 0.9–3.6)
LYMPHOCYTES NFR BLD: 15 % (ref 21–52)
MCH RBC QN AUTO: 29.4 PG (ref 24–34)
MCHC RBC AUTO-ENTMCNC: 32.5 G/DL (ref 31–37)
MCV RBC AUTO: 90.5 FL (ref 74–97)
MONOCYTES # BLD: 0.4 K/UL (ref 0.05–1.2)
MONOCYTES NFR BLD: 6 % (ref 3–10)
NEUTS SEG # BLD: 4.3 K/UL (ref 1.8–8)
NEUTS SEG NFR BLD: 62 % (ref 40–73)
PLATELET # BLD AUTO: 171 K/UL (ref 135–420)
PMV BLD AUTO: 10.7 FL (ref 9.2–11.8)
POTASSIUM SERPL-SCNC: 3.4 MMOL/L (ref 3.5–5.5)
RBC # BLD AUTO: 3.16 M/UL (ref 4.2–5.3)
SERVICE CMNT-IMP: ABNORMAL
SODIUM SERPL-SCNC: 140 MMOL/L (ref 136–145)
WBC # BLD AUTO: 7 K/UL (ref 4.6–13.2)

## 2017-12-10 PROCEDURE — 74011250636 HC RX REV CODE- 250/636: Performed by: FAMILY MEDICINE

## 2017-12-10 PROCEDURE — 80048 BASIC METABOLIC PNL TOTAL CA: CPT | Performed by: HOSPITALIST

## 2017-12-10 PROCEDURE — 74011250637 HC RX REV CODE- 250/637: Performed by: HOSPITALIST

## 2017-12-10 PROCEDURE — 74011250637 HC RX REV CODE- 250/637: Performed by: NURSE PRACTITIONER

## 2017-12-10 PROCEDURE — 74011000250 HC RX REV CODE- 250: Performed by: NURSE PRACTITIONER

## 2017-12-10 PROCEDURE — 65270000029 HC RM PRIVATE

## 2017-12-10 PROCEDURE — 74011000250 HC RX REV CODE- 250: Performed by: HOSPITALIST

## 2017-12-10 PROCEDURE — 74011000258 HC RX REV CODE- 258: Performed by: HOSPITALIST

## 2017-12-10 PROCEDURE — 74011250637 HC RX REV CODE- 250/637: Performed by: FAMILY MEDICINE

## 2017-12-10 PROCEDURE — 77030018836 HC SOL IRR NACL ICUM -A

## 2017-12-10 PROCEDURE — 74011250636 HC RX REV CODE- 250/636: Performed by: HOSPITALIST

## 2017-12-10 PROCEDURE — 85025 COMPLETE CBC W/AUTO DIFF WBC: CPT | Performed by: HOSPITALIST

## 2017-12-10 PROCEDURE — 36415 COLL VENOUS BLD VENIPUNCTURE: CPT | Performed by: HOSPITALIST

## 2017-12-10 PROCEDURE — 82962 GLUCOSE BLOOD TEST: CPT

## 2017-12-10 PROCEDURE — 74011250636 HC RX REV CODE- 250/636: Performed by: NURSE PRACTITIONER

## 2017-12-10 RX ORDER — LEVOFLOXACIN 5 MG/ML
750 INJECTION, SOLUTION INTRAVENOUS EVERY 24 HOURS
Status: DISCONTINUED | OUTPATIENT
Start: 2017-12-10 | End: 2017-12-16 | Stop reason: HOSPADM

## 2017-12-10 RX ORDER — CARVEDILOL 3.12 MG/1
3.12 TABLET ORAL 2 TIMES DAILY WITH MEALS
Status: DISCONTINUED | OUTPATIENT
Start: 2017-12-10 | End: 2017-12-11

## 2017-12-10 RX ORDER — CARVEDILOL 3.12 MG/1
3.12 TABLET ORAL ONCE
Status: ACTIVE | OUTPATIENT
Start: 2017-12-10 | End: 2017-12-11

## 2017-12-10 RX ADMIN — LEVOTHYROXINE SODIUM 75 MCG: 75 TABLET ORAL at 05:09

## 2017-12-10 RX ADMIN — Medication 1 MG: at 22:24

## 2017-12-10 RX ADMIN — HEPARIN SODIUM 5000 UNITS: 5000 INJECTION, SOLUTION INTRAVENOUS; SUBCUTANEOUS at 04:47

## 2017-12-10 RX ADMIN — FOLIC ACID TAB 400 MCG 0.4 MG: 400 TAB at 08:40

## 2017-12-10 RX ADMIN — LEVOFLOXACIN 750 MG: 5 INJECTION, SOLUTION INTRAVENOUS at 11:47

## 2017-12-10 RX ADMIN — NYSTATIN: 100000 POWDER TOPICAL at 17:15

## 2017-12-10 RX ADMIN — HEPARIN SODIUM 5000 UNITS: 5000 INJECTION, SOLUTION INTRAVENOUS; SUBCUTANEOUS at 13:43

## 2017-12-10 RX ADMIN — DOCUSATE SODIUM 100 MG: 50 LIQUID ORAL at 08:40

## 2017-12-10 RX ADMIN — METOCLOPRAMIDE 10 MG: 5 INJECTION, SOLUTION INTRAMUSCULAR; INTRAVENOUS at 01:15

## 2017-12-10 RX ADMIN — TRAMADOL HYDROCHLORIDE 50 MG: 50 TABLET, FILM COATED ORAL at 05:09

## 2017-12-10 RX ADMIN — Medication 1 MG: at 17:16

## 2017-12-10 RX ADMIN — ASPIRIN 81 MG 81 MG: 81 TABLET ORAL at 08:40

## 2017-12-10 RX ADMIN — FAMOTIDINE 20 MG: 10 INJECTION, SOLUTION INTRAVENOUS at 20:32

## 2017-12-10 RX ADMIN — SODIUM CHLORIDE 1250 MG: 900 INJECTION, SOLUTION INTRAVENOUS at 09:04

## 2017-12-10 RX ADMIN — PANCRELIPASE 1 CAPSULE: 10000; 34000; 55000 CAPSULE, DELAYED RELEASE ORAL at 08:40

## 2017-12-10 RX ADMIN — POLYETHYLENE GLYCOL 3350 17 G: 17 POWDER, FOR SOLUTION ORAL at 08:40

## 2017-12-10 RX ADMIN — METOCLOPRAMIDE 10 MG: 5 INJECTION, SOLUTION INTRAMUSCULAR; INTRAVENOUS at 17:16

## 2017-12-10 RX ADMIN — GABAPENTIN 400 MG: 400 CAPSULE ORAL at 20:32

## 2017-12-10 RX ADMIN — FUROSEMIDE 40 MG: 40 TABLET ORAL at 08:40

## 2017-12-10 RX ADMIN — NYSTATIN: 100000 POWDER TOPICAL at 09:04

## 2017-12-10 RX ADMIN — GABAPENTIN 400 MG: 400 CAPSULE ORAL at 08:40

## 2017-12-10 RX ADMIN — METOCLOPRAMIDE 10 MG: 5 INJECTION, SOLUTION INTRAMUSCULAR; INTRAVENOUS at 11:47

## 2017-12-10 RX ADMIN — TRAMADOL HYDROCHLORIDE 50 MG: 50 TABLET, FILM COATED ORAL at 11:09

## 2017-12-10 RX ADMIN — PANCRELIPASE 1 CAPSULE: 10000; 34000; 55000 CAPSULE, DELAYED RELEASE ORAL at 17:16

## 2017-12-10 RX ADMIN — CARVEDILOL 3.12 MG: 3.12 TABLET, FILM COATED ORAL at 13:43

## 2017-12-10 RX ADMIN — HEPARIN SODIUM 5000 UNITS: 5000 INJECTION, SOLUTION INTRAVENOUS; SUBCUTANEOUS at 20:32

## 2017-12-10 RX ADMIN — PANCRELIPASE 1 CAPSULE: 10000; 34000; 55000 CAPSULE, DELAYED RELEASE ORAL at 13:44

## 2017-12-10 RX ADMIN — MEROPENEM 1 G: 1 INJECTION, POWDER, FOR SOLUTION INTRAVENOUS at 04:48

## 2017-12-10 RX ADMIN — FAMOTIDINE 20 MG: 10 INJECTION, SOLUTION INTRAVENOUS at 08:40

## 2017-12-10 RX ADMIN — METOCLOPRAMIDE 10 MG: 5 INJECTION, SOLUTION INTRAMUSCULAR; INTRAVENOUS at 05:09

## 2017-12-10 RX ADMIN — SODIUM CHLORIDE: 234 INJECTION INTRAMUSCULAR; INTRAVENOUS; SUBCUTANEOUS at 05:12

## 2017-12-10 NOTE — PROGRESS NOTES
Problem: Falls - Risk of  Goal: *Absence of Falls  Document Kapil Fall Risk and appropriate interventions in the flowsheet.    Fall Risk Interventions:  Mobility Interventions: Patient to call before getting OOB         Medication Interventions: Patient to call before getting OOB, Teach patient to arise slowly    Elimination Interventions: Call light in reach, Patient to call for help with toileting needs, Toileting schedule/hourly rounds    History of Falls Interventions: Door open when patient unattended

## 2017-12-10 NOTE — ROUTINE PROCESS
2230 Critical Vancomycin level called to floor 31.8.  Pharmacist Liseth Burnett notified, Vancomycin dose held as per order

## 2017-12-10 NOTE — PROGRESS NOTES
Problem: Falls - Risk of  Goal: *Absence of Falls  Document Kapil Fall Risk and appropriate interventions in the flowsheet.    Outcome: Progressing Towards Goal  Fall Risk Interventions:  Mobility Interventions: Patient to call before getting OOB         Medication Interventions: Patient to call before getting OOB, Teach patient to arise slowly    Elimination Interventions: Call light in reach, Patient to call for help with toileting needs, Toileting schedule/hourly rounds    History of Falls Interventions: Door open when patient unattended

## 2017-12-10 NOTE — PROGRESS NOTES
2 [facility]  Hospitalist Division        Inpatient Daily Progress Note    Daily progress Note    Patient: Artis Roe MRN: [id number]  CSN: [id number]    YOB: 1965  Age: 50 y.o. Sex: female    DOA: 12/5/2017 LOS:  LOS: 4 days                    Chief Complaint:  Abdominal pain at PEG site       Subjective:      No complaints today, NAD    Objective:      Visit Vitals    BP (!) 170/103 (BP 1 Location: Right arm, BP Patient Position: At rest)    Pulse 89    Temp 97.3 °F (36.3 °C)    Resp 16    Ht 5' 4\" (1.626 m)    Wt 57.2 kg (126 lb)    SpO2 100%    Breastfeeding No    BMI 21.63 kg/m2           Physical Exam:  General appearance: alert, cooperative, no distress, appears stated age  Lungs: clear to auscultation throughout, no wheezes   Heart: regular rate and rhythm, S1, S2 normal, no murmur, click, rub or gallop  Abdomen: soft, non distended. Mildly TTP.  PEG infusing-   Extremities: extremities normal, atraumatic, no cyanosis or edema  Skin: Skin color, texture, turgor normal. No rashes or lesions  Neurologic: moves all 4 extremities equally  PSY: appropriately behaved      Intake and Output:  Current Shift:  12/10 0701 - 12/10 1900  In: 270   Out: 0   Last three shifts:  12/08 1901 - 12/10 0700  In: 4350 [I.V.:3020]  Out: 4300 [Urine:4300]    Recent Results (from the past 24 hour(s))   GLUCOSE, POC    Collection Time: 12/09/17 12:21 PM   Result Value Ref Range    Glucose (POC) 117 (H) 70 - 110 mg/dL   GLUCOSE, POC    Collection Time: 12/09/17  6:06 PM   Result Value Ref Range    Glucose (POC) 155 (H) 70 - 110 mg/dL   VANCOMYCIN, TROUGH    Collection Time: 12/09/17 10:00 PM   Result Value Ref Range    Vancomycin,trough 31.8 (HH) 10.0 - 20.0 ug/mL    Reported dose date: 20171209      Reported dose time: 1100      Reported dose: 1250 UNITS   GLUCOSE, POC    Collection Time: 12/10/17 12:25 AM   Result Value Ref Range    Glucose (POC) 119 (H) 70 - 110 mg/dL   CBC WITH AUTOMATED DIFF    Collection Time: 12/10/17  4:25 AM   Result Value Ref Range    WBC 7.0 4.6 - 13.2 K/uL    RBC 3.16 (L) 4.20 - 5.30 M/uL    HGB 9.3 (L) 12.0 - 16.0 g/dL    HCT 28.6 (L) 35.0 - 45.0 %    MCV 90.5 74.0 - 97.0 FL    MCH 29.4 24.0 - 34.0 PG    MCHC 32.5 31.0 - 37.0 g/dL    RDW 16.0 (H) 11.6 - 14.5 %    PLATELET 621 656 - 297 K/uL    MPV 10.7 9.2 - 11.8 FL    NEUTROPHILS 62 40 - 73 %    LYMPHOCYTES 15 (L) 21 - 52 %    MONOCYTES 6 3 - 10 %    EOSINOPHILS 17 (H) 0 - 5 %    BASOPHILS 0 0 - 2 %    ABS. NEUTROPHILS 4.3 1.8 - 8.0 K/UL    ABS. LYMPHOCYTES 1.1 0.9 - 3.6 K/UL    ABS. MONOCYTES 0.4 0.05 - 1.2 K/UL    ABS. EOSINOPHILS 1.2 (H) 0.0 - 0.4 K/UL    ABS. BASOPHILS 0.0 0.0 - 0.06 K/UL    DF AUTOMATED     METABOLIC PANEL, BASIC    Collection Time: 12/10/17  4:25 AM   Result Value Ref Range    Sodium 140 136 - 145 mmol/L    Potassium 3.4 (L) 3.5 - 5.5 mmol/L    Chloride 105 100 - 108 mmol/L    CO2 29 21 - 32 mmol/L    Anion gap 6 3.0 - 18 mmol/L    Glucose 129 (H) 74 - 99 mg/dL    BUN 14 7.0 - 18 MG/DL    Creatinine 0.78 0.6 - 1.3 MG/DL    BUN/Creatinine ratio 18 12 - 20      GFR est AA >60 >60 ml/min/1.73m2    GFR est non-AA >60 >60 ml/min/1.73m2    Calcium 7.9 (L) 8.5 - 10.1 MG/DL   GLUCOSE, POC    Collection Time: 12/10/17  5:40 AM   Result Value Ref Range    Glucose (POC) 126 (H) 70 - 110 mg/dL           Lab Results   Component Value Date/Time    Glucose 129 12/10/2017 04:25 AM    Glucose 122 12/09/2017 04:35 AM    Glucose 109 12/08/2017 04:27 AM    Glucose 81 12/07/2017 04:40 AM    Glucose 99 12/06/2017 08:41 AM        Assessment/Plan:     Patient Active Problem List   Diagnosis Code    Hypertension I10    [facility] E11.9    Dysphagia R13.10    History of throat cancer Z85.819    KEATON (acute kidney injury) ([facility] N17.9    Abdominal wall cellulitis L03.311    G tube feedings (HCC) Z93.1    Anemia D64.9    Hypothyroid E03.9       A/P:  Jayy Araujo is a 50 y.o. female who presents with abdominal pain around her feeding tube onset 2 days. She reports the pain as severe. She denies fever. Pain reports having similar problem in the past when the feeding tube was dislodged. In the ED she was given dilaudid for pain and had respiratory distress requiring narcan. She otherwise denies chest pain , sob. She on on chronic home O2 3 L NC. GI consulted on 12/7- PEG tube replaced. Tube feedings resumed with IV Reglan 5 mg q6h after gastrograffin study confirmed tube position. Patient with PEG site leaking evening/overnight 12/7-12/8. Tube feedings decreased to 30 mL/hr and IV Reglan increased to 10 mg IV q6h. GI to see patient afternoon 12/8 for possible percutaneous endoscopic guided replacement to a G-J feeding tube device.       - Leukocytosis-  Patient is a very difficult case to manage. There is a possibility she has some cellulitic component to her Peg to malfunction previously there is also the possibility that this is from a recurrent resp infection. 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 I will dc vanc and merrem and do levaquin only (intermediate to psuedomonas and will provide intraabdominal coverage). I will assume others are colonizers unless her clinical course worsens.  - PEG tube leaking- discussed with GI- decrease tube feeds, increase Reglan- may need GJ tomorrow  - Blood cultures- no growth to date- continue to follow. - Hx throat cancer  - Anemia of chronic disease likely 2/2 above: Hgb 6.0- transfuse 2 units PRBC.  Stable , monitor  - Chronic Hypoxic Respiratory failure: continuous home 02 at 3L  - DM: SSI   - Hypothyroidism: levothyroxine - Constipation: Colace, Miralax

## 2017-12-10 NOTE — PROGRESS NOTES
7995 Received bedside report from Korina Gonsalez, LANDEN, updated white board, call bell is within reach. Dressing around the PEG tube was changed as it is draining. Skin around the area is red and irritated. 0820 IV dressing on left wrist was replaced. IV is still working at this time. Call bell, and phone are within reach. 0840 Morning medications given to the patient through her PEG Tube. Call bell, phone, and suction are within reach. 0920 Received positive cultural from [facility] in lab. Patient has a positive sputum for MRSA. Dr. Anastacio Edouard paged. 8491 Dr. Anastacio Edouard made aware the results. No verbal orders received at this time. 8200 [facility] with Kasia Camacho RN, Supervisor about the patient having to be on contact from the MRSA in the sputum. She said she would get back to me with an answer. Faye Conte RN, Supervisor informed me that patient does not need to be on contact precautions at this time. 1030 Both IV's are not working at this time. Asked the nursing supervisor to come and start IV. Also called the ICU to have one of them start the IV.     1100 Patient asked for pain medication. Explained to the patient she cannot have any pain medication through the IV as she does not have a working IV. She opted to take the oral pain medication through her tube at this time. Call bell is placed within reach. 30 [facility], [address], RN, from ICU was able to start a new working IV on the patient. 1200 Patient Dr. Anastacio Edouard about the patient's blood pressure being elevated. [address] with [address] Anastacio Eoduard. He has ordered Coreg for the patient's blood pressure but isn't due until 5 PM. Per Dr. Anastacio Edouard he would like the coreg to be given now. Ordered a once time dosage for the patient. 1340 Patient given the blood pressure medication at this time. Patient is sleeping very deeply at this time. Patient can be woken up when touched. She knows who she is and where she is. Patient falls back to sleep immediately.  Call bell and phone are within reach. 1630 Patient is now awake and talking on the phone. Call bell and phone are within reach. [phone] Patient requesting pain medication at this time. Will give medication. Bedside commode empty it was mixed with watery stool unable to measure but the patient has adequate output at this time. Dressing on her abdomen is changed with barrier cream applied to it. Call bell and phone are with reach. 1815 Blood sugar taken and patient does not need coverage at this time. Patient is really groggy at this time. She is arousable, and alert. Bedside and Verbal shift change report given to Araceli Flores Rn (oncoming nurse) by Sukumar Fair RN (offgoing nurse). Report included the following information SBAR, Kardex and MAR.

## 2017-12-10 NOTE — PROGRESS NOTES
PROGRESS NOTE   PATIENT:  Inez Benavides           MRN: [id number]           [facility], [address]           12/10/2017, 10:33 AM      I  Ms. Kris Eng is a 50 y.o. female who is being seen for Peg J site leakage    SUBJECTIVE:  Pt reports drainage is worse than before. No other complaints. OBJECTIVE:  Patient Vitals for the past 24 hrs:   Temp Pulse Resp BP SpO2   12/10/17 0830 - - - - 100 %   12/10/17 0735 97.3 °F (36.3 °C) 89 16 (!) 170/103 100 %   12/10/17 0526 98.6 °F (37 °C) 89 16 (!) 181/98 96 %   12/10/17 0039 98.2 °F (36.8 °C) 91 16 (!) 173/98 98 %   12/09/17 2016 98.2 °F (36.8 °C) 91 16 (!) 164/91 100 %   12/09/17 1636 97.5 °F (36.4 °C) 88 18 (!) 172/100 99 %         Intake/Output Summary (Last 24 hours) at 12/10/17 1101  Last data filed at 12/10/17 0701   Gross per 24 hour   Intake             1750 ml   Output             2400 ml   Net             -650 ml       Gen: NAD  Abd  : Soft, non tender, BS +, Peg site some leakage of greenish liquid and tube feeds worse when she moves or bends, surrounding erythema. Labs: Results:   Chemistry Recent Labs      12/10/17   0425  12/09/17   0435  12/08/17   0427   GLU  129*  122*  109*   NA  140  139  138   K  3.4*  3.5  3.7   CL  105  106  106   CO2  29  25  24   BUN  14  18  24*   CREA  0.78  0.85  1.06   CA  7.9*  7.6*  8.0*   AGAP  6  8  8   BUCR  18  21*  23*    Estimated Creatinine Clearance: 74.5 mL/min (based on Cr of 0.78). CBC w/Diff Recent Labs      12/10/17   0425  12/09/17   0435  12/08/17   0427   WBC  7.0  9.2  13.9*   RBC  3.16*  2.92*  2.95*   HGB  9.3*  8.6*  8.7*   HCT  28.6*  26.4*  26.6*   PLT  171  161  155   GRANS  62  68  83*   LYMPH  15*  12*  7*   EOS  17*  16*  8*      Cardiac Enzymes No results for input(s): CPK, CKND1, LAVELLE in the last 72 hours. No lab exists for component: CKRMB, TROIP   Coagulation No results for input(s): PTP, INR, APTT in the last 72 hours.     No lab exists for component: INREXT, INREXT Hepatitis Panel No results found for: HAMAT, HAAB, HABT, HAAT, HBSAG, HBSB, HBSAT, HBABN, HBCM, HBCAB, HBCAT, XBCABS, HBEAB, HBEAG, XHEPCS, [id number], HBEGLT, HBCMLT, HBCLT, HBEBLT, [id number], [id number], HAVMLT, [id number], HBCMLT, [id number], HCGAT   Amylase Lipase    Liver Enzymes No results for input(s): TP, ALB, TBILI, AP, SGOT, ALT in the last 72 hours. No lab exists for component: DBIL   Thyroid Studies No results for input(s): T4, T3U, TSH, TSHEXT, TSHEXT in the last 72 hours.     No lab exists for component: T3RU     Pathology pathology         Allergies   Allergen Reactions    Morphine Rash    Amlodipine Swelling     Leg swelling per patient, but no problem taking Lotrel (amlodipine-benazepril)       Current Facility-Administered Medications   Medication Dose Route Frequency    carvedilol (COREG) tablet 3.125 mg  3.125 mg Oral BID WITH MEALS    levoFLOXacin (LEVAQUIN) 750 mg in D5W IVPB  750 mg IntraVENous Q24H    sodium chloride 0.45 % in dextrose 10% 1,019.6 mL infusion   IntraVENous CONTINUOUS    HYDROmorphone in NS (DILAUDID) injection 1 mg  1 mg IntraVENous Q4H PRN    metoclopramide HCl (REGLAN) injection 10 mg  10 mg IntraVENous Q6H    aspirin chewable tablet 81 mg  81 mg Per G Tube DAILY    docusate (COLACE) 50 mg/5 mL oral liquid 100 mg  100 mg Per G Tube DAILY    folic acid tablet 0.4 mg  0.4 mg Oral DAILY    furosemide (LASIX) tablet 40 mg  40 mg Per G Tube DAILY    gabapentin (NEURONTIN) capsule 400 mg  400 mg Per G Tube BID    levothyroxine (SYNTHROID) tablet 75 mcg  75 mcg Per G Tube Daily    lipase-protease-amylase (ZENPEP 10,000) capsule 1 Cap  1 Cap Per G Tube TID WITH MEALS    polyethylene glycol (MIRALAX) packet 17 g  17 g Per G Tube DAILY    famotidine (PF) (PEPCID) injection 20 mg  20 mg IntraVENous Q12H    nystatin (MYCOSTATIN) 100,000 unit/gram powder   Topical BID    0.9% sodium chloride infusion 250 mL  250 mL IntraVENous PRN    insulin lispro (HUMALOG) injection SubCUTAneous Q6H    naloxone (NARCAN) injection 0.4 mg  0.4 mg IntraVENous PRN    ondansetron (ZOFRAN) injection 4 mg  4 mg IntraVENous Q4H PRN    heparin (porcine) injection 5,000 Units  5,000 Units SubCUTAneous Q8H    cetirizine (ZYRTEC) tablet 10 mg  10 mg Oral DAILY PRN    sodium chloride (OCEAN) 0.65 % nasal spray 2 Spray  2 Spray Both Nostrils PRN    glucose chewable tablet 16 g  4 Tab Oral PRN    glucagon (GLUCAGEN) injection 1 mg  1 mg IntraMUSCular PRN    diphenhydrAMINE (BENADRYL) capsule 25 mg  25 mg Oral Q6H PRN    traMADol (ULTRAM) tablet 50 mg  50 mg Oral Q6H PRN    acetaminophen (TYLENOL) tablet 650 mg  650 mg Oral Q6H PRN       ASSESSMENT:  1. Leakage around PEGJ - some greenish bile and tube feed with surrounding cellulitis . It was replaced Friday. Had burried balloon. 2 . Hx of throat CA with proximal esophageal obstruction and severe OP dysphagia requiring PEG tube placement .  S/P of chemoradiation. 3.  Suspected DM gastroparesis requiring PEG change to PEG-J device last 8/2017 at [facility]. RECOMMENDATIONS:   1.  Will need PEGJ placement by IR at a new site   Mayuri Billingsley MD

## 2017-12-10 NOTE — ROUTINE PROCESS
Bedside and Verbal shift change report given to Dominic Pulliam (oncoming nurse) by Link Smith RN (offgoing nurse). Report included the following information SBAR, OR Summary, Procedure Summary, Intake/Output, MAR and Recent Results.

## 2017-12-10 NOTE — PROGRESS NOTES
Pharmacy Dosing Services: Vancomycin    Indication: cellulits    Day of therapy: 4    Other Antimicrobials (Include dose, start day & day of therapy):  Meropenem 1 gm iv q8h. Day0: , day 2    Current Maintenance dose: 1.25 g iv q18h    Goal Vancomycin Level: 15-20    Vancomycin Level (if drawn): 31.8 (16 hrs trough)     Significant Cultures:   Blood, urine: negative. Resp: moderate normal with moderate MRSA and MDR psuedomonas A. Renal function stable? (unstable defined as SCr increase of 0.5 mg/dL or > 50% increase from baseline, whichever is greater) (Y/N): Y     CAPD, Hemodialysis or Renal Replacement Therapy (Y/N): Y     Recent Labs      12/10/17   0425  17   0435  17   042   CREA  0.78  0.85  1.06   BUN  14  18  24*   WBC  7.0  9.2  13.9*     Temp (24hrs), Av °F (36.7 °C), Min:97.3 °F (36.3 °C), Max:98.6 °F (37 °C)    Creatinine Clearance (Creatinine Clearance (ml/min)): 74.5     Regimen assessment: super-therapeutic  Maintenance dose: decrease to 1.25 gm iv q24h  Next scheduled level: 830       Pharmacy will follow daily and adjust medications as appropriate for renal function and/or serum levels.     Thank you,  Elizabeth Zuniga

## 2017-12-11 ENCOUNTER — ANESTHESIA EVENT (OUTPATIENT)
Dept: ENDOSCOPY | Age: 52
DRG: 394 | End: 2017-12-11
Payer: MEDICARE

## 2017-12-11 ENCOUNTER — ANESTHESIA (OUTPATIENT)
Dept: ENDOSCOPY | Age: 52
DRG: 394 | End: 2017-12-11
Payer: MEDICARE

## 2017-12-11 LAB
ANION GAP SERPL CALC-SCNC: 6 MMOL/L (ref 3–18)
BASOPHILS # BLD: 0.1 K/UL (ref 0–0.06)
BASOPHILS NFR BLD: 1 % (ref 0–2)
BUN SERPL-MCNC: 13 MG/DL (ref 7–18)
BUN/CREAT SERPL: 14 (ref 12–20)
CALCIUM SERPL-MCNC: 8.3 MG/DL (ref 8.5–10.1)
CHLORIDE SERPL-SCNC: 104 MMOL/L (ref 100–108)
CO2 SERPL-SCNC: 31 MMOL/L (ref 21–32)
CREAT SERPL-MCNC: 0.9 MG/DL (ref 0.6–1.3)
DIFFERENTIAL METHOD BLD: ABNORMAL
EOSINOPHIL # BLD: 0.9 K/UL (ref 0–0.4)
EOSINOPHIL NFR BLD: 14 % (ref 0–5)
ERYTHROCYTE [DISTWIDTH] IN BLOOD BY AUTOMATED COUNT: 15.7 % (ref 11.6–14.5)
GLUCOSE BLD STRIP.AUTO-MCNC: 131 MG/DL (ref 70–110)
GLUCOSE BLD STRIP.AUTO-MCNC: 73 MG/DL (ref 70–110)
GLUCOSE BLD STRIP.AUTO-MCNC: 94 MG/DL (ref 70–110)
GLUCOSE SERPL-MCNC: 91 MG/DL (ref 74–99)
HCT VFR BLD AUTO: 28.6 % (ref 35–45)
HGB BLD-MCNC: 9.3 G/DL (ref 12–16)
LYMPHOCYTES # BLD: 1.6 K/UL (ref 0.9–3.6)
LYMPHOCYTES NFR BLD: 24 % (ref 21–52)
MCH RBC QN AUTO: 29.7 PG (ref 24–34)
MCHC RBC AUTO-ENTMCNC: 32.5 G/DL (ref 31–37)
MCV RBC AUTO: 91.4 FL (ref 74–97)
MONOCYTES # BLD: 0.5 K/UL (ref 0.05–1.2)
MONOCYTES NFR BLD: 7 % (ref 3–10)
NEUTS SEG # BLD: 3.7 K/UL (ref 1.8–8)
NEUTS SEG NFR BLD: 54 % (ref 40–73)
PLATELET # BLD AUTO: 188 K/UL (ref 135–420)
PMV BLD AUTO: 11.6 FL (ref 9.2–11.8)
POTASSIUM SERPL-SCNC: 3.5 MMOL/L (ref 3.5–5.5)
RBC # BLD AUTO: 3.13 M/UL (ref 4.2–5.3)
SODIUM SERPL-SCNC: 141 MMOL/L (ref 136–145)
WBC # BLD AUTO: 6.8 K/UL (ref 4.6–13.2)

## 2017-12-11 PROCEDURE — 76040000019: Performed by: INTERNAL MEDICINE

## 2017-12-11 PROCEDURE — 74011250636 HC RX REV CODE- 250/636: Performed by: FAMILY MEDICINE

## 2017-12-11 PROCEDURE — 74011250636 HC RX REV CODE- 250/636: Performed by: HOSPITALIST

## 2017-12-11 PROCEDURE — 0DJ08ZZ INSPECTION OF UPPER INTESTINAL TRACT, VIA NATURAL OR ARTIFICIAL OPENING ENDOSCOPIC: ICD-10-PCS | Performed by: INTERNAL MEDICINE

## 2017-12-11 PROCEDURE — 76060000031 HC ANESTHESIA FIRST 0.5 HR: Performed by: INTERNAL MEDICINE

## 2017-12-11 PROCEDURE — 74011250636 HC RX REV CODE- 250/636: Performed by: NURSE PRACTITIONER

## 2017-12-11 PROCEDURE — 85025 COMPLETE CBC W/AUTO DIFF WBC: CPT | Performed by: HOSPITALIST

## 2017-12-11 PROCEDURE — 74011250637 HC RX REV CODE- 250/637: Performed by: HOSPITALIST

## 2017-12-11 PROCEDURE — 74011000250 HC RX REV CODE- 250

## 2017-12-11 PROCEDURE — 74011000258 HC RX REV CODE- 258: Performed by: HOSPITALIST

## 2017-12-11 PROCEDURE — 80048 BASIC METABOLIC PNL TOTAL CA: CPT | Performed by: HOSPITALIST

## 2017-12-11 PROCEDURE — 74011000250 HC RX REV CODE- 250: Performed by: NURSE PRACTITIONER

## 2017-12-11 PROCEDURE — 77030018846 HC SOL IRR STRL H20 ICUM -A

## 2017-12-11 PROCEDURE — 74011250637 HC RX REV CODE- 250/637: Performed by: NURSE PRACTITIONER

## 2017-12-11 PROCEDURE — 36415 COLL VENOUS BLD VENIPUNCTURE: CPT | Performed by: HOSPITALIST

## 2017-12-11 PROCEDURE — 74011250637 HC RX REV CODE- 250/637: Performed by: FAMILY MEDICINE

## 2017-12-11 PROCEDURE — 65270000029 HC RM PRIVATE

## 2017-12-11 PROCEDURE — 74011250636 HC RX REV CODE- 250/636

## 2017-12-11 PROCEDURE — 82962 GLUCOSE BLOOD TEST: CPT

## 2017-12-11 PROCEDURE — 74011000258 HC RX REV CODE- 258

## 2017-12-11 RX ORDER — SODIUM CHLORIDE 9 MG/ML
INJECTION, SOLUTION INTRAVENOUS
Status: DISCONTINUED | OUTPATIENT
Start: 2017-12-11 | End: 2017-12-11 | Stop reason: HOSPADM

## 2017-12-11 RX ORDER — PROPOFOL 10 MG/ML
INJECTION, EMULSION INTRAVENOUS AS NEEDED
Status: DISCONTINUED | OUTPATIENT
Start: 2017-12-11 | End: 2017-12-11 | Stop reason: HOSPADM

## 2017-12-11 RX ORDER — LIDOCAINE HYDROCHLORIDE 20 MG/ML
INJECTION, SOLUTION EPIDURAL; INFILTRATION; INTRACAUDAL; PERINEURAL AS NEEDED
Status: DISCONTINUED | OUTPATIENT
Start: 2017-12-11 | End: 2017-12-11 | Stop reason: HOSPADM

## 2017-12-11 RX ORDER — CARVEDILOL 6.25 MG/1
6.25 TABLET ORAL 2 TIMES DAILY WITH MEALS
Status: DISCONTINUED | OUTPATIENT
Start: 2017-12-11 | End: 2017-12-16 | Stop reason: HOSPADM

## 2017-12-11 RX ORDER — LISINOPRIL 5 MG/1
5 TABLET ORAL DAILY
Status: DISCONTINUED | OUTPATIENT
Start: 2017-12-11 | End: 2017-12-15

## 2017-12-11 RX ADMIN — NYSTATIN: 100000 POWDER TOPICAL at 18:02

## 2017-12-11 RX ADMIN — ASPIRIN 81 MG 81 MG: 81 TABLET ORAL at 10:13

## 2017-12-11 RX ADMIN — PROPOFOL 30 MG: 10 INJECTION, EMULSION INTRAVENOUS at 16:18

## 2017-12-11 RX ADMIN — LEVOFLOXACIN 750 MG: 5 INJECTION, SOLUTION INTRAVENOUS at 10:14

## 2017-12-11 RX ADMIN — Medication 1 MG: at 10:42

## 2017-12-11 RX ADMIN — LISINOPRIL 5 MG: 5 TABLET ORAL at 10:13

## 2017-12-11 RX ADMIN — FAMOTIDINE 20 MG: 10 INJECTION, SOLUTION INTRAVENOUS at 21:19

## 2017-12-11 RX ADMIN — NYSTATIN: 100000 POWDER TOPICAL at 10:14

## 2017-12-11 RX ADMIN — HEPARIN SODIUM 5000 UNITS: 5000 INJECTION, SOLUTION INTRAVENOUS; SUBCUTANEOUS at 21:17

## 2017-12-11 RX ADMIN — FUROSEMIDE 40 MG: 40 TABLET ORAL at 10:13

## 2017-12-11 RX ADMIN — METOCLOPRAMIDE 10 MG: 5 INJECTION, SOLUTION INTRAMUSCULAR; INTRAVENOUS at 00:45

## 2017-12-11 RX ADMIN — TRAMADOL HYDROCHLORIDE 50 MG: 50 TABLET, FILM COATED ORAL at 18:00

## 2017-12-11 RX ADMIN — LEVOTHYROXINE SODIUM 75 MCG: 75 TABLET ORAL at 05:56

## 2017-12-11 RX ADMIN — HEPARIN SODIUM 5000 UNITS: 5000 INJECTION, SOLUTION INTRAVENOUS; SUBCUTANEOUS at 15:43

## 2017-12-11 RX ADMIN — CARVEDILOL 6.25 MG: 6.25 TABLET, FILM COATED ORAL at 15:42

## 2017-12-11 RX ADMIN — METOCLOPRAMIDE 10 MG: 5 INJECTION, SOLUTION INTRAMUSCULAR; INTRAVENOUS at 21:18

## 2017-12-11 RX ADMIN — SODIUM CHLORIDE: 9 INJECTION, SOLUTION INTRAVENOUS at 15:43

## 2017-12-11 RX ADMIN — PANCRELIPASE 1 CAPSULE: 10000; 34000; 55000 CAPSULE, DELAYED RELEASE ORAL at 15:42

## 2017-12-11 RX ADMIN — LIDOCAINE HYDROCHLORIDE 20 MG: 20 INJECTION, SOLUTION EPIDURAL; INFILTRATION; INTRACAUDAL; PERINEURAL at 16:13

## 2017-12-11 RX ADMIN — METOCLOPRAMIDE 10 MG: 5 INJECTION, SOLUTION INTRAMUSCULAR; INTRAVENOUS at 05:56

## 2017-12-11 RX ADMIN — FAMOTIDINE 20 MG: 10 INJECTION, SOLUTION INTRAVENOUS at 10:13

## 2017-12-11 RX ADMIN — CARVEDILOL 3.12 MG: 3.12 TABLET, FILM COATED ORAL at 00:45

## 2017-12-11 RX ADMIN — PANCRELIPASE 1 CAPSULE: 10000; 34000; 55000 CAPSULE, DELAYED RELEASE ORAL at 10:13

## 2017-12-11 RX ADMIN — PROPOFOL 30 MG: 10 INJECTION, EMULSION INTRAVENOUS at 16:13

## 2017-12-11 RX ADMIN — Medication 1 MG: at 21:19

## 2017-12-11 RX ADMIN — HEPARIN SODIUM 5000 UNITS: 5000 INJECTION, SOLUTION INTRAVENOUS; SUBCUTANEOUS at 04:50

## 2017-12-11 RX ADMIN — GABAPENTIN 400 MG: 400 CAPSULE ORAL at 10:13

## 2017-12-11 RX ADMIN — METOCLOPRAMIDE 10 MG: 5 INJECTION, SOLUTION INTRAMUSCULAR; INTRAVENOUS at 15:42

## 2017-12-11 RX ADMIN — SODIUM CHLORIDE: 234 INJECTION INTRAMUSCULAR; INTRAVENOUS; SUBCUTANEOUS at 03:27

## 2017-12-11 RX ADMIN — FOLIC ACID TAB 400 MCG 0.4 MG: 400 TAB at 10:13

## 2017-12-11 RX ADMIN — GABAPENTIN 400 MG: 400 CAPSULE ORAL at 21:19

## 2017-12-11 NOTE — PROGRESS NOTES
2 [facility]  Hospitalist Division        Inpatient Daily Progress Note    Daily progress Note    Patient: Arthur Yates MRN: [id number]  CSN: [id number]    YOB: 1965  Age: 50 y.o. Sex: female    DOA: 12/5/2017 LOS:  LOS: 5 days                    Chief Complaint:  Abdominal pain at PEG site       Subjective:      Sitting up. C/o PEG insertion site pain. Continued to have tube feeding leakage around PEG site over the weekend. Objective:      Visit Vitals    BP (!) 168/102 (BP 1 Location: Right arm, BP Patient Position: At rest)    Pulse 78    Temp 97.9 °F (36.6 °C)    Resp 16    Ht 5' 4\" (1.626 m)    Wt 57.2 kg (126 lb)    SpO2 99%    Breastfeeding No    BMI 21.63 kg/m2           Physical Exam:  General appearance: alert, cooperative, no distress, appears stated age  Lungs: bilateral bases with rhonchi, no wheezes   Heart: regular rate and rhythm, S1, S2 normal, no murmur, click, rub or gallop  Abdomen: soft, non distended. Mildly TTP.  PEG clamped, dressing intact   Extremities: extremities normal, atraumatic, no cyanosis or edema  Skin: Skin color, texture, turgor normal. No rashes or lesions  Neurologic: grossly normal   PSY: appropriately behaved      Intake and Output:  Current Shift:     Last three shifts:  12/09 1901 - 12/11 0700  In: 2311.7 [I.V.:1981.7]  Out: 1552 [Urine:1551]    Recent Results (from the past 24 hour(s))   GLUCOSE, POC    Collection Time: 12/10/17 12:07 PM   Result Value Ref Range    Glucose (POC) 158 (H) 70 - 110 mg/dL   GLUCOSE, POC    Collection Time: 12/10/17  5:58 PM   Result Value Ref Range    Glucose (POC) 128 (H) 70 - 110 mg/dL   GLUCOSE, POC    Collection Time: 12/10/17 11:56 PM   Result Value Ref Range    Glucose (POC) 109 70 - 110 mg/dL   CBC WITH AUTOMATED DIFF    Collection Time: 12/11/17  6:00 AM   Result Value Ref Range    WBC 6.8 4.6 - 13.2 K/uL    RBC 3.13 (L) 4.20 - 5.30 M/uL    HGB 9.3 (L) 12.0 - 16.0 g/dL    HCT 28.6 (L) 35.0 - 45.0 %    MCV 91.4 74.0 - 97.0 FL    MCH 29.7 24.0 - 34.0 PG    MCHC 32.5 31.0 - 37.0 g/dL    RDW 15.7 (H) 11.6 - 14.5 %    PLATELET 753 066 - 094 K/uL    MPV 11.6 9.2 - 11.8 FL    NEUTROPHILS 54 40 - 73 %    LYMPHOCYTES 24 21 - 52 %    MONOCYTES 7 3 - 10 %    EOSINOPHILS 14 (H) 0 - 5 %    BASOPHILS 1 0 - 2 %    ABS. NEUTROPHILS 3.7 1.8 - 8.0 K/UL    ABS. LYMPHOCYTES 1.6 0.9 - 3.6 K/UL    ABS. MONOCYTES 0.5 0.05 - 1.2 K/UL    ABS. EOSINOPHILS 0.9 (H) 0.0 - 0.4 K/UL    ABS. BASOPHILS 0.1 (H) 0.0 - 0.06 K/UL    DF AUTOMATED     METABOLIC PANEL, BASIC    Collection Time: 12/11/17  6:00 AM   Result Value Ref Range    Sodium 141 136 - 145 mmol/L    Potassium 3.5 3.5 - 5.5 mmol/L    Chloride 104 100 - 108 mmol/L    CO2 31 21 - 32 mmol/L    Anion gap 6 3.0 - 18 mmol/L    Glucose 91 74 - 99 mg/dL    BUN 13 7.0 - 18 MG/DL    Creatinine 0.90 0.6 - 1.3 MG/DL    BUN/Creatinine ratio 14 12 - 20      GFR est AA >60 >60 ml/min/1.73m2    GFR est non-AA >60 >60 ml/min/1.73m2    Calcium 8.3 (L) 8.5 - 10.1 MG/DL   GLUCOSE, POC    Collection Time: 12/11/17  6:03 AM   Result Value Ref Range    Glucose (POC) 94 70 - 110 mg/dL           Lab Results   Component Value Date/Time    Glucose 91 12/11/2017 06:00 AM    Glucose 129 12/10/2017 04:25 AM    Glucose 122 12/09/2017 04:35 AM    Glucose 109 12/08/2017 04:27 AM    Glucose 81 12/07/2017 04:40 AM        Assessment/Plan:     Patient Active Problem List   Diagnosis Code    Hypertension I10    Diabetes mellitus ([facility] E11.9    Dysphagia R13.10    History of throat cancer Z85.819    KEATON (acute kidney injury) ([facility] N17.9    Abdominal wall cellulitis L03.311    G tube feedings (HCC) Z93.1    Anemia D64.9    Hypothyroid E03.9       A/P:  Hermes Lee is a 50 y.o. female who presents with abdominal pain around her feeding tube onset 2 days. She reports the pain as severe. She denies fever.  Pain reports having similar problem in the past when the feeding tube was dislodged. In the ED she was given dilaudid for pain and had respiratory distress requiring narcan. She otherwise denies chest pain , sob. She on on chronic home O2 3 L NC. GI consulted on 12/7- PEG tube replaced. Tube feedings resumed with IV Reglan 5 mg q6h after gastrograffin study confirmed tube position. Patient with PEG site leaking evening/overnight 12/7-12/8. Tube feedings decreased to 30 mL/hr and IV Reglan increased to 10 mg IV q6h. Patient continued      - Leukocytosis-  Patient is a very difficult case to manage. There is a possibility she has some cellulitic component to her PEG to malfunction previously there is also the possibility that this is from a recurrent resp infection. 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 Vanc and Merrem were discontinued. Continue PO levaquin only (intermediate to psuedomonas and will provide intraabdominal coverage).  Presume others are colonizers unless patient's clinical course worsens.  - PEG tube leaking- planned for PEG-J extension by IR today  - Blood cultures- no growth after 5 days- continue to follow for final result   - Hx throat cancer  - Anemia of chronic disease likely 2/2 above: S/p 2 units PRBC on 12/7- Hgb has remained stable   - Chronic Hypoxic Respiratory failure: continuous home 02 at 3L  - DM: SSI   - Hypothyroidism: levothyroxine   - Constipation: Colace, Miralax   - DVT prophylaxis: Heparin          CLIFTON Ro-YAQUELIN [address]  Pager:  [phone]  Office:  [phone]

## 2017-12-11 NOTE — PROGRESS NOTES
Discharge plan is to return home with home care. PEG replaced Friday however leaking issue remain unresolved. Plan is for PEGJ with IR. Patient continue to receive IV antibiotics. I have left an FYI requesting home care for wound care, she will need specific orders for wound care and skin care for cellulitis management.    Kristel Hummel RN

## 2017-12-11 NOTE — ANESTHESIA PREPROCEDURE EVALUATION
Anesthetic History   No history of anesthetic complications            Review of Systems / Medical History  Patient summary reviewed and pertinent labs reviewed    Pulmonary  Within defined limits                 Neuro/Psych   Within defined limits           Cardiovascular    Hypertension: well controlled          CAD      Comments: Pt denies recent CP. GI/Hepatic/Renal  Within defined limits              Endo/Other    Diabetes: well controlled, type 2  Hypothyroidism  Cancer and anemia     Other Findings   Comments:   Risk Factors for Postoperative nausea/vomiting:       History of postoperative nausea/vomiting? NO       Female? YES       Motion sickness? NO       Intended opioid administration for postoperative analgesia? NO      Smoking Abstinence  Current Smoker? NO  Elective Surgery? YES  Seen preoperatively by anesthesiologist or proxy prior to day of surgery? YES  Pt abstained from smoking 24 hours prior to anesthesia?  N/A           Physical Exam    Airway  Mallampati: II  TM Distance: 4 - 6 cm  Neck ROM: decreased range of motion   Mouth opening: Normal     Cardiovascular               Dental         Pulmonary                 Abdominal  Abdominal exam normal       Other Findings            Anesthetic Plan    ASA: 3  Anesthesia type: MAC            Anesthetic plan and risks discussed with: Patient

## 2017-12-11 NOTE — PERIOP NOTES
TRANSFER - IN REPORT:    Verbal report received from [facility] on Verlene Janine  being received from 2200 for ordered procedure      Report consisted of patients Situation, Background, Assessment and   Recommendations(SBAR). Information from the following report(s) Kardex, Procedure Summary and Recent Results was reviewed with the receiving nurse. Opportunity for questions and clarification was provided. Assessment completed upon patients arrival to unit and care assumed.

## 2017-12-11 NOTE — PROGRESS NOTES
NUTRITION follow-up/Plan of care    RECOMMENDATIONS:   1. Enteral Nutrition: Osmolite 1.2 Goal Rate @ 60 ml/hr (1728 Kcal, 80 g PRO and 1181 mL free water)  2. When IV fluids discontinued; Water Flushes: suggest 150 mL every 6 hours  3. Monitor TF, labs and weight. 4. RD to follow        GOALS:   1. Ongoing: Enteral nutrition meets >75% of protein/calorie needs by 12/14  2. Ongoing: Maintain weight (+/- 1-2 lb) by 12/18    ASSESSMENT:   Wt status is classified as normal per BMI of 21.6. TF currently on hold d/t possible procedure. Labs noted. Nutrition recommendations listed. RD to follow.        Nutrition Risk:  [x]   High []  Moderate [] Low    SUBJECTIVE/OBJECTIVE:   Pt admitted for abdominal wall cellulitis. PMHx of throat cancer, dysphagia, PEG/TF, DM, HTN and KEATON. Wt loss noted since last admission, but question accuracy of current wt recorded as unsure how obtained. Variable weights recorded (09/2017) at 125 lb, (10/2017) at 140 lb and (12/2017) 126 lb. Abdomen and sacral wounds noted in chart. Pt confirmed wt fluctuation over last 6 months (between 140-125 lb) equaling a 10% wt change. Pt is s/p PEG removal and replacement on (12/7) and it is currently clamped d/t new PEG still leaking. Per Chart review (12/10) plan is for PEGJ placement by IR at a new site. Will monitor. Information Obtained From:   [x] Chart Review  [] Patient  [] Family/Caregiver  [] Nurse/Physician   [] Patient Rounds/Interdisciplinary Meeting    Diet: NPO with TF: Osmolite 1.2 @ 60 mL/hr  No data found. Medications: [x] Reviewed   Encounter Diagnoses     ICD-10-CM ICD-9-CM   1. Cellulitis, abdominal wall L03.311 682.2   2. Leukocytosis, unspecified type D72.829 288.60   3.  Non-intractable vomiting with nausea, unspecified vomiting type R11.2 787.01     Past Medical History:   Diagnosis Date    Anemia     Cancer ([facility]     throat    Coronary artery disease     Diabetes ([facility]     Diabetes mellitus ([facility]     ETOH abuse  HTN (hypertension)     Hypertension     Hypothyroid     Lupus     Osteonecrosis ([facility]     of jaw    PEG adjustment, replacement, or removal     S/P thoracentesis     Throat cancer ([facility] 2011    SCC of the nasopharynx, s/p chemo/radiation     Labs:    Lab Results   Component Value Date/Time    Sodium 141 12/11/2017 06:00 AM    Potassium 3.5 12/11/2017 06:00 AM    Chloride 104 12/11/2017 06:00 AM    CO2 31 12/11/2017 06:00 AM    Anion gap 6 12/11/2017 06:00 AM    Glucose 91 12/11/2017 06:00 AM    BUN 13 12/11/2017 06:00 AM    Creatinine 0.90 12/11/2017 06:00 AM    Calcium 8.3 12/11/2017 06:00 AM    Magnesium 2.4 12/05/2017 09:33 PM    Phosphorus 5.1 10/06/2017 04:30 AM    Albumin 3.1 12/05/2017 09:33 PM     Anthropometrics: BMI (calculated): 21.6   Last 3 Recorded Weights in this Encounter    12/05/17 1957 12/06/17 0941   Weight: 57.2 kg (126 lb) 57.2 kg (126 lb)      Ht Readings from Last 1 Encounters:   12/06/17 5' 4\" (1.626 m)     []  Weight Loss  []  Weight Gain  []  Weight Stable   [x]  New wt n/a on record     Estimated Nutrition Needs:   1728 Kcals/day (9849-8029 Kcal)  Protein (g): 80 g (70-85g)    Nutrition Problems Identified:   [x] Suboptimal PO intake   [] Food Allergies  [x] Difficulty chewing/swallowing/poor dentition  [] Constipation/Diarrhea   [] Nausea/Vomiting   [] None  [x] Other: Enteral nutrition via PEG     Plan:   [] Therapeutic Diet  []  Obtained/adjusted food preferences/tolerances and/or snacks options   []  Supplements added   [] Occupational therapy following for feeding techniques  []  HS snack added   []  Modify diet texture   []  Modify diet for food allergies   []  Educate patient   []  Assist with menu selection   []  Monitor PO intake on meal rounds   [x]  Continue inpatient monitoring and intervention   []  Participated in discharge planning/Interdisciplinary rounds/Team meetings   [x]  Other:Enteral Nutrition recommendations listed     Education Needs:   [] Not appropriate for teaching at this time   [x] Identified and addressed    Nutrition Monitoring and Evaluation:   [x] Continue inpatient monitoring and interventions    [] Other:     Ezekiel Davis

## 2017-12-11 NOTE — PROGRESS NOTES
1014 meds given, dressing change performed to PEG tube, applied barrier cream around PEG tube site, patient in bed, call bell within reach, no acute distress noted     1555 patient left to endo, meds given, no acute distress noted     1643  Per endo patient on way back to unit and will be npo at midnight for procedure with IR to convert Gtube to G/J tube       1800 tramadol given, dressing change to PEG tube site, patient in bed, no acute distress noted    1919  Bedside and Verbal shift change report given to [facility]. And Cullen SCHUSTER (oncoming nurse) by Brii Leonard RN   (offgoing nurse). Report included the following information SBAR, Kardex and MAR.

## 2017-12-11 NOTE — PROGRESS NOTES
2 [facility]  Hospitalist Division        Inpatient Daily Progress Note    Daily progress Note    Patient: Artis Roe MRN: [id number]  CSN: [id number]    YOB: 1965  Age: 50 y.o. Sex: female    DOA: 12/5/2017 LOS:  LOS: 5 days                    Chief Complaint:  Abdominal pain at PEG site       Subjective:      NAD doing well, Peg tube continued to leak over weekend    Objective:      Visit Vitals    BP (!) 168/102 (BP 1 Location: Right arm, BP Patient Position: At rest)    Pulse 78    Temp 97.9 °F (36.6 °C)    Resp 16    Ht 5' 4\" (1.626 m)    Wt 57.2 kg (126 lb)    SpO2 99%    Breastfeeding No    BMI 21.63 kg/m2           Physical Exam:  General appearance: alert, cooperative, no distress, appears stated age  Lungs: clear to auscultation throughout, no wheezes   Heart: regular rate and rhythm, S1, S2 normal, no murmur, click, rub or gallop  Abdomen: soft, non distended. Mildly TTP.  PEG infusing-   Extremities: extremities normal, atraumatic, no cyanosis or edema  Skin: Skin color, texture, turgor normal. No rashes or lesions  Neurologic: moves all 4 extremities equally  PSY: appropriately behaved      Intake and Output:  Current Shift:     Last three shifts:  12/09 1901 - 12/11 0700  In: 2311.7 [I.V.:1981.7]  Out: 1552 [Urine:1551]    Recent Results (from the past 24 hour(s))   GLUCOSE, POC    Collection Time: 12/10/17 12:07 PM   Result Value Ref Range    Glucose (POC) 158 (H) 70 - 110 mg/dL   GLUCOSE, POC    Collection Time: 12/10/17  5:58 PM   Result Value Ref Range    Glucose (POC) 128 (H) 70 - 110 mg/dL   GLUCOSE, POC    Collection Time: 12/10/17 11:56 PM   Result Value Ref Range    Glucose (POC) 109 70 - 110 mg/dL   CBC WITH AUTOMATED DIFF    Collection Time: 12/11/17  6:00 AM   Result Value Ref Range    WBC 6.8 4.6 - 13.2 K/uL    RBC 3.13 (L) 4.20 - 5.30 M/uL    HGB 9.3 (L) 12.0 - 16.0 g/dL    HCT 28.6 (L) 35.0 - 45.0 %    MCV 91.4 74.0 - 97.0 FL    MCH 29.7 24.0 - 34.0 PG    MCHC 32.5 31.0 - 37.0 g/dL    RDW 15.7 (H) 11.6 - 14.5 %    PLATELET 644 005 - 037 K/uL    MPV 11.6 9.2 - 11.8 FL    NEUTROPHILS 54 40 - 73 %    LYMPHOCYTES 24 21 - 52 %    MONOCYTES 7 3 - 10 %    EOSINOPHILS 14 (H) 0 - 5 %    BASOPHILS 1 0 - 2 %    ABS. NEUTROPHILS 3.7 1.8 - 8.0 K/UL    ABS. LYMPHOCYTES 1.6 0.9 - 3.6 K/UL    ABS. MONOCYTES 0.5 0.05 - 1.2 K/UL    ABS. EOSINOPHILS 0.9 (H) 0.0 - 0.4 K/UL    ABS. BASOPHILS 0.1 (H) 0.0 - 0.06 K/UL    DF AUTOMATED     METABOLIC PANEL, BASIC    Collection Time: 12/11/17  6:00 AM   Result Value Ref Range    Sodium 141 136 - 145 mmol/L    Potassium 3.5 3.5 - 5.5 mmol/L    Chloride 104 100 - 108 mmol/L    CO2 31 21 - 32 mmol/L    Anion gap 6 3.0 - 18 mmol/L    Glucose 91 74 - 99 mg/dL    BUN 13 7.0 - 18 MG/DL    Creatinine 0.90 0.6 - 1.3 MG/DL    BUN/Creatinine ratio 14 12 - 20      GFR est AA >60 >60 ml/min/1.73m2    GFR est non-AA >60 >60 ml/min/1.73m2    Calcium 8.3 (L) 8.5 - 10.1 MG/DL   GLUCOSE, POC    Collection Time: 12/11/17  6:03 AM   Result Value Ref Range    Glucose (POC) 94 70 - 110 mg/dL           Lab Results   Component Value Date/Time    Glucose 91 12/11/2017 06:00 AM    Glucose 129 12/10/2017 04:25 AM    Glucose 122 12/09/2017 04:35 AM    Glucose 109 12/08/2017 04:27 AM    Glucose 81 12/07/2017 04:40 AM        Assessment/Plan:     Patient Active Problem List   Diagnosis Code    Hypertension I10    [facility] E11.9    Dysphagia R13.10    History of throat cancer Z85.819    KEATON (acute kidney injury) ([facility] N17.9    Abdominal wall cellulitis L03.311    [facility] feedings ([facility] Z93.1    Anemia D64.9    Hypothyroid E03.9       A/P:  Ginger Zhao is a 50 y.o. female who presents with abdominal pain around her feeding tube onset 2 days. She reports the pain as severe. She denies fever. Pain reports having similar problem in the past when the feeding tube was dislodged.  In the ED she was given dilaudid for pain and had respiratory distress requiring narcan. She otherwise denies chest pain , sob. She on on chronic home O2 3 L NC. GI consulted on 12/7- PEG tube replaced. Tube feedings resumed with IV Reglan 5 mg q6h after gastrograffin study confirmed tube position. Patient with PEG site leaking evening/overnight 12/7-12/8. Tube feedings decreased to 30 mL/hr and IV Reglan increased to 10 mg IV q6h. GI to see patient afternoon 12/8 for possible percutaneous endoscopic guided replacement to a G-J feeding tube device. Patient grew MRSA and resistant pseudomonas on sputum, see below will not treat now. GJ tube today,.         - Leukocytosis-  Patient is a very difficult case to manage. There is a possibility she has some cellulitic component to her Peg to malfunction previously there is also the possibility that this is from a recurrent resp infection. 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 I will dc vanc and merrem and do levaquin only (intermediate to psuedomonas and will provide intraabdominal coverage). I will assume others are colonizers unless her clinical course worsens.  - PEG tube leaking- discussed with GI- GJ today by IR  - Blood cultures- no growth to date- continue to follow. - Hx throat cancer  - Anemia of chronic disease likely 2/2 above: Hgb 6.0- transfuse 2 units PRBC.  Stable , monitor  - Chronic Hypoxic Respiratory failure: continuous home 02 at 3L  - DM: SSI   - Hypothyroidism: levothyroxine   - Constipation: Colace, Miralax

## 2017-12-11 NOTE — ROUTINE PROCESS
TRANSFER - OUT REPORT:    Verbal report given to Seth Arcos RN (name) on Sandip Vilchis  being transferred to 2200(unit) for routine post - op       Report consisted of patients Situation, Background, Assessment and   Recommendations(SBAR). Information from the following report(s) SBAR was reviewed with the receiving nurse. Lines:   Peripheral IV 12/10/17 Right Arm (Active)   Site Assessment Clean, dry, & intact 12/11/2017  4:29 PM   Phlebitis Assessment 0 12/11/2017  4:29 PM   Infiltration Assessment 0 12/11/2017  4:29 PM   Dressing Status Clean, dry, & intact 12/11/2017  4:29 PM   Dressing Type Transparent;Tape 12/11/2017  4:29 PM   Hub Color/Line Status Blue; Infusing 12/11/2017  4:29 PM   Action Taken Open ports on tubing capped 12/10/2017 11:47 AM   Alcohol Cap Used Yes 12/11/2017  4:29 PM        Opportunity for questions and clarification was provided.       Patient transported with:   [employer]

## 2017-12-11 NOTE — ROUTINE PROCESS
Bedside and Verbal shift change report given to Pete Wills RN (oncoming nurse) by Kiko Callejas RN (offgoing nurse). Report included the following information SBAR, Procedure Summary, Intake/Output, MAR and Recent Results.

## 2017-12-11 NOTE — PROGRESS NOTES
[phone]    PROGRESS NOTE    SUBJECTIVE:   Patient still experiencing stomal leakage despite Reglan disrupting her Gtube feedings. OBJECTIVE:  Visit Vitals    BP (!) 168/94    Pulse 72    Temp 97.6 °F (36.4 °C)    Resp 16    Ht 5' 4\" (1.626 m)    Wt 57.2 kg (126 lb)    SpO2 99%    Breastfeeding No    BMI 21.63 kg/m2     Awake, alert, oriented. Clear breath sounds. RRR, no murmurs. Moderate peristomal skin irritation,  PEG tube in position. Nontender abdomen, no rebound or guarding, no mass or ascites. ASSESSMENT:   PEG tube stomal leakage possibly related to severe gastroparesis but will need to exclude gastric outlet or duodenal stenosis. PLAN:   1. Hold off plans for IR intervention to change G-tube to a G-J combination tube under fluoro. Will proceed with and EGD via gastrostomy stoma to assess her gastroduodenal anatomy to make sure there is no evidence of obstructive or neoplastic process interfering with her gastric empyting or motility. 2.  If EGD does not identify any obstructive or stenosing problem then will schedule IR to change to G-J feeding tube tomorrow.       Traci Arteaga MD

## 2017-12-11 NOTE — PROGRESS NOTES
Problem: Falls - Risk of  Goal: *Absence of Falls  Document Kapil Fall Risk and appropriate interventions in the flowsheet.    Outcome: Progressing Towards Goal  Fall Risk Interventions:  Mobility Interventions: Patient to call before getting OOB         Medication Interventions: Patient to call before getting OOB, Teach patient to arise slowly    Elimination Interventions: Call light in reach, Toilet paper/wipes in reach, Toileting schedule/hourly rounds    History of Falls Interventions: Door open when patient unattended

## 2017-12-11 NOTE — PROCEDURES
Esophagogastroduodenoscopy (EGD) Report    Patient: Alexey Mehta MRN: [id number]  SSN: [id number]    YOB: 1965  Age: 50 y.o. Sex: female      Date/Time:  12/11/2017 4:22 PM    Procedure: Esophagogastroduodenoscopy     FINDINGS:  1. Esophagus -  Distal esophagus was evaluated in a retrograde manner via stomach and was normal without stricture or mass. 2.  Stomach -   Normal mucosa without ulcer or mass. Pylorus was patent. No retained food with mild bilious liquid material that was easily suctioned. The internal gastrostomy was located high up in the fundus of the stomach that is its most dependent portion. No ulcer or fistulous tracts noted adjacent to the internal gastrostomy. 3.  Duodenum -  Normal bulb and 2nd portion. IMPRESSION:   Proximal fundal gastrostomy position without evidence of gastric outlet obstruction or duodenal stenosis. Her persistent stomal leakage is likely the result of its location in the most dependent portion of the stomach. RECOMMENDATIONS:  1. Proceed with G-J replacement feeding tube placement under fluoro by IR tomorrow. 2.   Recommend surgical consultation to either revise her gastrostomy or change to a feeding jejunostomy to reduce stomal leakage and reduce pulmonary aspiration. 3.   Hold Gtube feedings for now and continue IVF hydration. Indication: PEG tube malfunction and stomal leakage. :  Gay Wilson MD  Referring Provider:   Jovi Flood MD  History: The history and physical exam were reviewed and updated. Endoscope: [id number]  Extent of Exam: second portion of the duodenum  ASA: ASA 3 - Patient with moderate systemic disease with functional limitations  Anethesia/Sedation:  MAC anesthesia    Description of the procedure: The procedure was discussed with the patient including risks, benefits, alternatives including risks of iv sedation, bleeding, perforation and aspiration.   A safety timeout was performed. The patient was placed in a supine position. Sedation/anesthesia was administered. The patients vital signs were monitored at all times including heart rate/rhythm, blood pressure and oxygen saturation. The G-tube balloon was deflated and removed. The stoma was observed and without fistulous openings but with mild peristomal skin redness. The ultra-iqbal endoscope was then passed into the stoma, then to the stomach and under direct visualization to the second portion of the duodenum. The endoscope was then slowly withdrawn while visualizing the mucosa. In the stomach a retroflexion was performed and the internal gastrostomy was observed. The cardia was noted and distal esophagus intubated in a retrograde manner. The scope was then removed and procedure terminated. The balloon type PEG tube was reinserted and balloon inflated with 6 ML water. The patient was then transferred to recovery in stable condition. Therapies:  None    Specimens: None           Complications:   None; patient tolerated the procedure well. EBL:None      Michael Mcclure MD, [facility]  [facility]  December 11, 2017  4:22 PM

## 2017-12-12 ENCOUNTER — APPOINTMENT (OUTPATIENT)
Dept: CARDIAC CATH/INVASIVE PROCEDURES | Age: 52
DRG: 394 | End: 2017-12-12
Attending: INTERNAL MEDICINE
Payer: MEDICARE

## 2017-12-12 LAB
ANION GAP SERPL CALC-SCNC: 7 MMOL/L (ref 3–18)
BACTERIA SPEC CULT: NORMAL
BACTERIA SPEC CULT: NORMAL
BASOPHILS # BLD: 0 K/UL (ref 0–0.1)
BASOPHILS NFR BLD: 0 % (ref 0–3)
BUN SERPL-MCNC: 13 MG/DL (ref 7–18)
BUN/CREAT SERPL: 13 (ref 12–20)
CALCIUM SERPL-MCNC: 8.7 MG/DL (ref 8.5–10.1)
CHLORIDE SERPL-SCNC: 101 MMOL/L (ref 100–108)
CO2 SERPL-SCNC: 31 MMOL/L (ref 21–32)
CREAT SERPL-MCNC: 0.97 MG/DL (ref 0.6–1.3)
DIFFERENTIAL METHOD BLD: ABNORMAL
EOSINOPHIL # BLD: 1 K/UL (ref 0–0.4)
EOSINOPHIL NFR BLD: 14 % (ref 0–5)
ERYTHROCYTE [DISTWIDTH] IN BLOOD BY AUTOMATED COUNT: 15.2 % (ref 11.6–14.5)
GLUCOSE BLD STRIP.AUTO-MCNC: 100 MG/DL (ref 70–110)
GLUCOSE BLD STRIP.AUTO-MCNC: 73 MG/DL (ref 70–110)
GLUCOSE BLD STRIP.AUTO-MCNC: 97 MG/DL (ref 70–110)
GLUCOSE BLD STRIP.AUTO-MCNC: 98 MG/DL (ref 70–110)
GLUCOSE SERPL-MCNC: 78 MG/DL (ref 74–99)
HCT VFR BLD AUTO: 30.3 % (ref 35–45)
HGB BLD-MCNC: 10 G/DL (ref 12–16)
LYMPHOCYTES # BLD: 1.7 K/UL (ref 0.8–3.5)
LYMPHOCYTES NFR BLD: 24 % (ref 20–51)
MCH RBC QN AUTO: 29.8 PG (ref 24–34)
MCHC RBC AUTO-ENTMCNC: 33 G/DL (ref 31–37)
MCV RBC AUTO: 90.2 FL (ref 74–97)
MONOCYTES # BLD: 0.3 K/UL (ref 0–1)
MONOCYTES NFR BLD: 4 % (ref 2–9)
NEUTS SEG # BLD: 3.9 K/UL (ref 1.8–8)
NEUTS SEG NFR BLD: 58 % (ref 42–75)
PLATELET # BLD AUTO: 217 K/UL (ref 135–420)
PMV BLD AUTO: 11.4 FL (ref 9.2–11.8)
POTASSIUM SERPL-SCNC: 3.5 MMOL/L (ref 3.5–5.5)
RBC # BLD AUTO: 3.36 M/UL (ref 4.2–5.3)
RBC MORPH BLD: ABNORMAL
RBC MORPH BLD: ABNORMAL
SERVICE CMNT-IMP: NORMAL
SERVICE CMNT-IMP: NORMAL
SODIUM SERPL-SCNC: 139 MMOL/L (ref 136–145)
WBC # BLD AUTO: 6.9 K/UL (ref 4.6–13.2)

## 2017-12-12 PROCEDURE — 74011250636 HC RX REV CODE- 250/636: Performed by: INTERNAL MEDICINE

## 2017-12-12 PROCEDURE — 74011250636 HC RX REV CODE- 250/636

## 2017-12-12 PROCEDURE — 80048 BASIC METABOLIC PNL TOTAL CA: CPT | Performed by: NURSE PRACTITIONER

## 2017-12-12 PROCEDURE — 74011250637 HC RX REV CODE- 250/637: Performed by: HOSPITALIST

## 2017-12-12 PROCEDURE — 85025 COMPLETE CBC W/AUTO DIFF WBC: CPT | Performed by: NURSE PRACTITIONER

## 2017-12-12 PROCEDURE — 65270000029 HC RM PRIVATE

## 2017-12-12 PROCEDURE — 36415 COLL VENOUS BLD VENIPUNCTURE: CPT | Performed by: NURSE PRACTITIONER

## 2017-12-12 PROCEDURE — C1769 GUIDE WIRE: HCPCS

## 2017-12-12 PROCEDURE — 74011000250 HC RX REV CODE- 250: Performed by: NURSE PRACTITIONER

## 2017-12-12 PROCEDURE — 74011250637 HC RX REV CODE- 250/637: Performed by: FAMILY MEDICINE

## 2017-12-12 PROCEDURE — 77030018846 HC SOL IRR STRL H20 ICUM -A

## 2017-12-12 PROCEDURE — 74011636320 HC RX REV CODE- 636/320: Performed by: RADIOLOGY

## 2017-12-12 PROCEDURE — 74011250637 HC RX REV CODE- 250/637: Performed by: NURSE PRACTITIONER

## 2017-12-12 PROCEDURE — 74011250636 HC RX REV CODE- 250/636: Performed by: NURSE PRACTITIONER

## 2017-12-12 PROCEDURE — 74011000258 HC RX REV CODE- 258: Performed by: HOSPITALIST

## 2017-12-12 PROCEDURE — 74011250636 HC RX REV CODE- 250/636: Performed by: HOSPITALIST

## 2017-12-12 PROCEDURE — 74011250636 HC RX REV CODE- 250/636: Performed by: FAMILY MEDICINE

## 2017-12-12 PROCEDURE — 74011000250 HC RX REV CODE- 250: Performed by: RADIOLOGY

## 2017-12-12 PROCEDURE — 74011250636 HC RX REV CODE- 250/636: Performed by: RADIOLOGY

## 2017-12-12 PROCEDURE — 82962 GLUCOSE BLOOD TEST: CPT

## 2017-12-12 PROCEDURE — 0D20XUZ CHANGE FEEDING DEVICE IN UPPER INTESTINAL TRACT, EXTERNAL APPROACH: ICD-10-PCS | Performed by: RADIOLOGY

## 2017-12-12 PROCEDURE — 49446 CHANGE G-TUBE TO G-J PERC: CPT

## 2017-12-12 RX ORDER — HYDRALAZINE HYDROCHLORIDE 20 MG/ML
20 INJECTION INTRAMUSCULAR; INTRAVENOUS ONCE
Status: COMPLETED | OUTPATIENT
Start: 2017-12-12 | End: 2017-12-12

## 2017-12-12 RX ORDER — DIPHENHYDRAMINE HYDROCHLORIDE 50 MG/ML
50 INJECTION, SOLUTION INTRAMUSCULAR; INTRAVENOUS
Status: COMPLETED | OUTPATIENT
Start: 2017-12-12 | End: 2017-12-12

## 2017-12-12 RX ORDER — MIDAZOLAM HYDROCHLORIDE 1 MG/ML
.5-2 INJECTION, SOLUTION INTRAMUSCULAR; INTRAVENOUS
Status: DISCONTINUED | OUTPATIENT
Start: 2017-12-12 | End: 2017-12-12

## 2017-12-12 RX ORDER — LIDOCAINE HYDROCHLORIDE 10 MG/ML
1-60 INJECTION INFILTRATION; PERINEURAL
Status: DISCONTINUED | OUTPATIENT
Start: 2017-12-12 | End: 2017-12-12

## 2017-12-12 RX ORDER — METOCLOPRAMIDE HYDROCHLORIDE 5 MG/ML
10 INJECTION INTRAMUSCULAR; INTRAVENOUS EVERY 6 HOURS
Status: DISCONTINUED | OUTPATIENT
Start: 2017-12-12 | End: 2017-12-13

## 2017-12-12 RX ORDER — HEPARIN SODIUM 200 [USP'U]/100ML
500 INJECTION, SOLUTION INTRAVENOUS ONCE
Status: COMPLETED | OUTPATIENT
Start: 2017-12-12 | End: 2017-12-12

## 2017-12-12 RX ORDER — FENTANYL CITRATE 50 UG/ML
25-100 INJECTION, SOLUTION INTRAMUSCULAR; INTRAVENOUS
Status: DISCONTINUED | OUTPATIENT
Start: 2017-12-12 | End: 2017-12-12

## 2017-12-12 RX ORDER — HYDRALAZINE HYDROCHLORIDE 20 MG/ML
INJECTION INTRAMUSCULAR; INTRAVENOUS
Status: COMPLETED
Start: 2017-12-12 | End: 2017-12-12

## 2017-12-12 RX ORDER — DIPHENHYDRAMINE HYDROCHLORIDE 50 MG/ML
INJECTION, SOLUTION INTRAMUSCULAR; INTRAVENOUS
Status: COMPLETED
Start: 2017-12-12 | End: 2017-12-12

## 2017-12-12 RX ADMIN — NYSTATIN: 100000 POWDER TOPICAL at 18:45

## 2017-12-12 RX ADMIN — LEVOFLOXACIN 750 MG: 5 INJECTION, SOLUTION INTRAVENOUS at 13:54

## 2017-12-12 RX ADMIN — HEPARIN SODIUM 5000 UNITS: 5000 INJECTION, SOLUTION INTRAVENOUS; SUBCUTANEOUS at 04:16

## 2017-12-12 RX ADMIN — FENTANYL CITRATE 25 MCG: 50 INJECTION, SOLUTION INTRAMUSCULAR; INTRAVENOUS at 09:36

## 2017-12-12 RX ADMIN — SODIUM CHLORIDE: 234 INJECTION INTRAMUSCULAR; INTRAVENOUS; SUBCUTANEOUS at 00:24

## 2017-12-12 RX ADMIN — PANCRELIPASE 1 CAPSULE: 10000; 34000; 55000 CAPSULE, DELAYED RELEASE ORAL at 13:48

## 2017-12-12 RX ADMIN — HEPARIN SODIUM 5000 UNITS: 5000 INJECTION, SOLUTION INTRAVENOUS; SUBCUTANEOUS at 13:47

## 2017-12-12 RX ADMIN — NYSTATIN: 100000 POWDER TOPICAL at 14:48

## 2017-12-12 RX ADMIN — FOLIC ACID TAB 400 MCG 0.4 MG: 400 TAB at 13:48

## 2017-12-12 RX ADMIN — HYDRALAZINE HYDROCHLORIDE 10 MG: 20 INJECTION INTRAMUSCULAR; INTRAVENOUS at 09:25

## 2017-12-12 RX ADMIN — HEPARIN SODIUM 1000 UNITS: 200 INJECTION, SOLUTION INTRAVENOUS at 09:30

## 2017-12-12 RX ADMIN — CARVEDILOL 6.25 MG: 6.25 TABLET, FILM COATED ORAL at 00:12

## 2017-12-12 RX ADMIN — TRAMADOL HYDROCHLORIDE 50 MG: 50 TABLET, FILM COATED ORAL at 21:13

## 2017-12-12 RX ADMIN — METOCLOPRAMIDE 10 MG: 5 INJECTION, SOLUTION INTRAMUSCULAR; INTRAVENOUS at 05:22

## 2017-12-12 RX ADMIN — MIDAZOLAM HYDROCHLORIDE 0.5 MG: 1 INJECTION, SOLUTION INTRAMUSCULAR; INTRAVENOUS at 09:17

## 2017-12-12 RX ADMIN — FUROSEMIDE 40 MG: 40 TABLET ORAL at 13:48

## 2017-12-12 RX ADMIN — GABAPENTIN 400 MG: 400 CAPSULE ORAL at 21:12

## 2017-12-12 RX ADMIN — METOCLOPRAMIDE 10 MG: 5 INJECTION, SOLUTION INTRAMUSCULAR; INTRAVENOUS at 21:01

## 2017-12-12 RX ADMIN — LISINOPRIL 5 MG: 5 TABLET ORAL at 13:48

## 2017-12-12 RX ADMIN — FAMOTIDINE 20 MG: 10 INJECTION, SOLUTION INTRAVENOUS at 21:01

## 2017-12-12 RX ADMIN — Medication 1 MG: at 04:14

## 2017-12-12 RX ADMIN — LEVOTHYROXINE SODIUM 75 MCG: 75 TABLET ORAL at 06:09

## 2017-12-12 RX ADMIN — HEPARIN SODIUM 5000 UNITS: 5000 INJECTION, SOLUTION INTRAVENOUS; SUBCUTANEOUS at 21:01

## 2017-12-12 RX ADMIN — DIPHENHYDRAMINE HYDROCHLORIDE 25 MG: 25 CAPSULE ORAL at 21:13

## 2017-12-12 RX ADMIN — LIDOCAINE HYDROCHLORIDE 10 ML: 10 INJECTION, SOLUTION INFILTRATION; PERINEURAL at 09:43

## 2017-12-12 RX ADMIN — Medication 1 MG: at 18:53

## 2017-12-12 RX ADMIN — DIPHENHYDRAMINE HYDROCHLORIDE 25 MG: 50 INJECTION, SOLUTION INTRAMUSCULAR; INTRAVENOUS at 09:27

## 2017-12-12 RX ADMIN — FENTANYL CITRATE 50 MCG: 50 INJECTION, SOLUTION INTRAMUSCULAR; INTRAVENOUS at 09:11

## 2017-12-12 RX ADMIN — ASPIRIN 81 MG 81 MG: 81 TABLET ORAL at 13:48

## 2017-12-12 RX ADMIN — GABAPENTIN 400 MG: 400 CAPSULE ORAL at 13:59

## 2017-12-12 RX ADMIN — PANCRELIPASE 1 CAPSULE: 10000; 34000; 55000 CAPSULE, DELAYED RELEASE ORAL at 18:45

## 2017-12-12 RX ADMIN — FAMOTIDINE 20 MG: 10 INJECTION, SOLUTION INTRAVENOUS at 13:47

## 2017-12-12 RX ADMIN — PANCRELIPASE 1 CAPSULE: 10000; 34000; 55000 CAPSULE, DELAYED RELEASE ORAL at 00:12

## 2017-12-12 RX ADMIN — Medication 1 MG: at 13:30

## 2017-12-12 RX ADMIN — METOCLOPRAMIDE 10 MG: 5 INJECTION, SOLUTION INTRAMUSCULAR; INTRAVENOUS at 14:48

## 2017-12-12 RX ADMIN — IOPAMIDOL 32 ML: 612 INJECTION, SOLUTION INTRAVENOUS at 09:50

## 2017-12-12 RX ADMIN — CARVEDILOL 6.25 MG: 6.25 TABLET, FILM COATED ORAL at 13:48

## 2017-12-12 NOTE — PROGRESS NOTES
7259 Patient left to radiology      MD [facility] made aware of leakage around [address] tube site, instructed to use Jeju port and made mentioned there will be some leakage, surgical consult will be performed, instructed to contact Jl Gonzalez in regards to feeding set up     [address] contacted in regards to feedings initiation, instructed nutritionist for this floor will be contacted and made aware     1730 feeding initiated at 20 mL per hr, placement verified     1915 Bedside and Verbal shift change report given to Dread Cody RN  (oncoming nurse) by Meghann Finney RN   (offgoing nurse). Report included the following information SBAR, Kardex and MAR.

## 2017-12-12 NOTE — PROGRESS NOTES
Gastrointestinal Progress Note    Patient Name: Luan Dill    WZGLA'S Date: 12/12/2017    Admit Date: 12/5/2017      Assessment:   1. Stomal leakage from gastrostomy stoma  2. Presumed gastroparesis  3. Oropharyngeal dysphagia    Recommendation:   1. Recommend preferential use of jejunal tube for medication and tube feeds. Need to confirm tube feeding schedule and formulation with nutrition and need to confirm appropriateness of medications for jejunal administration with pharmacy  2. Await surgical opinion on revising gastrostomy tube vs placement of a direct jejunostomy tube; discussed with Dr Myrna Coon today. Subjective:     Luan Dill is a 50 y.o. Female followed for leakage around gastrostomy tube. She had replacement of her tube with a 22Fr VIKTORIA G-J tube by IR today. Has had ongoing drainage around the G-tube site per nursing staff, concerned that her medications are coming right out the gastrostomy. Patient has minimal pain around tube site.        Current Facility-Administered Medications   Medication Dose Route Frequency    carvedilol (COREG) tablet 6.25 mg  6.25 mg Oral BID WITH MEALS    lisinopril (PRINIVIL, ZESTRIL) tablet 5 mg  5 mg Oral DAILY    levoFLOXacin (LEVAQUIN) 750 mg in D5W IVPB  750 mg IntraVENous Q24H    sodium chloride 0.45 % in dextrose 10% 1,019.6 mL infusion   IntraVENous CONTINUOUS    HYDROmorphone in NS (DILAUDID) injection 1 mg  1 mg IntraVENous Q4H PRN    metoclopramide HCl (REGLAN) injection 10 mg  10 mg IntraVENous Q6H    aspirin chewable tablet 81 mg  81 mg Per G Tube DAILY    docusate (COLACE) 50 mg/5 mL oral liquid 100 mg  100 mg Per G Tube DAILY    folic acid tablet 0.4 mg  0.4 mg Oral DAILY    furosemide (LASIX) tablet 40 mg  40 mg Per G Tube DAILY    gabapentin (NEURONTIN) capsule 400 mg  400 mg Per G Tube BID    levothyroxine (SYNTHROID) tablet 75 mcg  75 mcg Per G Tube Daily    lipase-protease-amylase (ZENPEP 10,000) capsule 1 Cap  1 Cap Per G Tube TID WITH MEALS    polyethylene glycol (MIRALAX) packet 17 g  17 g Per G Tube DAILY    famotidine (PF) (PEPCID) injection 20 mg  20 mg IntraVENous Q12H    nystatin (MYCOSTATIN) 100,000 unit/gram powder   Topical BID    0.9% sodium chloride infusion 250 mL  250 mL IntraVENous PRN    insulin lispro (HUMALOG) injection   SubCUTAneous Q6H    naloxone (NARCAN) injection 0.4 mg  0.4 mg IntraVENous PRN    ondansetron (ZOFRAN) injection 4 mg  4 mg IntraVENous Q4H PRN    heparin (porcine) injection 5,000 Units  5,000 Units SubCUTAneous Q8H    cetirizine (ZYRTEC) tablet 10 mg  10 mg Oral DAILY PRN    sodium chloride (OCEAN) 0.65 % nasal spray 2 Spray  2 Spray Both Nostrils PRN    glucose chewable tablet 16 g  4 Tab Oral PRN    glucagon (GLUCAGEN) injection 1 mg  1 mg IntraMUSCular PRN    diphenhydrAMINE (BENADRYL) capsule 25 mg  25 mg Oral Q6H PRN    traMADol (ULTRAM) tablet 50 mg  50 mg Oral Q6H PRN    acetaminophen (TYLENOL) tablet 650 mg  650 mg Oral Q6H PRN          Objective:     Physical Exam:    Visit Vitals    /84 (BP 1 Location: Right arm, BP Patient Position: Lying left side)    Pulse 80    Temp 97.8 °F (36.6 °C)    Resp 20    Ht 5' 4\" (1.626 m)    Wt 57.2 kg (126 lb)    SpO2 100%    Breastfeeding No    BMI 21.63 kg/m2     Gen: Awake and alert  CV: RRR, no M/R/G  Pulm: CTA B/L  Abd: soft, no significant tenderness, G/J tube in good position with new dressing in place with minimal current drainage noted; no significant erythema of the skin around the tube.      Data Review:    Labs: Results:       Chemistry Recent Labs      12/12/17   0340 12/11/17   0600  12/10/17   0425   GLU  78  91  129*   NA  139  141  140   K  3.5  3.5  3.4*   CL  101  104  105   CO2  31  31  29   BUN  13  13  14   CREA  0.97  0.90  0.78   CA  8.7  8.3*  7.9*   AGAP  7  6  6   BUCR  13  14  18      CBC w/Diff Recent Labs      12/12/17   0340 12/11/17   0600  12/10/17   0425   WBC  6.9  6.8  7.0   RBC  3.36* 3.13*  3.16*   HGB  10.0*  9.3*  9.3*   HCT  30.3*  28.6*  28.6*   PLT  217  188  171   GRANS  58  54  62   LYMPH  24  24  15*   EOS  14*  14*  17*      Coagulation No results for input(s): PTP, INR, APTT in the last 72 hours. No lab exists for component: INREXT    Liver Enzymes No results for input(s): TP, ALB, TBIL, AP, SGOT, ALT in the last 72 hours.     No lab exists for component: JELENA Owusu MD  December 12, 2017

## 2017-12-12 NOTE — PROGRESS NOTES
[facility]  Hospitalist Division        Inpatient Daily Progress Note    Daily progress Note    Patient: Yane Martínez MRN: [id number]  CSN: [id number]    YOB: 1965  Age: 50 y.o. Sex: female    DOA: 12/5/2017 LOS:  LOS: 6 days                    Chief Complaint:  Abdominal pain at PEG site       Subjective:      Returned from IR for G-J placement. C/o drowsiness. Pain well controlled. Objective:      Visit Vitals    BP (!) 159/94 (BP 1 Location: Right arm, BP Patient Position: At rest)    Pulse 63    Temp 97.7 °F (36.5 °C)    Resp 20    Ht 5' 4\" (1.626 m)    Wt 57.2 kg (126 lb)    SpO2 93%    Breastfeeding No    BMI 21.63 kg/m2           Physical Exam:  General appearance: alert, cooperative, no distress, appears stated age  Lungs: bilateral bases with faint rhonchi, no wheezes   Heart: regular rate and rhythm, S1, S2 normal, no murmur, click, rub or gallop  Abdomen: soft, non distended. Mildly TTP.  G-J tube clamped, dressing CDI   Extremities: extremities normal, atraumatic, no cyanosis or edema  Skin: Skin color, texture, turgor normal. No rashes or lesions  Neurologic: grossly normal   PSY: appropriately behaved      Intake and Output:  Current Shift:     Last three shifts:  12/10 1901 - 12/12 0700  In: 1539.2 [I.V.:1389.2]  Out: 1000 [Urine:1000]    Recent Results (from the past 24 hour(s))   GLUCOSE, POC    Collection Time: 12/11/17  6:07 PM   Result Value Ref Range    Glucose (POC) 73 70 - 110 mg/dL   GLUCOSE, POC    Collection Time: 12/12/17 12:03 AM   Result Value Ref Range    Glucose (POC) 100 70 - 110 mg/dL   CBC WITH AUTOMATED DIFF    Collection Time: 12/12/17  3:40 AM   Result Value Ref Range    WBC 6.9 4.6 - 13.2 K/uL    RBC 3.36 (L) 4.20 - 5.30 M/uL    HGB 10.0 (L) 12.0 - 16.0 g/dL    HCT 30.3 (L) 35.0 - 45.0 %    MCV 90.2 74.0 - 97.0 FL    MCH 29.8 24.0 - 34.0 PG    MCHC 33.0 31.0 - 37.0 g/dL    RDW 15.2 (H) 11.6 - 14.5 % PLATELET 744 992 - 175 K/uL    MPV 11.4 9.2 - 11.8 FL    NEUTROPHILS 58 42 - 75 %    LYMPHOCYTES 24 20 - 51 %    MONOCYTES 4 2 - 9 %    EOSINOPHILS 14 (H) 0 - 5 %    BASOPHILS 0 0 - 3 %    ABS. NEUTROPHILS 3.9 1.8 - 8.0 K/UL    ABS. LYMPHOCYTES 1.7 0.8 - 3.5 K/UL    ABS. MONOCYTES 0.3 0 - 1.0 K/UL    ABS. EOSINOPHILS 1.0 (H) 0.0 - 0.4 K/UL    ABS.  BASOPHILS 0.0 0.0 - 0.1 K/UL    RBC COMMENTS ANISOCYTOSIS  1+        RBC COMMENTS HYPOCHROMIA  1+        DF MANUAL     METABOLIC PANEL, BASIC    Collection Time: 12/12/17  3:40 AM   Result Value Ref Range    Sodium 139 136 - 145 mmol/L    Potassium 3.5 3.5 - 5.5 mmol/L    Chloride 101 100 - 108 mmol/L    CO2 31 21 - 32 mmol/L    Anion gap 7 3.0 - 18 mmol/L    Glucose 78 74 - 99 mg/dL    BUN 13 7.0 - 18 MG/DL    Creatinine 0.97 0.6 - 1.3 MG/DL    BUN/Creatinine ratio 13 12 - 20      GFR est AA >60 >60 ml/min/1.73m2    GFR est non-AA >60 >60 ml/min/1.73m2    Calcium 8.7 8.5 - 10.1 MG/DL   GLUCOSE, POC    Collection Time: 12/12/17  5:57 AM   Result Value Ref Range    Glucose (POC) 98 70 - 110 mg/dL   GLUCOSE, POC    Collection Time: 12/12/17 12:05 PM   Result Value Ref Range    Glucose (POC) 97 70 - 110 mg/dL   GLUCOSE, POC    Collection Time: 12/12/17  4:04 PM   Result Value Ref Range    Glucose (POC) 73 70 - 110 mg/dL           Lab Results   Component Value Date/Time    Glucose 78 12/12/2017 03:40 AM    Glucose 91 12/11/2017 06:00 AM    Glucose 129 12/10/2017 04:25 AM    Glucose 122 12/09/2017 04:35 AM    Glucose 109 12/08/2017 04:27 AM        Assessment/Plan:     Patient Active Problem List   Diagnosis Code    Hypertension I10    Diabetes mellitus ([facility] E11.9    Dysphagia R13.10    History of throat cancer Z85.819    KEATON (acute kidney injury) ([address] N17.9    Abdominal wall cellulitis L03.311    G tube feedings ([facility]) Z93.1    Anemia D64.9    Hypothyroid E03.9       Hospital Course:   Kamaljit White is a 50 y.o. female who presents with abdominal pain around her feeding tube onset 2 days. She reports the pain as severe. She denies fever. Pain reports having similar problem in the past when the feeding tube was dislodged. In the ED she was given dilaudid for pain and had respiratory distress requiring narcan. She otherwise denies chest pain , sob. She on on chronic home O2 3 L NC. GI consulted on 12/7- PEG tube replaced. Tube feedings resumed with IV Reglan 5 mg q6h after gastrograffin study confirmed tube position. Patient with PEG site leaking evening/overnight 12/7-12/8. Tube feedings decreased to 30 mL/hr and IV Reglan increased to 10 mg IV q6h. Patient continued to have leaking at PEG site. Placement of G-J tube on 12/12. Nutrition Consult ordered for tube feeding management. A/P:  - Leukocytosis-  Resolved. Patient is a very difficult case to manage. There is a possibility she has some cellulitic component to her PEG to malfunction previously there is also the possibility that this is from a recurrent resp infection. 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 There is no clear evidence of current infection and given the sputum growing now penamase resistant organisms as well as MRSA that is even more resistant than previously is even a better reason to try to minimize abx to decrease possiblity of further resistance. Vanc and Merrem were discontinued. Continue PO levaquin only (intermediate to psuedomonas and will provide intraabdominal coverage). Presume others are colonizers unless patient's clinical course worsens.  Levaquin day 3/7  - G-J tube placement by IR 12/12- nutrition consult ordered for Tube Feeding management   - Blood cultures- final result: no growth    - Hx throat cancer  - Anemia of chronic disease- continue to monitor H/H  - Chronic Hypoxic Respiratory failure: continuous home 02 at 3L  - DM: SSI   - Hypothyroidism: levothyroxine   - Constipation: Colace, Miralax   - DVT prophylaxis: Heparin          Chuck Ortiz, FNP-BC  [facility]  Pager:  [phone]  Office:  [phone]

## 2017-12-12 NOTE — PROGRESS NOTES
conducted a Follow up consultation and Spiritual Assessment for Manoj Farrell, who is a 50 y.o.,female. The  provided the following Interventions:  Continued the relationship of care and support. Listened empathically. Offered prayer and assurance of continued prayer on patients behalf. Chart reviewed. The following outcomes were achieved:  Patient expressed gratitude for 's visit. Assessment:  There are no spiritual or Mosque issues which require intervention at this time. Plan:  Chaplains will continue to follow and will provide pastoral care on an as needed/requested basis.  recommends bedside caregivers page  on duty if patient shows signs of acute spiritual or emotional distress. Joan Forte M.Div.   [facility]  [phone]

## 2017-12-12 NOTE — PROCEDURES
Vascular & Interventional Radiology Brief Procedure Note    Interventional Radiologist: Lynn Poon MD    Assistants: None    Pre-operative Diagnosis:  Leakage around G-tube    Post-operative Diagnosis: Same as pre-op dx    Procedure(s) Performed:  G-J tube placement via G-tube site    Anesthesia:  Local and Moderate Sedation    Findings:  20 Fr Gastric tube in stomach, removed and new slightly larger 22 Fr 45 cm VIKTORIA G-J tube placed, tip in jejenum    Complications: None    Estimated Blood Loss:  minimal    Tubes and Drains: 22 Fr 45 cm VIKTORIA G-J    Specimens: None    Condition: Good    Disposition:  Nursing unit    Plan: Bedrest x 1.5 hrs, tube ready for use      Lynn Poon MD  [address] [address]  Vascular & Interventional Radiology  12/12/2017

## 2017-12-12 NOTE — PROGRESS NOTES
[facility] Cardiac Cath Lab:  Pre Procedure Chart Check    Patients chart was accessed and reviewed for possible and/or scheduled procedure. Creatinine Clearance:  CREATININE: 0.97 MG/DL (17 0340)  Estimated creatinine clearance: 59.9 mL/min    Total Contrast  Load:  3 x estimated clearance amount=   180    ml    75% of Contrast Load:  0.75 x Total Contrast Load=        ml      Recent Labs      17   0340   WBC  6.9   RBC  3.36*   HCT  30.3*   HGB  10.0*   PLT  217   NA  139   K  3.5   BUN  13   CREA  0.97   GFRAA  >60   GFRNA  >60   CA  8.7       BMI: Body mass index is 21.63 kg/(m^2). ALLERGIES:   Allergies   Allergen Reactions    Morphine Rash    Amlodipine Swelling     Leg swelling per patient, but no problem taking Lotrel (amlodipine-benazepril)       Lines:        Peripheral IV 12/10/17 Right Arm (Active)   Site Assessment Clean, dry, & intact 2017 12:59 AM   Phlebitis Assessment 0 2017 12:59 AM   Infiltration Assessment 0 2017 12:59 AM   Dressing Status Clean, dry, & intact 2017 12:59 AM   Dressing Type Transparent;Tape 2017 12:59 AM   Hub Color/Line Status Blue; Infusing 2017 12:59 AM   Action Taken Open ports on tubing capped 2017 12:59 AM   Alcohol Cap Used Yes 2017 12:59 AM          History:  Past Medical History:   Diagnosis Date    Anemia     Cancer ([address]     throat    Coronary artery disease     Diabetes ([address]     Diabetes mellitus ([address]     ETOH abuse     HTN (hypertension)     Hypertension     Hypothyroid     Lupus     Osteonecrosis (HCC)     of jaw    PEG adjustment, replacement, or removal     S/P thoracentesis     Throat cancer ([address]     SCC of the nasopharynx, s/p chemo/radiation     Past Surgical History:   Procedure Laterality Date    ABDOMEN SURGERY PROC UNLISTED      feeding tube placement    EGD  2017         HC TUBE PEG REPLACMNT  2017         HX  SECTION      HX HEENT      throat cancer biopsy     Patient Active Problem List   Diagnosis Code    Hypertension I10    Diabetes mellitus ([facility] E11.9    Dysphagia R13.10    History of throat cancer Z85.819    [facility] N17.9    Abdominal wall cellulitis L03.311    G tube feedings (HCC) Z93.1    Anemia D64.9    Hypothyroid E03.9

## 2017-12-12 NOTE — PROGRESS NOTES
Certified Tim Dutta provider rounded on Luan Dill to provide education related to sleep apnea after chart review for risk factors. Risk factors include:  1. Hypertension  2. Diabetes, Type II  3. Coronary Artery Disease  4. Cancer  5. Mallampati II  6. STOP BANG score 4  7. [place] Sleepiness score 9    Provided patient with the following pamphlets:  1. What is Sleep Apnea  2. Sleep and Medical problems  3. Sleep & Your Heart  4. Common Sleep Problems for Older Adults  5. Tips For Sleep As You Age  10. Tips For Better Sleep In Older Adults    Patient Education:  1. Reviewed sleep hygiene & effects of poor sleep quality. 2. Reviewed relationship between sleep & heart health. 3. Reviewed relationship between sleep & age. 4. Reviewed relationship between sleep & weight. Patient stated that she prefers to sleep in lateral position. Recommendations:  1. Referral to sleep specialist for evaluation if symptoms of poor sleep quality present. 2. Order baseline PSG testing to be arranged as an outpatient. 3. Follow up with sleep specialist for treatment and management.

## 2017-12-12 NOTE — PROGRESS NOTES
TRANSFER - OUT REPORT:    Verbal report given to Regine on Joo Espino  being transferred to 2214 for routine progression of care       Report consisted of patients Situation, Background, Assessment and   Recommendations(SBAR). Information from the following report(s) SBAR, Procedure Summary and MAR was reviewed with the receiving nurse. Opportunity for questions and clarification was provided.       Patient transported with:  Edgar Parikh and RT

## 2017-12-12 NOTE — H&P
The patient is an appropriate candidate to undergo G to [facility] tube exchange. Patient assessed immediately prior to induction. Anesthesia plan as follows:   Conscious Sedation. Planned agent(s):  fentanyl and versed    ASA Score:  ASA 3 - Severe systemic disease but compensated     History and Physical update:  H&P was reviewed and the patient was examined. No changes have occurred in the patient's condition since the H&P was completed.     Yonathan Robert MD  Vascular & Interventional Radiology  [facility]  12/12/2017

## 2017-12-12 NOTE — PROGRESS NOTES
9855 Bedside and Verbal shift change report given to 84 Radha Hampton RN (oncoming nurse) by Obdulio Coles RN (offgoing nurse). Report included the following information SBAR, Kardex and MAR.

## 2017-12-12 NOTE — ANESTHESIA POSTPROCEDURE EVALUATION
Post-Anesthesia Evaluation and Assessment    Patient: Adithya Postal MRN: [id number]  SSN: [id number]    YOB: 1965  Age: 50 y.o. Sex: female       Cardiovascular Function/Vital Signs  Visit Vitals    BP (!) 142/92    Pulse 78    Temp 36.4 °C (97.6 °F)    Resp 16    Ht 5' 4\" (1.626 m)    Wt 57.2 kg (126 lb)    SpO2 100%    Breastfeeding No    BMI 21.63 kg/m2       Patient is status post MAC anesthesia for Procedure(s):  ESOPHAGOGASTRODUODENOSCOPY (EGD). Nausea/Vomiting: None    Postoperative hydration reviewed and adequate. Pain:  Pain Scale 1: Numeric (0 - 10) (12/11/17 2056)  Pain Intensity 1: 3 (12/11/17 2056)   Managed    Neurological Status:   Neuro  Neurologic State: Alert (12/10/17 1940)  Orientation Level: Oriented X4 (12/10/17 1940)  Cognition: Appropriate decision making; Appropriate for age attention/concentration; Appropriate safety awareness (12/10/17 0830)  Speech: Appropriate for age (12/10/17 0830)  Assessment L Pupil: Brisk;Round (12/09/17 1600)  Size L Pupil (mm): 3 (12/09/17 1600)  Assessment R Pupil: Brisk;Round (12/09/17 1600)  Size R Pupil (mm): 3 (12/09/17 1600)  LUE Motor Response: Purposeful (12/09/17 2000)  LLE Motor Response: Purposeful (12/09/17 2000)  RUE Motor Response: Purposeful (12/09/17 2000)  RLE Motor Response: Purposeful (12/09/17 2000)   At baseline    Mental Status and Level of Consciousness: Arousable    Pulmonary Status:   O2 Device: Nasal cannula (12/11/17 1631)   Adequate oxygenation and airway patent    Complications related to anesthesia: None    Post-anesthesia assessment completed.  No concerns    Signed By: Coreen Mtz MD     December 11, 2017

## 2017-12-13 LAB
ANION GAP SERPL CALC-SCNC: 8 MMOL/L (ref 3–18)
BASOPHILS # BLD: 0 K/UL (ref 0–0.06)
BASOPHILS NFR BLD: 0 % (ref 0–2)
BUN SERPL-MCNC: 16 MG/DL (ref 7–18)
BUN/CREAT SERPL: 14 (ref 12–20)
CALCIUM SERPL-MCNC: 8.6 MG/DL (ref 8.5–10.1)
CHLORIDE SERPL-SCNC: 103 MMOL/L (ref 100–108)
CO2 SERPL-SCNC: 29 MMOL/L (ref 21–32)
CREAT SERPL-MCNC: 1.11 MG/DL (ref 0.6–1.3)
DIFFERENTIAL METHOD BLD: ABNORMAL
EOSINOPHIL # BLD: 1.1 K/UL (ref 0–0.4)
EOSINOPHIL NFR BLD: 17 % (ref 0–5)
ERYTHROCYTE [DISTWIDTH] IN BLOOD BY AUTOMATED COUNT: 15 % (ref 11.6–14.5)
GLUCOSE BLD STRIP.AUTO-MCNC: 100 MG/DL (ref 70–110)
GLUCOSE BLD STRIP.AUTO-MCNC: 101 MG/DL (ref 70–110)
GLUCOSE BLD STRIP.AUTO-MCNC: 89 MG/DL (ref 70–110)
GLUCOSE BLD STRIP.AUTO-MCNC: 96 MG/DL (ref 70–110)
GLUCOSE BLD STRIP.AUTO-MCNC: 96 MG/DL (ref 70–110)
GLUCOSE SERPL-MCNC: 94 MG/DL (ref 74–99)
HCT VFR BLD AUTO: 30 % (ref 35–45)
HGB BLD-MCNC: 9.8 G/DL (ref 12–16)
LYMPHOCYTES # BLD: 2.2 K/UL (ref 0.9–3.6)
LYMPHOCYTES NFR BLD: 32 % (ref 21–52)
MCH RBC QN AUTO: 29.7 PG (ref 24–34)
MCHC RBC AUTO-ENTMCNC: 32.7 G/DL (ref 31–37)
MCV RBC AUTO: 90.9 FL (ref 74–97)
MONOCYTES # BLD: 0.5 K/UL (ref 0.05–1.2)
MONOCYTES NFR BLD: 7 % (ref 3–10)
NEUTS SEG # BLD: 2.9 K/UL (ref 1.8–8)
NEUTS SEG NFR BLD: 44 % (ref 40–73)
PLATELET # BLD AUTO: 212 K/UL (ref 135–420)
PMV BLD AUTO: 11.3 FL (ref 9.2–11.8)
POTASSIUM SERPL-SCNC: 3.4 MMOL/L (ref 3.5–5.5)
RBC # BLD AUTO: 3.3 M/UL (ref 4.2–5.3)
SODIUM SERPL-SCNC: 140 MMOL/L (ref 136–145)
WBC # BLD AUTO: 6.7 K/UL (ref 4.6–13.2)

## 2017-12-13 PROCEDURE — 74011250636 HC RX REV CODE- 250/636: Performed by: FAMILY MEDICINE

## 2017-12-13 PROCEDURE — 74011250636 HC RX REV CODE- 250/636: Performed by: INTERNAL MEDICINE

## 2017-12-13 PROCEDURE — 74011250637 HC RX REV CODE- 250/637: Performed by: FAMILY MEDICINE

## 2017-12-13 PROCEDURE — 74011250637 HC RX REV CODE- 250/637: Performed by: HOSPITALIST

## 2017-12-13 PROCEDURE — 36415 COLL VENOUS BLD VENIPUNCTURE: CPT | Performed by: INTERNAL MEDICINE

## 2017-12-13 PROCEDURE — 65270000029 HC RM PRIVATE

## 2017-12-13 PROCEDURE — 85025 COMPLETE CBC W/AUTO DIFF WBC: CPT | Performed by: INTERNAL MEDICINE

## 2017-12-13 PROCEDURE — 74011000250 HC RX REV CODE- 250: Performed by: NURSE PRACTITIONER

## 2017-12-13 PROCEDURE — 74011250636 HC RX REV CODE- 250/636: Performed by: NURSE PRACTITIONER

## 2017-12-13 PROCEDURE — 74011250637 HC RX REV CODE- 250/637: Performed by: NURSE PRACTITIONER

## 2017-12-13 PROCEDURE — 74011250636 HC RX REV CODE- 250/636: Performed by: HOSPITALIST

## 2017-12-13 PROCEDURE — 80048 BASIC METABOLIC PNL TOTAL CA: CPT | Performed by: INTERNAL MEDICINE

## 2017-12-13 PROCEDURE — 74011000258 HC RX REV CODE- 258: Performed by: HOSPITALIST

## 2017-12-13 PROCEDURE — 82962 GLUCOSE BLOOD TEST: CPT

## 2017-12-13 RX ORDER — FAMOTIDINE 20 MG/1
20 TABLET, FILM COATED ORAL 2 TIMES DAILY
Status: DISCONTINUED | OUTPATIENT
Start: 2017-12-14 | End: 2017-12-13 | Stop reason: SDUPTHER

## 2017-12-13 RX ORDER — POTASSIUM CHLORIDE 7.45 MG/ML
10 INJECTION INTRAVENOUS ONCE
Status: COMPLETED | OUTPATIENT
Start: 2017-12-13 | End: 2017-12-13

## 2017-12-13 RX ORDER — FAMOTIDINE 20 MG/1
20 TABLET, FILM COATED ORAL EVERY 12 HOURS
Status: DISCONTINUED | OUTPATIENT
Start: 2017-12-14 | End: 2017-12-16 | Stop reason: HOSPADM

## 2017-12-13 RX ORDER — HYDRALAZINE HYDROCHLORIDE 20 MG/ML
10 INJECTION INTRAMUSCULAR; INTRAVENOUS
Status: DISCONTINUED | OUTPATIENT
Start: 2017-12-13 | End: 2017-12-16 | Stop reason: HOSPADM

## 2017-12-13 RX ADMIN — PANCRELIPASE 1 CAPSULE: 10000; 34000; 55000 CAPSULE, DELAYED RELEASE ORAL at 16:50

## 2017-12-13 RX ADMIN — CARVEDILOL 6.25 MG: 6.25 TABLET, FILM COATED ORAL at 12:54

## 2017-12-13 RX ADMIN — HEPARIN SODIUM 5000 UNITS: 5000 INJECTION, SOLUTION INTRAVENOUS; SUBCUTANEOUS at 05:13

## 2017-12-13 RX ADMIN — GABAPENTIN 400 MG: 400 CAPSULE ORAL at 20:21

## 2017-12-13 RX ADMIN — NYSTATIN: 100000 POWDER TOPICAL at 18:32

## 2017-12-13 RX ADMIN — METOCLOPRAMIDE 10 MG: 5 INJECTION, SOLUTION INTRAMUSCULAR; INTRAVENOUS at 02:13

## 2017-12-13 RX ADMIN — FOLIC ACID TAB 400 MCG 0.4 MG: 400 TAB at 10:20

## 2017-12-13 RX ADMIN — FUROSEMIDE 40 MG: 40 TABLET ORAL at 10:19

## 2017-12-13 RX ADMIN — PANCRELIPASE 1 CAPSULE: 10000; 34000; 55000 CAPSULE, DELAYED RELEASE ORAL at 00:18

## 2017-12-13 RX ADMIN — HEPARIN SODIUM 5000 UNITS: 5000 INJECTION, SOLUTION INTRAVENOUS; SUBCUTANEOUS at 14:23

## 2017-12-13 RX ADMIN — GABAPENTIN 400 MG: 400 CAPSULE ORAL at 10:19

## 2017-12-13 RX ADMIN — FAMOTIDINE 20 MG: 10 INJECTION, SOLUTION INTRAVENOUS at 20:21

## 2017-12-13 RX ADMIN — CARVEDILOL 6.25 MG: 6.25 TABLET, FILM COATED ORAL at 23:43

## 2017-12-13 RX ADMIN — NYSTATIN: 100000 POWDER TOPICAL at 11:46

## 2017-12-13 RX ADMIN — PANCRELIPASE 1 CAPSULE: 10000; 34000; 55000 CAPSULE, DELAYED RELEASE ORAL at 10:20

## 2017-12-13 RX ADMIN — POTASSIUM CHLORIDE 10 MEQ: 10 INJECTION, SOLUTION INTRAVENOUS at 10:51

## 2017-12-13 RX ADMIN — Medication 1 MG: at 02:44

## 2017-12-13 RX ADMIN — LEVOFLOXACIN 750 MG: 5 INJECTION, SOLUTION INTRAVENOUS at 13:04

## 2017-12-13 RX ADMIN — TRAMADOL HYDROCHLORIDE 50 MG: 50 TABLET, FILM COATED ORAL at 10:20

## 2017-12-13 RX ADMIN — LISINOPRIL 5 MG: 5 TABLET ORAL at 10:19

## 2017-12-13 RX ADMIN — SODIUM CHLORIDE: 234 INJECTION INTRAMUSCULAR; INTRAVENOUS; SUBCUTANEOUS at 02:11

## 2017-12-13 RX ADMIN — Medication 1 MG: at 21:41

## 2017-12-13 RX ADMIN — METOCLOPRAMIDE 10 MG: 5 INJECTION, SOLUTION INTRAMUSCULAR; INTRAVENOUS at 10:20

## 2017-12-13 RX ADMIN — Medication 1 MG: at 14:24

## 2017-12-13 RX ADMIN — HEPARIN SODIUM 5000 UNITS: 5000 INJECTION, SOLUTION INTRAVENOUS; SUBCUTANEOUS at 20:21

## 2017-12-13 RX ADMIN — LEVOTHYROXINE SODIUM 75 MCG: 75 TABLET ORAL at 05:12

## 2017-12-13 RX ADMIN — PANCRELIPASE 1 CAPSULE: 10000; 34000; 55000 CAPSULE, DELAYED RELEASE ORAL at 23:43

## 2017-12-13 RX ADMIN — CARVEDILOL 6.25 MG: 6.25 TABLET, FILM COATED ORAL at 00:19

## 2017-12-13 RX ADMIN — ASPIRIN 81 MG 81 MG: 81 TABLET ORAL at 10:20

## 2017-12-13 RX ADMIN — HYDRALAZINE HYDROCHLORIDE 10 MG: 20 INJECTION INTRAMUSCULAR; INTRAVENOUS at 23:42

## 2017-12-13 NOTE — PROGRESS NOTES
NP note indicate patient is to be on \"PO Levaquin only\". Medication profile indicates drug is being given IV,  notified patients nurse, Bryan Contreras,  to this discrepancy  and asked her to investigate. Patient has had leakage around her ostomy site and question arose if this is why the medication has been given IV as opposed to PO. Nutritional consult ordered. Discharge plan continues to be to return home with home care as indicated.  Jose Mcneal RN

## 2017-12-13 NOTE — PROGRESS NOTES
8436 Patient in the bed resting quietly and watching tv noted with no s/sx of pain/discomfort at this time. Pt requested for dilaudid IV pain medication, this nurse notified pt i'll administer tramadol when it due but pt continue stating \"i don't need tramadol, I need dilaudid\".  Will administer per pt request.     0192 IV dilaudid administered per patient request     0640 Tube feeding adivanced to 40 mL/hr

## 2017-12-13 NOTE — PROGRESS NOTES
[phone]    PROGRESS NOTE    SUBJECTIVE:   New G-J feeding tube is working well without stomal leakage. Resolved stomal pain. OBJECTIVE:  Visit Vitals    BP (!) 178/102 (BP 1 Location: Left arm, BP Patient Position: At rest)    Pulse 84    Temp 97.8 °F (36.6 °C)    Resp 18    Ht 5' 4\" (1.626 m)    Wt 56.1 kg (123 lb 9.6 oz)    SpO2 96%    Breastfeeding No    BMI 21.22 kg/m2     Abdomen flat and nontender. G-J tube secured to external bumper with suture at the 5.5 cm ted on shaft. No succusion splash. No mass or ascites. ASSESSMENT:   1)  Resolved gastrostomy stomal leakage with replacement of G tube to G-J feeding tube. Leakage a result of the proximal gastric/fundal location of the gastrostomy and/or DM gastroparesis. 2)  Chronic malnutrition. 3)  Hx of throat cancer s/p chemoradiation with complete proximal esophageal obstruction. PLAN:   1. Discontinue Reglan now that she is receiving J-tube feedings. 2.  I still recommend a surgical consultation as to whether she will benefit from either a gastrostomy revision to a more distal gastric location or change to a feeding jejunostomy. If surgery is not an option then she will need to be seen by IR as an outpatient every 6 months for G-J tube evaluation and replacement. 3.  I have no further recommendations. I will sign off and please call if additional help is needed.     Kassandra Kimbrough MD

## 2017-12-13 NOTE — PROGRESS NOTES
Problem: General Medical Care Plan  Goal: *Vital signs within specified parameters  Outcome: Progressing Towards Goal  Patient Vitals for the past 8 hrs:   Temp Pulse Resp BP SpO2   12/12/17 2014 97.5 °F (36.4 °C) 69 20 (!) 156/91 100 %         Goal: *Skin integrity maintained  Outcome: Progressing Towards Goal  Zinc oxide cream applied around PEG site;  Nystatin in skin folds  Goal: *Optimize nutritional status  Outcome: Progressing Towards Goal  Pt 0 mL residual from PEG; advanced per order  Goal: *Progressive mobility and function (eg: ADL's)  Outcome: Progressing Towards Goal  Pt up to [facility] with assist

## 2017-12-13 NOTE — PROGRESS NOTES
Problem: Falls - Risk of  Goal: *Absence of Falls  Document Kapil Fall Risk and appropriate interventions in the flowsheet.    Outcome: Progressing Towards Goal  Fall Risk Interventions:  Mobility Interventions: Patient to call before getting OOB         Medication Interventions: Patient to call before getting OOB, Teach patient to arise slowly    Elimination Interventions: Patient to call for help with toileting needs, Toilet paper/wipes in reach, Toileting schedule/hourly rounds    History of Falls Interventions: Consult care management for discharge planning, Investigate reason for fall

## 2017-12-13 NOTE — PROGRESS NOTES
0572 Bedside and Verbal shift change report given to Sudhakar Wei RN (oncoming nurse) by Misa Smith RN (offgoing nurse). Report included the following information SBAR, Kardex and MAR.

## 2017-12-13 NOTE — PROGRESS NOTES
2 [facility]  Hospitalist Division        Inpatient Daily Progress Note    Daily progress Note    Patient: Joo Espino MRN: [id number]  CSN: [id number]    YOB: 1965  Age: 50 y.o. Sex: female    DOA: 12/5/2017 LOS:  LOS: 7 days                    Chief Complaint:  Abdominal pain at PEG site       Subjective:      Returned from IR for G-J placement. C/o drowsiness. Pain well controlled. Objective:      Visit Vitals    BP (!) 169/96 (BP 1 Location: Left arm, BP Patient Position: At rest)    Pulse 77    Temp 98.1 °F (36.7 °C)    Resp 18    Ht 5' 4\" (1.626 m)    Wt 56.1 kg (123 lb 9.6 oz)    SpO2 100%    Breastfeeding No    BMI 21.22 kg/m2           Physical Exam:  General appearance: alert, cooperative, no distress, appears stated age  Lungs: bilateral bases with faint rhonchi, no wheezes   Heart: regular rate and rhythm, S1, S2 normal, no murmur, click, rub or gallop  Abdomen: soft, non distended. Mildly TTP.  G-J tube clamped, dressing CDI   Extremities: extremities normal, atraumatic, no cyanosis or edema  Skin: Skin color, texture, turgor normal. No rashes or lesions  Neurologic: grossly normal   PSY: appropriately behaved      Intake and Output:  Current Shift:     Last three shifts:  12/11 1901 - 12/13 0700  In: 1187.5 [I.V.:597.5]  Out: 400 [Urine:400]    Recent Results (from the past 24 hour(s))   GLUCOSE, POC    Collection Time: 12/12/17 12:05 PM   Result Value Ref Range    Glucose (POC) 97 70 - 110 mg/dL   GLUCOSE, POC    Collection Time: 12/12/17  4:04 PM   Result Value Ref Range    Glucose (POC) 73 70 - 110 mg/dL   GLUCOSE, POC    Collection Time: 12/13/17 12:23 AM   Result Value Ref Range    Glucose (POC) 89 70 - 110 mg/dL   GLUCOSE, POC    Collection Time: 12/13/17  5:46 AM   Result Value Ref Range    Glucose (POC) 96 70 - 110 mg/dL   CBC WITH AUTOMATED DIFF    Collection Time: 12/13/17  6:38 AM   Result Value Ref Range    WBC 6.7 4.6 - 13.2 K/uL    RBC 3.30 (L) 4.20 - 5.30 M/uL    HGB 9.8 (L) 12.0 - 16.0 g/dL    HCT 30.0 (L) 35.0 - 45.0 %    MCV 90.9 74.0 - 97.0 FL    MCH 29.7 24.0 - 34.0 PG    MCHC 32.7 31.0 - 37.0 g/dL    RDW 15.0 (H) 11.6 - 14.5 %    PLATELET 657 427 - 904 K/uL    MPV 11.3 9.2 - 11.8 FL    NEUTROPHILS 44 40 - 73 %    LYMPHOCYTES 32 21 - 52 %    MONOCYTES 7 3 - 10 %    EOSINOPHILS 17 (H) 0 - 5 %    BASOPHILS 0 0 - 2 %    ABS. NEUTROPHILS 2.9 1.8 - 8.0 K/UL    ABS. LYMPHOCYTES 2.2 0.9 - 3.6 K/UL    ABS. MONOCYTES 0.5 0.05 - 1.2 K/UL    ABS. EOSINOPHILS 1.1 (H) 0.0 - 0.4 K/UL    ABS. BASOPHILS 0.0 0.0 - 0.06 K/UL    DF AUTOMATED     METABOLIC PANEL, BASIC    Collection Time: 12/13/17  6:38 AM   Result Value Ref Range    Sodium 140 136 - 145 mmol/L    Potassium 3.4 (L) 3.5 - 5.5 mmol/L    Chloride 103 100 - 108 mmol/L    CO2 29 21 - 32 mmol/L    Anion gap 8 3.0 - 18 mmol/L    Glucose 94 74 - 99 mg/dL    BUN 16 7.0 - 18 MG/DL    Creatinine 1.11 0.6 - 1.3 MG/DL    BUN/Creatinine ratio 14 12 - 20      GFR est AA >60 >60 ml/min/1.73m2    GFR est non-AA 52 (L) >60 ml/min/1.73m2    Calcium 8.6 8.5 - 10.1 MG/DL           Lab Results   Component Value Date/Time    Glucose 94 12/13/2017 06:38 AM    Glucose 78 12/12/2017 03:40 AM    Glucose 91 12/11/2017 06:00 AM    Glucose 129 12/10/2017 04:25 AM    Glucose 122 12/09/2017 04:35 AM        Assessment/Plan:     Patient Active Problem List   Diagnosis Code    Hypertension I10    Diabetes mellitus ([facility] E11.9    Dysphagia R13.10    History of throat cancer Z85.819    KEATON (acute kidney injury) ([facility] N17.9    Abdominal wall cellulitis L03.311    G tube feedings (HCC) Z93.1    Anemia D64.9    Hypothyroid E03.9       Hospital Course:   Tanya Teran is a 50 y.o. female who presents with abdominal pain around her feeding tube onset 2 days. She reports the pain as severe. She denies fever. Pain reports having similar problem in the past when the feeding tube was dislodged.  In the ED she was given dilaudid for pain and had respiratory distress requiring narcan. She otherwise denies chest pain , sob. She on on chronic home O2 3 L NC. GI consulted on 12/7- PEG tube replaced. Tube feedings resumed with IV Reglan 5 mg q6h after gastrograffin study confirmed tube position. Patient with PEG site leaking evening/overnight 12/7-12/8. Tube feedings decreased to 30 mL/hr and IV Reglan increased to 10 mg IV q6h. Patient continued to have leaking at PEG site. Placement of G-J tube on 12/12. Nutrition Consult ordered for tube feeding management. A/P:  - Leukocytosis-  Resolved. Patient is a very difficult case to manage. There is a possibility she has some cellulitic component to her PEG to malfunction previously there is also the possibility that this is from a recurrent resp infection. 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 There is no clear evidence of current infection and given the sputum growing now penamase resistant organisms as well as MRSA that is even more resistant than previously is even a better reason to try to minimize abx to decrease possiblity of further resistance. Vanc and Merrem were discontinued. Continue levaquin only (intermediate to psuedomonas and will provide intraabdominal coverage). Presume others are colonizers unless patient's clinical course worsens.  Levaquin day 4/7  - G-J tube placement by IR 12/12- nutrition consult ordered for Tube Feeding management   - Blood cultures- final result: no growth    - Hx throat cancer  - Anemia of chronic disease- continue to monitor H/H  - Chronic Hypoxic Respiratory failure: continuous home 02 at 3L  - DM: SSI   - Hypothyroidism: levothyroxine   - Constipation: Colace, Miralax   - DVT prophylaxis: Heparin    - General Surgery consulted for PEG revision or possible surgical J tube placement       Chuck Ortiz, [facility]  Hospitalist Division  Pager:  [phone]  Office:  [phone]

## 2017-12-13 NOTE — PROGRESS NOTES
0730 - received pt in room. Pt is AO. Pain rated 4/10 to abdomen. 1020 - assessment completed. Pt is AOx4. IV fluid and tube feed infusing. Pt resting in bed. J tube flushed with 40cc of water. 1115 - pt receiving spa bath, no distress noted. 1155 - assisted pt up to bedpan, tolerated well.    1350 - performed dressing change to J tube site, pt tolerated well. 1520 - pt resting, pain rated 3/10    1720 - pt resting in bed watching TV, feeding increased by 10cc per order. Pt tolerating well, no residual. Continued drainage around Jtube.

## 2017-12-13 NOTE — PROGRESS NOTES
Problem: Falls - Risk of  Goal: *Absence of Falls  Document Kapil Fall Risk and appropriate interventions in the flowsheet.    Outcome: Progressing Towards Goal  Fall Risk Interventions:  Mobility Interventions: Communicate number of staff needed for ambulation/transfer, Patient to call before getting OOB, Utilize walker, cane, or other assitive device         Medication Interventions: Evaluate medications/consider consulting pharmacy, Patient to call before getting OOB, Teach patient to arise slowly    Elimination Interventions: Call light in reach, Patient to call for help with toileting needs, Elevated toilet seat, Toileting schedule/hourly rounds    History of Falls Interventions: Consult care management for discharge planning, Investigate reason for fall

## 2017-12-14 LAB
ANION GAP SERPL CALC-SCNC: 8 MMOL/L (ref 3–18)
BASOPHILS # BLD: 0 K/UL (ref 0–0.06)
BASOPHILS NFR BLD: 0 % (ref 0–2)
BUN SERPL-MCNC: 18 MG/DL (ref 7–18)
BUN/CREAT SERPL: 18 (ref 12–20)
CALCIUM SERPL-MCNC: 8.3 MG/DL (ref 8.5–10.1)
CHLORIDE SERPL-SCNC: 103 MMOL/L (ref 100–108)
CO2 SERPL-SCNC: 27 MMOL/L (ref 21–32)
CREAT SERPL-MCNC: 1 MG/DL (ref 0.6–1.3)
DIFFERENTIAL METHOD BLD: ABNORMAL
EOSINOPHIL # BLD: 0.7 K/UL (ref 0–0.4)
EOSINOPHIL NFR BLD: 9 % (ref 0–5)
ERYTHROCYTE [DISTWIDTH] IN BLOOD BY AUTOMATED COUNT: 14.8 % (ref 11.6–14.5)
GLUCOSE BLD STRIP.AUTO-MCNC: 111 MG/DL (ref 70–110)
GLUCOSE BLD STRIP.AUTO-MCNC: 119 MG/DL (ref 70–110)
GLUCOSE BLD STRIP.AUTO-MCNC: 124 MG/DL (ref 70–110)
GLUCOSE BLD STRIP.AUTO-MCNC: 131 MG/DL (ref 70–110)
GLUCOSE SERPL-MCNC: 118 MG/DL (ref 74–99)
HCT VFR BLD AUTO: 28.5 % (ref 35–45)
HGB BLD-MCNC: 9.4 G/DL (ref 12–16)
LYMPHOCYTES # BLD: 2.6 K/UL (ref 0.9–3.6)
LYMPHOCYTES NFR BLD: 31 % (ref 21–52)
MCH RBC QN AUTO: 29.6 PG (ref 24–34)
MCHC RBC AUTO-ENTMCNC: 33 G/DL (ref 31–37)
MCV RBC AUTO: 89.6 FL (ref 74–97)
MONOCYTES # BLD: 0.5 K/UL (ref 0.05–1.2)
MONOCYTES NFR BLD: 6 % (ref 3–10)
NEUTS SEG # BLD: 4.3 K/UL (ref 1.8–8)
NEUTS SEG NFR BLD: 54 % (ref 40–73)
PLATELET # BLD AUTO: 209 K/UL (ref 135–420)
PMV BLD AUTO: 10.7 FL (ref 9.2–11.8)
POTASSIUM SERPL-SCNC: 3.6 MMOL/L (ref 3.5–5.5)
RBC # BLD AUTO: 3.18 M/UL (ref 4.2–5.3)
SODIUM SERPL-SCNC: 138 MMOL/L (ref 136–145)
WBC # BLD AUTO: 8.1 K/UL (ref 4.6–13.2)

## 2017-12-14 PROCEDURE — 77010033678 HC OXYGEN DAILY

## 2017-12-14 PROCEDURE — 74011250636 HC RX REV CODE- 250/636: Performed by: FAMILY MEDICINE

## 2017-12-14 PROCEDURE — 65270000029 HC RM PRIVATE

## 2017-12-14 PROCEDURE — 74011250636 HC RX REV CODE- 250/636: Performed by: HOSPITALIST

## 2017-12-14 PROCEDURE — 74011250637 HC RX REV CODE- 250/637: Performed by: HOSPITALIST

## 2017-12-14 PROCEDURE — 74011250637 HC RX REV CODE- 250/637: Performed by: INTERNAL MEDICINE

## 2017-12-14 PROCEDURE — 77030020263 HC SOL INJ SOD CL0.9% LFCR 1000ML

## 2017-12-14 PROCEDURE — 74011250637 HC RX REV CODE- 250/637: Performed by: FAMILY MEDICINE

## 2017-12-14 PROCEDURE — 74011250637 HC RX REV CODE- 250/637: Performed by: NURSE PRACTITIONER

## 2017-12-14 PROCEDURE — 80048 BASIC METABOLIC PNL TOTAL CA: CPT | Performed by: NURSE PRACTITIONER

## 2017-12-14 PROCEDURE — 82962 GLUCOSE BLOOD TEST: CPT

## 2017-12-14 PROCEDURE — 74011250636 HC RX REV CODE- 250/636: Performed by: NURSE PRACTITIONER

## 2017-12-14 PROCEDURE — 85025 COMPLETE CBC W/AUTO DIFF WBC: CPT | Performed by: NURSE PRACTITIONER

## 2017-12-14 PROCEDURE — 77030019604 HC DRSG WND CA ALG S&N -A

## 2017-12-14 PROCEDURE — 36415 COLL VENOUS BLD VENIPUNCTURE: CPT | Performed by: NURSE PRACTITIONER

## 2017-12-14 PROCEDURE — 77030018846 HC SOL IRR STRL H20 ICUM -A

## 2017-12-14 RX ADMIN — NYSTATIN: 100000 POWDER TOPICAL at 13:28

## 2017-12-14 RX ADMIN — NYSTATIN: 100000 POWDER TOPICAL at 18:13

## 2017-12-14 RX ADMIN — TRAMADOL HYDROCHLORIDE 50 MG: 50 TABLET, FILM COATED ORAL at 08:29

## 2017-12-14 RX ADMIN — LISINOPRIL 5 MG: 5 TABLET ORAL at 08:29

## 2017-12-14 RX ADMIN — HEPARIN SODIUM 5000 UNITS: 5000 INJECTION, SOLUTION INTRAVENOUS; SUBCUTANEOUS at 05:19

## 2017-12-14 RX ADMIN — FAMOTIDINE 20 MG: 20 TABLET ORAL at 20:37

## 2017-12-14 RX ADMIN — TRAMADOL HYDROCHLORIDE 50 MG: 50 TABLET, FILM COATED ORAL at 02:33

## 2017-12-14 RX ADMIN — CARVEDILOL 6.25 MG: 6.25 TABLET, FILM COATED ORAL at 13:10

## 2017-12-14 RX ADMIN — Medication 1 MG: at 05:25

## 2017-12-14 RX ADMIN — FUROSEMIDE 40 MG: 40 TABLET ORAL at 08:29

## 2017-12-14 RX ADMIN — PANCRELIPASE 1 CAPSULE: 10000; 34000; 55000 CAPSULE, DELAYED RELEASE ORAL at 13:10

## 2017-12-14 RX ADMIN — ASPIRIN 81 MG 81 MG: 81 TABLET ORAL at 08:29

## 2017-12-14 RX ADMIN — HEPARIN SODIUM 5000 UNITS: 5000 INJECTION, SOLUTION INTRAVENOUS; SUBCUTANEOUS at 13:12

## 2017-12-14 RX ADMIN — GABAPENTIN 400 MG: 400 CAPSULE ORAL at 08:29

## 2017-12-14 RX ADMIN — GABAPENTIN 400 MG: 400 CAPSULE ORAL at 20:37

## 2017-12-14 RX ADMIN — LEVOFLOXACIN 750 MG: 5 INJECTION, SOLUTION INTRAVENOUS at 13:10

## 2017-12-14 RX ADMIN — LEVOTHYROXINE SODIUM 75 MCG: 75 TABLET ORAL at 08:33

## 2017-12-14 RX ADMIN — FAMOTIDINE 20 MG: 20 TABLET ORAL at 08:29

## 2017-12-14 RX ADMIN — Medication 1 MG: at 20:35

## 2017-12-14 RX ADMIN — Medication 1 MG: at 13:59

## 2017-12-14 RX ADMIN — FOLIC ACID TAB 400 MCG 0.4 MG: 400 TAB at 08:29

## 2017-12-14 RX ADMIN — PANCRELIPASE 1 CAPSULE: 10000; 34000; 55000 CAPSULE, DELAYED RELEASE ORAL at 18:12

## 2017-12-14 RX ADMIN — Medication 1 MG: at 09:46

## 2017-12-14 RX ADMIN — HEPARIN SODIUM 5000 UNITS: 5000 INJECTION, SOLUTION INTRAVENOUS; SUBCUTANEOUS at 20:36

## 2017-12-14 NOTE — ROUTINE PROCESS
Bedside and Verbal shift change report given to Corina Uriostegui RN (oncoming nurse) by Jerel Montiel RN  (offgoing nurse). Report included the following information SBAR, Kardex and MAR.

## 2017-12-14 NOTE — PROGRESS NOTES
Patient suffering with continual consequence resulting from esophageal cancer and treatments. Nursing notes indicate she has some, \" green\" draining from her PEG insertion site however volume of all drainage diminishing. He discharge plan is to retun home with home care as indicated.  Franklin Hawkins RN

## 2017-12-14 NOTE — PROGRESS NOTES
0230  PEG dressing saturated with green drainage. Changed dressing and applied barrier cream.    Bedside and Verbal shift change report given to Serena Marroquin RN by Bambi Reyes RN. Report included the following information SBAR, Kardex, Intake/Output and MAR.

## 2017-12-14 NOTE — PROGRESS NOTES
NUTRITION follow-up/Plan of care    RECOMMENDATIONS:   1. Enteral Nutrition: Osmolite 1.2 Goal Rate @ 60 ml/hr (1728 Kcal, 80 g PRO and 1181 mL free water)  2. When IV fluids discontinued; Water Flushes: suggest 150 mL every 6 hours  3. Monitor TF, labs and weight. 4. RD to follow        GOALS:   1. Ongoing: Enteral nutrition meets >75% of protein/calorie needs by 12/19  2. Ongoing: Maintain weight (+/- 1-2 lb) by 12/21    ASSESSMENT:   Wt status is classified as normal per BMI of 21.6. TF: Osmolite 1.2 @60 mL/hr. Labs noted. BG range () over past 24 hours. Nutrition recommendations listed. RD to follow.        Nutrition Risk:  []   High [x]  Moderate [] Low    SUBJECTIVE/OBJECTIVE:   Pt admitted for abdominal wall cellulitis. PMHx of throat cancer, dysphagia, PEG/TF, DM, HTN and KEATON. Wt loss noted, but variable weights recorded (09/2017) at 125 lb, (10/2017) at 140 lb and (12/2017) 126 lb. Pt confirmed wt fluctuation over last 6 months (between 140-125 lb) equaling a 10% wt change. Abdomen and sacral wounds noted. Pt is s/p G-J feeding tube removal and replacement with a balloon type G tube on (12/7) which was later removed d/t leaking and replaced with G-J tube placement via G-tube site on (12/12). At which time new orders for TF were placed and initiated. Surgical consult done (12/13) d/t persistent leaking and to be discussed further. Pt seen in room after rounds. TF infusing Osmolite 1.2 at goal rate of 60 mL/hr (1676 mL fed); tolerating well. Per chart review some leakage around PEG site still this morning. Will monitor. Information Obtained From:   [x] Chart Review  [x] Patient  [] Family/Caregiver  [] Nurse/Physician   [x] Patient Rounds/Interdisciplinary Meeting    Diet: NPO with TF: Osmolite 1.2 @ 60 mL/hr  No data found. Medications: [x] Reviewed   Encounter Diagnoses     ICD-10-CM ICD-9-CM   1. Cellulitis, abdominal wall L03.311 682.2   2.  Leukocytosis, unspecified type D72.829 288.60 3. Non-intractable vomiting with nausea, unspecified vomiting type R11.2 787.01     Past Medical History:   Diagnosis Date    Anemia     Cancer ([facility]     throat    Coronary artery disease     Diabetes ([facility]     Diabetes mellitus ([facility]     ETOH abuse     HTN (hypertension)     Hypertension     Hypothyroid     Lupus     Osteonecrosis ([facility]     of jaw    PEG adjustment, replacement, or removal     S/P thoracentesis     Throat cancer ([facility] 2011    SCC of the nasopharynx, s/p chemo/radiation     Labs:    Lab Results   Component Value Date/Time    Sodium 138 12/14/2017 05:12 AM    Potassium 3.6 12/14/2017 05:12 AM    Chloride 103 12/14/2017 05:12 AM    CO2 27 12/14/2017 05:12 AM    Anion gap 8 12/14/2017 05:12 AM    Glucose 118 12/14/2017 05:12 AM    BUN 18 12/14/2017 05:12 AM    Creatinine 1.00 12/14/2017 05:12 AM    Calcium 8.3 12/14/2017 05:12 AM    Magnesium 2.4 12/05/2017 09:33 PM    Phosphorus 5.1 10/06/2017 04:30 AM    Albumin 3.1 12/05/2017 09:33 PM     Anthropometrics: BMI (calculated): 21.2   Last 3 Recorded Weights in this Encounter    12/05/17 1957 12/06/17 0941 12/13/17 0121   Weight: 57.2 kg (126 lb) 57.2 kg (126 lb) 56.1 kg (123 lb 9.6 oz)      Ht Readings from Last 1 Encounters:   12/06/17 5' 4\" (1.626 m)     [x]  Weight Loss (2.4 lb or 1.9%)  []  Weight Gain  []  Weight Stable   []  New wt n/a on record     Estimated Nutrition Needs:   1728 Kcals/day (9569-1111 Kcal)  Protein (g): 80 g (70-85g)    Nutrition Problems Identified:   [x] Suboptimal PO intake   [] Food Allergies  [x] Difficulty chewing/swallowing/poor dentition  [] Constipation/Diarrhea   [] Nausea/Vomiting   [] None  [x] Other: Enteral nutrition via PEG     Plan:   [] Therapeutic Diet  []  Obtained/adjusted food preferences/tolerances and/or snacks options   []  Supplements added   [] Occupational therapy following for feeding techniques  []  HS snack added   []  Modify diet texture   []  Modify diet for food allergies   [] Educate patient   []  Assist with menu selection   []  Monitor PO intake on meal rounds   [x]  Continue inpatient monitoring and intervention   []  Participated in discharge planning/Interdisciplinary rounds/Team meetings   [x]  Other:Enteral Nutrition recommendations listed     Education Needs:   [] Not appropriate for teaching at this time   [x] Identified and addressed    Nutrition Monitoring and Evaluation:   [x] Continue inpatient monitoring and interventions    [] Other:     Inge Alanis

## 2017-12-14 NOTE — PROGRESS NOTES
Surgery    Pt had some drainage around G-tube overnight. Tolerating feeds    Will talk to Dr. Jeanmarie Guerin about case today.

## 2017-12-14 NOTE — CONSULTS
Surgical Consultation    Attending: Chano Clarke  Reason for Consultation: Valente Faria gastrostomy     Consultant: Darío Colunga. Loras Rinne, MD, FACS      Subjective:     Geraldine Miranda is a 50 y.o. female with a history of throat cancer who is now gastrostomy dependent for feedings since last April. She has apparent near total occlusion of her upper esophagus per GI notes with severe oropharyngeal dysphagia as a result. She reports recurring episodes of aspiration pneumonia that are blamed on gastroparesis. She has been told she cannot take any PO intake since last April and was converted to jejunal feedings via [address] tube in August at [facility]. She presented with abdominal pain and a partially dislodged GJ tube with the balloon within the abdominal wall tract. This was changed to a G -tube and then she underwent successful EGD which showed no retained food or significant bile retention and then replacement of the GJ which is functioning well. She has drainage around the tube \"once or twice today\" which is similar to her usual status. Dr. Maverick Cui has requested my evaluation for converting to J-tube versus replacing the G-tube more inferiorly in the stomach to prevent these wound complications and to prevent aspiration pneumonia.      Patient Active Problem List    Diagnosis Date Noted    Abdominal wall cellulitis 12/06/2017    G tube feedings ([address] 12/06/2017    Anemia 12/06/2017    Hypothyroid 12/06/2017    KEATON (acute kidney injury) ([address] 10/11/2017    Dysphagia 10/09/2017    History of throat cancer 10/09/2017    Hypertension 09/24/2017    Diabetes mellitus ([address] 09/24/2017     Past Medical History:   Diagnosis Date    Anemia     Cancer ([address]     throat    Coronary artery disease     Diabetes ([address]     Diabetes mellitus ([address]     ETOH abuse     HTN (hypertension)     Hypertension     Hypothyroid     Lupus     Osteonecrosis ([facility])     of jaw    PEG adjustment, replacement, or removal     S/P thoracentesis     Throat cancer ([facility]     SCC of the nasopharynx, s/p chemo/radiation      Past Surgical History:   Procedure Laterality Date    ABDOMEN SURGERY PROC UNLISTED      feeding tube placement    EGD  2017         HC TUBE PEG REPLACMNT  2017         HX  SECTION      HX HEENT      throat cancer biopsy      Social History   Substance Use Topics    Smoking status: Former Smoker    Smokeless tobacco: Never Used    Alcohol use No      Family History   Problem Relation Age of Onset    Lupus Sister       Current Facility-Administered Medications   Medication Dose Route Frequency    hydrALAZINE (APRESOLINE) 20 mg/mL injection 10 mg  10 mg IntraVENous Q6H PRN    carvedilol (COREG) tablet 6.25 mg  6.25 mg Oral BID WITH MEALS    lisinopril (PRINIVIL, ZESTRIL) tablet 5 mg  5 mg Oral DAILY    levoFLOXacin (LEVAQUIN) 750 mg in D5W IVPB  750 mg IntraVENous Q24H    sodium chloride 0.45 % in dextrose 10% 1,019.6 mL infusion   IntraVENous CONTINUOUS    HYDROmorphone in NS (DILAUDID) injection 1 mg  1 mg IntraVENous Q4H PRN    aspirin chewable tablet 81 mg  81 mg Per G Tube DAILY    docusate (COLACE) 50 mg/5 mL oral liquid 100 mg  100 mg Per G Tube DAILY    folic acid tablet 0.4 mg  0.4 mg Oral DAILY    furosemide (LASIX) tablet 40 mg  40 mg Per G Tube DAILY    gabapentin (NEURONTIN) capsule 400 mg  400 mg Per G Tube BID    levothyroxine (SYNTHROID) tablet 75 mcg  75 mcg Per G Tube Daily    lipase-protease-amylase (ZENPEP 10,000) capsule 1 Cap  1 Cap Per G Tube TID WITH MEALS    polyethylene glycol (MIRALAX) packet 17 g  17 g Per G Tube DAILY    famotidine (PF) (PEPCID) injection 20 mg  20 mg IntraVENous Q12H    nystatin (MYCOSTATIN) 100,000 unit/gram powder   Topical BID    0.9% sodium chloride infusion 250 mL  250 mL IntraVENous PRN    insulin lispro (HUMALOG) injection   SubCUTAneous Q6H    naloxone (NARCAN) injection 0.4 mg  0.4 mg IntraVENous PRN    ondansetron (ZOFRAN) injection 4 mg 4 mg IntraVENous Q4H PRN    heparin (porcine) injection 5,000 Units  5,000 Units SubCUTAneous Q8H    cetirizine (ZYRTEC) tablet 10 mg  10 mg Oral DAILY PRN    sodium chloride (OCEAN) 0.65 % nasal spray 2 Spray  2 Spray Both Nostrils PRN    glucose chewable tablet 16 g  4 Tab Oral PRN    glucagon (GLUCAGEN) injection 1 mg  1 mg IntraMUSCular PRN    diphenhydrAMINE (BENADRYL) capsule 25 mg  25 mg Oral Q6H PRN    traMADol (ULTRAM) tablet 50 mg  50 mg Oral Q6H PRN    acetaminophen (TYLENOL) tablet 650 mg  650 mg Oral Q6H PRN      Allergies   Allergen Reactions    Morphine Rash    Amlodipine Swelling     Leg swelling per patient, but no problem taking Lotrel (amlodipine-benazepril)        Review of Systems:  Positive in BOLD    CONST: Fever, weight loss, fatigue or chills  GI: Nausea, vomiting, abdominal pain, change in bowel habits, hematochezia, melena, and GERD   INTEG: Dermatitis, abnormal moles  HEENT: Recent changes in vision, vertigo, epistaxis, dysphagia and hoarseness, hard of hearing  CV: Chest pain, palpitations, HTN, edema and varicosities  RESP: Cough, shortness of breath, wheezing, hemoptysis, snoring and reactive airway disease  : Hematuria, dysuria, frequency, urgency, nocturia and stress urinary incontinence   MS: Weakness, joint pain and arthritis  ENDO: Diabetes, thyroid disease, polyuria, polydipsia, polyphagia, poor wound healing, heat intolerance, cold intolerance  LYMPH/HEME: Anemia, bruising and history of blood transfusions  NEURO: Dizziness, headache, fainting, seizures and stroke  PSYCH: Anxiety and depression    Objective:     Visit Vitals    /88 (BP 1 Location: Left arm, BP Patient Position: At rest)    Pulse 69    Temp 98.4 °F (36.9 °C)    Resp 18    Ht 5' 4\" (1.626 m)    Wt 56.1 kg (123 lb 9.6 oz)    SpO2 100%    Breastfeeding No    BMI 21.22 kg/m2       Physical Exam:      GENERAL: alert, cooperative, no distress, appears older than stated age  EYE:conjunctivae and sclerae normal, pupils equal, round, reactive to light, extraocular movements intact without nystagmus  THROAT & NECK: firm radiation changes, nontender,   LUNG: clear to auscultation bilaterally  HEART: Regular rate and rhythm  ABDOMEN: G-tube in place - dressing is clean without drainage at my visit, some erythema at the site, no pus, otherwise non-tender  EXTREMITIES:  extremities normal, atraumatic, no cyanosis or edema  SKIN: Normal.    Imaging and Lab Review:   Findings ofCT Abd/pelvis 12/6 -images directly viewed by me. IMPRESSION:     The gastrojejunostomy tube appears intact without evidence of obstruction, leak, or adjacent abscess. Its appearance is stable since 6/13/2017. Constipation without obstruction. Reticulonodular densities in the lung bases with mild bronchiectasis improved compared to the CT chest of 9/24/2017. Present densities may represent residual or persistent inflammatory process. The tube tract exits roughly anterior on the abdominal wall in the mid anterior fundus, relatively close to the lesser curve of the stomach. Recent Results (from the past 24 hour(s))   GLUCOSE, POC    Collection Time: 12/13/17 12:23 AM   Result Value Ref Range    Glucose (POC) 89 70 - 110 mg/dL   GLUCOSE, POC    Collection Time: 12/13/17  5:46 AM   Result Value Ref Range    Glucose (POC) 96 70 - 110 mg/dL   CBC WITH AUTOMATED DIFF    Collection Time: 12/13/17  6:38 AM   Result Value Ref Range    WBC 6.7 4.6 - 13.2 K/uL    RBC 3.30 (L) 4.20 - 5.30 M/uL    HGB 9.8 (L) 12.0 - 16.0 g/dL    HCT 30.0 (L) 35.0 - 45.0 %    MCV 90.9 74.0 - 97.0 FL    MCH 29.7 24.0 - 34.0 PG    MCHC 32.7 31.0 - 37.0 g/dL    RDW 15.0 (H) 11.6 - 14.5 %    PLATELET 800 066 - 116 K/uL    MPV 11.3 9.2 - 11.8 FL    NEUTROPHILS 44 40 - 73 %    LYMPHOCYTES 32 21 - 52 %    MONOCYTES 7 3 - 10 %    EOSINOPHILS 17 (H) 0 - 5 %    BASOPHILS 0 0 - 2 %    ABS. NEUTROPHILS 2.9 1.8 - 8.0 K/UL    ABS. LYMPHOCYTES 2.2 0.9 - 3.6 K/UL    ABS. MONOCYTES 0.5 0.05 - 1.2 K/UL    ABS. EOSINOPHILS 1.1 (H) 0.0 - 0.4 K/UL    ABS. BASOPHILS 0.0 0.0 - 0.06 K/UL    DF AUTOMATED     METABOLIC PANEL, BASIC    Collection Time: 12/13/17  6:38 AM   Result Value Ref Range    Sodium 140 136 - 145 mmol/L    Potassium 3.4 (L) 3.5 - 5.5 mmol/L    Chloride 103 100 - 108 mmol/L    CO2 29 21 - 32 mmol/L    Anion gap 8 3.0 - 18 mmol/L    Glucose 94 74 - 99 mg/dL    BUN 16 7.0 - 18 MG/DL    Creatinine 1.11 0.6 - 1.3 MG/DL    BUN/Creatinine ratio 14 12 - 20      GFR est AA >60 >60 ml/min/1.73m2    GFR est non-AA 52 (L) >60 ml/min/1.73m2    Calcium 8.6 8.5 - 10.1 MG/DL   GLUCOSE, POC    Collection Time: 12/13/17 12:03 PM   Result Value Ref Range    Glucose (POC) 100 70 - 110 mg/dL   GLUCOSE, POC    Collection Time: 12/13/17  5:17 PM   Result Value Ref Range    Glucose (POC) 101 70 - 110 mg/dL       images and reports reviewed    Assessment:   1. Throat cancer  2. Oropharyngeal dysphagia with UES scarring and inability to take PO x 8 months  3. Recurring aspiration pneumonia events  4. Gastrostomy site leaking with reported gastroparesis but no retained stomach contents on EGD      Plan:     1. Feeding jejunostomy is far LESS durable and functional that gastrostomies as a general rule  2. The G-tube is located anteriorly already, moving it more inferiorly may not help her issues  3. The findings of gastroparesis and associated reflux allowing aspiration need to be confirmed via either gastric emptying study or UGI  4. Are we sure her aspiration events are not related to aspiration of ORAL secretions? Since this is above the stenosis, this seems much more likely. If so, should she be considered for spit fistula? 5. Relocating gastrostomies rarely help leaking issues. Leaking is best managed by appropriate tube deployment and management of underlying issues such as gastroparesis. I will discuss this further with Dr. William Woodard tomorrow.  However, I am not sure surgical revision will be durable or even effective for the goals as stated.        Signed By: Laura Rodriguez MD     December 13, 2017

## 2017-12-14 NOTE — PROGRESS NOTES
Problem: Pain    Goal: *Control of Pain  Outcome: Progressing Towards Goal  Pt refusing to take Tramadol for mild abdominal discomfort. Demands IV Dilaudid. Problem: General Medical Care Plan    Goal: *Vital signs within specified parameters  Outcome: Progressing Towards Goal  Patient Vitals for the past 8 hrs:   Temp Pulse Resp BP SpO2   12/14/17 0038 - 90 - 107/71 -   12/13/17 2322 97.8 °F (36.6 °C) 80 16 (!) 187/106 97 %       Medicated with PRN Hydralazine. Home med Amlodipine not restarted; will ask on-coming RN to address with MD in the morning    Goal: *Optimize nutritional status  Outcome: Progressing Towards Goal  Pt tolerating tube feed at goal of 60 mL with 0 mL residuals     Problem: Falls - Risk of    Goal: *Absence of Falls  Document Kapil Fall Risk and appropriate interventions in the flowsheet.    Outcome: Progressing Towards Goal  Fall Risk Interventions:  Mobility Interventions: Communicate number of staff needed for ambulation/transfer, Patient to call before getting OOB, Utilize walker, cane, or other assitive device     Medication Interventions: Patient to call before getting OOB, Teach patient to arise slowly    Elimination Interventions: Call light in reach, Elevated toilet seat, Patient to call for help with toileting needs, Toileting schedule/hourly rounds, Toilet paper/wipes in reach    History of Falls Interventions: Consult care management for discharge planning

## 2017-12-15 ENCOUNTER — APPOINTMENT (OUTPATIENT)
Dept: GENERAL RADIOLOGY | Age: 52
DRG: 394 | End: 2017-12-15
Attending: SURGERY
Payer: MEDICARE

## 2017-12-15 LAB
ANION GAP SERPL CALC-SCNC: 8 MMOL/L (ref 3–18)
BASOPHILS # BLD: 0 K/UL (ref 0–0.06)
BASOPHILS NFR BLD: 0 % (ref 0–2)
BUN SERPL-MCNC: 23 MG/DL (ref 7–18)
BUN/CREAT SERPL: 22 (ref 12–20)
CALCIUM SERPL-MCNC: 8.2 MG/DL (ref 8.5–10.1)
CHLORIDE SERPL-SCNC: 104 MMOL/L (ref 100–108)
CO2 SERPL-SCNC: 28 MMOL/L (ref 21–32)
CREAT SERPL-MCNC: 1.04 MG/DL (ref 0.6–1.3)
DIFFERENTIAL METHOD BLD: ABNORMAL
EOSINOPHIL # BLD: 0.3 K/UL (ref 0–0.4)
EOSINOPHIL NFR BLD: 5 % (ref 0–5)
ERYTHROCYTE [DISTWIDTH] IN BLOOD BY AUTOMATED COUNT: 14.7 % (ref 11.6–14.5)
GLUCOSE BLD STRIP.AUTO-MCNC: 124 MG/DL (ref 70–110)
GLUCOSE BLD STRIP.AUTO-MCNC: 128 MG/DL (ref 70–110)
GLUCOSE BLD STRIP.AUTO-MCNC: 99 MG/DL (ref 70–110)
GLUCOSE SERPL-MCNC: 108 MG/DL (ref 74–99)
HCT VFR BLD AUTO: 27.6 % (ref 35–45)
HGB BLD-MCNC: 9 G/DL (ref 12–16)
LYMPHOCYTES # BLD: 2.2 K/UL (ref 0.9–3.6)
LYMPHOCYTES NFR BLD: 29 % (ref 21–52)
MCH RBC QN AUTO: 29.6 PG (ref 24–34)
MCHC RBC AUTO-ENTMCNC: 32.6 G/DL (ref 31–37)
MCV RBC AUTO: 90.8 FL (ref 74–97)
MONOCYTES # BLD: 0.6 K/UL (ref 0.05–1.2)
MONOCYTES NFR BLD: 8 % (ref 3–10)
NEUTS SEG # BLD: 4.4 K/UL (ref 1.8–8)
NEUTS SEG NFR BLD: 58 % (ref 40–73)
PLATELET # BLD AUTO: 207 K/UL (ref 135–420)
PMV BLD AUTO: 11.2 FL (ref 9.2–11.8)
POTASSIUM SERPL-SCNC: 4 MMOL/L (ref 3.5–5.5)
RBC # BLD AUTO: 3.04 M/UL (ref 4.2–5.3)
SODIUM SERPL-SCNC: 140 MMOL/L (ref 136–145)
WBC # BLD AUTO: 7.6 K/UL (ref 4.6–13.2)

## 2017-12-15 PROCEDURE — 74011000255 HC RX REV CODE- 255: Performed by: INTERNAL MEDICINE

## 2017-12-15 PROCEDURE — 74011250637 HC RX REV CODE- 250/637: Performed by: INTERNAL MEDICINE

## 2017-12-15 PROCEDURE — 82962 GLUCOSE BLOOD TEST: CPT

## 2017-12-15 PROCEDURE — 74011250637 HC RX REV CODE- 250/637: Performed by: HOSPITALIST

## 2017-12-15 PROCEDURE — 74011250636 HC RX REV CODE- 250/636: Performed by: FAMILY MEDICINE

## 2017-12-15 PROCEDURE — 77030018846 HC SOL IRR STRL H20 ICUM -A

## 2017-12-15 PROCEDURE — 85025 COMPLETE CBC W/AUTO DIFF WBC: CPT | Performed by: NURSE PRACTITIONER

## 2017-12-15 PROCEDURE — 74011250636 HC RX REV CODE- 250/636: Performed by: NURSE PRACTITIONER

## 2017-12-15 PROCEDURE — 80048 BASIC METABOLIC PNL TOTAL CA: CPT | Performed by: NURSE PRACTITIONER

## 2017-12-15 PROCEDURE — 74011250637 HC RX REV CODE- 250/637: Performed by: NURSE PRACTITIONER

## 2017-12-15 PROCEDURE — 74245 XR UPPER GI/SMALL BOWEL: CPT

## 2017-12-15 PROCEDURE — 65270000029 HC RM PRIVATE

## 2017-12-15 PROCEDURE — 36415 COLL VENOUS BLD VENIPUNCTURE: CPT | Performed by: NURSE PRACTITIONER

## 2017-12-15 PROCEDURE — 74011250636 HC RX REV CODE- 250/636: Performed by: HOSPITALIST

## 2017-12-15 RX ORDER — LISINOPRIL 10 MG/1
10 TABLET ORAL DAILY
Status: DISCONTINUED | OUTPATIENT
Start: 2017-12-16 | End: 2017-12-16 | Stop reason: HOSPADM

## 2017-12-15 RX ORDER — LISINOPRIL 5 MG/1
5 TABLET ORAL
Status: COMPLETED | OUTPATIENT
Start: 2017-12-15 | End: 2017-12-15

## 2017-12-15 RX ADMIN — BARIUM SULFATE 352 G: 960 POWDER, FOR SUSPENSION ORAL at 11:19

## 2017-12-15 RX ADMIN — PANCRELIPASE 1 CAPSULE: 10000; 34000; 55000 CAPSULE, DELAYED RELEASE ORAL at 17:23

## 2017-12-15 RX ADMIN — HEPARIN SODIUM 5000 UNITS: 5000 INJECTION, SOLUTION INTRAVENOUS; SUBCUTANEOUS at 05:42

## 2017-12-15 RX ADMIN — CARVEDILOL 6.25 MG: 6.25 TABLET, FILM COATED ORAL at 12:38

## 2017-12-15 RX ADMIN — HYDRALAZINE HYDROCHLORIDE 10 MG: 20 INJECTION INTRAMUSCULAR; INTRAVENOUS at 14:37

## 2017-12-15 RX ADMIN — HEPARIN SODIUM 5000 UNITS: 5000 INJECTION, SOLUTION INTRAVENOUS; SUBCUTANEOUS at 22:13

## 2017-12-15 RX ADMIN — Medication 1 MG: at 08:40

## 2017-12-15 RX ADMIN — LEVOTHYROXINE SODIUM 75 MCG: 75 TABLET ORAL at 05:43

## 2017-12-15 RX ADMIN — FAMOTIDINE 20 MG: 20 TABLET ORAL at 22:14

## 2017-12-15 RX ADMIN — LISINOPRIL 5 MG: 5 TABLET ORAL at 17:34

## 2017-12-15 RX ADMIN — PANCRELIPASE 1 CAPSULE: 10000; 34000; 55000 CAPSULE, DELAYED RELEASE ORAL at 22:56

## 2017-12-15 RX ADMIN — ASPIRIN 81 MG 81 MG: 81 TABLET ORAL at 08:39

## 2017-12-15 RX ADMIN — Medication 1 MG: at 14:35

## 2017-12-15 RX ADMIN — LEVOFLOXACIN 750 MG: 5 INJECTION, SOLUTION INTRAVENOUS at 12:45

## 2017-12-15 RX ADMIN — Medication 1 MG: at 02:39

## 2017-12-15 RX ADMIN — GABAPENTIN 400 MG: 400 CAPSULE ORAL at 22:14

## 2017-12-15 RX ADMIN — FAMOTIDINE 20 MG: 20 TABLET ORAL at 08:39

## 2017-12-15 RX ADMIN — NYSTATIN: 100000 POWDER TOPICAL at 17:23

## 2017-12-15 RX ADMIN — LISINOPRIL 5 MG: 5 TABLET ORAL at 08:39

## 2017-12-15 RX ADMIN — CARVEDILOL 6.25 MG: 6.25 TABLET, FILM COATED ORAL at 00:05

## 2017-12-15 RX ADMIN — PANCRELIPASE 1 CAPSULE: 10000; 34000; 55000 CAPSULE, DELAYED RELEASE ORAL at 00:05

## 2017-12-15 RX ADMIN — Medication 1 MG: at 22:13

## 2017-12-15 RX ADMIN — GABAPENTIN 400 MG: 400 CAPSULE ORAL at 08:39

## 2017-12-15 RX ADMIN — PANCRELIPASE 1 CAPSULE: 10000; 34000; 55000 CAPSULE, DELAYED RELEASE ORAL at 12:38

## 2017-12-15 RX ADMIN — FOLIC ACID TAB 400 MCG 0.4 MG: 400 TAB at 08:39

## 2017-12-15 RX ADMIN — FUROSEMIDE 40 MG: 40 TABLET ORAL at 08:39

## 2017-12-15 RX ADMIN — HEPARIN SODIUM 5000 UNITS: 5000 INJECTION, SOLUTION INTRAVENOUS; SUBCUTANEOUS at 12:39

## 2017-12-15 NOTE — ROUTINE PROCESS
Bedside and Verbal shift change report given to Milton Meade RN (oncoming nurse) by Marlee Schuler (offgoing nurse). Report included the following information SBAR, Kardex and MAR.

## 2017-12-15 NOTE — PROGRESS NOTES
2 [facility]  Hospitalist Division        Inpatient Daily Progress Note    Daily progress Note    Patient: Jennifer Serarno MRN: [id number]  CSN: [id number]    YOB: 1965  Age: 50 y.o. Sex: female    DOA: 12/5/2017 LOS:  LOS: 9 days                    Chief Complaint:  Abdominal pain at PEG site       Subjective:      Sleeping- easily arouses. Denies chest pain or shortness of breath. Objective:      Visit Vitals    BP (!) 161/101 (BP 1 Location: Right arm, BP Patient Position: At rest;Sitting)    Pulse 80    Temp 98.7 °F (37.1 °C)    Resp 17    Ht 5' 4\" (1.626 m)    Wt 56.1 kg (123 lb 9.6 oz)    SpO2 100%    Breastfeeding No    BMI 21.22 kg/m2           Physical Exam:  General appearance: alert, cooperative, no distress, appears stated age  Lungs: bilateral bases with faint rhonchi, no wheezes   Heart: regular rate and rhythm, S1, S2 normal, no murmur, click, rub or gallop  Abdomen: soft, non distended. Mildly TTP.  G-J tube clamped, dressing CDI   Extremities: extremities normal, atraumatic, no cyanosis or edema  Skin: Skin color, texture, turgor normal. No rashes or lesions  Neurologic: grossly normal   PSY: appropriately behaved      Intake and Output:  Current Shift:     Last three shifts:  12/13 1901 - 12/15 0700  In: 2691.7 [I.V.:221.7]  Out: 640 [Urine:640]    Recent Results (from the past 24 hour(s))   GLUCOSE, POC    Collection Time: 12/14/17  6:01 PM   Result Value Ref Range    Glucose (POC) 111 (H) 70 - 110 mg/dL   GLUCOSE, POC    Collection Time: 12/14/17 11:19 PM   Result Value Ref Range    Glucose (POC) 131 (H) 70 - 954 mg/dL   METABOLIC PANEL, BASIC    Collection Time: 12/15/17  4:35 AM   Result Value Ref Range    Sodium 140 136 - 145 mmol/L    Potassium 4.0 3.5 - 5.5 mmol/L    Chloride 104 100 - 108 mmol/L    CO2 28 21 - 32 mmol/L    Anion gap 8 3.0 - 18 mmol/L    Glucose 108 (H) 74 - 99 mg/dL    BUN 23 (H) 7.0 - 18 MG/DL    Creatinine 1. 04 0.6 - 1.3 MG/DL    BUN/Creatinine ratio 22 (H) 12 - 20      GFR est AA >60 >60 ml/min/1.73m2    GFR est non-AA 56 (L) >60 ml/min/1.73m2    Calcium 8.2 (L) 8.5 - 10.1 MG/DL   CBC WITH AUTOMATED DIFF    Collection Time: 12/15/17  4:35 AM   Result Value Ref Range    WBC 7.6 4.6 - 13.2 K/uL    RBC 3.04 (L) 4.20 - 5.30 M/uL    HGB 9.0 (L) 12.0 - 16.0 g/dL    HCT 27.6 (L) 35.0 - 45.0 %    MCV 90.8 74.0 - 97.0 FL    MCH 29.6 24.0 - 34.0 PG    MCHC 32.6 31.0 - 37.0 g/dL    RDW 14.7 (H) 11.6 - 14.5 %    PLATELET 323 741 - 387 K/uL    MPV 11.2 9.2 - 11.8 FL    NEUTROPHILS 58 40 - 73 %    LYMPHOCYTES 29 21 - 52 %    MONOCYTES 8 3 - 10 %    EOSINOPHILS 5 0 - 5 %    BASOPHILS 0 0 - 2 %    ABS. NEUTROPHILS 4.4 1.8 - 8.0 K/UL    ABS. LYMPHOCYTES 2.2 0.9 - 3.6 K/UL    ABS. MONOCYTES 0.6 0.05 - 1.2 K/UL    ABS. EOSINOPHILS 0.3 0.0 - 0.4 K/UL    ABS. BASOPHILS 0.0 0.0 - 0.06 K/UL    DF AUTOMATED     GLUCOSE, POC    Collection Time: 12/15/17  6:14 AM   Result Value Ref Range    Glucose (POC) 124 (H) 70 - 110 mg/dL           Lab Results   Component Value Date/Time    Glucose 108 12/15/2017 04:35 AM    Glucose 118 12/14/2017 05:12 AM    Glucose 94 12/13/2017 06:38 AM    Glucose 78 12/12/2017 03:40 AM    Glucose 91 12/11/2017 06:00 AM        Assessment/Plan:     Patient Active Problem List   Diagnosis Code    Hypertension I10    Diabetes mellitus ([address] E11.9    Dysphagia R13.10    History of throat cancer Z85.819    KEATON (acute kidney injury) ([address] N17.9    Abdominal wall cellulitis L03.311    G tube feedings ([facility]) Z93.1    Anemia D64.9    Hypothyroid E03.9       Hospital Course:   Yane Martínez is a 50 y.o. female who presents with abdominal pain around her feeding tube onset 2 days. She reports the pain as severe. She denies fever. Pain reports having similar problem in the past when the feeding tube was dislodged. In the ED she was given dilaudid for pain and had respiratory distress requiring narcan.  She otherwise denies chest pain , sob. She on on chronic home O2 3 L NC. GI consulted on 12/7- PEG tube replaced. Tube feedings resumed with IV Reglan 5 mg q6h after gastrograffin study confirmed tube position. Patient with PEG site leaking evening/overnight 12/7-12/8. Tube feedings decreased to 30 mL/hr and IV Reglan increased to 10 mg IV q6h. Patient continued to have leaking at PEG site. Placement of G-J tube on 12/12. Nutrition Consult ordered for tube feeding management. General surgery consulted 12/13 for possible surgical revision of PEG tube. Tube feeding continues to be advanced to goal at 60 mL/hr. UGI series completed and shows no signs of abnormality. Wound Care consulted ordered for home care recommendations. Home Health orders placed in anticipation of discharge on 12/16 with home care. A/P:  - Leukocytosis-  Resolved. Patient is a very difficult case to manage. There is a possibility she has some cellulitic component to her PEG to malfunction previously there is also the possibility that this is from a recurrent resp infection. 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 There is no clear evidence of current infection and given the sputum growing now penamase resistant organisms as well as MRSA that is even more resistant than previously is even a better reason to try to minimize abx to decrease possiblity of further resistance. Vanc and Merrem were discontinued. Continue levaquin only (intermediate to psuedomonas and will provide intraabdominal coverage). Presume others are colonizers unless patient's clinical course worsens. Levaquin day 6/7  - G-J tube placement by IR 12/12- UGI series today- no abnormality identified   - Blood cultures- final result: no growth    - Hx throat cancer  - Anemia of chronic disease- remains stable   - Chronic Hypoxic Respiratory failure: continuous home 02 at 3L  - HTN: Coreg. Increase Lisinopril.  Hydralazine PRN   - DM: SSI   - Hypothyroidism: levothyroxine   - Constipation: Colace, Miralax   - DVT prophylaxis: Heparin    -  General Surgery- UGI normal. May need evaluation by ENT for spit fistula   - Dispo: home with [facility] on 12/16- orders placed for tube feeds and wound care at [facility] site         LISA Jones 83  Pager:  [phone]  Office:  [phone]

## 2017-12-15 NOTE — PROGRESS NOTES
Surgery    Her UGI was completely normal. There is NO reflux elicited, no leak at the G-tube site, rapid (and normal) gastric emptying and normal bowel transit to the cecum. Based on these findings, there is NO evidence to support gastric content aspiration or gastroparesis. With no leak at the G-tube site as well, I see no reason to convert the site unless it does not heal down with appropriate care over the next 2 weeks. I do not see any reason for revision to an end feeding jejunostomy but will defer to GI about if they would like to relocate the G tube inferiorly. I would recommend continuing Jejunal feedings for the next couple of weeks as the gastrostomy site heals and narrows and then should be able to resume gastric feedings. Lastly, I recommend she be seen by ENT for evaluation for possible need for spit fistula. There is no role for surgical jejunostomy. Please call again if needed.

## 2017-12-15 NOTE — WOUND CARE
Patient was seen today by wound care per consult request for PEG tube site ulcer. Patient is sitting up in bed, awake and alert. She is cooperative with her wound care. Old dressing of a large ABD pad and gauze was removed. The site was assessed. There is a PEG tube in place with yellow slough around the opening with surrounding erythema. The wound was cleansed with NS. Skin prep was applied to the marky wound skin. Aquacel Ag ribbon was cut and placed circumferentially around the PEG site, and covered with plain foam and Hypafix tape. Orders will be placed for home health nurse to continue the dressings after patient is discharged.

## 2017-12-15 NOTE — ROUTINE PROCESS
0932 /98 rechecked 150/95 no distress currently sleeping call bell within reach will continue to monitor. 8410 Patient resting comfortably voiced no respiratory distress/discomfort but continue asking for iv dilaudid administered as needed.

## 2017-12-15 NOTE — MANAGEMENT PLAN
Discharge Planning    1420:Discharge Plan is Home with [address]  3282: Noted tube feeding orders. Unable to get tube feeds from company this late in day. Will need orders for Home Health for wound care.  Skilled nurse rani and PT/OT        Naomi Parisi RN BSN  Outcomes Manager  Pager # [phone]

## 2017-12-15 NOTE — PROGRESS NOTES
Surgery    Discussed with Dr. Raciel Ardon yesterday. She had some leakage again yesterday. See my impressions below    1. G -tube leakage - Per EGD, no significant trauma at site. The leakage is based on 2 issues: (1) tube location high in the fundus allowing accumulating gastric contents to leak around tube and (2) traumatic dilation of the tract from pullling balloon within tract. - Traumatic dilation of tract will resolve with time and appropriate management of the tube, gentle traction of the balloon against the wall bolster and head up, no left side down positioning      - As discussed with Dr. Raciel Ardon, relocation of the G-tube is best accomplished with endoscopic replacement via current tract and moving site more inferiorly in the stomach via a new PEG site. 2. Aspiration events with pulmonary issues - I do not think this is related to gastroparesis, even if the patient has gastroparesis. Based on described hypopharyngeal anatomy and severe stenosis, it is MUCH more likely that she is aspirating oral secretions which would need management via spit fistula    - Based on this supposition, I am ordering an UGI and SBFT via the G tube to look at the stomach, degree of reflux (which will not be a high quality study since she has leakage at the tube site) and look at the emptying of the stomach and its transit through the gut. If the stomach empties well, there is no need for jejunal feedings once the dilated tract improves. If there is severe gastroparesis, then the stomach will need chronic decompression via some type of G-tube anyway which would best be handled as above. 3. As described before, if chronic jejunal feeds are needed and we are unable to improve the tract and the stomach is otherwise functioning normally, the only surgical option for jejunal feedings that could be durable would be a eren en Y type continent jejunostomy. Typical Witzel tube feeding route is not durable.  I would not consider this surgical conversion unless the other routes fail.

## 2017-12-16 ENCOUNTER — HOME HEALTH ADMISSION (OUTPATIENT)
Dept: HOME HEALTH SERVICES | Facility: HOME HEALTH | Age: 52
End: 2017-12-16

## 2017-12-16 VITALS
HEIGHT: 64 IN | TEMPERATURE: 97.7 F | DIASTOLIC BLOOD PRESSURE: 78 MMHG | SYSTOLIC BLOOD PRESSURE: 120 MMHG | OXYGEN SATURATION: 100 % | RESPIRATION RATE: 18 BRPM | HEART RATE: 77 BPM | BODY MASS INDEX: 21.1 KG/M2 | WEIGHT: 123.6 LBS

## 2017-12-16 LAB
GLUCOSE BLD STRIP.AUTO-MCNC: 121 MG/DL (ref 70–110)
GLUCOSE BLD STRIP.AUTO-MCNC: 136 MG/DL (ref 70–110)

## 2017-12-16 PROCEDURE — 82962 GLUCOSE BLOOD TEST: CPT

## 2017-12-16 PROCEDURE — 74011250637 HC RX REV CODE- 250/637: Performed by: NURSE PRACTITIONER

## 2017-12-16 PROCEDURE — 74011250637 HC RX REV CODE- 250/637: Performed by: HOSPITALIST

## 2017-12-16 PROCEDURE — 74011250636 HC RX REV CODE- 250/636: Performed by: FAMILY MEDICINE

## 2017-12-16 PROCEDURE — 74011250636 HC RX REV CODE- 250/636: Performed by: NURSE PRACTITIONER

## 2017-12-16 PROCEDURE — 74011250637 HC RX REV CODE- 250/637: Performed by: INTERNAL MEDICINE

## 2017-12-16 RX ORDER — CARVEDILOL 6.25 MG/1
6.25 TABLET ORAL 2 TIMES DAILY WITH MEALS
Qty: 60 TAB | Refills: 0 | Status: SHIPPED | OUTPATIENT
Start: 2017-12-16

## 2017-12-16 RX ORDER — NYSTATIN 100000 [USP'U]/G
POWDER TOPICAL 2 TIMES DAILY
Qty: 1 BOTTLE | Refills: 0 | Status: SHIPPED | OUTPATIENT
Start: 2017-12-16

## 2017-12-16 RX ORDER — DOCUSATE SODIUM 50 MG/5ML
100 LIQUID ORAL DAILY
Qty: 100 ML | Refills: 0 | Status: SHIPPED | OUTPATIENT
Start: 2017-12-17

## 2017-12-16 RX ORDER — LISINOPRIL 10 MG/1
10 TABLET ORAL DAILY
Qty: 30 TAB | Refills: 0 | Status: SHIPPED | OUTPATIENT
Start: 2017-12-17

## 2017-12-16 RX ORDER — TRAMADOL HYDROCHLORIDE 50 MG/1
50 TABLET ORAL
Qty: 30 TAB | Refills: 0 | Status: SHIPPED | OUTPATIENT
Start: 2017-12-16

## 2017-12-16 RX ORDER — DIPHENHYDRAMINE HCL 25 MG
25 CAPSULE ORAL
Qty: 30 CAP | Refills: 0 | Status: SHIPPED | OUTPATIENT
Start: 2017-12-16 | End: 2017-12-26

## 2017-12-16 RX ADMIN — Medication 1 MG: at 02:08

## 2017-12-16 RX ADMIN — HEPARIN SODIUM 5000 UNITS: 5000 INJECTION, SOLUTION INTRAVENOUS; SUBCUTANEOUS at 05:45

## 2017-12-16 RX ADMIN — NYSTATIN: 100000 POWDER TOPICAL at 10:39

## 2017-12-16 RX ADMIN — PANCRELIPASE 1 CAPSULE: 10000; 34000; 55000 CAPSULE, DELAYED RELEASE ORAL at 10:19

## 2017-12-16 RX ADMIN — Medication 1 MG: at 09:02

## 2017-12-16 RX ADMIN — FOLIC ACID TAB 400 MCG 0.4 MG: 400 TAB at 10:19

## 2017-12-16 RX ADMIN — FUROSEMIDE 40 MG: 40 TABLET ORAL at 10:19

## 2017-12-16 RX ADMIN — CARVEDILOL 6.25 MG: 6.25 TABLET, FILM COATED ORAL at 01:57

## 2017-12-16 RX ADMIN — LEVOTHYROXINE SODIUM 75 MCG: 75 TABLET ORAL at 10:39

## 2017-12-16 RX ADMIN — GABAPENTIN 400 MG: 400 CAPSULE ORAL at 10:19

## 2017-12-16 RX ADMIN — LISINOPRIL 10 MG: 10 TABLET ORAL at 10:19

## 2017-12-16 RX ADMIN — FAMOTIDINE 20 MG: 20 TABLET ORAL at 10:19

## 2017-12-16 RX ADMIN — ASPIRIN 81 MG 81 MG: 81 TABLET ORAL at 10:19

## 2017-12-16 NOTE — PROGRESS NOTES
0730 Received report from off going RN. Patient alert and oriented. Tube feeding of osmolite 1.2 infusing at 60ml/hr. Oxygen at 3L n/c. Dressing intact to peg tube. Callbell within reach. Patient instructed to call and not get out of bed without assist to prevent falls. 0900 Tube feeding residual zero. Tube flushes well    0930 to xray for upper gi and small bowel xray. 1140 back to room. Resting quietly in bed.    1945 Bedside and Verbal shift change report given to jake duron (oncoming nurse) by Brian Uriostegui (offgoing nurse). Report included the following information SBAR, Kardex and MAR.

## 2017-12-16 NOTE — PROGRESS NOTES
The patient is threatening to leave AMA and says that the NP told her she could leave today. FOC obtained for Analia Erickson. Spoke to [facility] home care, in receipt of home care orders. Faxed tube feeding orders to [facility].   Corey Pandey RN

## 2017-12-16 NOTE — DISCHARGE SUMMARY
Discharge Summary    Patient: Hermes Lee               Sex: female          DOA: 12/5/2017         YOB: 1965      Age:  50 y.o.        LOS:  LOS: 10 days                Admit Date: 12/5/2017    Discharge Date: 12/16/2017    Admission Diagnoses: Abdominal wall cellulitis  Abdominal wall cellulitis  leaking peg    Discharge Diagnoses:    Problem List as of 12/16/2017  Date Reviewed: 12/11/2017          Codes Class Noted - Resolved    * (Principal)Abdominal wall cellulitis ICD-10-CM: L03.311  ICD-9-CM: 682.2  12/6/2017 - Present        G tube feedings ([facility] ICD-10-CM: Z93.1  ICD-9-CM: V44.1  12/6/2017 - Present        Anemia ICD-10-CM: D64.9  ICD-9-CM: 285.9  12/6/2017 - Present        Hypothyroid ICD-10-CM: E03.9  ICD-9-CM: 244.9  12/6/2017 - Present        KEATON (acute kidney injury) ([facility] ICD-10-CM: N17.9  ICD-9-CM: 584.9  10/11/2017 - Present        Dysphagia ICD-10-CM: R13.10  ICD-9-CM: 787.20  10/9/2017 - Present        History of throat cancer ICD-10-CM: K23.038  ICD-9-CM: V10.02  10/9/2017 - Present        Hypertension ICD-10-CM: I10  ICD-9-CM: 401.9  9/24/2017 - Present        Diabetes mellitus ([facility] ICD-10-CM: E11.9  ICD-9-CM: 250.00  9/24/2017 - Present        RESOLVED: Recurrent pneumonia ICD-10-CM: J18.9  ICD-9-CM: 900  10/11/2017 - 12/7/2017        RESOLVED: Respiratory failure with hypoxia ([facility] ICD-10-CM: J96.91  ICD-9-CM: 518.81  10/11/2017 - 12/7/2017        RESOLVED: SOB (shortness of breath) ICD-10-CM: R06.02  ICD-9-CM: 786.05  10/9/2017 - 12/7/2017        RESOLVED: Acute respiratory failure with hypercapnia ([facility] ICD-10-CM: J96.02  ICD-9-CM: 518.81  9/24/2017 - 12/7/2017        RESOLVED: Sepsis ([facility] ICD-10-CM: A41.9  ICD-9-CM: 038.9, 995.91  9/24/2017 - 12/7/2017        RESOLVED: HCAP (healthcare-associated pneumonia) ICD-10-CM: J18.9  ICD-9-CM: 486  9/24/2017 - 12/7/2017              Discharge Medications:   Cannot display discharge medications since this patient is not currently admitted. Follow-up:pcp and surgery    Discharge Condition:good    Activity:as tolerated    Diet:tube feeding    Wound Care:as per wound care nurse;patient discharged with home health services. Labs:  Labs: Results:       Chemistry Recent Labs      12/15/17   0435  12/14/17   0512   GLU  108*  118*   NA  140  138   K  4.0  3.6   CL  104  103   CO2  28  27   BUN  23*  18   CREA  1.04  1.00   CA  8.2*  8.3*   AGAP  8  8   BUCR  22*  18      CBC w/Diff Recent Labs      12/15/17   0435  12/14/17   0512   WBC  7.6  8.1   RBC  3.04*  3.18*   HGB  9.0*  9.4*   HCT  27.6*  28.5*   PLT  207  209   GRANS  58  54   LYMPH  29  31   EOS  5  9*      Cardiac Enzymes No results for input(s): CPK, CKND1, LAVELLE in the last 72 hours. No lab exists for component: CKRMB, TROIP   Coagulation No results for input(s): PTP, INR, APTT in the last 72 hours. No lab exists for component: INREXT    Lipid Panel No results found for: CHOL, CHOLPOCT, CHOLX, CHLST, CHOLV, 078676, HDL, LDL, LDLC, DLDLP, 559315, VLDLC, VLDL, TGLX, TRIGL, TRIGP, TGLPOCT, CHHD, CHHDX   BNP No results for input(s): BNPP in the last 72 hours. Liver Enzymes No results for input(s): TP, ALB, TBIL, AP, SGOT, GPT in the last 72 hours. No lab exists for component: DBIL   Thyroid Studies Lab Results   Component Value Date/Time    TSH 1.54 09/26/2017 09:56 AM          Imaging:    CT abdomen/pelvis on 12/6:  The gastrojejunostomy tube appears intact without evidence of obstruction, leak,  or adjacent abscess. Its appearance is stable since 6/13/2017. Constipation without obstruction. Reticulonodular densities in the lung bases with mild bronchiectasis improved  compared to the CT chest of 9/24/2017. Present densities may represent residual  or persistent inflammatory process.   The study was initially interpreted by the radiology resident at the time of the  examination and the appropriate personnel informed    IR Imaging G Tube  Successful gastrojejunostomy tube placement via gastrostomy tract.           Consults:   GI   Surgery    Treatment Team: Treatment Team: Utilization Review: Roland Stevenson RN; Care Manager: Aviva Fiore; Consulting Provider: Sharee Moise NP; Nurse Practitioner: Jl Seo NP; Care Manager: Juventino Alicea RN    Significant Diagnostic Studies:see recent lab results. Hospital Course:  Bessy Simmons is a 50 y.o. female who presents with abdominal pain around her feeding tube onset 2 days. She reports the pain as severe. She denies fever. Pain reports having similar problem in the past when the feeding tube was dislodged. In the ED she was given dilaudid for pain and had respiratory distress requiring narcan. She otherwise denies chest pain , sob. She on on chronic home O2 3 L NC. GI consulted on 12/7- PEG tube replaced. Tube feedings resumed with IV Reglan 5 mg q6h after gastrograffin study confirmed tube position. Patient with PEG site leaking evening/overnight 12/7-12/8. Tube feedings decreased to 30 mL/hr and IV Reglan increased to 10 mg IV q6h. Patient continued to have leaking at PEG site. Placement of G-J tube on 12/12. Nutrition Consult ordered for tube feeding management. General surgery consulted 12/13 for possible surgical revision of PEG tube. Tube feeding continues to be advanced to goal at 60 mL/hr. UGI series completed and shows no signs of abnormality. Wound Care consulted ordered for home care recommendations. Home Health orders placed in anticipation of discharge on 12/16 with home care. - Leukocytosis-  Resolved. Patient is a very difficult case to manage. There is a possibility she has some cellulitic component to her PEG to malfunction previously there is also the possibility that this is from a recurrent resp infection. 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  There is no clear evidence of current infection and given the sputum growing now penamase resistant organisms as well as MRSA that is even more resistant than previously is even a better reason to try to minimize abx to decrease possiblity of further resistance. Vanc and Merrem were discontinued. Continue levaquin only (intermediate to psuedomonas and will provide intraabdominal coverage). Presume others are colonizers unless patient's clinical course worsens. Time for discharge:40 minutes.     Allyson Foster MD  December 16, 2017

## 2017-12-16 NOTE — PROGRESS NOTES
Bedside shift change report given to Yessi Walker RN (oncoming nurse) by Ely Frank (offgoing nurse). Report included the following information SBAR, ED Summary, MAR, Recent Results and Med Rec Status. 1932:  Patient received in bed, sleeping. No signs of distress. MAR review. Bed low/wheels locked. Will continue to monitor.

## 2017-12-16 NOTE — DISCHARGE INSTRUCTIONS
DISCHARGE SUMMARY from Nurse    PATIENT INSTRUCTIONS:    After general anesthesia or intravenous sedation, for 24 hours or while taking prescription Narcotics:  · Limit your activities  · Do not drive and operate hazardous machinery  · Do not make important personal or business decisions  · Do  not drink alcoholic beverages  · If you have not urinated within 8 hours after discharge, please contact your surgeon on call. Report the following to your surgeon:  · Excessive pain, swelling, redness or odor of or around the surgical area  · Temperature over 100.5  · Nausea and vomiting lasting longer than 4 hours or if unable to take medications  · Any signs of decreased circulation or nerve impairment to extremity: change in color, persistent  numbness, tingling, coldness or increase pain  · Any questions    What to do at Home:  Recommended activity: Activity as tolerated. If you experience any additional symptoms, please follow up with your PCP. *  Please give a list of your current medications to your Primary Care Provider. *  Please update this list whenever your medications are discontinued, doses are      changed, or new medications (including over-the-counter products) are added. *  Please carry medication information at all times in case of emergency situations. These are general instructions for a healthy lifestyle:    No smoking/ No tobacco products/ Avoid exposure to second hand smoke  Surgeon General's Warning:  Quitting smoking now greatly reduces serious risk to your health.     Obesity, smoking, and sedentary lifestyle greatly increases your risk for illness    A healthy diet, regular physical exercise & weight monitoring are important for maintaining a healthy lifestyle    You may be retaining fluid if you have a history of heart failure or if you experience any of the following symptoms:  Weight gain of 3 pounds or more overnight or 5 pounds in a week, increased swelling in our hands or feet or shortness of breath while lying flat in bed. Please call your doctor as soon as you notice any of these symptoms; do not wait until your next office visit. Recognize signs and symptoms of STROKE:    F-face looks uneven    A-arms unable to move or move unevenly    S-speech slurred or non-existent    T-time-call 911 as soon as signs and symptoms begin-DO NOT go       Back to bed or wait to see if you get better-TIME IS BRAIN. Warning Signs of HEART ATTACK     Call 911 if you have these symptoms:   Chest discomfort. Most heart attacks involve discomfort in the center of the chest that lasts more than a few minutes, or that goes away and comes back. It can feel like uncomfortable pressure, squeezing, fullness, or pain.  Discomfort in other areas of the upper body. Symptoms can include pain or discomfort in one or both arms, the back, neck, jaw, or stomach.  Shortness of breath with or without chest discomfort.  Other signs may include breaking out in a cold sweat, nausea, or lightheadedness. Don't wait more than five minutes to call 911 - MINUTES MATTER! Fast action can save your life. Calling 911 is almost always the fastest way to get lifesaving treatment. Emergency Medical Services staff can begin treatment when they arrive -- up to an hour sooner than if someone gets to the hospital by car. [employer] Activation    Thank you for requesting access to [employer]. Please follow the instructions below to securely access and download your online medical record. [employer] allows you to send messages to your doctor, view your test results, renew your prescriptions, schedule appointments, and more. How Do I Sign Up? 1. In your internet browser, go to [email or web address]  2. Click on the First Time User? Click Here link in the Sign In box. You will be redirect to the New Member Sign Up page. 3. Enter your [employer] Access Code exactly as it appears below.  You will not need to use this code after youve completed the sign-up process. If you do not sign up before the expiration date, you must request a new code. [employer] Access Code: [email or web address]  Expires: 2018  3:12 PM (This is the date your [employer] access code will )    4. Enter the last four digits of your Social Security Number (xxxx) and Date of Birth (mm/dd/yyyy) as indicated and click Submit. You will be taken to the next sign-up page. 5. Create a [employer] ID. This will be your [employer] login ID and cannot be changed, so think of one that is secure and easy to remember. 6. Create a [employer] password. You can change your password at any time. 7. Enter your Password Reset Question and Answer. This can be used at a later time if you forget your password. 8. Enter your e-mail address. You will receive e-mail notification when new information is available in [facility] [address] 9. Click Sign Up. You can now view and download portions of your medical record. 10. Click the Download Summary menu link to download a portable copy of your medical information. Additional Information    If you have questions, please visit the Frequently Asked Questions section of the [employer] website at [email or web address] Remember, [employer] is NOT to be used for urgent needs. For medical emergencies, dial 911. Patient armband removed and shredded. The discharge information has been reviewed with the patient and spouse. The patient and spouse verbalized understanding. Discharge medications reviewed with the patient and spouse and appropriate educational materials and side effects teaching were provided.   ___________________________________________________________________________________________________________________________________

## 2017-12-16 NOTE — PROGRESS NOTES
0740 - received pt in room. Pt resting quietly. No distress noted.    1990 - assessment completed. Pt is AOx4. Small drainage to J tube, infusing. Patient resting. Call bell at bedside. No distress noted. 1430 - pt inquired as to her discharge and stated that she was told that she would be d/c'd today. Advised pt that [place] has not been set up as yet, pt stated \"it doesn't matter, I'm leaving here today regardless, there's nothing that you can do for me here, so I want to go home, I've been here to long. \" Placed a call to Dr. Noman Saleh for discharge and explained situation. Spoke with  Chandana Fernandez. 1600 - reviewed discharge instructions with pt. Left room for questions/concerns. Pt verbalized her understanding.

## 2017-12-17 NOTE — PROGRESS NOTES
I received a call from [facility] care asking if the patient has a feeding pump. I have called the patient , her parent and her sister in an effort to answer this question. I have yet to have a return call from any family member regarding this question. I have emailed Zenaida Sanchez and informed Dr Sherry New of the difficulty I am experiencing contacting this patient.  Phuong Rivero RN

## 2017-12-18 ENCOUNTER — HOME CARE VISIT (OUTPATIENT)
Dept: HOME HEALTH SERVICES | Facility: HOME HEALTH | Age: 52
End: 2017-12-18

## 2017-12-18 NOTE — PROGRESS NOTES
Call to the pt . No answer. [facility] requesting callback asap to discuss dc /home care services. Contacted [facility] # [phone] option #5 to inquire if the pt was active with them. Their intake rep, Jayson Marcos, reported she is. Referral and updated home care and tube feeding orders sent to them via Fax.

## 2017-12-19 ENCOUNTER — HOME CARE VISIT (OUTPATIENT)
Dept: HOME HEALTH SERVICES | Facility: HOME HEALTH | Age: 52
End: 2017-12-19

## 2017-12-28 NOTE — ANCILLARY DISCHARGE INSTRUCTIONS
[facility]  Discharge Phone Call       After-Care Discharge Phone Call Questions:    Were you able to get your prescriptions filled? Comment:      [x] Yes  []No    Comment if answer is \"No\"   Are you taking your medication(s) as your doctor ordered? Do you understand the purpose of your medications? Comment:    [x] Yes  []No    Comment if answer is \"No\"   Are you taking any other medications that are not on the list?  Comment:      [x] Yes  []No    Comment if answer is \"Yes\"   Do you have any questions about your medications? No  Are you aware of potential side effects? Yes   Comment:    [x] Yes  [x]No    Comment if answer is \"Yes\"   Did you make your follow-up appointments (if the hospital did not do this before  discharge)? Comment:    [x] Yes  []No    Comment if answer is \"No\"   Is there any reason you might not be able to keep your follow-up appointments? Comment:     [] Yes  [x]No    Comment if answer is \"Yes\"   Do you have any questions about your care plan? No  Are you aware of what health problems to be alert for? Yes   Comment:    [x] Yes  [x]No    Comment if answer is \"Yes\"   Do you have a good understanding of how you should manage your health? Comment:    [x] Yes  []No    Comment if answer is \"Yes\"   Do you know which symptoms to watch for that would mean you would need to call your doctor right away? Comment:      [x] Yes  []No    Comment if answer is \"No\"   Do you have any questions about the follow up process or any instructions that we have provided? Comment:    [] Yes  [x]No    Comment if answer is \"Yes\"   Did staff take your preferences into account?         [x] Yes  []No    Comment if answer is \"Yes\"

## 2018-09-25 NOTE — PROGRESS NOTES
Problem: Falls - Risk of  Goal: *Absence of Falls  Document Kapil Fall Risk and appropriate interventions in the flowsheet.    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

## 2019-03-05 NOTE — CDMP QUERY
Please clarify if this patient is being treated/managed for:    =>Acute diastolic heart failure,  now compensated  =>Other Explanation of clinical findings  =>Unable to Determine (no explanation of clinical findings)    The medical record reflects the following:    Risk:  HTN,  CAD,     Clinical Indicators: Progress notes 10/2- IM- Pt also seems to have chf as the  is markedly elevated at >35,000. ... Will need to be slowly diuresed once the infection has come under control .    PCCM- Possible acute exacerbation of diastolic CHF-compensated, does not appear fluid overloaded.    9/29 CXR- There may be developing pulmonary venous hypertension which could represent fluid overload  9/25, 9/26, 9/30 NT  pro BNP  all > 26273    Treatment: 10/1- IV lasix ordered X1  10/2- IV Hydralazine ordered X2 ,   Lopressor increased,  NT  proBNP ordered- 55823     Please clarify and document your clinical opinion in the progress notes and discharge summary   Thank you,  Mary Vidales [facility], [address] [place] [address] Rd
Please clarify if this patient is being treated/managed for:    =>Mild metabolic/toxic encephalopathy  =>Other Explanation of clinical findings  =>Unable to Determine (no explanation of clinical findings)    The medical record reflects the following:    Risk:Septic shock, pneumonia, acute hypoxic respiratory failure    Clinical Indicators: 9/26-   . Samm Bailey .  agitation overnight requiring precedex  Lethargic today 2/2 ativan  9/27-  Lethargic today 2/2 precedex and pain meds    Treatment: Tx of underlying infection, ICU monitoring    Please clarify and document your clinical opinion in the progress notes and discharge.      Thank you,    Louise Fairchild RN [address] [facility] [place]
Imaging Studies/Labs

## 2023-10-16 NOTE — PROGRESS NOTES
Ms. Ramos Valdes is awake and agitated. She is biting the endotracheal tube and coughing frequently. Dr. Kyle Pearce paged notified of patient's status. Order received for duoneb nebulizer treatments q4h. A bite block was placed. RN to the bedside to adjust sedation medications. Will continue to monitor. Where Do You Want The Question To Include Opioid Counseling Located?: Case Summary Tab

## 2024-07-02 NOTE — PROGRESS NOTES
"Physical Therapy Treatment    Patient Name: Helen Johnston  MRN: [id number]    Today's Date: 7/2/2024  Time Calculation  Start Time: 1137  Stop Time: 1217  Time Calculation (min): 40 min     PT Therapeutic Procedures Time Entry  Therapeutic Exercise Time Entry: 38                   Insurance:  Visit:  3 of [place]  Auth required: No  Payor: [employer] / Plan: [employer]ICARE / Product Type: *No Product type* /    Certification Dates:  6/5/24 - 9/3/24    Assessment:   Able to progress strengthening without increased sx.      Plan:   Continue to progress strengthening as tolerated.    Current Problem  1. Strain of right quadriceps muscle, sequela            General  General  Reason for Referral: R quad strain  Referred By: Dr. Elizabeth    Subjective    No change in knee pain.  Has been doing a lot of yard work which has challenged the knee.    Precautions  Precautions  Medical Precautions: No known precautions/limitation (Medical hx reviewed.  (+) for HTN, asthma, COPD, thyroid disease.  Not likely to impact care.)  Pain  Pain Assessment  Pain Assessment: 0-10  0-10 (Numeric) Pain Score: 3  Pain Location: Knee  Pain Orientation: Right    Objective   AROM  R knee flexion 104    TREATMENT  THERAPEUTIC EXERCISE:  Recumbent bike seat 12 level 1 for 6mins  Bridges/glute sets 5\"x10  SLR 10x ea (with breaks)  Sup clam with Blue TB 10x2  LAQ with 3# wt 5\"x10 ea  Seated HS curls Blue TB 10x ea  Standing hip ABD 10x2 ea  Mini squat standing at counter 10x2    MANUAL THERAPY:      NEUROMUSCULAR RE-EDUCATION:      THERAPEUTIC ACTIVITY:      MODALITIES:      OP EDUCATION:   Access Code: [email or web address]  URL: [email or web address]  Date: 07/02/2024  Prepared by: Emely Hu    Exercises  - Supine Active Straight Leg Raise  - 2 x daily - 7 x weekly - 1 sets - 10 reps  - Supine Bridge  - 2 x daily - 7 x weekly - 1 sets - 10 reps  - Seated Long Arc Quad  - 2 x daily - 7 x weekly - 1 sets - 10 reps - 5 hold  - Single " Medicine Progress Note    Patient: Yane Martínez   Age:  54 y.o.  DOA: 10/11/2017   Admit Dx / CC: Respiratory failure with hypoxia ([address]  Recurrent pneumonia  Recurrent pneumonia  Respiratory failure with hypoxia ([address]  LOS:  LOS: 4 days     Assessment/Plan   Principal Problem:    Recurrent pneumonia (10/11/2017)    Active Problems:    Respiratory failure with hypoxia ([address] (10/11/2017)      KEATON (acute kidney injury) ([address] (10/11/2017)        Additional Plan notes     1)  Recurrent PNA   - recent extended hospital stay for Pseudomonal PNA about 3 weeks   - ID on board, continue cipro and vanc, stop zosyn   - on hi sunil, weaning. Step down , pulm following. 2)  Acute on chronic resp failure   - 2/2 above    DISPO     Anticipated Date of Discharge: ? ??? Anticipated Disposition (home, SNF) : home    Subjective:   Patient seen and examined. Patient looks much improved today, states feels less SOB. Objective:     Visit Vitals    BP (!) 147/96    Pulse 100    Temp 99.1 °F (37.3 °C)    Resp 21    Wt 65.2 kg (143 lb 11.8 oz)    SpO2 95%    BMI 24.67 kg/m2       Physical Exam:  General appearance: alert, cooperative, no distress, appears stated age  Head: Normocephalic, without obvious abnormality, atraumatic  Neck: supple, trachea midline  Lungs: B/L coarse , B/L crackles R>L  Heart: regular rate and rhythm, S1, S2 normal, no murmur, click, rub or gallop  Abdomen: soft, non-tender.  Bowel sounds normal. No masses,  no organomegaly  Extremities: extremities normal, atraumatic, no cyanosis or edema  Skin: Skin color, texture, turgor normal. No rashes or lesions    Intake and Output:  Current Shift:  10/15 0701 - 10/15 1900  In: 390   Out: 355 [Urine:355]  Last three shifts:  10/13 1901 - 10/15 0700  In: 2869 [I.V.:450]  Out: 8526 [Urine:3195]    Lab/Data Reviewed:  CMP:   Lab Results   Component Value Date/Time     10/15/2017 03:40 AM    K 3.5 10/15/2017 03:40 AM     10/15/2017 03:40 AM    CO2 31 10/15/2017 03:40 AM    AGAP 6 10/15/2017 03:40 AM     (H) 10/15/2017 03:40 AM    BUN 15 10/15/2017 03:40 AM    CREA 1.28 10/15/2017 03:40 AM    GFRAA 54 (L) 10/15/2017 03:40 AM    GFRNA 44 (L) 10/15/2017 03:40 AM    CA 8.2 (L) 10/15/2017 03:40 AM     CBC:   Lab Results   Component Value Date/Time    WBC 13.3 (H) 10/15/2017 03:40 AM    HGB 7.8 (L) 10/15/2017 03:40 AM    HCT 24.0 (L) 10/15/2017 03:40 AM     10/15/2017 03:40 AM     All Cardiac Markers in the last 24 hours: No results found for: CPK, CK, CKMMB, CKMB, RCK3, CKMBT, CKNDX, CKND1, LAVELLE, TROPT, TROIQ, RAMIREZ, TROPT, TNIPOC, BNP, BNPP    Medications Reviewed:  Current Facility-Administered Medications   Medication Dose Route Frequency    HYDROcodone-acetaminophen (NORCO) 7.5-325 mg per tablet 1 Tab  1 Tab Oral Q3H PRN    guaiFENesin (ROBITUSSIN) 100 mg/5 mL oral liquid 100 mg  100 mg Oral Q4H PRN    [START ON 10/16/2017] amLODIPine (NORVASC) tablet 10 mg  10 mg Oral DAILY    promethazine (PHENERGAN) 25 mg in 0.9% sodium chloride 50 mL IVPB  25 mg IntraVENous Q6H PRN    vancomycin (VANCOCIN) 750 mg in 0.9% sodium chloride (MBP/ADV) 250 mL ADV  750 mg IntraVENous Q24H    [START ON 10/17/2017] VANCOMYCIN INFORMATION NOTE   Other ONCE    gabapentin (NEURONTIN) capsule 400 mg  400 mg Oral TID    albuterol-ipratropium (DUO-NEB) 2.5 MG-0.5 MG/3 ML  3 mL Nebulization Q6H RT    budesonide (PULMICORT) 500 mcg/2 ml nebulizer suspension  500 mcg Nebulization BID RT    ciprofloxacin HCl (CIPRO) tablet 500 mg  500 mg Oral Q12H    0.9% sodium chloride infusion 250 mL  250 mL IntraVENous PRN    labetalol (NORMODYNE) tablet 100 mg  100 mg Oral Q12H    labetalol (NORMODYNE;TRANDATE) injection 20 mg  20 mg IntraVENous Q4H PRN    albuterol (PROVENTIL VENTOLIN) nebulizer solution 2.5 mg  2.5 mg Nebulization Q4H PRN    heparin (porcine) pf 300-500 Units  300-500 Units InterCATHeter PRN    sodium chloride (NS) flush 10-30 mL  10-30 mL InterCATHeter PRN Leg Balance with Clock Reach  - 2 x daily - 7 x weekly - 1 sets - 10 reps  - Standing Hip Abduction with Counter Support  - 2 x daily - 7 x weekly - 1 sets - 10 reps  - Hooklying Clamshell with Resistance  - 2 x daily - 7 x weekly - 1 sets - 10 reps  - Mini Squat with Counter Support  - 2 x daily - 7 x weekly - 1 sets - 10 reps    Goals:  1. Pain 0/10  2. Outcomes Measure:  LEFS 40/80  3. RLE strength 5/5 to allow patient to lift leg into car without increased sx.   4. Demonstrates independence with HEP.    sodium chloride (NS) flush 10 mL  10 mL InterCATHeter Q24H    sodium chloride (NS) flush 10 mL  10 mL InterCATHeter PRN    sodium chloride (NS) flush 10-40 mL  10-40 mL InterCATHeter Q8H    sodium chloride (NS) flush 20 mL  20 mL InterCATHeter Q24H    famotidine (PEPCID) tablet 20 mg  20 mg Oral BID    sodium chloride (NS) flush 5-10 mL  5-10 mL IntraVENous PRN    naloxone (NARCAN) injection 0.4 mg  0.4 mg IntraVENous PRN    heparin (porcine) injection 5,000 Units  5,000 Units SubCUTAneous Q8H    insulin lispro (HUMALOG) injection   SubCUTAneous Q6H    glucose chewable tablet 16 g  4 Tab Oral PRN    glucagon (GLUCAGEN) injection 1 mg  1 mg IntraMUSCular PRN    dextrose (D50W) injection syrg 12.5-25 g  25-50 mL IntraVENous PRN    VANCOMYCIN INFORMATION NOTE   Other Rx Dosing/Monitoring       Goran Bailey MD    October 15, 2017

## 2025-05-21 NOTE — ED NOTES
2 week post op, Left TKA  DOS: 5/15/25  Progress: well  Pain: 2-3/10 w/o pain medication   Therapy: 2 days per week  Weight Bearing: FWB w/ cane   Sutures: staples  Pain Medication: None  Refill Needed: No  Problems: doing well without problems  Note: Yes - wants to start working from home on 6/1/25        SIDE RAILS UP X 2   BED IN LOW AND LOCKED  POSITION  FLUIDS AND TOILETING OFFERED  PLAN OF CARE REVIEWED WITH PT/FAMILY  IV SITE ASSESSED   PAIN ASSESSED  COMFORT ADDRESSED  CALL BELL WITHIN REACH      Patient complains of back pain. Repositioned for comfort to left side.

## (undated) DEVICE — KIT COLON W/ 1.1OZ LUB AND 2 END

## (undated) DEVICE — BITE BLK ENDOSCP AD 54FR GRN POLYETH ENDOSCP W STRP SLD

## (undated) DEVICE — SYR 50ML SLIP TIP NSAF LF STRL --

## (undated) DEVICE — MEDI-VAC NON-CONDUCTIVE SUCTION TUBING: Brand: CARDINAL HEALTH

## (undated) DEVICE — BASIN EMESIS 500CC ROSE 250/CS 60/PLT: Brand: MEDEGEN MEDICAL PRODUCTS, LLC

## (undated) DEVICE — FLEX ADVANTAGE 1500CC: Brand: FLEX ADVANTAGE

## (undated) DEVICE — KENDALL 500 SERIES DIAPHORETIC FOAM MONITORING ELECTRODE - TEAR DROP SHAPE ( 30/PK): Brand: KENDALL